# Patient Record
Sex: FEMALE | Race: WHITE | Employment: UNEMPLOYED | URBAN - METROPOLITAN AREA
[De-identification: names, ages, dates, MRNs, and addresses within clinical notes are randomized per-mention and may not be internally consistent; named-entity substitution may affect disease eponyms.]

---

## 2017-01-24 ENCOUNTER — APPOINTMENT (OUTPATIENT)
Dept: GENERAL RADIOLOGY | Age: 57
DRG: 071 | End: 2017-01-24
Attending: EMERGENCY MEDICINE
Payer: MEDICARE

## 2017-01-24 ENCOUNTER — APPOINTMENT (OUTPATIENT)
Dept: CT IMAGING | Age: 57
DRG: 071 | End: 2017-01-24
Attending: EMERGENCY MEDICINE
Payer: MEDICARE

## 2017-01-24 ENCOUNTER — HOSPITAL ENCOUNTER (INPATIENT)
Age: 57
LOS: 7 days | Discharge: HOME | DRG: 071 | End: 2017-01-31
Attending: EMERGENCY MEDICINE | Admitting: HOSPITALIST
Payer: MEDICARE

## 2017-01-24 DIAGNOSIS — R41.0 DELIRIUM: Primary | ICD-10-CM

## 2017-01-24 PROBLEM — R65.10 SIRS (SYSTEMIC INFLAMMATORY RESPONSE SYNDROME) (HCC): Status: ACTIVE | Noted: 2017-01-24

## 2017-01-24 PROBLEM — E87.1 HYPONATREMIA: Status: ACTIVE | Noted: 2017-01-24

## 2017-01-24 PROBLEM — J09.X2 INFLUENZA A (H5N1): Status: ACTIVE | Noted: 2017-01-24

## 2017-01-24 PROBLEM — R41.82 ALTERED MENTAL STATUS: Status: ACTIVE | Noted: 2017-01-24

## 2017-01-24 PROBLEM — R06.02 SHORTNESS OF BREATH: Status: ACTIVE | Noted: 2017-01-24

## 2017-01-24 LAB
ALBUMIN SERPL BCP-MCNC: 4.2 G/DL (ref 3.5–5)
ALBUMIN/GLOB SERPL: 1.2 {RATIO} (ref 1.1–2.2)
ALP SERPL-CCNC: 108 U/L (ref 45–117)
ALT SERPL-CCNC: 34 U/L (ref 12–78)
AMPHET UR QL SCN: NEGATIVE
ANION GAP BLD CALC-SCNC: 11 MMOL/L (ref 5–15)
APAP SERPL-MCNC: <2 UG/ML (ref 10–30)
APPEARANCE UR: CLEAR
AST SERPL W P-5'-P-CCNC: 24 U/L (ref 15–37)
ATRIAL RATE: 93 BPM
BACTERIA URNS QL MICRO: NEGATIVE /HPF
BARBITURATES UR QL SCN: NEGATIVE
BASOPHILS # BLD AUTO: 0 K/UL (ref 0–0.1)
BASOPHILS # BLD: 0 % (ref 0–1)
BENZODIAZ UR QL: NEGATIVE
BILIRUB SERPL-MCNC: 0.4 MG/DL (ref 0.2–1)
BILIRUB UR QL: NEGATIVE
BUN SERPL-MCNC: 10 MG/DL (ref 6–20)
BUN/CREAT SERPL: 11 (ref 12–20)
CALCIUM SERPL-MCNC: 9.1 MG/DL (ref 8.5–10.1)
CALCULATED P AXIS, ECG09: 73 DEGREES
CALCULATED R AXIS, ECG10: 51 DEGREES
CALCULATED T AXIS, ECG11: 65 DEGREES
CANNABINOIDS UR QL SCN: NEGATIVE
CHLORIDE SERPL-SCNC: 109 MMOL/L (ref 97–108)
CK SERPL-CCNC: 182 U/L (ref 26–192)
CO2 SERPL-SCNC: 19 MMOL/L (ref 21–32)
COCAINE UR QL SCN: NEGATIVE
COLOR UR: ABNORMAL
CREAT SERPL-MCNC: 0.9 MG/DL (ref 0.55–1.02)
DIAGNOSIS, 93000: NORMAL
DRUG SCRN COMMENT,DRGCM: NORMAL
EOSINOPHIL # BLD: 0 K/UL (ref 0–0.4)
EOSINOPHIL NFR BLD: 0 % (ref 0–7)
EPITH CASTS URNS QL MICRO: ABNORMAL /LPF
ERYTHROCYTE [DISTWIDTH] IN BLOOD BY AUTOMATED COUNT: 14.9 % (ref 11.5–14.5)
GLOBULIN SER CALC-MCNC: 3.5 G/DL (ref 2–4)
GLUCOSE SERPL-MCNC: 135 MG/DL (ref 65–100)
GLUCOSE UR STRIP.AUTO-MCNC: NEGATIVE MG/DL
HCT VFR BLD AUTO: 40 % (ref 35–47)
HGB BLD-MCNC: 13.5 G/DL (ref 11.5–16)
HGB UR QL STRIP: NEGATIVE
HYALINE CASTS URNS QL MICRO: ABNORMAL /LPF (ref 0–5)
KETONES UR QL STRIP.AUTO: ABNORMAL MG/DL
LEUKOCYTE ESTERASE UR QL STRIP.AUTO: NEGATIVE
LYMPHOCYTES # BLD AUTO: 17 % (ref 12–49)
LYMPHOCYTES # BLD: 1.6 K/UL (ref 0.8–3.5)
MCH RBC QN AUTO: 28 PG (ref 26–34)
MCHC RBC AUTO-ENTMCNC: 33.8 G/DL (ref 30–36.5)
MCV RBC AUTO: 83 FL (ref 80–99)
METHADONE UR QL: NEGATIVE
MONOCYTES # BLD: 0.3 K/UL (ref 0–1)
MONOCYTES NFR BLD AUTO: 3 % (ref 5–13)
NEUTS SEG # BLD: 7.5 K/UL (ref 1.8–8)
NEUTS SEG NFR BLD AUTO: 80 % (ref 32–75)
NITRITE UR QL STRIP.AUTO: NEGATIVE
OPIATES UR QL: NEGATIVE
P-R INTERVAL, ECG05: 150 MS
PCP UR QL: NEGATIVE
PH UR STRIP: 7 [PH] (ref 5–8)
PLATELET # BLD AUTO: 347 K/UL (ref 150–400)
POTASSIUM SERPL-SCNC: 3.4 MMOL/L (ref 3.5–5.1)
PROT SERPL-MCNC: 7.7 G/DL (ref 6.4–8.2)
PROT UR STRIP-MCNC: 30 MG/DL
Q-T INTERVAL, ECG07: 380 MS
QRS DURATION, ECG06: 80 MS
QTC CALCULATION (BEZET), ECG08: 472 MS
RBC # BLD AUTO: 4.82 M/UL (ref 3.8–5.2)
RBC #/AREA URNS HPF: ABNORMAL /HPF (ref 0–5)
SALICYLATES SERPL-MCNC: 2.6 MG/DL (ref 2.8–20)
SODIUM SERPL-SCNC: 139 MMOL/L (ref 136–145)
SP GR UR REFRACTOMETRY: 1.02 (ref 1–1.03)
TROPONIN I SERPL-MCNC: <0.04 NG/ML
UA: UC IF INDICATED,UAUC: ABNORMAL
UROBILINOGEN UR QL STRIP.AUTO: 0.2 EU/DL (ref 0.2–1)
VENTRICULAR RATE, ECG03: 93 BPM
WBC # BLD AUTO: 9.4 K/UL (ref 3.6–11)
WBC URNS QL MICRO: ABNORMAL /HPF (ref 0–4)

## 2017-01-24 PROCEDURE — 70450 CT HEAD/BRAIN W/O DYE: CPT

## 2017-01-24 PROCEDURE — 99285 EMERGENCY DEPT VISIT HI MDM: CPT

## 2017-01-24 PROCEDURE — 82550 ASSAY OF CK (CPK): CPT | Performed by: EMERGENCY MEDICINE

## 2017-01-24 PROCEDURE — 85025 COMPLETE CBC W/AUTO DIFF WBC: CPT | Performed by: EMERGENCY MEDICINE

## 2017-01-24 PROCEDURE — 51701 INSERT BLADDER CATHETER: CPT

## 2017-01-24 PROCEDURE — 80307 DRUG TEST PRSMV CHEM ANLYZR: CPT | Performed by: EMERGENCY MEDICINE

## 2017-01-24 PROCEDURE — 71010 XR CHEST PORT: CPT

## 2017-01-24 PROCEDURE — 65660000001 HC RM ICU INTERMED STEPDOWN

## 2017-01-24 PROCEDURE — 93005 ELECTROCARDIOGRAM TRACING: CPT

## 2017-01-24 PROCEDURE — 84484 ASSAY OF TROPONIN QUANT: CPT | Performed by: EMERGENCY MEDICINE

## 2017-01-24 PROCEDURE — 80053 COMPREHEN METABOLIC PANEL: CPT | Performed by: EMERGENCY MEDICINE

## 2017-01-24 PROCEDURE — 74011000250 HC RX REV CODE- 250: Performed by: HOSPITALIST

## 2017-01-24 PROCEDURE — 81001 URINALYSIS AUTO W/SCOPE: CPT | Performed by: EMERGENCY MEDICINE

## 2017-01-24 PROCEDURE — 74011250636 HC RX REV CODE- 250/636: Performed by: HOSPITALIST

## 2017-01-24 PROCEDURE — 96374 THER/PROPH/DIAG INJ IV PUSH: CPT

## 2017-01-24 PROCEDURE — 77030005563 HC CATH URETH INT MMGH -A

## 2017-01-24 PROCEDURE — 36415 COLL VENOUS BLD VENIPUNCTURE: CPT | Performed by: EMERGENCY MEDICINE

## 2017-01-24 PROCEDURE — 74011250637 HC RX REV CODE- 250/637: Performed by: HOSPITALIST

## 2017-01-24 PROCEDURE — 74011250636 HC RX REV CODE- 250/636: Performed by: EMERGENCY MEDICINE

## 2017-01-24 RX ORDER — LANOLIN ALCOHOL/MO/W.PET/CERES
400 CREAM (GRAM) TOPICAL DAILY
Status: DISCONTINUED | OUTPATIENT
Start: 2017-01-24 | End: 2017-01-31 | Stop reason: HOSPADM

## 2017-01-24 RX ORDER — POTASSIUM CHLORIDE 750 MG/1
40 TABLET, FILM COATED, EXTENDED RELEASE ORAL
Status: COMPLETED | OUTPATIENT
Start: 2017-01-24 | End: 2017-01-24

## 2017-01-24 RX ORDER — ENOXAPARIN SODIUM 100 MG/ML
40 INJECTION SUBCUTANEOUS EVERY 24 HOURS
Status: DISCONTINUED | OUTPATIENT
Start: 2017-01-24 | End: 2017-01-31 | Stop reason: HOSPADM

## 2017-01-24 RX ORDER — LOPERAMIDE HYDROCHLORIDE 2 MG/1
2 CAPSULE ORAL
COMMUNITY
End: 2017-01-31

## 2017-01-24 RX ORDER — FAMOTIDINE 20 MG/1
20 TABLET, FILM COATED ORAL 2 TIMES DAILY
Status: DISCONTINUED | OUTPATIENT
Start: 2017-01-24 | End: 2017-01-31 | Stop reason: HOSPADM

## 2017-01-24 RX ORDER — QUETIAPINE FUMARATE 100 MG/1
300 TABLET, FILM COATED ORAL DAILY
Status: DISCONTINUED | OUTPATIENT
Start: 2017-01-24 | End: 2017-01-24

## 2017-01-24 RX ORDER — SODIUM CHLORIDE 0.9 % (FLUSH) 0.9 %
5-10 SYRINGE (ML) INJECTION EVERY 8 HOURS
Status: DISCONTINUED | OUTPATIENT
Start: 2017-01-24 | End: 2017-01-31 | Stop reason: HOSPADM

## 2017-01-24 RX ORDER — LORAZEPAM 1 MG/1
2 TABLET ORAL 3 TIMES DAILY
Status: DISCONTINUED | OUTPATIENT
Start: 2017-01-24 | End: 2017-01-25

## 2017-01-24 RX ORDER — SODIUM CHLORIDE 0.9 % (FLUSH) 0.9 %
5-10 SYRINGE (ML) INJECTION AS NEEDED
Status: DISCONTINUED | OUTPATIENT
Start: 2017-01-24 | End: 2017-01-31 | Stop reason: HOSPADM

## 2017-01-24 RX ORDER — SODIUM CHLORIDE 9 MG/ML
100 INJECTION, SOLUTION INTRAVENOUS CONTINUOUS
Status: DISCONTINUED | OUTPATIENT
Start: 2017-01-24 | End: 2017-01-26

## 2017-01-24 RX ORDER — FLUOROMETHOLONE 1 MG/ML
1 SOLUTION/ DROPS OPHTHALMIC 2 TIMES DAILY
Status: DISCONTINUED | OUTPATIENT
Start: 2017-01-24 | End: 2017-01-31 | Stop reason: HOSPADM

## 2017-01-24 RX ORDER — FAMOTIDINE 20 MG/1
20 TABLET, FILM COATED ORAL 2 TIMES DAILY
Status: DISCONTINUED | OUTPATIENT
Start: 2017-01-24 | End: 2017-01-24 | Stop reason: SDUPTHER

## 2017-01-24 RX ORDER — HYDROCORTISONE ACETATE 1 %
400 CREAM (GRAM) TOPICAL DAILY
Status: DISCONTINUED | OUTPATIENT
Start: 2017-01-24 | End: 2017-01-24 | Stop reason: SDUPTHER

## 2017-01-24 RX ORDER — FLUTICASONE PROPIONATE 50 MCG
2 SPRAY, SUSPENSION (ML) NASAL 2 TIMES DAILY
Status: DISCONTINUED | OUTPATIENT
Start: 2017-01-24 | End: 2017-01-31 | Stop reason: HOSPADM

## 2017-01-24 RX ORDER — MELATONIN
1000 DAILY
Status: DISCONTINUED | OUTPATIENT
Start: 2017-01-24 | End: 2017-01-31 | Stop reason: HOSPADM

## 2017-01-24 RX ORDER — LORAZEPAM 2 MG/ML
1 INJECTION INTRAMUSCULAR
Status: COMPLETED | OUTPATIENT
Start: 2017-01-24 | End: 2017-01-24

## 2017-01-24 RX ORDER — MORPHINE SULFATE 2 MG/ML
2 INJECTION, SOLUTION INTRAMUSCULAR; INTRAVENOUS
Status: DISCONTINUED | OUTPATIENT
Start: 2017-01-24 | End: 2017-01-28

## 2017-01-24 RX ORDER — IPRATROPIUM BROMIDE AND ALBUTEROL SULFATE 2.5; .5 MG/3ML; MG/3ML
3 SOLUTION RESPIRATORY (INHALATION)
Status: DISCONTINUED | OUTPATIENT
Start: 2017-01-24 | End: 2017-01-27

## 2017-01-24 RX ORDER — VITAMIN E 268 MG
400 CAPSULE ORAL DAILY
COMMUNITY
End: 2021-08-03 | Stop reason: ALTCHOICE

## 2017-01-24 RX ORDER — FENOFIBRATE 145 MG/1
145 TABLET, COATED ORAL
Status: DISCONTINUED | OUTPATIENT
Start: 2017-01-24 | End: 2017-01-31 | Stop reason: HOSPADM

## 2017-01-24 RX ORDER — OSELTAMIVIR PHOSPHATE 75 MG/1
75 CAPSULE ORAL EVERY 12 HOURS
Status: CANCELLED | OUTPATIENT
Start: 2017-01-24

## 2017-01-24 RX ADMIN — FAMOTIDINE 20 MG: 20 TABLET ORAL at 18:30

## 2017-01-24 RX ADMIN — Medication 1 CAPSULE: at 18:30

## 2017-01-24 RX ADMIN — FLUOROMETHOLONE 1 DROP: 1 SOLUTION/ DROPS OPHTHALMIC at 18:30

## 2017-01-24 RX ADMIN — Medication 10 ML: at 22:47

## 2017-01-24 RX ADMIN — VITAMIN D, TAB 1000IU (100/BT) 1000 UNITS: 25 TAB at 18:30

## 2017-01-24 RX ADMIN — LORAZEPAM 1 MG: 2 INJECTION INTRAMUSCULAR; INTRAVENOUS at 11:28

## 2017-01-24 RX ADMIN — POTASSIUM CHLORIDE 40 MEQ: 750 TABLET, FILM COATED, EXTENDED RELEASE ORAL at 18:29

## 2017-01-24 RX ADMIN — VITAMIN E CAP 400 UNIT 400 UNITS: 400 CAP at 15:51

## 2017-01-24 RX ADMIN — SODIUM CHLORIDE 100 ML/HR: 900 INJECTION, SOLUTION INTRAVENOUS at 16:03

## 2017-01-24 RX ADMIN — FENOFIBRATE 145 MG: 145 TABLET, FILM COATED ORAL at 15:51

## 2017-01-24 RX ADMIN — QUETIAPINE FUMARATE 300 MG: 100 TABLET, FILM COATED ORAL at 15:51

## 2017-01-24 RX ADMIN — ENOXAPARIN SODIUM 40 MG: 40 INJECTION SUBCUTANEOUS at 15:51

## 2017-01-24 RX ADMIN — FLUTICASONE PROPIONATE 2 SPRAY: 50 SPRAY, METERED NASAL at 22:46

## 2017-01-24 RX ADMIN — LORAZEPAM 2 MG: 2 TABLET ORAL at 15:51

## 2017-01-24 RX ADMIN — Medication 10 ML: at 15:52

## 2017-01-24 RX ADMIN — LORAZEPAM 2 MG: 2 TABLET ORAL at 22:46

## 2017-01-24 NOTE — ED NOTES
Bedside and Verbal shift change report given to Candace Ortiz RN (oncoming nurse) by Cassia Olson RN (offgoing nurse). Report included the following information SBAR, ED Summary, MAR and Recent Results.

## 2017-01-24 NOTE — H&P
[address] [facility] [address] [place] [address], [address]   HISTORY AND PHYSICAL       Name:  Dg Ann   MR#:  [id number]   :  1960   Account #:  [de-identified]        Date of Adm:  2017       PRIMARY CARE PROVIDER: Adalgisa Puente MD    SOURCE OF INFORMATION: The patient, her sister at the bedside   and from ER staff. CHIEF COMPLAINT: Altered mental status. HISTORY OF PRESENT ILLNESS: This is a 58-year-old    female with past medical history significant for bipolar disorder, irritable   bowel syndrome, and headache, who is brought to [facility]   Emergency Department after she was found confused, agitated at   [facility] assisted living facility. The patient is a poor historian   and per her sister at the bedside, she reported that she was called by   [employer] at [facility] and she is told that the patient was confused  and agitated, she went to see the patient, patient was on her bed and her   medications on the tables. Her sister did not know which one of them  the patient took this morning. Her sister states that the patient didn't take  her medications 2 days ago. Per her sister, patient is more confused and  this different from her baseline. She had multiple falls in the past few days. The patient received Ativan in ER and look calm, but she does not give reliable   information. CT of the head without contrast was done, no acute   abnormality identified. Toxicology screening was negative. Urinalysis   unremarkable. Chest x-ray, no acute process, and there is a concern   for overdose and the patient is referred to hospitalist service for further   evaluation and admission. REVIEW OF SYSTEMS: Not able to obtain as the patient is confused. PAST MEDICAL HISTORY:   1. Bipolar disorder. 2. Gastroesophageal reflux disease. 3. Irritable bowel syndrome. 4. History of chronic headache.       PRIOR TO ADMISSION MEDICATIONS INCLUDE:   1. Vitamin E 400 units p.o. daily. 2. Imodium 2 mg 4 times daily as needed. 3. Pepcid 20 mg p.o. b.i.d.   4. Seroquel 300 mg p.o. daily. 5. Neurontin 300 mg p.o. as needed. 6. Nellie-Q 1 tab p.o. daily. 7. Flonase 2 sprays as needed. 8. Tricor 145 mg p.o. daily. 9. Benadryl 50 mg p.o. at bedtime. 10. Aspirin-acetaminophen-caffeine 1 tab every 6 hours as needed. 11. Ativan 2 mg 3 times daily. 12. Cholecalciferol 1 tab p.o. daily. ALLERGIES:    1. DICYCLOMINE. 2. LITHIUM. SOCIAL HISTORY: The patient from [facility] assisted living   facility. No tobacco or alcohol abuse. Ambulates with a rollator. CODE STATUS: FULL CODE. PHYSICAL EXAMINATION   VITAL SIGNS: Blood pressure 146/83, pulse 88, temperature 97.9,   respiratory rate 16. GENERAL APPEARANCE: The patient is alert, confused, but   cooperative, not in cardiorespiratory distress. HEENT: Pink conjunctivae and anicteric sclerae, moist tongue. Pupils   dilated, reactive to light slowly. Dry tongue and buccal mucosa. LUNGS: Clear to auscultation bilaterally. CHEST WALL: No tenderness or deformity. HEART: Regular rate and rhythm. S1 and S2 normal. No murmur or   gallop. ABDOMEN: Soft, nontender. Bowel sounds normal. No mass or   organomegaly. EXTREMITIES: No cyanosis or edema. SKIN: No rash or lesion. CENTRAL NERVOUS SYSTEM: The patient is conscious and alert,   confused. Moves all her extremities. EKG: Normal sinus rhythm, ventricular rate 93 beats per minute. No   significant EKG change from 03/19/2015. LABORATORY DATA: Hematology: White blood cell count 9.4,   hemoglobin 13.5, hematocrit 40, MCV 83, platelet count 573. Urinalysis unremarkable. Sodium 139, potassium 3.4, CO2 19, anion   gap 11, glucose 135, BUN 10, creatinine 0.90, calcium 9.1, ALT 34,   AST 24, alkaline phosphatase 128. Troponin less than 0.04. CT head without contrast: No acute process. Chest x-ray: No acute process.     ASSESSMENT: This is a 49-year-old  female with past   medical history significant for bipolar disorder, irritable bowel   syndrome, headache, who is brought from [facility] assisted   facility for altered mental status and confusion and agitation. 1. Encephalopathy, likely metabolic ? Over dose. 2. Hypokalemia. 3. History of bipolar disorder. 4. Chronic headache. PLAN:    1. Encephalopathy, metabolic versus delirium secondary to over dose present on admission. Admit to IMCU. Will hydrate with normal saline, monitor intake and output. CT scan is negative,  Neuro check, swallowing evaluation, fall precaution and consult psychiatrist,    2. Hypokalemia. Replace with KCl and repeat BMP in a.m.   3. History of bipolar disorder. The patient is stable after she had Ativan   and continue her home medications ativan, stop seroquel and consult psychiatrist.    Gastrointestinal prophylaxis, Pepcid. DVT prophylaxis, Lovenox.          MD HOUSTON Kenny / CAROLINA   D:  01/24/2017   13:49   T:  01/24/2017   14:25   Job #:  [id number]

## 2017-01-24 NOTE — PROGRESS NOTES
Admission Medication Reconciliation:    Information obtained from: Patient, patient's sister and rx bottles from home    Significant PMH/Disease States:   Past Medical History   Diagnosis Date    Arthritis     Bipolar disorder ([address]     Bladder pain     Choledocholithiasis 2010    GERD (gastroesophageal reflux disease)     Headache(784.0)      tension headaches    Irritable bowel     Pelvic pain in female     Psychiatric disorder      BIPOLAR    Stool color black      irritable bowel       Chief Complaint for this Admission:  AMS    Allergies:  Dicyclomine; Lithium; and Other medication    Prior to Admission Medications:   Prior to Admission Medications   Prescriptions Last Dose Informant Patient Reported? Taking? Cholecalciferol, Vitamin D3, (VITAMIN D3) 1,000 unit cap 2017 at Unknown time  Yes Yes   Sig: Take 1 Tab by mouth daily. L.acidoph&parac-S.therm-Bifido (THEO-Q,2,) 16 billion cell cap 2017 at Unknown time  No Yes   Si cap po QD   LORazepam (ATIVAN) 2 mg tablet 2017 at Unknown time  Yes Yes   Sig: Take 2 mg by mouth three (3) times daily. QUEtiapine (SEROQUEL) 300 mg tablet 2017 at Unknown time  Yes Yes   Sig: Take 300 mg by mouth daily. VILAZODONE HYDROCHLORIDE (VIIBRYD PO) 2017 at Unknown time  Yes Yes   Sig: Take 20 mg by mouth daily. aspirin-acetaminophen-caffeine 250-250-65 mg per tablet 2017 at Unknown time  Yes Yes   Sig: Take 1 Tab by mouth every six (6) hours as needed for Pain. diphenhydrAMINE (BENADRYL) 50 mg capsule 2017 at Unknown time  Yes Yes   Sig: Take 50 mg by mouth nightly. famotidine (PEPCID) 20 mg tablet 2017 at Unknown time  No Yes   Sig: Take 1 Tab by mouth two (2) times a day. D/C omeprazole   fenofibrate nanocrystallized (TRICOR) 145 mg tablet 2017 at Unknown time  No Yes   Sig: Take 1 Tab by mouth every fourty-eight (48) hours.    fluorometholone (FML) 0.1 % ophthalmic suspension 2017 at Unknown time  Yes Yes   Sig: Administer 1 Drop to both eyes two (2) times a day. fluticasone (FLONASE) 50 mcg/actuation nasal spray 1/23/2017 at Unknown time  No Yes   Sig: USE ONE SPRAY IN EACH NOSTRIL TWO TIMES A DAY. food supplement, lactose-free (ENSURE) liqd 1/23/2017 at Unknown time  No Yes   Sig: Take 237 mL by mouth daily. gabapentin (NEURONTIN) 300 mg capsule 1/23/2017 at Unknown time  No Yes   Sig: TAKE 1 CAPSULE BY MOUTH EVERY EIGHT HOURS AS NEEDED FOR HEADACHE. hyoscyamine SL (LEVSIN/SL) 0.125 mg SL tablet   No Yes   Sig: DISSOLVE 1 TABLET UNDER TONGUE EVERY SIX (6) HOURS AS NEEDED FOR CRAMPING. loperamide (IMODIUM) 2 mg capsule   Yes Yes   Sig: Take 2 mg by mouth four (4) times daily as needed for Diarrhea.   vitamin E (AQUA GEMS) 400 unit capsule 1/23/2017 at Unknown time  Yes Yes   Sig: Take 400 Units by mouth daily. Facility-Administered Medications: None         Comments/Recommendations: Reviewed all medications in prescription bottles brought in by patient's sister. These were consistent with the outpatient prescription records in rx query. Secondly, the medications in her pill box were checked for compliance with her medication regimen. The following issues were noteworthy:  1. She has been taking gabapentin bid every day except for Saturday while she is only prescribed to take this prn HA. 2.  Diphenhydramine is being taken 50 mg qhs, where this was supposed to be 25 mg prn  3. She has been taking quetiapine every day except for Saturday, the patient cannot recall why she set her pillbox up this way. 4.  Per patient's sister, she tends to [facility]" on OTC medications and she also tends to schedule her prn medications as well. She takes a significant amount of loperamide and generic excedrin(65 mg of caffeine) on a regular basis. The patient was counseled by myself on the detrimental results of using her OTC medications in excess.   In particular we discussed excessive use of Excedrin and the adverse effects of excessive caffeine in patient's with bipolar d/o. She and her sister expressed understanding of what was discussed and all of their questions were answered to their satisfaction.         Al Hernandez, LatanyaD

## 2017-01-24 NOTE — ED TRIAGE NOTES
Patient lives independently at the [facility]. She is anxious and not eating or drinking for 3 days. Patient has not taken any of her meds since Sunday for unknown reason. Patient is altered and oriented to self only when squad arrived.

## 2017-01-24 NOTE — ED PROVIDER NOTES
HPI Comments: 60 y.o. female with past medical history significant for Bipolar disorder, IBS, and tension headaches who presents from [facility] via EMS for evaluation of altered mental status. Per sister, the  at [facility] called her this morning and stated that patient called him confused and altered. Sister reports patient may have missed her medications yesterday. She is normally lucid but today is altered and disoriented. Sister states patient is making sense \"off and on. \"  She has a history of getting her meds mixed up but has never had a reaction like this from missing one day of medications. Patient is a poor historian. Social hx: Resides at [facility]  PCP: Chidi Good MD    Full history, physical exam, and ROS unable to be obtained due to: Altered mental status. Note written by Tiffanie Neely. Curtis Rudolph, as dictated by Shakir Nava MD 10:08 AM      The history is provided by the patient and a relative. The history is limited by the condition of the patient.         Past Medical History:   Diagnosis Date    Arthritis     Bipolar disorder ([address]     Bladder pain     Choledocholithiasis 11/24/2010    GERD (gastroesophageal reflux disease)     Headache(784.0)      tension headaches    Irritable bowel     Pelvic pain in female 2014    Psychiatric disorder      BIPOLAR    Stool color black      irritable bowel       Past Surgical History:   Procedure Laterality Date    Hx gyn  1998     hysterectomy    Hx gi  2005     prolasped rectum    Laparoscopy abdomen diagnostic  1987    Hx other surgical  1987     EXPLORATORY LAPAROSCOPY    Hx appendectomy  1988    Hx cholecystectomy  11/23/10     cholecystectomy         Family History:   Problem Relation Age of Onset    Hypertension Father     Cancer Father      SKIN CA    Colon Polyps Father     Other Maternal Uncle      CHAROT-MARISELA-TOOTH    Diabetes Paternal Grandfather        Social History     Social History  Marital status:      Spouse name: N/A    Number of children: N/A    Years of education: N/A     Occupational History    Not on file. Social History Main Topics    Smoking status: Never Smoker    Smokeless tobacco: Never Used    Alcohol use No    Drug use: Yes    Sexual activity: No     Other Topics Concern    Not on file     Social History Narrative         ALLERGIES: Dicyclomine; Lithium; and Other medication    Review of Systems   Unable to perform ROS: Other       Vitals:    01/24/17 1000 01/24/17 1030 01/24/17 1100   BP: (!) 155/92 157/90 159/84   Pulse: 98 93 90   Resp: 15 17 15   SpO2: 99% 100% 98%            Physical Exam   Constitutional: She appears well-developed and well-nourished. HENT:   Head: Normocephalic and atraumatic. Mouth/Throat: Oropharynx is clear and moist.   Eyes: EOM are normal.   Dilated minimally reactive pupils bilaterally   Neck: Normal range of motion. Neck supple. Cardiovascular: Normal rate, regular rhythm, normal heart sounds and intact distal pulses. Exam reveals no gallop and no friction rub. No murmur heard. Pulmonary/Chest: Effort normal. No respiratory distress. She has no wheezes. She has no rales. Abdominal: Soft. There is no tenderness. There is no rebound. Musculoskeletal: Normal range of motion. She exhibits no tenderness. Neurological: She is alert. No cranial nerve deficit. Oriented to person, place, month; not oriented to president or year. Answers some simple questions appropriately; unable to answer more complex questions  Follows simple commands. Motor; symmetric   Skin: No erythema. Psychiatric:   Tends to look off to the right  Poor eye contact   Nursing note and vitals reviewed. Note written by Graeme Ortega.  Natalia Davey, as dictated by Courtney Shaw MD 10:06 AM       MDM  Number of Diagnoses or Management Options  Delirium:   Critical Care  Total time providing critical care: 30-74 minutes  Total critical care time spend exclusive of procedures:  40 minutes    ED Course       Procedures      10:59 AM  Patient's sister has a handful of pills which she found on the floor. Will have pharmacist identify the pills. The loose meds found by sister were patient's usual medications including Benadryl and Exedrin. Patient has been taking two Benadryl at bedtime instead of one Benadryl at bedtime. Will check acetaminophen and salicylate levels. CONSULT NOTE:  12:03 PM Shanell Deluca MD spoke with Dr. Nadia Spring, Consult for Hospitalist.  Discussed available diagnostic tests and clinical findings. He is in agreement with care plans as outlined. Dr. Nadia Spring will admit the patient. ED EKG interpretation:  Rhythm: normal sinus rhythm; and regular . Rate (approx.): 95; Axis: normal; P wave: normal; QRS interval: normal ; ST/T wave: non-specific changes; in  Lead: ; Other findings: . This EKG was interpreted by Shanell Deluca MD,ED Provider.  12:07 PM

## 2017-01-24 NOTE — Clinical Note
Status[de-identified] Inpatient [101] Type of Bed: Medical [8] Inpatient Hospitalization Certified Necessary for the Following Reasons: 8. Other (further clarification in H&P documentation) Admitting Diagnosis: SIRS ([facility]ic inflammatory response syndrome) ([facility] [[id number]] Admitting Physician: Loli Clayton [[id number]] Attending Physician: Loli Clayton [[id number]] Estimated Length of Stay: > or = to 2 Midnights Discharge Plan[de-identified] Other (Specify)

## 2017-01-25 LAB
ANION GAP BLD CALC-SCNC: 11 MMOL/L (ref 5–15)
BUN SERPL-MCNC: 10 MG/DL (ref 6–20)
BUN/CREAT SERPL: 12 (ref 12–20)
CALCIUM SERPL-MCNC: 8.8 MG/DL (ref 8.5–10.1)
CHLORIDE SERPL-SCNC: 112 MMOL/L (ref 97–108)
CO2 SERPL-SCNC: 19 MMOL/L (ref 21–32)
CREAT SERPL-MCNC: 0.86 MG/DL (ref 0.55–1.02)
ERYTHROCYTE [DISTWIDTH] IN BLOOD BY AUTOMATED COUNT: 15.5 % (ref 11.5–14.5)
GLUCOSE SERPL-MCNC: 110 MG/DL (ref 65–100)
HCT VFR BLD AUTO: 38.1 % (ref 35–47)
HGB BLD-MCNC: 12.6 G/DL (ref 11.5–16)
MCH RBC QN AUTO: 27.8 PG (ref 26–34)
MCHC RBC AUTO-ENTMCNC: 33.1 G/DL (ref 30–36.5)
MCV RBC AUTO: 83.9 FL (ref 80–99)
PLATELET # BLD AUTO: 298 K/UL (ref 150–400)
POTASSIUM SERPL-SCNC: 4 MMOL/L (ref 3.5–5.1)
RBC # BLD AUTO: 4.54 M/UL (ref 3.8–5.2)
SODIUM SERPL-SCNC: 142 MMOL/L (ref 136–145)
WBC # BLD AUTO: 7.3 K/UL (ref 3.6–11)

## 2017-01-25 PROCEDURE — 36415 COLL VENOUS BLD VENIPUNCTURE: CPT | Performed by: HOSPITALIST

## 2017-01-25 PROCEDURE — 85027 COMPLETE CBC AUTOMATED: CPT | Performed by: HOSPITALIST

## 2017-01-25 PROCEDURE — 65270000032 HC RM SEMIPRIVATE

## 2017-01-25 PROCEDURE — 74011250636 HC RX REV CODE- 250/636: Performed by: HOSPITALIST

## 2017-01-25 PROCEDURE — 74011250637 HC RX REV CODE- 250/637: Performed by: NURSE PRACTITIONER

## 2017-01-25 PROCEDURE — 74011000250 HC RX REV CODE- 250: Performed by: HOSPITALIST

## 2017-01-25 PROCEDURE — 80048 BASIC METABOLIC PNL TOTAL CA: CPT | Performed by: HOSPITALIST

## 2017-01-25 PROCEDURE — 74011250637 HC RX REV CODE- 250/637: Performed by: HOSPITALIST

## 2017-01-25 RX ORDER — CLONIDINE HYDROCHLORIDE 0.1 MG/1
0.1 TABLET ORAL
Status: DISCONTINUED | OUTPATIENT
Start: 2017-01-25 | End: 2017-01-26

## 2017-01-25 RX ADMIN — CLONIDINE HYDROCHLORIDE 0.1 MG: 0.1 TABLET ORAL at 18:37

## 2017-01-25 RX ADMIN — Medication 10 ML: at 14:00

## 2017-01-25 RX ADMIN — Medication 10 ML: at 21:49

## 2017-01-25 RX ADMIN — LORAZEPAM 2 MG: 2 TABLET ORAL at 16:41

## 2017-01-25 RX ADMIN — SODIUM CHLORIDE 100 ML/HR: 900 INJECTION, SOLUTION INTRAVENOUS at 03:27

## 2017-01-25 RX ADMIN — FAMOTIDINE 20 MG: 20 TABLET ORAL at 16:40

## 2017-01-25 RX ADMIN — FLUOROMETHOLONE 1 DROP: 1 SOLUTION/ DROPS OPHTHALMIC at 08:25

## 2017-01-25 RX ADMIN — LORAZEPAM 2 MG: 2 TABLET ORAL at 08:27

## 2017-01-25 RX ADMIN — VITAMIN E CAP 400 UNIT 400 UNITS: 400 CAP at 08:30

## 2017-01-25 RX ADMIN — FLUOROMETHOLONE 1 DROP: 1 SOLUTION/ DROPS OPHTHALMIC at 16:41

## 2017-01-25 RX ADMIN — VITAMIN D, TAB 1000IU (100/BT) 1000 UNITS: 25 TAB at 08:27

## 2017-01-25 RX ADMIN — Medication 1 CAPSULE: at 08:27

## 2017-01-25 RX ADMIN — ENOXAPARIN SODIUM 40 MG: 40 INJECTION SUBCUTANEOUS at 14:31

## 2017-01-25 RX ADMIN — FAMOTIDINE 20 MG: 20 TABLET ORAL at 08:27

## 2017-01-25 NOTE — PROGRESS NOTES
2017 Report received from [facility]. 2048 TRANSFER - IN REPORT:    Verbal report received from Anamaria Zelaya RN(name) on Quintin Clark  being received from ED(unit) for routine progression of care      Report consisted of patients Situation, Background, Assessment and   Recommendations(SBAR). Information from the following report(s) SBAR, Kardex, Accordion and Recent Results was reviewed with the receiving nurse. Opportunity for questions and clarification was provided. Assessment completed upon patients arrival to unit and care assumed      Pharmacy called to inform me that we do not carry the Excedrin medication that patient takes at home. Per patient , she was told to hold off on taking this medication due to the caffeine.      Primary Nurse Liliana Marcos RN and Sukhdeep Calle RN performed a dual skin assessment on this patient No impairment noted

## 2017-01-25 NOTE — CDMP QUERY
Please clarify if this patient is being treated/managed for:    =>Metabolic Acidosis in the setting of confusion requiring lab monitoring   =>Other Explanation of clinical findings  =>Unable to Determine (no explanation of clinical findings)    The medical record reflects the following clinical findings, treatment, and risk factors:    Risk Factors: confusion  Clinical Indicators: CO2 19 x 2  Treatment: lab monitoring     Please clarify and document your clinical opinion in the progress notes and discharge summary including the definitive and/or presumptive diagnosis, (suspected or probable), related to the above clinical findings. Please include clinical findings supporting your diagnosis.     Thank you,         Jeni Hicks [facility]

## 2017-01-25 NOTE — PROGRESS NOTES
Problem: Falls - Risk of  Goal: *Absence of falls  Outcome: Progressing Towards Goal  Pt is absent of falls. Bed in lowest and locked position. Call bell within reach. Bedside shift change report given to Jennifer (oncoming nurse) by Diann Castro (offgoing nurse). Report included the following information SBAR, Kardex, Recent Results and Med Rec Status.

## 2017-01-25 NOTE — PROGRESS NOTES
Hospitalist Progress Note  Radha Perry NP  Office: [phone]  Cell: [phone]      Date of Service:  2017  NAME:  Allan Preston  :  1960  MRN:  [id number]    Admission Summary:   Patient presented to the ED, found by sister at [facility] assisted living facility with confusion and agitation. Sister states that the patient had medications on the table when she arrived but does not know which ones she took that day. Per sister the patient was more confused then her baseline, had multiple falls over the past several days. Admitted for further evaluation and treatment. Interval history / Subjective:   Pt in bed, appears anxious and restless. Has a hard time staying on topic for conversation. Assessment & Plan:     Encephalopathy (POA):  - Metabolic versus delirium secondary to over dose  - Continue to hydrate with NS  - Monitor I&O  - CT of head () showed there is no intracranial hemorrhage, extra-axial collection, mass, mass effect or midline shift.  - Continue fall precautions   - Drug screen negative ()  - Acetaminophen <2 and Salicylate level 2.6 ()     Hypokalemia: Resolved. Potassium 4.0 ()      Elevated blood pressure:  - No history of HTN, not currently taking any medication  - Will continue to monitor. Ordered po clonidine prn for SBP >150     Hx of bipolar disorder:   - Patient given Ativan in the ED with improvement  - Continue home dose of Ativan (2mg TID)  - Seroquel held  - Psychiatrist has seen, no need for inpatient admit but recommends continued hospitalization     DVT prophylaxis: On Lovenox  Dispo: Back to home when able.    Code status: Full  Care Plan discussed with: psychiatry, pt and her sister     May transfer to medical floor       Hospital Problems  Date Reviewed: 2017          Codes Class Noted POA    * (Principal)Altered mental status ICD-10-CM: R41.82  ICD-9-CM: 780.97 1/24/2017 Unknown        Delirium ICD-10-CM: R41.0  ICD-9-CM: 780.09  1/24/2017 Unknown        Hyponatremia ICD-10-CM: E87.1  ICD-9-CM: 276.1  1/24/2017 Unknown            Review of Systems:   Multiple complaints: legs restless, not eating well, intermittent nausea and poor appetite. Vital Signs:    Last 24hrs VS reviewed since prior progress note. Most recent are:  Visit Vitals    BP (!) 167/94 (BP 1 Location: Right arm, BP Patient Position: At rest)    Pulse 87    Temp 98.3 °F (36.8 °C)    Resp 11    Wt 76.8 kg (169 lb 5 oz)    SpO2 98%    BMI 33.07 kg/m2       Intake/Output Summary (Last 24 hours) at 01/25/17 1819  Last data filed at 01/25/17 1300   Gross per 24 hour   Intake             2075 ml   Output                0 ml   Net             2075 ml      Physical Examination:        Constitutional:  Cooperative, pleasant. Restless   ENT:  Oral mucous moist    Resp:  CTA bilaterally. No accessory muscle use. RA   CV:  Regular rhythm, SR on tele. GI:  Soft, non distended, non tender. normoactive bowel sounds     Musculoskeletal:  No edema, warm, 2+ pulses throughout    Neurologic:  Moves all extremities. AAOx 1-2      Data Review:   Review and/or order of clinical lab test  Review and/or order of tests in the radiology section of CPT  Review and/or order of tests in the medicine section of CPT    Labs:     Recent Labs      01/25/17   0720  01/24/17   0944   WBC  7.3  9.4   HGB  12.6  13.5   HCT  38.1  40.0   PLT  298  347     Recent Labs      01/25/17   0720  01/24/17   0944   NA  142  139   K  4.0  3.4*   CL  112*  109*   CO2  19*  19*   BUN  10  10   CREA  0.86  0.90   GLU  110*  135*   CA  8.8  9.1     Recent Labs      01/24/17   0944   SGOT  24   ALT  34   AP  108   TBILI  0.4   TP  7.7   ALB  4.2   GLOB  3.5     No results for input(s): INR, PTP, APTT in the last 72 hours. No lab exists for component: INREXT   No results for input(s): FE, TIBC, PSAT, FERR in the last 72 hours.    No results found for: FOL, RBCF   No results for input(s): PH, PCO2, PO2 in the last 72 hours.   Recent Labs      01/24/17   0944   TROIQ  <0.04     Lab Results   Component Value Date/Time    Cholesterol, total 185 06/28/2016 09:00 AM    HDL Cholesterol 53 06/28/2016 09:00 AM    LDL, calculated 114 06/28/2016 09:00 AM    Triglyceride 89 06/28/2016 09:00 AM    CHOL/HDL Ratio 3.2 05/17/2014 06:31 AM     No results found for: [facility]  Lab Results   Component Value Date/Time    Color YELLOW/STRAW 01/24/2017 09:44 AM    Appearance CLEAR 01/24/2017 09:44 AM    Specific gravity 1.018 01/24/2017 09:44 AM    pH (UA) 7.0 01/24/2017 09:44 AM    Protein 30 01/24/2017 09:44 AM    Glucose NEGATIVE  01/24/2017 09:44 AM    Ketone TRACE 01/24/2017 09:44 AM    Bilirubin NEGATIVE  01/24/2017 09:44 AM    Urobilinogen 0.2 01/24/2017 09:44 AM    Nitrites NEGATIVE  01/24/2017 09:44 AM    Leukocyte Esterase NEGATIVE  01/24/2017 09:44 AM    Epithelial cells FEW 01/24/2017 09:44 AM    Bacteria NEGATIVE  01/24/2017 09:44 AM    WBC 0-4 01/24/2017 09:44 AM    RBC 0-5 01/24/2017 09:44 AM     Medications Reviewed:     Current Facility-Administered Medications   Medication Dose Route Frequency    cloNIDine HCl (CATAPRES) tablet 0.1 mg  0.1 mg Oral Q6H PRN    cholecalciferol (VITAMIN D3) tablet 1,000 Units  1,000 Units Oral DAILY    famotidine (PEPCID) tablet 20 mg  20 mg Oral BID    fenofibrate nanocrystallized (TRICOR) tablet 145 mg  145 mg Oral Q48H    fluorometholone (FML) 0.1 % ophthalmic suspension 1 Drop  1 Drop Both Eyes BID    fluticasone (FLONASE) 50 mcg/actuation nasal spray 2 Spray  2 Spray Both Nostrils BID    lactobac ac& pc-s.therm-b.anim (THEO Q/RISAQUAD)  1 Cap Oral DAILY    LORazepam (ATIVAN) tablet 2 mg  2 mg Oral TID    sodium chloride (NS) flush 5-10 mL  5-10 mL IntraVENous Q8H    sodium chloride (NS) flush 5-10 mL  5-10 mL IntraVENous PRN    enoxaparin (LOVENOX) injection 40 mg  40 mg SubCUTAneous Q24H    albuterol-ipratropium (DUO-NEB) 2.5 MG-0.5 MG/3 ML  3 mL Nebulization Q4H PRN    morphine injection 2 mg  2 mg IntraVENous Q4H PRN    vitamin E acetate capsule 400 Units  400 Units Oral DAILY   ______________________________________________________________________  EXPECTED LENGTH OF STAY: 2d 9h  ACTUAL LENGTH OF STAY:          [facility], NP

## 2017-01-25 NOTE — PROGRESS NOTES
Care Management Interventions  PCP Verified by CM: Yes Marge Bowen MD)  Mode of Transport at Discharge: Other (see comment)  Transition of Care Consult (CM Consult):  (To Be Determined)  Jamee Signup: No  Discharge Durable Medical Equipment: No  Physical Therapy Consult: No  Occupational Therapy Consult: No  Speech Therapy Consult: No  Current Support Network: Lives Alone ([facility])  Confirm Follow Up Transport: Family  Plan discussed with Pt/Family/Caregiver: Yes  Discharge Location  Discharge Placement: Other: (To Be Determined. )    CM met with patient. Patient was alert, functionally oriented, cheerful, and cooperative. She reports some variable confusion, but this not observed at this time. Patient has a history of mental illness with SSI disability income. Patient lives alone at [facility]. Previously, patient lived with parents in [place] who continue in good health in their [de-identified]. She moved to [address] about 3 years ago. She has a sister Amador Maharaj who is very supportive. No other family support reported. Patient reports good family /social support. Patient reports confusion about taking her medications, not sure if too much or too little, that led to this hospitalization. A Psychiatric Consult is pending. Patient said that she has previously had physical therapy at [address], the facility no longer had that service. CM is aware that [facility] is available if home health services are needed. Patient expects return to independent functioning. Patient confirmed PCP, health insurance, and prescription coverage. Transport to home will be provided by sister. No discharge planning needs presented at this time.

## 2017-01-25 NOTE — PROGRESS NOTES
1000: Purewick applied. Pt explained use. Pt verbalized understanding. Problem: Falls - Risk of  Goal: *Absence of falls  Outcome: Progressing Towards Goal  Pt absent of falls. Call light within reach. Bed in low position. Non-skid socks applied. Pt encouraged to call out. Pt calls out appropriately. Purewick repositioned every 2 hours throughout shift. Bedside shift change report given to Katia Russell 70. (oncoming nurse) by Fallon Menon (offgoing nurse). Report included the following information SBAR, Kardex, ED Summary, Procedure Summary, Intake/Output, MAR, Accordion, Recent Results, Med Rec Status, Cardiac Rhythm NSR and Alarm Parameters .

## 2017-01-25 NOTE — PROGRESS NOTES
Spiritual Care Assessment/Progress Notes    Zenia Turcios [id number]  [id number]    1960  60 y.o.  female    Patient Telephone Number: [phone] (home)   Yazidism Affiliation: [facility]   Language: English   Extended Emergency Contact Information  Primary Emergency Contact: [address] [facility] Phone: [phone]  Mobile Phone: [phone]  Relation: Sister   Patient Active Problem List    Diagnosis Date Noted    Altered mental status 01/24/2017    Delirium 01/24/2017    Hyponatremia 01/24/2017    Drop attack 03/25/2015    IBS (irritable bowel syndrome) 10/30/2014    Chronic headache 10/30/2014    Bipolar affective disorder, manic ([address] 05/16/2014     Class: Acute    Choledocholithiasis 11/24/2010    Gallstones with obstruction of gallbladder 11/23/2010    Arthritis         Date: 1/25/2017       Level of Yazidism/Spiritual Activity:  [x]         Involved in le tradition/spiritual practice    []         Not involved in le tradition/spiritual practice  []         Spiritually oriented    []         Claims no spiritual orientation    []         seeking spiritual identity  []         Feels alienated from Scientologist practice/tradition  []         Feels angry about Scientologist practice/tradition  [x]         Spirituality/Scientologist tradition is a resource for coping at this time.   []         Not able to assess due to medical condition    Services Provided Today:  []         crisis intervention    []         reading Scriptures  [x]         spiritual assessment    [x]         prayer  [x]         empathic listening/emotional support  []         rites and rituals (cite in comments)  []         life review     []         Scientologist support  []         theological development   []         advocacy  []         ethical dialog     []         blessing  []         bereavement support    []         support to family  []         anticipatory grief support   []         help with AMD  [] spiritual guidance    []         meditation      Spiritual Care Needs  [x]         Emotional Support  [x]         Spiritual/Sikh Care  []         Loss/Adjustment  []         Advocacy/Referral                /Ethics  []         No needs expressed at               this time  []         Other: (note in               comments)  [address]  []         Follow up visits with               pt/family  []         Provide materials  []         Schedule sacraments  []         Contact Community               Clergy  [x]         Follow up as needed  []         Other: (note in               comments)     Comments: Visited with Ms. Jalen Caceres per request of pt's nurse as I was present on unit. Provided listening presence, emotional and spiritual support to pt as she provided a review of the reasons for her hospitalization. Pt expresses Sikh le; shared prayer. Ms. Jalen Caceres expressed appreciation for visit and for support. Please page [phone] for additional  support as needed.     Cait Chapin, Palliative

## 2017-01-25 NOTE — PROGRESS NOTES
TRANSFER - OUT REPORT:    Verbal report given to JYOTSNA Ritter(name) on Nicolette Riley  being transferred to [facility]) for routine progression of care       Report consisted of patients Situation, Background, Assessment and   Recommendations(SBAR). Information from the following report(s) SBAR was reviewed with the receiving nurse. Lines:   Peripheral IV 01/24/17 Left Arm (Active)   Site Assessment Clean, dry, & intact 1/24/2017  9:54 AM   Phlebitis Assessment 0 1/24/2017  9:54 AM   Infiltration Assessment 0 1/24/2017  9:54 AM   Dressing Status Clean, dry, & intact 1/24/2017  9:54 AM   Hub Color/Line Status Pink 1/24/2017  9:54 AM        Opportunity for questions and clarification was provided.       Patient transported with:   Registered Nurse

## 2017-01-25 NOTE — PROGRESS NOTES
Nurse Practitioner Student Progress Note     PCP: Johnathan Campo MD     Patient presented to the ED, found by sister at [facility] assisted living facility with confusion and agitation. Sister states that the patient had medications on the table when she arrived but does not know which ones she took that day. Per sister the patient was more confused then her baseline, had multiple falls over the past several days. Admitted for further evaluation and treatment. Assessment/Plan:   Encephalopathy (POA):  -Metabolic versus delirium secondary to over dose  -Continue to hydrate with NS  -Monitor I&O  -CT of head (1/24) showed there is no intracranial hemorrhage, extra-axial collection, mass, mass effect or midline shift.  -Psychiatrist consulted. -Continue fall precautions   -Drug screen was negative (1/24)  -Acetaminophen <2 and Salicylate level 2.6 (2/87)    Hypokalemia:   Resolved. Potassium is 4.0 (1/25)     Elevated blood pressure:  -No history of HTN, not currently taking any medication  -Will continue to monitor. Hx of bipolar disorder:   -Patient given Ativan in the ED with improvement  -Continue home dose of Ativan (2mg TID)  -Seroquel held  -Psychiatrist consulted on patient. DVT prophylaxis: On Lovenox  Dispo: Back to home when able. Discussed plan with nurse, patient, family, NP and attending. Subjective:   Patient is resting is the bed, states that this has been a bad day for her Bipolar disorder and for her IBS. Does not really provide more detail, patient appears anxious. Patient verbalizes a poor appetites and decreases oral intake over the past day.       Past Medical History   Diagnosis Date    Arthritis     Bipolar disorder ([facility] [address]     Bladder pain     Choledocholithiasis 11/24/2010    GERD (gastroesophageal reflux disease)     Headache(784.0) tension headaches    Irritable bowel     Pelvic pain in female 2014    Psychiatric disorder      BIPOLAR    Stool color black      irritable bowel       Past Surgical History   Procedure Laterality Date    Hx gyn  1998     hysterectomy    Hx gi  2005     prolasped rectum    Laparoscopy abdomen diagnostic  1987    Hx other surgical  1987     EXPLORATORY LAPAROSCOPY    Hx appendectomy  1988    Hx cholecystectomy  11/23/10     cholecystectomy       Family History   Problem Relation Age of Onset    Hypertension Father     Cancer Father      SKIN CA    Colon Polyps Father     Other Maternal Uncle      CHAROT-MARISELA-TOOTH    Diabetes Paternal Grandfather        Social History     Social History    Marital status:      Spouse name: N/A    Number of children: N/A    Years of education: N/A     Social History Main Topics    Smoking status: Never Smoker    Smokeless tobacco: Never Used    Alcohol use No    Drug use: Yes    Sexual activity: No     Other Topics Concern    None     Social History Narrative       Review of Systems:   Patient states some intermittent headache, chest pain, palpitations, shortness of breath, nausea, abdominal pain, pain with urination,swelling to her extremities and anxiety. Denies any diarrhea. Discussed with nursing and patient has no significant complaints for today. Objective:     Visit Vitals    /86 (BP 1 Location: Right arm, BP Patient Position: At rest)    Pulse 91    Temp 98.6 °F (37 °C)    Resp 17    Wt 76.8 kg (169 lb 5 oz)    SpO2 97%    BMI 33.07 kg/m2       Intake/Output Summary (Last 24 hours) at 01/25/17 1529  Last data filed at 01/25/17 1300   Gross per 24 hour   Intake             2075 ml   Output                0 ml   Net             2075 ml        PHYSICAL EXAM:   General:  Alert, cooperative, no acute distress. HEENT: Mucus membranes are moist.   Neurologic:  Alert and oriented X 3, moving all extremities, speech clear. Lungs:  CTA bilateral; No accessory muscle usage  Heart:   NSR (90), S1 and S2 heard, no murmur, no Rubs, no Gallops, no JVD   Abdomen:  Soft, Non distended, +Bowel sounds, tender in all four quadrants. Extremities:  No clubbing cyanosis, no edema, warm, dry. Skin:  Intact, no s/s of infection  Psych:  Cooperative. No agitated, patient is anxious. Data Review:     Recent Labs      01/25/17 0720 01/24/17 0944   NA  142  139   K  4.0  3.4*   CL  112*  109*   CO2  19*  19*   BUN  10  10   CREA  0.86  0.90   GLU  110*  135*   ALB   --   4.2     Recent Labs      01/25/17 0720 01/24/17 0944   WBC  7.3  9.4   HGB  12.6  13.5   HCT  38.1  40.0   PLT  298  347     Recent Labs      01/24/17 0944   TROIQ  <0.04     No results for input(s): INR, PTP, APTT in the last 72 hours.     No lab exists for component: INREXT  No components found for: GLPOC    Current Facility-Administered Medications   Medication Dose Route Frequency    cholecalciferol (VITAMIN D3) tablet 1,000 Units  1,000 Units Oral DAILY    famotidine (PEPCID) tablet 20 mg  20 mg Oral BID    fenofibrate nanocrystallized (TRICOR) tablet 145 mg  145 mg Oral Q48H    fluorometholone (FML) 0.1 % ophthalmic suspension 1 Drop  1 Drop Both Eyes BID    fluticasone (FLONASE) 50 mcg/actuation nasal spray 2 Spray  2 Spray Both Nostrils BID    lactobac ac& pc-s.therm-b.anim (THEO Q/RISAQUAD)  1 Cap Oral DAILY    LORazepam (ATIVAN) tablet 2 mg  2 mg Oral TID    sodium chloride (NS) flush 5-10 mL  5-10 mL IntraVENous Q8H    sodium chloride (NS) flush 5-10 mL  5-10 mL IntraVENous PRN    enoxaparin (LOVENOX) injection 40 mg  40 mg SubCUTAneous Q24H    albuterol-ipratropium (DUO-NEB) 2.5 MG-0.5 MG/3 ML  3 mL Nebulization Q4H PRN    morphine injection 2 mg  2 mg IntraVENous Q4H PRN    vitamin E acetate capsule 400 Units  400 Units Oral DAILY       Efra Pipes

## 2017-01-25 NOTE — PROGRESS NOTES
Pt well known to me from outpatient practice. Diagnosis of bipolar disorder never fully manic, sometimes depressed with neurovegetative sx prominent, and some up moods. Pt probably took pills this AM and has improved over the day but unable to hold meaningful conversation with confusion prominent. Will defer more thorough A/P pending her level of alertness.   Will follow

## 2017-01-26 PROCEDURE — 74011250637 HC RX REV CODE- 250/637: Performed by: NURSE PRACTITIONER

## 2017-01-26 PROCEDURE — 74011250636 HC RX REV CODE- 250/636: Performed by: HOSPITALIST

## 2017-01-26 PROCEDURE — 65270000032 HC RM SEMIPRIVATE

## 2017-01-26 PROCEDURE — 74011250637 HC RX REV CODE- 250/637: Performed by: PSYCHIATRY & NEUROLOGY

## 2017-01-26 PROCEDURE — 74011250637 HC RX REV CODE- 250/637: Performed by: HOSPITALIST

## 2017-01-26 RX ORDER — QUETIAPINE FUMARATE 100 MG/1
100 TABLET, FILM COATED ORAL 2 TIMES DAILY
Status: DISCONTINUED | OUTPATIENT
Start: 2017-01-26 | End: 2017-01-27

## 2017-01-26 RX ORDER — AMLODIPINE BESYLATE 5 MG/1
2.5 TABLET ORAL DAILY
Status: DISCONTINUED | OUTPATIENT
Start: 2017-01-27 | End: 2017-01-29

## 2017-01-26 RX ADMIN — FLUOROMETHOLONE 1 DROP: 1 SOLUTION/ DROPS OPHTHALMIC at 18:11

## 2017-01-26 RX ADMIN — Medication 1 CAPSULE: at 08:59

## 2017-01-26 RX ADMIN — QUETIAPINE FUMARATE 100 MG: 100 TABLET, FILM COATED ORAL at 21:43

## 2017-01-26 RX ADMIN — Medication 10 ML: at 21:43

## 2017-01-26 RX ADMIN — CLONIDINE HYDROCHLORIDE 0.1 MG: 0.1 TABLET ORAL at 11:20

## 2017-01-26 RX ADMIN — FAMOTIDINE 20 MG: 20 TABLET ORAL at 18:11

## 2017-01-26 RX ADMIN — FLUTICASONE PROPIONATE 2 SPRAY: 50 SPRAY, METERED NASAL at 21:44

## 2017-01-26 RX ADMIN — FAMOTIDINE 20 MG: 20 TABLET ORAL at 08:59

## 2017-01-26 RX ADMIN — ENOXAPARIN SODIUM 40 MG: 40 INJECTION SUBCUTANEOUS at 14:35

## 2017-01-26 RX ADMIN — Medication 10 ML: at 14:36

## 2017-01-26 RX ADMIN — VITAMIN E CAP 400 UNIT 400 UNITS: 400 CAP at 08:59

## 2017-01-26 RX ADMIN — VITAMIN D, TAB 1000IU (100/BT) 1000 UNITS: 25 TAB at 08:59

## 2017-01-26 RX ADMIN — FENOFIBRATE 145 MG: 145 TABLET, FILM COATED ORAL at 14:35

## 2017-01-26 RX ADMIN — Medication 10 ML: at 06:00

## 2017-01-26 NOTE — PROGRESS NOTES
Reviewed medical chart; met with the patient at the bedside. Note prior assessment by colleague Alex Bustamante LCSW. Patient lives at [address] [place] [address]. She uses a rollator to ambulate. Patient explained that she had a mishap with her medication, but that her sister is looking into getting medication administration assistance at [facility]. Patient is followed by Dr. Parker Mccurdy as an inpatient and outpatient. Note that she may not need a transfer to the [address] [place] [address] Unit. This  called the patient's sister and left a voicemail to introduce self and role, Nathaniel Haq, Y#[phone], H#[phone] - awaiting a call back. Care Management will continue to follow her disposition.    RENZO Rosales

## 2017-01-26 NOTE — PROGRESS NOTES
Hospitalist Progress Note  Noe Valenzuela NP  Office: [phone]  Cell: [phone]      Date of Service:  2017  NAME:  Sonia Jain  :  1960  MRN:  [id number]    Admission Summary:   Patient presented to the ED, found by sister at [facility] assisted living facility with confusion and agitation. Sister states that the patient had medications on the table when she arrived but does not know which ones she took that day. Per sister the patient was more confused then her baseline, had multiple falls over the past several days. Admitted for further evaluation and treatment. Interval history / Subjective:   Pt in bed, looks more calm today. She reports she is feeling better - fixated on how she doesn't know \"how this happened\". Still having a hard time staying on topic with conversation. Assessment & Plan:     Encephalopathy (POA):  - Metabolic versus delirium secondary to over dose  - CT of head () showed there is no intracranial hemorrhage, extra-axial collection, mass, mass effect or midline shift.  - Continue fall precautions   - Drug screen negative ()  - Acetaminophen <2 and Salicylate level 2.6 ()     Hypokalemia: Resolved. Potassium 4.0 ()      Elevated blood pressure:  - No history of HTN, no BP meds PTA  - Will continue to monitor. Has received 2 doses of clonidine. DBP climbs into 90's and SBP into 150's. - start low dose norvasc tomorrow am, stop clonidine     Hx of bipolar disorder:   - Patient given Ativan in the ED with improvement  - stopped ativan as per psych   - Seroquel held for now, psych will start back as indicated  - Psychiatrist has seen, no need for inpatient admit but recommends continued hospitalization     DVT prophylaxis: On Lovenox  Dispo: Back to home when able.    Code status: Full  Care Plan discussed with: patient and attending MD       Hospital Problems  Date Reviewed: 2017 Codes Class Noted POA    * (Principal)Altered mental status ICD-10-CM: R41.82  ICD-9-CM: 780.97  1/24/2017 Unknown        Delirium ICD-10-CM: R41.0  ICD-9-CM: 780.09  1/24/2017 Unknown        Hyponatremia ICD-10-CM: E87.1  ICD-9-CM: 276.1  1/24/2017 Unknown            Review of Systems:   Reports she is \"hot\". No GI complaints, no respiratory complaints. Vital Signs:    Last 24hrs VS reviewed since prior progress note. Most recent are:  Visit Vitals    BP (!) 136/91    Pulse 82    Temp 98 °F (36.7 °C)    Resp 14    Wt 76.8 kg (169 lb 5 oz)    SpO2 98%    BMI 33.07 kg/m2       Intake/Output Summary (Last 24 hours) at 01/26/17 1319  Last data filed at 01/26/17 1121   Gross per 24 hour   Intake                0 ml   Output                1 ml   Net               -1 ml      Physical Examination:        Constitutional:  Cooperative, pleasant. ENT:  Oral mucous moist    Resp:  CTA bilaterally. No accessory muscle use. RA   CV:  Regular rhythm. GI:  Soft, non distended, non tender. normoactive bowel sounds     Musculoskeletal:  No edema, warm, 2+ pulses throughout    Neurologic:  Moves all extremities. AAOx 2-3      Data Review:   Review and/or order of clinical lab test  Review and/or order of tests in the radiology section of CPT  Review and/or order of tests in the medicine section of CPT    Labs:     Recent Labs      01/25/17   0720  01/24/17   0944   WBC  7.3  9.4   HGB  12.6  13.5   HCT  38.1  40.0   PLT  298  347     Recent Labs      01/25/17   0720  01/24/17   0944   NA  142  139   K  4.0  3.4*   CL  112*  109*   CO2  19*  19*   BUN  10  10   CREA  0.86  0.90   GLU  110*  135*   CA  8.8  9.1     Recent Labs      01/24/17   0944   SGOT  24   ALT  34   AP  108   TBILI  0.4   TP  7.7   ALB  4.2   GLOB  3.5     No results for input(s): INR, PTP, APTT in the last 72 hours. No lab exists for component: INREXT, INREXT   No results for input(s): FE, TIBC, PSAT, FERR in the last 72 hours.    No results found for: FOL, RBCF   No results for input(s): PH, PCO2, PO2 in the last 72 hours.   Recent Labs      01/24/17   0944   TROIQ  <0.04     Lab Results   Component Value Date/Time    Cholesterol, total 185 06/28/2016 09:00 AM    HDL Cholesterol 53 06/28/2016 09:00 AM    LDL, calculated 114 06/28/2016 09:00 AM    Triglyceride 89 06/28/2016 09:00 AM    CHOL/HDL Ratio 3.2 05/17/2014 06:31 AM     No results found for: [facility]  Lab Results   Component Value Date/Time    Color YELLOW/STRAW 01/24/2017 09:44 AM    Appearance CLEAR 01/24/2017 09:44 AM    Specific gravity 1.018 01/24/2017 09:44 AM    pH (UA) 7.0 01/24/2017 09:44 AM    Protein 30 01/24/2017 09:44 AM    Glucose NEGATIVE  01/24/2017 09:44 AM    Ketone TRACE 01/24/2017 09:44 AM    Bilirubin NEGATIVE  01/24/2017 09:44 AM    Urobilinogen 0.2 01/24/2017 09:44 AM    Nitrites NEGATIVE  01/24/2017 09:44 AM    Leukocyte Esterase NEGATIVE  01/24/2017 09:44 AM    Epithelial cells FEW 01/24/2017 09:44 AM    Bacteria NEGATIVE  01/24/2017 09:44 AM    WBC 0-4 01/24/2017 09:44 AM    RBC 0-5 01/24/2017 09:44 AM     Medications Reviewed:     Current Facility-Administered Medications   Medication Dose Route Frequency    cloNIDine HCl (CATAPRES) tablet 0.1 mg  0.1 mg Oral Q6H PRN    cholecalciferol (VITAMIN D3) tablet 1,000 Units  1,000 Units Oral DAILY    famotidine (PEPCID) tablet 20 mg  20 mg Oral BID    fenofibrate nanocrystallized (TRICOR) tablet 145 mg  145 mg Oral Q48H    fluorometholone (FML) 0.1 % ophthalmic suspension 1 Drop  1 Drop Both Eyes BID    fluticasone (FLONASE) 50 mcg/actuation nasal spray 2 Spray  2 Spray Both Nostrils BID    lactobac ac& pc-s.therm-b.anim (THEO Q/RISAQUAD)  1 Cap Oral DAILY    sodium chloride (NS) flush 5-10 mL  5-10 mL IntraVENous Q8H    sodium chloride (NS) flush 5-10 mL  5-10 mL IntraVENous PRN    enoxaparin (LOVENOX) injection 40 mg  40 mg SubCUTAneous Q24H    albuterol-ipratropium (DUO-NEB) 2.5 MG-0.5 MG/3 ML  3 mL Nebulization Q4H PRN    morphine injection 2 mg  2 mg IntraVENous Q4H PRN    vitamin E acetate capsule 400 Units  400 Units Oral DAILY   ______________________________________________________________________  EXPECTED LENGTH OF STAY: 2d 9h  ACTUAL LENGTH OF STAY:          2             Johnson Anderson NP

## 2017-01-26 NOTE — PROGRESS NOTES
TRANSFER - OUT REPORT:    Verbal report given to Cyn Griffin RN(name) on Glenora Dandy  being transferred to [facility](unit) for routine progression of care       Report consisted of patients Situation, Background, Assessment and   Recommendations(SBAR). Information from the following report(s) SBAR, Kardex, Intake/Output, MAR and Recent Results was reviewed with the receiving nurse. Lines:   Peripheral IV 01/24/17 Left Arm (Active)   Site Assessment Clean, dry, & intact 1/25/2017  4:30 PM   Phlebitis Assessment 0 1/25/2017  4:30 PM   Infiltration Assessment 0 1/25/2017  4:30 PM   Dressing Status Clean, dry, & intact 1/25/2017  4:30 PM   Dressing Type Transparent;Tape 1/25/2017  4:30 PM   Hub Color/Line Status Pink;Capped 1/25/2017  4:30 PM   Action Taken Open ports on tubing capped 1/25/2017  4:30 PM   Alcohol Cap Used Yes 1/25/2017  4:30 PM        Opportunity for questions and clarification was provided.

## 2017-01-26 NOTE — PROGRESS NOTES
Pt talking of GI sx and difficulty eating and she is wondering about damaging herself with taking too much medication. Somatization and obsessing about physical sx occurs frequently with her. She is also clearer mentally in terms of cognitive function and now appearing more like shifting to manic episode. Will restart quetiapine and consider transfer to Psychiatry if shift to manic state progresses.

## 2017-01-26 NOTE — ROUTINE PROCESS
Bedside and Verbal shift change report given to Bharat Mosher (oncoming nurse) by Troy Cesar (offgoing nurse). Report included the following information SBAR. All opportunity for clarification/questions given.

## 2017-01-27 PROCEDURE — 74011250637 HC RX REV CODE- 250/637: Performed by: NURSE PRACTITIONER

## 2017-01-27 PROCEDURE — 65270000032 HC RM SEMIPRIVATE

## 2017-01-27 PROCEDURE — 74011250637 HC RX REV CODE- 250/637: Performed by: HOSPITALIST

## 2017-01-27 PROCEDURE — 74011250636 HC RX REV CODE- 250/636: Performed by: HOSPITALIST

## 2017-01-27 PROCEDURE — 74011250637 HC RX REV CODE- 250/637: Performed by: PSYCHIATRY & NEUROLOGY

## 2017-01-27 RX ORDER — QUETIAPINE FUMARATE 100 MG/1
200 TABLET, FILM COATED ORAL
Status: DISCONTINUED | OUTPATIENT
Start: 2017-01-27 | End: 2017-01-28

## 2017-01-27 RX ORDER — QUETIAPINE FUMARATE 100 MG/1
100 TABLET, FILM COATED ORAL DAILY
Status: DISCONTINUED | OUTPATIENT
Start: 2017-01-28 | End: 2017-01-28

## 2017-01-27 RX ADMIN — FAMOTIDINE 20 MG: 20 TABLET ORAL at 18:05

## 2017-01-27 RX ADMIN — Medication 10 ML: at 13:40

## 2017-01-27 RX ADMIN — Medication 1 CAPSULE: at 09:57

## 2017-01-27 RX ADMIN — FLUOROMETHOLONE 1 DROP: 1 SOLUTION/ DROPS OPHTHALMIC at 09:59

## 2017-01-27 RX ADMIN — ENOXAPARIN SODIUM 40 MG: 40 INJECTION SUBCUTANEOUS at 13:39

## 2017-01-27 RX ADMIN — VITAMIN E CAP 400 UNIT 400 UNITS: 400 CAP at 09:57

## 2017-01-27 RX ADMIN — QUETIAPINE FUMARATE 200 MG: 100 TABLET, FILM COATED ORAL at 21:24

## 2017-01-27 RX ADMIN — Medication 10 ML: at 07:02

## 2017-01-27 RX ADMIN — AMLODIPINE BESYLATE 2.5 MG: 5 TABLET ORAL at 09:56

## 2017-01-27 RX ADMIN — QUETIAPINE FUMARATE 100 MG: 100 TABLET, FILM COATED ORAL at 07:02

## 2017-01-27 RX ADMIN — Medication 10 ML: at 21:24

## 2017-01-27 RX ADMIN — FLUOROMETHOLONE 1 DROP: 1 SOLUTION/ DROPS OPHTHALMIC at 19:09

## 2017-01-27 RX ADMIN — FLUTICASONE PROPIONATE 2 SPRAY: 50 SPRAY, METERED NASAL at 21:24

## 2017-01-27 RX ADMIN — VITAMIN D, TAB 1000IU (100/BT) 1000 UNITS: 25 TAB at 09:56

## 2017-01-27 RX ADMIN — FAMOTIDINE 20 MG: 20 TABLET ORAL at 09:56

## 2017-01-27 RX ADMIN — FLUTICASONE PROPIONATE 2 SPRAY: 50 SPRAY, METERED NASAL at 07:03

## 2017-01-27 NOTE — PROGRESS NOTES
Hospitalist Progress Note  Jayde Everett NP  Office: [phone]  Cell: [phone]      Date of Service:  2017  NAME:  Katie Lara  :  1960  MRN:  [id number]    Admission Summary:   Patient presented to the ED, found by sister at [facility] assisted living facility with confusion and agitation. Sister states that the patient had medications on the table when she arrived but does not know which ones she took that day. Per sister the patient was more confused then her baseline, had multiple falls over the past several days. Admitted for further evaluation and treatment. Interval history / Subjective:   Pt in bed, still having difficulty staying on topic with conversation. Had episode of dizziness last night, says she fell forward onto bed. Did not lose consciousness and had no detailed explanations of the event. Nurse did not know specifics either. Assessment & Plan:     Encephalopathy (POA):  - Metabolic versus delirium secondary to over dose  - CT of head () showed there is no intracranial hemorrhage, extra-axial collection, mass, mass effect or midline shift.  - Continue fall precautions   - Drug screen negative ()  - Acetaminophen <2 and Salicylate level 2.6 ()     Hypokalemia: Resolved. Potassium 4.0 ()      Elevated blood pressure:  - No history of HTN, no BP meds PTA  - Will continue to monitor. Had received 2 doses of clonidine (stopped yesterday)  - started low dose norvasc this am, monitor BP - can titrate up     Hx of bipolar disorder:   - Patient given Ativan in the ED with improvement  - stopped ativan as per psych   - Psychiatrist has seen and titrating medications  - Seroquel has been restarted.      DVT prophylaxis: On Lovenox  Dispo: Back to home when able unless psych recommending inpatient stay.   Code status: Full  Care Plan discussed with: patient, attending MD and case management Hospital Problems  Date Reviewed: 1/24/2017          Codes Class Noted POA    * (Principal)Altered mental status ICD-10-CM: R41.82  ICD-9-CM: 780.97  1/24/2017 Unknown        Delirium ICD-10-CM: R41.0  ICD-9-CM: 780.09  1/24/2017 Unknown        Hyponatremia ICD-10-CM: E87.1  ICD-9-CM: 276.1  1/24/2017 Unknown            Review of Systems:   Reports she is \"hot\". Has been dizzy. No GI complaints, no respiratory complaints. Vital Signs:    Last 24hrs VS reviewed since prior progress note. Most recent are:  Visit Vitals    /90 (BP 1 Location: Right arm, BP Patient Position: Standing)    Pulse 89    Temp 97.6 °F (36.4 °C)    Resp 16    Wt 76.8 kg (169 lb 5 oz)    SpO2 97%    BMI 33.07 kg/m2       Intake/Output Summary (Last 24 hours) at 01/27/17 1329  Last data filed at 01/27/17 1008   Gross per 24 hour   Intake              400 ml   Output                0 ml   Net              400 ml      Physical Examination:        Constitutional:  Cooperative, pleasant. ENT:  Oral mucous moist    Resp:  CTA bilaterally. No accessory muscle use. RA   CV:  Regular rhythm. GI:  Soft, non distended, non tender. normoactive bowel sounds     Musculoskeletal:  No edema, warm, 2+ pulses throughout    Neurologic:  Moves all extremities. AAOx 2-3      Data Review:   Review and/or order of clinical lab test  Review and/or order of tests in the radiology section of CPT  Review and/or order of tests in the medicine section of CPT    Labs:     Recent Labs      01/25/17   0720   WBC  7.3   HGB  12.6   HCT  38.1   PLT  298     Recent Labs      01/25/17   0720   NA  142   K  4.0   CL  112*   CO2  19*   BUN  10   CREA  0.86   GLU  110*   CA  8.8     No results for input(s): SGOT, GPT, ALT, AP, TBIL, TBILI, TP, ALB, GLOB, GGT, AML, LPSE in the last 72 hours. No lab exists for component: AMYP, HLPSE  No results for input(s): INR, PTP, APTT in the last 72 hours.     No lab exists for component: INREXT, INREXT   No results for input(s): FE, TIBC, PSAT, FERR in the last 72 hours. No results found for: FOL, RBCF   No results for input(s): PH, PCO2, PO2 in the last 72 hours. No results for input(s): CPK, CKNDX, TROIQ in the last 72 hours.     No lab exists for component: CPKMB  Lab Results   Component Value Date/Time    Cholesterol, total 185 06/28/2016 09:00 AM    HDL Cholesterol 53 06/28/2016 09:00 AM    LDL, calculated 114 06/28/2016 09:00 AM    Triglyceride 89 06/28/2016 09:00 AM    CHOL/HDL Ratio 3.2 05/17/2014 06:31 AM     No results found for: [facility]  Lab Results   Component Value Date/Time    Color YELLOW/STRAW 01/24/2017 09:44 AM    Appearance CLEAR 01/24/2017 09:44 AM    Specific gravity 1.018 01/24/2017 09:44 AM    pH (UA) 7.0 01/24/2017 09:44 AM    Protein 30 01/24/2017 09:44 AM    Glucose NEGATIVE  01/24/2017 09:44 AM    Ketone TRACE 01/24/2017 09:44 AM    Bilirubin NEGATIVE  01/24/2017 09:44 AM    Urobilinogen 0.2 01/24/2017 09:44 AM    Nitrites NEGATIVE  01/24/2017 09:44 AM    Leukocyte Esterase NEGATIVE  01/24/2017 09:44 AM    Epithelial cells FEW 01/24/2017 09:44 AM    Bacteria NEGATIVE  01/24/2017 09:44 AM    WBC 0-4 01/24/2017 09:44 AM    RBC 0-5 01/24/2017 09:44 AM     Medications Reviewed:     Current Facility-Administered Medications   Medication Dose Route Frequency    amLODIPine (NORVASC) tablet 2.5 mg  2.5 mg Oral DAILY    QUEtiapine (SEROquel) tablet 100 mg  100 mg Oral BID    cholecalciferol (VITAMIN D3) tablet 1,000 Units  1,000 Units Oral DAILY    famotidine (PEPCID) tablet 20 mg  20 mg Oral BID    fenofibrate nanocrystallized (TRICOR) tablet 145 mg  145 mg Oral Q48H    fluorometholone (FML) 0.1 % ophthalmic suspension 1 Drop  1 Drop Both Eyes BID    fluticasone (FLONASE) 50 mcg/actuation nasal spray 2 Spray  2 Spray Both Nostrils BID    lactobac ac& pc-s.therm-b.anim (THEO Q/RISAQUAD)  1 Cap Oral DAILY    sodium chloride (NS) flush 5-10 mL  5-10 mL IntraVENous Q8H    sodium chloride (NS) flush 5-10 mL  5-10 mL IntraVENous PRN    enoxaparin (LOVENOX) injection 40 mg  40 mg SubCUTAneous Q24H    morphine injection 2 mg  2 mg IntraVENous Q4H PRN    vitamin E acetate capsule 400 Units  400 Units Oral DAILY   ______________________________________________________________________  EXPECTED LENGTH OF STAY: 2d 9h  ACTUAL LENGTH OF STAY:          [address] [facility], NP

## 2017-01-27 NOTE — PROGRESS NOTES
Bedside shift change report given to Ben Longo RN (oncoming nurse) by Nigel Pike RN (offgoing nurse). Report included the following information SBAR, Kardex, Intake/Output, MAR and Recent Results.

## 2017-01-27 NOTE — PROGRESS NOTES
Spoke with Jonathon Padron, Personalized Living  at [facility] ([phone], ext [phone], B#[phone]). She explained the cost of medication reminders at their facility. The cost is as follows: 1 x / day = $8.50;  2 x/day = $11.00;  3x/day = $20.00 and 4x/day = $25.00. This  left a voicemail for the patient's sister, Sd Padron, F#[phone], B#[phone] to notify her of this information. Brian Param also plans to call the patient's sister with this information. Current Plan:  Per conversation with the psychiatrist, patient may transfer to [facility] for further management once a bed becomes available.     RENZO Martinez

## 2017-01-27 NOTE — PROGRESS NOTES
Pt continues to improve with clearer thinking, better about being on target. However still has multiple physical complaints that are psychogenic,. Speech is flighty at times, affect unstable, presents as helpless. Mood mixed with up moments, depressed moments and primarily anxiety/worry. Not in position to care for herself or return to independent living. Best served with transfer to Psychiatry for stabilization. Likely need unit where she can be assisted with ADLs. Bipolar diagnosis. Tolerating quetiapine and will increase dose. She had been on 400 mg/day as outpatient and now no longer influenced by overuse of medication and will advance quetiapine. Has been on lamotrigine before hospitalization but will go slowly with medication as not unmanageably acute.

## 2017-01-27 NOTE — ROUTINE PROCESS
Bedside and Verbal shift change report given to 81 Joel Mosher (oncoming nurse) by Edy Nugent (offgoing nurse). Report included the following information SBAR. All opportunity for clarification/questions given.

## 2017-01-27 NOTE — PROGRESS NOTES
ADULT PROTOCOL: JET AEROSOL ASSESSMENT    Patient  Stevie Queen     60 y.o.   female     1/27/2017  10:38 AM    Breath Sounds Pre Procedure: Right Breath Sounds: Clear                               Left Breath Sounds: Clear    Breath Sounds Post Procedure:                                        Breathing pattern: Pre procedure Breathing Pattern: Regular          Post procedure      Heart Rate: Pre procedure             Post procedure      Resp Rate: Pre procedure             Post procedure      Peak Flow: Pre bronchodilator             Post bronchodilator       FVC/FEV1:      Incentive Spirometry:             Cough: Pre procedure                 Post procedure      Suctioned:     Sputum: Pre procedure                   Post procedure      Oxygen: O2 Device: Room air        Changed: NO    SpO2: Pre procedure     without oxygen              Post procedure    without oxygen    Nebulizer Therapy: Current medications   DuoNeb Q4 PRN      Changed: YES, per Protocol: patient does not have HOme Regimen, no treatments given since ordered (01/24/2017), patient BS Clear    Smoking History:     Problem List:   Patient Active Problem List   Diagnosis Code    Arthritis M19.90    Gallstones with obstruction of gallbladder K80.21    Choledocholithiasis K80.50    Bipolar affective disorder, manic (HCC) F31.10    IBS (irritable bowel syndrome) K58.9    Chronic headache R51    Drop attack R55    Altered mental status R41.82    Delirium R41.0    Hyponatremia E87.1       Respiratory Therapist: Chino Sanderson, RT

## 2017-01-28 PROCEDURE — 74011250637 HC RX REV CODE- 250/637: Performed by: NURSE PRACTITIONER

## 2017-01-28 PROCEDURE — 74011250636 HC RX REV CODE- 250/636: Performed by: HOSPITALIST

## 2017-01-28 PROCEDURE — 65270000032 HC RM SEMIPRIVATE

## 2017-01-28 PROCEDURE — 74011250637 HC RX REV CODE- 250/637: Performed by: PSYCHIATRY & NEUROLOGY

## 2017-01-28 PROCEDURE — 74011250637 HC RX REV CODE- 250/637: Performed by: HOSPITALIST

## 2017-01-28 PROCEDURE — 74011000250 HC RX REV CODE- 250: Performed by: PSYCHIATRY & NEUROLOGY

## 2017-01-28 RX ADMIN — FLUOROMETHOLONE 1 DROP: 1 SOLUTION/ DROPS OPHTHALMIC at 18:14

## 2017-01-28 RX ADMIN — FAMOTIDINE 20 MG: 20 TABLET ORAL at 18:14

## 2017-01-28 RX ADMIN — Medication 10 ML: at 22:51

## 2017-01-28 RX ADMIN — FLUTICASONE PROPIONATE 2 SPRAY: 50 SPRAY, METERED NASAL at 09:05

## 2017-01-28 RX ADMIN — QUETIAPINE FUMARATE 100 MG: 100 TABLET, FILM COATED ORAL at 09:03

## 2017-01-28 RX ADMIN — VITAMIN E CAP 400 UNIT 400 UNITS: 400 CAP at 09:03

## 2017-01-28 RX ADMIN — AMLODIPINE BESYLATE 2.5 MG: 5 TABLET ORAL at 09:02

## 2017-01-28 RX ADMIN — Medication 1 CAPSULE: at 09:02

## 2017-01-28 RX ADMIN — FLUOROMETHOLONE 1 DROP: 1 SOLUTION/ DROPS OPHTHALMIC at 09:05

## 2017-01-28 RX ADMIN — OLANZAPINE 5 MG: 10 INJECTION, POWDER, FOR SOLUTION INTRAMUSCULAR at 13:33

## 2017-01-28 RX ADMIN — OLANZAPINE 10 MG: 10 INJECTION, POWDER, FOR SOLUTION INTRAMUSCULAR at 17:23

## 2017-01-28 RX ADMIN — FAMOTIDINE 20 MG: 20 TABLET ORAL at 09:02

## 2017-01-28 RX ADMIN — Medication 10 ML: at 13:35

## 2017-01-28 RX ADMIN — ENOXAPARIN SODIUM 40 MG: 40 INJECTION SUBCUTANEOUS at 13:33

## 2017-01-28 RX ADMIN — FLUTICASONE PROPIONATE 2 SPRAY: 50 SPRAY, METERED NASAL at 22:51

## 2017-01-28 RX ADMIN — FENOFIBRATE 145 MG: 145 TABLET, FILM COATED ORAL at 13:47

## 2017-01-28 RX ADMIN — VITAMIN D, TAB 1000IU (100/BT) 1000 UNITS: 25 TAB at 09:03

## 2017-01-28 NOTE — PROGRESS NOTES
Hospitalist Progress Note  Darcie Mcgraw NP  Office: [phone]  Cell: [phone]      Date of Service:  2017  NAME:  Sonia Barfield  :  1960  MRN:  [id number]    Admission Summary:   Patient presented to the ED, found by sister at [facility] living [facility] with confusion and agitation. Sister states that the patient had medications on the table when she arrived but does not know which ones she took that day. Per sister the patient was more confused then her baseline, had multiple falls over the past several days. Admitted for further evaluation and treatment. Interval history / Subjective:   Pt in bed, eating breakfast (has eaten almost all of it). Says she had a \"terrible\" night but unable to elaborate. She still has difficulty staying with topic of conversation. Appeared happy she would see Dr. Kelli Rodriguez this weekend. She was updated on plan of care: awaiting psychiatry bed. Assessment & Plan:     Encephalopathy (POA):  - Metabolic versus delirium secondary to over dose  - CT of head () showed there is no intracranial hemorrhage, extra-axial collection, mass, mass effect or midline shift.  - Continue fall precautions   - Drug screen negative ()  - Acetaminophen <2 and Salicylate level 2.6 ()     Hypokalemia: Resolved. Potassium 4.0 ()      Elevated blood pressure:  - No history of HTN, no BP meds PTA  - Will continue to monitor.   - started low dose norvasc, monitor: was normotensive this am     Hx of bipolar disorder:   - Patient given Ativan in the ED with improvement  - stopped ativan as per psych   - Psychiatrist has seen and titrating medications  - Seroquel has been restarted.    - discussed case/status with Dr. Kelli Rodriguez last pm: pt can be transferred to inpatient psych, bed is pending and she is on transfer list up in psych.     DVT prophylaxis: On Lovenox  Dispo: Awaiting psych bed  Code status: Full  Care Plan discussed with: patient, attending MD      Hospital Problems  Date Reviewed: 1/24/2017          Codes Class Noted POA    * (Principal)Altered mental status ICD-10-CM: R41.82  ICD-9-CM: 780.97  1/24/2017 Unknown        Delirium ICD-10-CM: R41.0  ICD-9-CM: 780.09  1/24/2017 Unknown        Hyponatremia ICD-10-CM: E87.1  ICD-9-CM: 276.1  1/24/2017 Unknown            Review of Systems:   Very directed with ROS questions: No dizziness, reported insomnia. No GI or respiratory complaints. Vital Signs:    Last 24hrs VS reviewed since prior progress note. Most recent are:  Visit Vitals    /74 (BP 1 Location: Right arm, BP Patient Position: At rest)    Pulse 88    Temp 98 °F (36.7 °C)    Resp 16    Wt 76.8 kg (169 lb 5 oz)    SpO2 100%    BMI 33.07 kg/m2       Intake/Output Summary (Last 24 hours) at 01/28/17 1011  Last data filed at 01/28/17 0300   Gross per 24 hour   Intake                0 ml   Output              650 ml   Net             -650 ml      Physical Examination:        Constitutional:  Cooperative, pleasant. Cheerful this am.   ENT:  Oral mucous moist    Resp:  No accessory muscle use. RA   CV:  Regular rhythm. Musculoskeletal:  No edema    Neurologic:  Moves all extremities. AAOx 3   Psych: Does not appear anxious. Has difficulty staying on task/with topic of conversation      Data Review:   Review and/or order of clinical lab test  Review and/or order of tests in the radiology section of CPT  Review and/or order of tests in the medicine section of CPT    Labs:     No results for input(s): WBC, HGB, HCT, PLT, HGBEXT, HCTEXT, PLTEXT, HGBEXT, HCTEXT, PLTEXT in the last 72 hours. No results for input(s): NA, K, CL, CO2, BUN, CREA, GLU, CA, MG, PHOS, URICA in the last 72 hours. No results for input(s): SGOT, GPT, ALT, AP, TBIL, TBILI, TP, ALB, GLOB, GGT, AML, LPSE in the last 72 hours.     No lab exists for component: AMYP, HLPSE  No results for input(s): INR, PTP, APTT in the last 72 hours. No lab exists for component: INREXT, INREXT   No results for input(s): FE, TIBC, PSAT, FERR in the last 72 hours. No results found for: FOL, RBCF   No results for input(s): PH, PCO2, PO2 in the last 72 hours. No results for input(s): CPK, CKNDX, TROIQ in the last 72 hours.     No lab exists for component: CPKMB  Lab Results   Component Value Date/Time    Cholesterol, total 185 06/28/2016 09:00 AM    HDL Cholesterol 53 06/28/2016 09:00 AM    LDL, calculated 114 06/28/2016 09:00 AM    Triglyceride 89 06/28/2016 09:00 AM    CHOL/HDL Ratio 3.2 05/17/2014 06:31 AM     No results found for: [facility]  Lab Results   Component Value Date/Time    Color YELLOW/STRAW 01/24/2017 09:44 AM    Appearance CLEAR 01/24/2017 09:44 AM    Specific gravity 1.018 01/24/2017 09:44 AM    pH (UA) 7.0 01/24/2017 09:44 AM    Protein 30 01/24/2017 09:44 AM    Glucose NEGATIVE  01/24/2017 09:44 AM    Ketone TRACE 01/24/2017 09:44 AM    Bilirubin NEGATIVE  01/24/2017 09:44 AM    Urobilinogen 0.2 01/24/2017 09:44 AM    Nitrites NEGATIVE  01/24/2017 09:44 AM    Leukocyte Esterase NEGATIVE  01/24/2017 09:44 AM    Epithelial cells FEW 01/24/2017 09:44 AM    Bacteria NEGATIVE  01/24/2017 09:44 AM    WBC 0-4 01/24/2017 09:44 AM    RBC 0-5 01/24/2017 09:44 AM     Medications Reviewed:     Current Facility-Administered Medications   Medication Dose Route Frequency    QUEtiapine (SEROquel) tablet 100 mg  100 mg Oral DAILY    QUEtiapine (SEROquel) tablet 200 mg  200 mg Oral QHS    amLODIPine (NORVASC) tablet 2.5 mg  2.5 mg Oral DAILY    cholecalciferol (VITAMIN D3) tablet 1,000 Units  1,000 Units Oral DAILY    famotidine (PEPCID) tablet 20 mg  20 mg Oral BID    fenofibrate nanocrystallized (TRICOR) tablet 145 mg  145 mg Oral Q48H    fluorometholone (FML) 0.1 % ophthalmic suspension 1 Drop  1 Drop Both Eyes BID    fluticasone (FLONASE) 50 mcg/actuation nasal spray 2 Spray  2 Spray Both Nostrils BID    lactobac ac& maria ines-s.therm-b.anim (THEO Q/RISAQUAD)  1 Cap Oral DAILY    sodium chloride (NS) flush 5-10 mL  5-10 mL IntraVENous Q8H    sodium chloride (NS) flush 5-10 mL  5-10 mL IntraVENous PRN    enoxaparin (LOVENOX) injection 40 mg  40 mg SubCUTAneous Q24H    morphine injection 2 mg  2 mg IntraVENous Q4H PRN    vitamin E acetate capsule 400 Units  400 Units Oral DAILY   ______________________________________________________________________  EXPECTED LENGTH OF STAY: 2d 9h  ACTUAL LENGTH OF STAY:          [address],

## 2017-01-28 NOTE — PROGRESS NOTES
Pt tends to be quite emphatic about her sx. Her subjective experience needs to be evaluated with objective observation. In talking about difficulty sleeping, which is quite unusual for quetiapine, pt did talk about missing lorazepam.  Perhaps lorazepam was covering unusual agitation of quetiapine. Will switch to olanzapine. She notes she has been on pretty much all medications with varying success. Current mental status shows her to be in mild manic mode with agitated, feeling on edge, rapid speech and thoughts, some moving from topic to topic without bridging sentences. Transfer to Psych when bed available.

## 2017-01-28 NOTE — PROGRESS NOTES
Nurse has been called into room several times by the patient but without any specific complaints or requests. She appears to have a flight of ideas and unable to stay on topic other to say \"It's my BiPolar. \"  She keeps referring to her feelings as a \"head experience. \"  When I inquire what I can do to help her feel better she states \"I don't know. \"  Will continue to monitor and provide emotional support. Will pass this information to the on-coming nurse.

## 2017-01-28 NOTE — PROGRESS NOTES
Bedside and Verbal shift change report given to alysa (oncoming nurse) by Roger Fair (offgoing nurse). Report included the following information SBAR.

## 2017-01-29 PROCEDURE — 74011250636 HC RX REV CODE- 250/636: Performed by: HOSPITALIST

## 2017-01-29 PROCEDURE — 74011250637 HC RX REV CODE- 250/637: Performed by: HOSPITALIST

## 2017-01-29 PROCEDURE — 74011000250 HC RX REV CODE- 250: Performed by: PSYCHIATRY & NEUROLOGY

## 2017-01-29 PROCEDURE — 65270000032 HC RM SEMIPRIVATE

## 2017-01-29 PROCEDURE — 74011250637 HC RX REV CODE- 250/637: Performed by: NURSE PRACTITIONER

## 2017-01-29 RX ORDER — AMLODIPINE BESYLATE 5 MG/1
5 TABLET ORAL DAILY
Status: DISCONTINUED | OUTPATIENT
Start: 2017-01-30 | End: 2017-01-31 | Stop reason: HOSPADM

## 2017-01-29 RX ORDER — AMOXICILLIN 250 MG
1 CAPSULE ORAL
Status: DISCONTINUED | OUTPATIENT
Start: 2017-01-29 | End: 2017-01-31 | Stop reason: HOSPADM

## 2017-01-29 RX ADMIN — OLANZAPINE 10 MG: 10 INJECTION, POWDER, FOR SOLUTION INTRAMUSCULAR at 18:00

## 2017-01-29 RX ADMIN — FAMOTIDINE 20 MG: 20 TABLET ORAL at 18:01

## 2017-01-29 RX ADMIN — FLUTICASONE PROPIONATE 2 SPRAY: 50 SPRAY, METERED NASAL at 07:05

## 2017-01-29 RX ADMIN — AMLODIPINE BESYLATE 2.5 MG: 5 TABLET ORAL at 08:27

## 2017-01-29 RX ADMIN — VITAMIN E CAP 400 UNIT 400 UNITS: 400 CAP at 08:27

## 2017-01-29 RX ADMIN — FLUTICASONE PROPIONATE 2 SPRAY: 50 SPRAY, METERED NASAL at 22:45

## 2017-01-29 RX ADMIN — ENOXAPARIN SODIUM 40 MG: 40 INJECTION SUBCUTANEOUS at 15:02

## 2017-01-29 RX ADMIN — DOCUSATE SODIUM AND SENNOSIDES 1 TABLET: 8.6; 5 TABLET, FILM COATED ORAL at 22:45

## 2017-01-29 RX ADMIN — OLANZAPINE 5 MG: 10 INJECTION, POWDER, FOR SOLUTION INTRAMUSCULAR at 08:29

## 2017-01-29 RX ADMIN — Medication 1 CAPSULE: at 08:27

## 2017-01-29 RX ADMIN — FLUOROMETHOLONE 1 DROP: 1 SOLUTION/ DROPS OPHTHALMIC at 18:01

## 2017-01-29 RX ADMIN — Medication 10 ML: at 15:02

## 2017-01-29 RX ADMIN — Medication 10 ML: at 22:45

## 2017-01-29 RX ADMIN — VITAMIN D, TAB 1000IU (100/BT) 1000 UNITS: 25 TAB at 08:27

## 2017-01-29 RX ADMIN — FLUOROMETHOLONE 1 DROP: 1 SOLUTION/ DROPS OPHTHALMIC at 08:33

## 2017-01-29 RX ADMIN — FAMOTIDINE 20 MG: 20 TABLET ORAL at 08:27

## 2017-01-29 NOTE — PROGRESS NOTES
Hospitalist Progress Note  Edmond Lewis NP  Office: [phone]  Cell: [phone]      Date of Service:  2017  NAME:  Nam Jenkins  :  1960  MRN:  [id number]    Admission Summary:   Patient presented to the ED, found by sister at [facility] living [facility] with confusion and agitation. Sister states that the patient had medications on the table when she arrived but does not know which ones she took that day. Per sister the patient was more confused then her baseline, had multiple falls over the past several days. Admitted for further evaluation and treatment. Interval history / Subjective:   Pt in bed, says she is sleeping better but \"up all night\" - oriented but does not stay with train of thought and jumps around with her conversation. Aware her meds are being changed and was able to tell me she was now on zyprexa. Assessment & Plan:     Encephalopathy (POA):  - Metabolic versus delirium secondary to over dose  - CT of head () showed there is no intracranial hemorrhage, extra-axial collection, mass, mass effect or midline shift.  - Continue fall precautions   - Drug screen negative ()  - Acetaminophen <2 and Salicylate level 2.6 ()     Hypokalemia: Resolved. Potassium 4.0 ()      Elevated blood pressure:  - No history of HTN, no BP meds PTA  - Will continue to monitor.   - started low dose norvasc, will increase dose to 5 mg q am starting tomorrow      Hx of bipolar disorder:   - Patient given Ativan in the ED with improvement, stopped ativan as per psych   - Psychiatrist has seen and titrating medications  - Seroquel now stopped, on zyprexa.   - discussed case/status with Dr. Michelle Fernandez : pt can be transferred to inpatient psych, bed is pending and she is on transfer list up in psych.     DVT prophylaxis: On Lovenox  Dispo: Awaiting psych bed  Code status: Full  Care Plan discussed with: patient, attending MD      Hospital Problems  Date Reviewed: 1/24/2017          Codes Class Noted POA    * (Principal)Altered mental status ICD-10-CM: R41.82  ICD-9-CM: 780.97  1/24/2017 Unknown        Delirium ICD-10-CM: R41.0  ICD-9-CM: 780.09  1/24/2017 Unknown        Hyponatremia ICD-10-CM: E87.1  ICD-9-CM: 276.1  1/24/2017 Unknown            Review of Systems:   Very directed with ROS questions: No dizziness, No GI or respiratory complaints. Vital Signs:    Last 24hrs VS reviewed since prior progress note. Most recent are:  Visit Vitals    /90 (BP 1 Location: Left arm, BP Patient Position: At rest)    Pulse 91    Temp 97.5 °F (36.4 °C)    Resp 16    Wt 76.8 kg (169 lb 5 oz)    SpO2 96%    BMI 33.07 kg/m2       Intake/Output Summary (Last 24 hours) at 01/29/17 1410  Last data filed at 01/29/17 0935   Gross per 24 hour   Intake              350 ml   Output              750 ml   Net             -400 ml      Physical Examination:   No change to physical exam 1/29:       Constitutional:  Cooperative, pleasant. Cheerful this am.   ENT:  Oral mucous moist    Resp:  No accessory muscle use. RA   CV:  Regular rhythm. Musculoskeletal:  No edema    Neurologic:  Moves all extremities. AAOx 3   Psych: Does not appear anxious. Has difficulty staying on task/with topic of conversation      Data Review:   Review and/or order of clinical lab test  Review and/or order of tests in the radiology section of CPT  Review and/or order of tests in the medicine section of CPT    Labs:     No results for input(s): WBC, HGB, HCT, PLT, HGBEXT, HCTEXT, PLTEXT, HGBEXT, HCTEXT, PLTEXT in the last 72 hours. No results for input(s): NA, K, CL, CO2, BUN, CREA, GLU, CA, MG, PHOS, URICA in the last 72 hours. No results for input(s): SGOT, GPT, ALT, AP, TBIL, TBILI, TP, ALB, GLOB, GGT, AML, LPSE in the last 72 hours.     No lab exists for component: AMYP, HLPSE  No results for input(s): INR, PTP, APTT in the last 72 hours.    No lab exists for component: INREXT, INREXT   No results for input(s): FE, TIBC, PSAT, FERR in the last 72 hours. No results found for: FOL, RBCF   No results for input(s): PH, PCO2, PO2 in the last 72 hours. No results for input(s): CPK, CKNDX, TROIQ in the last 72 hours.     No lab exists for component: CPKMB  Lab Results   Component Value Date/Time    Cholesterol, total 185 06/28/2016 09:00 AM    HDL Cholesterol 53 06/28/2016 09:00 AM    LDL, calculated 114 06/28/2016 09:00 AM    Triglyceride 89 06/28/2016 09:00 AM    CHOL/HDL Ratio 3.2 05/17/2014 06:31 AM     No results found for: [facility]  Lab Results   Component Value Date/Time    Color YELLOW/STRAW 01/24/2017 09:44 AM    Appearance CLEAR 01/24/2017 09:44 AM    Specific gravity 1.018 01/24/2017 09:44 AM    pH (UA) 7.0 01/24/2017 09:44 AM    Protein 30 01/24/2017 09:44 AM    Glucose NEGATIVE  01/24/2017 09:44 AM    Ketone TRACE 01/24/2017 09:44 AM    Bilirubin NEGATIVE  01/24/2017 09:44 AM    Urobilinogen 0.2 01/24/2017 09:44 AM    Nitrites NEGATIVE  01/24/2017 09:44 AM    Leukocyte Esterase NEGATIVE  01/24/2017 09:44 AM    Epithelial cells FEW 01/24/2017 09:44 AM    Bacteria NEGATIVE  01/24/2017 09:44 AM    WBC 0-4 01/24/2017 09:44 AM    RBC 0-5 01/24/2017 09:44 AM     Medications Reviewed:     Current Facility-Administered Medications   Medication Dose Route Frequency    OLANZapine (ZyPREXA) 5 mg in sterile water (preservative free) injection  5 mg IntraMUSCular DAILY    OLANZapine (ZyPREXA) 10 mg in sterile water (preservative free) injection  10 mg IntraMUSCular DAILY WITH DINNER    amLODIPine (NORVASC) tablet 2.5 mg  2.5 mg Oral DAILY    cholecalciferol (VITAMIN D3) tablet 1,000 Units  1,000 Units Oral DAILY    famotidine (PEPCID) tablet 20 mg  20 mg Oral BID    fenofibrate nanocrystallized (TRICOR) tablet 145 mg  145 mg Oral Q48H    fluorometholone (FML) 0.1 % ophthalmic suspension 1 Drop  1 Drop Both Eyes BID    fluticasone (FLONASE) 50 mcg/actuation nasal spray 2 Spray  2 Spray Both Nostrils BID    lactobac ac& pc-s.therm-b.anim (THEO Q/RISAQUAD)  1 Cap Oral DAILY    sodium chloride (NS) flush 5-10 mL  5-10 mL IntraVENous Q8H    sodium chloride (NS) flush 5-10 mL  5-10 mL IntraVENous PRN    enoxaparin (LOVENOX) injection 40 mg  40 mg SubCUTAneous Q24H    vitamin E acetate capsule 400 Units  400 Units Oral DAILY   ______________________________________________________________________  EXPECTED LENGTH OF STAY: 2d 9h  ACTUAL LENGTH OF STAY:          [address] [facility] [address], NP

## 2017-01-29 NOTE — PROGRESS NOTES
Problem: Falls - Risk of  Goal: *Absence of falls  Outcome: Progressing Towards Goal  Call bell within reach, pt educated on and verbalized understanding of calling before getting OOB. Patient specific fall prevention measures in place as documented on Fall Flowsheet.

## 2017-01-29 NOTE — PROGRESS NOTES
Bedside shift change report given to Natalya (oncoming nurse) by Joey Mc (offgoing nurse). Report included the following information SBAR.

## 2017-01-30 PROCEDURE — 74011000250 HC RX REV CODE- 250: Performed by: PSYCHIATRY & NEUROLOGY

## 2017-01-30 PROCEDURE — 65270000032 HC RM SEMIPRIVATE

## 2017-01-30 PROCEDURE — 74011250636 HC RX REV CODE- 250/636: Performed by: HOSPITALIST

## 2017-01-30 PROCEDURE — 74011250637 HC RX REV CODE- 250/637: Performed by: NURSE PRACTITIONER

## 2017-01-30 PROCEDURE — 74011250637 HC RX REV CODE- 250/637: Performed by: HOSPITALIST

## 2017-01-30 RX ADMIN — VITAMIN D, TAB 1000IU (100/BT) 1000 UNITS: 25 TAB at 08:52

## 2017-01-30 RX ADMIN — FENOFIBRATE 145 MG: 145 TABLET, FILM COATED ORAL at 14:00

## 2017-01-30 RX ADMIN — OLANZAPINE 10 MG: 10 INJECTION, POWDER, FOR SOLUTION INTRAMUSCULAR at 18:42

## 2017-01-30 RX ADMIN — Medication 10 ML: at 14:00

## 2017-01-30 RX ADMIN — OLANZAPINE 5 MG: 10 INJECTION, POWDER, FOR SOLUTION INTRAMUSCULAR at 08:52

## 2017-01-30 RX ADMIN — FAMOTIDINE 20 MG: 20 TABLET ORAL at 18:40

## 2017-01-30 RX ADMIN — ENOXAPARIN SODIUM 40 MG: 40 INJECTION SUBCUTANEOUS at 14:00

## 2017-01-30 RX ADMIN — FLUTICASONE PROPIONATE 2 SPRAY: 50 SPRAY, METERED NASAL at 07:20

## 2017-01-30 RX ADMIN — FLUOROMETHOLONE 1 DROP: 1 SOLUTION/ DROPS OPHTHALMIC at 18:51

## 2017-01-30 RX ADMIN — Medication 10 ML: at 21:58

## 2017-01-30 RX ADMIN — FLUTICASONE PROPIONATE 2 SPRAY: 50 SPRAY, METERED NASAL at 22:04

## 2017-01-30 RX ADMIN — FAMOTIDINE 20 MG: 20 TABLET ORAL at 08:52

## 2017-01-30 RX ADMIN — Medication 1 CAPSULE: at 08:52

## 2017-01-30 RX ADMIN — DOCUSATE SODIUM AND SENNOSIDES 1 TABLET: 8.6; 5 TABLET, FILM COATED ORAL at 21:58

## 2017-01-30 RX ADMIN — AMLODIPINE BESYLATE 5 MG: 5 TABLET ORAL at 08:52

## 2017-01-30 RX ADMIN — VITAMIN E CAP 400 UNIT 400 UNITS: 400 CAP at 08:53

## 2017-01-30 RX ADMIN — FLUOROMETHOLONE 1 DROP: 1 SOLUTION/ DROPS OPHTHALMIC at 08:53

## 2017-01-30 NOTE — PROGRESS NOTES
Pt continues to describe difficulty thinking, difficulty with memory and recall, quite anxious, and an emotional wreck. Tendency to exaggerate sx, especially somatic ones, and to have anxiety drive severity are an old pattern of patient. She does show ability to remember statements when unguarded and sheepish about this pointed out to her. That doesn't mean a conscious and manipulative activity but part of her somatization. Placement will be difficult as she is demonstrating limited ability to be self care. This is also a repetitive pattern and was seen consistently if episodically when she lived with her parents for many years. Parents became unable to manage pt's vicissitudes and therefore independent apartment. Assisted living level of function is being observed and perhaps pt will rally with that option especially with $ burden it imposes. On mental status she is alert and oriented x 4. Highly anxious. Hygiene and grooming ok. Mostly reliable and collaborative save for misrepresentations of sx and experience in hospital.  Mildly rapid speech and thought flow. No psychotic sx. No suicidal/homicidal sx. Subjective cognitive function worse than objective and overall not impaired. Dx of bipolar, mild manic episode. Transfer to Psychiatry when bed available on geropsych unit. Tolerating olanzapine.

## 2017-01-30 NOTE — PROGRESS NOTES
Hospitalist Progress Note  Donn Hutchinson MD  Office: [phone]  Cell: [phone]      Date of Service:  2017  NAME:  Enma Holland  :  1960  MRN:  [id number]    Admission Summary:   Patient presented to the ED, found by sister at [facility] assisted living facility with confusion and agitation. Sister states that the patient had medications on the table when she arrived but does not know which ones she took that day. Per sister the patient was more confused then her baseline, had multiple falls over the past several days. Admitted for further evaluation and treatment. Interval history / Subjective:   Pt in bed, says she is sleeping better but \"up all night\" - oriented but does not stay with train of thought and jumps around with her conversation. Aware her meds are being changed and was able to tell me she was now on zyprexa. Assessment & Plan:     Acute Encephalopathy (POA):  - Metabolic versus delirium secondary to over dose  - CT of head () showed there is no intracranial hemorrhage, extra-axial collection, mass, mass effect or midline shift.  - Continue fall precautions   - Drug screen negative ()  - Acetaminophen <2 and Salicylate level 2.6 ()     Hypokalemia: Resolved. Potassium 4.0 ()      Elevated blood pressure:  - No history of HTN, no BP meds PTA  - Will continue to monitor.   -Now on Norvasc     Hx of bipolar disorder:   - Patient given Ativan in the ED with improvement, stopped ativan as per psych   - Psychiatrist has seen and titrating medications  - Seroquel now stopped, on zyprexa. - discussed case/status with Dr. Arturo Amanda : pt can be transferred to inpatient psych, bed is pending and she is on transfer list up in psych.     Cardiac diet    The patient continues to remain stable from medicine standpoint awaiting inpatient psych bed     DVT prophylaxis: On Lovenox  Dispo: Awaiting psych bed  Code status: Full  Care Plan discussed with: patient     Hospital Problems  Date Reviewed: 1/24/2017          Codes Class Noted POA    * (Principal)Altered mental status ICD-10-CM: R41.82  ICD-9-CM: 780.97  1/24/2017 Unknown        Delirium ICD-10-CM: R41.0  ICD-9-CM: 780.09  1/24/2017 Unknown        Hyponatremia ICD-10-CM: E87.1  ICD-9-CM: 276.1  1/24/2017 Unknown            Review of Systems:   Very directed with ROS questions: No dizziness, No GI or respiratory complaints. Vital Signs:    Last 24hrs VS reviewed since prior progress note. Most recent are:  Visit Vitals    BP (!) 151/99 (BP 1 Location: Left arm, BP Patient Position: At rest)    Pulse 97    Temp 97.6 °F (36.4 °C)    Resp 16    Wt 76.8 kg (169 lb 5 oz)    SpO2 97%    BMI 33.07 kg/m2       Intake/Output Summary (Last 24 hours) at 01/30/17 0931  Last data filed at 01/30/17 0827   Gross per 24 hour   Intake              440 ml   Output              700 ml   Net             -260 ml      Physical Examination:   No change to physical exam 1/29:       Constitutional:  Cooperative, pleasant. Cheerful this am.   ENT:  Oral mucous moist    Resp:  No accessory muscle use. RA   CV:  Regular rhythm. Musculoskeletal:  No edema    Neurologic:  Moves all extremities. AAOx 3   Psych: Does not appear anxious. Has difficulty staying on task/with topic of conversation      Data Review:   Review and/or order of clinical lab test  Review and/or order of tests in the radiology section of CPT  Review and/or order of tests in the medicine section of CPT    Labs:     No results for input(s): WBC, HGB, HCT, PLT, HGBEXT, HCTEXT, PLTEXT, HGBEXT, HCTEXT, PLTEXT in the last 72 hours. No results for input(s): NA, K, CL, CO2, BUN, CREA, GLU, CA, MG, PHOS, URICA in the last 72 hours. No results for input(s): SGOT, GPT, ALT, AP, TBIL, TBILI, TP, ALB, GLOB, GGT, AML, LPSE in the last 72 hours.     No lab exists for component: AMYP, HLPSE  No results for input(s): INR, PTP, APTT in the last 72 hours. No lab exists for component: INREXT, INREXT   No results for input(s): FE, TIBC, PSAT, FERR in the last 72 hours. No results found for: FOL, RBCF   No results for input(s): PH, PCO2, PO2 in the last 72 hours. No results for input(s): CPK, CKNDX, TROIQ in the last 72 hours.     No lab exists for component: CPKMB  Lab Results   Component Value Date/Time    Cholesterol, total 185 06/28/2016 09:00 AM    HDL Cholesterol 53 06/28/2016 09:00 AM    LDL, calculated 114 06/28/2016 09:00 AM    Triglyceride 89 06/28/2016 09:00 AM    CHOL/HDL Ratio 3.2 05/17/2014 06:31 AM     No results found for: [facility]  Lab Results   Component Value Date/Time    Color YELLOW/STRAW 01/24/2017 09:44 AM    Appearance CLEAR 01/24/2017 09:44 AM    Specific gravity 1.018 01/24/2017 09:44 AM    pH (UA) 7.0 01/24/2017 09:44 AM    Protein 30 01/24/2017 09:44 AM    Glucose NEGATIVE  01/24/2017 09:44 AM    Ketone TRACE 01/24/2017 09:44 AM    Bilirubin NEGATIVE  01/24/2017 09:44 AM    Urobilinogen 0.2 01/24/2017 09:44 AM    Nitrites NEGATIVE  01/24/2017 09:44 AM    Leukocyte Esterase NEGATIVE  01/24/2017 09:44 AM    Epithelial cells FEW 01/24/2017 09:44 AM    Bacteria NEGATIVE  01/24/2017 09:44 AM    WBC 0-4 01/24/2017 09:44 AM    RBC 0-5 01/24/2017 09:44 AM     Medications Reviewed:     Current Facility-Administered Medications   Medication Dose Route Frequency    amLODIPine (NORVASC) tablet 5 mg  5 mg Oral DAILY    senna-docusate (PERICOLACE) 8.6-50 mg per tablet 1 Tab  1 Tab Oral QHS    OLANZapine (ZyPREXA) 5 mg in sterile water (preservative free) injection  5 mg IntraMUSCular DAILY    OLANZapine (ZyPREXA) 10 mg in sterile water (preservative free) injection  10 mg IntraMUSCular DAILY WITH DINNER    cholecalciferol (VITAMIN D3) tablet 1,000 Units  1,000 Units Oral DAILY    famotidine (PEPCID) tablet 20 mg  20 mg Oral BID    fenofibrate nanocrystallized (TRICOR) tablet 145 mg  145 mg Oral Q48H    fluorometholone (FML) 0.1 % ophthalmic suspension 1 Drop  1 Drop Both Eyes BID    fluticasone (FLONASE) 50 mcg/actuation nasal spray 2 Spray  2 Spray Both Nostrils BID    lactobac ac& pc-s.therm-b.anim (THEO Q/RISAQUAD)  1 Cap Oral DAILY    sodium chloride (NS) flush 5-10 mL  5-10 mL IntraVENous Q8H    sodium chloride (NS) flush 5-10 mL  5-10 mL IntraVENous PRN    enoxaparin (LOVENOX) injection 40 mg  40 mg SubCUTAneous Q24H    vitamin E acetate capsule 400 Units  400 Units Oral DAILY   ______________________________________________________________________  EXPECTED LENGTH OF STAY: 2d 9h  ACTUAL LENGTH OF STAY:          Rayray Livingston MD

## 2017-01-30 NOTE — PROGRESS NOTES
Reviewed medical chart; met with the patient at the bedside and later with her sister, Alexandre Alonzo. Brenda Honeycutt explained that she received this 's messages, but has been unable to call back during work hours. Explained again the cost of medication reminders at their facility. The cost is as follows: 1 x / day = $8.50;  2 x/day = $11.00;  3x/day = $20.00 and 4x/day = $25.00.  Brenda Honeycutt had not received a call from Jamie Mata, personalized living  at [facility] regarding the cost of assisted living. This  shared the marks that Jamie Mata had quoted last week for assisted living, $2800/month to start. As Brenda Honeycutt had previously indicated, the patient receives approximately $730/month in SSI and $150/month in food stamps. Riya asked what Medicaid covers in regards to housing. Explained that [facility]/assisted living or look into available Medicaid beds at a nursing facility. Care Management will continue to follow her disposition.    RENZO Moon

## 2017-01-31 ENCOUNTER — HOSPITAL ENCOUNTER (INPATIENT)
Age: 57
LOS: 6 days | Discharge: HOME | DRG: 885 | End: 2017-02-06
Attending: PSYCHIATRY & NEUROLOGY | Admitting: PSYCHIATRY & NEUROLOGY
Payer: MEDICARE

## 2017-01-31 VITALS
SYSTOLIC BLOOD PRESSURE: 128 MMHG | BODY MASS INDEX: 33.07 KG/M2 | OXYGEN SATURATION: 97 % | TEMPERATURE: 98.8 F | WEIGHT: 169.31 LBS | DIASTOLIC BLOOD PRESSURE: 91 MMHG | RESPIRATION RATE: 18 BRPM | HEART RATE: 55 BPM

## 2017-01-31 PROBLEM — F31.9 BIPOLAR 1 DISORDER (HCC): Status: ACTIVE | Noted: 2017-01-31

## 2017-01-31 PROCEDURE — 74011250636 HC RX REV CODE- 250/636: Performed by: HOSPITALIST

## 2017-01-31 PROCEDURE — 74011250637 HC RX REV CODE- 250/637: Performed by: HOSPITALIST

## 2017-01-31 PROCEDURE — 65220000003 HC RM SEMIPRIVATE PSYCH

## 2017-01-31 PROCEDURE — 74011250637 HC RX REV CODE- 250/637

## 2017-01-31 PROCEDURE — 74011250637 HC RX REV CODE- 250/637: Performed by: NURSE PRACTITIONER

## 2017-01-31 PROCEDURE — 74011000250 HC RX REV CODE- 250: Performed by: PSYCHIATRY & NEUROLOGY

## 2017-01-31 RX ORDER — AMOXICILLIN 250 MG
1 CAPSULE ORAL
Status: DISCONTINUED | OUTPATIENT
Start: 2017-01-31 | End: 2017-02-06 | Stop reason: HOSPADM

## 2017-01-31 RX ORDER — LORAZEPAM 1 MG/1
2 TABLET ORAL 3 TIMES DAILY
Status: DISCONTINUED | OUTPATIENT
Start: 2017-01-31 | End: 2017-01-31

## 2017-01-31 RX ORDER — ADHESIVE BANDAGE
30 BANDAGE TOPICAL DAILY PRN
Status: DISCONTINUED | OUTPATIENT
Start: 2017-01-31 | End: 2017-02-06 | Stop reason: HOSPADM

## 2017-01-31 RX ORDER — ACETAMINOPHEN 325 MG/1
650 TABLET ORAL
Status: DISCONTINUED | OUTPATIENT
Start: 2017-01-31 | End: 2017-01-31 | Stop reason: HOSPADM

## 2017-01-31 RX ORDER — BUSPIRONE HYDROCHLORIDE 5 MG/1
5 TABLET ORAL 3 TIMES DAILY
Status: DISCONTINUED | OUTPATIENT
Start: 2017-01-31 | End: 2017-01-31 | Stop reason: HOSPADM

## 2017-01-31 RX ORDER — LORAZEPAM 1 MG/1
2 TABLET ORAL
Status: DISCONTINUED | OUTPATIENT
Start: 2017-01-31 | End: 2017-01-31

## 2017-01-31 RX ORDER — AMLODIPINE BESYLATE 5 MG/1
5 TABLET ORAL DAILY
Status: DISCONTINUED | OUTPATIENT
Start: 2017-02-01 | End: 2017-02-06 | Stop reason: HOSPADM

## 2017-01-31 RX ORDER — IBUPROFEN 200 MG
1 TABLET ORAL
Status: DISCONTINUED | OUTPATIENT
Start: 2017-01-31 | End: 2017-02-06 | Stop reason: HOSPADM

## 2017-01-31 RX ORDER — OLANZAPINE 5 MG/1
5 TABLET ORAL DAILY
Status: DISCONTINUED | OUTPATIENT
Start: 2017-02-01 | End: 2017-01-31 | Stop reason: HOSPADM

## 2017-01-31 RX ORDER — FAMOTIDINE 20 MG/1
20 TABLET, FILM COATED ORAL 2 TIMES DAILY
Status: DISCONTINUED | OUTPATIENT
Start: 2017-02-01 | End: 2017-02-06 | Stop reason: HOSPADM

## 2017-01-31 RX ORDER — LORAZEPAM 2 MG/ML
0.5 INJECTION INTRAMUSCULAR
Status: DISCONTINUED | OUTPATIENT
Start: 2017-01-31 | End: 2017-02-06 | Stop reason: HOSPADM

## 2017-01-31 RX ORDER — ZOLPIDEM TARTRATE 5 MG/1
5 TABLET ORAL
Status: DISCONTINUED | OUTPATIENT
Start: 2017-01-31 | End: 2017-02-06 | Stop reason: HOSPADM

## 2017-01-31 RX ORDER — AMLODIPINE BESYLATE 5 MG/1
5 TABLET ORAL DAILY
Qty: 30 TAB | Refills: 0 | Status: SHIPPED | OUTPATIENT
Start: 2017-01-31 | End: 2017-02-14 | Stop reason: SDUPTHER

## 2017-01-31 RX ORDER — FLUTICASONE PROPIONATE 50 MCG
2 SPRAY, SUSPENSION (ML) NASAL DAILY
Status: DISCONTINUED | OUTPATIENT
Start: 2017-02-01 | End: 2017-02-01

## 2017-01-31 RX ORDER — AMOXICILLIN 250 MG
1 CAPSULE ORAL
Qty: 30 TAB | Refills: 0 | Status: SHIPPED | OUTPATIENT
Start: 2017-01-31 | End: 2017-02-14 | Stop reason: SDUPTHER

## 2017-01-31 RX ORDER — ACETAMINOPHEN 325 MG/1
650 TABLET ORAL
Status: DISCONTINUED | OUTPATIENT
Start: 2017-01-31 | End: 2017-02-06 | Stop reason: HOSPADM

## 2017-01-31 RX ORDER — BENZTROPINE MESYLATE 1 MG/1
1 TABLET ORAL
Status: DISCONTINUED | OUTPATIENT
Start: 2017-01-31 | End: 2017-02-06 | Stop reason: HOSPADM

## 2017-01-31 RX ORDER — FENOFIBRATE 145 MG/1
145 TABLET, COATED ORAL
Status: DISCONTINUED | OUTPATIENT
Start: 2017-01-31 | End: 2017-02-06 | Stop reason: HOSPADM

## 2017-01-31 RX ORDER — LORAZEPAM 0.5 MG/1
0.5 TABLET ORAL
Status: DISCONTINUED | OUTPATIENT
Start: 2017-01-31 | End: 2017-02-01

## 2017-01-31 RX ORDER — IBUPROFEN 400 MG/1
400 TABLET ORAL
Status: DISCONTINUED | OUTPATIENT
Start: 2017-01-31 | End: 2017-02-06 | Stop reason: HOSPADM

## 2017-01-31 RX ORDER — LANOLIN ALCOHOL/MO/W.PET/CERES
400 CREAM (GRAM) TOPICAL DAILY
Status: DISCONTINUED | OUTPATIENT
Start: 2017-02-01 | End: 2017-02-06 | Stop reason: HOSPADM

## 2017-01-31 RX ORDER — BENZTROPINE MESYLATE 1 MG/ML
1 INJECTION INTRAMUSCULAR; INTRAVENOUS
Status: DISCONTINUED | OUTPATIENT
Start: 2017-01-31 | End: 2017-02-06 | Stop reason: HOSPADM

## 2017-01-31 RX ORDER — OLANZAPINE 2.5 MG/1
2.5 TABLET ORAL
Status: DISCONTINUED | OUTPATIENT
Start: 2017-01-31 | End: 2017-02-06 | Stop reason: HOSPADM

## 2017-01-31 RX ORDER — OLANZAPINE 5 MG/1
10 TABLET ORAL EVERY EVENING
Status: DISCONTINUED | OUTPATIENT
Start: 2017-01-31 | End: 2017-01-31 | Stop reason: HOSPADM

## 2017-01-31 RX ADMIN — VITAMIN D, TAB 1000IU (100/BT) 1000 UNITS: 25 TAB at 10:04

## 2017-01-31 RX ADMIN — Medication 1 CAPSULE: at 10:04

## 2017-01-31 RX ADMIN — FENOFIBRATE 145 MG: 145 TABLET, FILM COATED ORAL at 22:10

## 2017-01-31 RX ADMIN — FAMOTIDINE 20 MG: 20 TABLET ORAL at 10:04

## 2017-01-31 RX ADMIN — ZOLPIDEM TARTRATE 5 MG: 5 TABLET, FILM COATED ORAL at 22:08

## 2017-01-31 RX ADMIN — LORAZEPAM 0.5 MG: 0.5 TABLET ORAL at 19:30

## 2017-01-31 RX ADMIN — Medication 10 ML: at 14:00

## 2017-01-31 RX ADMIN — Medication 10 ML: at 07:56

## 2017-01-31 RX ADMIN — OLANZAPINE 5 MG: 10 INJECTION, POWDER, FOR SOLUTION INTRAMUSCULAR at 15:27

## 2017-01-31 RX ADMIN — LORAZEPAM 2 MG: 1 TABLET ORAL at 03:10

## 2017-01-31 RX ADMIN — FLUOROMETHOLONE 1 DROP: 1 SOLUTION/ DROPS OPHTHALMIC at 10:05

## 2017-01-31 RX ADMIN — ACETAMINOPHEN 650 MG: 325 TABLET, FILM COATED ORAL at 10:59

## 2017-01-31 RX ADMIN — VITAMIN E CAP 400 UNIT 400 UNITS: 400 CAP at 10:04

## 2017-01-31 RX ADMIN — ENOXAPARIN SODIUM 40 MG: 40 INJECTION SUBCUTANEOUS at 15:27

## 2017-01-31 RX ADMIN — FLUTICASONE PROPIONATE 2 SPRAY: 50 SPRAY, METERED NASAL at 07:56

## 2017-01-31 RX ADMIN — DOCUSATE SODIUM AND SENNOSIDES 1 TABLET: 8.6; 5 TABLET, FILM COATED ORAL at 21:27

## 2017-01-31 RX ADMIN — AMLODIPINE BESYLATE 5 MG: 5 TABLET ORAL at 10:04

## 2017-01-31 NOTE — IP AVS SNAPSHOT
[address] [facility] [address] [phone] Patient: Cornell Ash MRN: [id number] CXU:6/43/2757 You are allergic to the following Allergen Reactions Dicyclomine Nausea and Vomiting Extreme stomach pains Lithium Nausea and Vomiting Severe nausea and vomiting. Other Medication Other (comments) Intolerance to oranges, cabbage,beans,peppers,raw onions,foods with caffeine in them, popcorn, no pop sodas, because of IBS Recent Documentation Height Weight Breastfeeding? BMI OB Status Smoking Status 1.524 m 73.3 kg No 31.56 kg/m2 Hysterectomy Never Smoker Emergency Contacts Name Discharge Info Relation Home Work Mobile Alvin Azevedo  Sister [23] [phone] About your hospitalization You were admitted on:  January 31, 2017 You last received care in the:  [address] You were discharged on:  February 6, 2017 Unit phone number:  [phone] Why you were hospitalized Your primary diagnosis was:  Not on File Your diagnoses also included:  Bipolar 1 Disorder (Hcc) Providers Seen During Your Hospitalizations Provider Role Specialty Primary office phone Marcelino Perry MD Attending Provider Psychiatry [phone] Lindsey Dan MD Attending Provider Psychiatry [phone] Your Primary Care Physician (PCP) Primary Care Physician Office Phone Office Fax [address] [place] [address] [phone] Follow-up Information Follow up With Details Comments Contact Info Dr Larisa Villalba On 2/8/2017 Time of Your Appt:  2:00 PM [facility]  
[phone] [address] [place] [address] On 2/6/2017 Staff will contact pt on Monday @ her home schedule appts for in home services [address] [place] [address] Geena Tang MD   [address] 
[phone] Your Appointments Tuesday February 14, 2017  9:15 AM EST  
[facility] [address] [place] [address]. [place] [phone]  
[phone] Current Discharge Medication List  
  
START taking these medications Dose & Instructions Dispensing Information Comments Morning Noon Evening Bedtime  
 busPIRone 5 mg tablet Commonly known as:  BUSPAR Your next dose is: Today, Tomorrow Other:  _________ Dose:  5 mg Take 1 Tab by mouth three (3) times daily. Indications: GENERALIZED ANXIETY DISORDER Quantity:  90 Tab Refills:  0  
     
   
   
   
  
 hydrOXYzine HCl 10 mg tablet Commonly known as:  ATARAX Your next dose is: Today, Tomorrow Other:  _________ Dose:  10 mg Take 1 Tab by mouth every four (4) hours as needed (Anxiety) for up to 10 days. Indications: ANXIETY Quantity:  15 Tab Refills:  1 CONTINUE these medications which have NOT CHANGED Dose & Instructions Dispensing Information Comments Morning Noon Evening Bedtime  
 amLODIPine 5 mg tablet Commonly known as:  [place] Paul Your next dose is: Today, Tomorrow Other:  _________ Dose:  5 mg Take 1 Tab by mouth daily for 30 days. Quantity:  30 Tab Refills:  0  
     
   
   
   
  
 aspirin-acetaminophen-caffeine 250-250-65 mg per tablet Commonly known as:  EXCEDRIN ES Your next dose is: Today, Tomorrow Other:  _________ Dose:  1 Tab Take 1 Tab by mouth every six (6) hours as needed for Pain. Refills:  0  
     
   
   
   
  
 famotidine 20 mg tablet Commonly known as:  PEPCID Your next dose is: Today, Tomorrow Other:  _________ Dose:  20 mg Take 1 Tab by mouth two (2) times a day. D/C omeprazole Quantity:  60 Tab Refills:  3 fenofibrate nanocrystallized 145 mg tablet Commonly known as:  [employer] Your next dose is: Today, Tomorrow Other:  _________ Dose:  145 mg Take 1 Tab by mouth every fourty-eight (48) hours. Quantity:  30 Tab Refills:  5  
     
   
   
   
  
 fluorometholone 0.1 % ophthalmic suspension Commonly known as:  FML Your next dose is: Today, Tomorrow Other:  _________ Dose:  1 Drop Administer 1 Drop to both eyes two (2) times a day. Refills:  99  
     
   
   
   
  
 fluticasone 50 mcg/actuation nasal spray Commonly known as:  Estelita Reges Your next dose is: Today, Tomorrow Other:  _________  
   
   
 USE ONE SPRAY IN EACH NOSTRIL TWO TIMES A DAY. Quantity:  16 g Refills:  1  
     
   
   
   
  
 senna-docusate 8.6-50 mg per tablet Commonly known as:  Bijal Sherwood Your next dose is: Today, Tomorrow Other:  _________ Dose:  1 Tab Take 1 Tab by mouth nightly for 30 days. Quantity:  30 Tab Refills:  0  
     
   
   
   
  
 vilazodone 10 mg Tab tablet Commonly known as:  VIIBRYD Your next dose is: Today, Tomorrow Other:  _________ Dose:  20 mg Take 20 mg by mouth daily. Refills:  0  
     
   
   
   
  
 VITAMIN D3 1,000 unit Cap Generic drug:  cholecalciferol Your next dose is: Today, Tomorrow Other:  _________ Dose:  1 Tab Take 1 Tab by mouth daily. Indications: VITAMIN D DEFICIENCY Refills:  0  
     
   
   
   
  
 vitamin E 400 unit capsule Commonly known as:  [address] Your next dose is: Today, Tomorrow Other:  _________ Dose:  400 Units Take 400 Units by mouth daily. Refills:  0 STOP taking these medications ATIVAN 2 mg tablet Generic drug:  LORazepam  
   
  
 diphenhydrAMINE 50 mg capsule Commonly known as:  BENADRYL  
   
  
 gabapentin 300 mg capsule Commonly known as:  NEURONTIN  
   
  
 L.acidoph&parac-S.therm-Bifido 16 billion cell Cap Commonly known as:  THEO-Q(2) QUEtiapine 300 mg tablet Commonly known as:  SEROquel Where to Get Your Medications Information on where to get these meds will be given to you by the nurse or doctor. ! Ask your nurse or doctor about these medications  
  busPIRone 5 mg tablet  
 hydrOXYzine HCl 10 mg tablet Discharge Instructions DISCHARGE SUMMARY 
 
NAME:Adeline Gates : 1960 MRN: [id number] The patient Jocelyn Chester exhibits the ability to control behavior in a less restrictive environment. Patient's level of functioning is improving. No assaultive/destructive behavior has been observed for the past 24 hours. No suicidal/homicidal threat or behavior has been observed for the past 24 hours. There is no evidence of serious medication side effects. Patient has not been in physical or protective restraints for at least the past 24 hours. If weapons involved, how are they secured? N/A Is patient aware of and in agreement with discharge plan? Yes Arrangements for medication:  Prescriptions given to patient. Copy of discharge instructions to  provider?: Fax to Dr Jenae Brooke @ [phone] Arrangements for transportation home:  Sister agreed to provide transportation. Mental health crisis number:  615 or your local mental health crisis line number at [phone] DISCHARGE SUMMARY from Nurse The following personal items are in your possession at time of discharge: 
 
Dental Appliances: None Visual Aid: Glasses Home Medications: None Jewelry: Watch Clothing: None Other Valuables: None Personal Items Sent to Safe: none PATIENT INSTRUCTIONS: 
 
 
What to do at Home: 
Recommended activity: Activity as tolerated,  
 
 If you experience any of the following symptoms thoughts of harming self, racing thoughts and feeling overwhelmed with anxiety and hopelessness, please follow up with Dr. Sebastián Miller. If you can not reach him or his office and symptoms continue immediately call your local crisis number at [phone]. *  Please give a list of your current medications to your Primary Care Provider. *  Please update this list whenever your medications are discontinued, doses are 
    changed, or new medications (including over-the-counter products) are added. *  Please carry medication information at all times in case of emergency situations. These are general instructions for a healthy lifestyle: No smoking/ No tobacco products/ Avoid exposure to second hand smoke Surgeon General's Warning:  Quitting smoking now greatly reduces serious risk to your health. Obesity, smoking, and sedentary lifestyle greatly increases your risk for illness A healthy diet, regular physical exercise & weight monitoring are important for maintaining a healthy lifestyle You may be retaining fluid if you have a history of heart failure or if you experience any of the following symptoms:  Weight gain of 3 pounds or more overnight or 5 pounds in a week, increased swelling in our hands or feet or shortness of breath while lying flat in bed. Please call your doctor as soon as you notice any of these symptoms; do not wait until your next office visit. Recognize signs and symptoms of STROKE: 
 
F-face looks uneven A-arms unable to move or move unevenly S-speech slurred or non-existent T-time-call 911 as soon as signs and symptoms begin-DO NOT go Back to bed or wait to see if you get better-TIME IS BRAIN. Warning Signs of HEART ATTACK Call 911 if you have these symptoms: 
? Chest discomfort.  Most heart attacks involve discomfort in the center of the chest that lasts more than a few minutes, or that goes away and comes back. It can feel like uncomfortable pressure, squeezing, fullness, or pain. ? Discomfort in other areas of the upper body. Symptoms can include pain or discomfort in one or both arms, the back, neck, jaw, or stomach. ? Shortness of breath with or without chest discomfort. ? Other signs may include breaking out in a cold sweat, nausea, or lightheadedness. Don't wait more than five minutes to call [address]! Fast action can save your life. Calling 911 is almost always the fastest way to get lifesaving treatment. Emergency Medical Services staff can begin treatment when they arrive  up to an hour sooner than if someone gets to the hospital by car. The discharge information has been reviewed with the patient. The patient verbalized understanding. Discharge medications reviewed with the patient and appropriate educational materials and side effects teaching were provided. Discharge Orders None ViViFi Announcement We are excited to announce that we are making your provider's discharge notes available to you in ViViFi. You will see these notes when they are completed and signed by the physician that discharged you from your recent hospital stay. If you have any questions or concerns about any information you see in ViViFi, please call the Health Information Department where you were seen or reach out to your Primary Care Provider for more information about your plan of care. Introducing [facility]! Janis Zepeda introduces ViViFi patient portal. Now you can access parts of your medical record, email your doctor's office, and request medication refills online. 1. In your internet browser, go to [email or web address] 2. Click on the First Time User? Click Here link in the Sign In box. You will see the New Member Sign Up page. 3. Enter your Fora Access Code exactly as it appears below. You will not need to use this code after youve completed the sign-up process. If you do not sign up before the expiration date, you must request a new code. · Fora Access Code: [email or web address] Expires: 4/24/2017 10:35 AM 
 
4. Enter the last four digits of your Social Security Number (xxxx) and Date of Birth (mm/dd/yyyy) as indicated and click Submit. You will be taken to the next sign-up page. 5. Create a Fora ID. This will be your Fora login ID and cannot be changed, so think of one that is secure and easy to remember. 6. Create a Fora password. You can change your password at any time. 7. Enter your Password Reset Question and Answer. This can be used at a later time if you forget your password. 8. Enter your e-mail address. You will receive e-mail notification when new information is available in [address] 9. Click Sign Up. You can now view and download portions of your medical record. 10. Click the Download Summary menu link to download a portable copy of your medical information. If you have questions, please visit the Frequently Asked Questions section of the Fora website. Remember, Fora is NOT to be used for urgent needs. For medical emergencies, dial 911. Now available from your iPhone and Android! General Information Please provide this summary of care documentation to your next provider. Patient Signature:  ____________________________________________________________ Date:  ____________________________________________________________  
  
Althia Kras Provider Signature:  ____________________________________________________________ Date:  ____________________________________________________________

## 2017-01-31 NOTE — PROGRESS NOTES
Bedside and Verbal shift change report given to Wallace Leon RN (oncoming nurse) by Anika Yeh (offgoing nurse). Report included the following information SBAR and Kardex.

## 2017-01-31 NOTE — DISCHARGE SUMMARY
Discharge Summary       PATIENT ID: Mckay Valdez  MRN: [id number]   YOB: 1960    DATE OF ADMISSION: 1/24/2017  9:30 AM    DATE OF DISCHARGE: 1/31/2017   PRIMARY CARE PROVIDER: Khalida Roque MD     ATTENDING PHYSICIAN: Dr Otf Johnson  DISCHARGING PROVIDER: Otf Johnson MD    To contact this individual call [phone] and ask the  to page. If unavailable ask to be transferred the Adult Hospitalist Department. CONSULTATIONS: IP CONSULT TO PSYCHIATRY    PROCEDURES/SURGERIES: * No surgery found *    ADMITTING DIAGNOSES & HOSPITAL COURSE:   Acute Encephalopathy (POA):  - Metabolic versus delirium secondary to over dose  - CT of head (1/24) showed there is no intracranial hemorrhage, extra-axial collection, mass, mass effect or midline shift.  - Continue fall precautions   - Drug screen negative (1/24)  - Acetaminophen <2 and Salicylate level 2.6 (4/72)     Hypokalemia: Resolved. Potassium 4.0 (1/25)      Elevated blood pressure:  - No history of HTN, no BP meds PTA  - Will continue to monitor.   -Now on Norvasc     Hx of bipolar disorder:   - Patient given Ativan in the ED with improvement, stopped ativan as per psych   - Psychiatrist has seen and titrating medications  - Seroquel now stopped, on zyprexa. - discussed case/status with Dr. Parker Mccurdy 1/27  -The patient will transferred to psych today    Cardiac diet        DISCHARGE DIAGNOSES / PLAN:      1. Acute encephalopathy  2.  Bipolar disorder       PENDING TEST RESULTS:   At the time of discharge the following test results are still pending: none    FOLLOW UP APPOINTMENTS:    Follow-up Information     Follow up With Details Comments Contact Info    Khalida Roque MD In 1 week  [address]  [facility]  [phone]             ADDITIONAL CARE RECOMMENDATIONS: none    DIET: Cardiac Diet    ACTIVITY: Activity as tolerated      DISCHARGE MEDICATIONS:  Current Discharge Medication List      START taking these medications Details   amLODIPine (NORVASC) 5 mg tablet Take 1 Tab by mouth daily for 30 days. Qty: 30 Tab, Refills: 0      senna-docusate (PERICOLACE) 8.6-50 mg per tablet Take 1 Tab by mouth nightly for 30 days. Qty: 30 Tab, Refills: 0      !! sterile water (preservative free) soln with OLANZapine 10 mg solr 10 mg 10 mg by IntraMUSCular route daily (with dinner). Qty: 30 Ampule, Refills: 0      !! sterile water (preservative free) soln with OLANZapine 10 mg solr 5 mg 5 mg by IntraMUSCular route daily for 30 days. Qty: 30 Ampule, Refills: 0       !! - Potential duplicate medications found. Please discuss with provider. CONTINUE these medications which have NOT CHANGED    Details   vitamin E (AQUA GEMS) 400 unit capsule Take 400 Units by mouth daily. famotidine (PEPCID) 20 mg tablet Take 1 Tab by mouth two (2) times a day. D/C omeprazole  Qty: 60 Tab, Refills: 3    Associated Diagnoses: Gastroesophageal reflux disease without esophagitis      L.acidoph&parac-S.therm-Bifido (THEO-Q,2,) 16 billion cell cap 1 cap po QD  Qty: 10 Cap, Refills: 0    Associated Diagnoses: Balance problem      fluticasone (FLONASE) 50 mcg/actuation nasal spray USE ONE SPRAY IN EACH NOSTRIL TWO TIMES A DAY. Qty: 16 g, Refills: 1      fenofibrate nanocrystallized (TRICOR) 145 mg tablet Take 1 Tab by mouth every fourty-eight (48) hours. Qty: 30 Tab, Refills: 5    Associated Diagnoses: Elevated triglycerides with high cholesterol      diphenhydrAMINE (BENADRYL) 50 mg capsule Take 50 mg by mouth nightly. aspirin-acetaminophen-caffeine 250-250-65 mg per tablet Take 1 Tab by mouth every six (6) hours as needed for Pain. fluorometholone (FML) 0.1 % ophthalmic suspension Administer 1 Drop to both eyes two (2) times a day. Refills: 99      food supplement, lactose-free (ENSURE) liqd Take 237 mL by mouth daily.   Qty: 30 Can, Refills: 2    Associated Diagnoses: Weight loss      Cholecalciferol, Vitamin D3, (VITAMIN D3) 1,000 unit cap Take 1 Tab by mouth daily. STOP taking these medications       loperamide (IMODIUM) 2 mg capsule Comments:   Reason for Stopping:         QUEtiapine (SEROQUEL) 300 mg tablet Comments:   Reason for Stopping:         gabapentin (NEURONTIN) 300 mg capsule Comments:   Reason for Stopping:         VILAZODONE HYDROCHLORIDE (VIIBRYD PO) Comments:   Reason for Stopping:         hyoscyamine SL (LEVSIN/SL) 0.125 mg SL tablet Comments:   Reason for Stopping:         LORazepam (ATIVAN) 2 mg tablet Comments:   Reason for Stopping:                 NOTIFY YOUR PHYSICIAN FOR ANY OF THE FOLLOWING:   Fever over 101 degrees for 24 hours. Chest pain, shortness of breath, fever, chills, nausea, vomiting, diarrhea, change in mentation, falling, weakness, bleeding. Severe pain or pain not relieved by medications. Or, any other signs or symptoms that you may have questions about.     DISPOSITION:   x Home With:   OT  PT  HH  RN       Long term SNF/Inpatient Rehab    Independent/assisted living    Hospice    Other:       PATIENT CONDITION AT DISCHARGE:     Functional status    Poor     Deconditioned    x Independent      Cognition   x  Lucid     Forgetful     Dementia      Catheters/lines (plus indication)    Wong     PICC     PEG    x None      Code status    x Full code     DNR      PHYSICAL EXAMINATION AT DISCHARGE:  Please see progress note      CHRONIC MEDICAL DIAGNOSES:  Problem List as of 1/31/2017  Date Reviewed: 1/24/2017          Codes Class Noted - Resolved    * (Principal)Altered mental status ICD-10-CM: R41.82  ICD-9-CM: 780.97  1/24/2017 - Present        Delirium ICD-10-CM: R41.0  ICD-9-CM: 780.09  1/24/2017 - Present        Hyponatremia ICD-10-CM: E87.1  ICD-9-CM: 276.1  1/24/2017 - Present        Drop attack ICD-10-CM: R55  ICD-9-CM: 780.2  3/25/2015 - Present        IBS (irritable bowel syndrome) ICD-10-CM: K58.9  ICD-9-CM: 564.1  10/30/2014 - Present        Chronic headache ICD-10-CM: R51  ICD-9-CM: 784.0 10/30/2014 - Present        Bipolar affective disorder, manic ([facility] ICD-10-CM: F31.10  ICD-9-CM: 296.40 Acute 5/16/2014 - Present        Choledocholithiasis ICD-10-CM: K80.50  ICD-9-CM: 574.50  11/24/2010 - Present        Gallstones with obstruction of gallbladder ICD-10-CM: K80.21  ICD-9-CM: 574.21  11/23/2010 - Present        Arthritis ICD-10-CM: M19.90  ICD-9-CM: 716.90  Unknown - Present              Greater than 37 minutes were spent with the patient on counseling and coordination of care    Signed:   Gabriel Richardson MD  1/31/2017  1:50 PM

## 2017-01-31 NOTE — BH NOTES
1715:  TRANSFER - IN REPORT:    Verbal report received from Faisal Downey on Glenora Dandy  being received from [address] [place] [address] for routine progression of care      Report consisted of patients Situation, Background, Assessment and   Recommendations(SBAR). Information from the following report(s) SBAR, Kardex and MAR was reviewed with the receiving nurse. Opportunity for questions and clarification was provided. Assessment completed upon patients arrival to unit and care assumed.

## 2017-01-31 NOTE — DISCHARGE INSTRUCTIONS
Discharge Instructions       PATIENT ID: Katie Lara  MRN: [id number]   YOB: 1960    DATE OF ADMISSION: 1/24/2017  9:30 AM    DATE OF DISCHARGE: 1/31/2017    PRIMARY CARE PROVIDER: Jovany Serrano MD     ATTENDING PHYSICIAN: Carlos Roque MD  DISCHARGING PROVIDER: Carlos Roque MD    To contact this individual call [phone] and ask the  to page. If unavailable ask to be transferred the Adult Hospitalist Department. DISCHARGE DIAGNOSES   AMS    CONSULTATIONS: IP CONSULT TO PSYCHIATRY    PROCEDURES/SURGERIES: * No surgery found *    PENDING TEST RESULTS:   At the time of discharge the following test results are still pending: none    FOLLOW UP APPOINTMENTS:   Follow-up Information     Follow up With Details Comments Contact Info    Jovany Serrano MD In 1 week  [address]  [address] [facility]  [phone]             ADDITIONAL CARE RECOMMENDATIONS: none    DIET: Cardiac Diet    ACTIVITY: Activity as tolerated      DISCHARGE MEDICATIONS:   See Medication Reconciliation Form    · It is important that you take the medication exactly as they are prescribed. · Keep your medication in the bottles provided by the pharmacist and keep a list of the medication names, dosages, and times to be taken in your wallet. · Do not take other medications without consulting your doctor. NOTIFY YOUR PHYSICIAN FOR ANY OF THE FOLLOWING:   Fever over 101 degrees for 24 hours. Chest pain, shortness of breath, fever, chills, nausea, vomiting, diarrhea, change in mentation, falling, weakness, bleeding. Severe pain or pain not relieved by medications. Or, any other signs or symptoms that you may have questions about.       DISPOSITION:   x Home With:   OT  PT  HH  RN       SNF/Inpatient Rehab/LTAC    Independent/assisted living    Hospice    Other:     CDMP Checked:   Yes x     PROBLEM LIST Updated:  Yes x       Signed:   Carlos Roque MD  1/31/2017  1:50 PM

## 2017-01-31 NOTE — BH NOTES
Skin Assessment performed by Carrie Mustafa RN and DR, LPN    Patient's skin is clean, dry and intact. There are no bruises or cuts - patient does have a small abrasion to the outer aspect of her Left Knee that she reports is a rug burn from a fall out of bed at home before admission when she was confused and having panic attacks. She has a lower abdominal bikini scar from endometriosis surgery in the past as well as an upper mid-line abdominal scar from gall bladder surgery. There are scattered moles across her upper back. Her feet are WNL. Jewelry: Patient is wearing glasses and a silver-colored chain wrist watch    Safety: Patient does contract for safety - states that she is glad to be here to help prevent the panic attacks she was having prior to her admission. Pressure Ulcer Documentation  (COMPLETE ONE LABEL PER PRESSURE ULCER)  For further information, please review corresponding Wound Care flowsheet. Stuart Ordaz has:    No pressure ulcer noted and pressure ulcer prevention initiated.       Elian Brooks RN

## 2017-01-31 NOTE — PROGRESS NOTES
0220:  Patient complaining of increased anxiety. Patient states she is having difficulty calming down, fidgeting in the bed, etc.  Patient states, \"It's like I'm having a panic attack, but I don't know. \"  Hospitalist paged. Spoke to Viral Ernst NP and orders received for PRN 2mg Ativan. Bedside shift change report given to Hari Humphrey RN (oncoming nurse) by Chi Quintero RN (offgoing nurse). Report included the following information SBAR, Intake/Output and MAR.

## 2017-01-31 NOTE — PROGRESS NOTES
Verbal shift change report given to Joaquina Fletcher (transfer nurse) by Ada Bean ([facility]). Report included the following information SBAR, Kardex, Procedure Summary, MAR, Recent Results and Med Rec Status.

## 2017-01-31 NOTE — PROGRESS NOTES
Pt improving with more practical thinking. Still affectively unstable, mood mixed, anxiety significant. No psychotic or suicidal sx. Addressing living situation. Considering information re what she needs to do to continue [facility]t place at [facility]. She has managed for @ 3 years even with regressive episodes and poor, deliberate or otherwise, management of medication. Still asking and concerned about lorazepam not being prescribed. Talks of sleep difficulties over past few days. Mental status shows her to be alert and oriented x 4. Generally cooperative but with some resentment. No motor sx. Hygiene and grooming adequate. She speaks coherently with goal direction and mildly elevated rate. Rhythm and volume normal.  Mild pressure. No psychotic sx, moderate preoccupation with sx/medication, and considerable helplessness. Mood mixed, quite emotive. Cognitive function okay and her \"poor\" memory represents anxiety and helplessness. Dx of bipolar disorder mixed episode improving. Medication misuse. Will start buspar for anxiety, change schedule for olanzapine to help with sleep.

## 2017-01-31 NOTE — BH NOTES
Admission Note:    Patient admitted under the care of Dr. Chris Shen with diagnosis of Bipolar Disorder    Admission Status:  Voluntary    Reason for Admission: Medication Adjustment and monitoring    Dr. Taiwo Castano called for orders:    Patient condition on arrival: anxious - patient reports that she hasn't slept for past several nights and seeks reassurance that we will have medication orders for even if the doctor doesn't see her until tomorrow. Patient's statements/questions/concerns: Medication to help her sleep. Patient states that she has been on our unit a few years ago.

## 2017-01-31 NOTE — IP AVS SNAPSHOT
Current Discharge Medication List  
  
Take these medications at their scheduled times Dose & Instructions Dispensing Information Comments Morning Noon Evening Bedtime  
 amLODIPine 5 mg tablet Commonly known as:  Rudene Post Your next dose is: Today, Tomorrow Other:  ____________ Dose:  5 mg Take 1 Tab by mouth daily for 30 days. Quantity:  30 Tab Refills:  0  
     
   
   
   
  
 busPIRone 5 mg tablet Commonly known as:  BUSPAR Your next dose is: Today, Tomorrow Other:  ____________ Dose:  5 mg Take 1 Tab by mouth three (3) times daily. Indications: GENERALIZED ANXIETY DISORDER Quantity:  90 Tab Refills:  0  
     
   
   
   
  
 famotidine 20 mg tablet Commonly known as:  PEPCID Your next dose is: Today, Tomorrow Other:  ____________ Dose:  20 mg Take 1 Tab by mouth two (2) times a day. D/C omeprazole Quantity:  60 Tab Refills:  3  
     
   
   
   
  
 fenofibrate nanocrystallized 145 mg tablet Commonly known as:  [employer] Your next dose is: Today, Tomorrow Other:  ____________ Dose:  145 mg Take 1 Tab by mouth every fourty-eight (48) hours. Quantity:  30 Tab Refills:  5  
     
   
   
   
  
 fluorometholone 0.1 % ophthalmic suspension Commonly known as:  FML Your next dose is: Today, Tomorrow Other:  ____________ Dose:  1 Drop Administer 1 Drop to both eyes two (2) times a day. Refills:  99  
     
   
   
   
  
 senna-docusate 8.6-50 mg per tablet Commonly known as:  Guillermo Balm Your next dose is: Today, Tomorrow Other:  ____________ Dose:  1 Tab Take 1 Tab by mouth nightly for 30 days. Quantity:  30 Tab Refills:  0  
     
   
   
   
  
 vilazodone 10 mg Tab tablet Commonly known as:  VIIBRYD Your next dose is: Today, Tomorrow Other:  ____________  Dose:  20 mg  
 Take 20 mg by mouth daily. Refills:  0  
     
   
   
   
  
 VITAMIN D3 1,000 unit Cap Generic drug:  cholecalciferol Your next dose is: Today, Tomorrow Other:  ____________ Dose:  1 Tab Take 1 Tab by mouth daily. Indications: VITAMIN D DEFICIENCY Refills:  0  
     
   
   
   
  
 vitamin E 400 unit capsule Commonly known as:  [address] Your next dose is: Today, Tomorrow Other:  ____________ Dose:  400 Units Take 400 Units by mouth daily. Refills:  0 Take these medications as needed Dose & Instructions Dispensing Information Comments Morning Noon Evening Bedtime  
 aspirin-acetaminophen-caffeine 250-250-65 mg per tablet Commonly known as:  EXCEDRIN ES Your next dose is: Today, Tomorrow Other:  ____________ Dose:  1 Tab Take 1 Tab by mouth every six (6) hours as needed for Pain. Refills:  0  
     
   
   
   
  
 hydrOXYzine HCl 10 mg tablet Commonly known as:  ATARAX Your next dose is: Today, Tomorrow Other:  ____________ Dose:  10 mg Take 1 Tab by mouth every four (4) hours as needed (Anxiety) for up to 10 days. Indications: ANXIETY Quantity:  15 Tab Refills:  1 Take these medications as directed Dose & Instructions Dispensing Information Comments Morning Noon Evening Bedtime  
 fluticasone 50 mcg/actuation nasal spray Commonly known as:  Tomeka Manley Your next dose is: Today, Tomorrow Other:  ____________  
   
   
 USE ONE SPRAY IN EACH NOSTRIL TWO TIMES A DAY. Quantity:  16 g Refills:  1 Where to Get Your Medications Information about where to get these medications is not yet available ! Ask your nurse or doctor about these medications  
  busPIRone 5 mg tablet  
 hydrOXYzine HCl 10 mg tablet

## 2017-01-31 NOTE — PROGRESS NOTES
Hospitalist Progress Note  Isamar Mike MD  Office: [phone]  Cell: [phone]      Date of Service:  2017  NAME:  Arlene Oates  :  1960  MRN:  [id number]    Admission Summary:   Patient presented to the ED, found by sister at [facility] living [facility] with confusion and agitation. Sister states that the patient had medications on the table when she arrived but does not know which ones she took that day. Per sister the patient was more confused then her baseline, had multiple falls over the past several days. Admitted for further evaluation and treatment. Interval history / Subjective:   No new issues  No pain but claims that since she has IBD sometimes when she moves her bowels she gets cramps     Assessment & Plan:     Acute Encephalopathy (POA):  - Metabolic versus delirium secondary to over dose  - CT of head () showed there is no intracranial hemorrhage, extra-axial collection, mass, mass effect or midline shift.  - Continue fall precautions   - Drug screen negative ()  - Acetaminophen <2 and Salicylate level 2.6 (4/10)     Hypokalemia: Resolved. Potassium 4.0 ()      Elevated blood pressure:  - No history of HTN, no BP meds PTA  - Will continue to monitor.   -Now on Norvasc     Hx of bipolar disorder:   - Patient given Ativan in the ED with improvement, stopped ativan as per psych   - Psychiatrist has seen and titrating medications  - Seroquel now stopped, on zyprexa.   - discussed case/status with Dr. Kasandra Alpers   -The patient will transferred to psych today    Cardiac diet    The patient continues to remain stable from medicine standpoint awaiting inpatient psych bed     DVT prophylaxis: On Lovenox  Dispo: Awaiting psych bed  Code status: Full  Care Plan discussed with: patient     Hospital Problems  Date Reviewed: 2017          Codes Class Noted POA    * (Principal)Altered mental status ICD-10-CM: R41.82  ICD-9-CM: 780.97  1/24/2017 Unknown        Delirium ICD-10-CM: R41.0  ICD-9-CM: 780.09  1/24/2017 Unknown        Hyponatremia ICD-10-CM: E87.1  ICD-9-CM: 276.1  1/24/2017 Unknown            Review of Systems:   Very directed with ROS questions: No dizziness, No GI or respiratory complaints. Vital Signs:    Last 24hrs VS reviewed since prior progress note. Most recent are:  Visit Vitals    BP (!) 148/91    Pulse 86    Temp 98.5 °F (36.9 °C)    Resp 18    Wt 76.8 kg (169 lb 5 oz)    SpO2 98%    BMI 33.07 kg/m2       Intake/Output Summary (Last 24 hours) at 01/31/17 1336  Last data filed at 01/31/17 1003   Gross per 24 hour   Intake                0 ml   Output              500 ml   Net             -500 ml      Physical Examination:   No change to physical exam 1/29:       Constitutional:  Cooperative, pleasant. Cheerful this am.   ENT:  Oral mucous moist    Resp:  No accessory muscle use. RA   CV:  Regular rhythm. Musculoskeletal:  No edema    Neurologic:  Moves all extremities. AAOx 3   Psych: Does not appear anxious. Has difficulty staying on task/with topic of conversation      Data Review:   Review and/or order of clinical lab test  Review and/or order of tests in the radiology section of CPT  Review and/or order of tests in the medicine section of CPT    Labs:     No results for input(s): WBC, HGB, HCT, PLT, HGBEXT, HCTEXT, PLTEXT, HGBEXT, HCTEXT, PLTEXT in the last 72 hours. No results for input(s): NA, K, CL, CO2, BUN, CREA, GLU, CA, MG, PHOS, URICA in the last 72 hours. No results for input(s): SGOT, GPT, ALT, AP, TBIL, TBILI, TP, ALB, GLOB, GGT, AML, LPSE in the last 72 hours. No lab exists for component: AMYP, HLPSE  No results for input(s): INR, PTP, APTT in the last 72 hours. No lab exists for component: INREXT, INREXT   No results for input(s): FE, TIBC, PSAT, FERR in the last 72 hours.    No results found for: FOL, RBCF   No results for input(s): PH, PCO2, PO2 in the last 72 hours. No results for input(s): CPK, CKNDX, TROIQ in the last 72 hours.     No lab exists for component: CPKMB  Lab Results   Component Value Date/Time    Cholesterol, total 185 06/28/2016 09:00 AM    HDL Cholesterol 53 06/28/2016 09:00 AM    LDL, calculated 114 06/28/2016 09:00 AM    Triglyceride 89 06/28/2016 09:00 AM    CHOL/HDL Ratio 3.2 05/17/2014 06:31 AM     No results found for: [facility]  Lab Results   Component Value Date/Time    Color YELLOW/STRAW 01/24/2017 09:44 AM    Appearance CLEAR 01/24/2017 09:44 AM    Specific gravity 1.018 01/24/2017 09:44 AM    pH (UA) 7.0 01/24/2017 09:44 AM    Protein 30 01/24/2017 09:44 AM    Glucose NEGATIVE  01/24/2017 09:44 AM    Ketone TRACE 01/24/2017 09:44 AM    Bilirubin NEGATIVE  01/24/2017 09:44 AM    Urobilinogen 0.2 01/24/2017 09:44 AM    Nitrites NEGATIVE  01/24/2017 09:44 AM    Leukocyte Esterase NEGATIVE  01/24/2017 09:44 AM    Epithelial cells FEW 01/24/2017 09:44 AM    Bacteria NEGATIVE  01/24/2017 09:44 AM    WBC 0-4 01/24/2017 09:44 AM    RBC 0-5 01/24/2017 09:44 AM     Medications Reviewed:     Current Facility-Administered Medications   Medication Dose Route Frequency    LORazepam (ATIVAN) tablet 2 mg  2 mg Oral Q6H PRN    acetaminophen (TYLENOL) tablet 650 mg  650 mg Oral Q6H PRN    amLODIPine (NORVASC) tablet 5 mg  5 mg Oral DAILY    senna-docusate (PERICOLACE) 8.6-50 mg per tablet 1 Tab  1 Tab Oral QHS    OLANZapine (ZyPREXA) 5 mg in sterile water (preservative free) injection  5 mg IntraMUSCular DAILY    OLANZapine (ZyPREXA) 10 mg in sterile water (preservative free) injection  10 mg IntraMUSCular DAILY WITH DINNER    cholecalciferol (VITAMIN D3) tablet 1,000 Units  1,000 Units Oral DAILY    famotidine (PEPCID) tablet 20 mg  20 mg Oral BID    fenofibrate nanocrystallized (TRICOR) tablet 145 mg  145 mg Oral Q48H    fluorometholone (FML) 0.1 % ophthalmic suspension 1 Drop  1 Drop Both Eyes BID    fluticasone (FLONASE) 50 mcg/actuation nasal spray 2 Spray  2 Spray Both Nostrils BID    lactobac ac& pc-s.therm-b.anim (THEO Q/RISAQUAD)  1 Cap Oral DAILY    sodium chloride (NS) flush 5-10 mL  5-10 mL IntraVENous Q8H    sodium chloride (NS) flush 5-10 mL  5-10 mL IntraVENous PRN    enoxaparin (LOVENOX) injection 40 mg  40 mg SubCUTAneous Q24H    vitamin E acetate capsule 400 Units  400 Units Oral DAILY   ______________________________________________________________________  EXPECTED LENGTH OF STAY: 2d 9h  ACTUAL LENGTH OF STAY:          Patricia Munson MD

## 2017-01-31 NOTE — PROGRESS NOTES
Reviewed medical chart; after speaking with the patient's sister last evening, this  looked into affordable assisted living options. This  called Ms. Keith of [facility] (Address: [address] [place] [address]). Ms. Vonda Zelaya explained that the monthly cost for this patient, only needing assistance with medication reminders and meals, would be $1500/month. She would complete the [employer] and Auxiliary angelika application, but requires that the family pay 3 months/$4500 up front if at all possible as it can take 45 days or more to get a response regarding the auxiliary angelika application. Update the patient's sister, Betzaida Orozco, Z#[phone], T#[phone] with this information. She plans to talk with her parents regarding the cost, but considers this a viable option. [facility] is a private home with under 10 residents. The patient would have to share a room, but her roommate would be close to her age. Care Management will continue to follow her disposition.    RENZO Jones

## 2017-02-01 PROCEDURE — 74011250637 HC RX REV CODE- 250/637

## 2017-02-01 PROCEDURE — 74011250637 HC RX REV CODE- 250/637: Performed by: PSYCHIATRY & NEUROLOGY

## 2017-02-01 PROCEDURE — 65220000003 HC RM SEMIPRIVATE PSYCH

## 2017-02-01 PROCEDURE — 74011250637 HC RX REV CODE- 250/637: Performed by: HOSPITALIST

## 2017-02-01 PROCEDURE — 74011000250 HC RX REV CODE- 250: Performed by: PSYCHIATRY & NEUROLOGY

## 2017-02-01 RX ORDER — FLUTICASONE PROPIONATE 50 MCG
1 SPRAY, SUSPENSION (ML) NASAL 2 TIMES DAILY
Status: DISCONTINUED | OUTPATIENT
Start: 2017-02-01 | End: 2017-02-06 | Stop reason: HOSPADM

## 2017-02-01 RX ORDER — FLUOROMETHOLONE 1 MG/ML
1 SOLUTION/ DROPS OPHTHALMIC 2 TIMES DAILY
Status: DISCONTINUED | OUTPATIENT
Start: 2017-02-01 | End: 2017-02-06 | Stop reason: HOSPADM

## 2017-02-01 RX ORDER — GABAPENTIN 300 MG/1
300 CAPSULE ORAL
COMMUNITY
End: 2017-02-06

## 2017-02-01 RX ORDER — LORAZEPAM 2 MG/1
2 TABLET ORAL
Status: DISCONTINUED | OUTPATIENT
Start: 2017-02-01 | End: 2017-02-06 | Stop reason: HOSPADM

## 2017-02-01 RX ORDER — VILAZODONE HYDROCHLORIDE 10 MG/1
20 TABLET ORAL
COMMUNITY
End: 2017-08-11 | Stop reason: DRUGHIGH

## 2017-02-01 RX ORDER — LORAZEPAM 2 MG/1
4 TABLET ORAL
Status: DISCONTINUED | OUTPATIENT
Start: 2017-02-01 | End: 2017-02-01

## 2017-02-01 RX ORDER — LORAZEPAM 1 MG/1
1 TABLET ORAL
Status: DISCONTINUED | OUTPATIENT
Start: 2017-02-01 | End: 2017-02-06 | Stop reason: HOSPADM

## 2017-02-01 RX ORDER — BUSPIRONE HYDROCHLORIDE 5 MG/1
5 TABLET ORAL 3 TIMES DAILY
Status: DISCONTINUED | OUTPATIENT
Start: 2017-02-01 | End: 2017-02-03

## 2017-02-01 RX ORDER — FOLIC ACID 1 MG/1
1 TABLET ORAL DAILY
Status: DISCONTINUED | OUTPATIENT
Start: 2017-02-01 | End: 2017-02-06 | Stop reason: HOSPADM

## 2017-02-01 RX ORDER — LANOLIN ALCOHOL/MO/W.PET/CERES
100 CREAM (GRAM) TOPICAL DAILY
Status: DISCONTINUED | OUTPATIENT
Start: 2017-02-01 | End: 2017-02-06 | Stop reason: HOSPADM

## 2017-02-01 RX ORDER — QUETIAPINE FUMARATE 300 MG/1
300 TABLET, FILM COATED ORAL DAILY
COMMUNITY
End: 2017-02-06

## 2017-02-01 RX ORDER — LORAZEPAM 2 MG/1
2 TABLET ORAL
Status: DISCONTINUED | OUTPATIENT
Start: 2017-02-01 | End: 2017-02-01

## 2017-02-01 RX ORDER — LORAZEPAM 2 MG/1
2 TABLET ORAL 3 TIMES DAILY
COMMUNITY
End: 2017-02-06

## 2017-02-01 RX ADMIN — OLANZAPINE 2.5 MG: 2.5 TABLET ORAL at 10:43

## 2017-02-01 RX ADMIN — AMLODIPINE BESYLATE 5 MG: 5 TABLET ORAL at 10:37

## 2017-02-01 RX ADMIN — FLUOROMETHOLONE 1 DROP: 1 SOLUTION/ DROPS OPHTHALMIC at 17:41

## 2017-02-01 RX ADMIN — BUSPIRONE HYDROCHLORIDE 5 MG: 5 TABLET ORAL at 21:46

## 2017-02-01 RX ADMIN — ZOLPIDEM TARTRATE 5 MG: 5 TABLET, FILM COATED ORAL at 21:51

## 2017-02-01 RX ADMIN — FLUTICASONE PROPIONATE 1 SPRAY: 50 SPRAY, METERED NASAL at 17:41

## 2017-02-01 RX ADMIN — FOLIC ACID 1 MG: 1 TABLET ORAL at 10:43

## 2017-02-01 RX ADMIN — FLUOROMETHOLONE 1 DROP: 1 SOLUTION/ DROPS OPHTHALMIC at 13:33

## 2017-02-01 RX ADMIN — FAMOTIDINE 20 MG: 20 TABLET ORAL at 17:41

## 2017-02-01 RX ADMIN — DOCUSATE SODIUM AND SENNOSIDES 1 TABLET: 8.6; 5 TABLET, FILM COATED ORAL at 21:46

## 2017-02-01 RX ADMIN — Medication 100 MG: at 10:43

## 2017-02-01 RX ADMIN — BUSPIRONE HYDROCHLORIDE 5 MG: 5 TABLET ORAL at 17:41

## 2017-02-01 RX ADMIN — FAMOTIDINE 20 MG: 20 TABLET ORAL at 10:37

## 2017-02-01 NOTE — INTERDISCIPLINARY ROUNDS
Behavioral Health Interdisciplinary Rounds     Patient Name: Stuart Ordaz  Age: 60 y.o.   Room/Bed:  742/  Primary Diagnosis: <principal problem not specified>   Admission Status: Voluntary     Readmission within 30 days: no  Power of  in place: no  Patient requires a blocked bed: no          Reason for blocked bed: n/a    VTE Prophylaxis: Not indicated  Influenza vaccine screen completed: yes Influenza vaccine given: yes - PTA  Mobility needs/Fall risk: no    Nutritional Plan: no  Consults: no         Labs/Testing due today?: no    Sleep hours:  6.5+      Participation in Care/Groups:  no  Medication Compliant?: Yes  PRNS (last 24 hours): Ambien Ativan    Restraints (last 24 hours):  no  Substance Abuse:  no  CIWA (range last 24 hours): n/a COWS (range last 24 hours): n/a  Alcohol screening (AUDIT) completed -  AUDIT Score: 0  If applicable, date SBIRT discussed in treatment team AND documented:   Tobacco - patient is a smoker: no   Date tobacco education completed by RN: n/a  24 hour chart check complete: yes     Patient goal(s) for today:   Treatment team focus/goals:   LOS:  1  Expected LOS:   Financial concerns/prescription coverage:    Date of last family contact:       Family requesting physician contact today:    Discharge plan:        Outpatient provider(s):     Participating treatment team members: Stuart Ordaz, RENZO Naqvi; Dr. Dominick Miller MD; Kasey Morejon, JYOTSNA; Graciela Lemus, LatanyaD

## 2017-02-01 NOTE — PROGRESS NOTES
Problem: Falls - Risk of  Goal: *Absence of falls  Outcome: Progressing Towards Goal  Patient remains absent of falls, non-slip socks in use, tap bell beside bed, bed alarm in use. Patient is out on the unit, med and meal compliant, did not attend group, remains on Q15 min safety checks.

## 2017-02-01 NOTE — PROGRESS NOTES
NUTRITION COMPLETE ASSESSMENT    RECOMMENDATIONS:   1. High fiber (Activia yogurt BID & All Bran cereal)  2. Update food preferences/tolerances  3. Routine weights     Interventions/Plan:   Food/Nutrient Delivery:  Modify diet/texture/consistency/nutrients (High fiber)          Nutrition Education:     Coordination of Care:    Nutrition Counseling:        Assessment:   Reason for Assessment:   [x] Provider Consult    Diet: Regular  Supplements: N/A; appetite excellent  Nutritionally Significant Medications: [x] Reviewed & Includes: Tricor, Pericolace, Pepcid, Folvite, B1, Vit E  Meal Intake: 100%    Subjective:  Appetite good, eating everything; clean plate. 's. Don't tolerate milk, drink Lactaid or soy milk. Don't tolerate: popcorn, corn, broccoli, cabbage, spicy foods and no coffee. I love sweet potatoes and penne pasta. Objective:  Admit Bipolar with hx IBS. Progressive and significant wt gain comparing wt 144.5# (2/2/16) to weight today 165.6# reflects ~21# (15%) x past ~11 months. Appetite excellent per pt, but claimed didn't eat for first 3 days of admission. IBS and offered high fiber foods, All Bran cereal and Activia yogurt BID. May consider probiotic. Rx Pepcid and Pericolace. Will update foods known to cause GI \"crampy pain\" per pt and lactose intolerant drinks Lactaid or soy milk. Hx high TG 1698 (2/3/16) last check 89 WNL (6/28/16); Rx Tricor and could consider fish oil. Noted weight gain, obese BMI 32 and risk metabolic syndrome. Could trial cardiac, NCS diet. Estimated Nutrition Needs:   Kcals/day: 1630 Kcals/day  Protein: 75 g  Fluid: 1400 ml (Minimum ~30 mL/kg IBW)     Based On: [employer] (MSJ x 1.3 wt maintenance)  Weight Used: Actual wt (75.1 kg (standing scale))    Pt expected to meet estimated nutrient needs:  [x]   Yes      Comparative Standards: % Est Energy Consumed: 100; % Est Protein Consumed: 100; % Est Fluid Consumed: 100%    Nutrition Diagnosis:   1. Altered GI function related to bowel function  as evidenced by irregular BM with IBS, and avoids foods that cause GI cramping. Goals:     Consume minimum 20 gm fiber daily until discharge     Monitoring & Evaluation:    - Fiber intake   - Other: Routine BM no crampy GI pain r/t food, flatulence or BM     Previous Nutrition Goals Met:  N/A  Previous Recommendations:      N/A    Education & Discharge Needs:   [x] Identified and addressed: Discussed fiber/fluid needs   [x] Participated in care plan, discharge planning, and/or interdisciplinary rounds        Cultural, Rastafari and ethnic food preferences identified:   None    Skin Integrity: [x]Intact    Edema: [x]None   Last BM: 2/1/17 x 1 BM (1/31/17 x 3 BM)  Food Allergies: [x]NKFA    Anthropometrics:    Weight Loss Metrics 2/1/2017 1/25/2017 11/29/2016 11/15/2016 10/4/2016 8/2/2016 6/28/2016   Today's Wt 165 lb 9.6 oz 169 lb 5 oz 167 lb 169 lb 166 lb 9.6 oz 164 lb 6.4 oz 158 lb   BMI 32.34 kg/m2 33.07 kg/m2 32.61 kg/m2 33.01 kg/m2 32.54 kg/m2 32.11 kg/m2 30.86 kg/m2      Last 3 Recorded Weights in this Encounter    02/01/17 1435   Weight: 75.1 kg (165 lb 9.6 oz)      Weight Source: Standing scale (comment)  Height: 5' (152.4 cm),    Body mass index is 32.34 kg/(m^2).   IBW : 45.4 kg (100 lb),    Usual Body Weight: 72.6 kg (160 lb),      Labs:    Lab Results   Component Value Date/Time    Sodium 142 01/25/2017 07:20 AM    Potassium 4.0 01/25/2017 07:20 AM    Chloride 112 01/25/2017 07:20 AM    CO2 19 01/25/2017 07:20 AM    Glucose 110 01/25/2017 07:20 AM    Glucose 86 05/17/2014 06:31 AM    BUN 10 01/25/2017 07:20 AM    Creatinine 0.86 01/25/2017 07:20 AM    Calcium 8.8 01/25/2017 07:20 AM    Albumin 4.2 01/24/2017 09:44 AM     Lab Results   Component Value Date/Time    Hemoglobin A1c 4.8 07/29/2010 10:08 AM     Lab Results   Component Value Date/Time    Glucose 110 01/25/2017 07:20 AM    Glucose 86 05/17/2014 06:31 AM      Lab Results   Component Value Date/Time ALT (SGPT) 34 01/24/2017 09:44 AM    AST (SGOT) 24 01/24/2017 09:44 AM    GGT 51 03/10/2015 10:27 AM    Alk.  phosphatase 108 01/24/2017 09:44 AM    Bilirubin, total 0.4 01/24/2017 09:44 AM        Jose Mayfield, RD

## 2017-02-01 NOTE — PROGRESS NOTES
Problem: Falls - Risk of  Goal: *Absence of falls  Outcome: Progressing Towards Goal  Pt received asleep in her room. Respirations are even and unlabored. No apparent distress noted. Pt remains free from falls while on this unit. Will continue Q15 minute checks for safety.

## 2017-02-01 NOTE — MASTER TREATMENT PLAN
1930- Pt appeared anxious. Ativan 0.5mg given as requested by patient for anxiety. Continue to monitor & support. 2209 - Ambien 5mg given as requested by patient prn for sleep. Continue to monitor & support.

## 2017-02-01 NOTE — BH NOTES
GROUP THERAPY PROGRESS NOTE    Aislinn Ingram did not participate in a Morning Process Group with a focus on identifying feelings and planning for the day.

## 2017-02-01 NOTE — PROGRESS NOTES
NUTRITION       Nutrition screening referral was triggered based on results obtained during nursing admission assessment for \"unsure wt loss. \"    The patient's chart was reviewed and nutrition assessment is not indicated at this time. Wt has been stable and with good appetite on med/surg floor prior to tx to psych. Wt Readings from Last 10 Encounters:   01/25/17 76.8 kg (169 lb 5 oz)   11/29/16 75.8 kg (167 lb)   11/15/16 76.7 kg (169 lb)   10/04/16 75.6 kg (166 lb 9.6 oz)   08/02/16 74.6 kg (164 lb 6.4 oz)   06/28/16 71.7 kg (158 lb)   03/22/16 65.8 kg (145 lb)   02/02/16 65.5 kg (144 lb 8 oz)   05/26/15 53.1 kg (117 lb)   04/28/15 53.5 kg (118 lb)   Please consult if nutrition intervention indicated during stay. Thank you.     Emiliano Ellis RD

## 2017-02-02 PROCEDURE — 74011250637 HC RX REV CODE- 250/637: Performed by: HOSPITALIST

## 2017-02-02 PROCEDURE — 65220000003 HC RM SEMIPRIVATE PSYCH

## 2017-02-02 PROCEDURE — 74011250637 HC RX REV CODE- 250/637

## 2017-02-02 PROCEDURE — 74011250637 HC RX REV CODE- 250/637: Performed by: PSYCHIATRY & NEUROLOGY

## 2017-02-02 RX ADMIN — BUSPIRONE HYDROCHLORIDE 5 MG: 5 TABLET ORAL at 10:06

## 2017-02-02 RX ADMIN — BUSPIRONE HYDROCHLORIDE 5 MG: 5 TABLET ORAL at 21:28

## 2017-02-02 RX ADMIN — FAMOTIDINE 20 MG: 20 TABLET ORAL at 10:06

## 2017-02-02 RX ADMIN — FLUTICASONE PROPIONATE 1 SPRAY: 50 SPRAY, METERED NASAL at 10:08

## 2017-02-02 RX ADMIN — DOCUSATE SODIUM AND SENNOSIDES 1 TABLET: 8.6; 5 TABLET, FILM COATED ORAL at 21:28

## 2017-02-02 RX ADMIN — ACETAMINOPHEN 650 MG: 325 TABLET, FILM COATED ORAL at 01:49

## 2017-02-02 RX ADMIN — AMLODIPINE BESYLATE 5 MG: 5 TABLET ORAL at 10:05

## 2017-02-02 RX ADMIN — FOLIC ACID 1 MG: 1 TABLET ORAL at 10:06

## 2017-02-02 RX ADMIN — FLUTICASONE PROPIONATE 1 SPRAY: 50 SPRAY, METERED NASAL at 17:55

## 2017-02-02 RX ADMIN — FLUOROMETHOLONE 1 DROP: 1 SOLUTION/ DROPS OPHTHALMIC at 10:08

## 2017-02-02 RX ADMIN — FAMOTIDINE 20 MG: 20 TABLET ORAL at 17:15

## 2017-02-02 RX ADMIN — BUSPIRONE HYDROCHLORIDE 5 MG: 5 TABLET ORAL at 17:15

## 2017-02-02 RX ADMIN — FLUOROMETHOLONE 1 DROP: 1 SOLUTION/ DROPS OPHTHALMIC at 17:55

## 2017-02-02 RX ADMIN — Medication 100 MG: at 10:06

## 2017-02-02 RX ADMIN — VITAMIN E CAP 400 UNIT 400 UNITS: 400 CAP at 10:09

## 2017-02-02 RX ADMIN — FENOFIBRATE 145 MG: 145 TABLET, FILM COATED ORAL at 21:28

## 2017-02-02 NOTE — BH NOTES
1540:  Patient initially received as she is sitting in the [email or web address] writing in her journal.  She reports that she is going home tomorrow and that she has had a clear-headed night. Later, after report, patient's daughter arrives to visit and meet with Social Work. Patient voices no complaints or concerns at this time. Will maintain q 15 minute safety checks. 2230:  Patient has been pleasant and cooperative throughout this shift. She has been visible on the unit and appropriately interactive with staff and peers while having dinner in the [email or web address] and watching TV afterward. She has alternated her time between the [email or web address] and her room where she spends quiet time writing in her journal.  Will continue to monitor.

## 2017-02-02 NOTE — BH NOTES
PSYCHIATRIC PROGRESS NOTE         Patient Name  Ramon Patel   Date of Birth 1960   [facility] [id number]   Medical Record Number  [id number]      Age  60 y.o. PCP Adalgisa Puente MD   Admit date:  1/31/2017    Room Number  742/02  @ [facility]   Date of Service  2/2/2017          PSYCHOTHERAPY SESSION NOTE:  Length of psychotherapy session: 45 minutes    Main condition/diagnosis/issues treated during session today, 2/2/2017 : benzodiazepine inteolerance, alternative anxity medications, independent living, coping skills    I employed Cognitive Behavioral therapy techniques, Reality-Oriented psychotherapy, as well as supportive psychotherapy in regards to various ongoing psychosocial stressors, including the following: pre-admission and current problems; housing issues; medical issues; and stress of hospitalization. Interpersonal relationship issues and psychodynamic conflicts explored. Attempts made to alleviate maladaptive patterns. We, also, worked on issues of denial & effects of substance dependency/use     Overall, patient is not progressing    Treatment Plan Update (reviewed an updated 2/2/2017) : I will modify psychotherapy tx plan by implementing more stress management strategies, building upon cognitive behavioral techniques, increasing coping skills, as well as shoring up psychological defenses). An extended energy and skill set was needed to engage pt in psychotherapy due to some of the following: resistiveness, complexity, negativity, confrontational nature, hostile behaviors, and/or severe abnormalities in thought processes/psychosis resulting in the loss of expressive/receptive language communication skills. E & M PROGRESS NOTE:         HISTORY       CC:  \"delirium\"  HISTORY OF PRESENT ILLNESS/INTERVAL HISTORY:  (reviewed/updated 2/2/2017).   per initial evaluation:     Ramon Patel presents/reports/evidences the following emotional symptoms today, 2/2/2017:delirium. The above symptoms have been present for 2 days . These symptoms are of severe severity. The symptoms are intermittent/ fleeting in nature. Additional symptomatology and features include agitation. SIDE EFFECTS: (reviewed/updated 2/2/2017)  None reported or admitted to. No noted toxicity with use of Depakote/Tegretol/lithium/Clozaril/TCAs   ALLERGIES:(reviewed/updated 2/2/2017)  Allergies   Allergen Reactions    Dicyclomine Nausea and Vomiting     Extreme stomach pains    Lithium Nausea and Vomiting     Severe nausea and vomiting.  Other Medication Other (comments)     Intolerance to oranges, cabbage,beans,peppers,raw onions,foods with caffeine in them, popcorn, no pop sodas, because of IBS      MEDICATIONS PRIOR TO ADMISSION:(reviewed/updated 2/2/2017)  Prescriptions Prior to Admission   Medication Sig    LORazepam (ATIVAN) 2 mg tablet Take 2 mg by mouth three (3) times daily.  QUEtiapine (SEROQUEL) 300 mg tablet Take 300 mg by mouth daily.  vilazodone (VIIBRYD) 10 mg tab tablet Take 20 mg by mouth daily.  gabapentin (NEURONTIN) 300 mg capsule Take 300 mg by mouth three (3) times daily as needed (Headache).  amLODIPine (NORVASC) 5 mg tablet Take 1 Tab by mouth daily for 30 days.  senna-docusate (PERICOLACE) 8.6-50 mg per tablet Take 1 Tab by mouth nightly for 30 days.  vitamin E (AQUA GEMS) 400 unit capsule Take 400 Units by mouth daily.  famotidine (PEPCID) 20 mg tablet Take 1 Tab by mouth two (2) times a day. D/C omeprazole    L.acidoph&parac-S.therm-Bifido (THEO-Q,2,) 16 billion cell cap 1 cap po QD    fluticasone (FLONASE) 50 mcg/actuation nasal spray USE ONE SPRAY IN EACH NOSTRIL TWO TIMES A DAY.  fenofibrate nanocrystallized (TRICOR) 145 mg tablet Take 1 Tab by mouth every fourty-eight (48) hours.  diphenhydrAMINE (BENADRYL) 50 mg capsule Take 50 mg by mouth nightly.  Indications: INSOMNIA    aspirin-acetaminophen-caffeine 250-250-65 mg per tablet Take 1 Tab by mouth every six (6) hours as needed for Pain.  fluorometholone (FML) 0.1 % ophthalmic suspension Administer 1 Drop to both eyes two (2) times a day.  Cholecalciferol, Vitamin D3, (VITAMIN D3) 1,000 unit cap Take 1 Tab by mouth daily. Indications: VITAMIN D DEFICIENCY      PAST MEDICAL HISTORY: Past medical history from the initial psychiatric evaluation has been reviewed (reviewed/updated 2/2/2017) with no additional updates (I asked patient and no additional past medical history provided). Past Medical History   Diagnosis Date    Arthritis     Bipolar disorder ([address]     Bladder pain     Choledocholithiasis 11/24/2010    GERD (gastroesophageal reflux disease)     Headache(784.0)      tension headaches    Irritable bowel     Pelvic pain in female 2014    Psychiatric disorder      BIPOLAR    Stool color black      irritable bowel     Past Surgical History   Procedure Laterality Date    Hx gyn  1998     hysterectomy    Hx gi  2005     prolasped rectum    Laparoscopy abdomen diagnostic  1987    Hx other surgical  1987     EXPLORATORY LAPAROSCOPY    Hx appendectomy  1988    Hx cholecystectomy  11/23/10     cholecystectomy      SOCIAL HISTORY: Social history from the initial psychiatric evaluation has been reviewed (reviewed/updated 2/2/2017) with no additional updates (I asked patient and no additional social history provided). Social History     Social History    Marital status:      Spouse name: N/A    Number of children: N/A    Years of education: N/A     Occupational History    Not on file. Social History Main Topics    Smoking status: Never Smoker    Smokeless tobacco: Never Used    Alcohol use No    Drug use:  Yes    Sexual activity: No     Other Topics Concern    Not on file     Social History Narrative      FAMILY HISTORY: Family history from the initial psychiatric evaluation has been reviewed (reviewed/updated 2/2/2017) with no additional updates (I asked patient and no additional family history provided). Family History   Problem Relation Age of Onset    Hypertension Father     Cancer Father      SKIN CA    Colon Polyps Father     Other Maternal Uncle      CHAROT-MARISELA-TOOTH    Diabetes Paternal Grandfather        REVIEW OF SYSTEMS: (reviewed/updated 2/2/2017)  Appetite:no change from normal   Sleep: improved   All other Review of Systems: Psychological ROS: positive for - anxiety  Respiratory ROS: no cough, shortness of breath, or wheezing  Cardiovascular ROS: no chest pain or dyspnea on exertion         [facility] (MSE):    MSE FINDINGS ARE WITHIN NORMAL LIMITS (WNL) UNLESS OTHERWISE STATED BELOW. ( ALL OF THE BELOW CATEGORIES OF THE MSE HAVE BEEN REVIEWED (reviewed 2/2/2017) AND UPDATED AS DEEMED APPROPRIATE )  General Presentation age appropriate, evasive   Orientation oriented to time, place and person   Vital Signs  See below (reviewed 2/2/2017); Vital Signs (BP, Pulse, & Temp) are within normal limits if not listed below.    Gait and Station Stable/steady, no ataxia   Musculoskeletal System No extrapyramidal symptoms (EPS); no abnormal muscular movements or Tardive Dyskinesia (TD); muscle strength and tone are within normal limits   Language No aphasia or dysarthria   Speech:  hyperverbal   Thought Processes logical; normal rate of thoughts; fair abstract reasoning/computation   Thought Associations goal directed   Thought Content free of delusions and free of hallucinations   Suicidal Ideations none   Homicidal Ideations none   Mood:  stable    Affect:  stable   Memory recent  fair   Memory remote:  good   Concentration/Attention:  fair   Fund of Knowledge average   Insight:  poor   Reliability fair   Judgment:  limited          VITALS:     Patient Vitals for the past 24 hrs:   Temp Pulse Resp BP SpO2   02/02/17 0916 98.9 °F (37.2 °C) (!) 108 20 196/71 98 %   02/01/17 2027 98.2 °F (36.8 °C) 96 18 161/84 95 % 02/01/17 1640 98.3 °F (36.8 °C) 100 18 152/88 96 %     Wt Readings from Last 3 Encounters:   02/01/17 75.1 kg (165 lb 9.6 oz)   01/25/17 76.8 kg (169 lb 5 oz)   11/29/16 75.8 kg (167 lb)     Temp Readings from Last 3 Encounters:   02/02/17 98.9 °F (37.2 °C)   01/31/17 98.8 °F (37.1 °C)   11/29/16 98 °F (36.7 °C) (Oral)     BP Readings from Last 3 Encounters:   02/02/17 196/71   01/31/17 (!) 128/91   11/29/16 110/74     Pulse Readings from Last 3 Encounters:   02/02/17 (!) 108   01/31/17 (!) 55   11/29/16 87            DATA     LABORATORY DATA:(reviewed/updated 2/2/2017)  No results found for this or any previous visit (from the past 24 hour(s)). No results found for: VALF2, VALAC, VALP, VALPR, DS6, CRBAM, CRBAMP, CARB2, XCRBAM  No results found for: LI, LIH, LITHM   RADIOLOGY REPORTS:(reviewed/updated 2/2/2017)  Ct Head Wo Cont    Result Date: 1/24/2017  EXAM:  CT HEAD WO CONT INDICATION:   Altered mental status COMPARISON: None. TECHNIQUE: Unenhanced CT of the head was performed using 5 mm images. Brain and bone windows were generated. CT dose reduction was achieved through use of a standardized protocol tailored for this examination and automatic exposure control for dose modulation. FINDINGS: The ventricles and sulci are normal in size, shape and configuration and midline. There is no significant white matter disease. There is no intracranial hemorrhage, extra-axial collection, mass, mass effect or midline shift. The basilar cisterns are open. No acute infarct is identified. The bone windows demonstrate no abnormalities. The visualized portions of the paranasal sinuses and mastoid air cells are clear. IMPRESSION: No acute abnormality identified. Xr Chest Port    Result Date: 1/24/2017  Indication: Altered mental status, not eating or drinking for 3 days Comparison: 3/19/2015 Portable exam of the chest obtained at 1012 demonstrates normal heart size. There is no acute process in the lung fields.  The osseous structures are unremarkable. Impression: No acute process.           MEDICATIONS     ALL MEDICATIONS:   Current Facility-Administered Medications   Medication Dose Route Frequency    folic acid (FOLVITE) tablet 1 mg  1 mg Oral DAILY    thiamine (B-1) tablet 100 mg  100 mg Oral DAILY    LORazepam (ATIVAN) tablet 1 mg  1 mg Oral Q1H PRN    LORazepam (ATIVAN) tablet 2 mg  2 mg Oral Q1H PRN    fluorometholone (FML) 0.1 % ophthalmic suspension 1 Drop  1 Drop Both Eyes BID    fluticasone (FLONASE) 50 mcg/actuation nasal spray 1 Spray  1 Spray Both Nostrils BID    busPIRone (BUSPAR) tablet 5 mg  5 mg Oral TID    OLANZapine (ZyPREXA) tablet 2.5 mg  2.5 mg Oral Q6H PRN    ziprasidone (GEODON) 10 mg in sterile water (preservative free) 0.5 mL injection  10 mg IntraMUSCular BID PRN    benztropine (COGENTIN) tablet 1 mg  1 mg Oral BID PRN    benztropine (COGENTIN) injection 1 mg  1 mg IntraMUSCular BID PRN    LORazepam (ATIVAN) injection 0.5 mg  0.5 mg IntraMUSCular Q4H PRN    zolpidem (AMBIEN) tablet 5 mg  5 mg Oral QHS PRN    acetaminophen (TYLENOL) tablet 650 mg  650 mg Oral Q4H PRN    ibuprofen (MOTRIN) tablet 400 mg  400 mg Oral Q8H PRN    magnesium hydroxide (MILK OF MAGNESIA) 400 mg/5 mL oral suspension 30 mL  30 mL Oral DAILY PRN    nicotine (NICODERM CQ) 21 mg/24 hr patch 1 Patch  1 Patch TransDERmal DAILY PRN    amLODIPine (NORVASC) tablet 5 mg  5 mg Oral DAILY    senna-docusate (PERICOLACE) 8.6-50 mg per tablet 1 Tab  1 Tab Oral QHS    vitamin E acetate capsule 400 Units  400 Units Oral DAILY    famotidine (PEPCID) tablet 20 mg  20 mg Oral BID    fenofibrate nanocrystallized (TRICOR) tablet 145 mg  145 mg Oral Q48H      SCHEDULED MEDICATIONS:   Current Facility-Administered Medications   Medication Dose Route Frequency    folic acid (FOLVITE) tablet 1 mg  1 mg Oral DAILY    thiamine (B-1) tablet 100 mg  100 mg Oral DAILY    fluorometholone (FML) 0.1 % ophthalmic suspension 1 Drop 1 Drop Both Eyes BID    fluticasone (FLONASE) 50 mcg/actuation nasal spray 1 Spray  1 Spray Both Nostrils BID    busPIRone (BUSPAR) tablet 5 mg  5 mg Oral TID    amLODIPine (NORVASC) tablet 5 mg  5 mg Oral DAILY    senna-docusate (PERICOLACE) 8.6-50 mg per tablet 1 Tab  1 Tab Oral QHS    vitamin E acetate capsule 400 Units  400 Units Oral DAILY    famotidine (PEPCID) tablet 20 mg  20 mg Oral BID    fenofibrate nanocrystallized (TRICOR) tablet 145 mg  145 mg Oral Q48H          ASSESSMENT & PLAN     DIAGNOSES REQUIRING ACTIVE TREATMENT AND MONITORING: (reviewed/updated 2/2/2017)  Patient Active Hospital Problem List:   B[facility] 1 [facility] ([facility] (1/31/2017)    Assessment: vitor alternating with depression    Plan: continue current medications- Discontinue opiates and benzodiazepines             n summary, Jerry Setting, is a 60 y.o.  female who presents with a severe exacerbation of the principal diagnosis of <principal problem not specified>  Patient's condition is improving. Patient requires continued inpatient hospitalization for further stabilization, safety monitoring and medication management. I will continue to coordinate the provision of individual, milieu, occupational, group, and substance abuse therapies to address target symptoms/diagnoses as deemed appropriate for the individual patient. A coordinated, multidisplinary treatment team round was conducted with the patient (this team consists of the nurse, psychiatric unit pharmcist,  and writer). Complete current electronic health record for patient has been reviewed today including consultant notes, ancillary staff notes, nurses and psychiatric tech notes. Suicide risk assessment completed and patient deemed to be of low risk for suicide at this time. The following regarding medications was addressed during rounds with patient:   the risks and benefits of the proposed medication.  The patient was given the opportunity to ask questions. Informed consent given to the use of the above medications. Will continue to adjust psychiatric and non-psychiatric medications (see above \"medication\" section and orders section for details) as deemed appropriate & based upon diagnoses and response to treatment. I will continue to order blood tests/labs and diagnostic tests as deemed appropriate and review results as they become available (see orders for details and above listed lab/test results). I will order psychiatric records from previous [facility] hospitals to further elucidate the nature of patient's psychopathology and review once available. I will gather additional collateral information from friends, family and o/p treatment team to further elucidate the nature of patient's psychopathology and baselline level of psychiatric functioning. I certify that this patient's inpatient psychiatric hospital services furnished since the previous certification were, and continue to be, required for treatment that could reasonably be expected to improve the patient's condition, or for diagnostic study, and that the patient continues to need, on a daily basis, active treatment furnished directly by or requiring the supervision of inpatient psychiatric facility personnel. In addition, the hospital records show that services furnished were intensive treatment services, admission or related services, or equivalent services.     EXPECTED DISCHARGE DATE/DAY: TBD     DISPOSITION: Home       Signed By:   Gwen Gardner MD  2/2/2017

## 2017-02-02 NOTE — BH NOTES
Family Contact: ANDREA met with pt and her sister Tonio Augustin) to review tx goals and d/c plans. Ms Jumana Guillen acknowledged that her sister has greatly improved but she questions whether she can mange. ANDREA stated Dr Parker Mccurdy changed her meds and Dr Citlaly Rose has continued them. She also is concerned that her sister is rushing to get out of the hospital and if she is not ready to return home. Pt told her sister that this time she is feeling better with the change in her medications. She did state that having someone to help her mange meds and living would be helpful. SW discussed skill building through local agencies that focuses on helping the individual develop skills to mange. Both agreed that this service would greatly help her. SW agreed to coordinate service before she is discharged. ANDREA called [employer] but phone service was not working. ANDREA that called Rachael Linares @ [phone] for possible referral. ANDREA spoke to Jenn Smith ( admission coordinator) as to reason for referral. Andrea was advised that pt meets the criteria for service. She agreed to come to tomorrow to conduct an assessment @ 9am. ANDREA advised pt and nursing staff of referral and pending assessment.

## 2017-02-02 NOTE — PROGRESS NOTES
Problem: Falls - Risk of  Goal: *Absence of falls  Outcome: Progressing Towards Goal  Using walker appropriately        Problem: Manic Behavior (Adult/Pediatric)  Goal: *STG: Participates in treatment plan  Outcome: Progressing Towards Goal  Review meds, up on unit social w peers and staff. Using humor appropriately. Smiling and bright while talking during community group. Set up only with standby assist needed for ADL's. Up to bathroom and ambulating with no difficulty. Mood euthymic with focus on her IBS and tolerating her am meal. Pt daily goal is to color and create art. Pt staff focus is on encouraging independence, limit setting and coping skills education.    Goal: *STG: Demonstrates improvement in sleep pattern  Outcome: Not Progressing Towards Goal  Variance: Patient Condition

## 2017-02-02 NOTE — INTERDISCIPLINARY ROUNDS
Behavioral Health Interdisciplinary Rounds     Patient Name: Erik Fernandez  Age: 60 y.o.   Room/Bed: 742/02  Primary Diagnosis: <principal problem not specified>   Admission Status:  Voluntary     Readmission within 30 days:  no  Power of  in place:  no  Patient requires a blocked bed:  no       Reason for blocked bed: n/a     VTE Prophylaxis:  Not indicated  Influenza vaccine screen completed:  yes     Influenza vaccine given: yes - PTA  Mobility needs/Fall risk:  no    Nutritional Plan:  no  Consults:  no         Labs/Testing due today?:  no     Sleep hours:     1.75+  Participation in Care/Groups:  no  Medication Compliant?: Yes  PRNS (last 24 hours):  Zyprexa, Ambien,Tylenol   Restraints (last 24 hours):  no  Substance Abuse:  no      CIWA (range last 24 hours): 3 - 0     COWS (range last 24 hours): n/a  Alcohol screening (AUDIT) completed - AUDIT Score: 0 If applicable, date SBIRT discussed in treatment team AND documented:   Tobacco - patient is a smoker:  no       Date tobacco education completed by RN: n/a  24 hour chart check complete:  yes      Patient goal(s) for today:   Treatment team focus/goals:   LOS: 1   Expected LOS: 2  Financial concerns/prescription coverage: Medicare  Date of last family contact:      Family requesting physician contact today:   Discharge plan:        Outpatient provider(s):      Participating treatment team members: Jorge A Serrano MSW; Dr. Amaury Chaudhary MD

## 2017-02-02 NOTE — BH NOTES
PSYCHOSOCIAL ASSESSMENT  :Patient identifying info:  Quintin lCark is a 60 y.o., female admitted 1/31/2017  6:14 PM     Presenting problem and precipitating factors: Pt was admitted to the hospital due altered mental status, anxiety but no s/s of psychosis not suicidal thinking  Pt seen today by SW to discuss d/c plans. Pt was very cooperative and eager to return to her life. Pt said I live @ [address] [place] [address] and receive all my meals via [employer]. She   also participates in various activities and enjoys living on her own. Pt said I lived with my parents for years. Pt said she feels ready to return to her apartment. Current psychiatric providers and contact info: Dr. Armani Norton    Previous psychiatric services/providers and response to treatment:     Substance abuse history:  Pt denied no history of drug use nor abuse. Social History   Substance Use Topics    Smoking status: Never Smoker    Smokeless tobacco: Never Used    Alcohol use No       Family constellation:  Parents, sister    Is significant other involved? N/A    Describe support system: Pt has a good support system and her sister Rafael Grier provides the greatest support. She can be reached @ [phone] or [phone]. Describe living arrangements and home environment: Pt is single, has no o children and she lives independently in her own apt. Pt does plan to return to [address] [place] [address] where she has been living for three years. 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     Have you been licensed as a manny care professional (current or ):  No    Leisure and recreation preferences:     Describe coping skills:  Ineffective and poor judgement    Ankit Boswell  2017

## 2017-02-02 NOTE — PROGRESS NOTES
Problem: Falls - Risk of  Goal: *Absence of falls  Outcome: Progressing Towards Goal  Patient received awake in her bed talking to her roommate. She states that she feels calm (no anxiety) but just can't get to sleep. Pt states she is hungry and maybe that is why she cannot sleep. Snack provided. No other needs at this time. Pt remains free from falls during this admission. Will continue Q15 minute checks for safety.       0150:  PRN Medication Documentation    Specific patient behavior that led to need for PRN medication: patient states that a headache came out of nowhere  Staff interventions attempted prior to PRN being given: lights dim, quiet milieu  PRN medication given: Tylenol (650mg)  Patient response/effectiveness of PRN medication: will assess

## 2017-02-02 NOTE — BH NOTES
GROUP THERAPY PROGRESS NOTE    Sonia Jain participated in a Morning Process Group on the [facility], with a focus identifying feelings and planning for the day. Group time: 45 minutes. Personal goal for participation: To increase the capacity to identify ones mood and plan for the day. Goal orientation: The patient will be able to identify their feelings and develop a plan for structuring their day. Group therapy participation: When prompted, this patient participated in the group. Therapeutic interventions reviewed and discussed: The group members were asked to identify an emotion they are having or let the group know what they want to focus on for the day. Impression of participation: The patient said she was transferred from the 5th floor of the hospital, while waiting for a bed on this unit. She indicated she was here to get her medication adjusted for her bi-polar illness and anxiety. She said she has been started on a medication for her anxiety that helped \"calm my mind last night. ..even though I didn't sleep. \" She said she wanted to stay up during the day, to make it easier for her to sleep tonight and that she might do some walking on the unit. She shared that her sister handles most of her affairs and visits her every day she has been in the hospital. She indicated that she likes to color, play the guitar, and sing. She said she has one adult daughter, who became a professional ballerina in [facility], and a grandson named Kimber Hartley. She appeared to be in a pleasant mood, despite her reported lack of sleep. She appeared oriented and there were no signs of HI/SI or obvious psychotic symptoms. This was the first time the undersigned had the patient in this group.

## 2017-02-02 NOTE — PROGRESS NOTES
Problem: Falls - Risk of  Goal: *Absence of falls  Outcome: Progressing Towards Goal  Patient is ambulating safely and steadily on the unit - she has remained free from falls/injury throughout this shift. Problem: Nutrition Deficit  Goal: *Optimize nutritional status  Outcome: Progressing Towards Goal  Patient with a history of IBS had a Nutrition Consult today with subsequent dietary orders.

## 2017-02-03 PROCEDURE — 74011250637 HC RX REV CODE- 250/637: Performed by: PSYCHIATRY & NEUROLOGY

## 2017-02-03 PROCEDURE — 74011250637 HC RX REV CODE- 250/637: Performed by: HOSPITALIST

## 2017-02-03 PROCEDURE — 74011250637 HC RX REV CODE- 250/637

## 2017-02-03 PROCEDURE — 65220000003 HC RM SEMIPRIVATE PSYCH

## 2017-02-03 RX ORDER — BUSPIRONE HYDROCHLORIDE 5 MG/1
5 TABLET ORAL 3 TIMES DAILY
Qty: 90 TAB | Refills: 0 | Status: SHIPPED | OUTPATIENT
Start: 2017-02-03 | End: 2017-02-06

## 2017-02-03 RX ORDER — HYDROXYZINE HYDROCHLORIDE 10 MG/1
10 TABLET, FILM COATED ORAL
Status: DISCONTINUED | OUTPATIENT
Start: 2017-02-03 | End: 2017-02-05

## 2017-02-03 RX ADMIN — VITAMIN E CAP 400 UNIT 400 UNITS: 400 CAP at 08:57

## 2017-02-03 RX ADMIN — FLUTICASONE PROPIONATE 1 SPRAY: 50 SPRAY, METERED NASAL at 08:56

## 2017-02-03 RX ADMIN — FLUTICASONE PROPIONATE 1 SPRAY: 50 SPRAY, METERED NASAL at 18:43

## 2017-02-03 RX ADMIN — ZOLPIDEM TARTRATE 5 MG: 5 TABLET, FILM COATED ORAL at 21:42

## 2017-02-03 RX ADMIN — FLUOROMETHOLONE 1 DROP: 1 SOLUTION/ DROPS OPHTHALMIC at 18:46

## 2017-02-03 RX ADMIN — FAMOTIDINE 20 MG: 20 TABLET ORAL at 08:57

## 2017-02-03 RX ADMIN — HYDROXYZINE HYDROCHLORIDE 10 MG: 10 TABLET, FILM COATED ORAL at 18:43

## 2017-02-03 RX ADMIN — FOLIC ACID 1 MG: 1 TABLET ORAL at 08:57

## 2017-02-03 RX ADMIN — ACETAMINOPHEN 650 MG: 325 TABLET, FILM COATED ORAL at 02:47

## 2017-02-03 RX ADMIN — FAMOTIDINE 20 MG: 20 TABLET ORAL at 17:39

## 2017-02-03 RX ADMIN — FLUOROMETHOLONE 1 DROP: 1 SOLUTION/ DROPS OPHTHALMIC at 08:56

## 2017-02-03 RX ADMIN — AMLODIPINE BESYLATE 5 MG: 5 TABLET ORAL at 08:57

## 2017-02-03 RX ADMIN — LORAZEPAM 1 MG: 1 TABLET ORAL at 16:22

## 2017-02-03 RX ADMIN — DOCUSATE SODIUM AND SENNOSIDES 1 TABLET: 8.6; 5 TABLET, FILM COATED ORAL at 21:42

## 2017-02-03 RX ADMIN — Medication 100 MG: at 08:57

## 2017-02-03 NOTE — PROGRESS NOTES
02/03/17 1612   Vital Signs   Temp 98.7 °F (37.1 °C)   Temp Source Oral   Pulse (Heart Rate) 98   Heart Rate Source Monitor   Resp Rate 20   O2 Sat (%) 98 %   Level of Consciousness Alert   BP (!) 165/101   MAP (Calculated) 122   BP 1 Method Automatic   MEWS Score 1   Pain 1   Pain Scale 1 Numeric (0 - 10)   Pain Intensity 1 0   POSS Scale   Opioid Sedation Scale 1   Oxygen Therapy   O2 Device Room air   pt denies pain. Pt alert and oriented. States increased anxiety. Pt and nurse discuss medication changes. PRN Medication Documentation    Specific patient behavior that led to need for PRN medication: pt c/o anxiety, CIWA 8, elevated BH and HR  Staff interventions attempted prior to PRN being given: education, deep breathing, coping skills, redirection   PRN medication given: po 1 mg ativan. Patient response/effectiveness of PRN medication: pt alert and oriented in bed. Pt practicing coping skills. reassessed CIWA 1.

## 2017-02-03 NOTE — PROGRESS NOTES
Problem: Manic Behavior (Adult/Pediatric)  Goal: *STG: Participates in treatment plan  Outcome: Progressing Towards Goal  Review meds, up on unit social during breakfast. Thoughts organized, logical and goal directed. Mood and affect smiling, anxious and grateful. Focus is on barriers to her independence and accommodations that she does not like. Staff encourage pt to focus on successes and problem solving barriers. Pt daily goal is to d/c home.  stafff focus is on d/c planning

## 2017-02-03 NOTE — DISCHARGE SUMMARY
PSYCHIATRIC DISCHARGE SUMMARY         IDENTIFICATION:    Patient Name  Ramon Patel   Date of Birth 1960   [facility] [id number]   Medical Record Number  [id number]      Age  60 y.o. PCP Adalgisa Puente MD   Admit date:  1/31/2017    Discharge date: 2/3/2017   Room Number  742/02  @ [facility]   Date of Service  2/3/2017               TYPE OF DISCHARGE: REGULAR               CONDITION AT DISCHARGE: good       PROVISIONAL & DISCHARGE DIAGNOSES:    Problem List  Date Reviewed: 1/24/2017          Codes Class    Bipolar 1 disorder ([facility] 75.) ICD-10-CM: F31.9  ICD-9-CM: 296.7         Altered mental status ICD-10-CM: R41.82  ICD-9-CM: 780.97         Delirium ICD-10-CM: R41.0  ICD-9-CM: 780.09         Hyponatremia ICD-10-CM: E87.1  ICD-9-CM: 276.1         Drop attack ICD-10-CM: R55  ICD-9-CM: 780.2         IBS (irritable bowel syndrome) ICD-10-CM: K58.9  ICD-9-CM: 564.1         Chronic headache ICD-10-CM: R51  ICD-9-CM: 784.0         Bipolar affective disorder, manic (HCC) ICD-10-CM: F31.10  ICD-9-CM: 296.40 Acute        Choledocholithiasis ICD-10-CM: K80.50  ICD-9-CM: 574.50         Gallstones with obstruction of gallbladder ICD-10-CM: K80.21  ICD-9-CM: 574.21         Arthritis ICD-10-CM: M19.90  ICD-9-CM: 716.90               Active Hospital Problems    Bipolar 1 disorder ([facility]        DISCHARGE DIAGNOSIS:   Axis I:  SEE ABOVE  Axis II: SEE ABOVE  Axis III: SEE ABOVE  Axis IV:  lack of structure  Axis V:  60 on admission, 70 on discharge 70(baseline)       CC & HISTORY OF PRESENT ILLNESS:  60year old single  female who lives independently at [place], who was admitted voluntarily after overusing benzodiazepines and having a mental status change. Pt has an established hx of medication overuse, in particular sedatives and pain killers. She has a reported HX of anxiety D/O and BAD, and she is followed by DR. Aura Sonoita in the community.  Pt was successfully detoxed off of benzodiazepines, and did very well on Atarax 10 po qid prn for anxiety. At dischrge her mental status was at baseline and she denied SI?HI intent and plan. SOCIAL HISTORY:    Social History     Social History    Marital status:      Spouse name: N/A    Number of children: N/A    Years of education: N/A     Occupational History    Not on file. Social History Main Topics    Smoking status: Never Smoker    Smokeless tobacco: Never Used    Alcohol use No    Drug use: Yes    Sexual activity: No     Other Topics Concern    Not on file     Social History Narrative      FAMILY HISTORY:   Family History   Problem Relation Age of Onset    Hypertension Father     Cancer Father      SKIN CA    Colon Polyps Father     Other Maternal Uncle      CHAROT-MARISELA-TOOTH    Diabetes Paternal Grandfather              HOSPITALIZATION COURSE:    Cornell Ash was admitted to the inpatient psychiatric unit [facility] for acute psychiatric stabilization in regards to symptomatology as described in the HPI above. The differential diagnosis at time of admission included: medication overuse/ acute delirium. While on the unit Cornell Ash was involved in individual, group, occupational and milieu therapy. Psychiatric medications were adjusted during this hospitalization including atarax. Cornell Ash demonstrated a slow, but progressive improvement in overall condition. Much of patient's depression appeared to be related to situational stressors and psychological factors. Please see individual progress notes for more specific details regarding patient's hospitalization course. At time of dc, Cornell Ash was without significant problems with depression psychosis  vitor. 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 Patient with request for discharge today. There are no grounds to seek a TDO.  Patient has maximized benefit to be derived from acute inpatient psychiatric treatment. All members of the treatment team concur with each other in regards to plans for discharge today per patient's request.          LABS AND IMAGAING:    Labs Reviewed - No data to display  Admission on 01/24/2017, Discharged on 01/31/2017   Component Date Value Ref Range Status    WBC 01/24/2017 9.4  3.6 - 11.0 K/uL Final    RBC 01/24/2017 4.82  3.80 - 5.20 M/uL Final    HGB 01/24/2017 13.5  11.5 - 16.0 g/dL Final    HCT 01/24/2017 40.0  35.0 - 47.0 % Final    MCV 01/24/2017 83.0  80.0 - 99.0 FL Final    MCH 01/24/2017 28.0  26.0 - 34.0 PG Final    MCHC 01/24/2017 33.8  30.0 - 36.5 g/dL Final    RDW 01/24/2017 14.9* 11.5 - 14.5 % Final    PLATELET 17/03/2622 875  150 - 400 K/uL Final    NEUTROPHILS 01/24/2017 80* 32 - 75 % Final    LYMPHOCYTES 01/24/2017 17  12 - 49 % Final    MONOCYTES 01/24/2017 3* 5 - 13 % Final    EOSINOPHILS 01/24/2017 0  0 - 7 % Final    BASOPHILS 01/24/2017 0  0 - 1 % Final    ABS. NEUTROPHILS 01/24/2017 7.5  1.8 - 8.0 K/UL Final    ABS. LYMPHOCYTES 01/24/2017 1.6  0.8 - 3.5 K/UL Final    ABS. MONOCYTES 01/24/2017 0.3  0.0 - 1.0 K/UL Final    ABS. EOSINOPHILS 01/24/2017 0.0  0.0 - 0.4 K/UL Final    ABS.  BASOPHILS 01/24/2017 0.0  0.0 - 0.1 K/UL Final    Sodium 01/24/2017 139  136 - 145 mmol/L Final    Potassium 01/24/2017 3.4* 3.5 - 5.1 mmol/L Final    Chloride 01/24/2017 109* 97 - 108 mmol/L Final    CO2 01/24/2017 19* 21 - 32 mmol/L Final    Anion gap 01/24/2017 11  5 - 15 mmol/L Final    Glucose 01/24/2017 135* 65 - 100 mg/dL Final    BUN 01/24/2017 10  6 - 20 MG/DL Final    Creatinine 01/24/2017 0.90  0.55 - 1.02 MG/DL Final    BUN/Creatinine ratio 01/24/2017 11* 12 - 20   Final    GFR est AA 01/24/2017 >60  >60 ml/min/1.73m2 Final    GFR est non-AA 01/24/2017 >60  >60 ml/min/1.73m2 Final    Calcium 01/24/2017 9.1  8.5 - 10.1 MG/DL Final    Bilirubin, total 01/24/2017 0.4  0.2 - 1.0 MG/DL Final    ALT (SGPT) 01/24/2017 34  12 - 78 U/L Final    AST (SGOT) 01/24/2017 24  15 - 37 U/L Final    Alk.  phosphatase 01/24/2017 108  45 - 117 U/L Final    Protein, total 01/24/2017 7.7  6.4 - 8.2 g/dL Final    Albumin 01/24/2017 4.2  3.5 - 5.0 g/dL Final    Globulin 01/24/2017 3.5  2.0 - 4.0 g/dL Final    A-G Ratio 01/24/2017 1.2  1.1 - 2.2   Final    Color 01/24/2017 YELLOW/STRAW    Final    Appearance 01/24/2017 CLEAR  CLEAR   Final    Specific gravity 01/24/2017 1.018  1.003 - 1.030   Final    pH (UA) 01/24/2017 7.0  5.0 - 8.0   Final    Protein 01/24/2017 30* NEG mg/dL Final    Glucose 01/24/2017 NEGATIVE   NEG mg/dL Final    Ketone 01/24/2017 TRACE* NEG mg/dL Final    Bilirubin 01/24/2017 NEGATIVE   NEG   Final    Blood 01/24/2017 NEGATIVE   NEG   Final    Urobilinogen 01/24/2017 0.2  0.2 - 1.0 EU/dL Final    Nitrites 01/24/2017 NEGATIVE   NEG   Final    Leukocyte Esterase 01/24/2017 NEGATIVE   NEG   Final    WBC 01/24/2017 0-4  0 - 4 /hpf Final    RBC 01/24/2017 0-5  0 - 5 /hpf Final    Epithelial cells 01/24/2017 FEW  FEW /lpf Final    Bacteria 01/24/2017 NEGATIVE   NEG /hpf Final    UA:UC IF INDICATED 01/24/2017 CULTURE NOT INDICATED BY UA RESULT  CNI   Final    Hyaline cast 01/24/2017 0-2  0 - 5 /lpf Final    Troponin-I, Qt. 01/24/2017 <0.04  <0.05 ng/mL Final    Ventricular Rate 01/24/2017 93  BPM Final    Atrial Rate 01/24/2017 93  BPM Final    P-R Interval 01/24/2017 150  ms Final    QRS Duration 01/24/2017 80  ms Final    Q-T Interval 01/24/2017 380  ms Final    QTC Calculation (Bezet) 01/24/2017 472  ms Final    Calculated P Axis 01/24/2017 73  degrees Final    Calculated R Axis 01/24/2017 51  degrees Final    Calculated T Axis 01/24/2017 65  degrees Final    Diagnosis 01/24/2017    Final                    Value:Normal sinus rhythm  When compared with ECG of 19-MAR-2015 16:41,  No significant change was found  Confirmed by Silvestre Luna MD, [place] ([id number]) on 1/24/2017 3:13:14 PM      CK 01/24/2017 182  26 - 192 U/L Final    AMPHETAMINE 01/24/2017 NEGATIVE   NEG   Final    BARBITURATES 01/24/2017 NEGATIVE   NEG   Final    BENZODIAZEPINE 01/24/2017 NEGATIVE   NEG   Final    COCAINE 01/24/2017 NEGATIVE   NEG   Final    METHADONE 01/24/2017 NEGATIVE   NEG   Final    OPIATES 01/24/2017 NEGATIVE   NEG   Final    PCP(PHENCYCLIDINE) 01/24/2017 NEGATIVE   NEG   Final    THC (TH-CANNABINOL) 01/24/2017 NEGATIVE   NEG   Final    Drug screen comment 01/24/2017 (NOTE)   Final    ACETAMINOPHEN 01/24/2017 <2* 10 - 30 ug/mL Final    SALICYLATE 44/12/6845 2.6* 2.8 - 20.0 MG/DL Final    Sodium 01/25/2017 142  136 - 145 mmol/L Final    Potassium 01/25/2017 4.0  3.5 - 5.1 mmol/L Final    Chloride 01/25/2017 112* 97 - 108 mmol/L Final    CO2 01/25/2017 19* 21 - 32 mmol/L Final    Anion gap 01/25/2017 11  5 - 15 mmol/L Final    Glucose 01/25/2017 110* 65 - 100 mg/dL Final    BUN 01/25/2017 10  6 - 20 MG/DL Final    Creatinine 01/25/2017 0.86  0.55 - 1.02 MG/DL Final    BUN/Creatinine ratio 01/25/2017 12  12 - 20   Final    GFR est AA 01/25/2017 >60  >60 ml/min/1.73m2 Final    GFR est non-AA 01/25/2017 >60  >60 ml/min/1.73m2 Final    Calcium 01/25/2017 8.8  8.5 - 10.1 MG/DL Final    WBC 01/25/2017 7.3  3.6 - 11.0 K/uL Final    RBC 01/25/2017 4.54  3.80 - 5.20 M/uL Final    HGB 01/25/2017 12.6  11.5 - 16.0 g/dL Final    HCT 01/25/2017 38.1  35.0 - 47.0 % Final    MCV 01/25/2017 83.9  80.0 - 99.0 FL Final    MCH 01/25/2017 27.8  26.0 - 34.0 PG Final    MCHC 01/25/2017 33.1  30.0 - 36.5 g/dL Final    RDW 01/25/2017 15.5* 11.5 - 14.5 % Final    PLATELET 02/01/7643 785  150 - 400 K/uL Final     Ct Head Wo Cont    Result Date: 1/24/2017  EXAM:  CT HEAD WO CONT INDICATION:   Altered mental status COMPARISON: None. TECHNIQUE: Unenhanced CT of the head was performed using 5 mm images. Brain and bone windows were generated.   CT dose reduction was achieved through use of a standardized protocol tailored for this examination and automatic exposure control for dose modulation. FINDINGS: The ventricles and sulci are normal in size, shape and configuration and midline. There is no significant white matter disease. There is no intracranial hemorrhage, extra-axial collection, mass, mass effect or midline shift. The basilar cisterns are open. No acute infarct is identified. The bone windows demonstrate no abnormalities. The visualized portions of the paranasal sinuses and mastoid air cells are clear. IMPRESSION: No acute abnormality identified. Xr Chest Port    Result Date: 1/24/2017  Indication: Altered mental status, not eating or drinking for 3 days Comparison: 3/19/2015 Portable exam of the chest obtained at 1012 demonstrates normal heart size. There is no acute process in the lung fields. The osseous structures are unremarkable. Impression: No acute process. DISPOSITION:    Home. Patient to f/u with o/p psychiatric, and psychotherapy appointments. Patient is to f/u with internist as directed. FOLLOW-UP CARE:    Activity as tolerated  Regular Diet  Wound Care: none needed. Follow-up Information     Follow up With Details Comments Contact Info    Dr Sonya James On 2/8/2017 Time of Your Appt:  2:00 PM [facility]   [address] On 2/6/2017 Staff will contact pt on Monday @ her home schedule appts for in home services [address] Daljit Navarro MD   [address]  [address] [facility]  [phone]                   PROGNOSIS:  Greatly dependent upon patient's ability to remain sober and to f/u with o/p drug/etoh rehabilitation and psychiatric/psychotherapy appointments as well as to comply with psychiatric medications as prescribed. Patient denies suicidal or homicidal ideations. [facility] fully contracts for safety.  Patient reports many positive predictive factors in terms of not attempting suicide or homicide. Patient appears to be at low risk of suicide or homicide. Patient and family are aware and in agreement with discharge and discharge plan. DISCHARGE MEDICATIONS: (no changes made). Informed consent given for the use of following psychotropic medications:  Current Discharge Medication List      START taking these medications    Details   busPIRone (BUSPAR) 5 mg tablet Take 1 Tab by mouth three (3) times daily. Indications: GENERALIZED ANXIETY DISORDER  Qty: 90 Tab, Refills: 0         CONTINUE these medications which have NOT CHANGED    Details   vilazodone (VIIBRYD) 10 mg tab tablet Take 20 mg by mouth daily. amLODIPine (NORVASC) 5 mg tablet Take 1 Tab by mouth daily for 30 days. Qty: 30 Tab, Refills: 0      senna-docusate (PERICOLACE) 8.6-50 mg per tablet Take 1 Tab by mouth nightly for 30 days. Qty: 30 Tab, Refills: 0      vitamin E (AQUA GEMS) 400 unit capsule Take 400 Units by mouth daily. famotidine (PEPCID) 20 mg tablet Take 1 Tab by mouth two (2) times a day. D/C omeprazole  Qty: 60 Tab, Refills: 3    Associated Diagnoses: Gastroesophageal reflux disease without esophagitis      fluticasone (FLONASE) 50 mcg/actuation nasal spray USE ONE SPRAY IN EACH NOSTRIL TWO TIMES A DAY. Qty: 16 g, Refills: 1      fenofibrate nanocrystallized (TRICOR) 145 mg tablet Take 1 Tab by mouth every fourty-eight (48) hours. Qty: 30 Tab, Refills: 5    Associated Diagnoses: Elevated triglycerides with high cholesterol      aspirin-acetaminophen-caffeine 250-250-65 mg per tablet Take 1 Tab by mouth every six (6) hours as needed for Pain. fluorometholone (FML) 0.1 % ophthalmic suspension Administer 1 Drop to both eyes two (2) times a day. Refills: 99      Cholecalciferol, Vitamin D3, (VITAMIN D3) 1,000 unit cap Take 1 Tab by mouth daily.  Indications: VITAMIN D DEFICIENCY         STOP taking these medications       LORazepam (ATIVAN) 2 mg tablet Comments:   Reason for Stopping:         QUEtiapine (SEROQUEL) 300 mg tablet Comments:   Reason for Stopping:         gabapentin (NEURONTIN) 300 mg capsule Comments:   Reason for Stopping:         L.acidoph&parac-S.therm-Bifido (THEO-Q,2,) 16 billion cell cap Comments:   Reason for Stopping:         diphenhydrAMINE (BENADRYL) 50 mg capsule Comments:   Reason for Stopping:                      A coordinated, multidisplinary treatment team round was conducted with Shekhar Rubin is done daily here at [facility] . This team consists of the nurse, psychiatric unit pharmcist,  and writer. I have spent greater than 35 minutes on discharge work.     Signed:  Dennis Elizalde MD  2/3/2017

## 2017-02-03 NOTE — BH NOTES
PSYCHIATRIC PROGRESS NOTE         Patient Name  Rusty Johnson   Date of Birth 1960   [facility] [id number]   Medical Record Number  [id number]      Age  60 y.o. PCP Niraj Block MD   Admit date:  1/31/2017    Room Number  742/02  @ [facility]   Date of Service  2/3/2017          PSYCHOTHERAPY SESSION NOTE:  Length of psychotherapy session: 45 minutes    Main condition/diagnosis/issues treated during session today, 2/3/2017 : benzodiazepine inteolerance, alternative anxity medications, independent living, coping skills    I employed Cognitive Behavioral therapy techniques, Reality-Oriented psychotherapy, as well as supportive psychotherapy in regards to various ongoing psychosocial stressors, including the following: pre-admission and current problems; housing issues; medical issues; and stress of hospitalization. Interpersonal relationship issues and psychodynamic conflicts explored. Attempts made to alleviate maladaptive patterns. We, also, worked on issues of denial & effects of substance dependency/use     Overall, patient is not progressing    Treatment Plan Update (reviewed an updated 2/3/2017) : I will modify psychotherapy tx plan by implementing more stress management strategies, building upon cognitive behavioral techniques, increasing coping skills, as well as shoring up psychological defenses). An extended energy and skill set was needed to engage pt in psychotherapy due to some of the following: resistiveness, complexity, negativity, confrontational nature, hostile behaviors, and/or severe abnormalities in thought processes/psychosis resulting in the loss of expressive/receptive language communication skills. E & M PROGRESS NOTE:         HISTORY       CC:  \"delirium\"  HISTORY OF PRESENT ILLNESS/INTERVAL HISTORY:  (reviewed/updated 2/3/2017).   per initial evaluation:     Rusty Johnson presents/reports/evidences the following emotional symptoms today, 2/3/2017:delirium. The above symptoms have been present for 2 days . These symptoms are of severe severity. The symptoms are intermittent/ fleeting in nature. Additional symptomatology and features include agitation. 2/3/17- Pt c/o Buspar being too activating and has produced insomnia. Will DC buspar and start low dose Atarax prn for anxiety. SIDE EFFECTS: (reviewed/updated 2/3/2017)  None reported or admitted to. No noted toxicity with use of Depakote/Tegretol/lithium/Clozaril/TCAs   ALLERGIES:(reviewed/updated 2/3/2017)  Allergies   Allergen Reactions    Dicyclomine Nausea and Vomiting     Extreme stomach pains    Lithium Nausea and Vomiting     Severe nausea and vomiting.  Other Medication Other (comments)     Intolerance to oranges, cabbage,beans,peppers,raw onions,foods with caffeine in them, popcorn, no pop sodas, because of IBS      MEDICATIONS PRIOR TO ADMISSION:(reviewed/updated 2/3/2017)  Prescriptions Prior to Admission   Medication Sig    LORazepam (ATIVAN) 2 mg tablet Take 2 mg by mouth three (3) times daily.  QUEtiapine (SEROQUEL) 300 mg tablet Take 300 mg by mouth daily.  vilazodone (VIIBRYD) 10 mg tab tablet Take 20 mg by mouth daily.  gabapentin (NEURONTIN) 300 mg capsule Take 300 mg by mouth three (3) times daily as needed (Headache).  amLODIPine (NORVASC) 5 mg tablet Take 1 Tab by mouth daily for 30 days.  senna-docusate (PERICOLACE) 8.6-50 mg per tablet Take 1 Tab by mouth nightly for 30 days.  vitamin E (AQUA GEMS) 400 unit capsule Take 400 Units by mouth daily.  famotidine (PEPCID) 20 mg tablet Take 1 Tab by mouth two (2) times a day. D/C omeprazole    L.acidoph&parac-S.therm-Bifido (THEO-Q,2,) 16 billion cell cap 1 cap po QD    fluticasone (FLONASE) 50 mcg/actuation nasal spray USE ONE SPRAY IN EACH NOSTRIL TWO TIMES A DAY.  fenofibrate nanocrystallized (TRICOR) 145 mg tablet Take 1 Tab by mouth every fourty-eight (48) hours.     diphenhydrAMINE (BENADRYL) 50 mg capsule Take 50 mg by mouth nightly. Indications: INSOMNIA    aspirin-acetaminophen-caffeine 250-250-65 mg per tablet Take 1 Tab by mouth every six (6) hours as needed for Pain.  fluorometholone (FML) 0.1 % ophthalmic suspension Administer 1 Drop to both eyes two (2) times a day.  Cholecalciferol, Vitamin D3, (VITAMIN D3) 1,000 unit cap Take 1 Tab by mouth daily. Indications: VITAMIN D DEFICIENCY      PAST MEDICAL HISTORY: Past medical history from the initial psychiatric evaluation has been reviewed (reviewed/updated 2/3/2017) with no additional updates (I asked patient and no additional past medical history provided). Past Medical History   Diagnosis Date    Arthritis     Bipolar disorder ([facility] [address]     Bladder pain     Choledocholithiasis 11/24/2010    GERD (gastroesophageal reflux disease)     Headache(784.0)      tension headaches    Irritable bowel     Pelvic pain in female 2014    Psychiatric disorder      BIPOLAR    Stool color black      irritable bowel     Past Surgical History   Procedure Laterality Date    Hx gyn  1998     hysterectomy    Hx gi  2005     prolasped rectum    Laparoscopy abdomen diagnostic  1987    Hx other surgical  1987     EXPLORATORY LAPAROSCOPY    Hx appendectomy  1988    Hx cholecystectomy  11/23/10     cholecystectomy      SOCIAL HISTORY: Social history from the initial psychiatric evaluation has been reviewed (reviewed/updated 2/3/2017) with no additional updates (I asked patient and no additional social history provided). Social History     Social History    Marital status:      Spouse name: N/A    Number of children: N/A    Years of education: N/A     Occupational History    Not on file. Social History Main Topics    Smoking status: Never Smoker    Smokeless tobacco: Never Used    Alcohol use No    Drug use:  Yes    Sexual activity: No     Other Topics Concern    Not on file     Social History Narrative      FAMILY HISTORY: Family history from the initial psychiatric evaluation has been reviewed (reviewed/updated 2/3/2017) with no additional updates (I asked patient and no additional family history provided). Family History   Problem Relation Age of Onset    Hypertension Father     Cancer Father      SKIN CA    Colon Polyps Father     Other Maternal Uncle      CHAROT-MARISELA-TOOTH    Diabetes Paternal Grandfather        REVIEW OF SYSTEMS: (reviewed/updated 2/3/2017)  Appetite:no change from normal   Sleep: improved   All other Review of Systems: Psychological ROS: positive for - anxiety  Respiratory ROS: no cough, shortness of breath, or wheezing  Cardiovascular ROS: no chest pain or dyspnea on exertion         [facility] (MSE):    MSE FINDINGS ARE WITHIN NORMAL LIMITS (WNL) UNLESS OTHERWISE STATED BELOW. ( ALL OF THE BELOW CATEGORIES OF THE MSE HAVE BEEN REVIEWED (reviewed 2/3/2017) AND UPDATED AS DEEMED APPROPRIATE )  General Presentation age appropriate, evasive   Orientation oriented to time, place and person   Vital Signs  See below (reviewed 2/3/2017); Vital Signs (BP, Pulse, & Temp) are within normal limits if not listed below.    Gait and Station Stable/steady, no ataxia   Musculoskeletal System No extrapyramidal symptoms (EPS); no abnormal muscular movements or Tardive Dyskinesia (TD); muscle strength and tone are within normal limits   Language No aphasia or dysarthria   Speech:  hyperverbal   Thought Processes logical; normal rate of thoughts; fair abstract reasoning/computation   Thought Associations goal directed   Thought Content free of delusions and free of hallucinations   Suicidal Ideations none   Homicidal Ideations none   Mood:  stable    Affect:  stable   Memory recent  fair   Memory remote:  good   Concentration/Attention:  fair   Fund of Knowledge average   Insight:  poor   Reliability fair   Judgment:  limited          VITALS:     Patient Vitals for the past 24 hrs:   Temp Pulse Resp BP SpO2   02/03/17 0733 98.6 °F (37 °C) 92 16 148/89 98 %   02/03/17 0535 - - - 142/83 -   02/03/17 0240 98.4 °F (36.9 °C) 92 18 145/86 98 %   02/02/17 1930 98 °F (36.7 °C) 100 20 149/90 98 %   02/02/17 1537 98.6 °F (37 °C) 93 16 (!) 148/94 -     Wt Readings from Last 3 Encounters:   02/01/17 75.1 kg (165 lb 9.6 oz)   01/25/17 76.8 kg (169 lb 5 oz)   11/29/16 75.8 kg (167 lb)     Temp Readings from Last 3 Encounters:   02/03/17 98.6 °F (37 °C)   01/31/17 98.8 °F (37.1 °C)   11/29/16 98 °F (36.7 °C) (Oral)     BP Readings from Last 3 Encounters:   02/03/17 148/89   01/31/17 (!) 128/91   11/29/16 110/74     Pulse Readings from Last 3 Encounters:   02/03/17 92   01/31/17 (!) 55   11/29/16 87            DATA     LABORATORY DATA:(reviewed/updated 2/3/2017)  No results found for this or any previous visit (from the past 24 hour(s)). No results found for: VALF2, VALAC, VALP, VALPR, DS6, CRBAM, CRBAMP, CARB2, XCRBAM  No results found for: LI, LIVI, MARTAM   RADIOLOGY REPORTS:(reviewed/updated 2/3/2017)  Ct Head Wo Cont    Result Date: 1/24/2017  EXAM:  CT HEAD WO CONT INDICATION:   Altered mental status COMPARISON: None. TECHNIQUE: Unenhanced CT of the head was performed using 5 mm images. Brain and bone windows were generated. CT dose reduction was achieved through use of a standardized protocol tailored for this examination and automatic exposure control for dose modulation. FINDINGS: The ventricles and sulci are normal in size, shape and configuration and midline. There is no significant white matter disease. There is no intracranial hemorrhage, extra-axial collection, mass, mass effect or midline shift. The basilar cisterns are open. No acute infarct is identified. The bone windows demonstrate no abnormalities. The visualized portions of the paranasal sinuses and mastoid air cells are clear. IMPRESSION: No acute abnormality identified.      Xr Chest Port    Result Date: 1/24/2017  Indication: Altered mental status, not eating or drinking for 3 days Comparison: 3/19/2015 Portable exam of the chest obtained at 1012 demonstrates normal heart size. There is no acute process in the lung fields. The osseous structures are unremarkable. Impression: No acute process.           MEDICATIONS     ALL MEDICATIONS:   Current Facility-Administered Medications   Medication Dose Route Frequency    folic acid (FOLVITE) tablet 1 mg  1 mg Oral DAILY    thiamine (B-1) tablet 100 mg  100 mg Oral DAILY    LORazepam (ATIVAN) tablet 1 mg  1 mg Oral Q1H PRN    LORazepam (ATIVAN) tablet 2 mg  2 mg Oral Q1H PRN    fluorometholone (FML) 0.1 % ophthalmic suspension 1 Drop  1 Drop Both Eyes BID    fluticasone (FLONASE) 50 mcg/actuation nasal spray 1 Spray  1 Spray Both Nostrils BID    busPIRone (BUSPAR) tablet 5 mg  5 mg Oral TID    OLANZapine (ZyPREXA) tablet 2.5 mg  2.5 mg Oral Q6H PRN    ziprasidone (GEODON) 10 mg in sterile water (preservative free) 0.5 mL injection  10 mg IntraMUSCular BID PRN    benztropine (COGENTIN) tablet 1 mg  1 mg Oral BID PRN    benztropine (COGENTIN) injection 1 mg  1 mg IntraMUSCular BID PRN    LORazepam (ATIVAN) injection 0.5 mg  0.5 mg IntraMUSCular Q4H PRN    zolpidem (AMBIEN) tablet 5 mg  5 mg Oral QHS PRN    acetaminophen (TYLENOL) tablet 650 mg  650 mg Oral Q4H PRN    ibuprofen (MOTRIN) tablet 400 mg  400 mg Oral Q8H PRN    magnesium hydroxide (MILK OF MAGNESIA) 400 mg/5 mL oral suspension 30 mL  30 mL Oral DAILY PRN    nicotine (NICODERM CQ) 21 mg/24 hr patch 1 Patch  1 Patch TransDERmal DAILY PRN    amLODIPine (NORVASC) tablet 5 mg  5 mg Oral DAILY    senna-docusate (PERICOLACE) 8.6-50 mg per tablet 1 Tab  1 Tab Oral QHS    vitamin E acetate capsule 400 Units  400 Units Oral DAILY    famotidine (PEPCID) tablet 20 mg  20 mg Oral BID    fenofibrate nanocrystallized (TRICOR) tablet 145 mg  145 mg Oral Q48H      SCHEDULED MEDICATIONS:   Current Facility-Administered Medications   Medication Dose Route Frequency    folic acid (FOLVITE) tablet 1 mg  1 mg Oral DAILY    thiamine (B-1) tablet 100 mg  100 mg Oral DAILY    fluorometholone (FML) 0.1 % ophthalmic suspension 1 Drop  1 Drop Both Eyes BID    fluticasone (FLONASE) 50 mcg/actuation nasal spray 1 Spray  1 Spray Both Nostrils BID    busPIRone (BUSPAR) tablet 5 mg  5 mg Oral TID    amLODIPine (NORVASC) tablet 5 mg  5 mg Oral DAILY    senna-docusate (PERICOLACE) 8.6-50 mg per tablet 1 Tab  1 Tab Oral QHS    vitamin E acetate capsule 400 Units  400 Units Oral DAILY    famotidine (PEPCID) tablet 20 mg  20 mg Oral BID    fenofibrate nanocrystallized (TRICOR) tablet 145 mg  145 mg Oral Q48H          ASSESSMENT & PLAN     DIAGNOSES REQUIRING ACTIVE TREATMENT AND MONITORING: (reviewed/updated 2/3/2017)  Patient Active Hospital Problem List:   B[facility] ([facility] (1/31/2017)    Assessment: vitor alternating with depression    Plan: continue current medications- Discontinue opiates and benzodiazepines             n summary, Arlene Oates, is a 60 y.o.  female who presents with a severe exacerbation of the principal diagnosis of <principal problem not specified>  Patient's condition is improving. Patient requires continued inpatient hospitalization for further stabilization, safety monitoring and medication management. I will continue to coordinate the provision of individual, milieu, occupational, group, and substance abuse therapies to address target symptoms/diagnoses as deemed appropriate for the individual patient. A coordinated, multidisplinary treatment team round was conducted with the patient (this team consists of the nurse, psychiatric unit pharmcist,  and writer). Complete current electronic health record for patient has been reviewed today including consultant notes, ancillary staff notes, nurses and psychiatric tech notes.     Suicide risk assessment completed and patient deemed to be of low risk for suicide at this time. The following regarding medications was addressed during rounds with patient:   the risks and benefits of the proposed medication. The patient was given the opportunity to ask questions. Informed consent given to the use of the above medications. Will continue to adjust psychiatric and non-psychiatric medications (see above \"medication\" section and orders section for details) as deemed appropriate & based upon diagnoses and response to treatment. I will continue to order blood tests/labs and diagnostic tests as deemed appropriate and review results as they become available (see orders for details and above listed lab/test results). I will order psychiatric records from previous [facility] hospitals to further elucidate the nature of patient's psychopathology and review once available. I will gather additional collateral information from friends, family and o/p treatment team to further elucidate the nature of patient's psychopathology and baselline level of psychiatric functioning. I certify that this patient's inpatient psychiatric hospital services furnished since the previous certification were, and continue to be, required for treatment that could reasonably be expected to improve the patient's condition, or for diagnostic study, and that the patient continues to need, on a daily basis, active treatment furnished directly by or requiring the supervision of inpatient psychiatric facility personnel. In addition, the hospital records show that services furnished were intensive treatment services, admission or related services, or equivalent services.     EXPECTED DISCHARGE DATE/DAY: TBD     DISPOSITION: Home       Signed By:   Bryn Dean MD  2/3/2017

## 2017-02-03 NOTE — BH NOTES
GROUP THERAPY PROGRESS NOTE    Everett Lew did not participate in a Morning Process Group with a focus on identifying feelings and planning for the day.

## 2017-02-03 NOTE — PROGRESS NOTES
1329- pt refused morning dose of Buspar 5 mg po. Pt aware Buspar 5 mg tablet is scheduled 3 times a day: 0900, 1600, 2200.

## 2017-02-03 NOTE — BH NOTES
Case Management: ANDREA met with Ms Logan Fields who is the admission coordinator to [address] [place] [address] to interview pt for services. Ms Logan Fields completed her assessment and she stated pt meets the criteria for services. Pt will be contacted on Monday by their office to schedule home appt. Update: ANDREA contacted Ms Mic Mcintyre ( [phone]# [phone]) to advise that pt.'s d/c has been cancelled due to re-evaluation of her medications. ANDREA will contact on Monday w/ new d/c date and time. Family Contact: ANDREA informed pt's sister, Kerri Cantrell that her dd/c has been cancelled. ANDREA will contact her on Monday w/ new d/c time.

## 2017-02-03 NOTE — BH NOTES
Behavioral Health Interdisciplinary Rounds     Patient Name: Rexie Eisenmenger  Age: 60 y.o. Room/Bed:  2/  Primary Diagnosis: <principal problem not specified>   Admission Status: Voluntary     Readmission within 30 days: no  Power of  in place: no  Patient requires a blocked bed: no          Reason for blocked bed: na    VTE Prophylaxis: Not indicated  Influenza vaccine screen completed: yes Influenza vaccine given: yes--PTA  Mobility needs/Fall risk: no    Nutritional Plan: no  Consults: no         Labs/Testing due today?: no    Sleep hours:  2.5+  (broken sleep)      Participation in Care/Groups:  yes  Medication Compliant?: Yes  PRNS (last 24 hours): None    Restraints (last 24 hours):  no  Substance Abuse:  yes CIWA (range last 24 hours): 2-0 COWS (range last 24 hours): na  Alcohol screening (AUDIT) completed -  AUDIT Score: 0  If applicable, date SBIRT discussed in treatment team AND documented:   Tobacco - patient is a smoker: no   Date tobacco education completed by RN: floyd  24 hour chart check complete: yes     Patient goal(s) for today: Pt voiced concerns that current medication is not effective in managing her anxiety.    Treatment team focus/goals: Cancel d/c and re-evaluate medications  LOS:  3  Expected LOS: TBD  Financial concerns/prescription coverage:  Medicare  Date of last family contact: 2/2  SW met with sister to discuss course of tx and d/c planning   Family requesting physician contact today:  no  Discharge plan: [facility] to evaluate pt today for services      Outpatient provider(s): Dr Roe Liang on 2/8/2017 @ 2:00 pm and [facility] services on 2/6/2017 @ her home    Participating treatment team members: Rexie Eisenmenger, Juan Alberto Fink, Dr Ortega Wang RN and Shannon Pi Pharm

## 2017-02-03 NOTE — PROGRESS NOTES
Pharmacist Discharge Medication Reconciliation    Discharging Provider: Dr. Celestina Marie PMH:   Past Medical History   Diagnosis Date    Arthritis     Bipolar disorder ([address]     Bladder pain     Choledocholithiasis 11/24/2010    GERD (gastroesophageal reflux disease)     Headache(784.0)      tension headaches    Irritable bowel     Pelvic pain in female 2014    Psychiatric disorder      BIPOLAR    Stool color black      irritable bowel     Chief Complaint for this Admission: No chief complaint on file. Allergies: Dicyclomine; Lithium; and Other medication    Discharge Medications:   Current Discharge Medication List        START taking these medications    Details   busPIRone (BUSPAR) 5 mg tablet Take 1 Tab by mouth three (3) times daily. Indications: GENERALIZED ANXIETY DISORDER  Qty: 90 Tab, Refills: 0           CONTINUE these medications which have NOT CHANGED    Details   vilazodone (VIIBRYD) 10 mg tab tablet Take 20 mg by mouth daily. amLODIPine (NORVASC) 5 mg tablet Take 1 Tab by mouth daily for 30 days. Qty: 30 Tab, Refills: 0      senna-docusate (PERICOLACE) 8.6-50 mg per tablet Take 1 Tab by mouth nightly for 30 days. Qty: 30 Tab, Refills: 0      vitamin E (AQUA GEMS) 400 unit capsule Take 400 Units by mouth daily. famotidine (PEPCID) 20 mg tablet Take 1 Tab by mouth two (2) times a day. D/C omeprazole  Qty: 60 Tab, Refills: 3    Associated Diagnoses: Gastroesophageal reflux disease without esophagitis      fluticasone (FLONASE) 50 mcg/actuation nasal spray USE ONE SPRAY IN EACH NOSTRIL TWO TIMES A DAY. Qty: 16 g, Refills: 1      fenofibrate nanocrystallized (TRICOR) 145 mg tablet Take 1 Tab by mouth every fourty-eight (48) hours. Qty: 30 Tab, Refills: 5    Associated Diagnoses: Elevated triglycerides with high cholesterol      aspirin-acetaminophen-caffeine 250-250-65 mg per tablet Take 1 Tab by mouth every six (6) hours as needed for Pain.       fluorometholone (FML) 0.1 % ophthalmic suspension Administer 1 Drop to both eyes two (2) times a day. Refills: 99      Cholecalciferol, Vitamin D3, (VITAMIN D3) 1,000 unit cap Take 1 Tab by mouth daily.  Indications: VITAMIN D DEFICIENCY           STOP taking these medications       LORazepam (ATIVAN) 2 mg tablet Comments:   Reason for Stopping:         QUEtiapine (SEROQUEL) 300 mg tablet Comments:   Reason for Stopping:         gabapentin (NEURONTIN) 300 mg capsule Comments:   Reason for Stopping:         L.acidoph&parac-S.therm-Bifido (THEO-Q,2,) 16 billion cell cap Comments:   Reason for Stopping:         diphenhydrAMINE (BENADRYL) 50 mg capsule Comments:   Reason for Stopping:               The patient's chart, MAR and AVS were reviewed by Alex Park, PHARMD.

## 2017-02-04 PROCEDURE — 74011250637 HC RX REV CODE- 250/637: Performed by: HOSPITALIST

## 2017-02-04 PROCEDURE — 74011250637 HC RX REV CODE- 250/637

## 2017-02-04 PROCEDURE — 74011250637 HC RX REV CODE- 250/637: Performed by: PSYCHIATRY & NEUROLOGY

## 2017-02-04 PROCEDURE — 65220000003 HC RM SEMIPRIVATE PSYCH

## 2017-02-04 RX ADMIN — FLUTICASONE PROPIONATE 1 SPRAY: 50 SPRAY, METERED NASAL at 08:58

## 2017-02-04 RX ADMIN — Medication 100 MG: at 08:55

## 2017-02-04 RX ADMIN — FLUTICASONE PROPIONATE 1 SPRAY: 50 SPRAY, METERED NASAL at 17:24

## 2017-02-04 RX ADMIN — VITAMIN E CAP 400 UNIT 400 UNITS: 400 CAP at 09:01

## 2017-02-04 RX ADMIN — FAMOTIDINE 20 MG: 20 TABLET ORAL at 17:21

## 2017-02-04 RX ADMIN — ZOLPIDEM TARTRATE 5 MG: 5 TABLET, FILM COATED ORAL at 21:35

## 2017-02-04 RX ADMIN — AMLODIPINE BESYLATE 5 MG: 5 TABLET ORAL at 08:55

## 2017-02-04 RX ADMIN — FENOFIBRATE 145 MG: 145 TABLET, FILM COATED ORAL at 21:35

## 2017-02-04 RX ADMIN — FLUOROMETHOLONE 1 DROP: 1 SOLUTION/ DROPS OPHTHALMIC at 08:59

## 2017-02-04 RX ADMIN — FOLIC ACID 1 MG: 1 TABLET ORAL at 08:55

## 2017-02-04 RX ADMIN — DOCUSATE SODIUM AND SENNOSIDES 1 TABLET: 8.6; 5 TABLET, FILM COATED ORAL at 21:35

## 2017-02-04 RX ADMIN — HYDROXYZINE HYDROCHLORIDE 10 MG: 10 TABLET, FILM COATED ORAL at 17:21

## 2017-02-04 RX ADMIN — FAMOTIDINE 20 MG: 20 TABLET ORAL at 08:55

## 2017-02-04 RX ADMIN — FLUOROMETHOLONE 1 DROP: 1 SOLUTION/ DROPS OPHTHALMIC at 17:24

## 2017-02-04 NOTE — PROGRESS NOTES
Problem: Manic Behavior (Adult/Pediatric)  Goal: *STG: Seeks staff when feelings of anxiety and fear arise  Outcome: Progressing Towards Goal  Patient seeked staffs when feeling of anxiety and fear arise. Pt requested for anti-anxiety medications at beginning of shift. Unit nurses encouraged pt using coping skills instead. Pt currently verbalized understanding. Pt medications and meals compliant. Appeared anxious, but cooperative. Up visible on unit at times and interacted with peers appropriately. Pt returned to room intermittently to decrease stimuli & to help relax. No acute distress noted. Continue pt on Q 15 min safety checks.

## 2017-02-04 NOTE — BH NOTES
PSYCHIATRIC PROGRESS NOTE         Patient Name  Mainor Man   Date of Birth 1960   [facility] [id number]   Medical Record Number  [id number]      Age  60 y.o. PCP Wyatt Olson MD   Admit date:  1/31/2017    Room Number  742/02  @ [facility]   Date of Service  2/4/2017          PSYCHOTHERAPY SESSION NOTE:  Length of psychotherapy session: 45 minutes    Main condition/diagnosis/issues treated during session today, 2/4/2017 : benzodiazepine inteolerance, alternative anxity medications, independent living, coping skills    I employed Cognitive Behavioral therapy techniques, Reality-Oriented psychotherapy, as well as supportive psychotherapy in regards to various ongoing psychosocial stressors, including the following: pre-admission and current problems; housing issues; medical issues; and stress of hospitalization. Interpersonal relationship issues and psychodynamic conflicts explored. Attempts made to alleviate maladaptive patterns. We, also, worked on issues of denial & effects of substance dependency/use     Overall, patient is not progressing    Treatment Plan Update (reviewed an updated 2/4/2017) : I will modify psychotherapy tx plan by implementing more stress management strategies, building upon cognitive behavioral techniques, increasing coping skills, as well as shoring up psychological defenses). An extended energy and skill set was needed to engage pt in psychotherapy due to some of the following: resistiveness, complexity, negativity, confrontational nature, hostile behaviors, and/or severe abnormalities in thought processes/psychosis resulting in the loss of expressive/receptive language communication skills. E & M PROGRESS NOTE:         HISTORY       CC:  \"delirium\"  HISTORY OF PRESENT ILLNESS/INTERVAL HISTORY:  (reviewed/updated 2/4/2017).   per initial evaluation:     Mainor Man presents/reports/evidences the following emotional symptoms today, 2/4/2017:delirium. The above symptoms have been present for 2 days . These symptoms are of severe severity. The symptoms are intermittent/ fleeting in nature. Additional symptomatology and features include agitation. 2/3/17- Pt c/o Buspar being too activating and has produced insomnia. Will DC buspar and start low dose Atarax prn for anxiety. 2/4/17- continues med seeking and dependent behaviors. Likes atarax and keeps trying to get nursing to increase the dose. Cignition and memory are intact. SIDE EFFECTS: (reviewed/updated 2/4/2017)  None reported or admitted to. No noted toxicity with use of Depakote/Tegretol/lithium/Clozaril/TCAs   ALLERGIES:(reviewed/updated 2/4/2017)  Allergies   Allergen Reactions    Dicyclomine Nausea and Vomiting     Extreme stomach pains    Lithium Nausea and Vomiting     Severe nausea and vomiting.  Other Medication Other (comments)     Intolerance to oranges, cabbage,beans,peppers,raw onions,foods with caffeine in them, popcorn, no pop sodas, because of IBS      MEDICATIONS PRIOR TO ADMISSION:(reviewed/updated 2/4/2017)  Prescriptions Prior to Admission   Medication Sig    LORazepam (ATIVAN) 2 mg tablet Take 2 mg by mouth three (3) times daily.  QUEtiapine (SEROQUEL) 300 mg tablet Take 300 mg by mouth daily.  vilazodone (VIIBRYD) 10 mg tab tablet Take 20 mg by mouth daily.  gabapentin (NEURONTIN) 300 mg capsule Take 300 mg by mouth three (3) times daily as needed (Headache).  amLODIPine (NORVASC) 5 mg tablet Take 1 Tab by mouth daily for 30 days.  senna-docusate (PERICOLACE) 8.6-50 mg per tablet Take 1 Tab by mouth nightly for 30 days.  vitamin E (AQUA GEMS) 400 unit capsule Take 400 Units by mouth daily.  famotidine (PEPCID) 20 mg tablet Take 1 Tab by mouth two (2) times a day.  D/C omeprazole    L.acidoph&parac-S.therm-Bifido (THEO-Q,2,) 16 billion cell cap 1 cap po QD    fluticasone (FLONASE) 50 mcg/actuation nasal spray USE ONE SPRAY IN [facility] NOSTRIL TWO TIMES A DAY.  fenofibrate nanocrystallized (TRICOR) 145 mg tablet Take 1 Tab by mouth every fourty-eight (48) hours.  diphenhydrAMINE (BENADRYL) 50 mg capsule Take 50 mg by mouth nightly. Indications: INSOMNIA    aspirin-acetaminophen-caffeine 250-250-65 mg per tablet Take 1 Tab by mouth every six (6) hours as needed for Pain.  fluorometholone (FML) 0.1 % ophthalmic suspension Administer 1 Drop to both eyes two (2) times a day.  Cholecalciferol, Vitamin D3, (VITAMIN D3) 1,000 unit cap Take 1 Tab by mouth daily. Indications: VITAMIN D DEFICIENCY      PAST MEDICAL HISTORY: Past medical history from the initial psychiatric evaluation has been reviewed (reviewed/updated 2/4/2017) with no additional updates (I asked patient and no additional past medical history provided). Past Medical History   Diagnosis Date    Arthritis     Bipolar disorder ([facility] [address]     Bladder pain     Choledocholithiasis 11/24/2010    GERD (gastroesophageal reflux disease)     Headache(784.0)      tension headaches    Irritable bowel     Pelvic pain in female 2014    Psychiatric disorder      BIPOLAR    Stool color black      irritable bowel     Past Surgical History   Procedure Laterality Date    Hx gyn  1998     hysterectomy    Hx gi  2005     prolasped rectum    Laparoscopy abdomen diagnostic  1987    Hx other surgical  1987     EXPLORATORY LAPAROSCOPY    Hx appendectomy  1988    Hx cholecystectomy  11/23/10     cholecystectomy      SOCIAL HISTORY: Social history from the initial psychiatric evaluation has been reviewed (reviewed/updated 2/4/2017) with no additional updates (I asked patient and no additional social history provided). Social History     Social History    Marital status:      Spouse name: N/A    Number of children: N/A    Years of education: N/A     Occupational History    Not on file.      Social History Main Topics    Smoking status: Never Smoker    Smokeless tobacco: Never Used    Alcohol use No    Drug use: Yes    Sexual activity: No     Other Topics Concern    Not on file     Social History Narrative      FAMILY HISTORY: Family history from the initial psychiatric evaluation has been reviewed (reviewed/updated 2/4/2017) with no additional updates (I asked patient and no additional family history provided). Family History   Problem Relation Age of Onset    Hypertension Father     Cancer Father      SKIN CA    Colon Polyps Father     Other Maternal Uncle      CHAROT-MARISELA-TOOTH    Diabetes Paternal Grandfather        REVIEW OF SYSTEMS: (reviewed/updated 2/4/2017)  Appetite:no change from normal   Sleep: improved   All other Review of Systems: Psychological ROS: positive for - anxiety  Respiratory ROS: no cough, shortness of breath, or wheezing  Cardiovascular ROS: no chest pain or dyspnea on exertion         [facility] (MSE):    MSE FINDINGS ARE WITHIN NORMAL LIMITS (WNL) UNLESS OTHERWISE STATED BELOW. ( ALL OF THE BELOW CATEGORIES OF THE MSE HAVE BEEN REVIEWED (reviewed 2/4/2017) AND UPDATED AS DEEMED APPROPRIATE )  General Presentation age appropriate, evasive   Orientation oriented to time, place and person   Vital Signs  See below (reviewed 2/4/2017); Vital Signs (BP, Pulse, & Temp) are within normal limits if not listed below.    Gait and Station Stable/steady, no ataxia   Musculoskeletal System No extrapyramidal symptoms (EPS); no abnormal muscular movements or Tardive Dyskinesia (TD); muscle strength and tone are within normal limits   Language No aphasia or dysarthria   Speech:  hyperverbal   Thought Processes logical; normal rate of thoughts; fair abstract reasoning/computation   Thought Associations goal directed   Thought Content free of delusions and free of hallucinations   Suicidal Ideations none   Homicidal Ideations none   Mood:  stable    Affect:  stable   Memory recent  fair   Memory remote:  good Concentration/Attention:  fair   Fund of Knowledge average   Insight:  poor   Reliability fair   Judgment:  limited          VITALS:     Patient Vitals for the past 24 hrs:   Temp Pulse Resp BP SpO2   02/04/17 1625 98.2 °F (36.8 °C) 100 20 (!) 193/100 99 %   02/04/17 1201 97.6 °F (36.4 °C) 100 16 (!) 166/92 -   02/04/17 0812 98.2 °F (36.8 °C) 95 18 144/87 98 %   02/03/17 1739 - 95 - (!) 163/95 -     Wt Readings from Last 3 Encounters:   02/01/17 75.1 kg (165 lb 9.6 oz)   01/25/17 76.8 kg (169 lb 5 oz)   11/29/16 75.8 kg (167 lb)     Temp Readings from Last 3 Encounters:   02/04/17 98.2 °F (36.8 °C)   01/31/17 98.8 °F (37.1 °C)   11/29/16 98 °F (36.7 °C) (Oral)     BP Readings from Last 3 Encounters:   02/04/17 (!) 193/100   01/31/17 (!) 128/91   11/29/16 110/74     Pulse Readings from Last 3 Encounters:   02/04/17 100   01/31/17 (!) 55   11/29/16 87            DATA     LABORATORY DATA:(reviewed/updated 2/4/2017)  No results found for this or any previous visit (from the past 24 hour(s)). No results found for: VALF2, VALAC, VALP, VALPR, DS6, CRBAM, CRBAMP, CARB2, XCRBAM  No results found for: LI, LIH, LITHM   RADIOLOGY REPORTS:(reviewed/updated 2/4/2017)  Ct Head Wo Cont    Result Date: 1/24/2017  EXAM:  CT HEAD WO CONT INDICATION:   Altered mental status COMPARISON: None. TECHNIQUE: Unenhanced CT of the head was performed using 5 mm images. Brain and bone windows were generated. CT dose reduction was achieved through use of a standardized protocol tailored for this examination and automatic exposure control for dose modulation. FINDINGS: The ventricles and sulci are normal in size, shape and configuration and midline. There is no significant white matter disease. There is no intracranial hemorrhage, extra-axial collection, mass, mass effect or midline shift. The basilar cisterns are open. No acute infarct is identified. The bone windows demonstrate no abnormalities.  The visualized portions of the paranasal sinuses and mastoid air cells are clear. IMPRESSION: No acute abnormality identified. Xr Chest Port    Result Date: 1/24/2017  Indication: Altered mental status, not eating or drinking for 3 days Comparison: 3/19/2015 Portable exam of the chest obtained at 1012 demonstrates normal heart size. There is no acute process in the lung fields. The osseous structures are unremarkable. Impression: No acute process.           MEDICATIONS     ALL MEDICATIONS:   Current Facility-Administered Medications   Medication Dose Route Frequency    hydrOXYzine HCl (ATARAX) tablet 10 mg  10 mg Oral A7H PRN    folic acid (FOLVITE) tablet 1 mg  1 mg Oral DAILY    thiamine (B-1) tablet 100 mg  100 mg Oral DAILY    LORazepam (ATIVAN) tablet 1 mg  1 mg Oral Q1H PRN    LORazepam (ATIVAN) tablet 2 mg  2 mg Oral Q1H PRN    fluorometholone (FML) 0.1 % ophthalmic suspension 1 Drop  1 Drop Both Eyes BID    fluticasone (FLONASE) 50 mcg/actuation nasal spray 1 Spray  1 Spray Both Nostrils BID    OLANZapine (ZyPREXA) tablet 2.5 mg  2.5 mg Oral Q6H PRN    ziprasidone (GEODON) 10 mg in sterile water (preservative free) 0.5 mL injection  10 mg IntraMUSCular BID PRN    benztropine (COGENTIN) tablet 1 mg  1 mg Oral BID PRN    benztropine (COGENTIN) injection 1 mg  1 mg IntraMUSCular BID PRN    LORazepam (ATIVAN) injection 0.5 mg  0.5 mg IntraMUSCular Q4H PRN    zolpidem (AMBIEN) tablet 5 mg  5 mg Oral QHS PRN    acetaminophen (TYLENOL) tablet 650 mg  650 mg Oral Q4H PRN    ibuprofen (MOTRIN) tablet 400 mg  400 mg Oral Q8H PRN    magnesium hydroxide (MILK OF MAGNESIA) 400 mg/5 mL oral suspension 30 mL  30 mL Oral DAILY PRN    nicotine (NICODERM CQ) 21 mg/24 hr patch 1 Patch  1 Patch TransDERmal DAILY PRN    amLODIPine (NORVASC) tablet 5 mg  5 mg Oral DAILY    senna-docusate (PERICOLACE) 8.6-50 mg per tablet 1 Tab  1 Tab Oral QHS    vitamin E acetate capsule 400 Units  400 Units Oral DAILY    famotidine (PEPCID) tablet 20 mg 20 mg Oral BID    fenofibrate nanocrystallized (TRICOR) tablet 145 mg  145 mg Oral Q48H      SCHEDULED MEDICATIONS:   Current Facility-Administered Medications   Medication Dose Route Frequency    folic acid (FOLVITE) tablet 1 mg  1 mg Oral DAILY    thiamine (B-1) tablet 100 mg  100 mg Oral DAILY    fluorometholone (FML) 0.1 % ophthalmic suspension 1 Drop  1 Drop Both Eyes BID    fluticasone (FLONASE) 50 mcg/actuation nasal spray 1 Spray  1 Spray Both Nostrils BID    amLODIPine (NORVASC) tablet 5 mg  5 mg Oral DAILY    senna-docusate (PERICOLACE) 8.6-50 mg per tablet 1 Tab  1 Tab Oral QHS    vitamin E acetate capsule 400 Units  400 Units Oral DAILY    famotidine (PEPCID) tablet 20 mg  20 mg Oral BID    fenofibrate nanocrystallized (TRICOR) tablet 145 mg  145 mg Oral Q48H          ASSESSMENT & PLAN     DIAGNOSES REQUIRING ACTIVE TREATMENT AND MONITORING: (reviewed/updated 2/4/2017)  Patient Active Hospital Problem List:   [facility] (1/31/2017)    Assessment: vitor alternating with depression    Plan: continue current medications- Discontinue opiates and benzodiazepines             n summary, Erik Fernandez, is a 60 y.o.  female who presents with a severe exacerbation of the principal diagnosis of <principal problem not specified>  Patient's condition is improving. Patient requires continued inpatient hospitalization for further stabilization, safety monitoring and medication management. I will continue to coordinate the provision of individual, milieu, occupational, group, and substance abuse therapies to address target symptoms/diagnoses as deemed appropriate for the individual patient. A coordinated, multidisplinary treatment team round was conducted with the patient (this team consists of the nurse, psychiatric unit pharmcist,  and writer).      Complete current electronic health record for patient has been reviewed today including consultant notes, ancillary staff notes, nurses and psychiatric tech notes. Suicide risk assessment completed and patient deemed to be of low risk for suicide at this time. The following regarding medications was addressed during rounds with patient:   the risks and benefits of the proposed medication. The patient was given the opportunity to ask questions. Informed consent given to the use of the above medications. Will continue to adjust psychiatric and non-psychiatric medications (see above \"medication\" section and orders section for details) as deemed appropriate & based upon diagnoses and response to treatment. I will continue to order blood tests/labs and diagnostic tests as deemed appropriate and review results as they become available (see orders for details and above listed lab/test results). I will order psychiatric records from previous [facility] hospitals to further elucidate the nature of patient's psychopathology and review once available. I will gather additional collateral information from friends, family and o/p treatment team to further elucidate the nature of patient's psychopathology and baselline level of psychiatric functioning. I certify that this patient's inpatient psychiatric hospital services furnished since the previous certification were, and continue to be, required for treatment that could reasonably be expected to improve the patient's condition, or for diagnostic study, and that the patient continues to need, on a daily basis, active treatment furnished directly by or requiring the supervision of inpatient psychiatric facility personnel. In addition, the hospital records show that services furnished were intensive treatment services, admission or related services, or equivalent services.     EXPECTED DISCHARGE DATE/DAY: TBD     DISPOSITION: Home       Signed By:   Ale Lipscomb MD  2/4/2017

## 2017-02-04 NOTE — PROGRESS NOTES
Problem: Falls - Risk of  Goal: *Absence of falls  Outcome: Progressing Towards Goal  0030 Patient appearing to be sleep in bed, respirations even and unlabored. No acute distress noted at this time. Bed alarm in place and functioning. Patient remains free of falls this shift. Will continue to monitor patient [id number] for safety. 24hr chart review completed.

## 2017-02-04 NOTE — BH NOTES
PRN Medication Documentation    Specific patient behavior that led to need for PRN medication: awake in bed  Staff interventions attempted prior to PRN being given: encourage to try to go to sleep  PRN medication given: Ambien 5 mg  Patient response/effectiveness of PRN medication: pending

## 2017-02-04 NOTE — BH NOTES
PRN Medication Documentation    Specific patient behavior that led to need for PRN medication: restless states she is anxious  Staff interventions attempted prior to PRN being given: encourage relaxation via reading  PRN medication given: Atarax 10mg  Patient response/effectiveness of PRN medication: stated it really help calmer and states she feel better.
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
## 2017-02-04 NOTE — INTERDISCIPLINARY ROUNDS
Behavioral Health Interdisciplinary Rounds      Patient Name: Katie Lara    Age: 60 y.o.     Room/Bed: 742/02  Primary Diagnosis: <principal problem not specified>   Admission Status: Voluntary     Readmission within 30 days: no  Power of  in place: no  Patient requires a blocked bed: no Reason for blocked bed: n/a     VTE Prophylaxis: Not indicated  Influenza vaccine screen completed: yes   Influenza vaccine given: yes--PTA  Mobility needs/Fall risk: no    Nutritional Plan: no  Consults: no         Labs/Testing due today?: no     Sleep hours: 6.15    Participation in Care/Groups: yes  Medication Compliant?: Yes  PRNS (last 24 hours): PO Anti-Anxiety, Sleep Aid     Restraints (last 24 hours): no  Substance Abuse: yes  CIWA (range last 24 hours): 0-1  COWS (range last 24 hours): n/a  Alcohol screening (AUDIT) completed - AUDIT Score: 0 If applicable, date SBIRT discussed in treatment team AND documented:   Tobacco - patient is a smoker: no   Date tobacco education completed by RN: n/a  24 hour chart check complete: yes      Patient goal(s) for today:  Prepare for d/c on Monday  Treatment team focus/goals: Cont. meds and monitor for their effectiveness  LOS: 5  Expected LOS: TBD  Financial concerns/prescription coverage: Medicare  Date of last family contact: 2/2 SW met with sister to discuss course of tx and d/c planning   Family requesting physician contact today: no  Discharge plan:   Return to independent [address] provider(s): Dr Brian Vazquez on 2/8/2017 @ 2:00 pm and [address] services on 2/6/2017 @ her home     Participating treatment team members: Katie Lara, Dr Parminedr Omalley

## 2017-02-05 PROCEDURE — 65220000003 HC RM SEMIPRIVATE PSYCH

## 2017-02-05 PROCEDURE — 74011250637 HC RX REV CODE- 250/637: Performed by: HOSPITALIST

## 2017-02-05 PROCEDURE — 74011250637 HC RX REV CODE- 250/637: Performed by: PSYCHIATRY & NEUROLOGY

## 2017-02-05 PROCEDURE — 74011250637 HC RX REV CODE- 250/637

## 2017-02-05 RX ORDER — HYDROXYZINE HYDROCHLORIDE 10 MG/1
10 TABLET, FILM COATED ORAL
Status: DISCONTINUED | OUTPATIENT
Start: 2017-02-05 | End: 2017-02-06 | Stop reason: HOSPADM

## 2017-02-05 RX ADMIN — FOLIC ACID 1 MG: 1 TABLET ORAL at 08:14

## 2017-02-05 RX ADMIN — FLUTICASONE PROPIONATE 1 SPRAY: 50 SPRAY, METERED NASAL at 08:19

## 2017-02-05 RX ADMIN — HYDROXYZINE HYDROCHLORIDE 10 MG: 10 TABLET, FILM COATED ORAL at 18:15

## 2017-02-05 RX ADMIN — FLUOROMETHOLONE 1 DROP: 1 SOLUTION/ DROPS OPHTHALMIC at 18:21

## 2017-02-05 RX ADMIN — AMLODIPINE BESYLATE 5 MG: 5 TABLET ORAL at 08:14

## 2017-02-05 RX ADMIN — FAMOTIDINE 20 MG: 20 TABLET ORAL at 08:14

## 2017-02-05 RX ADMIN — DOCUSATE SODIUM AND SENNOSIDES 1 TABLET: 8.6; 5 TABLET, FILM COATED ORAL at 22:02

## 2017-02-05 RX ADMIN — VITAMIN E CAP 400 UNIT 400 UNITS: 400 CAP at 08:23

## 2017-02-05 RX ADMIN — FLUTICASONE PROPIONATE 1 SPRAY: 50 SPRAY, METERED NASAL at 18:22

## 2017-02-05 RX ADMIN — Medication 100 MG: at 08:14

## 2017-02-05 RX ADMIN — FLUOROMETHOLONE 1 DROP: 1 SOLUTION/ DROPS OPHTHALMIC at 08:19

## 2017-02-05 RX ADMIN — HYDROXYZINE HYDROCHLORIDE 10 MG: 10 TABLET, FILM COATED ORAL at 03:00

## 2017-02-05 RX ADMIN — FAMOTIDINE 20 MG: 20 TABLET ORAL at 18:15

## 2017-02-05 RX ADMIN — ZOLPIDEM TARTRATE 5 MG: 5 TABLET, FILM COATED ORAL at 22:02

## 2017-02-05 NOTE — PROGRESS NOTES
Problem: Falls - Risk of  Goal: *Absence of falls  Outcome: Progressing Towards Goal  Patient is ambulating safely and steadily on the unit while using her walker appropriately. She has remained free from falls/injury throughout this shift. Problem: Manic Behavior (Adult/Pediatric)  Goal: *STG: Seeks staff when feelings of anxiety and fear arise  Outcome: Progressing Towards Goal  Patient approaches Nurses' station at the beginning of the shift to request medication for her \"rising vitor\" and then again later for Ambien at bedtime to ensure that she sleeps well tonight. Patient is anxious and focused on her medications.

## 2017-02-05 NOTE — PROGRESS NOTES
Problem: Manic Behavior (Adult/Pediatric)  Goal: *STG: Seeks staff when feelings of anxiety and fear arise  Outcome: Resolved/Met Date Met:  02/05/17  Patient seeks staff when feelings of anxiety arises, smiling, out on the unit, med and meal compliant, attention seeking, remains on Q15 min safety checks.

## 2017-02-05 NOTE — BH NOTES
PSYCHIATRIC PROGRESS NOTE         Patient Name  Aislinn Ingram   Date of Birth 1960   [facility] [id number]   Medical Record Number  [id number]      Age  60 y.o. PCP Chip Pastor MD   Admit date:  1/31/2017    Room Number  742/02  @ [facility]   Date of Service  2/5/2017          PSYCHOTHERAPY SESSION NOTE:  Length of psychotherapy session: 45 minutes    Main condition/diagnosis/issues treated during session today, 2/5/2017 : benzodiazepine inteolerance, alternative anxity medications, independent living, coping skills    I employed Cognitive Behavioral therapy techniques, Reality-Oriented psychotherapy, as well as supportive psychotherapy in regards to various ongoing psychosocial stressors, including the following: pre-admission and current problems; housing issues; medical issues; and stress of hospitalization. Interpersonal relationship issues and psychodynamic conflicts explored. Attempts made to alleviate maladaptive patterns. We, also, worked on issues of denial & effects of substance dependency/use     Overall, patient is not progressing    Treatment Plan Update (reviewed an updated 2/5/2017) : I will modify psychotherapy tx plan by implementing more stress management strategies, building upon cognitive behavioral techniques, increasing coping skills, as well as shoring up psychological defenses). An extended energy and skill set was needed to engage pt in psychotherapy due to some of the following: resistiveness, complexity, negativity, confrontational nature, hostile behaviors, and/or severe abnormalities in thought processes/psychosis resulting in the loss of expressive/receptive language communication skills. E & M PROGRESS NOTE:         HISTORY       CC:  \"delirium\"  HISTORY OF PRESENT ILLNESS/INTERVAL HISTORY:  (reviewed/updated 2/5/2017).   per initial evaluation:     Aislinn Ingram presents/reports/evidences the following emotional symptoms today, 2/5/2017:delirium. The above symptoms have been present for 2 days . These symptoms are of severe severity. The symptoms are intermittent/ fleeting in nature. Additional symptomatology and features include agitation. 2/3/17- Pt c/o Buspar being too activating and has produced insomnia. Will DC buspar and start low dose Atarax prn for anxiety. 2/4/17- continues med seeking and dependent behaviors. Likes atarax and keeps trying to get nursing to increase the dose. Cignition and memory are intact. 2/5/17/ pt. Still med seeking with no obvoius signs of distress or anxiety. Stated she would like a medication \"in a while because I know I'm going to feel anxious\". SIDE EFFECTS: (reviewed/updated 2/5/2017)  None reported or admitted to. No noted toxicity with use of Depakote/Tegretol/lithium/Clozaril/TCAs   ALLERGIES:(reviewed/updated 2/5/2017)  Allergies   Allergen Reactions    Dicyclomine Nausea and Vomiting     Extreme stomach pains    Lithium Nausea and Vomiting     Severe nausea and vomiting.  Other Medication Other (comments)     Intolerance to oranges, cabbage,beans,peppers,raw onions,foods with caffeine in them, popcorn, no pop sodas, because of IBS      MEDICATIONS PRIOR TO ADMISSION:(reviewed/updated 2/5/2017)  Prescriptions Prior to Admission   Medication Sig    LORazepam (ATIVAN) 2 mg tablet Take 2 mg by mouth three (3) times daily.  QUEtiapine (SEROQUEL) 300 mg tablet Take 300 mg by mouth daily.  vilazodone (VIIBRYD) 10 mg tab tablet Take 20 mg by mouth daily.  gabapentin (NEURONTIN) 300 mg capsule Take 300 mg by mouth three (3) times daily as needed (Headache).  amLODIPine (NORVASC) 5 mg tablet Take 1 Tab by mouth daily for 30 days.  senna-docusate (PERICOLACE) 8.6-50 mg per tablet Take 1 Tab by mouth nightly for 30 days.  vitamin E (AQUA GEMS) 400 unit capsule Take 400 Units by mouth daily.  famotidine (PEPCID) 20 mg tablet Take 1 Tab by mouth two (2) times a day.  D/C omeprazole    L.acidoph&parac-S.therm-Bifido (THEO-Q,2,) 16 billion cell cap 1 cap po QD    fluticasone (FLONASE) 50 mcg/actuation nasal spray USE ONE SPRAY IN EACH NOSTRIL TWO TIMES A DAY.  fenofibrate nanocrystallized (TRICOR) 145 mg tablet Take 1 Tab by mouth every fourty-eight (48) hours.  diphenhydrAMINE (BENADRYL) 50 mg capsule Take 50 mg by mouth nightly. Indications: INSOMNIA    aspirin-acetaminophen-caffeine 250-250-65 mg per tablet Take 1 Tab by mouth every six (6) hours as needed for Pain.  fluorometholone (FML) 0.1 % ophthalmic suspension Administer 1 Drop to both eyes two (2) times a day.  Cholecalciferol, Vitamin D3, (VITAMIN D3) 1,000 unit cap Take 1 Tab by mouth daily. Indications: VITAMIN D DEFICIENCY      PAST MEDICAL HISTORY: Past medical history from the initial psychiatric evaluation has been reviewed (reviewed/updated 2/5/2017) with no additional updates (I asked patient and no additional past medical history provided). Past Medical History   Diagnosis Date    Arthritis     Bipolar disorder ([facility] [address]     Bladder pain     Choledocholithiasis 11/24/2010    GERD (gastroesophageal reflux disease)     Headache(784.0)      tension headaches    Irritable bowel     Pelvic pain in female 2014    Psychiatric disorder      BIPOLAR    Stool color black      irritable bowel     Past Surgical History   Procedure Laterality Date    Hx gyn  1998     hysterectomy    Hx gi  2005     prolasped rectum    Laparoscopy abdomen diagnostic  1987    Hx other surgical  1987     EXPLORATORY LAPAROSCOPY    Hx appendectomy  1988    Hx cholecystectomy  11/23/10     cholecystectomy      SOCIAL HISTORY: Social history from the initial psychiatric evaluation has been reviewed (reviewed/updated 2/5/2017) with no additional updates (I asked patient and no additional social history provided).    Social History     Social History    Marital status:      Spouse name: N/A    Number of children: N/A    Years of education: N/A     Occupational History    Not on file. Social History Main Topics    Smoking status: Never Smoker    Smokeless tobacco: Never Used    Alcohol use No    Drug use: Yes    Sexual activity: No     Other Topics Concern    Not on file     Social History Narrative      FAMILY HISTORY: Family history from the initial psychiatric evaluation has been reviewed (reviewed/updated 2/5/2017) with no additional updates (I asked patient and no additional family history provided). Family History   Problem Relation Age of Onset    Hypertension Father     Cancer Father      SKIN CA    Colon Polyps Father     Other Maternal Uncle      CHAROT-MARISELA-TOOTH    Diabetes Paternal Grandfather        REVIEW OF SYSTEMS: (reviewed/updated 2/5/2017)  Appetite:no change from normal   Sleep: improved   All other Review of Systems: Psychological ROS: positive for - anxiety  Respiratory ROS: no cough, shortness of breath, or wheezing  Cardiovascular ROS: no chest pain or dyspnea on exertion         [facility] (MSE):    MSE FINDINGS ARE WITHIN NORMAL LIMITS (WNL) UNLESS OTHERWISE STATED BELOW. ( ALL OF THE BELOW CATEGORIES OF THE MSE HAVE BEEN REVIEWED (reviewed 2/5/2017) AND UPDATED AS DEEMED APPROPRIATE )  General Presentation age appropriate, evasive   Orientation oriented to time, place and person   Vital Signs  See below (reviewed 2/5/2017); Vital Signs (BP, Pulse, & Temp) are within normal limits if not listed below.    Gait and Station Stable/steady, no ataxia   Musculoskeletal System No extrapyramidal symptoms (EPS); no abnormal muscular movements or Tardive Dyskinesia (TD); muscle strength and tone are within normal limits   Language No aphasia or dysarthria   Speech:  hyperverbal   Thought Processes logical; normal rate of thoughts; fair abstract reasoning/computation   Thought Associations goal directed   Thought Content free of delusions and free of hallucinations   Suicidal Ideations none   Homicidal Ideations none   Mood:  stable    Affect:  stable   Memory recent  fair   Memory remote:  good   Concentration/Attention:  fair   Fund of Knowledge average   Insight:  poor   Reliability fair   Judgment:  limited          VITALS:     Patient Vitals for the past 24 hrs:   Temp Pulse Resp BP SpO2   02/05/17 0737 97.5 °F (36.4 °C) 88 18 143/82 96 %   02/05/17 0300 98.4 °F (36.9 °C) 98 18 (!) 160/93 97 %   02/04/17 2000 99.2 °F (37.3 °C) 95 18 (!) 158/95 95 %   02/04/17 1625 98.2 °F (36.8 °C) 100 20 (!) 193/100 99 %     Wt Readings from Last 3 Encounters:   02/01/17 75.1 kg (165 lb 9.6 oz)   01/25/17 76.8 kg (169 lb 5 oz)   11/29/16 75.8 kg (167 lb)     Temp Readings from Last 3 Encounters:   02/05/17 97.5 °F (36.4 °C)   01/31/17 98.8 °F (37.1 °C)   11/29/16 98 °F (36.7 °C) (Oral)     BP Readings from Last 3 Encounters:   02/05/17 143/82   01/31/17 (!) 128/91   11/29/16 110/74     Pulse Readings from Last 3 Encounters:   02/05/17 88   01/31/17 (!) 55   11/29/16 87            DATA     LABORATORY DATA:(reviewed/updated 2/5/2017)  No results found for this or any previous visit (from the past 24 hour(s)). No results found for: VALF2, VALAC, VALP, VALPR, DS6, CRBAM, CRBAMP, CARB2, XCRBAM  No results found for: LI, LIH, LITHM   RADIOLOGY REPORTS:(reviewed/updated 2/5/2017)  Ct Head Wo Cont    Result Date: 1/24/2017  EXAM:  CT HEAD WO CONT INDICATION:   Altered mental status COMPARISON: None. TECHNIQUE: Unenhanced CT of the head was performed using 5 mm images. Brain and bone windows were generated. CT dose reduction was achieved through use of a standardized protocol tailored for this examination and automatic exposure control for dose modulation. FINDINGS: The ventricles and sulci are normal in size, shape and configuration and midline. There is no significant white matter disease.  There is no intracranial hemorrhage, extra-axial collection, mass, mass effect or midline shift.  The basilar cisterns are open. No acute infarct is identified. The bone windows demonstrate no abnormalities. The visualized portions of the paranasal sinuses and mastoid air cells are clear. IMPRESSION: No acute abnormality identified. Xr Chest Port    Result Date: 1/24/2017  Indication: Altered mental status, not eating or drinking for 3 days Comparison: 3/19/2015 Portable exam of the chest obtained at 1012 demonstrates normal heart size. There is no acute process in the lung fields. The osseous structures are unremarkable. Impression: No acute process.           MEDICATIONS     ALL MEDICATIONS:   Current Facility-Administered Medications   Medication Dose Route Frequency    hydrOXYzine HCl (ATARAX) tablet 10 mg  10 mg Oral A0D PRN    folic acid (FOLVITE) tablet 1 mg  1 mg Oral DAILY    thiamine (B-1) tablet 100 mg  100 mg Oral DAILY    LORazepam (ATIVAN) tablet 1 mg  1 mg Oral Q1H PRN    LORazepam (ATIVAN) tablet 2 mg  2 mg Oral Q1H PRN    fluorometholone (FML) 0.1 % ophthalmic suspension 1 Drop  1 Drop Both Eyes BID    fluticasone (FLONASE) 50 mcg/actuation nasal spray 1 Spray  1 Spray Both Nostrils BID    OLANZapine (ZyPREXA) tablet 2.5 mg  2.5 mg Oral Q6H PRN    ziprasidone (GEODON) 10 mg in sterile water (preservative free) 0.5 mL injection  10 mg IntraMUSCular BID PRN    benztropine (COGENTIN) tablet 1 mg  1 mg Oral BID PRN    benztropine (COGENTIN) injection 1 mg  1 mg IntraMUSCular BID PRN    LORazepam (ATIVAN) injection 0.5 mg  0.5 mg IntraMUSCular Q4H PRN    zolpidem (AMBIEN) tablet 5 mg  5 mg Oral QHS PRN    acetaminophen (TYLENOL) tablet 650 mg  650 mg Oral Q4H PRN    ibuprofen (MOTRIN) tablet 400 mg  400 mg Oral Q8H PRN    magnesium hydroxide (MILK OF MAGNESIA) 400 mg/5 mL oral suspension 30 mL  30 mL Oral DAILY PRN    nicotine (NICODERM CQ) 21 mg/24 hr patch 1 Patch  1 Patch TransDERmal DAILY PRN    amLODIPine (NORVASC) tablet 5 mg  5 mg Oral DAILY    senna-docusate (PERICOLACE) 8.6-50 mg per tablet 1 Tab  1 Tab Oral QHS    vitamin E acetate capsule 400 Units  400 Units Oral DAILY    famotidine (PEPCID) tablet 20 mg  20 mg Oral BID    fenofibrate nanocrystallized (TRICOR) tablet 145 mg  145 mg Oral Q48H      SCHEDULED MEDICATIONS:   Current Facility-Administered Medications   Medication Dose Route Frequency    folic acid (FOLVITE) tablet 1 mg  1 mg Oral DAILY    thiamine (B-1) tablet 100 mg  100 mg Oral DAILY    fluorometholone (FML) 0.1 % ophthalmic suspension 1 Drop  1 Drop Both Eyes BID    fluticasone (FLONASE) 50 mcg/actuation nasal spray 1 Spray  1 Spray Both Nostrils BID    amLODIPine (NORVASC) tablet 5 mg  5 mg Oral DAILY    senna-docusate (PERICOLACE) 8.6-50 mg per tablet 1 Tab  1 Tab Oral QHS    vitamin E acetate capsule 400 Units  400 Units Oral DAILY    famotidine (PEPCID) tablet 20 mg  20 mg Oral BID    fenofibrate nanocrystallized (TRICOR) tablet 145 mg  145 mg Oral Q48H          ASSESSMENT & PLAN     DIAGNOSES REQUIRING ACTIVE TREATMENT AND MONITORING: (reviewed/updated 2/5/2017)  Patient Active Hospital Problem List:   [facility] (1/31/2017)    Assessment: vitor alternating with depression    Plan: continue current medications- Discontinue opiates and benzodiazepines             n summary, Nicolette Riley, is a 60 y.o.  female who presents with a severe exacerbation of the principal diagnosis of <principal problem not specified>  Patient's condition is improving. Patient requires continued inpatient hospitalization for further stabilization, safety monitoring and medication management. I will continue to coordinate the provision of individual, milieu, occupational, group, and substance abuse therapies to address target symptoms/diagnoses as deemed appropriate for the individual patient.   A coordinated, multidisplinary treatment team round was conducted with the patient (this team consists of the nurse, psychiatric unit pharmcist,  and writer). Complete current electronic health record for patient has been reviewed today including consultant notes, ancillary staff notes, nurses and psychiatric tech notes. Suicide risk assessment completed and patient deemed to be of low risk for suicide at this time. The following regarding medications was addressed during rounds with patient:   the risks and benefits of the proposed medication. The patient was given the opportunity to ask questions. Informed consent given to the use of the above medications. Will continue to adjust psychiatric and non-psychiatric medications (see above \"medication\" section and orders section for details) as deemed appropriate & based upon diagnoses and response to treatment. I will continue to order blood tests/labs and diagnostic tests as deemed appropriate and review results as they become available (see orders for details and above listed lab/test results). I will order psychiatric records from previous [facility] hospitals to further elucidate the nature of patient's psychopathology and review once available. I will gather additional collateral information from friends, family and o/p treatment team to further elucidate the nature of patient's psychopathology and baselline level of psychiatric functioning. I certify that this patient's inpatient psychiatric hospital services furnished since the previous certification were, and continue to be, required for treatment that could reasonably be expected to improve the patient's condition, or for diagnostic study, and that the patient continues to need, on a daily basis, active treatment furnished directly by or requiring the supervision of inpatient psychiatric facility personnel. In addition, the hospital records show that services furnished were intensive treatment services, admission or related services, or equivalent services.     EXPECTED DISCHARGE DATE/DAY: TBD DISPOSITION: Home       Signed By:   Lindsey Dan MD  2/5/2017

## 2017-02-05 NOTE — INTERDISCIPLINARY ROUNDS
Behavioral Health Interdisciplinary Rounds     Patient Name: Quintin Clark  Age: 60 y.o. Room/Bed:  742/  Primary Diagnosis: <principal problem not specified>   Admission Status: Voluntary     Readmission within 30 days: no  Power of  in place: no  Patient requires a blocked bed: no          Reason for blocked bed: n/a    VTE Prophylaxis: Not indicated  Influenza vaccine screen completed: yes Influenza vaccine given: yes - PTA  Mobility needs/Fall risk: no    Nutritional Plan: no  Consults: no         Labs/Testing due today?: no    Sleep hours: 4      Participation in Care/Groups:  yes  Medication Compliant?: Yes  PRNS (last 24 hours):  Antianxiety and Sleep Aid    Restraints (last 24 hours):  no  Substance Abuse:  yes CIWA (range last 24 hours): 0-5  COWS (range last 24 hours):   Alcohol screening (AUDIT) completed -  AUDIT Score: 0  If applicable, date SBIRT discussed in treatment team AND documented:   Tobacco - patient is a smoker: no   Date tobacco education completed by RN: n/a  24 hour chart check complete: yes     Patient goal(s) for today:   Treatment team focus/goals:   LOS:  5  Expected LOS:   Financial concerns/prescription coverage:   Date of last family contact:       Family requesting physician contact today:    Discharge plan:        Outpatient provider(s):     Participating treatment team members: Quintin Clark

## 2017-02-05 NOTE — PROGRESS NOTES
Problem: Falls - Risk of  Goal: *Absence of falls  Outcome: Progressing Towards Goal  2330 Pt appears asleep in bed. Respirations even and unlabored. Bed alarm on, bathroom light on, call bell within reach. Will continue to monitor with Q 15 safety checks. 0300 PRN Medication Documentation    Specific patient behavior that led to need for PRN medication: anxiety, rapid speech  Staff interventions attempted prior to PRN being given: dark, calm environment, encourage pt to deep breathe  PRN medication given: Atarax 10 mg  Patient response/effectiveness of PRN medication: 0340 Pt appears asleep. Respirations even and unlabored.

## 2017-02-05 NOTE — BH NOTES
1530:  Patient received as she is resting in bed reading. She greets oncoming staff cordially and states that she can feel her vitor rising. She also reports that her sister will be picking her up at noon tomorrow although there is no discharge order. She voices no additional complaints or concerns at this time. Will maintain q 15 minute safety checks.

## 2017-02-06 VITALS
OXYGEN SATURATION: 96 % | WEIGHT: 161.6 LBS | TEMPERATURE: 98.3 F | SYSTOLIC BLOOD PRESSURE: 165 MMHG | RESPIRATION RATE: 18 BRPM | BODY MASS INDEX: 31.73 KG/M2 | HEIGHT: 60 IN | DIASTOLIC BLOOD PRESSURE: 113 MMHG | HEART RATE: 90 BPM

## 2017-02-06 PROCEDURE — 74011250637 HC RX REV CODE- 250/637

## 2017-02-06 PROCEDURE — 74011250637 HC RX REV CODE- 250/637: Performed by: PSYCHIATRY & NEUROLOGY

## 2017-02-06 PROCEDURE — 74011250637 HC RX REV CODE- 250/637: Performed by: HOSPITALIST

## 2017-02-06 RX ORDER — HYDROXYZINE HYDROCHLORIDE 10 MG/1
10 TABLET, FILM COATED ORAL
Qty: 15 TAB | Refills: 1 | Status: SHIPPED | OUTPATIENT
Start: 2017-02-06 | End: 2017-02-14 | Stop reason: SDUPTHER

## 2017-02-06 RX ADMIN — Medication 100 MG: at 08:12

## 2017-02-06 RX ADMIN — HYDROXYZINE HYDROCHLORIDE 10 MG: 10 TABLET, FILM COATED ORAL at 03:42

## 2017-02-06 RX ADMIN — FLUTICASONE PROPIONATE 1 SPRAY: 50 SPRAY, METERED NASAL at 08:16

## 2017-02-06 RX ADMIN — AMLODIPINE BESYLATE 5 MG: 5 TABLET ORAL at 08:12

## 2017-02-06 RX ADMIN — FLUOROMETHOLONE 1 DROP: 1 SOLUTION/ DROPS OPHTHALMIC at 08:16

## 2017-02-06 RX ADMIN — FAMOTIDINE 20 MG: 20 TABLET ORAL at 08:12

## 2017-02-06 RX ADMIN — FOLIC ACID 1 MG: 1 TABLET ORAL at 08:12

## 2017-02-06 RX ADMIN — VITAMIN E CAP 400 UNIT 400 UNITS: 400 CAP at 08:15

## 2017-02-06 RX ADMIN — ACETAMINOPHEN 650 MG: 325 TABLET, FILM COATED ORAL at 02:18

## 2017-02-06 NOTE — BH NOTES
Behavioral Health Transition Record to Provider    Patient Name: Arlene Oates  YOB: 1960  Medical Record Number: [id number]  Date of Admission: 1/31/2017  Date of Discharge: 2/6/2017    Attending Provider: Deven Osman MD  Discharging Provider: Deven Osman MD  To contact this individual call [phone] and ask the  to page. If unavailable, ask to be transferred to [facility] Provider on call. Primary Care Provider: Donald Beebe MD    Allergies: Allergies   Allergen Reactions    Dicyclomine Nausea and Vomiting     Extreme stomach pains    Lithium Nausea and Vomiting     Severe nausea and vomiting.  Other Medication Other (comments)     Intolerance to oranges, cabbage,beans,peppers,raw onions,foods with caffeine in them, popcorn, no pop sodas, because of IBS       Reason for admission:   PSYCHIATRIC PROGRESS NOTE            Patient Name  Arlene Oates   Date of Birth 1960   [facility] [id number]   Medical Record Number  [id number]    Age  60 y.o. PCP Donald Beebe MD   Admit date:  1/31/2017    Room Number  742/02 @ [facility]   Date of Service  2/2/2017            PSYCHOTHERAPY SESSION NOTE:  Length of psychotherapy session: 45 minutes     Main condition/diagnosis/issues treated during session today, 2/2/2017 : benzodiazepine inteolerance, alternative anxity medications, independent living, coping skills     I employed Cognitive Behavioral therapy techniques, Reality-Oriented psychotherapy, as well as supportive psychotherapy in regards to various ongoing psychosocial stressors, including the following: pre-admission and current problems; housing issues; medical issues; and stress of hospitalization. Interpersonal relationship issues and psychodynamic conflicts explored. Attempts made to alleviate maladaptive patterns.  We, also, worked on issues of denial & effects of substance dependency/use      Overall, patient is not progressing     Treatment Plan Update (reviewed an updated 2/2/2017) : I will modify psychotherapy tx plan by implementing more stress management strategies, building upon cognitive behavioral techniques, increasing coping skills, as well as shoring up psychological defenses).     An extended energy and skill set was needed to engage pt in psychotherapy due to some of the following: resistiveness, complexity, negativity, confrontational nature, hostile behaviors, and/or severe abnormalities in thought processes/psychosis resulting in the loss of expressive/receptive language communication skills.                                     E & M PROGRESS NOTE:         HISTORY       CC: \"delirium\"  HISTORY OF PRESENT ILLNESS/INTERVAL HISTORY:  (reviewed/updated 2/2/2017). per initial evaluation:      Isabel Lundberg presents/reports/evidences the following emotional symptoms today, 2/2/2017:delirium. The above symptoms have been present for 2 days . These symptoms are of severe severity. The symptoms are intermittent/ fleeting in nature. Additional symptomatology and features include agitation. SIDE EFFECTS: (reviewed/updated 2/2/2017)  None reported or admitted to. No noted toxicity with use of Depakote/Tegretol/lithium/Clozaril/TCAs   ALLERGIES:(reviewed/updated 2/2/2017)        Allergies   Allergen Reactions    Dicyclomine Nausea and Vomiting       Extreme stomach pains    Lithium Nausea and Vomiting       Severe nausea and vomiting.  Other Medication Other (comments)       Intolerance to oranges, cabbage,beans,peppers,raw onions,foods with caffeine in them, popcorn, no pop sodas, because of IBS       MEDICATIONS PRIOR TO ADMISSION:(reviewed/updated 2/2/2017)       Prescriptions Prior to Admission   Medication Sig    LORazepam (ATIVAN) 2 mg tablet Take 2 mg by mouth three (3) times daily.  QUEtiapine (SEROQUEL) 300 mg tablet Take 300 mg by mouth daily.  vilazodone (VIIBRYD) 10 mg tab tablet Take 20 mg by mouth daily.     gabapentin (NEURONTIN) 300 mg capsule Take 300 mg by mouth three (3) times daily as needed (Headache).  amLODIPine (NORVASC) 5 mg tablet Take 1 Tab by mouth daily for 30 days.  senna-docusate (PERICOLACE) 8.6-50 mg per tablet Take 1 Tab by mouth nightly for 30 days.  vitamin E (AQUA GEMS) 400 unit capsule Take 400 Units by mouth daily.  famotidine (PEPCID) 20 mg tablet Take 1 Tab by mouth two (2) times a day. D/C omeprazole    L.acidoph&parac-S.therm-Bifido (THEO-Q,2,) 16 billion cell cap 1 cap po QD    fluticasone (FLONASE) 50 mcg/actuation nasal spray USE ONE SPRAY IN EACH NOSTRIL TWO TIMES A DAY.  fenofibrate nanocrystallized (TRICOR) 145 mg tablet Take 1 Tab by mouth every fourty-eight (48) hours.  diphenhydrAMINE (BENADRYL) 50 mg capsule Take 50 mg by mouth nightly. Indications: INSOMNIA    aspirin-acetaminophen-caffeine 250-250-65 mg per tablet Take 1 Tab by mouth every six (6) hours as needed for Pain.  fluorometholone (FML) 0.1 % ophthalmic suspension Administer 1 Drop to both eyes two (2) times a day.  Cholecalciferol, Vitamin D3, (VITAMIN D3) 1,000 unit cap Take 1 Tab by mouth daily. Indications: VITAMIN D DEFICIENCY             PAST MEDICAL HISTORY: Past medical history from the initial psychiatric evaluation has been reviewed (reviewed/updated 2/2/2017) with no additional updates (I asked patient and no additional past medical history provided).  Past Medical History   Diagnosis Date    Arthritis      Bipolar disorder (HCC)      Bladder pain      Choledocholithiasis 11/24/2010    GERD (gastroesophageal reflux disease)      Headache(784.0)         tension headaches    Irritable bowel      Pelvic pain in female 2014    Psychiatric disorder         BIPOLAR    Stool color black         irritable bowel             Past Surgical History   Procedure Laterality Date    Hx gyn   1998       hysterectomy    Hx gi   2005       prolasped rectum    Laparoscopy abdomen diagnostic   1987    Hx other surgical   1987       EXPLORATORY LAPAROSCOPY    Hx appendectomy   1988    Hx cholecystectomy   11/23/10       cholecystectomy       SOCIAL HISTORY: Social history from the initial psychiatric evaluation has been reviewed (reviewed/updated 2/2/2017) with no additional updates (I asked patient and no additional social history provided). Social History            Social History    Marital status:        Spouse name: N/A    Number of children: N/A    Years of education: N/A          Occupational History    Not on file.           Social History Main Topics    Smoking status: Never Smoker    Smokeless tobacco: Never Used    Alcohol use No    Drug use: Yes    Sexual activity: No           Other Topics Concern    Not on file      Social History Narrative       FAMILY HISTORY: Family history from the initial psychiatric evaluation has been reviewed (reviewed/updated 2/2/2017) with no additional updates (I asked patient and no additional family history provided). Family History   Problem Relation Age of Onset    Hypertension Father      Cancer Father         SKIN CA    Colon Polyps Father      Other Maternal Uncle         CHAROT-MARISELA-TOOTH    Diabetes Paternal Grandfather         REVIEW OF SYSTEMS: (reviewed/updated 2/2/2017)  Appetite:no change from normal   Sleep: improved   All other Review of Systems: Psychological ROS: positive for - anxiety  Respiratory ROS: no cough, shortness of breath, or wheezing  Cardiovascular ROS: no chest pain or dyspnea on exertion            MENTAL STATUS EXAM & VITALS      MENTAL STATUS EXAM (MSE):    MSE FINDINGS ARE WITHIN NORMAL LIMITS (WNL) UNLESS OTHERWISE STATED BELOW. ( ALL OF THE BELOW CATEGORIES OF THE MSE HAVE BEEN REVIEWED (reviewed 2/2/2017) AND UPDATED AS DEEMED APPROPRIATE )  General Presentation age appropriate, evasive   Orientation oriented to time, place and person   Vital Signs  See below (reviewed 2/2/2017);  Vital Signs (BP, Pulse, & Temp) are within normal limits if not listed below. Gait and Station Stable/steady, no ataxia   Musculoskeletal System No extrapyramidal symptoms (EPS); no abnormal muscular movements or Tardive Dyskinesia (TD); muscle strength and tone are within normal limits   Language No aphasia or dysarthria   Speech:  hyperverbal   Thought Processes logical; normal rate of thoughts; fair abstract reasoning/computation   Thought Associations goal directed   Thought Content free of delusions and free of hallucinations   Suicidal Ideations none   Homicidal Ideations none   Mood:  stable    Affect:  stable   Memory recent  fair   Memory remote:  good   Concentration/Attention:  fair   Fund of Knowledge average   Insight:  poor   Reliability fair   Judgment:  limited            VITALS:   Patient Vitals for the past 24 hrs:    Temp Pulse Resp BP SpO2   02/02/17 0916 98.9 °F (37.2 °C) (!) 108 20 196/71 98 %   02/01/17 2027 98.2 °F (36.8 °C) 96 18 161/84 95 %   02/01/17 1640 98.3 °F (36.8 °C) 100 18 152/88 96 %          Wt Readings from Last 3 Encounters:   02/01/17 75.1 kg (165 lb 9.6 oz)   01/25/17 76.8 kg (169 lb 5 oz)   11/29/16 75.8 kg (167 lb)          Temp Readings from Last 3 Encounters:   02/02/17 98.9 °F (37.2 °C)   01/31/17 98.8 °F (37.1 °C)   11/29/16 98 °F (36.7 °C) (Oral)          BP Readings from Last 3 Encounters:   02/02/17 196/71   01/31/17 (!) 128/91   11/29/16 110/74          Pulse Readings from Last 3 Encounters:   02/02/17 (!) 108   01/31/17 (!) 55   11/29/16 87                DATA      LABORATORY DATA:(reviewed/updated 2/2/2017)   Recent Results    No results found for this or any previous visit (from the past 24 hour(s)). No results found for: VALF2, VALAC, VALP, VALPR, DS6, CRBAM, CRBAMP, CARB2, XCRBAM  No results found for: LI, LIH, MARTAM   RADIOLOGY REPORTS:(reviewed/updated 2/2/2017)  Ct Head Wo Cont     Result Date: 1/24/2017  EXAM: CT HEAD WO CONT INDICATION: Altered mental status COMPARISON: None. TECHNIQUE: Unenhanced CT of the head was performed using 5 mm images. Brain and bone windows were generated. CT dose reduction was achieved through use of a standardized protocol tailored for this examination and automatic exposure control for dose modulation. FINDINGS: The ventricles and sulci are normal in size, shape and configuration and midline. There is no significant white matter disease. There is no intracranial hemorrhage, extra-axial collection, mass, mass effect or midline shift. The basilar cisterns are open. No acute infarct is identified. The bone windows demonstrate no abnormalities. The visualized portions of the paranasal sinuses and mastoid air cells are clear.      IMPRESSION: No acute abnormality identified.      Xr Chest Port     Result Date: 1/24/2017  Indication: Altered mental status, not eating or drinking for 3 days Comparison: 3/19/2015 Portable exam of the chest obtained at 1012 demonstrates normal heart size. There is no acute process in the lung fields.  The osseous structures are unremarkable.      Impression: No acute process.          MEDICATIONS      ALL MEDICATIONS:          Current Facility-Administered Medications   Medication Dose Route Frequency    folic acid (FOLVITE) tablet 1 mg 1 mg Oral DAILY    thiamine (B-1) tablet 100 mg 100 mg Oral DAILY    LORazepam (ATIVAN) tablet 1 mg 1 mg Oral Q1H PRN    LORazepam (ATIVAN) tablet 2 mg 2 mg Oral Q1H PRN    fluorometholone (FML) 0.1 % ophthalmic suspension 1 Drop 1 Drop Both Eyes BID    fluticasone (FLONASE) 50 mcg/actuation nasal spray 1 Spray 1 Spray Both Nostrils BID    busPIRone (BUSPAR) tablet 5 mg 5 mg Oral TID    OLANZapine (ZyPREXA) tablet 2.5 mg 2.5 mg Oral Q6H PRN    ziprasidone (GEODON) 10 mg in sterile water (preservative free) 0.5 mL injection 10 mg IntraMUSCular BID PRN    benztropine (COGENTIN) tablet 1 mg 1 mg Oral BID PRN    benztropine (COGENTIN) injection 1 mg 1 mg IntraMUSCular BID PRN    LORazepam (ATIVAN) injection 0.5 mg 0.5 mg IntraMUSCular Q4H PRN    zolpidem (AMBIEN) tablet 5 mg 5 mg Oral QHS PRN    acetaminophen (TYLENOL) tablet 650 mg 650 mg Oral Q4H PRN    ibuprofen (MOTRIN) tablet 400 mg 400 mg Oral Q8H PRN    magnesium hydroxide (MILK OF MAGNESIA) 400 mg/5 mL oral suspension 30 mL 30 mL Oral DAILY PRN    nicotine (NICODERM CQ) 21 mg/24 hr patch 1 Patch 1 Patch TransDERmal DAILY PRN    amLODIPine (NORVASC) tablet 5 mg 5 mg Oral DAILY    senna-docusate (PERICOLACE) 8.6-50 mg per tablet 1 Tab 1 Tab Oral QHS    vitamin E acetate capsule 400 Units 400 Units Oral DAILY    famotidine (PEPCID) tablet 20 mg 20 mg Oral BID    fenofibrate nanocrystallized (TRICOR) tablet 145 mg 145 mg Oral Q48H       SCHEDULED MEDICATIONS:          Current Facility-Administered Medications   Medication Dose Route Frequency    folic acid (FOLVITE) tablet 1 mg 1 mg Oral DAILY    thiamine (B-1) tablet 100 mg 100 mg Oral DAILY    fluorometholone (FML) 0.1 % ophthalmic suspension 1 Drop 1 Drop Both Eyes BID    fluticasone (FLONASE) 50 mcg/actuation nasal spray 1 Spray 1 Spray Both Nostrils BID    busPIRone (BUSPAR) tablet 5 mg 5 mg Oral TID    amLODIPine (NORVASC) tablet 5 mg 5 mg Oral DAILY    senna-docusate (PERICOLACE) 8.6-50 mg per tablet 1 Tab 1 Tab Oral QHS    vitamin E acetate capsule 400 Units 400 Units Oral DAILY    famotidine (PEPCID) tablet 20 mg 20 mg Oral BID    fenofibrate nanocrystallized (TRICOR) tablet 145 mg 145 mg Oral Q48H             ASSESSMENT & PLAN      DIAGNOSES REQUIRING ACTIVE TREATMENT AND MONITORING: (reviewed/updated 2/2/2017)  Patient Active Hospital Problem List:  Bipolar 1 disorder ([facility] (1/31/2017)  Assessment: vitor alternating with depression  Plan: continue current medications- Discontinue opiates and benzodiazepines                n summary, Uma Mcintyre, is a 60 y.o. female who presents with a severe exacerbation of the principal diagnosis of <principal problem not specified>  Patient's condition is improving. Patient requires continued inpatient hospitalization for further stabilization, safety monitoring and medication management. I will continue to coordinate the provision of individual, milieu, occupational, group, and substance abuse therapies to address target symptoms/diagnoses as deemed appropriate for the individual patient. A coordinated, multidisplinary treatment team round was conducted with the patient (this team consists of the nurse, psychiatric unit pharmcist,  and writer).    Complete current electronic health record for patient has been reviewed today including consultant notes, ancillary staff notes, nurses and psychiatric tech notes.     Suicide risk assessment completed and patient deemed to be of low risk for suicide at this time.      The following regarding medications was addressed during rounds with patient:   the risks and benefits of the proposed medication. The patient was given the opportunity to ask questions. Informed consent given to the use of the above medications. Will continue to adjust psychiatric and non-psychiatric medications (see above \"medication\" section and orders section for details) as deemed appropriate & based upon diagnoses and response to treatment.      I will continue to order blood tests/labs and diagnostic tests as deemed appropriate and review results as they become available (see orders for details and above listed lab/test results).      I will order psychiatric records from previous [facility] hospitals to further elucidate the nature of patient's psychopathology and review once available.     I will gather additional collateral information from friends, family and o/p treatment team to further elucidate the nature of patient's psychopathology and baselline level of psychiatric functioning.         I certify that this patient's inpatient psychiatric hospital services furnished since the previous certification were, and continue to be, required for treatment that could reasonably be expected to improve the patient's condition, or for diagnostic study, and that the patient continues to need, on a daily basis, active treatment furnished directly by or requiring the supervision of inpatient psychiatric facility personnel. In addition, the hospital records show that services furnished were intensive treatment services, admission or related services, or equivalent services.     EXPECTED DISCHARGE DATE/DAY: TBD      DISPOSITION: Home         Signed By:   Gee Hidalgo MD  2017    Admission Diagnosis: bipolar  Bipolar 1 disorder ([facility]    Procedures: * No surgery found *    Lab Results: No results found for this or any previous visit (from the past 72 hour(s)). Immunizations administered during this encounter:   Immunization History   Administered Date(s) Administered    Influenza Vaccine 10/13/2014       Discharge Diagnosis/Plan:   DISCHARGE SUMMARY    NAME:Adeline Graff  : 1960  MRN: [id number]    The patient Mustapha Manzano exhibits the ability to control behavior in a less restrictive environment. Patient's level of functioning is improving. No assaultive/destructive behavior has been observed for the past 24 hours. No suicidal/homicidal threat or behavior has been observed for the past 24 hours. There is no evidence of serious medication side effects. Patient has not been in physical or protective restraints for at least the past 24 hours. If weapons involved, how are they secured? N/A    Is patient aware of and in agreement with discharge plan? Yes     Arrangements for medication:  Prescriptions given to patient. Copy of discharge instructions to  provider?: Fax to Dr Enzo Thomson @ [phone]                                                                             Arrangements for transportation home:  Sister agreed to provide transportation.     Mental health crisis number:  332 or your local mental health crisis line number at [phone]    Discharge Medication List and Instructions:   Discharge Medication List as of 2/6/2017 11:38 AM      START taking these medications    Details   hydrOXYzine HCl (ATARAX) 10 mg tablet Take 1 Tab by mouth every four (4) hours as needed (Anxiety) for up to 10 days. Indications: ANXIETY, Print, Disp-15 Tab, R-1      busPIRone (BUSPAR) 5 mg tablet Take 1 Tab by mouth three (3) times daily. Indications: GENERALIZED ANXIETY DISORDER, Print, Disp-90 Tab, R-0         CONTINUE these medications which have NOT CHANGED    Details   vilazodone (VIIBRYD) 10 mg tab tablet Take 20 mg by mouth daily. , Historical Med      amLODIPine (NORVASC) 5 mg tablet Take 1 Tab by mouth daily for 30 days. , Print, Disp-30 Tab, R-0      senna-docusate (PERICOLACE) 8.6-50 mg per tablet Take 1 Tab by mouth nightly for 30 days. , Print, Disp-30 Tab, R-0      vitamin E (AQUA GEMS) 400 unit capsule Take 400 Units by mouth daily. , Historical Med      famotidine (PEPCID) 20 mg tablet Take 1 Tab by mouth two (2) times a day. D/C omeprazole, Normal, Disp-60 Tab, R-3      fluticasone (FLONASE) 50 mcg/actuation nasal spray USE ONE SPRAY IN EACH NOSTRIL TWO TIMES A DAY., Normal, Disp-16 g, R-1      fenofibrate nanocrystallized (TRICOR) 145 mg tablet Take 1 Tab by mouth every fourty-eight (48) hours. , No Print, Disp-30 Tab, R-5      aspirin-acetaminophen-caffeine 250-250-65 mg per tablet Take 1 Tab by mouth every six (6) hours as needed for Pain., Historical Med      fluorometholone (FML) 0.1 % ophthalmic suspension Administer 1 Drop to both eyes two (2) times a day., Historical Med, R-99      Cholecalciferol, Vitamin D3, (VITAMIN D3) 1,000 unit cap Take 1 Tab by mouth daily.  Indications: VITAMIN D DEFICIENCY, Historical Med         STOP taking these medications       LORazepam (ATIVAN) 2 mg tablet Comments:   Reason for Stopping:         QUEtiapine (SEROQUEL) 300 mg tablet Comments:   Reason for Stopping: gabapentin (NEURONTIN) 300 mg capsule Comments:   Reason for Stopping:         L.acidoph&parac-S.therm-Bifido (THEO-Q,2,) 16 billion cell cap Comments:   Reason for Stopping:         diphenhydrAMINE (BENADRYL) 50 mg capsule Comments:   Reason for Stopping:               Pending Test Results: None  To obtain results of studies pending at discharge, please contact N/A    Follow-Up Appointments: Follow-up Information     Follow up With Details Comments Contact Info    Dr Danuta Martin On 2/8/2017 Time of Your Appt:  2:00 PM [address] On 2/6/2017 Staff will contact pt on Monday @ her home schedule appts for in home services [address] [place] [address] Helen Hammond MD   [address]  [address] [facility]  [phone]            Advanced Care Plan: Patient declined.       Janneth Addison

## 2017-02-06 NOTE — DISCHARGE INSTRUCTIONS
DISCHARGE SUMMARY    NAME:Adeline Anderson  : 1960  MRN: [id number]    The patient Blank Townsend exhibits the ability to control behavior in a less restrictive environment. Patient's level of functioning is improving. No assaultive/destructive behavior has been observed for the past 24 hours. No suicidal/homicidal threat or behavior has been observed for the past 24 hours. There is no evidence of serious medication side effects. Patient has not been in physical or protective restraints for at least the past 24 hours. If weapons involved, how are they secured? N/A    Is patient aware of and in agreement with discharge plan? Yes     Arrangements for medication:  Prescriptions given to patient. Copy of discharge instructions to  provider?: Fax to Dr Clara Tierney @ [phone]                                                                             Arrangements for transportation home:  Sister agreed to provide transportation. Mental health crisis number:  [phone] or your local mental health crisis line number at [address] from Nurse    The following personal items are in your possession at time of discharge:    Dental Appliances: None  Visual Aid: Glasses     Home Medications: None  Jewelry: Watch  Clothing: None  Other Valuables: None  Personal Items Sent to Safe: none          PATIENT INSTRUCTIONS:      What to do at Home:  Recommended activity: Activity as tolerated,     If you experience any of the following symptoms thoughts of harming self, racing thoughts and feeling overwhelmed with anxiety and hopelessness, please follow up with Dr. Clara Tierney. If you can not reach him or his office and symptoms continue immediately call your local crisis number at [phone]. *  Please give a list of your current medications to your Primary Care Provider.     *  Please update this list whenever your medications are discontinued, doses are      changed, or new medications (including over-the-counter products) are added. *  Please carry medication information at all times in case of emergency situations. These are general instructions for a healthy lifestyle:    No smoking/ No tobacco products/ Avoid exposure to second hand smoke    Surgeon General's Warning:  Quitting smoking now greatly reduces serious risk to your health. Obesity, smoking, and sedentary lifestyle greatly increases your risk for illness    A healthy diet, regular physical exercise & weight monitoring are important for maintaining a healthy lifestyle    You may be retaining fluid if you have a history of heart failure or if you experience any of the following symptoms:  Weight gain of 3 pounds or more overnight or 5 pounds in a week, increased swelling in our hands or feet or shortness of breath while lying flat in bed. Please call your doctor as soon as you notice any of these symptoms; do not wait until your next office visit. Recognize signs and symptoms of STROKE:    F-face looks uneven    A-arms unable to move or move unevenly    S-speech slurred or non-existent    T-time-call 911 as soon as signs and symptoms begin-DO NOT go       Back to bed or wait to see if you get better-TIME IS BRAIN. Warning Signs of HEART ATTACK     Call 911 if you have these symptoms:   Chest discomfort. Most heart attacks involve discomfort in the center of the chest that lasts more than a few minutes, or that goes away and comes back. It can feel like uncomfortable pressure, squeezing, fullness, or pain.  Discomfort in other areas of the upper body. Symptoms can include pain or discomfort in one or both arms, the back, neck, jaw, or stomach.  Shortness of breath with or without chest discomfort.  Other signs may include breaking out in a cold sweat, nausea, or lightheadedness. Don't wait more than five minutes to call 911 - MINUTES MATTER! Fast action can save your life.  Calling 911 is almost always the fastest way to get lifesaving treatment. Emergency Medical Services staff can begin treatment when they arrive -- up to an hour sooner than if someone gets to the hospital by car. The discharge information has been reviewed with the patient. The patient verbalized understanding. Discharge medications reviewed with the patient and appropriate educational materials and side effects teaching were provided.

## 2017-02-06 NOTE — BH NOTES
GROUP THERAPY PROGRESS NOTE    Cornell Ash participated in a Morning Process Group on the [facility], with a focus identifying feelings and planning for the day. Group time: 55 minutes. Personal goal for participation: To increase the capacity to identify ones mood and plan for the day. Goal orientation: The patient will be able to identify their feelings and develop a plan for structuring their day. Group therapy participation: When prompted, this patient partially participated in the group. Therapeutic interventions reviewed and discussed: The group members were asked to identify an emotion they are having or let the group know what they want to focus on for the day. Impression of participation: The patient joined the group about half way through and said she was feeling, \"Calmer today. \" She added that she slept 6.5 hours last night and this seemed to please her. She added that she planned to see Dr. Tatyana Mcrae on Wednesday and that she was focused on a possible discharge today. She appeared fully oriented and without any gross psychosis. She also expressed no SI/HI. She was in an euphoric mood and had a pleasant disposition.

## 2017-02-06 NOTE — PROGRESS NOTES
Problem: Manic Behavior (Adult/Pediatric)  Goal: *STG: Participates in treatment plan  Outcome: Progressing Towards Goal  Review meds, up in room writing in journal. Thoughts organized, logical and goal directed. Mood and affect bright and engaged. Reports goal for day is to d/c home. Demonstrates manipulative behaviors such as splitting staff. Staff focus is on encouraging independence, limit setting and d/c planning. 1212 pt signed d/c instructions verbalized understanding of follow up appoitments and medications. Awaiting sisters  for home.

## 2017-02-06 NOTE — INTERDISCIPLINARY ROUNDS
Behavioral Health Interdisciplinary Rounds     Patient Name: Terrence Rust  Age: 60 y.o. Room/Bed:  742/  Primary Diagnosis: <principal problem not specified>   Admission Status: Voluntary     Readmission within 30 days: no  Power of  in place: no  Patient requires a blocked bed: no          Reason for blocked bed: n/a    VTE Prophylaxis: Not indicated  Influenza vaccine screen completed: yes Influenza vaccine given: yes - PTA  Mobility needs/Fall risk: no    Nutritional Plan: no  Consults: no         Labs/Testing due today?: no    Sleep hours: 6        Participation in Care/Groups:  yes  Medication Compliant?: Yes  PRNS (last 24 hours):  Antianxiety and Sleep Aid, Pain    Restraints (last 24 hours):  no  Substance Abuse:  yes  CIWA (range last 24 hours): 0-3 COWS (range last 24 hours):   Alcohol screening (AUDIT) completed -  AUDIT Score: 0  If applicable, date SBIRT discussed in treatment team AND documented:   Tobacco - patient is a smoker: no   Date tobacco education completed by RN: n/a  24 hour chart check complete: yes     Patient goal(s) for today:   Treatment team focus/goals:   LOS:  6  Expected LOS:   Financial concerns/prescription coverage:    Date of last family contact:       Family requesting physician contact today:    Discharge plan:        Outpatient provider(s):     Participating treatment team members: Terrence Rust

## 2017-02-06 NOTE — BH NOTES
PRN Medication Documentation    Specific patient behavior that led to need for PRN medication: anxiety  Staff interventions attempted prior to PRN being given: coping skills  PRN medication given: atarax  Patient response/effectiveness of PRN medication: good

## 2017-02-06 NOTE — PROGRESS NOTES
Problem: Falls - Risk of  Goal: *Absence of falls  Outcome: Progressing Towards Goal  Patient is ambulating safely and steadily on the unit while using her walker appropriately. She has been free from falls/injury throughout this shift. Problem: Manic Behavior (Adult/Pediatric)  Goal: *STG: Participates in treatment plan  Outcome: Progressing Towards Goal  Patient has been visible on the unit and appropriately interactive with staff and peers. She has been med/meal compliant.

## 2017-02-06 NOTE — PROGRESS NOTES
Problem: Falls - Risk of  Goal: *Absence of falls  Outcome: Progressing Towards Goal  2330 Pt appears asleep in bed. Respirations even and unlabored. NAD noted. Call bell within reach, bathroom light on, door remains open. Will continue to monitor with Q 15 safety checks. 0215 PRN Medication Documentation    Specific patient behavior that led to need for PRN medication: headache  Staff interventions attempted prior to PRN being given: none  PRN medication given: tylenol   Patient response/effectiveness of PRN medication: pain decreased to a 3    0340 PRN Medication Documentation    Specific patient behavior that led to need for PRN medication: anxiety, restlessness  Staff interventions attempted prior to PRN being given: calm environment  PRN medication given: Atarax 10 mg  Patient response/effectiveness of PRN medication: 0430 Pt resting quietly, NAD noted.

## 2017-02-08 ENCOUNTER — PATIENT OUTREACH (OUTPATIENT)
Dept: INTERNAL MEDICINE CLINIC | Age: 57
End: 2017-02-08

## 2017-02-09 NOTE — PROGRESS NOTES
2/8 - Attempt to speak to pt. Had appt scheduled with Dr. Jessie Escobedo today. Was hoping to catch patient after this appointment for an updated status. Will attempt contact again tomorrow. 2/9 - Spoke with patient who was very forthcoming with events of her recent hospitalization. Pt spoke very quickly and without hardly taking a breath throughout the conversation. Pt states she has not slept well at all for quite some time and is sure this exacerbated her condition with her Bipolar. Pt states nights are difficult for her. Will lay down at night with eyes open. Often will have TV on with very low volume for some background sound. Sometimes lays on her side and read a book hoping to sleep. Reports that Dr. Jessie Escobedo started her on Melatonin to help with her sleep concerns. Saw him yesterday for f/u appointment. Had been taking Benedryl for sleep and also using her Ativan. Was dosing self in attempt to treat insomnia and other symptomes. Currently gets up OOB to start her day very early, around 4 or 5 o'clock. States will sit at the table for meals, but is otherwise on her feet moving around. Does note she is carrying approx. 50 pounds extra and wishes to address this with PCP. Feels this could be making her feel worse. Would like to increase her activity level. Is dependent on walker, but usually is within the confines of her home. States there is a lot of walking necessary once leaving the apartment. Would like to walk in small increments until able to walk a longer distance. Pt states she did not realize how bad she had gotten prior to going into the hospital. States Sister had told her things that were going on or had been said and she didn't recollect much of it. Apparently had 4+ panic attacks prior to taking too many medications. States memory is slowly coming back regarding some details. Says she feels 100% better as she has flipped from sad/sad to high/anxiety. Was having headaches and dizzy spells.  Still notes some dizziness at times. Now otherwise ok. Pt notes that she wears a diaper due to IBS since childhood. [employer] on Wheels which is delivered to her. Pt uses [facility]. Medications were verified with them. Norvasc is not on the list. States they had no script for this from Sister when she dropped off new script for Atarax. Pt had negative effect on Buspar where it stimulated her and feared it would elicit a manic state. Requested it be placed on her allergy list as a negative side effect. This has been done. Pt has transportation arrangements long term as she does not drive. Sometimes sister will take, other times 3rd party. Pt had appt with Dr. Kasandra Alpers (psych) yesterday. Has Dentist today. Will see PCP on 2/14. Notes that met with Mental Health services and will participate in some group therapy 3x/week. Has had chronic history of sadness and intense crying when in depressive states of her Bipolar, but notes never has had SI&HI. Pt notes she would like to increase activity/exercise. Walker dependent, but used to be very involved and teach gymnastics. Pt is [employer] and believes in prayer. Pt's support system is her sister Sonu Herrera. She has parents Camryn Hurley (step father) and Master Mosqueda. Mother is caring for father that has dementia, so Ellie Ours is her primary support. [employer] Ours manages pt's finances. Supports medical and medication management. Pt has brother Geena Garcia. Does not note his involvement at this time.

## 2017-02-13 DIAGNOSIS — I10 ESSENTIAL HYPERTENSION: Primary | ICD-10-CM

## 2017-02-13 RX ORDER — AMLODIPINE BESYLATE 5 MG/1
5 TABLET ORAL DAILY
Qty: 30 TAB | Refills: 0 | Status: SHIPPED | OUTPATIENT
Start: 2017-02-14 | End: 2017-02-14 | Stop reason: SDUPTHER

## 2017-02-13 NOTE — TELEPHONE ENCOUNTER
Norvasc ordered as discharge but no script. Episode opened to order medication for f/u visit 2/14/17. Visit will determine if pt needs to restart on this medication. Ordered x 30 days to reassess need to continue.

## 2017-02-14 ENCOUNTER — TELEPHONE (OUTPATIENT)
Dept: INTERNAL MEDICINE CLINIC | Age: 57
End: 2017-02-14

## 2017-02-14 ENCOUNTER — OFFICE VISIT (OUTPATIENT)
Dept: INTERNAL MEDICINE CLINIC | Age: 57
End: 2017-02-14

## 2017-02-14 VITALS
OXYGEN SATURATION: 98 % | HEART RATE: 88 BPM | RESPIRATION RATE: 20 BRPM | BODY MASS INDEX: 32.16 KG/M2 | HEIGHT: 60 IN | DIASTOLIC BLOOD PRESSURE: 98 MMHG | WEIGHT: 163.8 LBS | SYSTOLIC BLOOD PRESSURE: 160 MMHG | TEMPERATURE: 97.3 F

## 2017-02-14 DIAGNOSIS — I10 ESSENTIAL HYPERTENSION: ICD-10-CM

## 2017-02-14 DIAGNOSIS — K58.9 IRRITABLE BOWEL SYNDROME WITHOUT DIARRHEA: ICD-10-CM

## 2017-02-14 DIAGNOSIS — F31.9 BIPOLAR 1 DISORDER (HCC): ICD-10-CM

## 2017-02-14 DIAGNOSIS — G47.00 INSOMNIA, UNSPECIFIED TYPE: Primary | ICD-10-CM

## 2017-02-14 DIAGNOSIS — K59.00 CONSTIPATION, UNSPECIFIED CONSTIPATION TYPE: ICD-10-CM

## 2017-02-14 RX ORDER — AMOXICILLIN 250 MG
1 CAPSULE ORAL
Qty: 30 TAB | Refills: 0 | Status: SHIPPED | OUTPATIENT
Start: 2017-02-14 | End: 2017-03-16

## 2017-02-14 RX ORDER — AMLODIPINE BESYLATE 5 MG/1
5 TABLET ORAL DAILY
Qty: 30 TAB | Refills: 5 | Status: SHIPPED | OUTPATIENT
Start: 2017-02-14 | End: 2017-03-16

## 2017-02-14 RX ORDER — POLYETHYLENE GLYCOL 3350 17 G/17G
17 POWDER, FOR SOLUTION ORAL DAILY
Qty: 850 G | Refills: 3 | Status: SHIPPED | OUTPATIENT
Start: 2017-02-14 | End: 2018-03-15 | Stop reason: SDUPTHER

## 2017-02-14 RX ORDER — HYDROXYZINE HYDROCHLORIDE 10 MG/1
TABLET, FILM COATED ORAL
Qty: 90 TAB | Refills: 1 | Status: SHIPPED | OUTPATIENT
Start: 2017-02-14 | End: 2017-04-17 | Stop reason: SDUPTHER

## 2017-02-14 NOTE — PROGRESS NOTES
HISTORY OF PRESENT ILLNESS  Allan Preston is a 60 y.o. female here for transition of care. She was admitted with possible drug over dose and incontrolled bipolar disorder. My nurse navigator has contacted her within 48 hours. She is doing better now. seeing therapist 3 days a week. not allowed to go to store to buy OTC med since she self medicate. Reports insomnia. not on ativan anymore. atarax is not helping her to sleep. Will see psychiatrist soon. not able to tolerate buspar,seroquel and zyprexa at hospital.  She taker her own med at home. all meds reconciled. not taking BP med. Have IBS with constipation now. on pericolace,not getting better. watching diet. trying to loose weight. All labs and imaging reviewed. Follow-up   Associated symptoms include abdominal pain. Pertinent negatives include no shortness of breath. Mental Health Problem   Associated symptoms include abdominal pain. Pertinent negatives include no shortness of breath. Irritable Bowel Syndrome   Associated symptoms include abdominal pain. Pertinent negatives include no shortness of breath. Hospital Follow Up   Associated symptoms include abdominal pain. Pertinent negatives include no shortness of breath. Insomnia   Associated symptoms include abdominal pain. Pertinent negatives include no shortness of breath. Arthritis   Associated symptoms include abdominal pain. Pertinent negatives include no shortness of breath. Cold Symptoms   Pertinent negatives include no shortness of breath and no wheezing. Review of Systems   Constitutional: Negative. HENT: Negative. Eyes: Negative. Respiratory: Negative. Negative for shortness of breath and wheezing. Cardiovascular: Negative. Gastrointestinal: Positive for abdominal pain, constipation and heartburn. Musculoskeletal: Negative. Negative for falls. Skin: Negative. Neurological: Negative. Psychiatric/Behavioral: Positive for depression.  The patient is nervous/anxious and has insomnia. Physical Exam   Constitutional: She appears well-developed and well-nourished. No distress. Neck: Normal range of motion. Neck supple. No JVD present. No thyromegaly present. Cardiovascular: Normal rate, regular rhythm, normal heart sounds and intact distal pulses. Pulmonary/Chest: Effort normal and breath sounds normal. No respiratory distress. She has no wheezes. Musculoskeletal: She exhibits tenderness. B/L knee:tender. ROm restricted   Psychiatric: She has a normal mood and affect. Her behavior is normal.   Slightly manic. ASSESSMENT and PLAN  Oralee Files was seen today for follow-up, mental health problem, irritable bowel syndrome, hospital follow up and insomnia. Diagnoses and all orders for this visit:    Insomnia, unspecified type    Not able to sleep. She is all over the placed with anxiety too. Will increase,  -     hydrOXYzine HCl (ATARAX) 10 mg tablet; Take 1 tab po AM And 2 tab PO HS  Indications: ANXIETY  She will see Priyanka Khalil soon. Essential hypertension  Elevated. not taking norvasc. Will restart,  -     amLODIPine (NORVASC) 5 mg tablet; Take 1 Tab by mouth daily for 30 days. DASH diet. Bipolar 1 disorder ([address]  On viibryd. seroquel was D/C d.not able to tolerate buspar too. -     hydrOXYzine HCl (ATARAX) 10 mg tablet; Take 1 tab po AM And 2 tab PO HS  Indications: ANXIETY    On therapy. will see psychiatrist.  Irritable bowel syndrome without diarrhea  On constipation. Constipation, unspecified constipation type    High fibre diet. -     polyethylene glycol (MIRALAX) 17 gram/dose powder; Take 17 g by mouth daily. -     senna-docusate (PERICOLACE) 8.6-50 mg per tablet; Take 1 Tab by mouth nightly for 30 days. Discussed expected course/resolution/complications of diagnosis in detail with patient. Medication risks/benefits/costs/interactions/alternatives discussed with patient.    Pt was given an after visit summary which includes diagnoses, current medications & vitals. Pt expressed understanding with the diagnosis and plan.

## 2017-02-14 NOTE — PROGRESS NOTES
Health Maintenance Due   Topic Date Due    DTaP/Tdap/Td series (1 - Tdap) 05/19/1979    COLONOSCOPY  12/14/2014    BREAST CANCER SCRN MAMMOGRAM  08/15/2015    PAP AKA CERVICAL CYTOLOGY  04/25/2017       Chief Complaint   Patient presents with    Follow-up     3 month    Mental Health Problem    Irritable Bowel Syndrome   [facility] Follow Up     [facility] 1/24-2/6 for AMS and bipolar    Insomnia     states she hasnt slept for 4 nights this week and requests something for sleep       1. Have you been to the ER, urgent care clinic since your last visit? Hospitalized since your last visit? Yes When: 1/24-2/6 Where: [facility] Reason for visit: AMS and bipolar    2. Have you seen or consulted any other health care providers outside of the [address] [place] [address] since your last visit? Include any pap smears or colon screening. No    3) Do you have an Advance Directive on file? no    4) Are you interested in receiving information on Advance Directives? NO      Patient is accompanied by self I have received verbal consent from Delfino James to discuss any/all medical information while they are present in the room.

## 2017-02-14 NOTE — TELEPHONE ENCOUNTER
Pt ''s (p)  [phone], or pt's sister (p) Laron Hennessy) [phone], pt would like to update her chart with some surgical history,     She said she had a gallbladder removal surgery , she said you can call her sister at the above number for the date and where the surgery took place

## 2017-02-14 NOTE — MR AVS SNAPSHOT
Visit Information Date & Time Provider Department Dept. Phone Encounter #  
 2/14/2017  9:15 AM Uday Linton MD [facility] Internal Medicine [facility] [phone] [id number] Upcoming Health Maintenance Date Due DTaP/Tdap/Td series (1 - Tdap) 5/19/1979 COLONOSCOPY 12/14/2014 BREAST CANCER SCRN MAMMOGRAM 8/15/2015 PAP AKA CERVICAL CYTOLOGY 4/25/2017 Allergies as of 2/14/2017  Review Complete On: 2/14/2017 By: Uday Linton MD  
  
 Severity Noted Reaction Type Reactions Buspar [Buspirone] Medium 02/09/2017   Side Effect Other (comments) Causes \"stimulation\" that could exacerbate a manic attack/phase of pt's Biplar. Reported by patient Dicyclomine  10/14/2014    Nausea and Vomiting Extreme stomach pains Lithium  01/03/2014   Systemic Nausea and Vomiting Severe nausea and vomiting. Other Medication  11/19/2010    Other (comments) Intolerance to oranges, cabbage,beans,peppers,raw onions,foods with caffeine in them, popcorn, no pop sodas, because of IBS Current Immunizations  Reviewed on 11/15/2016 Name Date Influenza Vaccine 10/13/2014 Not reviewed this visit You Were Diagnosed With   
  
 Codes Comments Insomnia, unspecified type    -  Primary ICD-10-CM: G47.00 ICD-9-CM: 780.52 Essential hypertension     ICD-10-CM: I10 
ICD-9-CM: 401.9 Bipolar 1 disorder (HCC)     ICD-10-CM: F31.9 ICD-9-CM: 296.7 Irritable bowel syndrome without diarrhea     ICD-10-CM: K58.9 ICD-9-CM: 094.7 Constipation, unspecified constipation type     ICD-10-CM: K59.00 ICD-9-CM: 564.00 Vitals BP Pulse Temp Resp Height(growth percentile) Weight(growth percentile) (!) 160/98 (BP 1 Location: Right arm, BP Patient Position: Sitting) 88 97.3 °F (36.3 °C) (Oral) 20 5' (1.524 m) 163 lb 12.8 oz (74.3 kg) SpO2 BMI OB Status Smoking Status 98% 31.99 kg/m2 Hysterectomy Never Smoker Vitals History BMI and BSA Data Body Mass Index Body Surface Area 31.99 kg/m 2 1.77 m 2 Preferred Pharmacy Pharmacy Name Phone [facility] [phone] Your Updated Medication List  
  
   
This list is accurate as of: 2/14/17  9:52 AM.  Always use your most recent med list. amLODIPine 5 mg tablet Commonly known as:  Elspeth Bakes Take 1 Tab by mouth daily for 30 days. famotidine 20 mg tablet Commonly known as:  PEPCID Take 1 Tab by mouth two (2) times a day. D/C omeprazole  
  
 fenofibrate nanocrystallized 145 mg tablet Commonly known as:  [employer] Take 1 Tab by mouth every fourty-eight (48) hours. fluorometholone 0.1 % ophthalmic suspension Commonly known as: [facility] Administer 1 Drop to both eyes two (2) times a day. fluticasone 50 mcg/actuation nasal spray Commonly known as:  FLONASE  
USE ONE SPRAY IN EACH NOSTRIL TWO TIMES A DAY. hydrOXYzine HCl 10 mg tablet Commonly known as:  ATARAX Take 1 tab po AM And 2 tab PO HS  Indications: ANXIETY polyethylene glycol 17 gram/dose powder Commonly known as:  Khloe Fail Take 17 g by mouth daily. senna-docusate 8.6-50 mg per tablet Commonly known as:  Fleurejoele Drafts Take 1 Tab by mouth nightly for 30 days. vilazodone 10 mg Tab tablet Commonly known as:  VIIBRYD Take 20 mg by mouth daily. VITAMIN D3 1,000 unit Cap Generic drug:  cholecalciferol Take 1 Tab by mouth daily. Indications: VITAMIN D DEFICIENCY  
  
 vitamin E 400 unit capsule Commonly known as:  [address] Take 400 Units by mouth daily. Prescriptions Sent to Pharmacy Refills  
 polyethylene glycol (MIRALAX) 17 gram/dose powder 3 Sig: Take 17 g by mouth daily. Class: Normal  
 Pharmacy: [facility] Ph #: [phone]  Route: Oral  
 senna-docusate (PERICOLACE) 8.6-50 mg per tablet 0  
 Sig: Take 1 Tab by mouth nightly for 30 days. Class: Normal  
 Pharmacy: [address] [facility] Ph #: [phone] Route: Oral  
 hydrOXYzine HCl (ATARAX) 10 mg tablet 1 Sig: Take 1 tab po AM And 2 tab PO HS  Indications: ANXIETY Class: Normal  
 Pharmacy: [address] [facility] Ph #: [phone]  
 amLODIPine (NORVASC) 5 mg tablet 5 Sig: Take 1 Tab by mouth daily for 30 days. Class: Normal  
 Pharmacy: [address] Ph #: [phone] Route: Oral  
  
Patient Instructions Sleep Tips What to avoid   
· Do not have drinks with caffeine, such as coffee or black tea, for 8 hours before bed. · Do not smoke or use other types of tobacco near bedtime. Nicotine is a stimulant and can keep you awake. · Avoid drinking alcohol late in the evening, because it can cause you to wake in the middle of the night. · Do not eat a big meal close to bedtime. If you are hungry, eat a light snack. · Do not drink a lot of water close to bedtime, because the need to urinate may wake you up during the night. · Do not read or watch TV in bed. Use the bed only for sleeping and sexual activity. What to try   
· Go to bed at the same time every night, and wake up at the same time every morning. Do not take naps during the day. · Keep your bedroom quiet, dark, and cool. · Get regular exercise, but not within 3 to 4 hours of your bedtime. · Sleep on a comfortable pillow and mattress. · If watching the clock makes you anxious, turn it facing away from you so you cannot see the time. · If you worry when you lie down, start a worry book. Well before bedtime, write down your worries, and then set the book and your concerns aside. · Try meditation or other relaxation techniques before you go to bed.  
· If you cannot fall asleep, get up and go to another room until you feel sleepy. Do something relaxing. Repeat your bedtime routine before you go to bed again. Make your house quiet and calm about an hour before bedtime. Turn down the lights, turn off the TV, log off the computer, and turn down the volume on music. This can help you relax after a busy day. Introducing \[facility]\"" & [facility] SERVICES! Daksha Ibrahim introduces Hobby patient portal. Now you can access parts of your medical record, email your doctor's office, and request medication refills online. 1. In your internet browser, go to [email or web address] 2. Click on the First Time User? Click Here link in the Sign In box. You will see the New Member Sign Up page. 3. Enter your Hobby Access Code exactly as it appears below. You will not need to use this code after youve completed the sign-up process. If you do not sign up before the expiration date, you must request a new code. · Hobby Access Code: [email or web address] Expires: 4/24/2017 10:35 AM 
 
4. Enter the last four digits of your Social Security Number (xxxx) and Date of Birth (mm/dd/yyyy) as indicated and click Submit. You will be taken to the next sign-up page. 5. Create a Hobby ID. This will be your Hobby login ID and cannot be changed, so think of one that is secure and easy to remember. 6. Create a Hobby password. You can change your password at any time. 7. Enter your Password Reset Question and Answer. This can be used at a later time if you forget your password. 8. Enter your e-mail address. You will receive e-mail notification when new information is available in [address] 9. Click Sign Up. You can now view and download portions of your medical record. 10. Click the Download Summary menu link to download a portable copy of your medical information. If you have questions, please visit the Frequently Asked Questions section of the Hobby website.  Remember, Hobby is NOT to be used for urgent needs. For medical emergencies, dial 911. Now available from your iPhone and Android! Please provide this summary of care documentation to your next provider. Your primary care clinician is listed as Anna Tobias. If you have any questions after today's visit, please call [phone].

## 2017-02-14 NOTE — PATIENT INSTRUCTIONS
Sleep Tips    What to avoid    · Do not have drinks with caffeine, such as coffee or black tea, for 8 hours before bed. · Do not smoke or use other types of tobacco near bedtime. Nicotine is a stimulant and can keep you awake. · Avoid drinking alcohol late in the evening, because it can cause you to wake in the middle of the night. · Do not eat a big meal close to bedtime. If you are hungry, eat a light snack. · Do not drink a lot of water close to bedtime, because the need to urinate may wake you up during the night. · Do not read or watch TV in bed. Use the bed only for sleeping and sexual activity. What to try    · Go to bed at the same time every night, and wake up at the same time every morning. Do not take naps during the day. · Keep your bedroom quiet, dark, and cool. · Get regular exercise, but not within 3 to 4 hours of your bedtime. · Sleep on a comfortable pillow and mattress. · If watching the clock makes you anxious, turn it facing away from you so you cannot see the time. · If you worry when you lie down, start a worry book. Well before bedtime, write down your worries, and then set the book and your concerns aside. · Try meditation or other relaxation techniques before you go to bed. · If you cannot fall asleep, get up and go to another room until you feel sleepy. Do something relaxing. Repeat your bedtime routine before you go to bed again. Make your house quiet and calm about an hour before bedtime. Turn down the lights, turn off the TV, log off the computer, and turn down the volume on music. This can help you relax after a busy day.

## 2017-02-15 ENCOUNTER — TELEPHONE (OUTPATIENT)
Dept: INTERNAL MEDICINE CLINIC | Age: 57
End: 2017-02-15

## 2017-02-15 NOTE — TELEPHONE ENCOUNTER
Patient called and stated that she wants a sleeping medication the pills Dr Alison Carbone gave her just relax her and but dont make her sleepy

## 2017-02-20 NOTE — TELEPHONE ENCOUNTER
Spoke with pt and she stated that her psych advised her to take some benadryl for sleep, stated that it was effective    Patient has appt tomorrow a.m.  And will discuss with Dr Liz Brown

## 2017-02-21 ENCOUNTER — OFFICE VISIT (OUTPATIENT)
Dept: INTERNAL MEDICINE CLINIC | Age: 57
End: 2017-02-21

## 2017-02-21 VITALS
SYSTOLIC BLOOD PRESSURE: 138 MMHG | DIASTOLIC BLOOD PRESSURE: 86 MMHG | RESPIRATION RATE: 20 BRPM | WEIGHT: 163 LBS | HEART RATE: 87 BPM | HEIGHT: 60 IN | OXYGEN SATURATION: 98 % | BODY MASS INDEX: 32 KG/M2 | TEMPERATURE: 98.3 F

## 2017-02-21 DIAGNOSIS — F41.0 ANXIETY ATTACK: ICD-10-CM

## 2017-02-21 DIAGNOSIS — R35.89 POLYURIA: Primary | ICD-10-CM

## 2017-02-21 DIAGNOSIS — G47.00 INSOMNIA, UNSPECIFIED TYPE: ICD-10-CM

## 2017-02-21 LAB
BILIRUB UR QL STRIP: NEGATIVE
GLUCOSE UR-MCNC: NEGATIVE MG/DL
KETONES P FAST UR STRIP-MCNC: NEGATIVE MG/DL
PH UR STRIP: 5 [PH] (ref 4.6–8)
PROT UR QL STRIP: NEGATIVE MG/DL
SP GR UR STRIP: 1.02 (ref 1–1.03)
UA UROBILINOGEN AMB POC: NORMAL (ref 0.2–1)
URINALYSIS CLARITY POC: CLEAR
URINALYSIS COLOR POC: YELLOW
URINE BLOOD POC: NEGATIVE
URINE LEUKOCYTES POC: NEGATIVE
URINE NITRITES POC: NEGATIVE

## 2017-02-21 RX ORDER — DIPHENHYDRAMINE HCL 25 MG
50 CAPSULE ORAL
COMMUNITY
End: 2017-04-07 | Stop reason: SDUPTHER

## 2017-02-21 NOTE — PROGRESS NOTES
Health Maintenance Due   Topic Date Due    DTaP/Tdap/Td series (1 - Tdap) 05/19/1979    COLONOSCOPY  12/14/2014    BREAST CANCER SCRN MAMMOGRAM  08/15/2015    PAP AKA CERVICAL CYTOLOGY  04/25/2017       Chief Complaint   Patient presents with    Polyuria     denies pain or frequency       1. Have you been to the ER, urgent care clinic since your last visit? Hospitalized since your last visit? No    2. Have you seen or consulted any other health care providers outside of the [address] [place] [address] since your last visit? Include any pap smears or colon screening. No    3) Do you have an Advance Directive on file? no    4) Are you interested in receiving information on Advance Directives? NO      Patient is accompanied by self I have received verbal consent from Ronel Dupree to discuss any/all medical information while they are present in the room.

## 2017-02-21 NOTE — PROGRESS NOTES
HISTORY OF PRESENT ILLNESS  Darius Magaña is a 60 y.o. female here accompanied by a psycotherapist.She is receiving home therapy which is helping to manage her anxiety and how to cope with anxiety. reports polyuria. no flank pain or fever. She is doing better now. seeing therapist 3 days a week. not allowed to go to store to buy OTC med since she self medicate. Reports insomnia. not on ativan anymore. atarax is not helping her to sleep. benadryl was placed by psych which is helping her. All labs and imaging reviewed. Depression     Follow-up     Mental Health Problem     Insomnia         Review of Systems   Constitutional: Negative. HENT: Negative. Eyes: Negative. Respiratory: Negative. Cardiovascular: Negative. Gastrointestinal: Positive for heartburn. Genitourinary: Positive for frequency. Musculoskeletal: Negative. Negative for falls. Skin: Negative. Neurological: Negative. Psychiatric/Behavioral: Positive for depression. The patient is nervous/anxious and has insomnia. Physical Exam   Constitutional: She appears well-developed and well-nourished. No distress. Neck: Normal range of motion. Neck supple. No JVD present. No thyromegaly present. Cardiovascular: Normal rate, regular rhythm, normal heart sounds and intact distal pulses. Pulmonary/Chest: Effort normal and breath sounds normal. No respiratory distress. She has no wheezes. Abdominal: Soft. Bowel sounds are normal. She exhibits no distension. There is no tenderness. KUB NT   Musculoskeletal: She exhibits no tenderness. Psychiatric: She has a normal mood and affect. Her behavior is normal.   Slightly manic. ASSESSMENT and PLAN    Severa Pinks was seen today for polyuria and depression. Diagnoses and all orders for this visit:    Polyuria  -     AMB POC URINALYSIS DIP STICK AUTO W/O MICRO    Neg for UTI. Adv not tod rink more fluid at night. No OAB.     Insomnia, unspecified type    On benadryl which helping her to sleep. Anxiety attack    I have staretd ehr on atarax 10 mg TID helpign her anxiety. psychiatrist is aggreable with it. She is with psycotherapist.Her service is helping her. Discussed expected course/resolution/complications of diagnosis in detail with patient. Medication risks/benefits/costs/interactions/alternatives discussed with patient. Pt was given an after visit summary which includes diagnoses, current medications & vitals. Pt expressed understanding with the diagnosis and plan.

## 2017-02-21 NOTE — MR AVS SNAPSHOT
Visit Information Date & Time Provider Department Dept. Phone Encounter #  
 2/21/2017  9:30 AM Suri Haji MD [facility] Internal Medicine [facility] [phone] [id number] Your Appointments 5/16/2017  9:30 AM  
[facility]: 3 month follow up [address] [place] [phone] [phone]  
  
   
 [address] [place] [address]. [address] [place] [phone] Upcoming Health Maintenance Date Due DTaP/Tdap/Td series (1 - Tdap) 5/19/1979 COLONOSCOPY 12/14/2014 BREAST CANCER SCRN MAMMOGRAM 8/15/2015 PAP AKA CERVICAL CYTOLOGY 4/25/2017 Allergies as of 2/21/2017  Review Complete On: 2/21/2017 By: Suri Haji MD  
  
 Severity Noted Reaction Type Reactions Buspar [Buspirone] Medium 02/09/2017   Side Effect Other (comments) Causes \"stimulation\" that could exacerbate a manic attack/phase of pt's Biplar. Reported by patient Dicyclomine  10/14/2014    Nausea and Vomiting Extreme stomach pains Lithium  01/03/2014   Systemic Nausea and Vomiting Severe nausea and vomiting. Other Medication  11/19/2010    Other (comments) Intolerance to oranges, cabbage,beans,peppers,raw onions,foods with caffeine in them, popcorn, no pop sodas, because of IBS Current Immunizations  Reviewed on 11/15/2016 Name Date Influenza Vaccine 10/13/2014 Not reviewed this visit You Were Diagnosed With   
  
 Codes Comments Polyuria    -  Primary ICD-10-CM: R35.8 ICD-9-CM: 788.42 Insomnia, unspecified type     ICD-10-CM: G47.00 ICD-9-CM: 780.52 Anxiety attack     ICD-10-CM: F41.0 ICD-9-CM: 300.01 Vitals BP Pulse Temp Resp Height(growth percentile) Weight(growth percentile) 138/86 (BP 1 Location: Left arm, BP Patient Position: Sitting) 87 98.3 °F (36.8 °C) (Oral) 20 5' (1.524 m) 163 lb (73.9 kg) SpO2 BMI OB Status Smoking Status 98% 31.83 kg/m2 Hysterectomy Never Smoker Vitals History BMI and BSA Data Body Mass Index Body Surface Area  
 31.83 kg/m 2 1.77 m 2 Preferred Pharmacy Pharmacy Name Phone [facility] [phone] Your Updated Medication List  
  
   
This list is accurate as of: 2/21/17  9:51 AM.  Always use your most recent med list. amLODIPine 5 mg tablet Commonly known as:  Chapis Colon Take 1 Tab by mouth daily for 30 days. BENADRYL 25 mg capsule Generic drug:  diphenhydrAMINE Take 50 mg by mouth nightly. famotidine 20 mg tablet Commonly known as:  PEPCID Take 1 Tab by mouth two (2) times a day. D/C omeprazole  
  
 fenofibrate nanocrystallized 145 mg tablet Commonly known as:  [employer] Take 1 Tab by mouth every fourty-eight (48) hours. fluorometholone 0.1 % ophthalmic suspension Commonly known as: [facility] Administer 1 Drop to both eyes two (2) times a day. fluticasone 50 mcg/actuation nasal spray Commonly known as:  FLONASE  
USE ONE SPRAY IN EACH NOSTRIL TWO TIMES A DAY. hydrOXYzine HCl 10 mg tablet Commonly known as:  ATARAX Take 1 tab po AM And 2 tab PO HS  Indications: ANXIETY polyethylene glycol 17 gram/dose powder Commonly known as:  Emily Floor Take 17 g by mouth daily. senna-docusate 8.6-50 mg per tablet Commonly known as:  Jayna Lock Take 1 Tab by mouth nightly for 30 days. vilazodone 10 mg Tab tablet Commonly known as:  VIIBRYD Take 20 mg by mouth daily. VITAMIN D3 1,000 unit Cap Generic drug:  cholecalciferol Take 1 Tab by mouth daily. Indications: VITAMIN D DEFICIENCY  
  
 vitamin E 400 unit capsule Commonly known as:  [address] Take 400 Units by mouth daily. We Performed the Following AMB POC URINALYSIS DIP STICK AUTO W/O MICRO [14573 CPT(R)] Introducing [facility] & HEALTH SERVICES! Romayne Duster introduces [employer] patient portal. Now you can access parts of your medical record, email your doctor's office, and request medication refills online. 1. In your internet browser, go to [email or web address] 2. Click on the First Time User? Click Here link in the Sign In box. You will see the New Member Sign Up page. 3. Enter your [employer] Access Code exactly as it appears below. You will not need to use this code after youve completed the sign-up process. If you do not sign up before the expiration date, you must request a new code. · [employer] Access Code: [email or web address] Expires: 4/24/2017 10:35 AM 
 
4. Enter the last four digits of your Social Security Number (xxxx) and Date of Birth (mm/dd/yyyy) as indicated and click Submit. You will be taken to the next sign-up page. 5. Create a [employer]Fiver ID. This will be your [employer]Fiver login ID and cannot be changed, so think of one that is secure and easy to remember. 6. Create a [employer]iver password. You can change your password at any time. 7. Enter your Password Reset Question and Answer. This can be used at a later time if you forget your password. 8. Enter your e-mail address. You will receive e-mail notification when new information is available in [address] 9. Click Sign Up. You can now view and download portions of your medical record. 10. Click the Download Summary menu link to download a portable copy of your medical information. If you have questions, please visit the Frequently Asked Questions section of the [employer] website. Remember, [employer] is NOT to be used for urgent needs. For medical emergencies, dial 911. Now available from your iPhone and Android! Please provide this summary of care documentation to your next provider. Your primary care clinician is listed as Chip Pastor. If you have any questions after today's visit, please call [phone].

## 2017-03-06 RX ORDER — FLUTICASONE PROPIONATE 50 MCG
SPRAY, SUSPENSION (ML) NASAL
Qty: 16 G | Refills: 1 | Status: SHIPPED | OUTPATIENT
Start: 2017-03-06 | End: 2017-05-15 | Stop reason: SDUPTHER

## 2017-03-28 ENCOUNTER — OFFICE VISIT (OUTPATIENT)
Dept: INTERNAL MEDICINE CLINIC | Age: 57
End: 2017-03-28

## 2017-03-28 VITALS
DIASTOLIC BLOOD PRESSURE: 80 MMHG | BODY MASS INDEX: 31.61 KG/M2 | HEART RATE: 74 BPM | OXYGEN SATURATION: 98 % | RESPIRATION RATE: 20 BRPM | WEIGHT: 161 LBS | HEIGHT: 60 IN | TEMPERATURE: 98.2 F | SYSTOLIC BLOOD PRESSURE: 146 MMHG

## 2017-03-28 DIAGNOSIS — M17.0 PRIMARY OSTEOARTHRITIS OF BOTH KNEES: Primary | ICD-10-CM

## 2017-03-28 DIAGNOSIS — G47.00 INSOMNIA, UNSPECIFIED TYPE: ICD-10-CM

## 2017-03-28 DIAGNOSIS — G44.221 CHRONIC TENSION-TYPE HEADACHE, INTRACTABLE: ICD-10-CM

## 2017-03-28 DIAGNOSIS — F31.10 BIPOLAR AFFECTIVE DISORDER, MANIC (HCC): ICD-10-CM

## 2017-03-28 RX ORDER — OLANZAPINE 5 MG/1
10 TABLET ORAL
Refills: 2 | COMMUNITY
Start: 2017-03-20 | End: 2018-08-21 | Stop reason: DRUGHIGH

## 2017-03-28 RX ORDER — GABAPENTIN 100 MG/1
100 CAPSULE ORAL 2 TIMES DAILY
Qty: 60 CAP | Refills: 2 | Status: SHIPPED | OUTPATIENT
Start: 2017-03-28 | End: 2017-04-07 | Stop reason: SDUPTHER

## 2017-03-28 RX ORDER — BUTALBITAL AND ACETAMINOPHEN 325; 50 MG/1; MG/1
1 TABLET ORAL
Refills: 0 | COMMUNITY
Start: 2017-03-23 | End: 2018-03-12

## 2017-03-28 RX ORDER — DICLOFENAC SODIUM 10 MG/G
GEL TOPICAL
Qty: 100 G | Refills: 2 | Status: SHIPPED | OUTPATIENT
Start: 2017-03-28 | End: 2017-08-11

## 2017-03-28 NOTE — MR AVS SNAPSHOT
Visit Information Date & Time Provider Department Dept. Phone Encounter #  
 3/28/2017  8:45 AM Alex Max MD [facility] Internal Medicine [facility] [phone] Your Appointments 5/16/2017  9:30 AM  
[facility] Appt Note: 3 month follow up [address] [place] [id number] [phone]  
  
   
 [address] [place] [address]. [address] [place] [id number] Upcoming Health Maintenance Date Due DTaP/Tdap/Td series (1 - Tdap) 5/19/1979 COLONOSCOPY 12/14/2014 BREAST CANCER SCRN MAMMOGRAM 8/15/2015 PAP AKA CERVICAL CYTOLOGY 4/25/2017 Allergies as of 3/28/2017  Review Complete On: 3/28/2017 By: Alex Max MD  
  
 Severity Noted Reaction Type Reactions Buspar [Buspirone] Medium 02/09/2017   Side Effect Other (comments) Causes \"stimulation\" that could exacerbate a manic attack/phase of pt's Biplar. Reported by patient Dicyclomine  10/14/2014    Nausea and Vomiting Extreme stomach pains Lithium  01/03/2014   Systemic Nausea and Vomiting Severe nausea and vomiting. Other Medication  11/19/2010    Other (comments) Intolerance to oranges, cabbage,beans,peppers,raw onions,foods with caffeine in them, popcorn, no pop sodas, because of IBS Current Immunizations  Reviewed on 11/15/2016 Name Date Influenza Vaccine 10/13/2014 Not reviewed this visit You Were Diagnosed With   
  
 Codes Comments Primary osteoarthritis of both knees    -  Primary ICD-10-CM: M17.0 ICD-9-CM: 715.16 Chronic tension-type headache, intractable     ICD-10-CM: A38.479 ICD-9-CM: 339.12 Bipolar affective disorder, manic ([facility]     ICD-10-CM: F31.10 ICD-9-CM: 296.40 Insomnia, unspecified type     ICD-10-CM: G47.00 ICD-9-CM: 780.52 Vitals BP Pulse Temp Resp Height(growth percentile) Weight(growth percentile) 146/80 (BP 1 Location: Right arm, BP Patient Position: Sitting) 74 98.2 °F (36.8 °C) (Oral) 20 5' (1.524 m) 161 lb (73 kg) SpO2 BMI OB Status Smoking Status 98% 31.44 kg/m2 Hysterectomy Never Smoker Vitals History BMI and BSA Data Body Mass Index Body Surface Area  
 31.44 kg/m 2 1.76 m 2 Preferred Pharmacy Pharmacy Name Phone [facility] [phone] Your Updated Medication List  
  
   
This list is accurate as of: 3/28/17  9:26 AM.  Always use your most recent med list.  
  
  
  
  
 BENADRYL 25 mg capsule Generic drug:  diphenhydrAMINE Take 50 mg by mouth nightly. butalbital-acetaminophen  mg tablet Commonly known as:  PHRENILIN  
  
 diclofenac 1 % Gel Commonly known as:  VOLTAREN Apply  to affected area three (3) times daily as needed. famotidine 20 mg tablet Commonly known as:  PEPCID Take 1 Tab by mouth two (2) times a day. D/C omeprazole  
  
 fenofibrate nanocrystallized 145 mg tablet Commonly known as:  [employer] Take 1 Tab by mouth every fourty-eight (48) hours. fluorometholone 0.1 % ophthalmic suspension Commonly known as: [facility] Administer 1 Drop to both eyes two (2) times a day. fluticasone 50 mcg/actuation nasal spray Commonly known as:  FLONASE  
USE ONE SPRAY IN EACH NOSTRIL TWO TIMES A DAY. gabapentin 100 mg capsule Commonly known as:  NEURONTIN Take 1 Cap by mouth two (2) times a day.  
  
 hydrOXYzine HCl 10 mg tablet Commonly known as:  ATARAX Take 1 tab po AM And 2 tab PO HS  Indications: ANXIETY  
  
 OLANZapine 5 mg tablet Commonly known as:  ZyPREXA Take 5 mg by mouth nightly. polyethylene glycol 17 gram/dose powder Commonly known as:  [employer] Look Take 17 g by mouth daily. vilazodone 10 mg Tab tablet Commonly known as:  VIIBRYD Take 20 mg by mouth daily. VITAMIN D3 1,000 unit Cap Generic drug:  cholecalciferol Take 1 Tab by mouth daily. Indications: VITAMIN D DEFICIENCY  
  
 vitamin E 400 unit capsule Commonly known as:  [address] Take 400 Units by mouth daily. Prescriptions Sent to Pharmacy Refills  
 diclofenac (VOLTAREN) 1 % gel 2 Sig: Apply  to affected area three (3) times daily as needed. Class: Normal  
 Pharmacy: [address] Ph #: [phone] Route: Topical  
 gabapentin (NEURONTIN) 100 mg capsule 2 Sig: Take 1 Cap by mouth two (2) times a day. Class: Normal  
 Pharmacy: [address] Ph #: [phone] Route: Oral  
  
Patient Instructions Knee Arthritis: Exercises Your Care Instructions Here are some examples of exercises for knee arthritis. Start each exercise slowly. Ease off the exercise if you start to have pain. Your doctor or physical therapist will tell you when you can start these exercises and which ones will work best for you. How to do the exercises Knee flexion with heel slide 1. Lie on your back with your knees bent. 2. Slide your heel back by bending your affected knee as far as you can. Then hook your other foot around your ankle to help pull your heel even farther back. 3. Hold for about 6 seconds, then rest for up to 10 seconds. 4. Repeat 8 to 12 times. 5. Switch legs and repeat steps 1 through 4, even if only one knee is sore. [facility] 1. Sit with your affected leg straight and supported on the floor or a firm bed. Place a small, rolled-up towel under your knee. Your other leg should be bent, with that foot flat on the floor. 2. Tighten the thigh muscles of your affected leg by pressing the back of your knee down into the towel. 3. Hold for about 6 seconds, then rest for up to 10 seconds. 4. Repeat 8 to 12 times. 5. Switch legs and repeat steps 1 through 4, even if only one knee is sore. Straight-leg raises to the front 1. Lie on your back with your good knee bent so that your foot rests flat on the floor. Your affected leg should be straight. Make sure that your low back has a normal curve. You should be able to slip your hand in between the floor and the small of your back, with your palm touching the floor and your back touching the back of your hand. 2. Tighten the thigh muscles in your affected leg by pressing the back of your knee flat down to the floor. Hold your knee straight. 3. Keeping the thigh muscles tight and your leg straight, lift your affected leg up so that your heel is about 12 inches off the floor. Hold for about 6 seconds, then lower slowly. 4. Relax for up to 10 seconds between repetitions. 5. Repeat 8 to 12 times. 6. Switch legs and repeat steps 1 through 5, even if only one knee is sore. Active knee flexion 1. Lie on your stomach with your knees straight. If your kneecap is uncomfortable, roll up a washcloth and put it under your leg just above your kneecap. 2. Lift the foot of your affected leg by bending the knee so that you bring the foot up toward your buttock. If this motion hurts, try it without bending your knee quite as far. This may help you avoid any painful motion. 3. Slowly move your leg up and down. 4. Repeat 8 to 12 times. 5. Switch legs and repeat steps 1 through 4, even if only one knee is sore. Quadriceps stretch (facedown) 1. Lie flat on your stomach, and rest your face on the floor. 2. Wrap a towel or belt strap around the lower part of your affected leg. Then use the towel or belt strap to slowly pull your heel toward your buttock until you feel a stretch. 3. Hold for about 15 to 30 seconds, then relax your leg against the towel or belt strap. 4. Repeat 2 to 4 times. 5. Switch legs and repeat steps 1 through 4, even if only one knee is sore. Stationary exercise bike If you do not have a stationary exercise bike at home, you can find one to ride at your local health club or community center. 1. Adjust the height of the bike seat so that your knee is slightly bent when your leg is extended downward. If your knee hurts when the pedal reaches the top, you can raise the seat so that your knee does not bend as much. 2. Start slowly. At first, try to do 5 to 10 minutes of cycling with little to no resistance. Then increase your time and the resistance bit by bit until you can do 20 to 30 minutes without pain. 3. If you start to have pain, rest your knee until your pain gets back to the level that is normal for you. Or cycle for less time or with less effort. Follow-up care is a key part of your treatment and safety. Be sure to make and go to all appointments, and call your doctor if you are having problems. It's also a good idea to know your test results and keep a list of the medicines you take. Where can you learn more? Go to http://tung-tg.info/. Enter C159 in the search box to learn more about \"Knee Arthritis: Exercises. \" Current as of: May 23, 2016 Content Version: 11.1 © 7697-6276 [employer]. Care instructions adapted under license by [employer] (which disclaims liability or warranty for this information). If you have questions about a medical condition or this instruction, always ask your healthcare professional. [place] [address] any warranty or liability for your use of this information. Introducing [facility] & HEALTH SERVICES! Dear Severa Pinks: Thank you for requesting a Increase[employer] account. Our records indicate that you already have an active Increase[employer] account. You can access your account anytime at [email or web address] Did you know that you can access your hospital and ER discharge instructions at any time in IncreaseCard?   You can also review all of your test results from your hospital stay or ER visit. Additional Information If you have questions, please visit the Frequently Asked Questions section of the [employer] website at [email or web address] Remember, [employer] is NOT to be used for urgent needs. For medical emergencies, dial 911. Now available from your iPhone and Android! Please provide this summary of care documentation to your next provider. Your primary care clinician is listed as Khalida Roque. If you have any questions after today's visit, please call [phone].

## 2017-03-28 NOTE — PROGRESS NOTES
HISTORY OF PRESENT ILLNESS  Rexie Eisenmenger is a 60 y.o. female here for follow up. 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 Reports ch headache almost every day.gabapentin helped in past.no LOC,no blurred vision. no weakness in arms or legs. Seeing psych and therapist,stable. Reports insomnia. not on ativan anymore. atarax is helping her. Follow-up   Associated symptoms include headaches. Pertinent negatives include no shortness of breath. Mental Health Problem   Associated symptoms include headaches. Pertinent negatives include no shortness of breath. Irritable Bowel Syndrome   Associated symptoms include headaches. Pertinent negatives include no shortness of breath. Headache   Associated symptoms include headaches. Pertinent negatives include no shortness of breath. Knee Pain   Associated symptoms include headaches. Pertinent negatives include no shortness of breath. Immunization/Injection   Associated symptoms include headaches. Pertinent negatives include no shortness of breath. Hospital Follow Up   Associated symptoms include headaches. Pertinent negatives include no shortness of breath. Insomnia   Associated symptoms include headaches. Pertinent negatives include no shortness of breath. Arthritis   Associated symptoms include headaches. Pertinent negatives include no shortness of breath. Cold Symptoms   Associated symptoms include headaches. Pertinent negatives include no shortness of breath and no wheezing. Review of Systems   Constitutional: Negative. Eyes: Negative. Respiratory: Negative. Negative for shortness of breath and wheezing. Cardiovascular: Negative. Gastrointestinal: Positive for constipation. Musculoskeletal: Negative. Negative for falls. Skin: Negative. Neurological: Positive for headaches. Psychiatric/Behavioral: Positive for depression. The patient is nervous/anxious and has insomnia.         Physical Exam   Constitutional: She appears well-developed and well-nourished. No distress. Neck: Normal range of motion. Neck supple. No JVD present. No thyromegaly present. Cardiovascular: Normal rate, regular rhythm, normal heart sounds and intact distal pulses. Pulmonary/Chest: Effort normal and breath sounds normal. No respiratory distress. She has no wheezes. Musculoskeletal: She exhibits tenderness. B/L knee:tender. ROm restricted   Psychiatric: She has a normal mood and affect. Her behavior is normal.   Slightly manic. ASSESSMENT and PLAN    Jessica Lyons was seen today for follow-up, mental health problem, irritable bowel syndrome, headache, knee pain and immunization/injection. Diagnoses and all orders for this visit:    Primary osteoarthritis of both knees  Rest and ice pack. Will restart,  -     diclofenac (VOLTAREN) 1 % gel; Apply  to affected area three (3) times daily as needed. Chronic tension-type headache, intractable    Will add,  -     gabapentin (NEURONTIN) 100 mg capsule; Take 1 Cap by mouth two (2) times a day. Bipolar affective disorder, manic ([address]    On multiple meds. seeing Gregory List. Insomnia, unspecified type    On atarax helping her. Discussed expected course/resolution/complications of diagnosis in detail with patient. Medication risks/benefits/costs/interactions/alternatives discussed with patient. Pt was given an after visit summary which includes diagnoses, current medications & vitals. Pt expressed understanding with the diagnosis and plan.

## 2017-03-28 NOTE — PATIENT INSTRUCTIONS
Knee Arthritis: Exercises  Your Care Instructions  Here are some examples of exercises for knee arthritis. Start each exercise slowly. Ease off the exercise if you start to have pain. Your doctor or physical therapist will tell you when you can start these exercises and which ones will work best for you. How to do the exercises  Knee flexion with heel slide    1. Lie on your back with your knees bent. 2. Slide your heel back by bending your affected knee as far as you can. Then hook your other foot around your ankle to help pull your heel even farther back. 3. Hold for about 6 seconds, then rest for up to 10 seconds. 4. Repeat 8 to 12 times. 5. Switch legs and repeat steps 1 through 4, even if only one knee is sore. Quad sets    1. Sit with your affected leg straight and supported on the floor or a firm bed. Place a small, rolled-up towel under your knee. Your other leg should be bent, with that foot flat on the floor. 2. Tighten the thigh muscles of your affected leg by pressing the back of your knee down into the towel. 3. Hold for about 6 seconds, then rest for up to 10 seconds. 4. Repeat 8 to 12 times. 5. Switch legs and repeat steps 1 through 4, even if only one knee is sore. Straight-leg raises to the front    1. Lie on your back with your good knee bent so that your foot rests flat on the floor. Your affected leg should be straight. Make sure that your low back has a normal curve. You should be able to slip your hand in between the floor and the small of your back, with your palm touching the floor and your back touching the back of your hand. 2. Tighten the thigh muscles in your affected leg by pressing the back of your knee flat down to the floor. Hold your knee straight. 3. Keeping the thigh muscles tight and your leg straight, lift your affected leg up so that your heel is about 12 inches off the floor. Hold for about 6 seconds, then lower slowly.   4. Relax for up to 10 seconds between repetitions. 5. Repeat 8 to 12 times. 6. Switch legs and repeat steps 1 through 5, even if only one knee is sore. Active knee flexion    1. Lie on your stomach with your knees straight. If your kneecap is uncomfortable, roll up a washcloth and put it under your leg just above your kneecap. 2. Lift the foot of your affected leg by bending the knee so that you bring the foot up toward your buttock. If this motion hurts, try it without bending your knee quite as far. This may help you avoid any painful motion. 3. Slowly move your leg up and down. 4. Repeat 8 to 12 times. 5. Switch legs and repeat steps 1 through 4, even if only one knee is sore. Quadriceps stretch (facedown)    1. Lie flat on your stomach, and rest your face on the floor. 2. Wrap a towel or belt strap around the lower part of your affected leg. Then use the towel or belt strap to slowly pull your heel toward your buttock until you feel a stretch. 3. Hold for about 15 to 30 seconds, then relax your leg against the towel or belt strap. 4. Repeat 2 to 4 times. 5. Switch legs and repeat steps 1 through 4, even if only one knee is sore. Stationary exercise bike    If you do not have a stationary exercise bike at home, you can find one to ride at your local health club or community center. 1. Adjust the height of the bike seat so that your knee is slightly bent when your leg is extended downward. If your knee hurts when the pedal reaches the top, you can raise the seat so that your knee does not bend as much. 2. Start slowly. At first, try to do 5 to 10 minutes of cycling with little to no resistance. Then increase your time and the resistance bit by bit until you can do 20 to 30 minutes without pain. 3. If you start to have pain, rest your knee until your pain gets back to the level that is normal for you. Or cycle for less time or with less effort. Follow-up care is a key part of your treatment and safety.  Be sure to make and go to all appointments, and call your doctor if you are having problems. It's also a good idea to know your test results and keep a list of the medicines you take. Where can you learn more? Go to http://tung-tg.info/. Enter C159 in the search box to learn more about \"Knee Arthritis: Exercises. \"  Current as of: May 23, 2016  Content Version: 11.1  © 2970-7628 [employer], [employer]. Care instructions adapted under license by [employer] (which disclaims liability or warranty for this information). If you have questions about a medical condition or this instruction, always ask your healthcare professional. [address] any warranty or liability for your use of this information.

## 2017-03-28 NOTE — PROGRESS NOTES
Health Maintenance Due   Topic Date Due    DTaP/Tdap/Td series (1 - Tdap) 05/19/1979    COLONOSCOPY  12/14/2014    BREAST CANCER SCRN MAMMOGRAM  08/15/2015    PAP AKA CERVICAL CYTOLOGY  04/25/2017       Chief Complaint   Patient presents with    Follow-up     states that hydroxyzine is not on her insurance formulary    Mental Health Problem    Irritable Bowel Syndrome    Headache     states having headaches everyday and having to take tylenol 3 x a day    Knee Pain     states having trouble walking 3 days a week    Immunization/Injection     wasnt sure if her TDAP was due       1. Have you been to the ER, urgent care clinic since your last visit? Hospitalized since your last visit? No    2. Have you seen or consulted any other health care providers outside of the [address] [place] [address] since your last visit? Include any pap smears or colon screening. No    3) Do you have an Advance Directive on file? no    4) Are you interested in receiving information on Advance Directives? NO      Patient is accompanied by adult caretaker I have received verbal consent from Rexie Eisenmenger to discuss any/all medical information while they are present in the room.

## 2017-04-13 ENCOUNTER — TELEPHONE (OUTPATIENT)
Dept: INTERNAL MEDICINE CLINIC | Age: 57
End: 2017-04-13

## 2017-04-13 NOTE — TELEPHONE ENCOUNTER
Faxed off appeal form and letter yesterday for pt to receive hyroxyzine. Called pt to let her know.   Will wait for approval/denial

## 2017-04-13 NOTE — TELEPHONE ENCOUNTER
Received call from Muriel Wyatt, a pharmacist with [facility] team.  He had a few questions to ask and said that her hydroxyzine would be approved. Called patient to let her know. LM on her VM to that effect. Told her to wait until tomorrow to try to refill hydroxyzine.

## 2017-04-17 DIAGNOSIS — G47.00 INSOMNIA, UNSPECIFIED TYPE: ICD-10-CM

## 2017-04-17 DIAGNOSIS — F31.9 BIPOLAR 1 DISORDER (HCC): ICD-10-CM

## 2017-04-17 RX ORDER — HYDROXYZINE HYDROCHLORIDE 10 MG/1
TABLET, FILM COATED ORAL
Qty: 90 TAB | Refills: 1 | Status: SHIPPED | OUTPATIENT
Start: 2017-04-17 | End: 2017-04-18 | Stop reason: SDUPTHER

## 2017-04-18 ENCOUNTER — OFFICE VISIT (OUTPATIENT)
Dept: INTERNAL MEDICINE CLINIC | Age: 57
End: 2017-04-18

## 2017-04-18 VITALS
SYSTOLIC BLOOD PRESSURE: 118 MMHG | OXYGEN SATURATION: 98 % | HEIGHT: 60 IN | DIASTOLIC BLOOD PRESSURE: 68 MMHG | WEIGHT: 161 LBS | HEART RATE: 72 BPM | TEMPERATURE: 98.6 F | RESPIRATION RATE: 17 BRPM | BODY MASS INDEX: 31.61 KG/M2

## 2017-04-18 DIAGNOSIS — M25.561 PAIN IN BOTH KNEES, UNSPECIFIED CHRONICITY: ICD-10-CM

## 2017-04-18 DIAGNOSIS — G44.221 CHRONIC TENSION-TYPE HEADACHE, INTRACTABLE: Primary | ICD-10-CM

## 2017-04-18 DIAGNOSIS — F31.9 BIPOLAR 1 DISORDER (HCC): ICD-10-CM

## 2017-04-18 DIAGNOSIS — M25.562 PAIN IN BOTH KNEES, UNSPECIFIED CHRONICITY: ICD-10-CM

## 2017-04-18 DIAGNOSIS — G47.00 INSOMNIA, UNSPECIFIED TYPE: ICD-10-CM

## 2017-04-18 RX ORDER — GABAPENTIN 100 MG/1
200 CAPSULE ORAL 2 TIMES DAILY
Qty: 120 CAP | Refills: 5 | Status: SHIPPED | OUTPATIENT
Start: 2017-04-18 | End: 2017-10-13 | Stop reason: SDUPTHER

## 2017-04-18 RX ORDER — HYDROXYZINE HYDROCHLORIDE 10 MG/1
TABLET, FILM COATED ORAL
Qty: 90 TAB | Refills: 4 | Status: SHIPPED | OUTPATIENT
Start: 2017-04-18 | End: 2017-06-05 | Stop reason: SDUPTHER

## 2017-04-18 RX ORDER — ESTRADIOL 0.05 MG/D
1 FILM, EXTENDED RELEASE TRANSDERMAL
Refills: 3 | COMMUNITY
Start: 2017-04-05 | End: 2018-08-21 | Stop reason: ALTCHOICE

## 2017-04-18 RX ORDER — 1.1% SODIUM FLUORIDE PRESCRIPTION DENTAL CREAM 5 MG/G
1 CREAM DENTAL
Refills: 4 | COMMUNITY
Start: 2017-03-29 | End: 2019-09-03 | Stop reason: ALTCHOICE

## 2017-04-18 NOTE — PROGRESS NOTES
HISTORY OF PRESENT ILLNESS  Italo Peters is a 60 y.o. female here for follow up.her knee pain is better. She has better balance to walk now. Her headache is better but need more dose adjustment. Has bipolar disorder. on med. doing well. Seeing psych and therapist,stable. Headache   Associated symptoms include headaches. Pertinent negatives include no shortness of breath. Follow Up Chronic Condition   Associated symptoms include headaches. Pertinent negatives include no shortness of breath. Follow-up   Associated symptoms include headaches. Pertinent negatives include no shortness of breath. Mental Health Problem   Associated symptoms include headaches. Pertinent negatives include no shortness of breath. Irritable Bowel Syndrome   Associated symptoms include headaches. Pertinent negatives include no shortness of breath. Knee Pain   Associated symptoms include headaches. Pertinent negatives include no shortness of breath. Immunization/Injection   Associated symptoms include headaches. Pertinent negatives include no shortness of breath. Hospital Follow Up   Associated symptoms include headaches. Pertinent negatives include no shortness of breath. Insomnia   Associated symptoms include headaches. Pertinent negatives include no shortness of breath. Arthritis   Associated symptoms include headaches. Pertinent negatives include no shortness of breath. Cold Symptoms   Associated symptoms include headaches. Pertinent negatives include no shortness of breath and no wheezing. Review of Systems   Constitutional: Negative. Eyes: Negative. Respiratory: Negative. Negative for shortness of breath and wheezing. Cardiovascular: Negative. Musculoskeletal: Positive for joint pain. Negative for falls. Skin: Negative. Neurological: Positive for headaches. Psychiatric/Behavioral: Positive for depression. The patient is nervous/anxious and has insomnia.         Physical Exam Constitutional: She appears well-developed and well-nourished. No distress. Neck: Normal range of motion. Neck supple. No JVD present. No thyromegaly present. Cardiovascular: Normal rate, regular rhythm, normal heart sounds and intact distal pulses. Pulmonary/Chest: Effort normal and breath sounds normal. No respiratory distress. She has no wheezes. Musculoskeletal: She exhibits tenderness. B/L knee:tender. ROm restricted   Psychiatric: She has a normal mood and affect. Her behavior is normal.   Slightly manic. ASSESSMENT and PLAN    Mark Aden was seen today for headache and follow up chronic condition. Diagnoses and all orders for this visit:    Chronic tension-type headache, intractable    better but need dose adjustment. Will increase,  -     gabapentin (NEURONTIN) 100 mg capsule; Take 2 Caps by mouth two (2) times a day. Anxiety  Will refill,  -     hydrOXYzine HCl (ATARAX) 10 mg tablet; 1 tab po TID    Bipolar 1 disorder (HCC)  -     hydrOXYzine HCl (ATARAX) 10 mg tablet; 1 tab po TID    Pain in both knees, unspecified chronicity    On voltarin gel. Discussed expected course/resolution/complications of diagnosis in detail with patient. Medication risks/benefits/costs/interactions/alternatives discussed with patient. Pt was given an after visit summary which includes diagnoses, current medications & vitals. Pt expressed understanding with the diagnosis and plan.

## 2017-04-18 NOTE — MR AVS SNAPSHOT
Visit Information Date & Time Provider Department Dept. Phone Encounter #  
 4/18/2017 10:45 AM Sy Álvarez MD [facility] Internal Medicine [facility] [phone] [id number] Your Appointments 5/16/2017  9:30 AM  
ROUTINE CARE with Sy Álvarez MD  
[address] ([facility]) Appt Note: 3 month follow up [address] [place] [address]. [place] [phone]  
  
   
 [address] [place] [address]. [address] [place] [phone] Upcoming Health Maintenance Date Due DTaP/Tdap/Td series (1 - Tdap) 5/19/1979 COLONOSCOPY 12/14/2014 BREAST CANCER SCRN MAMMOGRAM 8/15/2015 PAP AKA CERVICAL CYTOLOGY 4/25/2017 Allergies as of 4/18/2017  Review Complete On: 4/18/2017 By: Sy Álvarez MD  
  
 Severity Noted Reaction Type Reactions Buspar [Buspirone] Medium 02/09/2017   Side Effect Other (comments) Causes \"stimulation\" that could exacerbate a manic attack/phase of pt's Biplar. Reported by patient Dicyclomine  10/14/2014    Nausea and Vomiting Extreme stomach pains Lithium  01/03/2014   Systemic Nausea and Vomiting Severe nausea and vomiting. Other Medication  11/19/2010    Other (comments) Intolerance to oranges, cabbage,beans,peppers,raw onions,foods with caffeine in them, popcorn, no pop sodas, because of IBS Current Immunizations  Reviewed on 11/15/2016 Name Date Influenza Vaccine 10/13/2014 Not reviewed this visit You Were Diagnosed With   
  
 Codes Comments Chronic tension-type headache, intractable    -  Primary ICD-10-CM: B04.896 ICD-9-CM: 339.12 Insomnia, unspecified type     ICD-10-CM: G47.00 ICD-9-CM: 780.52 Bipolar 1 disorder (HCC)     ICD-10-CM: F31.9 ICD-9-CM: 296.7 Pain in both knees, unspecified chronicity     ICD-10-CM: M25.561, M25.562 ICD-9-CM: 719.46 Vitals BP Pulse Temp Resp Height(growth percentile) Weight(growth percentile) 118/68 (BP 1 Location: Right arm, BP Patient Position: Sitting) 72 98.6 °F (37 °C) (Oral) 17 5' (1.524 m) 161 lb (73 kg) SpO2 BMI OB Status Smoking Status 98% 31.44 kg/m2 Hysterectomy Never Smoker Vitals History BMI and BSA Data Body Mass Index Body Surface Area  
 31.44 kg/m 2 1.76 m 2 Preferred Pharmacy Pharmacy Name Phone [facility] [phone] Your Updated Medication List  
  
   
This list is accurate as of: 4/18/17 11:19 AM.  Always use your most recent med list.  
  
  
  
  
 butalbital-acetaminophen  mg tablet Commonly known as:  PHRENILIN  
  
 diclofenac 1 % Gel Commonly known as:  VOLTAREN Apply  to affected area three (3) times daily as needed. diphenhydrAMINE 25 mg capsule Commonly known as:  BENADRYL Take 2 Caps by mouth nightly. estradiol 0.05 mg/24 hr  
Commonly known as:  VIVELLE  
  
 famotidine 20 mg tablet Commonly known as:  PEPCID Take 1 Tab by mouth two (2) times a day. D/C omeprazole  
  
 fenofibrate nanocrystallized 145 mg tablet Commonly known as:  [employer] Take 1 Tab by mouth every fourty-eight (48) hours. fluorometholone 0.1 % ophthalmic suspension Commonly known as: [address] [facility] Administer 1 Drop to both eyes two (2) times a day. fluticasone 50 mcg/actuation nasal spray Commonly known as:  FLONASE  
USE ONE SPRAY IN EACH NOSTRIL TWO TIMES A DAY. gabapentin 100 mg capsule Commonly known as:  NEURONTIN Take 2 Caps by mouth two (2) times a day.  
  
 hydrOXYzine HCl 10 mg tablet Commonly known as:  ATARAX  
1 tab po TID OLANZapine 5 mg tablet Commonly known as:  ZyPREXA Take 5 mg by mouth nightly. polyethylene glycol 17 gram/dose powder Commonly known as:  Adiel Early Take 17 g by mouth daily. SF 5000 PLUS 1.1 % Crea Generic drug:  sodium fluoride  
  
 vilazodone 10 mg Tab tablet Commonly known as:  VIIBRYD Take 20 mg by mouth daily. VITAMIN D3 1,000 unit Cap Generic drug:  cholecalciferol Take 1 Tab by mouth daily. Indications: VITAMIN D DEFICIENCY  
  
 vitamin E 400 unit capsule Commonly known as:  [address] Take 400 Units by mouth daily. Prescriptions Sent to Pharmacy Refills  
 hydrOXYzine HCl (ATARAX) 10 mg tablet 4 Si tab po TID Class: Normal  
 Pharmacy: [address]  Ph #: [phone]  
 gabapentin (NEURONTIN) 100 mg capsule 5 Sig: Take 2 Caps by mouth two (2) times a day. Class: Normal  
 Pharmacy: [address]  Ph #: [phone] Route: Oral  
  
Introducing [facility] & HEALTH SERVICES! Dear Shellie Carvalho: Thank you for requesting a [email or web address] account. Our records indicate that you already have an active [email or web address] account. You can access your account anytime at [email or web address] Did you know that you can access your hospital and ER discharge instructions at any time in [email or web address]? You can also review all of your test results from your hospital stay or ER visit. Additional Information If you have questions, please visit the Frequently Asked Questions section of the [email or web address] website at [email or web address] Remember, [email or web address] is NOT to be used for urgent needs. For medical emergencies, dial 911. Now available from your iPhone and Android! Please provide this summary of care documentation to your next provider. Your primary care clinician is listed as Beata Yi. If you have any questions after today's visit, please call [phone].

## 2017-04-18 NOTE — PROGRESS NOTES
Reviewed record in preparation for visit and have obtained necessary documentation. Identified pt with two pt identifiers(name and ). Health Maintenance Due   Topic    DTaP/Tdap/Td series (1 - Tdap)    COLONOSCOPY     BREAST CANCER SCRN MAMMOGRAM     PAP AKA CERVICAL CYTOLOGY          Chief Complaint   Patient presents with    Headache          Learning Assessment:  :     Learning Assessment 2/10/2015   PRIMARY LEARNER Patient   PRIMARY LANGUAGE ENGLISH   LEARNER PREFERENCE PRIMARY READING   ANSWERED BY patient   RELATIONSHIP SELF       Depression Screening:  :     No flowsheet data found. Fall Risk Assessment:  :     Fall Risk Assessment, last 12 mths 2015   Able to walk? Yes   Fall in past 12 months? Yes   Fall with injury? No   Number of falls in past 12 months 8 or more   Fall Risk Score 8       Abuse Screening:  :     Abuse Screening Questionnaire 3/22/2016   Do you ever feel afraid of your partner? N   Are you in a relationship with someone who physically or mentally threatens you? N   Is it safe for you to go home? Y       Coordination of Care Questionnaire:  :     1) Have you been to an emergency room, urgent care clinic since your last visit? no   Hospitalized since your last visit? no             2) Have you seen or consulted any other health care providers outside of [address] [place] [address] since your last visit? yes  (Include any pap smears or colon screenings in this section.)    3) Do you have an Advance Directive on file? no    4) Are you interested in receiving information on Advance Directives? NO      Patient is accompanied by self I have received verbal consent from Leslie Cooper to discuss any/all medical information while they are present in the room.

## 2017-05-02 DIAGNOSIS — K21.9 GASTROESOPHAGEAL REFLUX DISEASE WITHOUT ESOPHAGITIS: ICD-10-CM

## 2017-05-02 RX ORDER — FAMOTIDINE 20 MG/1
TABLET, FILM COATED ORAL
Qty: 60 TAB | Refills: 3 | Status: SHIPPED | OUTPATIENT
Start: 2017-05-02 | End: 2017-08-29 | Stop reason: SDUPTHER

## 2017-05-15 RX ORDER — FLUTICASONE PROPIONATE 50 MCG
SPRAY, SUSPENSION (ML) NASAL
Qty: 16 G | Refills: 1 | Status: SHIPPED | OUTPATIENT
Start: 2017-05-15 | End: 2017-07-11 | Stop reason: SDUPTHER

## 2017-05-30 ENCOUNTER — OFFICE VISIT (OUTPATIENT)
Dept: INTERNAL MEDICINE CLINIC | Age: 57
End: 2017-05-30

## 2017-05-30 VITALS
BODY MASS INDEX: 31.22 KG/M2 | OXYGEN SATURATION: 98 % | TEMPERATURE: 98.3 F | WEIGHT: 159 LBS | SYSTOLIC BLOOD PRESSURE: 100 MMHG | DIASTOLIC BLOOD PRESSURE: 66 MMHG | HEIGHT: 60 IN | RESPIRATION RATE: 20 BRPM | HEART RATE: 78 BPM

## 2017-05-30 DIAGNOSIS — Z00.00 MEDICARE ANNUAL WELLNESS VISIT, INITIAL: ICD-10-CM

## 2017-05-30 DIAGNOSIS — M81.0 OSTEOPOROSIS, UNSPECIFIED OSTEOPOROSIS TYPE, UNSPECIFIED PATHOLOGICAL FRACTURE PRESENCE: ICD-10-CM

## 2017-05-30 DIAGNOSIS — R60.0 BILATERAL EDEMA OF LOWER EXTREMITY: Primary | ICD-10-CM

## 2017-05-30 DIAGNOSIS — Z71.89 ACP (ADVANCE CARE PLANNING): ICD-10-CM

## 2017-05-30 DIAGNOSIS — I10 ESSENTIAL HYPERTENSION: ICD-10-CM

## 2017-05-30 DIAGNOSIS — Z23 ENCOUNTER FOR IMMUNIZATION: ICD-10-CM

## 2017-05-30 DIAGNOSIS — F31.10 BIPOLAR AFFECTIVE DISORDER, MANIC (HCC): ICD-10-CM

## 2017-05-30 RX ORDER — LOSARTAN POTASSIUM 25 MG/1
25 TABLET ORAL DAILY
Qty: 30 TAB | Refills: 3 | Status: SHIPPED | OUTPATIENT
Start: 2017-05-30 | End: 2017-09-26 | Stop reason: SDUPTHER

## 2017-05-30 RX ORDER — ALENDRONATE SODIUM 70 MG/1
70 TABLET ORAL
Qty: 4 TAB | Refills: 12 | Status: SHIPPED | OUTPATIENT
Start: 2017-05-30 | End: 2018-06-08 | Stop reason: SDUPTHER

## 2017-05-30 RX ORDER — MULTIVITAMIN
1 TABLET ORAL DAILY
Qty: 30 TAB | Refills: 12 | Status: SHIPPED | OUTPATIENT
Start: 2017-05-30 | End: 2019-10-01 | Stop reason: ALTCHOICE

## 2017-05-30 RX ORDER — AMLODIPINE BESYLATE 5 MG/1
TABLET ORAL
Refills: 5 | COMMUNITY
Start: 2017-05-15 | End: 2017-06-28

## 2017-05-30 NOTE — PATIENT INSTRUCTIONS
Schedule of Personalized Health Plan  (Provide Copy to Patient)  The best way to stay healthy is to live a healthy lifestyle. A healthy lifestyle includes regular exercise, eating a well-balanced diet, keeping a healthy weight and not smoking. Regular physical exams and screening tests are another important way to take care of yourself. Preventive exams provided by health care providers can find health problems early when treatment works best and can keep you from getting certain diseases or illnesses. Preventive services include exams, lab tests, screenings, shots, monitoring and information to help you take care of your own health. All people over 65 should have a pneumonia shot. Pneumonia shots are usually only needed once in a lifetime unless your doctor decides differently. All people over 65 should have a yearly flu shot. People over 65 are at medium to high risk for Hepatitis B. Three shots are needed for complete protection. In addition to your physical exam, some screening tests are recommended:    Bone mass measurement (dexa scan) is recommended every two years. done already  Diabetes Mellitus screening is recommended every year. Glaucoma is an eye disease caused by high pressure in the eye. An eye exam is recommended every year. Recent eye check up April 2017. Cardiovascular screening tests that check your cholesterol and other blood fat (lipid) levels are recommended every five years. Colorectal Cancer screening tests help to find pre-cancerous polyps (growths in the colon) so they can be removed before they turn into cancer. Tests ordered for screening depend on your personal and family history risk factors. Screening for Breast Cancer is recommended yearly with a mammogram.    Screening for Cervical Cancer is recommended every two years (annually for certain risk factors, such as previous history of STD or abnormal PAP in past 7 years), with a Pelvic Exam with PAP. no need for pap anymore. Colonoscopy is up to date. SHe will see GI soon.   Here is a list of your current Health Maintenance items with a due date:  Health Maintenance   Topic Date Due    DTaP/Tdap/Td series (1 - Tdap) 05/19/1979    COLONOSCOPY  12/14/2014    PAP AKA CERVICAL CYTOLOGY  04/25/2017    INFLUENZA AGE 9 TO ADULT  08/01/2017    BREAST CANCER SCRN MAMMOGRAM  03/28/2019    Hepatitis C Screening  Completed

## 2017-05-30 NOTE — MR AVS SNAPSHOT
Visit Information Date & Time Provider Department Dept. Phone Encounter #  
 5/30/2017  9:00 AM Bryce Segundo MD [facility] Internal Medicine [facility] [phone] [id number] Your Appointments 7/18/2017  9:30 AM  
ROUTINE CARE with Bryce Segundo MD  
[facility] ([facility]) Appt Note: 3 month follow up [address] [place] [address]. [place] [phone]  
  
   
 [address] [place] [address]. [address] [place] [phone] Upcoming Health Maintenance Date Due DTaP/Tdap/Td series (1 - Tdap) 5/19/1979 COLONOSCOPY 12/14/2014 PAP AKA CERVICAL CYTOLOGY 4/25/2017 INFLUENZA AGE 9 TO ADULT 8/1/2017 BREAST CANCER SCRN MAMMOGRAM 3/28/2019 Allergies as of 5/30/2017  Review Complete On: 5/30/2017 By: Bryce Segundo MD  
  
 Severity Noted Reaction Type Reactions Buspar [Buspirone] Medium 02/09/2017   Side Effect Other (comments) Causes \"stimulation\" that could exacerbate a manic attack/phase of pt's Biplar. Reported by patient Dicyclomine  10/14/2014    Nausea and Vomiting Extreme stomach pains Lithium  01/03/2014   Systemic Nausea and Vomiting Severe nausea and vomiting. Other Medication  11/19/2010    Other (comments) Intolerance to oranges, cabbage,beans,peppers,raw onions,foods with caffeine in them, popcorn, no pop sodas, because of IBS Current Immunizations  Reviewed on 5/30/2017 Name Date Influenza Vaccine 10/13/2014 Reviewed by Bryce Segundo MD on 5/30/2017 at  9:18 AM  
You Were Diagnosed With   
  
 Codes Comments Bilateral edema of lower extremity    -  Primary ICD-10-CM: R60.0 ICD-9-CM: 782.3 Essential hypertension     ICD-10-CM: I10 
ICD-9-CM: 401.9 Bipolar affective disorder, manic ([facility]     ICD-10-CM: F31.10 ICD-9-CM: 296.40 Encounter for immunization     ICD-10-CM: M33 ICD-9-CM: V03.89   
 Osteoporosis, unspecified osteoporosis type, unspecified pathological fracture presence     ICD-10-CM: M81.0 ICD-9-CM: 733.00 Medicare annual wellness visit, initial     ICD-10-CM: Z00.00 ICD-9-CM: V70.0 ACP (advance care planning)     ICD-10-CM: Z71.89 ICD-9-CM: V65.49 Vitals BP Pulse Temp Resp Height(growth percentile) Weight(growth percentile) 100/66 (BP 1 Location: Left arm, BP Patient Position: Sitting) 78 98.3 °F (36.8 °C) (Oral) 20 5' (1.524 m) 159 lb (72.1 kg) SpO2 BMI OB Status Smoking Status 98% 31.05 kg/m2 Hysterectomy Never Smoker Vitals History BMI and BSA Data Body Mass Index Body Surface Area 31.05 kg/m 2 1.75 m 2 Preferred Pharmacy Pharmacy Name [facility] [phone] Your Updated Medication List  
  
   
This list is accurate as of: 5/30/17  9:25 AM.  Always use your most recent med list.  
  
  
  
  
 alendronate 70 mg tablet Commonly known as:  FOSAMAX Take 1 Tab by mouth every seven (7) days. amLODIPine 5 mg tablet Commonly known as:  Simon Ezra TAKE 1 TABLET BY MOUTH EVERY DAY. butalbital-acetaminophen  mg tablet Commonly known as:  PHRENILIN  
  
 calcium-cholecalciferol (D3) tablet Commonly known as:  CALTRATE 600+D Take 1 Tab by mouth daily. diclofenac 1 % Gel Commonly known as:  VOLTAREN Apply  to affected area three (3) times daily as needed. diph,Pertuss(Acell),Tet Vac-PF 2 Lf-(2.5-5-3-5 mcg)-5Lf/0.5 mL susp Commonly known as:  ADACEL  
0.5 mL by IntraMUSCular route once for 1 dose. diphenhydrAMINE 25 mg capsule Commonly known as:  BENADRYL Take 2 Caps by mouth nightly. estradiol 0.05 mg/24 hr  
Commonly known as:  VIVELLE  
  
 famotidine 20 mg tablet Commonly known as:  PEPCID  
TAKE 1 TABLET BY MOUTH TWO (2) TIMES A DAY. STOP OMEPRAZOLE. fenofibrate nanocrystallized 145 mg tablet Commonly known as:  [employer] Take 1 Tab by mouth every fourty-eight (48) hours. fluorometholone 0.1 % ophthalmic suspension Commonly known as: [address] [facility] Administer 1 Drop to both eyes two (2) times a day. fluticasone 50 mcg/actuation nasal spray Commonly known as:  FLONASE  
USE ONE SPRAY IN EACH NOSTRIL TWO TIMES A DAY. gabapentin 100 mg capsule Commonly known as:  NEURONTIN Take 2 Caps by mouth two (2) times a day.  
  
 hydrOXYzine HCl 10 mg tablet Commonly known as:  ATARAX  
1 tab po TID  
  
 losartan 25 mg tablet Commonly known as:  COZAAR Take 1 Tab by mouth daily. OLANZapine 5 mg tablet Commonly known as:  ZyPREXA Take 5 mg by mouth nightly. pneumococcal 13 luis conj dip 0.5 mL Syrg injection Commonly known as:  PREVNAR-13  
0.5 mL by IntraMUSCular route once for 1 dose. polyethylene glycol 17 gram/dose powder Commonly known as:  Joi Adolfo Take 17 g by mouth daily. SF 5000 PLUS 1.1 % Crea Generic drug:  sodium fluoride  
  
 vilazodone 10 mg Tab tablet Commonly known as:  VIIBRYD Take 20 mg by mouth daily. VITAMIN D3 1,000 unit Cap Generic drug:  cholecalciferol Take 1 Tab by mouth daily. Indications: VITAMIN D DEFICIENCY  
  
 vitamin E 400 unit capsule Commonly known as:  [address] Take 400 Units by mouth daily. Prescriptions Sent to Pharmacy Refills  
 losartan (COZAAR) 25 mg tablet 3 Sig: Take 1 Tab by mouth daily. Class: Normal  
 Pharmacy: [address] Ph #: [phone] Route: Oral  
 pneumococcal 13 luis conj dip (PREVNAR-13) 0.5 mL syrg injection 0 Si.5 mL by IntraMUSCular route once for 1 dose. Class: Normal  
 Pharmacy: [address] Ph #: [phone]  Route: IntraMUSCular  
 alendronate (FOSAMAX) 70 mg tablet 12  
 Sig: Take 1 Tab by mouth every seven (7) days. Class: Normal  
 Pharmacy: [address] Ph #: [phone] Route: Oral  
 calcium-cholecalciferol, D3, (CALTRATE 600+D) tablet 12 Sig: Take 1 Tab by mouth daily. Class: Normal  
 Pharmacy: [address] Ph #: [phone] Route: Oral  
 diph,Pertuss,Acell,,Tet Vac-PF (ADACEL) 2 Lf-(2.5-5-3-5 mcg)-5Lf/0.5 mL susp 0 Si.5 mL by IntraMUSCular route once for 1 dose. Class: Normal  
 Pharmacy: [address] Ph #: [phone] Route: IntraMUSCular Patient Instructions Schedule of Personalized Health Plan (Provide Copy to Patient) The best way to stay healthy is to live a healthy lifestyle. A healthy lifestyle includes regular exercise, eating a well-balanced diet, keeping a healthy weight and not smoking. Regular physical exams and screening tests are another important way to take care of yourself. Preventive exams provided by health care providers can find health problems early when treatment works best and can keep you from getting certain diseases or illnesses. Preventive services include exams, lab tests, screenings, shots, monitoring and information to help you take care of your own health. All people over 65 should have a pneumonia shot. Pneumonia shots are usually only needed once in a lifetime unless your doctor decides differently. All people over 65 should have a yearly flu shot. People over 65 are at medium to high risk for Hepatitis B. Three shots are needed for complete protection. In addition to your physical exam, some screening tests are recommended: 
 
Bone mass measurement (dexa scan) is recommended every two years. done already Diabetes Mellitus screening is recommended every year. Glaucoma is an eye disease caused by high pressure in the eye.   An eye exam is recommended every year. Recent eye check up April 2017. Cardiovascular screening tests that check your cholesterol and other blood fat (lipid) levels are recommended every five years. Colorectal Cancer screening tests help to find pre-cancerous polyps (growths in the colon) so they can be removed before they turn into cancer. Tests ordered for screening depend on your personal and family history risk factors. Screening for Breast Cancer is recommended yearly with a mammogram. 
 
Screening for Cervical Cancer is recommended every two years (annually for certain risk factors, such as previous history of STD or abnormal PAP in past 7 years), with a Pelvic Exam with PAP. no need for pap anymore. Colonoscopy is up to date. SHe will see GI soon. Here is a list of your current Health Maintenance items with a due date: 
Health Maintenance Topic Date Due  
 DTaP/Tdap/Td series (1 - Tdap) 05/19/1979  COLONOSCOPY  12/14/2014  PAP AKA CERVICAL CYTOLOGY  04/25/2017  INFLUENZA AGE 9 TO ADULT  08/01/2017  BREAST CANCER SCRN MAMMOGRAM  03/28/2019  Hepatitis C Screening  Completed Introducing [facility] & HEALTH SERVICES! Dear Bowen Bashir: Thank you for requesting a [employer] account. Our records indicate that you already have an active [employer] account. You can access your account anytime at [email or web address] Did you know that you can access your hospital and ER discharge instructions at any time in [employer]? You can also review all of your test results from your hospital stay or ER visit. Additional Information If you have questions, please visit the Frequently Asked Questions section of the [employer] website at [email or web address] Remember, [employer] is NOT to be used for urgent needs. For medical emergencies, dial 911. Now available from your iPhone and Android! Please provide this summary of care documentation to your next provider. Your primary care clinician is listed as [facility] Garib[facility]. If you have any questions after today's visit, please call [phone].

## 2017-05-30 NOTE — PROGRESS NOTES
Health Maintenance Due   Topic Date Due    DTaP/Tdap/Td series (1 - Tdap) 05/19/1979    COLONOSCOPY  12/14/2014    BREAST CANCER SCRN MAMMOGRAM  08/15/2015    PAP AKA CERVICAL CYTOLOGY  04/25/2017       Chief Complaint   Patient presents with    Ankle swelling     ankle, feet and legs x 2 weeks   [employer] Visit       1. Have you been to the ER, urgent care clinic since your last visit? Hospitalized since your last visit? No    2. Have you seen or consulted any other health care providers outside of the [employer] since your last visit? Include any pap smears or colon screening. No    3) Do you have an Advance Directive on file? no    4) Are you interested in receiving information on Advance Directives? NO      Patient is accompanied by self I have received verbal consent from Budianne Riding to discuss any/all medical information while they are present in the room.

## 2017-05-30 NOTE — PROGRESS NOTES
Leslie Cooper is a 59 y.o. female and presents for annual Medicare Wellness Visit. Problem List: Reviewed with patient and discussed risk factors. Patient Active Problem List   Diagnosis Code    Arthritis M19.90    Gallstones with obstruction of gallbladder K80.21    Choledocholithiasis K80.50    Bipolar affective disorder, manic ([facility]) F31.10    IBS (irritable bowel syndrome) K58.9    Chronic headache R51    Drop attack R55    Altered mental status R41.82    Delirium R41.0    Hyponatremia E87.1    Bipolar 1 disorder ([address] F31.9    ACP (advance care planning) Z71.89       Current medical providers:  Patient Care Team:  Fran Cuevas MD as PCP - General (Internal Medicine)  Markus Song MD (Gynecology)  Eldon Stevens MD (Gastroenterology)  Te Ordonez MD (Psychiatry)  Alexia Turner MD ([facility] [facility])  Katya Mckeon LPN as Ambulatory Care Navigator (Internal Medicine)  Najma Akbar RN as Nurse Navigator (Internal Medicine)    PSH: Reviewed with patient  Past Surgical History:   Procedure Laterality Date    HX APPENDECTOMY  80    HX CHOLECYSTECTOMY  11/23/10    cholecystectomy    HX GI  2005    prolasped rectum    HX GYN  1998    hysterectomy    HX OTHER SURGICAL  1987    EXPLORATORY LAPAROSCOPY    LAPAROSCOPY ABDOMEN DIAGNOSTIC  1987        SH: Reviewed with patient  Social History   Substance Use Topics    Smoking status: Never Smoker    Smokeless tobacco: Never Used    Alcohol use No       FH: Reviewed with patient  Family History   Problem Relation Age of Onset    Hypertension Father     Cancer Father      SKIN CA    Colon Polyps Father     Other Maternal Uncle      CHAROT-MARISELA-TOOTH    Diabetes Paternal Grandfather        Medications/Allergies: Reviewed with patient  Current Outpatient Prescriptions on File Prior to Visit   Medication Sig Dispense Refill    fluticasone (FLONASE) 50 mcg/actuation nasal spray USE ONE SPRAY IN EACH NOSTRIL TWO TIMES A DAY. 16 g 1    famotidine (PEPCID) 20 mg tablet TAKE 1 TABLET BY MOUTH TWO (2) TIMES A DAY. STOP OMEPRAZOLE. 60 Tab 3    estradiol (VIVELLE) 0.05 mg/24 hr   3    SF 5000 PLUS 1.1 % crea   4    hydrOXYzine HCl (ATARAX) 10 mg tablet 1 tab po TID 90 Tab 4    gabapentin (NEURONTIN) 100 mg capsule Take 2 Caps by mouth two (2) times a day. 120 Cap 5    diphenhydrAMINE (BENADRYL) 25 mg capsule Take 2 Caps by mouth nightly. 60 Cap 3    butalbital-acetaminophen (PHRENILIN)  mg tablet   0    OLANZapine (ZYPREXA) 5 mg tablet Take 5 mg by mouth nightly. 2    diclofenac (VOLTAREN) 1 % gel Apply  to affected area three (3) times daily as needed. 100 g 2    polyethylene glycol (MIRALAX) 17 gram/dose powder Take 17 g by mouth daily. 850 g 3    vilazodone (VIIBRYD) 10 mg tab tablet Take 20 mg by mouth daily.  vitamin E (AQUA GEMS) 400 unit capsule Take 400 Units by mouth daily.  fenofibrate nanocrystallized (TRICOR) 145 mg tablet Take 1 Tab by mouth every fourty-eight (48) hours. 30 Tab 5    fluorometholone (FML) 0.1 % ophthalmic suspension Administer 1 Drop to both eyes two (2) times a day. 99    Cholecalciferol, Vitamin D3, (VITAMIN D3) 1,000 unit cap Take 1 Tab by mouth daily. Indications: VITAMIN D DEFICIENCY       No current facility-administered medications on file prior to visit. Allergies   Allergen Reactions    Buspar [Buspirone] Other (comments)     Causes \"stimulation\" that could exacerbate a manic attack/phase of pt's Biplar. Reported by patient    Dicyclomine Nausea and Vomiting     Extreme stomach pains    Lithium Nausea and Vomiting     Severe nausea and vomiting.     Other Medication Other (comments)     Intolerance to oranges, cabbage,beans,peppers,raw onions,foods with caffeine in them, popcorn, no pop sodas, because of IBS       Objective:  Visit Vitals    /66 (BP 1 Location: Left arm, BP Patient Position: Sitting)    Pulse 78    Temp 98.3 °F (36.8 °C) (Oral)    Resp 20  Ht 5' (1.524 m)    Wt 159 lb (72.1 kg)    SpO2 98%    BMI 31.05 kg/m2    Body mass index is 31.05 kg/(m^2). Assessment of cognitive impairment: Alert and oriented x 3    Depression Screen:   PHQ over the last two weeks 5/30/2017   Little interest or pleasure in doing things Not at all   Feeling down, depressed or hopeless Not at all   Total Score PHQ 2 0       Fall Risk Assessment:    Fall Risk Assessment, last 12 mths 5/30/2017   Able to walk? Yes   Fall in past 12 months? No   Fall with injury? -   Number of falls in past 12 months -   Fall Risk Score -       Functional Ability:   Does the patient exhibit a steady gait? yes   How long did it take the patient to get up and walk from a sitting position? 1 min   Is the patient self reliant?  (ie can do own laundry, meals, household chores)  yes     Does the patient handle his/her own medications? yes     Does the patient handle his/her own money? yes     Is the patients home safe (ie good lighting, handrails on stairs and bath, etc.)? no     Did you notice or did patient express any hearing difficulties? no     Did you notice or did patient express any vision difficulties?   no     Were distance and reading eye charts used? no       Advance Care Planning:   Patient was offered the opportunity to discuss advance care planning:  yes     Does patient have an Advance Directive:  no   If no, did you provide information on Caring Connections?   yes       Plan:      Orders Placed This Encounter    amLODIPine (NORVASC) 5 mg tablet    losartan (COZAAR) 25 mg tablet    pneumococcal 13 luis conj dip (PREVNAR-13) 0.5 mL syrg injection    alendronate (FOSAMAX) 70 mg tablet    calcium-cholecalciferol, D3, (CALTRATE 600+D) tablet    diph,Pertuss,Acell,,Tet Vac-PF (ADACEL) 2 Lf-(2.5-5-3-5 mcg)-5Lf/0.5 mL susp       Health Maintenance   Topic Date Due    DTaP/Tdap/Td series (1 - Tdap) 05/19/1979    COLONOSCOPY  12/14/2014    PAP AKA CERVICAL CYTOLOGY 04/25/2017    INFLUENZA AGE 9 TO ADULT  08/01/2017    BREAST CANCER SCRN MAMMOGRAM  03/28/2019    Hepatitis C Screening  Completed       Patient verbalized understanding and agreement with the plan. A copy of the After Visit Summary with personalized health plan was given to the patient today.

## 2017-05-30 NOTE — PROGRESS NOTES
HISTORY OF PRESENT ILLNESS  Robyn Monique is a 59 y.o. female here for follow up.noticed to have b/L leg edema and she feels her whole body swelled up. no weight gain. No SOB or orthopnea. No chest pain. Has bipolar disorder. on med. doing well. Seeing psych and therapist,stable. She has no living will. Here for [facility] wellness visit too. Ankle swelling      Hypertension    Pertinent negatives include no shortness of breath. Bipolar   Pertinent negatives include no shortness of breath. Follow-up   Pertinent negatives include no shortness of breath. Mental Health Problem   Pertinent negatives include no shortness of breath. Immunization/Injection   Pertinent negatives include no shortness of breath. Insomnia   Pertinent negatives include no shortness of breath. Review of Systems   Constitutional: Negative. HENT: Negative. Eyes: Negative. Respiratory: Negative. Negative for shortness of breath and wheezing. Cardiovascular: Positive for leg swelling. Gastrointestinal: Negative. Musculoskeletal: Positive for joint pain. Negative for falls. Skin: Negative. Psychiatric/Behavioral: Positive for depression. The patient is nervous/anxious and has insomnia. Physical Exam   Constitutional: She appears well-developed and well-nourished. No distress. Neck: Normal range of motion. Neck supple. No JVD present. No thyromegaly present. Cardiovascular: Normal rate, regular rhythm, normal heart sounds and intact distal pulses. Pulmonary/Chest: Effort normal and breath sounds normal. No respiratory distress. She has no wheezes. Musculoskeletal: She exhibits edema. She exhibits no tenderness. B/L +leg edema   Psychiatric: She has a normal mood and affect. Her behavior is normal.   Slightly manic. ASSESSMENT and PLAN    Alex Hopson was seen today for ankle swelling, annual wellness visit, hypertension and bipolar.     Diagnoses and all orders for this visit:    Bilateral edema of lower extremity    leg elevation. Will D/C norvasc 5 mg.    low salt diet. Essential hypertension    BP is OK. Will D/c norvasc.will start,  -     losartan (COZAAR) 25 mg tablet; Take 1 Tab by mouth daily. Bipolar affective disorder, manic ([address]    Seeing . Encounter for immunization  Will order,  -     pneumococcal 13 luis conj dip (PREVNAR-13) 0.5 mL syrg injection; 0.5 mL by IntraMUSCular route once for 1 dose.  -     diph,Pertuss,Acell,,Tet Vac-PF (ADACEL) 2 Lf-(2.5-5-3-5 mcg)-5Lf/0.5 mL susp; 0.5 mL by IntraMUSCular route once for 1 dose. Osteoporosis, unspecified osteoporosis type, unspecified pathological fracture presence  Will start,  -     alendronate (FOSAMAX) 70 mg tablet; Take 1 Tab by mouth every seven (7) days. -     calcium-cholecalciferol, D3, (CALTRATE 600+D) tablet; Take 1 Tab by mouth daily. Medicare annual wellness visit, initial    ACP (advance care planning)      ACP discussed with pt in detail , including choosing a healthcare agent to communicate patient's healthcare decisions if patient lost the ability to make decisions, such as after a sudden illness or accident. Understanding of the healthcare agent role was assessed and information provided. Discussed values, goals, and preferences if recovery is not expected, even with continued medical treatment in the event of: Imminent death/serious illness. Recommended completion of Advance Directive form after review of ACP materials and conversation with prospective healthcare agent. Recommended communicating the plan and making copies for the healthcare agent, personal physician, and others as appropriate (e.g., health system). Recommended review of completed ACP document annually or upon change in health status. Information book and form given. Face to face time spent was16 minutes              Discussed expected course/resolution/complications of diagnosis in detail with patient.    Medication risks/benefits/costs/interactions/alternatives discussed with patient. Pt was given an after visit summary which includes diagnoses, current medications & vitals. Pt expressed understanding with the diagnosis and plan.

## 2017-06-05 DIAGNOSIS — F31.9 BIPOLAR 1 DISORDER (HCC): ICD-10-CM

## 2017-06-05 DIAGNOSIS — G47.00 INSOMNIA, UNSPECIFIED TYPE: ICD-10-CM

## 2017-06-05 RX ORDER — HYDROXYZINE HYDROCHLORIDE 10 MG/1
TABLET, FILM COATED ORAL
Qty: 90 TAB | Refills: 4 | Status: SHIPPED | OUTPATIENT
Start: 2017-06-05 | End: 2017-07-26

## 2017-06-15 ENCOUNTER — HOSPITAL ENCOUNTER (OUTPATIENT)
Dept: GENERAL RADIOLOGY | Age: 57
Discharge: HOME | End: 2017-06-15
Attending: INTERNAL MEDICINE
Payer: MEDICARE

## 2017-06-15 DIAGNOSIS — K58.9 IRRITABLE BOWEL SYNDROME: ICD-10-CM

## 2017-06-15 DIAGNOSIS — F31.9 BIPOLAR 1 DISORDER (HCC): ICD-10-CM

## 2017-06-15 DIAGNOSIS — G47.00 INSOMNIA, UNSPECIFIED TYPE: ICD-10-CM

## 2017-06-15 DIAGNOSIS — N80.9 ENDOMETRIOSIS: ICD-10-CM

## 2017-06-15 DIAGNOSIS — R19.4 CHANGE IN BOWEL HABITS: ICD-10-CM

## 2017-06-15 DIAGNOSIS — R15.9 INCONTINENCE, FECES: ICD-10-CM

## 2017-06-15 PROCEDURE — 74270 X-RAY XM COLON 1CNTRST STD: CPT

## 2017-06-15 RX ORDER — HYDROXYZINE HYDROCHLORIDE 10 MG/1
TABLET, FILM COATED ORAL
Qty: 90 TAB | Refills: 1 | Status: ON HOLD | OUTPATIENT
Start: 2017-06-15 | End: 2017-06-27 | Stop reason: SDUPTHER

## 2017-06-27 ENCOUNTER — HOSPITAL ENCOUNTER (OUTPATIENT)
Age: 57
Setting detail: OUTPATIENT SURGERY
Discharge: HOME | End: 2017-06-27
Attending: SPECIALIST | Admitting: SPECIALIST
Payer: MEDICARE

## 2017-06-27 VITALS
OXYGEN SATURATION: 96 % | DIASTOLIC BLOOD PRESSURE: 87 MMHG | WEIGHT: 159 LBS | HEIGHT: 60 IN | SYSTOLIC BLOOD PRESSURE: 147 MMHG | RESPIRATION RATE: 16 BRPM | BODY MASS INDEX: 31.22 KG/M2 | HEART RATE: 94 BPM

## 2017-06-27 PROCEDURE — 76040000007: Performed by: SPECIALIST

## 2017-06-27 PROCEDURE — 77030020268 HC MISC GENERAL SUPPLY: Performed by: SPECIALIST

## 2017-06-27 NOTE — IP AVS SNAPSHOT
[address] [facility] 
[phone] Patient: Ollie Stoddard MRN: [id number] SLY:[birth year] You are allergic to the following Allergen Reactions Buspar (Buspirone) Other (comments) Causes \"stimulation\" that could exacerbate a manic attack/phase of pt's Biplar. Reported by patient Dicyclomine Nausea and Vomiting Extreme stomach pains Lithium Nausea and Vomiting Severe nausea and vomiting. Other Medication Other (comments) Intolerance to oranges, cabbage,beans,peppers,raw onions,foods with caffeine in them, popcorn, no pop sodas, because of IBS Recent Documentation Height Weight Breastfeeding? BMI OB Status Smoking Status 1.524 m 72.1 kg No 31.05 kg/m2 Hysterectomy Never Smoker Emergency Contacts Name Discharge Info Relation Home Work Mobile [employer] CAREGIVER [3] Sister [23] [phone] About your hospitalization You were admitted on:  June 27, 2017 You last received care in the:  [facility] ENDOSCOPY You were discharged on:  June 27, 2017 Unit phone number:  [phone] Why you were hospitalized Your primary diagnosis was:  Not on File Providers Seen During Your Hospitalizations Provider Role Specialty Primary office phone Laurie Estes MD Attending Provider Gastroenterology [phone] Your Primary Care Physician (PCP) Primary Care Physician Office Phone Office Fax [address] [employer] [address] [phone] Follow-up Information None Your Appointments Tuesday July 18, 2017  9:30 AM EDT ROUTINE CARE with Nasrin Laureano MD  
[address] ([facility]) [address] [place] [phone] Current Discharge Medication List  
  
ASK your doctor about these medications Dose & Instructions Dispensing Information Comments Morning Noon Evening Bedtime  
 alendronate 70 mg tablet Commonly known as:  FOSAMAX Your last dose was: Your next dose is:    
   
   
 Dose:  70 mg Take 1 Tab by mouth every seven (7) days. Quantity:  4 Tab Refills:  12 amLODIPine 5 mg tablet Commonly known as:  Cristian Waldrop Your last dose was: Your next dose is: TAKE 1 TABLET BY MOUTH EVERY DAY. Refills:  5  
     
   
   
   
  
 butalbital-acetaminophen  mg tablet Commonly known as:  Mayen Mend Your last dose was: Your next dose is:    
   
   
  Refills:  0  
     
   
   
   
  
 calcium-cholecalciferol (D3) tablet Commonly known as:  CALTRATE 600+D Your last dose was: Your next dose is:    
   
   
 Dose:  1 Tab Take 1 Tab by mouth daily. Quantity:  30 Tab Refills:  12  
     
   
   
   
  
 diclofenac 1 % Gel Commonly known as:  VOLTAREN Your last dose was: Your next dose is:    
   
   
 Apply  to affected area three (3) times daily as needed. Quantity:  100 g Refills:  2 diphenhydrAMINE 25 mg capsule Commonly known as:  BENADRYL Your last dose was: Your next dose is:    
   
   
 Dose:  50 mg Take 2 Caps by mouth nightly. Quantity:  60 Cap Refills:  3  
     
   
   
   
  
 estradiol 0.05 mg/24 hr  
Commonly known as:  Sobia Alert Your last dose was: Your next dose is:    
   
   
  Refills:  3  
     
   
   
   
  
 famotidine 20 mg tablet Commonly known as:  PEPCID Your last dose was: Your next dose is: TAKE 1 TABLET BY MOUTH TWO (2) TIMES A DAY. STOP OMEPRAZOLE. Quantity:  60 Tab Refills:  3  
     
   
   
   
  
 fenofibrate nanocrystallized 145 mg tablet Commonly known as:  [employer] Your last dose was:     
   
Your next dose is:    
   
   
 Dose:  145 mg  
 Take 1 Tab by mouth every fourty-eight (48) hours. Quantity:  30 Tab Refills:  5  
     
   
   
   
  
 fluorometholone 0.1 % ophthalmic suspension Commonly known as:  FML Your last dose was: Your next dose is:    
   
   
 Dose:  1 Drop Administer 1 Drop to both eyes two (2) times a day. Refills:  99  
     
   
   
   
  
 fluticasone 50 mcg/actuation nasal spray Commonly known as:  Arlys Pock Your last dose was: Your next dose is:    
   
   
 USE ONE SPRAY IN EACH NOSTRIL TWO TIMES A DAY. Quantity:  16 g Refills:  1  
     
   
   
   
  
 gabapentin 100 mg capsule Commonly known as:  NEURONTIN Your last dose was: Your next dose is:    
   
   
 Dose:  200 mg Take 2 Caps by mouth two (2) times a day. Quantity:  120 Cap Refills:  5  
     
   
   
   
  
 hydrOXYzine HCl 10 mg tablet Commonly known as:  ATARAX What changed:  Another medication with the same name was removed. Continue taking this medication, and follow the directions you see here. Your last dose was: Your next dose is:    
   
   
 1 tab po TID Quantity:  90 Tab Refills:  4  
     
   
   
   
  
 losartan 25 mg tablet Commonly known as:  COZAAR Your last dose was: Your next dose is:    
   
   
 Dose:  25 mg Take 1 Tab by mouth daily. Quantity:  30 Tab Refills:  3 OLANZapine 5 mg tablet Commonly known as:  ZyPREXA Your last dose was: Your next dose is:    
   
   
 Dose:  5 mg Take 5 mg by mouth nightly. Refills:  2  
     
   
   
   
  
 polyethylene glycol 17 gram/dose powder Commonly known as:  Edmund Barnhart Your last dose was: Your next dose is:    
   
   
 Dose:  17 g Take 17 g by mouth daily. Quantity:  850 g Refills:  3 SF 5000 PLUS 1.1 % Crea Generic drug:  sodium fluoride Your last dose was: Your next dose is: Refills:  4  
     
   
   
   
  
 vilazodone 10 mg Tab tablet Commonly known as:  VIIBRYD Your last dose was: Your next dose is:    
   
   
 Dose:  20 mg Take 20 mg by mouth daily. Refills:  0  
     
   
   
   
  
 VITAMIN D3 1,000 unit Cap Generic drug:  cholecalciferol Your last dose was: Your next dose is:    
   
   
 Dose:  1 Tab Take 1 Tab by mouth daily. Indications: VITAMIN D DEFICIENCY Refills:  0  
     
   
   
   
  
 vitamin E 400 unit capsule Commonly known as:  [address] Your last dose was: Your next dose is:    
   
   
 Dose:  400 Units Take 400 Units by mouth daily. Refills:  0 Discharge Instructions Segundo Norman [id number] MANOMETRY DISCHARGE INSTRUCTION You may resume your regular diet as tolerated. You may resume your normal daily activities. Call your Physician if you have any complications or questions. [employer] Activation Thank you for requesting access to [employer]. Please follow the instructions below to securely access and download your online medical record. G[employer]mant allows you to send messages to your doctor, view your test results, renew your prescriptions, schedule appointments, and more. How Do I Sign Up? 1. In your internet browser, go to [email or web address] 
2. Click on the First Time User? Click Here link in the Sign In box. You will be redirect to the New Member Sign Up page. 3. Enter your G[employer]mant Access Code exactly as it appears below. You will not need to use this code after youve completed the sign-up process. If you do not sign up before the expiration date, you must request a new code. [employer]mant Access Code: Activation code not generated Current G[employer]mant Status: Active (This is the date your G[employer]mant access code will ) 4.  Enter the last four digits of your Social Security Number (xxxx) and Date of Birth (mm/dd/yyyy) as indicated and click Submit. You will be taken to the next sign-up page. 5. Create a [employer] ID. This will be your [employer] login ID and cannot be changed, so think of one that is secure and easy to remember. 6. Create a [employer] password. You can change your password at any time. 7. Enter your Password Reset Question and Answer. This can be used at a later time if you forget your password. 8. Enter your e-mail address. You will receive e-mail notification when new information is available in [address] 9. Click Sign Up. You can now view and download portions of your medical record. 10. Click the Download Summary menu link to download a portable copy of your medical information. Additional Information If you have questions, please visit the Frequently Asked Questions section of the [employer] website at [email or web address] Remember, [employer] is NOT to be used for urgent needs. For medical emergencies, dial 911. Discharge Orders None ACO Transitions of Care Introducing [employer] [address] Keesha Choi offers a voluntary care coordination program to provide high quality service and care to [facility] fee-for-service beneficiaries. Patrica Solitario was designed to help you enhance your health and well-being through the following services: ? Transitions of Care  support for individuals who are transitioning from one care setting to another (example: Hospital to home). ? Chronic and Complex Care Coordination  support for individuals and caregivers of those with serious or chronic illnesses or with more than one chronic (ongoing) condition and those who take a number of different medications. If you meet specific medical criteria, a [address] [place] [address] Rd may call you directly to coordinate your care with your primary care physician and your other care providers. For questions about the [facility] MEDICAL Golf programs, please, contact your physicians office. For general questions or additional information about Accountable Care Organizations: 
Please visit www.medicare.gov/acos. html or call 1-800-MEDICARE (1-597.485.6174) TT users should call 9-251.200.9303. Introducing [facility] SERVICES! Dear Patricia Hernandez: Thank you for requesting a [employer] account. Our records indicate that you already have an active [employer] account. You can access your account anytime at [email or web address] Did you know that you can access your hospital and ER discharge instructions at any time in [employer]? You can also review all of your test results from your hospital stay or ER visit. Additional Information If you have questions, please visit the Frequently Asked Questions section of the [employer] website at [email or web address] Remember, [employer] is NOT to be used for urgent needs. For medical emergencies, dial 911. Now available from your iPhone and Android! General Information Please provide this summary of care documentation to your next provider. Patient Signature:  ____________________________________________________________ Date:  ____________________________________________________________  
  
[facility] Provider Signature:  ____________________________________________________________ Date:  ____________________________________________________________

## 2017-06-27 NOTE — DISCHARGE INSTRUCTIONS
Katarina Glasgow  [id number]  1960      MANOMETRY DISCHARGE INSTRUCTION    You may resume your regular diet as tolerated. You may resume your normal daily activities. Call your Physician if you have any complications or questions. [employer] Activation    Thank you for requesting access to [employer]. Please follow the instructions below to securely access and download your online medical record. [employer] allows you to send messages to your doctor, view your test results, renew your prescriptions, schedule appointments, and more. How Do I Sign Up? 1. In your internet browser, go to [email or web address]  2. Click on the First Time User? Click Here link in the Sign In box. You will be redirect to the New Member Sign Up page. 3. Enter your [employer] Access Code exactly as it appears below. You will not need to use this code after youve completed the sign-up process. If you do not sign up before the expiration date, you must request a new code. [employer] Access Code: Activation code not generated  Current [employer] Status: Active (This is the date your [employer] access code will )    4. Enter the last four digits of your Social Security Number (xxxx) and Date of Birth (mm/dd/yyyy) as indicated and click Submit. You will be taken to the next sign-up page. 5. Create a [employer] ID. This will be your [employer] login ID and cannot be changed, so think of one that is secure and easy to remember. 6. Create a [employer] password. You can change your password at any time. 7. Enter your Password Reset Question and Answer. This can be used at a later time if you forget your password. 8. Enter your e-mail address. You will receive e-mail notification when new information is available in [address] 9. Click Sign Up. You can now view and download portions of your medical record. 10. Click the Download Summary menu link to download a portable copy of your medical information.     Additional Information    If you have questions, please visit the Frequently Asked Questions section of the [employer] website at [email or web address] Remember, [employer] is NOT to be used for urgent needs. For medical emergencies, dial 911.

## 2017-06-27 NOTE — PROGRESS NOTES
Rectal exam done by Raymon Wong RN. Anal manometry catheter inserted into rectum. Manometry procedure complete. Catheter inserted into rectum. Balloon filled with 40cc of luke warm H2O, and pt escorted to bathroom for 3 min expulsion test.  Pt was able to expel balloon. Balloon deflated and catheter removed. Pt tolerated procedures well.

## 2017-06-28 RX ORDER — MELATONIN 5 MG
5 CAPSULE ORAL
COMMUNITY
End: 2018-03-12

## 2017-06-28 RX ORDER — ACETAMINOPHEN 500 MG
1000 TABLET ORAL
COMMUNITY
End: 2017-09-15

## 2017-06-28 RX ORDER — IBUPROFEN 200 MG
400 TABLET ORAL
COMMUNITY
End: 2017-07-26

## 2017-06-28 RX ORDER — DIAZEPAM 2.5 MG/.5ML
2 GEL RECTAL
COMMUNITY
End: 2017-08-29 | Stop reason: ALTCHOICE

## 2017-06-28 RX ORDER — LOPERAMIDE HYDROCHLORIDE 2 MG/1
2 CAPSULE ORAL AS NEEDED
COMMUNITY
End: 2018-03-12

## 2017-06-28 RX ORDER — BLACK COHOSH ROOT 540 MG
1 CAPSULE ORAL DAILY
COMMUNITY
End: 2018-08-21 | Stop reason: ALTCHOICE

## 2017-06-28 NOTE — PERIOP NOTES
[facility]  Ambulatory Surgery Unit  Pre-operative Instructions for Endo Procedures    Procedure Date  7/5/17            Tentative Arrival Time 1000      1. On the day of your procedure, please report to the Ambulatory Surgery Unit Registration Desk and sign in at your designated time. The Ambulatory Surgery Unit is located in [facility] on the [facility] side of the [facility] across from the [address] [place] [address]. Please have all of your health insurance cards and a photo ID. 2. You must have someone with you to drive you home, as you should not drive a car for 24 hours following anesthesia. Please make arrangements for a responsible adult friend or family member to stay with you for at least the first 24 hours after your procedure. 3. Do not have anything to eat or drink (including water, gum, mints, coffee, juice) after midnight   7/4. This may not apply to medications prescribed by your physician. (Please note below the special instructions with medications to take the morning of your procedure.)    4. If applicable, follow the clear liquid diet and bowel prep instructions provided by your physician's office. If you do not have this information, or have any questions, please contact your physician's office. 5. We recommend you do not drink any alcoholic beverages for 24 hours before and after your procedure. 6. Stop all Aspirin, non-steroidal anti-inflammatory drugs (i.e. Advil, Aleve), vitamins, and supplements as directed by your surgeon's office. **If you are currently taking Plavix, Coumadin, or other blood-thinning agents, contact your surgeon for instructions. **    7. In an effort to help prevent surgical site infection, we ask that you shower with an anti-bacterial soap (i.e. Dial or Safeguard) on the morning of your procedure. Do not apply any lotions, powders, or deodorants after showering. 8. Wear comfortable clothes. Wear glasses instead of contacts.  Do not bring any jewelry or money (other than copays or fees as instructed). Do not wear make-up, particularly mascara, the morning of your procedure. Wear your hair loose or down, no ponytails, buns, lorna pins or clips. All body piercings must be removed. 9. You should understand that if you do not follow these instructions your procedure may be cancelled. If your physical condition changes (i.e. fever, cold or flu) please contact your surgeon as soon as possible. 10. It is important that you be on time. If a situation occurs where you may be late, or if you have any questions or problems, please call [phone]. 11. Your procedure time may be subject to change. You will receive a phone call the day prior to confirm your arrival time. Special Instructions: Take all medications and inhalers, as prescribed, on the morning of surgery with a sip of water EXCEPT: none      I understand a pre-operative phone call will be made to verify my procedure time.  In the event that I am not available, I give permission for a message to be left on my answering service and/or with another person?      none    Reviewed by phone by patient, verbalized understanding.     ___________________      ___________________      ___________________  (Signature of Patient)          (Witness)                   (Date and Time)

## 2017-06-28 NOTE — PERIOP NOTES
PAT call to patient. Pt states that her Mental Health Counselor, Paulina Knott will be transporting her for colonoscopy. Patient advised that per anesthesia guidelines, pt needs responsible adult with her for 24 hours. Patient advised to please review Dr. Javy Olivo instruction sheet to know which supplements/meds to hold for 5 days prior to procedure. Pt verbalized understanding, and will review her information today.

## 2017-07-05 ENCOUNTER — HOSPITAL ENCOUNTER (OUTPATIENT)
Age: 57
Setting detail: OUTPATIENT SURGERY
Discharge: HOME | End: 2017-07-05
Attending: COLON & RECTAL SURGERY | Admitting: COLON & RECTAL SURGERY
Payer: MEDICARE

## 2017-07-05 ENCOUNTER — ANESTHESIA (OUTPATIENT)
Dept: SURGERY | Age: 57
End: 2017-07-05
Payer: MEDICARE

## 2017-07-05 ENCOUNTER — ANESTHESIA EVENT (OUTPATIENT)
Dept: SURGERY | Age: 57
End: 2017-07-05
Payer: MEDICARE

## 2017-07-05 VITALS
OXYGEN SATURATION: 98 % | TEMPERATURE: 98.3 F | HEART RATE: 84 BPM | BODY MASS INDEX: 30.04 KG/M2 | RESPIRATION RATE: 16 BRPM | WEIGHT: 153 LBS | DIASTOLIC BLOOD PRESSURE: 65 MMHG | HEIGHT: 60 IN | SYSTOLIC BLOOD PRESSURE: 107 MMHG

## 2017-07-05 PROBLEM — Z12.11 ENCOUNTER FOR SCREENING COLONOSCOPY: Status: ACTIVE | Noted: 2017-07-05

## 2017-07-05 PROCEDURE — 77030021352 HC CBL LD SYS DISP COVD -B: Performed by: COLON & RECTAL SURGERY

## 2017-07-05 PROCEDURE — 77030020255 HC SOL INJ LR 1000ML BG: Performed by: COLON & RECTAL SURGERY

## 2017-07-05 PROCEDURE — 76030000002 HC AMB SURG OR TIME FIRST 0.: Performed by: COLON & RECTAL SURGERY

## 2017-07-05 PROCEDURE — 74011250636 HC RX REV CODE- 250/636

## 2017-07-05 PROCEDURE — 76210000040 HC AMBSU PH I REC FIRST 0.5 HR: Performed by: COLON & RECTAL SURGERY

## 2017-07-05 PROCEDURE — 76060000073 HC AMB SURG ANES FIRST 0.5 HR: Performed by: COLON & RECTAL SURGERY

## 2017-07-05 PROCEDURE — 74011250636 HC RX REV CODE- 250/636: Performed by: ANESTHESIOLOGY

## 2017-07-05 PROCEDURE — 76210000046 HC AMBSU PH II REC FIRST 0.5 HR: Performed by: COLON & RECTAL SURGERY

## 2017-07-05 RX ORDER — SODIUM CHLORIDE 0.9 % (FLUSH) 0.9 %
5-10 SYRINGE (ML) INJECTION AS NEEDED
Status: DISCONTINUED | OUTPATIENT
Start: 2017-07-05 | End: 2017-07-05 | Stop reason: HOSPADM

## 2017-07-05 RX ORDER — LIDOCAINE HYDROCHLORIDE 10 MG/ML
0.1 INJECTION, SOLUTION EPIDURAL; INFILTRATION; INTRACAUDAL; PERINEURAL AS NEEDED
Status: DISCONTINUED | OUTPATIENT
Start: 2017-07-05 | End: 2017-07-05 | Stop reason: HOSPADM

## 2017-07-05 RX ORDER — SODIUM CHLORIDE 0.9 % (FLUSH) 0.9 %
5-10 SYRINGE (ML) INJECTION EVERY 8 HOURS
Status: DISCONTINUED | OUTPATIENT
Start: 2017-07-05 | End: 2017-07-05 | Stop reason: HOSPADM

## 2017-07-05 RX ORDER — LABETALOL HYDROCHLORIDE 5 MG/ML
5 INJECTION, SOLUTION INTRAVENOUS ONCE
Status: DISCONTINUED | OUTPATIENT
Start: 2017-07-05 | End: 2017-07-05

## 2017-07-05 RX ORDER — SODIUM CHLORIDE, SODIUM LACTATE, POTASSIUM CHLORIDE, CALCIUM CHLORIDE 600; 310; 30; 20 MG/100ML; MG/100ML; MG/100ML; MG/100ML
25 INJECTION, SOLUTION INTRAVENOUS CONTINUOUS
Status: DISCONTINUED | OUTPATIENT
Start: 2017-07-05 | End: 2017-07-05 | Stop reason: HOSPADM

## 2017-07-05 RX ORDER — HYDROMORPHONE HYDROCHLORIDE 1 MG/ML
.2-.5 INJECTION, SOLUTION INTRAMUSCULAR; INTRAVENOUS; SUBCUTANEOUS ONCE
Status: DISCONTINUED | OUTPATIENT
Start: 2017-07-05 | End: 2017-07-05 | Stop reason: HOSPADM

## 2017-07-05 RX ORDER — MORPHINE SULFATE 10 MG/ML
2 INJECTION, SOLUTION INTRAMUSCULAR; INTRAVENOUS
Status: DISCONTINUED | OUTPATIENT
Start: 2017-07-05 | End: 2017-07-05 | Stop reason: HOSPADM

## 2017-07-05 RX ORDER — FENTANYL CITRATE 50 UG/ML
25 INJECTION, SOLUTION INTRAMUSCULAR; INTRAVENOUS
Status: DISCONTINUED | OUTPATIENT
Start: 2017-07-05 | End: 2017-07-05 | Stop reason: HOSPADM

## 2017-07-05 RX ORDER — DIPHENHYDRAMINE HYDROCHLORIDE 50 MG/ML
12.5 INJECTION, SOLUTION INTRAMUSCULAR; INTRAVENOUS AS NEEDED
Status: DISCONTINUED | OUTPATIENT
Start: 2017-07-05 | End: 2017-07-05 | Stop reason: HOSPADM

## 2017-07-05 RX ORDER — OXYCODONE AND ACETAMINOPHEN 5; 325 MG/1; MG/1
1 TABLET ORAL ONCE
Status: DISCONTINUED | OUTPATIENT
Start: 2017-07-05 | End: 2017-07-05 | Stop reason: HOSPADM

## 2017-07-05 RX ORDER — PROPOFOL 10 MG/ML
INJECTION, EMULSION INTRAVENOUS AS NEEDED
Status: DISCONTINUED | OUTPATIENT
Start: 2017-07-05 | End: 2017-07-05 | Stop reason: HOSPADM

## 2017-07-05 RX ADMIN — PROPOFOL 200 MG: 10 INJECTION, EMULSION INTRAVENOUS at 12:13

## 2017-07-05 RX ADMIN — PROPOFOL 50 MG: 10 INJECTION, EMULSION INTRAVENOUS at 12:16

## 2017-07-05 RX ADMIN — PROPOFOL 200 MG: 10 INJECTION, EMULSION INTRAVENOUS at 12:00

## 2017-07-05 RX ADMIN — SODIUM CHLORIDE, SODIUM LACTATE, POTASSIUM CHLORIDE, AND CALCIUM CHLORIDE 25 ML/HR: 600; 310; 30; 20 INJECTION, SOLUTION INTRAVENOUS at 10:21

## 2017-07-05 NOTE — INTERVAL H&P NOTE
H&P Update:  Agusto Tran was seen and examined. History and physical has been reviewed. The patient has been examined.  There have been no significant clinical changes since the completion of the originally dated History and Physical.    Signed By: Taiwo Faye MD     July 5, 2017 11:48 AM

## 2017-07-05 NOTE — IP AVS SNAPSHOT
[address] [facility] 
[phone] Patient: Mari Rose MRN: [id number] TPM:3/94/1525 You are allergic to the following Allergen Reactions Buspar (Buspirone) Other (comments) Causes \"stimulation\" that could exacerbate a manic attack/phase of pt's Biplar. Reported by patient Amlodipine Other (comments) Ankle swelling Dicyclomine Nausea and Vomiting Extreme stomach pains Lithium Nausea and Vomiting Severe nausea and vomiting. Other Medication Other (comments) Intolerance to oranges, cabbage,beans,peppers,raw onions,foods with caffeine in them, popcorn, no pop sodas, because of IBS Recent Documentation Height Weight Breastfeeding? BMI OB Status Smoking Status 1.524 m 69.4 kg No 29.88 kg/m2 Hysterectomy Never Smoker Emergency Contacts Name Discharge Info Relation Home Work Mobile [employer] CAREGIVER [3] Sister [23] [phone] About your hospitalization You were admitted on:  July 5, 2017 You last received care in the:  [facility] You were discharged on:  July 5, 2017 Unit phone number:  [phone] Why you were hospitalized Your primary diagnosis was:  Encounter For Screening Colonoscopy Providers Seen During Your Hospitalizations Provider Role Specialty Primary office phone Priyanka Rapp MD Attending Provider Colon and Rectal Surgery [phone] Your Primary Care Physician (PCP) Primary Care Physician Office Phone Office Fax [address], [place] [address] [phone] Follow-up Information Follow up With Details Comments Contact Info Celine Amos MD   [address] [place] [address] 
[phone] Your Appointments Tuesday July 18, 2017  9:30 AM EDT ROUTINE CARE with Celine Amos MD  
 [facility] ([facility]) [address] [place] [id number] [phone] Current Discharge Medication List  
  
CONTINUE these medications which have CHANGED Dose & Instructions Dispensing Information Comments Morning Noon Evening Bedtime  
 polyethylene glycol 17 gram/dose powder Commonly known as:  Jair Carter What changed:   
- when to take this 
- reasons to take this Your last dose was: Your next dose is:    
   
   
 Dose:  17 g Take 17 g by mouth daily. Quantity:  850 g Refills:  3 CONTINUE these medications which have NOT CHANGED Dose & Instructions Dispensing Information Comments Morning Noon Evening Bedtime ACIDOPHILUS Cap Generic drug:  Lactobacillus acidophilus Your last dose was: Your next dose is:    
   
   
 Dose:  1 Cap Take 1 Cap by mouth daily. Refills:  0  
     
   
   
   
  
 alendronate 70 mg tablet Commonly known as:  FOSAMAX Your last dose was: Your next dose is:    
   
   
 Dose:  70 mg Take 1 Tab by mouth every seven (7) days. Quantity:  4 Tab Refills:  12  
     
   
   
   
  
 BC HEADACHE POWDER PO Your last dose was: Your next dose is:    
   
   
 Dose:  1 Dose Take 1 Dose by mouth as needed. Refills:  0  
     
   
   
   
  
 black cohosh 540 mg Cap Your last dose was: Your next dose is:    
   
   
 Dose:  1 Tab Take 1 Tab by mouth daily. Refills:  0  
     
   
   
   
  
 butalbital-acetaminophen  mg tablet Commonly known as:  Florespinoza Watson Your last dose was: Your next dose is:    
   
   
 Dose:  1 Tab Take 1 Tab by mouth every six (6) hours as needed. Refills:  0  
     
   
   
   
  
 calcium-cholecalciferol (D3) tablet Commonly known as:  CALTRATE 600+D Your last dose was: Your next dose is: Dose:  1 Tab Take 1 Tab by mouth daily. Quantity:  30 Tab Refills:  12  
     
   
   
   
  
 diazePAM 2.5 mg Kit Commonly known as:  VALIUM Your last dose was: Your next dose is:    
   
   
 Dose:  2 mg Insert 2 mg into rectum every eight (8) hours as needed (pelvic floor pain). Refills:  0  
     
   
   
   
  
 diclofenac 1 % Gel Commonly known as:  VOLTAREN Your last dose was: Your next dose is:    
   
   
 Apply  to affected area three (3) times daily as needed. Quantity:  100 g Refills:  2 diphenhydrAMINE 25 mg capsule Commonly known as:  BENADRYL Your last dose was: Your next dose is:    
   
   
 Dose:  50 mg Take 2 Caps by mouth nightly. Quantity:  60 Cap Refills:  3  
     
   
   
   
  
 estradiol 0.05 mg/24 hr  
Commonly known as:  Lisa Linden Your last dose was: Your next dose is:    
   
   
 Dose:  1 Patch 1 Patch by TransDERmal route two (2) times a week on Wednesday and Saturday. Refills:  3  
     
   
   
   
  
 famotidine 20 mg tablet Commonly known as:  PEPCID Your last dose was: Your next dose is: TAKE 1 TABLET BY MOUTH TWO (2) TIMES A DAY. STOP OMEPRAZOLE. Quantity:  60 Tab Refills:  3  
     
   
   
   
  
 fenofibrate nanocrystallized 145 mg tablet Commonly known as:  [employer] Your last dose was: Your next dose is:    
   
   
 Dose:  145 mg Take 1 Tab by mouth every fourty-eight (48) hours. Quantity:  30 Tab Refills:  5  
     
   
   
   
  
 fluorometholone 0.1 % ophthalmic suspension Commonly known as:  FML Your last dose was: Your next dose is:    
   
   
 Dose:  1 Drop Administer 1 Drop to both eyes two (2) times a day. Refills:  99  
     
   
   
   
  
 fluticasone 50 mcg/actuation nasal spray Commonly known as:  Leafy Few Your last dose was: Your next dose is: USE ONE SPRAY IN EACH NOSTRIL TWO TIMES A DAY. Quantity:  16 g Refills:  1  
     
   
   
   
  
 gabapentin 100 mg capsule Commonly known as:  NEURONTIN Your last dose was: Your next dose is:    
   
   
 Dose:  200 mg Take 2 Caps by mouth two (2) times a day. Quantity:  120 Cap Refills:  5  
     
   
   
   
  
 hydrOXYzine HCl 10 mg tablet Commonly known as:  ATARAX Your last dose was: Your next dose is:    
   
   
 1 tab po TID Quantity:  90 Tab Refills:  4  
     
   
   
   
  
 ibuprofen 200 mg tablet Commonly known as:  MOTRIN Your last dose was: Your next dose is:    
   
   
 Dose:  400 mg Take 400 mg by mouth nightly. Refills:  0  
     
   
   
   
  
 loperamide 2 mg capsule Commonly known as:  IMODIUM Your last dose was: Your next dose is:    
   
   
 Dose:  2 mg Take 2 mg by mouth as needed for Diarrhea. Refills:  0  
     
   
   
   
  
 losartan 25 mg tablet Commonly known as:  COZAAR Your last dose was: Your next dose is:    
   
   
 Dose:  25 mg Take 1 Tab by mouth daily. Quantity:  30 Tab Refills:  3  
     
   
   
   
  
 melatonin 5 mg Cap capsule Your last dose was: Your next dose is:    
   
   
 Dose:  5 mg Take 5 mg by mouth nightly. Refills:  0  
     
   
   
   
  
 OLANZapine 5 mg tablet Commonly known as:  ZyPREXA Your last dose was: Your next dose is:    
   
   
 Dose:  5 mg Take 5 mg by mouth daily (with dinner). Refills:  2 REFRESH OPTIVE ADVANCED (PF) OP Your last dose was: Your next dose is:    
   
   
 Dose:  1 Drop Apply 1 Drop to eye two (2) times daily as needed. Refills:  0 SF 5000 PLUS 1.1 % Crea Generic drug:  sodium fluoride Your last dose was: Your next dose is:    
   
   
 Dose:  1 Tube 1 Tube. Refills:  4 TYLENOL EXTRA STRENGTH 500 mg tablet Generic drug:  acetaminophen Your last dose was: Your next dose is:    
   
   
 Dose:  1000 mg Take 1,000 mg by mouth every six (6) hours as needed for Pain. Refills:  0  
     
   
   
   
  
 vilazodone 10 mg Tab tablet Commonly known as:  VIIBRYD Your last dose was: Your next dose is:    
   
   
 Dose:  20 mg Take 20 mg by mouth daily. Refills:  0  
     
   
   
   
  
 VITAMIN D3 1,000 unit Cap Generic drug:  cholecalciferol Your last dose was: Your next dose is:    
   
   
 Dose:  1 Tab Take 1 Tab by mouth daily. Indications: VITAMIN D DEFICIENCY Refills:  0  
     
   
   
   
  
 vitamin E 400 unit capsule Commonly known as:  [address] Your last dose was: Your next dose is:    
   
   
 Dose:  400 Units Take 400 Units by mouth daily. Refills:  0 Discharge Instructions Prachi Son [id number] 1960 COLON DISCHARGE INSTRUCTIONS Discomfort: 
Redness at IV site- apply warm compress to area; if redness or soreness persist- contact your physician There may be a slight amount of blood passed from the rectum Gaseous discomfort- walking, belching will help relieve any discomfort You may not operate a vehicle for 24 hours You may not engage in an occupation involving machinery or appliances for rest of today You may not drink alcoholic beverages for at least 24 hours Avoid making any critical decisions for at least 24 hour DIET: 
 Regular diet.  however -  remember your colon is empty and a heavy meal will produce gas. Avoid these foods:  vegetables, fried / greasy foods, carbonated drinks for today ACTIVITY: 
You may not  resume your normal daily activities it is recommended that you spend the remainder of the day resting -  avoid any strenuous activity. CALL M.D. ANY SIGN OF: Increasing pain, nausea, vomiting Abdominal distension (swelling) New increased bleeding (oral or rectal) Fever (chills) Pain in chest area Bloody discharge from nose or mouth Shortness of breath You may  take any Advil, Aspirin, Ibuprofen, Motrin, Aleve, or Tylenol as needed for pain. Post procedure diagnosis:  SIGMOID DIVERTICULOSIS. PREVIOUS ANASTOMOSIS AT 10 CM. Follow-up Instructions: 
 Call Dr. Adan Harden if any questions Telephone #  [phone] Additional instructions ;  followup scope in 10 years; Will discuss surgical options in clinic -- return to clinic in 2 weeks. Francisca Shell [id number] 1960 DISCHARGE SUMMARY from Nurse The following personal items collected during your admission are returned to you:  
Dental Appliance: Dental Appliances: None Vision: Visual Aid: Glasses Hearing Aid:   
Jewelry:   
Clothing:   
Other Valuables:   
Valuables sent to safe:   
 
DO NOT TAKE TYLENOL/ACETAMINOPHEN WITH PERCOCET, [address], [address] TAKE NARCOTIC PAIN MEDICATIONS WITH FOOD Narcotics tend to be constipating, we suggest taking a stool softener such as Colace or Miralax (follow package instructions). DO NOT DRIVE WHILE TAKING NARCOTIC PAIN MEDICATIONS. DO NOT TAKE SLEEPING MEDICATIONS OR ANTIANXIETY MEDICATIONS WHILE TAKING NARCOTIC PAIN MEDICATIONS,  ESPECIALLY THE NIGHT OF ANESTHESIA. CPAP PATIENTS BE SURE TO WEAR MACHINE WHENEVER NAPPING OR SLEEPING. DISCHARGE SUMMARY from Nurse The following personal items collected during your admission are returned to you:  
Dental Appliance: Dental Appliances: None Vision: Visual Aid: Glasses Hearing Aid:   
Jewelry:   
Clothing:   
Other Valuables:   
Valuables sent to safe:   
 
 
PATIENT INSTRUCTIONS: 
 
 
B - Balance E - Eyes F-  Face looks uneven A-  Arms unable to move or move even S-  Speech slurred or non-existent T-  Time-call 911 as soon as signs and symptoms begin-DO NOT go Back to bed or wait to see if you get better-TIME IS BRAIN. If you have not received your influenza and/or pneumococcal vaccine, please follow up with your primary care physician. The discharge information has been reviewed with the patient and caregiver. The patient and caregiver verbalized understanding. Discharge Orders None ACO Transitions of Care Introducing [employer] [address] Keesha Choi offers a voluntary care coordination program to provide high quality service and care to [facility] fee-for-service beneficiaries. Socorro Landin was designed to help you enhance your health and well-being through the following services: ? Transitions of Care  support for individuals who are transitioning from one care setting to another (example: Hospital to home). ? Chronic and Complex Care Coordination  support for individuals and caregivers of those with serious or chronic illnesses or with more than one chronic (ongoing) condition and those who take a number of different medications. If you meet specific medical criteria, a [address] [place] [address] Rd may call you directly to coordinate your care with your primary care physician and your other care providers. For questions about the [facility] programs, please, contact your physicians office. For general questions or additional information about Accountable Care Organizations: 
Please visit www.medicare.gov/acos. html or call 1-800-MEDICARE (4-726.808.6756) TTY users should call 9-923.566.4263. Introducing [facility] & HEALTH SERVICES! Dear [facility]: Thank you for requesting a [employer] account. Our records indicate that you already have an active [employer]OS account. You can access your account anytime at https://[employer]. [employer] Did you know that you can access your hospital and ER discharge instructions at any time in [employer]? You can also review all of your test results from your hospital stay or ER visit. Additional Information If you have questions, please visit the Frequently Asked Questions section of the [employer] website at https://[email or web address]. [employer]/[email or web address] Remember, [employer] is NOT to be used for urgent needs. For medical emergencies, dial 911. Now available from your iPhone and Android! General Information Please provide this summary of care documentation to your next provider. Patient Signature:  ____________________________________________________________ Date:  ____________________________________________________________  
  
Alisia Gage Provider Signature:  ____________________________________________________________ Date:  ____________________________________________________________

## 2017-07-05 NOTE — ANESTHESIA POSTPROCEDURE EVALUATION
Post-Anesthesia Evaluation and Assessment    Patient: Desi Stone MRN: [id number]  SSN: [id number]    YOB: 1960  Age: 59 y.o. Sex: female       Cardiovascular Function/Vital Signs  Visit Vitals    /65    Pulse 84    Temp 36.8 °C (98.3 °F)    Resp 16    Ht 5' (1.524 m)    Wt 69.4 kg (153 lb)    SpO2 98%    Breastfeeding No    BMI 29.88 kg/m2       Patient is status post general, total IV anesthesia anesthesia for Procedure(s):  COLONOSCOPY. Nausea/Vomiting: None    Postoperative hydration reviewed and adequate. Pain:  Pain Scale 1: Numeric (0 - 10) (07/05/17 1235)  Pain Intensity 1: 0 (07/05/17 1235)   Managed    Neurological Status:   Neuro (WDL): Within Defined Limits (07/05/17 1235)  Neuro  Neurologic State: Alert (07/05/17 1235)  LUE Motor Response: Spontaneous  (07/05/17 1235)  LLE Motor Response: Spontaneous  (07/05/17 1235)  RUE Motor Response: Spontaneous  (07/05/17 1235)  RLE Motor Response: Spontaneous  (07/05/17 1235)   At baseline    Mental Status and Level of Consciousness: Arousable    Pulmonary Status:   O2 Device: Room air (07/05/17 1235)   Adequate oxygenation and airway patent    Complications related to anesthesia: None    Post-anesthesia assessment completed.  No concerns    Signed By: Katrine Schwab, MD     July 5, 2017

## 2017-07-05 NOTE — PERIOP NOTES
Anila Azul  1960  [id number]    Situation:  Verbal report given from: SPEEDY Farley CRNA  Procedure: Procedure(s):  COLONOSCOPY    Background:    Preoperative diagnosis: RECTAL PROLAPSE    Postoperative diagnosis: SIGMOID DIVERTICULOSIS. PREVIOUS ANASTOMOSIS AT 10 CM. :  Dr. Bob Lucas    Assistant(s): Circ-1: Cornelius Lomeli  Circ-2: Ken Goldberg RN  Scrub Tech-1: Randi Garibay    Specimens: * No specimens in log *    Assessment:  Intra-procedure medications   Propofol  450 mg      Anesthesia gave intra-procedure sedation and medications, see anesthesia flow sheet     Intravenous fluids: LR@ KVO     Vital signs stable     Abdominal assessment: round and soft       Recommendation:    Permission to share finding with family or friend yes    All side rails up, bed in low position, wheels locked. Nurse at bedside.

## 2017-07-05 NOTE — OP NOTES
[address], [address]   OP NOTE       Name:  David Menjivar   MR#:  [id number]   :  1960   Account #:  [de-identified]    Surgery Date:  2017   Date of Adm:  2017       PREOPERATIVE DIAGNOSIS:  Recurrent rectal prolapse. POSTOPERATIVE DIAGNOSES:    1. Recurrent rectal prolapse. 2. Previous anastomosis located at about 10 cm from the anal verge   with sigmoid diverticulosis above that. PROCEDURES PERFORMED:  Total colonoscopy to cecum. SURGEON: Rosa Elena Akins. Danielle Castle MD    ESTIMATED BLOOD LOSS: None. SPECIMENS REMOVED: None. ANESTHESIA:  IV sedation with propofol per Anesthesia. INDICATIONS: The patient is a 20-year-old with recurrent rectal   prolapse. She has terrible sphincter tone; a problem from the   standpoint of recurring prolapses going forth. The problem now is to   pick which operation would be the best for her. She has already had a   \"low anterior resection,\" and I wanted to see, exactly where the   anastomosis is, because that would play into which operative options   we would have that would involve rectum, and regardless of whether   they from below or above the site of the prolapse (i.e. extra   abdominal/perineal versus intraabdominal). She is in today for   screening colonoscopy to see if there are any other factors that would   cause her to prolapse and to definitively locate her anastomotic line. DESCRIPTION OF PROCEDURE: After uneventful induction of IV   sedation, the patient was placed in the left lateral position and the   pediatric 180 stiffening scope passed through the colon to the cecum   without difficulty. Position was confirmed with light reflex and local   anatomic landmarks. The scope was slowly and carefully pulled back. Photograph was obtained of the ileocecal valve to document location   and anatomy. Prep was excellent.  The ascending and transverse colon   were normal. The descending colon looked good with a rim that may be   left of the sigmoid colon, likely, that was accompanied by a number of   diverticula. This continued until we got down to the \"rectum,\" and we   saw a scar from the staple line, about 10 cm up from the anal verge. This gives us a good idea as to where blood supplies could be   compromised or problematic if we were to repeat various types of   surgical resectional therapies and with the staple line now 10 cm up   from verge, this gives us a good idea as to what resections may result   in relatively ischemic segments, depending on how they were done, or   whether they will be well vascularized. At any rate, the rectal prolapse   is obvious and the sphincter tone is terrible. The scope was pulled back   out. Air was aspirated. The scope was removed and the exam   terminated. She tolerated the exam well and remained stable and left   with a benign abdomen. We will have her back to office to go over her   surgical possibilities, and she could undergo colonoscopy in 10 years.         MD NICOLLE Wilson / KIKI   D:  07/05/2017   12:32   T:  07/05/2017   14:38   Job #:  [id number]

## 2017-07-05 NOTE — DISCHARGE INSTRUCTIONS
Arpit Bhatt  [id number]  1960    COLON DISCHARGE INSTRUCTIONS  Discomfort:  Redness at IV site- apply warm compress to area; if redness or soreness persist- contact your physician  There may be a slight amount of blood passed from the rectum  Gaseous discomfort- walking, belching will help relieve any discomfort  You may not operate a vehicle for 24 hours  You may not engage in an occupation involving machinery or appliances for rest of today  You may not drink alcoholic beverages for at least 24 hours  Avoid making any critical decisions for at least 24 hour  DIET:   Regular diet. - however -  remember your colon is empty and a heavy meal will produce gas. Avoid these foods:  vegetables, fried / greasy foods, carbonated drinks for today     ACTIVITY:  You may not  resume your normal daily activities it is recommended that you spend the remainder of the day resting -  avoid any strenuous activity. CALL M.D. ANY SIGN OF:   Increasing pain, nausea, vomiting  Abdominal distension (swelling)  New increased bleeding (oral or rectal)  Fever (chills)  Pain in chest area  Bloody discharge from nose or mouth  Shortness of breath    You may  take any Advil, Aspirin, Ibuprofen, Motrin, Aleve, or Tylenol as needed for pain. Post procedure diagnosis:  SIGMOID DIVERTICULOSIS. PREVIOUS ANASTOMOSIS AT 10 CM. Follow-up Instructions:   Call Dr. Daryl Goodell if any questions  Telephone #  [phone]  Additional instructions ;  followup scope in 10 years; Will discuss surgical options in clinic -- return to clinic in 2 weeks.                   Arpit Bhatt  [id number]  1960        DISCHARGE SUMMARY from Nurse    The following personal items collected during your admission are returned to you:   Dental Appliance: Dental Appliances: None  Vision: Visual Aid: Glasses  Hearing Aid:    Jewelry:    Clothing:    Other Valuables:    Valuables sent to safe:      DO NOT TAKE TYLENOL/ACETAMINOPHEN WITH PERCOCET, LORTAB, [address] CANDYODEN. TAKE NARCOTIC PAIN MEDICATIONS WITH FOOD   Narcotics tend to be constipating, we suggest taking a stool softener such as Colace or Miralax (follow package instructions). DO NOT DRIVE WHILE TAKING NARCOTIC PAIN MEDICATIONS. DO NOT TAKE SLEEPING MEDICATIONS OR ANTIANXIETY MEDICATIONS WHILE TAKING NARCOTIC PAIN MEDICATIONS,  ESPECIALLY THE NIGHT OF ANESTHESIA. CPAP PATIENTS BE SURE TO WEAR MACHINE WHENEVER NAPPING OR SLEEPING. DISCHARGE SUMMARY from Nurse    The following personal items collected during your admission are returned to you:   Dental Appliance: Dental Appliances: None  Vision: Visual Aid: Glasses  Hearing Aid:    Jewelry:    Clothing:    Other Valuables:    Valuables sent to safe:        PATIENT INSTRUCTIONS:    After General Anesthesia or Intravenous Sedation, for 24 hours or while taking prescription Narcotics:        Someone should be with you for the next 24 hours. For your own safety, a responsible adult must drive you home. · Limit your activities  · Recommended activity: Rest today, up with assistance today. Do not climb stairs or shower unattended for the next 24 hours. · Please start with a soft bland diet and advance as tolerated (no nausea) to regular diet. · If you have a sore throat you should try the following: fluids, warm salt water gargles, or throat lozenges. If it does not improve after several days please follow up with your primary physician. · Do not drive and operate hazardous machinery  · Do not make important personal or business decisions  · Do  not drink alcoholic beverages  · If you have not urinated within 8 hours after discharge, please contact your surgeon on call.     Report the following to your surgeon:  · Excessive pain, swelling, redness or odor of or around the surgical area  · Temperature over 100.5  · Nausea and vomiting lasting longer than 4 hours or if unable to take medications  · Any signs of decreased circulation or nerve impairment to extremity: change in color, persistent  numbness, tingling, coldness or increase pain      · You will receive a Post Operative Call from one of the Recovery Room Nurses on the day after your surgery to check on you. It is very important for us to know how you are recovering after your surgery. If you have an issue or need to speak with someone, please call your surgeon, do not wait for the post operative call. · You may receive an e-mail or letter in the mail from [facility] regarding your experience with us in the Ambulatory Surgery Unit. Your feedback is valuable to us and we appreciate your participation in the survey. · If the above instructions are not adequate, please contact Nella King RN, [facility] anesthesia Nurse Manager or our Anesthesiologist, at [phone]. If you are having problems after your surgery, call the physician at his office number. · We wish you a speedy recovery ? What to do at Home:      *  Please give a list of your current medications to your Primary Care Provider. *  Please update this list whenever your medications are discontinued, doses are      changed, or new medications (including over-the-counter products) are added. *  Please carry medication information at all times in case of emergency situations. These are general instructions for a healthy lifestyle:    No smoking/ No tobacco products/ Avoid exposure to second hand smoke    Surgeon General's Warning:  Quitting smoking now greatly reduces serious risk to your health.     Obesity, smoking, and sedentary lifestyle greatly increases your risk for illness    A healthy diet, regular physical exercise & weight monitoring are important for maintaining a healthy lifestyle    You may be retaining fluid if you have a history of heart failure or if you experience any of the following symptoms:  Weight gain of 3 pounds or more overnight or 5 pounds in a week, increased swelling in our hands or feet or shortness of breath while lying flat in bed. Please call your doctor as soon as you notice any of these symptoms; do not wait until your next office visit. Recognize signs and symptoms of STROKE:    B - Balance  E - Eyes    F-  Face looks uneven    A-  Arms unable to move or move even    S-  Speech slurred or non-existent    T-  Time-call 911 as soon as signs and symptoms begin-DO NOT go       Back to bed or wait to see if you get better-TIME IS BRAIN. If you have not received your influenza and/or pneumococcal vaccine, please follow up with your primary care physician. The discharge information has been reviewed with the patient and caregiver. The patient and caregiver verbalized understanding.

## 2017-07-05 NOTE — BRIEF OP NOTE
BRIEF OPERATIVE NOTE    Date of Procedure: 7/5/2017   Preoperative Diagnosis: RECTAL PROLAPSE  Postoperative Diagnosis: SIGMOID DIVERTICULOSIS. PREVIOUS ANASTOMOSIS AT 10 CM. Procedure(s):  COLONOSCOPY  Surgeon(s) and Role:     * David Alicea MD - Primary         Assistant Staff:       Surgical Staff:  Circ-1: Trini Pelaez  Circ-2: Barbara Peralta RN  Scrub Tech-1: Randi Garibay  Event Time In   Incision Start 1203   Incision Close 1216     Anesthesia: MAC   Estimated Blood Loss: none  Specimens: * No specimens in log *   Findings: anastomosis at 10 cm;  Sigmoid tics;   Otherwise normal colon;  Very poor tone  Complications: none  Implants: * No implants in log *

## 2017-07-05 NOTE — H&P
[facility], [address]   HISTORY AND PHYSICAL       Name:  Kelli Flores   MR#:  [id number]   :  1960   Account #:  [de-identified]        Date of Adm:  2017       CHIEF COMPLAINT: Rectal prolapse with previous history of prolapse,   and previous repair, now for exam for some incontinence and recurrent   prolapse. HISTORY OF PRESENT ILLNESS: The patient is 49-year-old woman   who has undergone a low anterior anastomosis for rectal prolapse   years ago, by my partner, Dr. Farzana Mckee. She has over the   ensuing years had various types of colonic motility issues, but she has   recently developed recurrence of prolapse and problems with   continence. She has evidence of recurrent prolapse, and prior to   deciding on the type of surgery, we wanted to make sure that there is   not an element of mass or structuring above the lower rectum that is   predisposing her towards a recurrence at the site of previous repair. She also seems to prolapse more anterior tissue than posterior, and   again, in planning a future surgical intervention for her prolapse, I   would like to know exactly where the anastomosis is, and so as not to   inadvertently create an ischemic segment above her anal outlet and   anal rectum at the lowest portion, that could be disastrous for viability   of her mid rectum. Subsequently, she is in today for screening   colonoscopy. PAST MEDICAL HISTORY: Significant for the rectal resection done  in   about . She has also undergone a radical hysterectomy,   appendectomy, cholecystectomy, and she has a number of medical   issues, and medications which she takes for them. Indeed, her list of medicines is 27 medications long. Highlights include:    1. TriCor. 2. Losartan. 3. Hydroxyzine. 4. Zyprexa. 5. Vilazodone. 6. Pepcid. 7. Loperamide. 8. Polyethylene glycol. 9. Acidophilus.    10. Fluticasone. 11. Fluorometholone. 12. Various eyedrops. 13. She also takes estradiol. 14. Gabapentin. 15. Valium. 16. Benadryl. 17. Melatonin. 18. Ibuprofen. 19. Alendronate. 20. Calcium. 21. Vitamins D and E.   22. BC Powders. 23. Acetaminophen. ALLERGIES    1. BUSPAR. 2. LITHIUM. 3. DICYCLOMINE. Medical history is otherwise negative, as noted above. She has a   history of hysterectomy for endometriosis. SOCIAL HISTORY: She does not drink, does not smoke. FAMILY HISTORY: Significant for cholecystectomy, rectal surgery in   both parents, and some dementia. REVIEW OF SYSTEMS: Significant for frequent loose stools, change   in bowels habits and abdominal discomfort, rectal pressure, history of   headaches. She has some occasional dysuria, joint pains, and   swelling. She has trouble sleeping and some situational depression. PHYSICAL EXAMINATION   GENERAL: A 57-year-old white female, she is in no acute distress. HEENT: Clear. NECK: Supple. LUNGS: Clear. CARDIAC: Regular. BREASTS: Deferred. ABDOMEN: Soft, nontender, no mass or organomegaly. Bowel sounds   active. Well healed surgical incisions. Flanks negative. Groins   negative. RECTAL: Decreased sphincter tone. Full thickness rectal prolapse. The prolapse is much more pronounced anteriorly than posteriorly. It is   almost as if a rectocele has taken part in the development of this   process, as well. EXTREMITIES: No gross deformity. NEUROLOGICAL: Intact. SKIN: Clear. ASSESSMENT: Rectal prolapse recurrence with irritable bowel   syndrome type symptoms as well.  Before embarking on any kind of   repair of a prolapse, I need to know exactly where the anastomosis   from the previous surgery is, so that we do not jeopardize a segment of   distal colon brought down to the rectal area by dividing above the   anastomosis, thus rendering a potential for ischemia of that segment   that has been previously anastomosed down to the lowest part of the   rectum. PLAN: We are going to scope her today and see if there are any   answers forthcoming, in terms of the level of the previous anastomosis,   and if there is any other process above it that might predispose her to   issues.  Further care will be rendered once we see the results of this   exam.         MD NICOLLE Kaplan / Isaac Carey   D:  07/04/2017   20:11   T:  07/04/2017   21:33   Job #:  [id number]

## 2017-07-05 NOTE — ANESTHESIA PREPROCEDURE EVALUATION
Anesthetic History          Comments: Was awake during one colonoscopy & didn't like it     Review of Systems / Medical History  Patient summary reviewed, nursing notes reviewed and pertinent labs reviewed    Pulmonary                Comments: Seasonal allergy: coughing sneezing   Neuro/Psych         Headaches (Chronic) and psychiatric history (Bipolar affective disorder)     Cardiovascular    Hypertension              Exercise tolerance: >4 METS     GI/Hepatic/Renal     GERD (no longer a problem)          Comments: IBS Endo/Other        Arthritis     Other Findings            Physical Exam    Airway  Mallampati: II  TM Distance: 4 - 6 cm  Neck ROM: normal range of motion   Mouth opening: Normal    Comments: torus Cardiovascular  Regular rate and rhythm,  S1 and S2 normal,  no murmur, click, rub, or gallop  Rhythm: regular  Rate: normal         Dental  No notable dental hx       Pulmonary  Breath sounds clear to auscultation               Abdominal  GI exam deferred       Other Findings            Anesthetic Plan    ASA: 2  Anesthesia type: general and total IV anesthesia          Induction: Intravenous  Anesthetic plan and risks discussed with: Patient

## 2017-07-07 NOTE — OP NOTES
[facility]    [facility], [address] [address]   OP NOTE       Name:  Jerome Acosta   MR#:  [id number]   :  1960   Account #:  [de-identified]    Surgery Date:  2017   Date of Adm:  2017       PREOPERATIVE DIAGNOSIS: Fecal incontinence. POSTOPERATIVE DIAGNOSES   1. Abnormal study. 2. Failure to augment voluntary squeeze. 3. Marked pelvic floor dyskinesia with paradoxical increase of rectal   pressure with push maneuver. 4. Abnormal sensory findings. 5. Balloon expulsion is passed    PROCEDURES PERFORMED   1. Anorectal manometry. 2. Assessment of anorectal function with balloon. ESTIMATED BLOOD LOSS: none    SPECIMENS REMOVED: none    ANESTHESIA:  None     DESCRIPTION OF PROCEDURE: High-resolution anorectal   manometry was performed by the nursing staff, with subsequent   interpretation by Dr. Kimberly Woody. Sphincter pressures are measured, rectal   and abdominal reference mean and max. Normals were greater than 30   mmHg. Maximum rectal reference pressure 48.7, mean rectal   reference pressure 34.1. Maximum abdominal reference pressure 55.7,   mean 41.2. The patient was then asked to voluntarily squeeze. Normal   will sustain for more than 10 seconds and increase squeeze by more   than 30 mmHg. Squeeze pressure increases only to 65.5 mmHg by   rectal reference and 75.9 mmHg by abdominal reference. These are   borderline/weak. She sustains for 21.5 seconds. She was then asked to bear down. Residual anal pressure paradoxically   increases to 90.1 mmHg, intrarectal pressure at this time was 64.3, for   a rectoanal pressure differential of -25.8. This is a -41% relaxation. This   is marked pelvic floor dyskinesia. Balloon expulsion is passed. .    The balloon was then utilized for sensory testing. Rectoanal inhibitory   reflex was present. The first sensation to rectal filling was at 30 mL. Normal should be about 20 mL.  The urge to defecate is at 80 mL,   which is normal. Maximum discomfort is at 120 mL, which is low;   normals are about 180 mL. The sensory findings demonstrate impaired sensation to first filling and   heightened sensitivity to ongoing maximal filling. This may represent   stool in the vault. It may represent a noncompliant or denervated   sphincter. I recommend the followin. Transanal ultrasound to look for focal weakness. 2. Pelvic floor therapy. 3. MR defecography.         MD SINDI Thao / Sky May   D:  2017   20:37   T:  2017   09:43   Job #:  [id number]

## 2017-07-11 RX ORDER — FLUTICASONE PROPIONATE 50 MCG
SPRAY, SUSPENSION (ML) NASAL
Qty: 16 G | Refills: 1 | Status: SHIPPED | OUTPATIENT
Start: 2017-07-11 | End: 2017-10-09 | Stop reason: SDUPTHER

## 2017-07-13 DIAGNOSIS — E78.2 ELEVATED TRIGLYCERIDES WITH HIGH CHOLESTEROL: ICD-10-CM

## 2017-07-13 RX ORDER — FENOFIBRATE 145 MG/1
TABLET, COATED ORAL
Qty: 30 TAB | Refills: 5 | Status: SHIPPED | OUTPATIENT
Start: 2017-07-13 | End: 2017-07-18 | Stop reason: SDUPTHER

## 2017-07-18 ENCOUNTER — OFFICE VISIT (OUTPATIENT)
Dept: INTERNAL MEDICINE CLINIC | Age: 57
End: 2017-07-18

## 2017-07-18 VITALS
HEART RATE: 91 BPM | OXYGEN SATURATION: 99 % | TEMPERATURE: 97.8 F | WEIGHT: 158 LBS | SYSTOLIC BLOOD PRESSURE: 118 MMHG | HEIGHT: 60 IN | RESPIRATION RATE: 20 BRPM | DIASTOLIC BLOOD PRESSURE: 74 MMHG | BODY MASS INDEX: 31.02 KG/M2

## 2017-07-18 DIAGNOSIS — F31.10 BIPOLAR AFFECTIVE DISORDER, MANIC (HCC): ICD-10-CM

## 2017-07-18 DIAGNOSIS — K62.3 RECTAL PROLAPSE: ICD-10-CM

## 2017-07-18 DIAGNOSIS — R51.9 CHRONIC DAILY HEADACHE: Primary | ICD-10-CM

## 2017-07-18 DIAGNOSIS — I10 ESSENTIAL HYPERTENSION: ICD-10-CM

## 2017-07-18 RX ORDER — AMITRIPTYLINE HYDROCHLORIDE 25 MG/1
25 TABLET, FILM COATED ORAL
Qty: 30 TAB | Refills: 2 | Status: SHIPPED | OUTPATIENT
Start: 2017-07-18 | End: 2017-09-15 | Stop reason: DRUGHIGH

## 2017-07-18 NOTE — PATIENT INSTRUCTIONS

## 2017-07-18 NOTE — MR AVS SNAPSHOT
Visit Information Date & Time Provider Department Dept. Phone Encounter #  
 7/18/2017  9:30 AM Yeimi Spivey MD [facility] Internal Medicine [facility] [phone] [id number] Upcoming Health Maintenance Date Due DTaP/Tdap/Td series (1 - Tdap) 5/19/1979 PAP AKA CERVICAL CYTOLOGY 4/25/2017 INFLUENZA AGE 9 TO ADULT 8/1/2017 MEDICARE YEARLY EXAM 5/31/2018 BREAST CANCER SCRN MAMMOGRAM 3/28/2019 COLONOSCOPY 7/5/2027 Allergies as of 7/18/2017  Review Complete On: 7/18/2017 By: Yeimi Spivey MD  
  
 Severity Noted Reaction Type Reactions Buspar [Buspirone] Medium 02/09/2017   Side Effect Other (comments) Causes \"stimulation\" that could exacerbate a manic attack/phase of pt's Biplar. Reported by patient Amlodipine  06/28/2017    Other (comments) Ankle swelling Dicyclomine  10/14/2014    Nausea and Vomiting Extreme stomach pains Lithium  01/03/2014   Systemic Nausea and Vomiting Severe nausea and vomiting. Other Medication  11/19/2010    Other (comments) Intolerance to oranges, cabbage,beans,peppers,raw onions,foods with caffeine in them, popcorn, no pop sodas, because of IBS Current Immunizations  Reviewed on 5/30/2017 Name Date Influenza Vaccine 10/13/2014 Tdap 6/19/2017 Not reviewed this visit You Were Diagnosed With   
  
 Codes Comments Chronic daily headache    -  Primary ICD-10-CM: R36 ICD-9-CM: 784.0 Bipolar affective disorder, manic ([facility]     ICD-10-CM: F31.10 ICD-9-CM: 296.40 Rectal prolapse     ICD-10-CM: K62.3 ICD-9-CM: 569.1 Essential hypertension     ICD-10-CM: I10 
ICD-9-CM: 401.9 Vitals BP Pulse Temp Resp Height(growth percentile) Weight(growth percentile) 118/74 (BP 1 Location: Left arm, BP Patient Position: Sitting) 91 97.8 °F (36.6 °C) (Oral) 20 5' (1.524 m) 158 lb (71.7 kg) SpO2 BMI OB Status Smoking Status 99% 30.86 kg/m2 Hysterectomy Never Smoker Vitals History BMI and BSA Data Body Mass Index Body Surface Area  
 30.86 kg/m 2 1.74 m 2 Preferred Pharmacy Pharmacy Name Phone [facility] [phone] Your Updated Medication List  
  
   
This list is accurate as of: 7/18/17  9:53 AM.  Always use your most recent med list.  
  
  
  
  
 ACIDOPHILUS Cap Generic drug:  Lactobacillus acidophilus Take 1 Cap by mouth daily. alendronate 70 mg tablet Commonly known as:  FOSAMAX Take 1 Tab by mouth every seven (7) days. amitriptyline 25 mg tablet Commonly known as:  ELAVIL Take 1 Tab by mouth nightly. BC HEADACHE POWDER PO Take 1 Dose by mouth as needed. black cohosh 540 mg Cap Take 1 Tab by mouth daily. butalbital-acetaminophen  mg tablet Commonly known as:  Volney Coe Take 1 Tab by mouth every six (6) hours as needed. calcium-cholecalciferol (D3) tablet Commonly known as:  CALTRATE 600+D Take 1 Tab by mouth daily. diazePAM 2.5 mg Kit Commonly known as:  VALIUM Insert 2 mg into rectum every eight (8) hours as needed (pelvic floor pain). diclofenac 1 % Gel Commonly known as:  VOLTAREN Apply  to affected area three (3) times daily as needed. diphenhydrAMINE 25 mg capsule Commonly known as:  BENADRYL Take 2 Caps by mouth nightly. estradiol 0.05 mg/24 hr  
Commonly known as:  VIVELLE  
1 Patch by TransDERmal route two (2) times a week on Wednesday and Saturday. famotidine 20 mg tablet Commonly known as:  PEPCID  
TAKE 1 TABLET BY MOUTH TWO (2) TIMES A DAY. STOP OMEPRAZOLE. fenofibrate nanocrystallized 145 mg tablet Commonly known as:  [employer] Take 1 Tab by mouth every fourty-eight (48) hours. fluorometholone 0.1 % ophthalmic suspension Commonly known as: [facility] Administer 1 Drop to both eyes two (2) times a day. fluticasone 50 mcg/actuation nasal spray Commonly known as:  FLONASE  
USE ONE SPRAY IN EACH NOSTRIL TWO TIMES A DAY. gabapentin 100 mg capsule Commonly known as:  NEURONTIN Take 2 Caps by mouth two (2) times a day.  
  
 hydrOXYzine HCl 10 mg tablet Commonly known as:  ATARAX  
1 tab po TID  
  
 ibuprofen 200 mg tablet Commonly known as:  MOTRIN Take 400 mg by mouth nightly. loperamide 2 mg capsule Commonly known as:  IMODIUM Take 2 mg by mouth as needed for Diarrhea.  
  
 losartan 25 mg tablet Commonly known as:  COZAAR Take 1 Tab by mouth daily. melatonin 5 mg Cap capsule Take 5 mg by mouth nightly. OLANZapine 5 mg tablet Commonly known as:  ZyPREXA Take 5 mg by mouth daily (with dinner). polyethylene glycol 17 gram/dose powder Commonly known as:  Wilman Barrs Take 17 g by mouth daily. REFRESH OPTIVE ADVANCED (PF) OP Apply 1 Drop to eye two (2) times daily as needed. SF 5000 PLUS 1.1 % Crea Generic drug:  sodium fluoride 1 Tube. TYLENOL EXTRA STRENGTH 500 mg tablet Generic drug:  acetaminophen Take 1,000 mg by mouth every six (6) hours as needed for Pain.  
  
 vilazodone 10 mg Tab tablet Commonly known as:  VIIBRYD Take 20 mg by mouth daily. VITAMIN D3 1,000 unit Cap Generic drug:  cholecalciferol Take 1 Tab by mouth daily. Indications: VITAMIN D DEFICIENCY  
  
 vitamin E 400 unit capsule Commonly known as:  [address] Take 400 Units by mouth daily. Prescriptions Sent to Pharmacy Refills  
 amitriptyline (ELAVIL) 25 mg tablet 2 Sig: Take 1 Tab by mouth nightly. Class: Normal  
 Pharmacy: [facility] Ralf Serrato Ph #: [phone] Route: Oral  
  
We Performed the Following REFERRAL TO NEUROLOGY [ESW87 Custom] Comments:  
 Please evaluate patient for for chronic headache Referral Information Referral ID Referred By Referred To [id number] [facility], [address] [facility] Maggie Arellano Phone: [phone] Fax: [phone] Visits Status Start Date End Date 1 New Request 7/18/17 7/18/18 If your referral has a status of pending review or denied, additional information will be sent to support the outcome of this decision. Patient Instructions Headache: Care Instructions Your Care Instructions Headaches have many possible causes. Most headaches aren't a sign of a more serious problem, and they will get better on their own. Home treatment may help you feel better faster. The doctor has checked you carefully, but problems can develop later. If you notice any problems or new symptoms, get medical treatment right away. Follow-up care is a key part of your treatment and safety. Be sure to make and go to all appointments, and call your doctor if you are having problems. It's also a good idea to know your test results and keep a list of the medicines you take. How can you care for yourself at home? · Do not drive if you have taken a prescription pain medicine. · Rest in a quiet, dark room until your headache is gone. Close your eyes and try to relax or go to sleep. Don't watch TV or read. · Put a cold, moist cloth or cold pack on the painful area for 10 to 20 minutes at a time. Put a thin cloth between the cold pack and your skin. · Use a warm, moist towel or a heating pad set on low to relax tight shoulder and neck muscles. · Have someone gently massage your neck and shoulders. · Take pain medicines exactly as directed. ¨ If the doctor gave you a prescription medicine for pain, take it as prescribed. ¨ If you are not taking a prescription pain medicine, ask your doctor if you can take an over-the-counter medicine.  
· Be careful not to take pain medicine more often than the instructions allow, because you may get worse or more frequent headaches when the medicine wears off. · Do not ignore new symptoms that occur with a headache, such as a fever, weakness or numbness, vision changes, or confusion. These may be signs of a more serious problem. To prevent headaches · Keep a headache diary so you can figure out what triggers your headaches. Avoiding triggers may help you prevent headaches. Record when each headache began, how long it lasted, and what the pain was like (throbbing, aching, stabbing, or dull). Write down any other symptoms you had with the headache, such as nausea, flashing lights or dark spots, or sensitivity to bright light or loud noise. Note if the headache occurred near your period. List anything that might have triggered the headache, such as certain foods (chocolate, cheese, wine) or odors, smoke, bright light, stress, or lack of sleep. · Find healthy ways to deal with stress. Headaches are most common during or right after stressful times. Take time to relax before and after you do something that has caused a headache in the past. 
· Try to keep your muscles relaxed by keeping good posture. Check your jaw, face, neck, and shoulder muscles for tension, and try relaxing them. When sitting at a desk, change positions often, and stretch for 30 seconds each hour. · Get plenty of sleep and exercise. · Eat regularly and well. Long periods without food can trigger a headache. · Treat yourself to a massage. Some people find that regular massages are very helpful in relieving tension. · Limit caffeine by not drinking too much coffee, tea, or soda. But don't quit caffeine suddenly, because that can also give you headaches. · Reduce eyestrain from computers by blinking frequently and looking away from the computer screen every so often. Make sure you have proper eyewear and that your monitor is set up properly, about an arm's length away. · Seek help if you have depression or anxiety. Your headaches may be linked to these conditions. Treatment can both prevent headaches and help with symptoms of anxiety or depression. When should you call for help? Call 911 anytime you think you may need emergency care. For example, call if: 
· You have signs of a stroke. These may include: 
¨ Sudden numbness, paralysis, or weakness in your face, arm, or leg, especially on only one side of your body. ¨ Sudden vision changes. ¨ Sudden trouble speaking. ¨ Sudden confusion or trouble understanding simple statements. ¨ Sudden problems with walking or balance. ¨ A sudden, severe headache that is different from past headaches. Call your doctor now or seek immediate medical care if: 
· You have a new or worse headache. · Your headache gets much worse. Where can you learn more? Go to http://tung-tg.info/. Enter M271 in the search box to learn more about \"Headache: Care Instructions. \" Current as of: October 14, 2016 Content Version: 11.3 © 9800-1473 Virtual 3-D Display for Smartphones. Care instructions adapted under license by [employer] (which disclaims liability or warranty for this information). If you have questions about a medical condition or this instruction, always ask your healthcare professional. [place] [address] any warranty or liability for your use of this information. Introducing \[facility] [place] [facility]\"" & HEALTH SERVICES! Dear Kevin Lee: Thank you for requesting a Jumpzter account. Our records indicate that you already have an active Jumpzter account. You can access your account anytime at [email or web address] Did you know that you can access your hospital and ER discharge instructions at any time in Jumpzter? You can also review all of your test results from your hospital stay or ER visit. Additional Information If you have questions, please visit the Frequently Asked Questions section of the NuORDER website at [email or web address] Remember, NuORDER is NOT to be used for urgent needs. For medical emergencies, dial 911. Now available from your iPhone and Android! Please provide this summary of care documentation to your next provider. Your primary care clinician is listed as Cecy Nino. If you have any questions after today's visit, please call [phone].

## 2017-07-18 NOTE — PROGRESS NOTES
HISTORY OF PRESENT ILLNESS  Pam Li is a 59 y.o. female here for follow up. reports ch daily headache.takign motrin and tylenol 2 to 3 times a day. some times use BC powder too.  her knee pain is better. She has better balance to walk now. Has HTN. on med. Has bipolar disorder. on med. doing well. Seeing psych and therapist,stable. Abdominal Pain   Associated symptoms include headaches. Pertinent negatives include no shortness of breath. Mental Health Problem   Associated symptoms include headaches. Pertinent negatives include no shortness of breath. Headache   Associated symptoms include headaches. Pertinent negatives include no shortness of breath. Follow Up Chronic Condition   Associated symptoms include headaches. Pertinent negatives include no shortness of breath. Insomnia   Associated symptoms include headaches. Pertinent negatives include no shortness of breath. Arthritis   Associated symptoms include headaches. Pertinent negatives include no shortness of breath. Review of Systems   Constitutional: Negative. Eyes: Negative. Respiratory: Negative. Negative for shortness of breath and wheezing. Cardiovascular: Negative. Musculoskeletal: Positive for joint pain. Negative for falls. Skin: Negative. Neurological: Positive for headaches. Psychiatric/Behavioral: Positive for depression. The patient is nervous/anxious and has insomnia. Physical Exam   Constitutional: She is oriented to person, place, and time. She appears well-developed and well-nourished. No distress. HENT:   Head: Normocephalic and atraumatic. Right Ear: External ear normal.   Left Ear: External ear normal.   Nose: Nose normal.   Mouth/Throat: Oropharynx is clear and moist. No oropharyngeal exudate. Neck: Normal range of motion. Neck supple. No JVD present. No thyromegaly present. Cardiovascular: Normal rate, regular rhythm, normal heart sounds and intact distal pulses.     Pulmonary/Chest: Effort normal and breath sounds normal. No respiratory distress. She has no wheezes. Neurological: She is alert and oriented to person, place, and time. She has normal reflexes. She displays normal reflexes. No cranial nerve deficit. She exhibits normal muscle tone. Coordination normal.   Psychiatric: She has a normal mood and affect. Her behavior is normal.       ASSESSMENT and PLAN    Jacki Landaverde was seen today for irritable bowel syndrome, mental health problem and headache. Diagnoses and all orders for this visit:    Chronic daily headache  Need to stop NSAID. can cause rebound headache. Refer to,  -     REFERRAL TO NEUROLOGY    Will start,  -     amitriptyline (ELAVIL) 25 mg tablet; Take 1 Tab by mouth nightly. Bipolar affective disorder, manic ([address]    On zyprexa and atarax. seeing David Dacosta. Rectal prolapse    Seeing .will have surgery soon. Essential hypertension    Stable. on med. Other orders  -     amitriptyline (ELAVIL) 25 mg tablet; Take 1 Tab by mouth nightly. Discussed expected course/resolution/complications of diagnosis in detail with patient. Medication risks/benefits/costs/interactions/alternatives discussed with patient. Pt was given an after visit summary which includes diagnoses, current medications & vitals. Pt expressed understanding with the diagnosis and plan.

## 2017-07-18 NOTE — PROGRESS NOTES
Health Maintenance Due   Topic Date Due    DTaP/Tdap/Td series (1 - Tdap) 05/19/1979    PAP AKA CERVICAL CYTOLOGY  04/25/2017       Chief Complaint   Patient presents with    Irritable Bowel Syndrome     3 month follow up   [address] Problem    Headache     states daily headaches and meds not effective       1. Have you been to the ER, urgent care clinic since your last visit? Hospitalized since your last visit? No    2. Have you seen or consulted any other health care providers outside of the [address] [place] [address] since your last visit? Include any pap smears or colon screening. No    3) Do you have an Advance Directive on file? no    4) Are you interested in receiving information on Advance Directives? NO      Patient is accompanied by self I have received verbal consent from Anila Azul to discuss any/all medical information while they are present in the room.

## 2017-07-26 ENCOUNTER — HOSPITAL ENCOUNTER (OUTPATIENT)
Dept: PREADMISSION TESTING | Age: 57
Discharge: HOME | End: 2017-07-26
Attending: COLON & RECTAL SURGERY
Payer: MEDICARE

## 2017-07-26 VITALS
TEMPERATURE: 98.3 F | HEART RATE: 82 BPM | SYSTOLIC BLOOD PRESSURE: 124 MMHG | DIASTOLIC BLOOD PRESSURE: 77 MMHG | BODY MASS INDEX: 30.77 KG/M2 | WEIGHT: 156.75 LBS | RESPIRATION RATE: 20 BRPM | OXYGEN SATURATION: 100 % | HEIGHT: 60 IN

## 2017-07-26 LAB
ABO + RH BLD: NORMAL
ALBUMIN SERPL BCP-MCNC: 4.1 G/DL (ref 3.5–5)
ALBUMIN/GLOB SERPL: 1.2 {RATIO} (ref 1.1–2.2)
ALP SERPL-CCNC: 87 U/L (ref 45–117)
ALT SERPL-CCNC: 30 U/L (ref 12–78)
ANION GAP BLD CALC-SCNC: 5 MMOL/L (ref 5–15)
APPEARANCE UR: CLEAR
APTT PPP: 31.4 SEC (ref 22.1–32.5)
AST SERPL W P-5'-P-CCNC: 21 U/L (ref 15–37)
BACTERIA URNS QL MICRO: NEGATIVE /HPF
BILIRUB SERPL-MCNC: 0.3 MG/DL (ref 0.2–1)
BILIRUB UR QL: NEGATIVE
BLOOD GROUP ANTIBODIES SERPL: NORMAL
BUN SERPL-MCNC: 15 MG/DL (ref 6–20)
BUN/CREAT SERPL: 15 (ref 12–20)
CALCIUM SERPL-MCNC: 9.4 MG/DL (ref 8.5–10.1)
CEA SERPL-MCNC: <0.5 NG/ML
CHLORIDE SERPL-SCNC: 105 MMOL/L (ref 97–108)
CO2 SERPL-SCNC: 29 MMOL/L (ref 21–32)
COLOR UR: NORMAL
CREAT SERPL-MCNC: 1 MG/DL (ref 0.55–1.02)
EPITH CASTS URNS QL MICRO: NORMAL /LPF
ERYTHROCYTE [DISTWIDTH] IN BLOOD BY AUTOMATED COUNT: 13.5 % (ref 11.5–14.5)
GLOBULIN SER CALC-MCNC: 3.4 G/DL (ref 2–4)
GLUCOSE SERPL-MCNC: 89 MG/DL (ref 65–100)
GLUCOSE UR STRIP.AUTO-MCNC: NEGATIVE MG/DL
HCT VFR BLD AUTO: 39.2 % (ref 35–47)
HGB BLD-MCNC: 13 G/DL (ref 11.5–16)
HGB UR QL STRIP: NEGATIVE
HYALINE CASTS URNS QL MICRO: NORMAL /LPF (ref 0–5)
INR PPP: 1.1 (ref 0.9–1.1)
KETONES UR QL STRIP.AUTO: NEGATIVE MG/DL
LEUKOCYTE ESTERASE UR QL STRIP.AUTO: NEGATIVE
MCH RBC QN AUTO: 28.6 PG (ref 26–34)
MCHC RBC AUTO-ENTMCNC: 33.2 G/DL (ref 30–36.5)
MCV RBC AUTO: 86.3 FL (ref 80–99)
NITRITE UR QL STRIP.AUTO: NEGATIVE
PH UR STRIP: 5.5 [PH] (ref 5–8)
PLATELET # BLD AUTO: 297 K/UL (ref 150–400)
POTASSIUM SERPL-SCNC: 3.8 MMOL/L (ref 3.5–5.1)
PROT SERPL-MCNC: 7.5 G/DL (ref 6.4–8.2)
PROT UR STRIP-MCNC: NEGATIVE MG/DL
PROTHROMBIN TIME: 11.5 SEC (ref 9–11.1)
RBC # BLD AUTO: 4.54 M/UL (ref 3.8–5.2)
RBC #/AREA URNS HPF: NORMAL /HPF (ref 0–5)
SODIUM SERPL-SCNC: 139 MMOL/L (ref 136–145)
SP GR UR REFRACTOMETRY: 1.01 (ref 1–1.03)
SPECIMEN EXP DATE BLD: NORMAL
THERAPEUTIC RANGE,PTTT: NORMAL SECS (ref 58–77)
UROBILINOGEN UR QL STRIP.AUTO: 0.2 EU/DL (ref 0.2–1)
WBC # BLD AUTO: 6.8 K/UL (ref 3.6–11)
WBC URNS QL MICRO: NORMAL /HPF (ref 0–4)

## 2017-07-26 PROCEDURE — 80053 COMPREHEN METABOLIC PANEL: CPT | Performed by: COLON & RECTAL SURGERY

## 2017-07-26 PROCEDURE — 87086 URINE CULTURE/COLONY COUNT: CPT | Performed by: COLON & RECTAL SURGERY

## 2017-07-26 PROCEDURE — 85027 COMPLETE CBC AUTOMATED: CPT | Performed by: COLON & RECTAL SURGERY

## 2017-07-26 PROCEDURE — 85730 THROMBOPLASTIN TIME PARTIAL: CPT | Performed by: COLON & RECTAL SURGERY

## 2017-07-26 PROCEDURE — 82378 CARCINOEMBRYONIC ANTIGEN: CPT | Performed by: COLON & RECTAL SURGERY

## 2017-07-26 PROCEDURE — 86900 BLOOD TYPING SEROLOGIC ABO: CPT | Performed by: COLON & RECTAL SURGERY

## 2017-07-26 PROCEDURE — 85610 PROTHROMBIN TIME: CPT | Performed by: COLON & RECTAL SURGERY

## 2017-07-26 PROCEDURE — 36415 COLL VENOUS BLD VENIPUNCTURE: CPT | Performed by: COLON & RECTAL SURGERY

## 2017-07-26 PROCEDURE — 81001 URINALYSIS AUTO W/SCOPE: CPT | Performed by: COLON & RECTAL SURGERY

## 2017-07-26 RX ORDER — ALVIMOPAN 12 MG/1
12 CAPSULE ORAL ONCE
Status: CANCELLED | OUTPATIENT
Start: 2017-08-03 | End: 2017-08-03

## 2017-07-26 RX ORDER — DIAZEPAM 5 MG/1
5 TABLET ORAL 2 TIMES DAILY
COMMUNITY
End: 2017-09-15

## 2017-07-26 NOTE — PERIOP NOTES
[facility]  Preoperative Instructions        Surgery Date 08/03/17          Time of Arrival 1000   Contact # [phone] home    1. On the day of your surgery, please report to the Surgical Services Registration Desk and sign in at your designated time. The Surgery Center is located to the right of the Emergency Room. 2. You must have someone with you to drive you home. You should not drive a car for 24 hours following surgery. Please make arrangements for a friend or family member to stay with you for the first 24 hours after your surgery. 3. Do not have anything to eat or drink (including water, gum, mints, coffee, juice) after midnight ?08/02/17    . ? This may not apply to medications prescribed by your physician. ?(Please note below the special instructions with medications to take the morning of your procedure.)    4. We recommend you do not drink any alcoholic beverages for 24 hours before and after your surgery. 5. Stop all Aspirin, non-steroidal anti-inflammatory drugs (i.e. Advil, Aleve), vitamins, and supplements?as directed by your surgeon's office. ? **If you are currently taking Plavix, Coumadin, or other blood-thinning agents, contact your surgeon for instructions. **    6. Wear comfortable clothes. Wear glasses instead of contacts. Do not bring any money or jewelry. Please bring picture ID, insurance card, and any prearranged co-payment or hospital payment. Do not wear make-up, particularly mascara the morning of your surgery. Do not wear nail polish, particularly if you are having foot /hand surgery. Wear your hair loose or down, no ponytails, buns, lorna pins or clips. All body piercings must be removed. Please shower with antibacterial soap for three consecutive days before and on the morning of surgery, but do not apply any lotions, powders or deodorants after the shower on the day of surgery. Please use a fresh towels after each shower.  Please sleep in clean clothes and change bed linens the night before surgery. Please do not shave for 48 hours prior to surgery. Shaving of the face is acceptable. 7. You should understand that if you do not follow these instructions your surgery may be cancelled. If your physical condition changes (I.e. fever, cold or flu) please contact your surgeon as soon as possible. 8. It is important that you be on time. If a situation occurs where you may be late, please call [phone] (OR Holding Area). 9. If you have any questions and or problems, please call [phone] (Pre-admission Testing). 10. Your surgery time may be subject to change. You will receive a phone call the evening prior if your time changes. 11.  If having outpatient surgery, you must have someone to drive you here, stay with you during the duration of your stay, and to drive you home at time of discharge. Special Instructions: Follow bowel prep as surgeon directed -----  Practice incentive spirometry and bring to the hospital    [address] [place] [address] as needed,eye drops as needed,fluticasone nasal spray,hormone patch may be removed with surgery preparation      I understand a pre-operative phone call will be made to verify my surgery time. In the event that I am not available, I give permission for a message to be left on my answering service and/or with another person?   {yes nyes   ___________________      __________   _________    (Signature of Patient)             (Witness)                (Date and Time)

## 2017-07-28 LAB
BACTERIA SPEC CULT: NORMAL
CC UR VC: NORMAL
SERVICE CMNT-IMP: NORMAL

## 2017-07-28 RX ORDER — SODIUM CHLORIDE, SODIUM LACTATE, POTASSIUM CHLORIDE, CALCIUM CHLORIDE 600; 310; 30; 20 MG/100ML; MG/100ML; MG/100ML; MG/100ML
25 INJECTION, SOLUTION INTRAVENOUS CONTINUOUS
Status: CANCELLED | OUTPATIENT
Start: 2017-08-03

## 2017-07-31 ENCOUNTER — TELEPHONE (OUTPATIENT)
Dept: INTERNAL MEDICINE CLINIC | Age: 57
End: 2017-07-31

## 2017-07-31 NOTE — TELEPHONE ENCOUNTER
GT to let you know:  She's off of atarax, and now on hydroxoxine. Replaced the atarax with valium - 5 mg 2x per day. Dr. Tatyana Mcrae made these changes. She will be going into the hospital on Thurs Aug 3 to get her colon resectioned.

## 2017-08-03 ENCOUNTER — HOSPITAL ENCOUNTER (INPATIENT)
Age: 57
LOS: 3 days | Discharge: HOME | DRG: 331 | End: 2017-08-06
Attending: COLON & RECTAL SURGERY | Admitting: COLON & RECTAL SURGERY
Payer: MEDICARE

## 2017-08-03 ENCOUNTER — ANESTHESIA EVENT (OUTPATIENT)
Dept: SURGERY | Age: 57
DRG: 331 | End: 2017-08-03
Payer: MEDICARE

## 2017-08-03 ENCOUNTER — ANESTHESIA (OUTPATIENT)
Dept: SURGERY | Age: 57
DRG: 331 | End: 2017-08-03
Payer: MEDICARE

## 2017-08-03 PROBLEM — K62.3 RECTAL PROLAPSE: Status: ACTIVE | Noted: 2017-08-03

## 2017-08-03 LAB — INR BLD: 1.1 (ref 0.9–1.2)

## 2017-08-03 PROCEDURE — 74011000250 HC RX REV CODE- 250: Performed by: COLON & RECTAL SURGERY

## 2017-08-03 PROCEDURE — 77030027138 HC INCENT SPIROMETER -A

## 2017-08-03 PROCEDURE — 77030002996 HC SUT SLK J&J -A: Performed by: COLON & RECTAL SURGERY

## 2017-08-03 PROCEDURE — 77030021163 HC TUBE CYSTO IRR ICUM -A: Performed by: COLON & RECTAL SURGERY

## 2017-08-03 PROCEDURE — 77030009527 HC GEL PRT SYS AMR -E: Performed by: COLON & RECTAL SURGERY

## 2017-08-03 PROCEDURE — 77030012961 HC IRR KT CYSTO/TUR ICUM -A: Performed by: COLON & RECTAL SURGERY

## 2017-08-03 PROCEDURE — 77030034848: Performed by: COLON & RECTAL SURGERY

## 2017-08-03 PROCEDURE — 74011250636 HC RX REV CODE- 250/636: Performed by: ANESTHESIOLOGY

## 2017-08-03 PROCEDURE — 77030011640 HC PAD GRND REM COVD -A: Performed by: COLON & RECTAL SURGERY

## 2017-08-03 PROCEDURE — 77030013079 HC BLNKT BAIR HGGR 3M -A: Performed by: ANESTHESIOLOGY

## 2017-08-03 PROCEDURE — 77030002888 HC SUT CHRMC J&J -A: Performed by: COLON & RECTAL SURGERY

## 2017-08-03 PROCEDURE — 77030035220 HC TRCR ENDOSC BLNTPRT ANCHR COVD -B: Performed by: COLON & RECTAL SURGERY

## 2017-08-03 PROCEDURE — 74011250636 HC RX REV CODE- 250/636

## 2017-08-03 PROCEDURE — 77030035051: Performed by: COLON & RECTAL SURGERY

## 2017-08-03 PROCEDURE — 77030008756 HC TU IRR SUC STRY -B: Performed by: COLON & RECTAL SURGERY

## 2017-08-03 PROCEDURE — 77030008684 HC TU ET CUF COVD -B: Performed by: ANESTHESIOLOGY

## 2017-08-03 PROCEDURE — C1769 GUIDE WIRE: HCPCS | Performed by: COLON & RECTAL SURGERY

## 2017-08-03 PROCEDURE — 77030035045 HC TRCR ENDOSC VRSPRT BLDLSS COVD -B: Performed by: COLON & RECTAL SURGERY

## 2017-08-03 PROCEDURE — 77030013567 HC DRN WND RESERV BARD -A: Performed by: COLON & RECTAL SURGERY

## 2017-08-03 PROCEDURE — 77030019702 HC WRP THER MENM -C: Performed by: COLON & RECTAL SURGERY

## 2017-08-03 PROCEDURE — 77030002986 HC SUT PROL J&J -A: Performed by: COLON & RECTAL SURGERY

## 2017-08-03 PROCEDURE — 77030010545: Performed by: COLON & RECTAL SURGERY

## 2017-08-03 PROCEDURE — 65270000029 HC RM PRIVATE

## 2017-08-03 PROCEDURE — 77030012890

## 2017-08-03 PROCEDURE — 76210000002 HC OR PH I REC 3 TO 3.5 HR: Performed by: COLON & RECTAL SURGERY

## 2017-08-03 PROCEDURE — 77030011296 HC FCPS GRSP ENDOSC J&J -B: Performed by: COLON & RECTAL SURGERY

## 2017-08-03 PROCEDURE — 0T788DZ DILATION OF BILATERAL URETERS WITH INTRALUMINAL DEVICE, VIA NATURAL OR ARTIFICIAL OPENING ENDOSCOPIC: ICD-10-PCS | Performed by: UROLOGY

## 2017-08-03 PROCEDURE — 74011250637 HC RX REV CODE- 250/637: Performed by: COLON & RECTAL SURGERY

## 2017-08-03 PROCEDURE — 74011000258 HC RX REV CODE- 258: Performed by: COLON & RECTAL SURGERY

## 2017-08-03 PROCEDURE — 74011000272 HC RX REV CODE- 272: Performed by: COLON & RECTAL SURGERY

## 2017-08-03 PROCEDURE — 77030012406 HC DRN WND PENRS BARD -A: Performed by: COLON & RECTAL SURGERY

## 2017-08-03 PROCEDURE — 77030026438 HC STYL ET INTUB CARD -A: Performed by: ANESTHESIOLOGY

## 2017-08-03 PROCEDURE — C1758 CATHETER, URETERAL: HCPCS | Performed by: COLON & RECTAL SURGERY

## 2017-08-03 PROCEDURE — 77030022703 HC LIGASURE  BLNT LAPSCP SEAL COVD -E: Performed by: COLON & RECTAL SURGERY

## 2017-08-03 PROCEDURE — 77030019908 HC STETH ESOPH SIMS -A: Performed by: ANESTHESIOLOGY

## 2017-08-03 PROCEDURE — 74011000250 HC RX REV CODE- 250

## 2017-08-03 PROCEDURE — 77030031139 HC SUT VCRL2 J&J -A: Performed by: COLON & RECTAL SURGERY

## 2017-08-03 PROCEDURE — 85610 PROTHROMBIN TIME: CPT

## 2017-08-03 PROCEDURE — 76060000038 HC ANESTHESIA 3.5 TO 4 HR: Performed by: COLON & RECTAL SURGERY

## 2017-08-03 PROCEDURE — 77030018836 HC SOL IRR NACL ICUM -A: Performed by: COLON & RECTAL SURGERY

## 2017-08-03 PROCEDURE — 76010000134 HC OR TIME 3.5 TO 4 HR: Performed by: COLON & RECTAL SURGERY

## 2017-08-03 PROCEDURE — 77030018832 HC SOL IRR H20 ICUM -A: Performed by: COLON & RECTAL SURGERY

## 2017-08-03 PROCEDURE — 74011250636 HC RX REV CODE- 250/636: Performed by: COLON & RECTAL SURGERY

## 2017-08-03 PROCEDURE — 77030018846 HC SOL IRR STRL H20 ICUM -A: Performed by: COLON & RECTAL SURGERY

## 2017-08-03 PROCEDURE — 77030008459 HC STPLR SKN COOP -B: Performed by: COLON & RECTAL SURGERY

## 2017-08-03 PROCEDURE — 0DSP4ZZ REPOSITION RECTUM, PERCUTANEOUS ENDOSCOPIC APPROACH: ICD-10-PCS | Performed by: COLON & RECTAL SURGERY

## 2017-08-03 PROCEDURE — 77030032490 HC SLV COMPR SCD KNE COVD -B: Performed by: COLON & RECTAL SURGERY

## 2017-08-03 PROCEDURE — 77030008771 HC TU NG SALEM SUMP -A: Performed by: ANESTHESIOLOGY

## 2017-08-03 RX ORDER — DIPHENHYDRAMINE HYDROCHLORIDE 50 MG/ML
12.5 INJECTION, SOLUTION INTRAMUSCULAR; INTRAVENOUS AS NEEDED
Status: DISCONTINUED | OUTPATIENT
Start: 2017-08-03 | End: 2017-08-03 | Stop reason: HOSPADM

## 2017-08-03 RX ORDER — AMITRIPTYLINE HYDROCHLORIDE 25 MG/1
25 TABLET, FILM COATED ORAL
Status: DISCONTINUED | OUTPATIENT
Start: 2017-08-03 | End: 2017-08-06 | Stop reason: HOSPADM

## 2017-08-03 RX ORDER — VALSARTAN 40 MG/1
40 TABLET ORAL
Status: DISCONTINUED | OUTPATIENT
Start: 2017-08-03 | End: 2017-08-06 | Stop reason: HOSPADM

## 2017-08-03 RX ORDER — FLUOROMETHOLONE 1 MG/ML
1 SOLUTION/ DROPS OPHTHALMIC 2 TIMES DAILY
Status: DISCONTINUED | OUTPATIENT
Start: 2017-08-03 | End: 2017-08-06 | Stop reason: HOSPADM

## 2017-08-03 RX ORDER — PROPOFOL 10 MG/ML
INJECTION, EMULSION INTRAVENOUS AS NEEDED
Status: DISCONTINUED | OUTPATIENT
Start: 2017-08-03 | End: 2017-08-03 | Stop reason: HOSPADM

## 2017-08-03 RX ORDER — FENTANYL CITRATE 50 UG/ML
INJECTION, SOLUTION INTRAMUSCULAR; INTRAVENOUS AS NEEDED
Status: DISCONTINUED | OUTPATIENT
Start: 2017-08-03 | End: 2017-08-03 | Stop reason: HOSPADM

## 2017-08-03 RX ORDER — MORPHINE SULFATE 5 MG/ML
INJECTION, SOLUTION INTRAVENOUS
Status: DISPENSED
Start: 2017-08-03 | End: 2017-08-04

## 2017-08-03 RX ORDER — FLUTICASONE PROPIONATE 50 MCG
1 SPRAY, SUSPENSION (ML) NASAL DAILY
Status: DISCONTINUED | OUTPATIENT
Start: 2017-08-04 | End: 2017-08-06 | Stop reason: HOSPADM

## 2017-08-03 RX ORDER — SODIUM CHLORIDE, SODIUM LACTATE, POTASSIUM CHLORIDE, CALCIUM CHLORIDE 600; 310; 30; 20 MG/100ML; MG/100ML; MG/100ML; MG/100ML
25 INJECTION, SOLUTION INTRAVENOUS CONTINUOUS
Status: DISCONTINUED | OUTPATIENT
Start: 2017-08-03 | End: 2017-08-03 | Stop reason: HOSPADM

## 2017-08-03 RX ORDER — PHENYLEPHRINE HCL IN 0.9% NACL 0.4MG/10ML
SYRINGE (ML) INTRAVENOUS AS NEEDED
Status: DISCONTINUED | OUTPATIENT
Start: 2017-08-03 | End: 2017-08-03 | Stop reason: HOSPADM

## 2017-08-03 RX ORDER — ENOXAPARIN SODIUM 100 MG/ML
40 INJECTION SUBCUTANEOUS EVERY 24 HOURS
Status: DISCONTINUED | OUTPATIENT
Start: 2017-08-04 | End: 2017-08-06 | Stop reason: HOSPADM

## 2017-08-03 RX ORDER — ROCURONIUM BROMIDE 10 MG/ML
INJECTION, SOLUTION INTRAVENOUS AS NEEDED
Status: DISCONTINUED | OUTPATIENT
Start: 2017-08-03 | End: 2017-08-03 | Stop reason: HOSPADM

## 2017-08-03 RX ORDER — SODIUM CHLORIDE 0.9 % (FLUSH) 0.9 %
5-10 SYRINGE (ML) INJECTION EVERY 8 HOURS
Status: DISCONTINUED | OUTPATIENT
Start: 2017-08-03 | End: 2017-08-06 | Stop reason: HOSPADM

## 2017-08-03 RX ORDER — DIAZEPAM 5 MG/1
5 TABLET ORAL 2 TIMES DAILY
Status: DISCONTINUED | OUTPATIENT
Start: 2017-08-04 | End: 2017-08-06 | Stop reason: HOSPADM

## 2017-08-03 RX ORDER — SODIUM CHLORIDE 0.9 % (FLUSH) 0.9 %
5-10 SYRINGE (ML) INJECTION AS NEEDED
Status: DISCONTINUED | OUTPATIENT
Start: 2017-08-03 | End: 2017-08-03 | Stop reason: HOSPADM

## 2017-08-03 RX ORDER — METOCLOPRAMIDE HYDROCHLORIDE 5 MG/ML
10 INJECTION INTRAMUSCULAR; INTRAVENOUS EVERY 6 HOURS
Status: DISCONTINUED | OUTPATIENT
Start: 2017-08-03 | End: 2017-08-06 | Stop reason: HOSPADM

## 2017-08-03 RX ORDER — ALVIMOPAN 12 MG/1
12 CAPSULE ORAL ONCE
Status: COMPLETED | OUTPATIENT
Start: 2017-08-03 | End: 2017-08-03

## 2017-08-03 RX ORDER — GLYCOPYRROLATE 0.2 MG/ML
INJECTION INTRAMUSCULAR; INTRAVENOUS AS NEEDED
Status: DISCONTINUED | OUTPATIENT
Start: 2017-08-03 | End: 2017-08-03 | Stop reason: HOSPADM

## 2017-08-03 RX ORDER — MORPHINE SULFATE 10 MG/ML
2 INJECTION, SOLUTION INTRAMUSCULAR; INTRAVENOUS
Status: DISCONTINUED | OUTPATIENT
Start: 2017-08-03 | End: 2017-08-03 | Stop reason: HOSPADM

## 2017-08-03 RX ORDER — HYDROMORPHONE HYDROCHLORIDE 2 MG/ML
INJECTION, SOLUTION INTRAMUSCULAR; INTRAVENOUS; SUBCUTANEOUS AS NEEDED
Status: DISCONTINUED | OUTPATIENT
Start: 2017-08-03 | End: 2017-08-03 | Stop reason: HOSPADM

## 2017-08-03 RX ORDER — ESTRADIOL 0.05 MG/D
1 FILM, EXTENDED RELEASE TRANSDERMAL
Status: DISCONTINUED | OUTPATIENT
Start: 2017-08-05 | End: 2017-08-06 | Stop reason: HOSPADM

## 2017-08-03 RX ORDER — DIPHENHYDRAMINE HCL 25 MG
50 CAPSULE ORAL
Status: DISCONTINUED | OUTPATIENT
Start: 2017-08-03 | End: 2017-08-06 | Stop reason: HOSPADM

## 2017-08-03 RX ORDER — LANOLIN ALCOHOL/MO/W.PET/CERES
6 CREAM (GRAM) TOPICAL
Status: DISCONTINUED | OUTPATIENT
Start: 2017-08-03 | End: 2017-08-06 | Stop reason: HOSPADM

## 2017-08-03 RX ORDER — KETOROLAC TROMETHAMINE 30 MG/ML
15 INJECTION, SOLUTION INTRAMUSCULAR; INTRAVENOUS
Status: DISCONTINUED | OUTPATIENT
Start: 2017-08-03 | End: 2017-08-06 | Stop reason: HOSPADM

## 2017-08-03 RX ORDER — SUCCINYLCHOLINE CHLORIDE 20 MG/ML
INJECTION INTRAMUSCULAR; INTRAVENOUS AS NEEDED
Status: DISCONTINUED | OUTPATIENT
Start: 2017-08-03 | End: 2017-08-03 | Stop reason: HOSPADM

## 2017-08-03 RX ORDER — ALVIMOPAN 12 MG/1
12 CAPSULE ORAL 2 TIMES DAILY
Status: DISCONTINUED | OUTPATIENT
Start: 2017-08-04 | End: 2017-08-06 | Stop reason: HOSPADM

## 2017-08-03 RX ORDER — MORPHINE SULFATE 5 MG/ML
INJECTION, SOLUTION INTRAVENOUS
Status: DISCONTINUED | OUTPATIENT
Start: 2017-08-03 | End: 2017-08-05

## 2017-08-03 RX ORDER — MIDAZOLAM HYDROCHLORIDE 1 MG/ML
INJECTION, SOLUTION INTRAMUSCULAR; INTRAVENOUS AS NEEDED
Status: DISCONTINUED | OUTPATIENT
Start: 2017-08-03 | End: 2017-08-03 | Stop reason: HOSPADM

## 2017-08-03 RX ORDER — METOCLOPRAMIDE HYDROCHLORIDE 5 MG/ML
INJECTION INTRAMUSCULAR; INTRAVENOUS
Status: COMPLETED
Start: 2017-08-03 | End: 2017-08-03

## 2017-08-03 RX ORDER — ONDANSETRON 2 MG/ML
4 INJECTION INTRAMUSCULAR; INTRAVENOUS AS NEEDED
Status: DISCONTINUED | OUTPATIENT
Start: 2017-08-03 | End: 2017-08-03 | Stop reason: HOSPADM

## 2017-08-03 RX ORDER — GABAPENTIN 100 MG/1
200 CAPSULE ORAL 2 TIMES DAILY
Status: DISCONTINUED | OUTPATIENT
Start: 2017-08-03 | End: 2017-08-06 | Stop reason: HOSPADM

## 2017-08-03 RX ORDER — HYDROMORPHONE HYDROCHLORIDE 1 MG/ML
.2-.5 INJECTION, SOLUTION INTRAMUSCULAR; INTRAVENOUS; SUBCUTANEOUS
Status: DISCONTINUED | OUTPATIENT
Start: 2017-08-03 | End: 2017-08-03 | Stop reason: HOSPADM

## 2017-08-03 RX ORDER — NALOXONE HYDROCHLORIDE 0.4 MG/ML
0.4 INJECTION, SOLUTION INTRAMUSCULAR; INTRAVENOUS; SUBCUTANEOUS
Status: DISCONTINUED | OUTPATIENT
Start: 2017-08-03 | End: 2017-08-06 | Stop reason: HOSPADM

## 2017-08-03 RX ORDER — ACETAMINOPHEN 325 MG/1
650 TABLET ORAL
Status: DISCONTINUED | OUTPATIENT
Start: 2017-08-03 | End: 2017-08-06 | Stop reason: HOSPADM

## 2017-08-03 RX ORDER — DEXAMETHASONE SODIUM PHOSPHATE 4 MG/ML
INJECTION, SOLUTION INTRA-ARTICULAR; INTRALESIONAL; INTRAMUSCULAR; INTRAVENOUS; SOFT TISSUE AS NEEDED
Status: DISCONTINUED | OUTPATIENT
Start: 2017-08-03 | End: 2017-08-03 | Stop reason: HOSPADM

## 2017-08-03 RX ORDER — OXYCODONE AND ACETAMINOPHEN 5; 325 MG/1; MG/1
1 TABLET ORAL
Status: DISCONTINUED | OUTPATIENT
Start: 2017-08-03 | End: 2017-08-06 | Stop reason: HOSPADM

## 2017-08-03 RX ORDER — OLANZAPINE 10 MG/1
5 TABLET ORAL
Status: DISCONTINUED | OUTPATIENT
Start: 2017-08-04 | End: 2017-08-06 | Stop reason: HOSPADM

## 2017-08-03 RX ORDER — SODIUM CHLORIDE 9 MG/ML
INJECTION, SOLUTION INTRAVENOUS
Status: DISCONTINUED | OUTPATIENT
Start: 2017-08-03 | End: 2017-08-03 | Stop reason: HOSPADM

## 2017-08-03 RX ORDER — DEXTROSE, SODIUM CHLORIDE, AND POTASSIUM CHLORIDE 5; .45; .15 G/100ML; G/100ML; G/100ML
INJECTION INTRAVENOUS
Status: DISPENSED
Start: 2017-08-03 | End: 2017-08-04

## 2017-08-03 RX ORDER — NEOSTIGMINE METHYLSULFATE 1 MG/ML
INJECTION INTRAVENOUS AS NEEDED
Status: DISCONTINUED | OUTPATIENT
Start: 2017-08-03 | End: 2017-08-03 | Stop reason: HOSPADM

## 2017-08-03 RX ORDER — SODIUM CHLORIDE 0.9 % (FLUSH) 0.9 %
5-10 SYRINGE (ML) INJECTION AS NEEDED
Status: DISCONTINUED | OUTPATIENT
Start: 2017-08-03 | End: 2017-08-06 | Stop reason: HOSPADM

## 2017-08-03 RX ORDER — BUTALBITAL, ACETAMINOPHEN AND CAFFEINE 50; 325; 40 MG/1; MG/1; MG/1
1 TABLET ORAL
Status: DISCONTINUED | OUTPATIENT
Start: 2017-08-03 | End: 2017-08-06 | Stop reason: HOSPADM

## 2017-08-03 RX ORDER — FENTANYL CITRATE 50 UG/ML
25 INJECTION, SOLUTION INTRAMUSCULAR; INTRAVENOUS
Status: DISCONTINUED | OUTPATIENT
Start: 2017-08-03 | End: 2017-08-03 | Stop reason: HOSPADM

## 2017-08-03 RX ORDER — DEXTROSE, SODIUM CHLORIDE, AND POTASSIUM CHLORIDE 5; .45; .15 G/100ML; G/100ML; G/100ML
75 INJECTION INTRAVENOUS CONTINUOUS
Status: DISCONTINUED | OUTPATIENT
Start: 2017-08-03 | End: 2017-08-05

## 2017-08-03 RX ORDER — FAMOTIDINE 20 MG/1
40 TABLET, FILM COATED ORAL 2 TIMES DAILY
Status: DISCONTINUED | OUTPATIENT
Start: 2017-08-03 | End: 2017-08-06 | Stop reason: HOSPADM

## 2017-08-03 RX ORDER — LIDOCAINE HYDROCHLORIDE 20 MG/ML
INJECTION, SOLUTION EPIDURAL; INFILTRATION; INTRACAUDAL; PERINEURAL AS NEEDED
Status: DISCONTINUED | OUTPATIENT
Start: 2017-08-03 | End: 2017-08-03 | Stop reason: HOSPADM

## 2017-08-03 RX ORDER — SODIUM CHLORIDE 0.9 % (FLUSH) 0.9 %
5-10 SYRINGE (ML) INJECTION EVERY 8 HOURS
Status: DISCONTINUED | OUTPATIENT
Start: 2017-08-03 | End: 2017-08-03 | Stop reason: HOSPADM

## 2017-08-03 RX ORDER — FENOFIBRATE 145 MG/1
145 TABLET, COATED ORAL
Status: DISCONTINUED | OUTPATIENT
Start: 2017-08-03 | End: 2017-08-06 | Stop reason: HOSPADM

## 2017-08-03 RX ORDER — ONDANSETRON 2 MG/ML
INJECTION INTRAMUSCULAR; INTRAVENOUS AS NEEDED
Status: DISCONTINUED | OUTPATIENT
Start: 2017-08-03 | End: 2017-08-03 | Stop reason: HOSPADM

## 2017-08-03 RX ADMIN — MIDAZOLAM HYDROCHLORIDE 2 MG: 1 INJECTION, SOLUTION INTRAMUSCULAR; INTRAVENOUS at 12:14

## 2017-08-03 RX ADMIN — LIDOCAINE HYDROCHLORIDE 60 MG: 20 INJECTION, SOLUTION EPIDURAL; INFILTRATION; INTRACAUDAL; PERINEURAL at 12:24

## 2017-08-03 RX ADMIN — SUCCINYLCHOLINE CHLORIDE 120 MG: 20 INJECTION INTRAMUSCULAR; INTRAVENOUS at 12:24

## 2017-08-03 RX ADMIN — FENTANYL CITRATE 25 MCG: 50 INJECTION, SOLUTION INTRAMUSCULAR; INTRAVENOUS at 15:50

## 2017-08-03 RX ADMIN — Medication 80 MCG: at 13:22

## 2017-08-03 RX ADMIN — CEFOTETAN DISODIUM 2 G: 2 INJECTION, POWDER, FOR SOLUTION INTRAMUSCULAR; INTRAVENOUS at 12:30

## 2017-08-03 RX ADMIN — Medication 80 MCG: at 12:58

## 2017-08-03 RX ADMIN — SODIUM CHLORIDE: 9 INJECTION, SOLUTION INTRAVENOUS at 14:25

## 2017-08-03 RX ADMIN — POTASSIUM CHLORIDE: 149 INJECTION, SOLUTION, CONCENTRATE INTRAVENOUS at 17:00

## 2017-08-03 RX ADMIN — Medication 80 MCG: at 12:25

## 2017-08-03 RX ADMIN — Medication 80 MCG: at 13:16

## 2017-08-03 RX ADMIN — FENTANYL CITRATE 50 MCG: 50 INJECTION, SOLUTION INTRAMUSCULAR; INTRAVENOUS at 12:22

## 2017-08-03 RX ADMIN — ROCURONIUM BROMIDE 30 MG: 10 INJECTION, SOLUTION INTRAVENOUS at 12:45

## 2017-08-03 RX ADMIN — ONDANSETRON 4 MG: 2 INJECTION INTRAMUSCULAR; INTRAVENOUS at 15:15

## 2017-08-03 RX ADMIN — HYDROMORPHONE HYDROCHLORIDE 0.4 MG: 2 INJECTION, SOLUTION INTRAMUSCULAR; INTRAVENOUS; SUBCUTANEOUS at 15:38

## 2017-08-03 RX ADMIN — DEXAMETHASONE SODIUM PHOSPHATE 4 MG: 4 INJECTION, SOLUTION INTRA-ARTICULAR; INTRALESIONAL; INTRAMUSCULAR; INTRAVENOUS; SOFT TISSUE at 14:30

## 2017-08-03 RX ADMIN — FENTANYL CITRATE 50 MCG: 50 INJECTION, SOLUTION INTRAMUSCULAR; INTRAVENOUS at 12:14

## 2017-08-03 RX ADMIN — ROCURONIUM BROMIDE 20 MG: 10 INJECTION, SOLUTION INTRAVENOUS at 13:25

## 2017-08-03 RX ADMIN — HYDROMORPHONE HYDROCHLORIDE 0.2 MG: 2 INJECTION, SOLUTION INTRAMUSCULAR; INTRAVENOUS; SUBCUTANEOUS at 14:41

## 2017-08-03 RX ADMIN — NEOSTIGMINE METHYLSULFATE 3 MG: 1 INJECTION INTRAVENOUS at 15:40

## 2017-08-03 RX ADMIN — HYDROMORPHONE HYDROCHLORIDE 0.4 MG: 2 INJECTION, SOLUTION INTRAMUSCULAR; INTRAVENOUS; SUBCUTANEOUS at 13:30

## 2017-08-03 RX ADMIN — Medication 10 ML: at 21:06

## 2017-08-03 RX ADMIN — SODIUM CHLORIDE, POTASSIUM CHLORIDE, SODIUM LACTATE AND CALCIUM CHLORIDE: 600; 310; 30; 20 INJECTION, SOLUTION INTRAVENOUS at 12:14

## 2017-08-03 RX ADMIN — HYDROMORPHONE HYDROCHLORIDE 0.4 MG: 2 INJECTION, SOLUTION INTRAMUSCULAR; INTRAVENOUS; SUBCUTANEOUS at 13:25

## 2017-08-03 RX ADMIN — AMITRIPTYLINE HYDROCHLORIDE 25 MG: 25 TABLET, FILM COATED ORAL at 21:06

## 2017-08-03 RX ADMIN — FAMOTIDINE 40 MG: 20 TABLET ORAL at 21:04

## 2017-08-03 RX ADMIN — GLYCOPYRROLATE 0.4 MG: 0.2 INJECTION INTRAMUSCULAR; INTRAVENOUS at 15:40

## 2017-08-03 RX ADMIN — VALSARTAN 40 MG: 40 TABLET ORAL at 21:05

## 2017-08-03 RX ADMIN — GABAPENTIN 200 MG: 100 CAPSULE ORAL at 21:04

## 2017-08-03 RX ADMIN — ROCURONIUM BROMIDE 10 MG: 10 INJECTION, SOLUTION INTRAVENOUS at 13:53

## 2017-08-03 RX ADMIN — HYDROMORPHONE HYDROCHLORIDE 0.2 MG: 2 INJECTION, SOLUTION INTRAMUSCULAR; INTRAVENOUS; SUBCUTANEOUS at 13:53

## 2017-08-03 RX ADMIN — PROPOFOL 20 MG: 10 INJECTION, EMULSION INTRAVENOUS at 12:25

## 2017-08-03 RX ADMIN — HYDROMORPHONE HYDROCHLORIDE 0.4 MG: 2 INJECTION, SOLUTION INTRAMUSCULAR; INTRAVENOUS; SUBCUTANEOUS at 14:04

## 2017-08-03 RX ADMIN — KETOROLAC TROMETHAMINE 15 MG: 30 INJECTION, SOLUTION INTRAMUSCULAR at 19:03

## 2017-08-03 RX ADMIN — FENOFIBRATE 145 MG: 145 TABLET ORAL at 21:05

## 2017-08-03 RX ADMIN — ROCURONIUM BROMIDE 20 MG: 10 INJECTION, SOLUTION INTRAVENOUS at 13:00

## 2017-08-03 RX ADMIN — PROPOFOL 160 MG: 10 INJECTION, EMULSION INTRAVENOUS at 12:24

## 2017-08-03 RX ADMIN — Medication: at 16:53

## 2017-08-03 RX ADMIN — SODIUM CHLORIDE: 9 INJECTION, SOLUTION INTRAVENOUS at 12:40

## 2017-08-03 RX ADMIN — ROCURONIUM BROMIDE 10 MG: 10 INJECTION, SOLUTION INTRAVENOUS at 14:37

## 2017-08-03 RX ADMIN — SODIUM CHLORIDE, POTASSIUM CHLORIDE, SODIUM LACTATE AND CALCIUM CHLORIDE: 600; 310; 30; 20 INJECTION, SOLUTION INTRAVENOUS at 14:30

## 2017-08-03 RX ADMIN — METOCLOPRAMIDE 10 MG: 5 INJECTION, SOLUTION INTRAMUSCULAR; INTRAVENOUS at 17:29

## 2017-08-03 RX ADMIN — PROPOFOL 50 MG: 10 INJECTION, EMULSION INTRAVENOUS at 15:38

## 2017-08-03 RX ADMIN — DEXTROSE MONOHYDRATE, SODIUM CHLORIDE, AND POTASSIUM CHLORIDE 150 ML/HR: 50; 4.5; 1.49 INJECTION, SOLUTION INTRAVENOUS at 23:38

## 2017-08-03 RX ADMIN — SODIUM CHLORIDE, SODIUM LACTATE, POTASSIUM CHLORIDE, AND CALCIUM CHLORIDE 25 ML/HR: 600; 310; 30; 20 INJECTION, SOLUTION INTRAVENOUS at 11:00

## 2017-08-03 RX ADMIN — FLUOROMETHOLONE 1 DROP: 1 SUSPENSION/ DROPS OPHTHALMIC at 23:15

## 2017-08-03 RX ADMIN — ALVIMOPAN 12 MG: 12 CAPSULE ORAL at 11:01

## 2017-08-03 NOTE — ANESTHESIA POSTPROCEDURE EVALUATION
Post-Anesthesia Evaluation and Assessment    Patient: Agusto Tran MRN: [id number]  SSN: [id number]    YOB: 1960  Age: 59 y.o. Sex: female       Cardiovascular Function/Vital Signs  Visit Vitals    /68 (BP 1 Location: Right arm, BP Patient Position: At rest)    Pulse 86    Temp 36.4 °C (97.5 °F)    Resp 20    Ht 5' (1.524 m)    Wt 69.7 kg (153 lb 10.6 oz)    SpO2 99%    BMI 30.01 kg/m2       Patient is status post general anesthesia for Procedure(s):  LAPAROSCOPIC RECTOPEXY, CYSTOSCOPY, INSERTION BILATERAL URETERAL STENTS    CYSTOSCOPY INSERTION URETERAL CATHETERS. Nausea/Vomiting: None    Postoperative hydration reviewed and adequate. Pain:  Pain Scale 1: Numeric (0 - 10) (08/03/17 1700)  Pain Intensity 1: 0 (08/03/17 1700)   Managed    Neurological Status:   Neuro (WDL): Within Defined Limits (08/03/17 1700)  Neuro  Neurologic State: Alert (08/03/17 1700)  Orientation Level: Oriented to person;Oriented to place;Oriented to situation (08/03/17 1700)  Cognition: Follows commands (08/03/17 1700)  Speech: Clear (08/03/17 1700)  LUE Motor Response: Purposeful (08/03/17 1700)  LLE Motor Response: Purposeful (08/03/17 1700)  RUE Motor Response: Purposeful (08/03/17 1700)  RLE Motor Response: Purposeful (08/03/17 1700)   At baseline    Mental Status and Level of Consciousness: Arousable    Pulmonary Status:   O2 Device: Nasal cannula (08/03/17 1700)   Adequate oxygenation and airway patent    Complications related to anesthesia: None    Post-anesthesia assessment completed.  No concerns    Signed By: Chadwick Bonilla MD     August 3, 2017

## 2017-08-03 NOTE — INTERVAL H&P NOTE
H&P Update:  Mikle Hodgkins was seen and examined. History and physical has been reviewed. The patient has been examined.  There have been no significant clinical changes since the completion of the originally dated History and Physical.    Signed By: Alexia Logan MD     August 3, 2017 12:08 PM

## 2017-08-03 NOTE — PERIOP NOTES
Handoff Report from Operating Room to PACU    Report received from [facility] RN  and Amira Guzman CRNA  regarding [place]. Surgeon(s):  Frutoso Albany, MD Pinky Nissen, MD  And Procedure(s) (LRB):  LAPAROSCOPIC RECTOPEXY, CYSTOSCOPY, INSERTION BILATERAL URETERAL STENTS   (N/A)  CYSTOSCOPY INSERTION URETERAL CATHETERS (Bilateral)  confirmed   with allergies, drains and dressings discussed. Anesthesia type, drugs, patient history, complications, estimated blood loss, vital signs, intake and output, and last pain medication and reversal medications were reviewed.

## 2017-08-03 NOTE — OP NOTES
[address], [address]   OP NOTE       Name:  Connor Ledbetter   MR#:  [id number]   :  1960   Account #:  [de-identified]    Surgery Date:  2017   Date of Adm:  2017       SURGEON: Kenny Lovell MD    ASSISTANT: None. PREOPERATIVE DIAGNOSIS: Rectal prolapse. POSTOPERATIVE DIAGNOSIS: Rectal prolapse. PROCEDURES PERFORMED:   1. Cystoscopy. 2. Bilateral ureteral stent placement, temporary. ANESTHESIA:  General    ESTIMATED BLOOD LOSS: 0 mL. INTRAVENOUS FLUIDS: Per anesthesia record. COMPLICATIONS: None. DRAINS: Bilateral ureteral access catheter (TigerTail). SPECIMENS REMOVED: None. INDICATIONS: The patient was met in the preoperative holding area. I   introduced myself, reviewed her case details, and discussed   anticipated bilateral ureteral stent placement. I am asked to do so by   Dr. Jarad Desir to aid in intraoperative ureteral identification. The   patient denied any prior urologic history or surgeries. Risks and   benefits discussed including ureteral and urinary tract injury, infection,   bleeding. The patient in agreement. DESCRIPTION OF PROCEDURE: The patient was taken to the   operating room in stable condition, placed under anesthesia, surgical   pause performed, prepped and draped, and positioned in the lithotomy   position. A 21-Monegasque cystourethroscopy was performed revealing   absence of any pathology to the bladder or urethra. Ureteral orifices   were identified in orthotopic position. Bilateral ureteral access   catheters were placed (TigerTail) over the guidance of a guidewire. There was some tightness of the ureter, but overall the catheters   passed without significant complication. Once placed, the left catheter   was cut in an oblique fashion for subsequent identification. The bladder   was then drained. A 16-Monegasque Wong catheter placed.  The TigerTail   catheters were affixed to the Wong catheter with adapters and then all   attached to a draining Wong bag. The case was turned over to Dr. Adan Harden. He was given instructions to remove the catheters at   case completion and call us if there were any issues.         Gerry Garcia MD DR / Karl Lopez   D:  08/03/2017   12:51   T:  08/03/2017   13:38   Job #:  [id number]

## 2017-08-03 NOTE — BRIEF OP NOTE
BRIEF OPERATIVE NOTE    Date of Procedure: 8/3/2017   Preoperative Diagnosis: RECURRENT RECTAL PROLAPSE  Postoperative Diagnosis: RECURRENT RECTAL PROLAPSE    Procedure(s):  LAPAROSCOPIC RECTOPEXY, CYSTOSCOPY, INSERTION BILATERAL URETERAL STENTS    CYSTOSCOPY INSERTION URETERAL CATHETERS  Surgeon(s) and Role:  Panel 1:     * Toan Reyna MD - Primary    Panel 2:     * Jerzy Kevin MD - Primary         Assistant Staff:       Surgical Staff:  Circ-1: Dallin Garcia RN  Circ-Relief: Katelyn Zamora RN; Vincent Mercer RN  Scrub Tech-1: Megha Durand  Scrub Tech-2: Allan Gaspar  Scrub Tech-Relief: Junious Anna  Surg Asst-1: Sailaja Oyster  Surg Asst-Relief: Roger Shook  Event Time In   Incision Start 1323   Incision Close 1545     Anesthesia: General   Estimated Blood Loss: 50  Specimens: * No specimens in log *   Findings: rectopexy without need for resection handled the problem   Complications: none  Implants: * No implants in log *

## 2017-08-03 NOTE — PERIOP NOTES
1100 Pt states wants mental health worker who accompanied her to hospital to wait in waiting room. States her sister will be here by 1300.

## 2017-08-03 NOTE — H&P
Lul Neumann, [address]   HISTORY AND PHYSICAL       Name:  Michael Nicole   MR#:  [id number]   :  1960   Account #:  [de-identified]        Date of Adm:  2017       CHIEF COMPLAINT: Recurrent rectal prolapse. HISTORY: The patient is a 69-year-old female who has undergone a   previous low anterior resection years ago by my partner, Dr. Lennox Lee, for rectal prolapse. Over the ensuing years, she has had various   types of colonic motility issues, but over the years, she has now come   to the point where she has recently developed recurrence of her   prolapse as well as problems with continence. She has on physical   exam had clear evidence of recurrent prolapse and we wanted to be   sure that we knew exactly where the anastomosis was prior to any   surgical intervention. We therefore recently colonoscoped her and   found the anastomosis to be at about 10 cm, which should give us   room to resect the old anastomosis and get a good anastomosis down   below that point without compromising an intervening segment of   descending colon that had been brought down to the rectal remnant. Indeed, it appears that we have room to resect the old repair and   perform a new one with a resection and rectopexy. She is now brought   in for resection rectopexy to correct her prolapse situation. PAST MEDICAL HISTORY: Significant for long-standing problems with   constipation and bowel motility issue. She has had a radical   hysterectomy as well, appendectomy, cholecystectomy and a number   of medical issues and medications which she takes for them. Her   medical history is obviously significant for acid reflux disease and   hypertension. She also has a history of hysterectomy for   endometriosis. MEDICATIONS   Indeed her list of medications is 27 medications long, highlights   include:    1. Tricor. 2. Losartan. 3. Hydroxyzine.    4. Zyprexa. 5. Vilazodone. 6. Pepcid. 7. Loperamide. 8. Polyethylene glycol. 9. Acidophilus. 10. Fluticasone. 11. Fluorometholone. 12. Various eyedrops. 13. Estradiol. 14. Gabapentin. 15. Valium. 16. Benadryl. 17. Melatonin. 18. Ibuprofen. 19. Alendronate. 20. Calcium. 21. Vitamin D.   22. Vitamin E.    23. BC powders. 24. Tylenol. ALLERGIES   INCLUDE:   1. BUSPAR. 2. LITHIUM. 3. DICYCLOMINE. HABITS: Does not drink, does not smoke. FAMILY HISTORY: Significant for gallbladder disease, rectal surgery   in both parents and some dementia. REVIEW OF SYSTEMS: Significant for frequent loose stools, change   in bowel habits and abdominal discomfort, rectal pressure, history   of headaches, occasional dysuria, joint pain and swelling, and some   trouble sleeping and situational depression. PHYSICAL EXAMINATION   GENERAL: A 73-year-old white female in no acute distress. HEENT: ENT is clear. NECK: Supple. LUNGS: Clear. CARDIAC: Regular. BREASTS: Deferred. ABDOMEN: Soft, nontender. No mass or organomegaly. Well-healed   surgical incisions. FLANKS: Negative. GROINS: Negative. RECTAL: Decreased sphincter tone. On request, she can produce a   full-thickness rectal prolapse, which is much more pronounced   anteriorly than posteriorly. EXTREMITIES: No gross deformity. NEUROLOGIC: Intact. SKIN: Clear. ASSESSMENT: Recurrent rectal prolapse with irritable bowel   syndrome. We have colonoscoped her and know the anastomosis   rides at about 10 cm from the anal verge. PLAN: We are going to move forward with resection rectopexy. She is   aware of the risks of surgery including anesthesia, infection, bleeding,   transfusions, risk of transfusions, alternatives to the same, DVT,   atelectasis and pneumonia. She is agreeable to proceed. We will move   forward with laparoscopic, possible open resection rectopexy today.         Ilene Dutton MD      WRT / Sarah Piña   D:  08/02/2017   22:45   T:  08/02/2017   23:39   Job #:  [id number]

## 2017-08-03 NOTE — ANESTHESIA PREPROCEDURE EVALUATION
Anesthetic History   No history of anesthetic complications            Review of Systems / Medical History  Patient summary reviewed, nursing notes reviewed and pertinent labs reviewed    Pulmonary  Within defined limits                 Neuro/Psych         Psychiatric history     Cardiovascular    Hypertension              Exercise tolerance: >4 METS     GI/Hepatic/Renal     GERD           Endo/Other        Obesity and arthritis     Other Findings   Comments: Bipolar  IBS  RECURRENT RECTAL PROLAPSE         Physical Exam    Airway  Mallampati: II  TM Distance: 4 - 6 cm  Neck ROM: normal range of motion   Mouth opening: Diminished (comment)     Cardiovascular  Regular rate and rhythm,  S1 and S2 normal,  no murmur, click, rub, or gallop             Dental  No notable dental hx       Pulmonary  Breath sounds clear to auscultation               Abdominal  GI exam deferred       Other Findings            Anesthetic Plan    ASA: 2  Anesthesia type: general            Anesthetic plan and risks discussed with: Patient

## 2017-08-03 NOTE — PERIOP NOTES
TRANSFER - OUT REPORT:    Verbal report given to Lazaro Clements RN (name) on Jg Huynh  being transferred to 2133(unit) for routine post - op       Report consisted of patients Situation, Background, Assessment and   Recommendations(SBAR). Information from the following report(s) SBAR, Kardex, OR Summary, Intake/Output and MAR was reviewed with the receiving nurse. Opportunity for questions and clarification was provided.       Patient transported with:   Registered Nurse

## 2017-08-03 NOTE — OP NOTES
[address], [address]   OP NOTE       Name:  Rani Mcallister   MR#:  [id number]   :  1960   Account #:  [de-identified]    Surgery Date:  2017   Date of Adm:  2017       PREOPERATIVE DIAGNOSIS:  Recurrent rectal prolapse following   low anterior resection. POSTOPERATIVE DIAGNOSIS:  Recurrent rectal prolapse following   low anterior resection with no redundant bowel available for resection,   but good setup for pelvic rectopexy at the time of surgery. PROCEDURES PERFORMED:  Laparoscopic rectopexy. ESTIMATED BLOOD LOSS: 50 cc's    SPECIMENS REMOVED: None    ANESTHESIA:  General.     SURGEON:  Mahesh Mello. Jose Betancourt MD    PREOPERATIVE MEDICATIONS: Cefotan 2 g IV. INDICATIONS: The patient is a 49-year-old woman with recurrent   rectal prolapse. My partner, Dr. Ap Young, a number of years ago   tried to treat this with a low anterior resection and she got some control   for some time, but has developed a recurrence. Her anastomosis is   high enough that if we did an Altemeier or perineal approach, we would   leave a devascularized middle portion and likely not be able to reach   the area of the previous colorectal anastomosis, and we did not want   to put her in a position where a section of rectum or colon between 2 staple lines   would be rendered ischemic and die. Therefore, we elected to go with   an abdominal approach so as to best visualize where we might be able   to help her if a resection was warranted. If not, we can simply proceed   with a rectopexy. Subsequently, she was booked for resection and   rectopexy as appropriate and we will do this laparoscopically. She is   aware of the risks including anesthesia, infection, bleeding,   transfusions, risk of transfusions, alternatives to the same, DVT,   atelectasis and pneumonia. PLAN: We will move forward with the resection today.     SUBSEQUENT OPERATIVE COURSE: She was taken to the operating   room where she underwent general anesthesia and was placed in   supine lithotomy position with both arms padded and tucked. Orogastric and Wong catheters in place. Her rectal tone was so poor   that she would not hold a rectal balloon for a rectal catheter. Supraumbilical 19/91 Jie port was placed. Pneumoperitoneum   was established. A right lower quadrant 10/12 port opened and the left   lower quadrant GelPort site opened through a 5 cm incision. Through   these accesses the case was done. The patient had undergone   placement of ureteral catheters by Dr. Henry Feliz and these were absolutely   invaluable as the degree of scarring from her previous low anterior   resection years ago had left her ureters stuck densely into the site of   the previous dissections and the presence of the stents was absolutely   invaluable in safely dissecting them off the tissues. Moreover, she did   not have much in the way of redundant sigmoid and rectum to resect   and it became clear that she would fare better with a rectopexy than   any kind of resectional therapy. Subsequently, the right and left perirectal gutters were opened and the   presacral space struck. Again, the ureteral catheters were invaluable   here. With the catheters being moved off to the side, we moved down   right and left perirectal gutter and into the presacral space, opened it   all the way down to the tip of the coccyx. We then came anteriorly and   took down part of the lateral ligamentous tissue and peritoneum such   that we could come down to the cul-de-sac and lift it all up without any   welling or deepening of the cul-de-sac.  Indeed, at the beginning of the   procedure the amount of welling up in the cul-de-sac between the   rectum and sacrum and structures in front was fairly striking, and   subsequently the freeing up of this allowed us to be in a good position   to do a standard rectopexy using the lateral ligaments and tacking   these to the sacral promontory. Subsequently, this was done with 0   Vicryl using 2 stitches on either side at the pelvic brim to lift the entire   rectum up out of the pelvis. We then placed WILL drains behind the   rectum to help seal up the space in the presacral space. We did not   require any resection of further sigmoid, and we did check for luminal   integrity using a sizer, 25 mm, which went through the entire area up to   the sigmoid very nicely and up into the descending colon without   difficulty and without tearing free any of the tissues. With the rectopexy now good and a WILL placed, we then irrigated the   pelvis, suctioned dry, and then released the pneumoperitoneum. The   trocar sites were closed with 0 Vicryl suture. The left lower quadrant   GelPort site was closed with 2-0 Vicryl for the peritoneum and running   #1 Vicryl for the fascia. Subcutaneous was irrigated and the   subcutaneous closed with 2-0 chromic. Again, it should be noted that   the trocar sites were closed with 0 Vicryl suture and the GelPort site   was closed with #1 Vicryl suture. With this all done now, the skin was   then closed with Insorb stapling. Sterile dressings were applied. The   WILL site had been brought out through the right lower quadrant 10/12   port and this was secured with 2-0 nylon. This was attached to bulb   syringe and the procedure terminated. The patient tolerated the   operation well. Blood loss was 50 mL or less. There was no   replacement. She remained stable and left the operating room in   satisfactory condition.         MD NICOLLE Gagnon / Tae Ingram   D:  08/03/2017   16:53   T:  08/03/2017   17:41   Job #:  [id number]

## 2017-08-03 NOTE — PROGRESS NOTES
2000- TRANSFER - IN REPORT:    Verbal report received from Anyi Meza on Arpit Bhatt  being received from PACU (unit) for routine post - op      Report consisted of patients Situation, Background, Assessment and   Recommendations(SBAR). Information from the following report(s) SBAR, Kardex, OR Summary, Procedure Summary, Intake/Output, MAR and Recent Results was reviewed with the receiving nurse. Opportunity for questions and clarification was provided. Assessment completed upon patients arrival to unit and care assumed.

## 2017-08-03 NOTE — IP AVS SNAPSHOT
[address] [facility] 
[phone] Patient: Orestes Connors MRN: [id number] HLE:[id number] You are allergic to the following Allergen Reactions Buspar (Buspirone) Other (comments) Causes \"stimulation\" that could exacerbate a manic attack/phase of pt's Biplar. Reported by patient Amlodipine Other (comments) Ankle swelling Dicyclomine Nausea and Vomiting Extreme stomach pains Lithium Nausea and Vomiting Severe nausea and vomiting. Other Medication Other (comments) Intolerance to oranges, cabbage,beans,peppers,raw onions,foods with caffeine in them, popcorn, no pop sodas, because of IBS Recent Documentation Height Weight BMI OB Status Smoking Status 1.524 m 69.7 kg 30.01 kg/m2 Hysterectomy Never Smoker Emergency Contacts Name Discharge Info Relation Home Work Mobile [employer] CAREGIVER [3] Sister [23] [phone] W,Riya  Other Relative [6] [phone] About your hospitalization You were admitted on:  August 3, 2017 You last received care in the:  [facility] 2 GENERAL SURGERY You were discharged on:  August 6, 2017 Unit phone number:  [phone] Why you were hospitalized Your primary diagnosis was:  Rectal Prolapse Providers Seen During Your Hospitalizations Provider Role Specialty Primary office phone Derotha Homans, MD Attending Provider Colon and Rectal Surgery [phone] Your Primary Care Physician (PCP) Primary Care Physician Office Phone Office Fax [address], [place] [address] [phone] Follow-up Information Follow up With Details Comments Contact Info Julia Cortez MD   [address] [place] [address] 
[phone] Your Appointments Tuesday August 29, 2017  9:30 AM EDT Any with Julia Cortez MD  
 [address] ([address]) [address] [place] [id number] [phone] Current Discharge Medication List  
  
START taking these medications Dose & Instructions Dispensing Information Comments Morning Noon Evening Bedtime  
 oxyCODONE-acetaminophen 5-325 mg per tablet Commonly known as:  PERCOCET Your last dose was: Your next dose is:    
   
   
 Dose:  1 Tab Take 1 Tab by mouth every four (4) hours as needed. Max Daily Amount: 6 Tabs. Quantity:  50 Tab Refills:  0 CONTINUE these medications which have CHANGED Dose & Instructions Dispensing Information Comments Morning Noon Evening Bedtime  
 polyethylene glycol 17 gram/dose powder Commonly known as:  Hansa Mule What changed:   
- when to take this 
- reasons to take this Your last dose was: Your next dose is:    
   
   
 Dose:  17 g Take 17 g by mouth daily. Quantity:  850 g Refills:  3 CONTINUE these medications which have NOT CHANGED Dose & Instructions Dispensing Information Comments Morning Noon Evening Bedtime ACIDOPHILUS Cap Generic drug:  Lactobacillus acidophilus Your last dose was: Your next dose is:    
   
   
 Dose:  1 Cap Take 1 Cap by mouth daily. Refills:  0  
     
   
   
   
  
 alendronate 70 mg tablet Commonly known as:  FOSAMAX Your last dose was: Your next dose is:    
   
   
 Dose:  70 mg Take 1 Tab by mouth every seven (7) days. Quantity:  4 Tab Refills:  12  
     
   
   
   
  
 amitriptyline 25 mg tablet Commonly known as:  ELAVIL Your last dose was: Your next dose is:    
   
   
 Dose:  25 mg Take 1 Tab by mouth nightly. Quantity:  30 Tab Refills:  2  
     
   
   
   
  
 black cohosh 540 mg Cap Your last dose was: Your next dose is:    
   
   
 Dose:  1 Tab Take 1 Tab by mouth daily. Refills:  0  
     
   
   
   
  
 butalbital-acetaminophen  mg tablet Commonly known as:  Krishna Rosa Your last dose was: Your next dose is:    
   
   
 Dose:  1 Tab Take 1 Tab by mouth every six (6) hours as needed. Refills:  0  
     
   
   
   
  
 calcium-cholecalciferol (D3) tablet Commonly known as:  CALTRATE 600+D Your last dose was: Your next dose is:    
   
   
 Dose:  1 Tab Take 1 Tab by mouth daily. Quantity:  30 Tab Refills:  12  
     
   
   
   
  
 * diazePAM 2.5 mg Kit Commonly known as:  VALIUM Your last dose was: Your next dose is:    
   
   
 Dose:  2 mg Insert 2 mg into vagina every eight (8) hours as needed (pelvic floor pain). Refills:  0  
     
   
   
   
  
 * diazePAM 5 mg tablet Commonly known as:  VALIUM Your last dose was: Your next dose is:    
   
   
 Dose:  5 mg Take 5 mg by mouth two (2) times a day. 8am and 3pm  
 Refills:  0  
     
   
   
   
  
 diclofenac 1 % Gel Commonly known as:  VOLTAREN Your last dose was: Your next dose is:    
   
   
 Apply  to affected area three (3) times daily as needed. Quantity:  100 g Refills:  2 diphenhydrAMINE 25 mg capsule Commonly known as:  BENADRYL Your last dose was: Your next dose is:    
   
   
 Dose:  50 mg Take 2 Caps by mouth nightly. Quantity:  60 Cap Refills:  3  
     
   
   
   
  
 estradiol 0.05 mg/24 hr  
Commonly known as:  Riya Alexandria Your last dose was: Your next dose is:    
   
   
 Dose:  1 Patch 1 Patch by TransDERmal route two (2) times a week on Wednesday and Saturday. Refills:  3  
     
   
   
   
  
 famotidine 20 mg tablet Commonly known as:  PEPCID Your last dose was: Your next dose is: TAKE 1 TABLET BY MOUTH TWO (2) TIMES A DAY. STOP OMEPRAZOLE. Quantity:  60 Tab Refills:  3  
     
   
   
   
  
 fenofibrate nanocrystallized 145 mg tablet Commonly known as:  [employer] Your last dose was: Your next dose is:    
   
   
 Dose:  145 mg Take 1 Tab by mouth every fourty-eight (48) hours. Quantity:  30 Tab Refills:  5  
     
   
   
   
  
 fluorometholone 0.1 % ophthalmic suspension Commonly known as:  FML Your last dose was: Your next dose is:    
   
   
 Dose:  1 Drop Administer 1 Drop to both eyes two (2) times a day. Refills:  99  
     
   
   
   
  
 fluticasone 50 mcg/actuation nasal spray Commonly known as:  Marcine Infield Your last dose was: Your next dose is:    
   
   
 USE ONE SPRAY IN EACH NOSTRIL TWO TIMES A DAY. Quantity:  16 g Refills:  1  
     
   
   
   
  
 gabapentin 100 mg capsule Commonly known as:  NEURONTIN Your last dose was: Your next dose is:    
   
   
 Dose:  200 mg Take 2 Caps by mouth two (2) times a day. Quantity:  120 Cap Refills:  5  
     
   
   
   
  
 loperamide 2 mg capsule Commonly known as:  IMODIUM Your last dose was: Your next dose is:    
   
   
 Dose:  2 mg Take 2 mg by mouth as needed for Diarrhea. Refills:  0  
     
   
   
   
  
 losartan 25 mg tablet Commonly known as:  COZAAR Your last dose was: Your next dose is:    
   
   
 Dose:  25 mg Take 1 Tab by mouth daily. Quantity:  30 Tab Refills:  3  
     
   
   
   
  
 melatonin 5 mg Cap capsule Your last dose was: Your next dose is:    
   
   
 Dose:  5 mg Take 5 mg by mouth nightly. Refills:  0  
     
   
   
   
  
 OLANZapine 5 mg tablet Commonly known as:  ZyPREXA Your last dose was: Your next dose is:    
   
   
 Dose:  5 mg Take 5 mg by mouth daily (with dinner). Refills:  2 REFRESH OPTIVE ADVANCED (PF) OP Your last dose was: Your next dose is:    
   
   
 Dose:  1 Drop Apply 1 Drop to eye two (2) times daily as needed. Refills:  0 SF 5000 PLUS 1.1 % Crea Generic drug:  fluoride (sodium) Your last dose was: Your next dose is:    
   
   
 Dose:  1 Tube 1 Tube. Refills:  4  
     
   
   
   
  
 TYLENOL EXTRA STRENGTH 500 mg tablet Generic drug:  acetaminophen Your last dose was: Your next dose is:    
   
   
 Dose:  1000 mg Take 1,000 mg by mouth every six (6) hours as needed for Pain. Refills:  0  
     
   
   
   
  
 vilazodone 10 mg Tab tablet Commonly known as:  VIIBRYD Your last dose was: Your next dose is:    
   
   
 Dose:  20 mg Take 20 mg by mouth every morning. Refills:  0  
     
   
   
   
  
 VITAMIN D3 1,000 unit Cap Generic drug:  cholecalciferol Your last dose was: Your next dose is:    
   
   
 Dose:  1 Tab Take 1 Tab by mouth daily. Indications: VITAMIN D DEFICIENCY Refills:  0  
     
   
   
   
  
 vitamin E 400 unit capsule Commonly known as:  [address] Your last dose was: Your next dose is:    
   
   
 Dose:  400 Units Take 400 Units by mouth daily. Refills:  0  
     
   
   
   
  
 * Notice: This list has 2 medication(s) that are the same as other medications prescribed for you. Read the directions carefully, and ask your doctor or other care provider to review them with you. Where to Get Your Medications Information on where to get these meds will be given to you by the nurse or doctor. ! Ask your nurse or doctor about these medications  
  oxyCODONE-acetaminophen 5-325 mg per tablet Discharge Instructions Steven Keene MD, FACS Anirudh TAMAYO. Marlyn Chavez MD, FACS Akira Cano MD, FACS Shy Hedrick. Katya Rose MD, FACS Peyton Hagan MD, FACS 
 Lexi Perez. MD Hari Smart MD 
 
[facility] Discharge Instructions for Colon Surgery Patients 1. Diagnosis: laparoscopic rectopexy 2. Restricted fiber diet. 3. Do not drive for 2 weeks or until after your next doctors appointment. 4. Leave dressings on incision. They may fall off on their own. 5. May take a shower. 6. No lifting any objects weighing more than 20 pounds. Do not do any housework, such as vacuuming, scrubbing, etc for at least a month. 7. When you get tired during the day, take naps, as you need your rest. 
8. Multiple bowel movements are normal each day for a while. 9. May walk as desired. May go up and down stairs. 10. Take pain medication as prescribed: (NO DRIVING WHILE ON PAIN MEDICATIONS). Percocet 5 mg po EVERY 4-6 HOURS AS NEEDED. Other Medications: resume your usual preadmission meds 11. See me in the office in 10-14 days. Call as soon as discharged for an appointment 21 [phone]. 12.  Call the Exchange [phone], if you have any questions or problems after office hours. Discharge Orders None ACO Transitions of Care Introducing [facility] offers a voluntary care coordination program to provide high quality service and care to [facility] fee-for-service beneficiaries. Migel Rush was designed to help you enhance your health and well-being through the following services: ? Transitions of Care  support for individuals who are transitioning from one care setting to another (example: Hospital to home). ? Chronic and Complex Care Coordination  support for individuals and caregivers of those with serious or chronic illnesses or with more than one chronic (ongoing) condition and those who take a number of different medications.   
 
 
If you meet specific medical criteria, a [facility] Coordinator may call you directly to coordinate your care with your primary care physician and your other care providers. For questions about the [facility] programs, please, contact your physicians office. For general questions or additional information about Accountable Care Organizations: 
Please visit www.medicare.gov/acos. html or call 1-800-MEDICARE (3-409.420.1738) TTY users should call 4-800.383.9473. Introducing [facility]! Dear Lesvia Valencia: Thank you for requesting a [employer] account. Our records indicate that you already have an active [employer] account. You can access your account anytime at [email or web address] Did you know that you can access your hospital and ER discharge instructions at any time in [employer]? You can also review all of your test results from your hospital stay or ER visit. Additional Information If you have questions, please visit the Frequently Asked Questions section of the [employer] website at [email or web address] Remember, [employer] is NOT to be used for urgent needs. For medical emergencies, dial 911. Now available from your iPhone and Android! General Information Please provide this summary of care documentation to your next provider. Patient Signature:  ____________________________________________________________ Date:  ____________________________________________________________  
  
Ira Weems Provider Signature:  ____________________________________________________________ Date:  ____________________________________________________________

## 2017-08-04 LAB
ALBUMIN SERPL BCP-MCNC: 3.2 G/DL (ref 3.5–5)
ALBUMIN/GLOB SERPL: 1 {RATIO} (ref 1.1–2.2)
ALP SERPL-CCNC: 64 U/L (ref 45–117)
ALT SERPL-CCNC: 27 U/L (ref 12–78)
ANION GAP BLD CALC-SCNC: 7 MMOL/L (ref 5–15)
AST SERPL W P-5'-P-CCNC: 22 U/L (ref 15–37)
BASOPHILS # BLD AUTO: 0 K/UL (ref 0–0.1)
BASOPHILS # BLD: 0 % (ref 0–1)
BILIRUB SERPL-MCNC: 0.4 MG/DL (ref 0.2–1)
BUN SERPL-MCNC: 6 MG/DL (ref 6–20)
BUN/CREAT SERPL: 4 (ref 12–20)
CALCIUM SERPL-MCNC: 7.4 MG/DL (ref 8.5–10.1)
CHLORIDE SERPL-SCNC: 106 MMOL/L (ref 97–108)
CO2 SERPL-SCNC: 24 MMOL/L (ref 21–32)
CREAT SERPL-MCNC: 1.35 MG/DL (ref 0.55–1.02)
EOSINOPHIL # BLD: 0 K/UL (ref 0–0.4)
EOSINOPHIL NFR BLD: 0 % (ref 0–7)
ERYTHROCYTE [DISTWIDTH] IN BLOOD BY AUTOMATED COUNT: 13.5 % (ref 11.5–14.5)
GLOBULIN SER CALC-MCNC: 3.2 G/DL (ref 2–4)
GLUCOSE SERPL-MCNC: 173 MG/DL (ref 65–100)
HCT VFR BLD AUTO: 34.5 % (ref 35–47)
HGB BLD-MCNC: 11.3 G/DL (ref 11.5–16)
LYMPHOCYTES # BLD AUTO: 11 % (ref 12–49)
LYMPHOCYTES # BLD: 1 K/UL (ref 0.8–3.5)
MCH RBC QN AUTO: 28.1 PG (ref 26–34)
MCHC RBC AUTO-ENTMCNC: 32.8 G/DL (ref 30–36.5)
MCV RBC AUTO: 85.8 FL (ref 80–99)
MONOCYTES # BLD: 0.3 K/UL (ref 0–1)
MONOCYTES NFR BLD AUTO: 3 % (ref 5–13)
NEUTS SEG # BLD: 7.4 K/UL (ref 1.8–8)
NEUTS SEG NFR BLD AUTO: 86 % (ref 32–75)
PLATELET # BLD AUTO: 237 K/UL (ref 150–400)
POTASSIUM SERPL-SCNC: 4.7 MMOL/L (ref 3.5–5.1)
PROT SERPL-MCNC: 6.4 G/DL (ref 6.4–8.2)
RBC # BLD AUTO: 4.02 M/UL (ref 3.8–5.2)
SODIUM SERPL-SCNC: 137 MMOL/L (ref 136–145)
WBC # BLD AUTO: 8.6 K/UL (ref 3.6–11)

## 2017-08-04 PROCEDURE — 85025 COMPLETE CBC W/AUTO DIFF WBC: CPT | Performed by: COLON & RECTAL SURGERY

## 2017-08-04 PROCEDURE — 80053 COMPREHEN METABOLIC PANEL: CPT | Performed by: COLON & RECTAL SURGERY

## 2017-08-04 PROCEDURE — 36415 COLL VENOUS BLD VENIPUNCTURE: CPT | Performed by: COLON & RECTAL SURGERY

## 2017-08-04 PROCEDURE — 74011000258 HC RX REV CODE- 258: Performed by: COLON & RECTAL SURGERY

## 2017-08-04 PROCEDURE — 74011250636 HC RX REV CODE- 250/636: Performed by: COLON & RECTAL SURGERY

## 2017-08-04 PROCEDURE — 74011000250 HC RX REV CODE- 250: Performed by: COLON & RECTAL SURGERY

## 2017-08-04 PROCEDURE — 65270000029 HC RM PRIVATE

## 2017-08-04 PROCEDURE — 74011250637 HC RX REV CODE- 250/637: Performed by: COLON & RECTAL SURGERY

## 2017-08-04 RX ORDER — OXYCODONE AND ACETAMINOPHEN 5; 325 MG/1; MG/1
1 TABLET ORAL
Qty: 50 TAB | Refills: 0 | Status: SHIPPED | OUTPATIENT
Start: 2017-08-04 | End: 2017-09-15

## 2017-08-04 RX ORDER — VILAZODONE HYDROCHLORIDE 20 MG/1
20 TABLET ORAL DAILY
Status: DISCONTINUED | OUTPATIENT
Start: 2017-08-05 | End: 2017-08-06 | Stop reason: HOSPADM

## 2017-08-04 RX ADMIN — Medication 10 ML: at 05:31

## 2017-08-04 RX ADMIN — FAMOTIDINE 40 MG: 20 TABLET ORAL at 09:15

## 2017-08-04 RX ADMIN — ALVIMOPAN 12 MG: 12 CAPSULE ORAL at 17:33

## 2017-08-04 RX ADMIN — AMITRIPTYLINE HYDROCHLORIDE 25 MG: 25 TABLET, FILM COATED ORAL at 21:09

## 2017-08-04 RX ADMIN — FLUOROMETHOLONE 1 DROP: 1 SUSPENSION/ DROPS OPHTHALMIC at 17:34

## 2017-08-04 RX ADMIN — Medication 10 ML: at 14:57

## 2017-08-04 RX ADMIN — METOCLOPRAMIDE 10 MG: 5 INJECTION, SOLUTION INTRAMUSCULAR; INTRAVENOUS at 23:45

## 2017-08-04 RX ADMIN — Medication 10 ML: at 21:10

## 2017-08-04 RX ADMIN — FLUOROMETHOLONE 1 DROP: 1 SUSPENSION/ DROPS OPHTHALMIC at 09:16

## 2017-08-04 RX ADMIN — OLANZAPINE 5 MG: 10 TABLET, FILM COATED ORAL at 17:33

## 2017-08-04 RX ADMIN — METOCLOPRAMIDE 10 MG: 5 INJECTION, SOLUTION INTRAMUSCULAR; INTRAVENOUS at 17:33

## 2017-08-04 RX ADMIN — FLUTICASONE PROPIONATE 1 SPRAY: 50 SPRAY, METERED NASAL at 09:17

## 2017-08-04 RX ADMIN — MELATONIN 3 MG ORAL TABLET 6 MG: 3 TABLET ORAL at 21:09

## 2017-08-04 RX ADMIN — DIAZEPAM 5 MG: 5 TABLET ORAL at 14:57

## 2017-08-04 RX ADMIN — GABAPENTIN 200 MG: 100 CAPSULE ORAL at 17:33

## 2017-08-04 RX ADMIN — DEXTROSE MONOHYDRATE, SODIUM CHLORIDE, AND POTASSIUM CHLORIDE 150 ML/HR: 50; 4.5; 1.49 INJECTION, SOLUTION INTRAVENOUS at 11:50

## 2017-08-04 RX ADMIN — CEFOTETAN DISODIUM 2 G: 2 INJECTION, POWDER, FOR SOLUTION INTRAMUSCULAR; INTRAVENOUS at 11:58

## 2017-08-04 RX ADMIN — DEXTROSE MONOHYDRATE, SODIUM CHLORIDE, AND POTASSIUM CHLORIDE 150 ML/HR: 50; 4.5; 1.49 INJECTION, SOLUTION INTRAVENOUS at 05:35

## 2017-08-04 RX ADMIN — ENOXAPARIN SODIUM 40 MG: 40 INJECTION SUBCUTANEOUS at 09:16

## 2017-08-04 RX ADMIN — FAMOTIDINE 40 MG: 20 TABLET ORAL at 17:33

## 2017-08-04 RX ADMIN — DIPHENHYDRAMINE HYDROCHLORIDE 50 MG: 25 CAPSULE ORAL at 21:09

## 2017-08-04 RX ADMIN — CEFOTETAN DISODIUM 2 G: 2 INJECTION, POWDER, FOR SOLUTION INTRAMUSCULAR; INTRAVENOUS at 00:07

## 2017-08-04 RX ADMIN — METOCLOPRAMIDE 10 MG: 5 INJECTION, SOLUTION INTRAMUSCULAR; INTRAVENOUS at 11:50

## 2017-08-04 RX ADMIN — METOCLOPRAMIDE 10 MG: 5 INJECTION, SOLUTION INTRAMUSCULAR; INTRAVENOUS at 05:31

## 2017-08-04 RX ADMIN — DIAZEPAM 5 MG: 5 TABLET ORAL at 09:15

## 2017-08-04 RX ADMIN — GABAPENTIN 200 MG: 100 CAPSULE ORAL at 09:15

## 2017-08-04 RX ADMIN — ALVIMOPAN 12 MG: 12 CAPSULE ORAL at 09:14

## 2017-08-04 RX ADMIN — METOCLOPRAMIDE 10 MG: 5 INJECTION, SOLUTION INTRAMUSCULAR; INTRAVENOUS at 00:06

## 2017-08-04 RX ADMIN — Medication 1 CAPSULE: at 09:16

## 2017-08-04 RX ADMIN — VALSARTAN 40 MG: 40 TABLET ORAL at 21:09

## 2017-08-04 NOTE — PROGRESS NOTES
Problem: Falls - Risk of  Goal: *Knowledge of fall prevention  Outcome: Progressing Towards Goal  Patient will use call bell for assistance before rising    Problem: Patient Education: Go to Patient Education Activity  Goal: Patient/Family Education  Outcome: Progressing Towards Goal  Call bell for assistance

## 2017-08-04 NOTE — PROGRESS NOTES
Patient requesting viibryd medication be reordered and she has it at the bedside from home, she reports this is for bipolar disorder and she is \"get a bad headache and begin to cry\" if not taken soon, nurse spoke with Dr Tianna Kelsey nurse and requested a reorder, pharmacy does not carry medication but can verify her home med at the bedside, awaiting return call.

## 2017-08-04 NOTE — PROGRESS NOTES
2300- Bedside shift change report given to Veta Severance (oncoming nurse) by Matilda Roy (offgoing nurse). Report included the following information SBAR, Kardex, Procedure Summary, Intake/Output, MAR and Recent Results.

## 2017-08-04 NOTE — PROGRESS NOTES
End of Shift Nursing Note    Bedside shift change report given to Sanjana Sidhu (oncoming nurse) by Claudean Rud (offgoing nurse). Report included the following information SBAR. [facility] Phone:   [phone]    Significant changes during shift:    One small BM patient describes as sediment   Non-emergent issues for physician to address:   no     Number times ambulated in hallway past shift: 3      Number of times OOB to chair past shift: 3    POD #: 1     Vital Signs:    Temp: 98.6 °F (37 °C)     Pulse (Heart Rate): 87     BP: 127/65     Resp Rate: 14     O2 Sat (%): 98 %    Lines & Drains:     Urinary Catheter? Yes   Placement Date: 8/3   Medical Necessity:   Central Line? No   Placement Date:    Medical Necessity:   PICC Line? No   Placement Date:    Medical Necessity:     NG tube [] in [] removed [x] not applicable   Drains [] in [] removed [x] not applicable     Skin Integrity:      Wounds: yes   Dressings Present: yes    Wound Concerns: yes      GI:    Current diet:  DIET CLEAR LIQUID    Nausea: YES  Vomiting: YES  Bowel Sounds: YES  Flatus: YES  Last Bowel Movement: yesterday   Appearance:     Respiratory:  Supplemental O2: No      Device:    via  Liters/min     Incentive Spirometer: YES  Volume: 1250  Coughing and Deep Breathing: YES  Oral Care: YES  Understanding (patient/family education): YES   Getting out of bed: YES  Head of bed elevation: YES    Patient Safety:    Falls Score: 1  Mobility Score: 1  Bed Alarm On? Yes  Sitter? Yes      Opportunity for questions and clarification was given to oncoming nurse. Patient bed is in low position, side rails are up x 3, door & observation blinds open as needed, call bell within reach and patient not in distress.     Teddy Peguero RN

## 2017-08-04 NOTE — PROGRESS NOTES
Interdisciplinary Rounds were completed on this patient. Rounds included nursing, clinical care leader, pharmacy, and case management. Patient was doing well without problems. Patient had the following concerns: none. Goals for the day will include: mobilize and pharmacy to follow up on medication that patient is requesting from home.

## 2017-08-05 LAB
ANION GAP BLD CALC-SCNC: 7 MMOL/L (ref 5–15)
BUN SERPL-MCNC: <1 MG/DL (ref 6–20)
BUN/CREAT SERPL: ABNORMAL (ref 12–20)
CALCIUM SERPL-MCNC: 7.9 MG/DL (ref 8.5–10.1)
CHLORIDE SERPL-SCNC: 105 MMOL/L (ref 97–108)
CO2 SERPL-SCNC: 24 MMOL/L (ref 21–32)
CREAT SERPL-MCNC: 0.93 MG/DL (ref 0.55–1.02)
ERYTHROCYTE [DISTWIDTH] IN BLOOD BY AUTOMATED COUNT: 14.1 % (ref 11.5–14.5)
GLUCOSE SERPL-MCNC: 111 MG/DL (ref 65–100)
HCT VFR BLD AUTO: 34.8 % (ref 35–47)
HGB BLD-MCNC: 11.2 G/DL (ref 11.5–16)
MCH RBC QN AUTO: 27.9 PG (ref 26–34)
MCHC RBC AUTO-ENTMCNC: 32.2 G/DL (ref 30–36.5)
MCV RBC AUTO: 86.6 FL (ref 80–99)
PLATELET # BLD AUTO: 236 K/UL (ref 150–400)
POTASSIUM SERPL-SCNC: 3.6 MMOL/L (ref 3.5–5.1)
RBC # BLD AUTO: 4.02 M/UL (ref 3.8–5.2)
SODIUM SERPL-SCNC: 136 MMOL/L (ref 136–145)
WBC # BLD AUTO: 7.4 K/UL (ref 3.6–11)

## 2017-08-05 PROCEDURE — 74011250637 HC RX REV CODE- 250/637: Performed by: COLON & RECTAL SURGERY

## 2017-08-05 PROCEDURE — 36415 COLL VENOUS BLD VENIPUNCTURE: CPT | Performed by: COLON & RECTAL SURGERY

## 2017-08-05 PROCEDURE — 80048 BASIC METABOLIC PNL TOTAL CA: CPT | Performed by: COLON & RECTAL SURGERY

## 2017-08-05 PROCEDURE — 85027 COMPLETE CBC AUTOMATED: CPT | Performed by: COLON & RECTAL SURGERY

## 2017-08-05 PROCEDURE — 74011250636 HC RX REV CODE- 250/636: Performed by: COLON & RECTAL SURGERY

## 2017-08-05 PROCEDURE — 65270000029 HC RM PRIVATE

## 2017-08-05 RX ORDER — MORPHINE SULFATE 10 MG/ML
2 INJECTION, SOLUTION INTRAMUSCULAR; INTRAVENOUS
Status: DISCONTINUED | OUTPATIENT
Start: 2017-08-05 | End: 2017-08-06 | Stop reason: HOSPADM

## 2017-08-05 RX ADMIN — DIPHENHYDRAMINE HYDROCHLORIDE 50 MG: 25 CAPSULE ORAL at 21:08

## 2017-08-05 RX ADMIN — Medication 10 ML: at 15:13

## 2017-08-05 RX ADMIN — OXYCODONE HYDROCHLORIDE AND ACETAMINOPHEN 1 TABLET: 5; 325 TABLET ORAL at 11:17

## 2017-08-05 RX ADMIN — ENOXAPARIN SODIUM 40 MG: 40 INJECTION SUBCUTANEOUS at 08:33

## 2017-08-05 RX ADMIN — ALVIMOPAN 12 MG: 12 CAPSULE ORAL at 17:26

## 2017-08-05 RX ADMIN — AMITRIPTYLINE HYDROCHLORIDE 25 MG: 25 TABLET, FILM COATED ORAL at 21:10

## 2017-08-05 RX ADMIN — GABAPENTIN 200 MG: 100 CAPSULE ORAL at 17:26

## 2017-08-05 RX ADMIN — OLANZAPINE 5 MG: 10 TABLET, FILM COATED ORAL at 17:26

## 2017-08-05 RX ADMIN — MELATONIN 3 MG ORAL TABLET 6 MG: 3 TABLET ORAL at 21:09

## 2017-08-05 RX ADMIN — FLUOROMETHOLONE 1 DROP: 1 SUSPENSION/ DROPS OPHTHALMIC at 08:33

## 2017-08-05 RX ADMIN — FAMOTIDINE 40 MG: 20 TABLET ORAL at 08:33

## 2017-08-05 RX ADMIN — DEXTROSE MONOHYDRATE, SODIUM CHLORIDE, AND POTASSIUM CHLORIDE 75 ML/HR: 50; 4.5; 1.49 INJECTION, SOLUTION INTRAVENOUS at 05:56

## 2017-08-05 RX ADMIN — GABAPENTIN 200 MG: 100 CAPSULE ORAL at 08:33

## 2017-08-05 RX ADMIN — FAMOTIDINE 40 MG: 20 TABLET ORAL at 17:26

## 2017-08-05 RX ADMIN — ACETAMINOPHEN 650 MG: 325 TABLET, FILM COATED ORAL at 03:23

## 2017-08-05 RX ADMIN — FLUOROMETHOLONE 1 DROP: 1 SUSPENSION/ DROPS OPHTHALMIC at 17:27

## 2017-08-05 RX ADMIN — DIAZEPAM 5 MG: 5 TABLET ORAL at 08:32

## 2017-08-05 RX ADMIN — Medication 5 ML: at 22:00

## 2017-08-05 RX ADMIN — OXYCODONE HYDROCHLORIDE AND ACETAMINOPHEN 1 TABLET: 5; 325 TABLET ORAL at 16:18

## 2017-08-05 RX ADMIN — METOCLOPRAMIDE 10 MG: 5 INJECTION, SOLUTION INTRAMUSCULAR; INTRAVENOUS at 05:56

## 2017-08-05 RX ADMIN — FENOFIBRATE 145 MG: 145 TABLET ORAL at 21:09

## 2017-08-05 RX ADMIN — VALSARTAN 40 MG: 40 TABLET ORAL at 21:09

## 2017-08-05 RX ADMIN — DIAZEPAM 5 MG: 5 TABLET ORAL at 15:12

## 2017-08-05 RX ADMIN — VILAZODONE HYDROCHLORIDE 20 MG: 20 TABLET ORAL at 09:00

## 2017-08-05 RX ADMIN — Medication 10 ML: at 05:56

## 2017-08-05 RX ADMIN — FLUTICASONE PROPIONATE 1 SPRAY: 50 SPRAY, METERED NASAL at 08:33

## 2017-08-05 RX ADMIN — Medication 1 CAPSULE: at 08:33

## 2017-08-05 RX ADMIN — ALVIMOPAN 12 MG: 12 CAPSULE ORAL at 08:32

## 2017-08-05 RX ADMIN — OXYCODONE HYDROCHLORIDE AND ACETAMINOPHEN 1 TABLET: 5; 325 TABLET ORAL at 20:48

## 2017-08-05 NOTE — PROGRESS NOTES
CRS  POD2  Pt without complaints.  Wong removed this am. Started on [employer]  flowsheet reviewed  Abd: inc: dressings intact  Drain: serosang    Plan:  Gi lite diet, hliv, dc pca

## 2017-08-05 NOTE — DISCHARGE INSTRUCTIONS
Rsoa Casillas MD, FACS  Anirudh Dahl MD, FACS  Zoya Barajas. Shari Flores MD, [address] [facility] Rachele Deal MD, [address] [facility] Diego Fulton MD, FACS  Georgi Urbano. MD Maryan Betancourt MD    [facility] Discharge Instructions for Colon Surgery Patients    1. Diagnosis: laparoscopic rectopexy  2. Restricted fiber diet. 3. Do not drive for 2 weeks or until after your next doctors appointment. 4. Leave dressings on incision. They may fall off on their own. 5. May take a shower. 6. No lifting any objects weighing more than 20 pounds. Do not do any housework, such as vacuuming, scrubbing, etc for at least a month. 7. When you get tired during the day, take naps, as you need your rest.  8. Multiple bowel movements are normal each day for a while. 9. May walk as desired. May go up and down stairs. 10. Take pain medication as prescribed: (NO DRIVING WHILE ON PAIN MEDICATIONS). Percocet 5 mg po EVERY 4-6 HOURS AS NEEDED. Other Medications: resume your usual preadmission meds  11. See me in the office in 10-14 days. Call as soon as discharged for an appointment 21 [phone]. 12.  Call the Exchange [phone], if you have any questions or problems after office hours.

## 2017-08-05 NOTE — PROGRESS NOTES
Patient requesting Fosamax be restarted tomorrow as she takes it once a week on Sundays, per pharmacy we do not carry drug and its not recommended this medication be taken in hospital, drug has long half life and can be resumed on dc pt expected to be dc on Monday. Pt made aware.

## 2017-08-05 NOTE — PROGRESS NOTES
Visited by Venice Llamas Partner on 8/4/17      BEAR William, [facility], [address]     Paging Service  [phone]

## 2017-08-05 NOTE — PROGRESS NOTES
CRS POD # 1    Stable nite;  AVSS with good UO;  Lungs clear;  Cor without failure; Abdomen soft with some BS;  Labs OK -- will recheck in AM;  Will dc pritchett in AM and increase po; Will decrease IV;  Good progress so far.

## 2017-08-05 NOTE — PROGRESS NOTES
End of Shift Nursing Note    Bedside shift change report given to Oliverio Cox RN (oncoming nurse) by Christy Ivan RN (offgoing nurse). Report included the following information SBAR. [facility] Phone:   [phone]    Significant changes during shift:    Diet advanced to GI Lite      Fluids and PCA dc      Reglan held due to loose stools   Non-emergent issues for physician to address:   no     Number times ambulated in hallway past shift: 3    Number of times OOB to chair past shift: 3    POD #: 2     Vital Signs:    Temp: 98.9 °F (37.2 °C)     Pulse (Heart Rate): 89     BP: 138/81     Resp Rate: 16     O2 Sat (%): 97 %    Lines & Drains:     Urinary Catheter? No (removed at 0600)   Placement Date:    Medical Necessity:     Central Line? No   Placement Date:    Medical Necessity:     PICC Line? No   Placement Date:    Medical Necessity:     NG tube [] in [] removed [x] not applicable   Drains [x] in  WILL to R Abd [] removed [] not applicable     Skin Integrity:      Wounds: yes   Dressings Present: yes    Wound Concerns: yes      GI:    Current diet:  DIET GI LITE (POST SURGICAL)    Nausea: YES  Vomiting: YES  Bowel Sounds: YES  Flatus: NO  Last Bowel Movement: yesterday   Appearance:     Respiratory:  Supplemental O2: No      Device:    via  Liters/min     Incentive Spirometer: YES  Volume: 1250  Coughing and Deep Breathing: YES  Oral Care: YES  Understanding (patient/family education): YES   Getting out of bed: YES  Head of bed elevation: YES    Patient Safety:    Falls Score: 1  Mobility Score: 1  Bed Alarm On? Yes  Sitter? Yes      Opportunity for questions and clarification was given to oncoming nurse. Patient bed is in low position, side rails are up x 3, door & observation blinds open as needed, call bell within reach and patient not in distress.     Christy Ivan RN

## 2017-08-05 NOTE — PROGRESS NOTES
End of Shift Nursing Note    Bedside shift change report given to JYOTSNA Nogueira (oncoming nurse) by Tyler Brown RN (offgoing nurse). Report included the following information SBAR. [facility] Phone:   [phone]    Significant changes during shift:    Diet advanced to GI Lite      Fluids decreased to 75/hr      Wong D/C'd at 0609; needs to void   Non-emergent issues for physician to address:   no     Number times ambulated in hallway past shift: 0      Number of times OOB to chair past shift: 0    POD #: 2     Vital Signs:    Temp: 98 °F (36.7 °C)     Pulse (Heart Rate): 94     BP: 117/63     Resp Rate: 14     O2 Sat (%): 95 %    Lines & Drains:     Urinary Catheter? No (removed at 0600)   Placement Date:    Medical Necessity:     Central Line? No   Placement Date:    Medical Necessity:     PICC Line? No   Placement Date:    Medical Necessity:     NG tube [] in [] removed [x] not applicable   Drains [x] in  WILL to R Abd [] removed [] not applicable     Skin Integrity:      Wounds: yes   Dressings Present: yes    Wound Concerns: yes      GI:    Current diet:  DIET GI LITE (POST SURGICAL)    Nausea: YES  Vomiting: YES  Bowel Sounds: YES  Flatus: NO  Last Bowel Movement: yesterday   Appearance:     Respiratory:  Supplemental O2: No      Device:    via  Liters/min     Incentive Spirometer: YES  Volume: 1250  Coughing and Deep Breathing: YES  Oral Care: YES  Understanding (patient/family education): YES   Getting out of bed: YES  Head of bed elevation: YES    Patient Safety:    Falls Score: 1  Mobility Score: 1  Bed Alarm On? Yes  Sitter? Yes      Opportunity for questions and clarification was given to oncoming nurse. Patient bed is in low position, side rails are up x 3, door & observation blinds open as needed, call bell within reach and patient not in distress.     Tyler Brown RN

## 2017-08-06 VITALS
OXYGEN SATURATION: 98 % | SYSTOLIC BLOOD PRESSURE: 102 MMHG | DIASTOLIC BLOOD PRESSURE: 61 MMHG | HEART RATE: 80 BPM | HEIGHT: 60 IN | BODY MASS INDEX: 30.17 KG/M2 | RESPIRATION RATE: 16 BRPM | WEIGHT: 153.66 LBS | TEMPERATURE: 98.1 F

## 2017-08-06 PROCEDURE — 74011250636 HC RX REV CODE- 250/636: Performed by: COLON & RECTAL SURGERY

## 2017-08-06 PROCEDURE — 74011250637 HC RX REV CODE- 250/637: Performed by: COLON & RECTAL SURGERY

## 2017-08-06 RX ADMIN — OXYCODONE HYDROCHLORIDE AND ACETAMINOPHEN 1 TABLET: 5; 325 TABLET ORAL at 08:01

## 2017-08-06 RX ADMIN — FAMOTIDINE 40 MG: 20 TABLET ORAL at 08:02

## 2017-08-06 RX ADMIN — OXYCODONE HYDROCHLORIDE AND ACETAMINOPHEN 1 TABLET: 5; 325 TABLET ORAL at 03:03

## 2017-08-06 RX ADMIN — OXYCODONE HYDROCHLORIDE AND ACETAMINOPHEN 1 TABLET: 5; 325 TABLET ORAL at 12:50

## 2017-08-06 RX ADMIN — Medication 1 CAPSULE: at 08:02

## 2017-08-06 RX ADMIN — DIAZEPAM 5 MG: 5 TABLET ORAL at 08:01

## 2017-08-06 RX ADMIN — ACETAMINOPHEN 650 MG: 325 TABLET, FILM COATED ORAL at 08:01

## 2017-08-06 RX ADMIN — VILAZODONE HYDROCHLORIDE 20 MG: 20 TABLET ORAL at 09:00

## 2017-08-06 RX ADMIN — Medication 10 ML: at 06:59

## 2017-08-06 RX ADMIN — GABAPENTIN 200 MG: 100 CAPSULE ORAL at 08:01

## 2017-08-06 RX ADMIN — ENOXAPARIN SODIUM 40 MG: 40 INJECTION SUBCUTANEOUS at 08:01

## 2017-08-06 RX ADMIN — FLUOROMETHOLONE 1 DROP: 1 SUSPENSION/ DROPS OPHTHALMIC at 08:27

## 2017-08-06 RX ADMIN — FLUTICASONE PROPIONATE 1 SPRAY: 50 SPRAY, METERED NASAL at 08:27

## 2017-08-06 RX ADMIN — ALVIMOPAN 12 MG: 12 CAPSULE ORAL at 08:02

## 2017-08-06 NOTE — PROGRESS NOTES
End of Shift Nursing Note    Bedside shift change report given to [address] [place] [address] (oncoming nurse) by Miri Rodriguez RN (offgoing nurse). Report included the following information SBAR, Kardex, OR Summary, Intake/Output and MAR. Zone Phone:       Significant changes during shift:   pt having BMs , loose and frequent   Non-emergent issues for physician to address:  none     Number times ambulated in hallway past shift: n      Number of times OOB to chair past shift: 0    POD #: 3     Vital Signs:    Temp: 98.5 °F (36.9 °C)     Pulse (Heart Rate): 80     BP: 113/66     Resp Rate: 19     O2 Sat (%): 98 %    Lines & Drains:     Urinary Catheter? No      NG tube [] in [] removed [x] not applicable   Drains [x] in [] removed [] not applicable     Skin Integrity:      Wounds: yes   Dressings Present: yes    Wound Concerns: yes      GI:    Current diet:  DIET GI LITE (POST SURGICAL)    Nausea: NO  Vomiting: NO  Bowel Sounds: yes  Flatus: YES  Last Bowel Movement: today   Appearance: loose    Respiratory:  Supplemental O2: No     Incentive Spirometer: YES  Volume: 750  Coughing and Deep Breathing: YES  Oral Care: YES  Understanding (patient/family education): YES   Getting out of bed: YES  Head of bed elevation: YES    Patient Safety:    Falls Score: 2  Mobility Score: 1  Bed Alarm On? No  Sitter? No      Opportunity for questions and clarification was given to oncoming nurse. Patient bed is in low position, side rails are up x 2, door & observation blinds open as needed, call bell within reach and patient not in distress.     Ankita Barillas RN

## 2017-08-06 NOTE — PROGRESS NOTES
CRS  Pt without complaints. hasmukh diet, hasmukh pain pills by mouth  Flowsheet, labs reviewed  Abd: soft, nt  Drain: serosang    Plan:  Dc drain.  Dc home

## 2017-08-07 ENCOUNTER — PATIENT OUTREACH (OUTPATIENT)
Dept: INTERNAL MEDICINE CLINIC | Age: 57
End: 2017-08-07

## 2017-08-10 NOTE — DISCHARGE SUMMARY
[address] SUMMARY       Name:  Nancy Jeffrey   MR#:  [id number]   :  1960   Account #:  [de-identified]        Date of Adm:  2017       DIAGNOSES:   1. Recurrent rectal prolapse. 2. History of previous rectal prolapse repair with previous sigmoid   resection and rectopexy. 3. History of appendectomy. 4. History of radical hysterectomy. 5. History of cholecystectomy. 6. History of hypertension and acid reflux disease. CONSULTATIONS: Dr. Heriberto Boswell, Urology. PROCEDURES PERFORMED THIS HOSPITALIZATION:   1. Laparoscopic rectopexy. 2. Cystoscopy and placement of bilateral ureteral catheters. [address] COURSE: The patient is a 49-year-old   white female with recurrent rectal prolapse. My partner, Dr. Bernice Izaguirre, a number of years ago had treated this with a low   anterior resection, sigmoidectomy and rectopexy and she had control   for some time of her Rectal prolapse problem. However, she has since   developed recurrence. Her anastomosis is high enough so that if we   did an Altmeier or perineal approach, we would leave a devascularized   middle portion and likely not be able to reach the area of previous   colorectal anastomosis from a perineal approach, therefore, we did   not want to put her in a position where a section of her remaining colon   between the 2 staple lines would be rendered ischemic and   die. Therefore, we have elected to go forward with an abdominal   approach, so it is best visualized where we might be able to help her if   a resection is warranted and if not, we can simply proceed with   rectopexy. Subsequently, she is in for surgery today. We will do this in   a laparoscopic fashion. The remainder of her history and physical is well documented  and   will not be repeated here.     SUBSEQUENT HOSPITAL COURSE: The patient was taken to the   operating room on the date of admission, where she underwent   laparoscopic exploration. At surgery she was found to have a colon   that draped directly down to her anastomosis and in order to correct   her problem a resection was not necessary; however, it was necessary   to release her rectum and pull it back up into a normal anatomic   position and rectopexy it up to the upper sacrum. This was done and   postoperatively, her course was quite smooth. She was moved up from   a clear liquid to a GI light diet and then to a regular diet. She was able   to void with removal of her Wong catheter, she was able to tolerate   regular diet and was able to stool. At the current time on postop day 3,   she is up, she is active, her pain is controlled with oral analgesics and   she is moving her bowels, voiding and doing well on all fronts. She will   be discharged home on Percocet for pain. I asked her not to lift over 20   pounds. She can shower but not bathe or soak the incision. I would   prefer she stay on a low-fiber diet and I would prefer she not driving   until her first postop visit. We will see her back in 10 days for followup,   and she is subsequently discharged home having obtained maximal   hospital benefit also. ADDENDUM:  Because of her previous surgery, we protected her   ureters with ureteral stents for the duration of the operation. They   were placed by Dr. Sung Gilbert from Urology and were discontinued at the   end of her procedure.          MD NICOLLE Barclay / BRITNEY   D:  08/10/2017   15:37   T:  08/10/2017   16:22   Job #:  [id number]

## 2017-08-10 NOTE — PROGRESS NOTES
Chava Hadley 59 y.o. listed on [place] and Daily report. Patient discharged from [facility] for rectal prolapse from dates: 8/3/17 to 8/6/17. RRAT score: 15 Moderate    Nurse Navigator(NN) contacted the patient by telephone to perform post hospital discharge assessment. Verified as patient with 2 identifiers. Provided introduction to self, and explanation of the Nurses Navigator role. Call Information: Spoke with patient who was in good spirits. She explained she is doing quite well. She is using a rollator she had in storage to help her feel more steady on her feet. She feels she is able to ambulate further this way. It is noted that the bandages remain in place. She has showered twice since surgery. The home nurse is coming out daily to assess her wounds and everything is looking good. There is some itching from the healing. Pt does have some complaint of a chaffing or raw sensation in the genital region. No issues with urination however. Pt will see NP tomorrow for a check up and this will be addressed. Pt states her pain has been well managed with the Percocet. She typically only needs approx. 3/day. Today however she has needed 2 by the afternoon, noting she was more sore this morning. Writer asks had pt been more active the day prior than usual. Pt does note she had a busy and active day. Writer explains that the additional activity just caught up to her. Not to be alarmed, but to increase the activity slowly and not get over confident when feeling good. Pt voices understanding. Questioned pt about bowel pattern since returning home. Pt notes she has not loose stool, but not formed stool, but more like sludge a few times a day. States she feels she is passing enough stool. Is drinking plenty of fluid. Pt questions how long she should use her Incentive Spirometer.  Writer encourages to use for 2 weeks post surgery, which would be until her post op appointment with  Fernando next week. Encouraged to keep the IS for use during the cold and flu season to help with keeping lungs open and clear. Pt notes she also has Pelvic Floor Pain and her gynecology doctor Hari Rodney suggested she resume some physical therapy to tighten and strengthen the pelvic muscles. Writer informed that the NP could possibly writer this referral tomorrow at the f/u appointment that she has scheduled. Pt states she would like to start on the therapy again because it helps significantly. It is so painful sometimes she will be sitting on a heating pad for the majority of the day. Writer informed she would forward this information to the NP so she would be aware of it and address it with her appointment tomorrow at noon. Writer sent Inbox message to NP regarding pt's appointment and referral request for the PT for pelvic floor pain. Also noted that pt likely should get clearance from Dr. Jonah Linton the surgeon for her prolapsed rectal surgery. She has that appointment next week. The doctor could write for the clearance and fax his office note to us and then we could proceed with the referral. Michelle Coburn had not considered the clearance until after having hung up with patient. The NP will be able to explain this to the pt however. Red Flags:  CP, SOB, fever, weakness especially on one side, trouble speaking or swallowing, confusion, incision site redness or drainage. Discharge Instructions :  Reviewed discharge instructions with patient. Patient verbalizes understanding of discharge instructions and follow-up care. PCP/Specialist Follow Up:   Patient scheduled to follow up with Avinash Tomlinson NP on 8/11/17. Reviewed red flags with patient, and patient verbalizes understanding. Opportunity given to ask questions. No other clinical/social/functional needs noted. Plan:  Reviewed plan of care. Patient verbalized understanding and agreement with plan.      The patient agrees to contact the PCP office for questions related to their healthcare. Contact information given to call prn. Goal:  Goals Addressed             Most Recent     Attends follow-up appointments as directed. PCP appt 8/11 and 8/29  Dr. Barbara Gavin \"next week\"       Knowledge and adherence of prescribed medication (ie. action, side effects, missed dose, etc.). On track (2/8/2017)     Prevent complications post hospitalization.  Returns to baseline activity level. Be able to ambulate safely without use of assistive device. Be able to resume house hold duties. Be able to resume normal level of walking ability. [facility] 8/10/17       Supportive resources in place to maintain patient in the community (ie. Home Health, DME equipment, refer to, medication assistant plan, etc.)   On track (2/8/2017)     Understands red flags post discharge.    On track (2/8/2017)          Medical History:     Past Medical History:   Diagnosis Date    Arthritis     Bipolar disorder ([address]     Bladder pain     Choledocholithiasis 11/24/2010    GERD (gastroesophageal reflux disease)     Headache     tension headaches    High cholesterol     History of cholecystectomy     Hypertension     Irritable bowel     Pelvic pain in female 2014    Psychiatric disorder     Stool color black     irritable bowel       Labs Reviewed:  Recent Results (from the past 200 hour(s))   POC INR    Collection Time: 08/03/17 10:51 AM   Result Value Ref Range    INR (POC) 1.1 <8.5     METABOLIC PANEL, COMPREHENSIVE    Collection Time: 08/04/17  2:50 AM   Result Value Ref Range    Sodium 137 136 - 145 mmol/L    Potassium 4.7 3.5 - 5.1 mmol/L    Chloride 106 97 - 108 mmol/L    CO2 24 21 - 32 mmol/L    Anion gap 7 5 - 15 mmol/L    Glucose 173 (H) 65 - 100 mg/dL    BUN 6 6 - 20 MG/DL    Creatinine 1.35 (H) 0.55 - 1.02 MG/DL    BUN/Creatinine ratio 4 (L) 12 - 20      GFR est AA 49 (L) >60 ml/min/1.73m2    GFR est non-AA 40 (L) >60 ml/min/1.73m2    Calcium 7.4 (L) 8.5 - 10.1 MG/DL    Bilirubin, total 0.4 0.2 - 1.0 MG/DL    ALT (SGPT) 27 12 - 78 U/L    AST (SGOT) 22 15 - 37 U/L    Alk. phosphatase 64 45 - 117 U/L    Protein, total 6.4 6.4 - 8.2 g/dL    Albumin 3.2 (L) 3.5 - 5.0 g/dL    Globulin 3.2 2.0 - 4.0 g/dL    A-G Ratio 1.0 (L) 1.1 - 2.2     CBC WITH AUTOMATED DIFF    Collection Time: 08/04/17  2:50 AM   Result Value Ref Range    WBC 8.6 3.6 - 11.0 K/uL    RBC 4.02 3.80 - 5.20 M/uL    HGB 11.3 (L) 11.5 - 16.0 g/dL    HCT 34.5 (L) 35.0 - 47.0 %    MCV 85.8 80.0 - 99.0 FL    MCH 28.1 26.0 - 34.0 PG    MCHC 32.8 30.0 - 36.5 g/dL    RDW 13.5 11.5 - 14.5 %    PLATELET 495 251 - 027 K/uL    NEUTROPHILS 86 (H) 32 - 75 %    LYMPHOCYTES 11 (L) 12 - 49 %    MONOCYTES 3 (L) 5 - 13 %    EOSINOPHILS 0 0 - 7 %    BASOPHILS 0 0 - 1 %    ABS. NEUTROPHILS 7.4 1.8 - 8.0 K/UL    ABS. LYMPHOCYTES 1.0 0.8 - 3.5 K/UL    ABS. MONOCYTES 0.3 0.0 - 1.0 K/UL    ABS. EOSINOPHILS 0.0 0.0 - 0.4 K/UL    ABS.  BASOPHILS 0.0 0.0 - 0.1 K/UL   CBC W/O DIFF    Collection Time: 08/05/17  3:33 AM   Result Value Ref Range    WBC 7.4 3.6 - 11.0 K/uL    RBC 4.02 3.80 - 5.20 M/uL    HGB 11.2 (L) 11.5 - 16.0 g/dL    HCT 34.8 (L) 35.0 - 47.0 %    MCV 86.6 80.0 - 99.0 FL    MCH 27.9 26.0 - 34.0 PG    MCHC 32.2 30.0 - 36.5 g/dL    RDW 14.1 11.5 - 14.5 %    PLATELET 988 361 - 935 K/uL   METABOLIC PANEL, BASIC    Collection Time: 08/05/17  3:33 AM   Result Value Ref Range    Sodium 136 136 - 145 mmol/L    Potassium 3.6 3.5 - 5.1 mmol/L    Chloride 105 97 - 108 mmol/L    CO2 24 21 - 32 mmol/L    Anion gap 7 5 - 15 mmol/L    Glucose 111 (H) 65 - 100 mg/dL    BUN <1 (L) 6 - 20 MG/DL    Creatinine 0.93 0.55 - 1.02 MG/DL    BUN/Creatinine ratio Cannot be calulated 12 - 20      GFR est AA >60 >60 ml/min/1.73m2    GFR est non-AA >60 >60 ml/min/1.73m2    Calcium 7.9 (L) 8.5 - 10.1 MG/DL

## 2017-08-11 ENCOUNTER — TELEPHONE (OUTPATIENT)
Dept: NEUROLOGY | Age: 57
End: 2017-08-11

## 2017-08-11 ENCOUNTER — OFFICE VISIT (OUTPATIENT)
Dept: INTERNAL MEDICINE CLINIC | Age: 57
End: 2017-08-11

## 2017-08-11 VITALS
BODY MASS INDEX: 31.41 KG/M2 | HEART RATE: 83 BPM | WEIGHT: 160 LBS | DIASTOLIC BLOOD PRESSURE: 60 MMHG | HEIGHT: 60 IN | RESPIRATION RATE: 20 BRPM | SYSTOLIC BLOOD PRESSURE: 112 MMHG | OXYGEN SATURATION: 98 % | TEMPERATURE: 98.2 F

## 2017-08-11 DIAGNOSIS — K62.3 RECTAL PROLAPSE: ICD-10-CM

## 2017-08-11 DIAGNOSIS — R10.2 PELVIC PAIN: Primary | ICD-10-CM

## 2017-08-11 DIAGNOSIS — B37.9 YEAST INFECTION: ICD-10-CM

## 2017-08-11 DIAGNOSIS — T14.8XXA HEMATOMA: ICD-10-CM

## 2017-08-11 RX ORDER — DOXYLAMINE SUCCINATE 25 MG
TABLET ORAL 2 TIMES DAILY
Qty: 15 G | Refills: 0 | Status: SHIPPED | OUTPATIENT
Start: 2017-08-11 | End: 2018-03-12

## 2017-08-11 RX ORDER — IBUPROFEN 200 MG
400 TABLET ORAL
COMMUNITY
End: 2018-03-12

## 2017-08-11 RX ORDER — VILAZODONE HYDROCHLORIDE 20 MG/1
TABLET ORAL
Refills: 5 | COMMUNITY
Start: 2017-07-24 | End: 2018-03-12

## 2017-08-11 NOTE — MR AVS SNAPSHOT
Visit Information Date & Time Provider Department Dept. Phone Encounter #  
 8/11/2017 12:00 PM Sergei Sena, [address] [facility] Internal Medicine [facility] [phone] [id number] Follow-up Instructions Return in about 2 weeks (around 8/25/2017). Follow-up and Disposition History Your Appointments 9/15/2017  8:40 AM  
Follow Up with DO Delon Guallpa [facility] at [address] [place] [address]) Appt Note: f/u headache 8/14/17; f/u headache 8/14/17  
 [address] [place] [id number] [phone]  
  
   
 [address] [place] [id number]  
  
    
 10/10/2017  3:40 PM  
Follow Up with DO Delon Guallpa [facility] at [address] [place] [address]) Appt Note: Follow Up, Headache,SW [id number] Dr. Nava [address] [place] [address]  
[phone] Upcoming Health Maintenance Date Due  
 PAP AKA CERVICAL CYTOLOGY 4/25/2017 INFLUENZA AGE 9 TO ADULT 8/1/2017 MEDICARE YEARLY EXAM 5/31/2018 BREAST CANCER SCRN MAMMOGRAM 3/28/2019 DTaP/Tdap/Td series (2 - Td) 6/19/2027 COLONOSCOPY 7/5/2027 Allergies as of 8/11/2017  Review Complete On: 8/11/2017 By: Sergei Sena, NEGRO Severity Noted Reaction Type Reactions Buspar [Buspirone] Medium 02/09/2017   Side Effect Other (comments) Causes \"stimulation\" that could exacerbate a manic attack/phase of pt's Biplar. Reported by patient Amlodipine  06/28/2017    Other (comments) Ankle swelling Dicyclomine  10/14/2014    Nausea and Vomiting Extreme stomach pains Lithium  01/03/2014   Systemic Nausea and Vomiting Severe nausea and vomiting. Other Medication  11/19/2010    Other (comments) Intolerance to oranges, cabbage,beans,peppers,raw onions,foods with caffeine in them, popcorn, no pop sodas, because of IBS Current Immunizations  Reviewed on 5/30/2017 Name Date Influenza Vaccine 10/13/2014 Tdap 6/19/2017 Not reviewed this visit You Were Diagnosed With   
  
 Codes Comments Pelvic pain    -  Primary ICD-10-CM: R10.2 ICD-9-CM: [id number] Yeast infection     ICD-10-CM: B37.9 ICD-9-CM: 112.9 Rectal prolapse     ICD-10-CM: K62.3 ICD-9-CM: 569.1 Hematoma     ICD-10-CM: T14.8 ICD-9-CM: 924.9 Vitals BP Pulse Temp Resp Height(growth percentile) Weight(growth percentile) 112/60 (BP 1 Location: Left arm, BP Patient Position: Sitting) 83 98.2 °F (36.8 °C) (Oral) 20 5' (1.524 m) 160 lb (72.6 kg) SpO2 BMI OB Status Smoking Status 98% 31.25 kg/m2 Hysterectomy Never Smoker Vitals History BMI and BSA Data Body Mass Index Body Surface Area  
 31.25 kg/m 2 1.75 m 2 Preferred Pharmacy Pharmacy Name Phone [facility] [phone] Your Updated Medication List  
  
   
This list is accurate as of: 8/11/17 11:59 PM.  Always use your most recent med list.  
  
  
  
  
 ACIDOPHILUS Cap Generic drug:  Lactobacillus acidophilus Take 1 Cap by mouth daily. alendronate 70 mg tablet Commonly known as:  FOSAMAX Take 1 Tab by mouth every seven (7) days. amitriptyline 25 mg tablet Commonly known as:  ELAVIL Take 1 Tab by mouth nightly. black cohosh 540 mg Cap Take 1 Tab by mouth daily. butalbital-acetaminophen  mg tablet Commonly known as:  Roanne Rankin Take 1 Tab by mouth every six (6) hours as needed. calcium-cholecalciferol (D3) tablet Commonly known as:  CALTRATE 600+D Take 1 Tab by mouth daily. * diazePAM 2.5 mg Kit Commonly known as:  VALIUM Insert 2 mg into vagina every eight (8) hours as needed (pelvic floor pain). * diazePAM 5 mg tablet Commonly known as:  VALIUM Take 5 mg by mouth two (2) times a day.  8am and 3pm  
  
 diphenhydrAMINE 25 mg capsule Commonly known as:  BENADRYL Take 2 Caps by mouth nightly. estradiol 0.05 mg/24 hr  
Commonly known as:  VIVELLE  
1 Patch by TransDERmal route two (2) times a week on Wednesday and Saturday. famotidine 20 mg tablet Commonly known as:  PEPCID  
TAKE 1 TABLET BY MOUTH TWO (2) TIMES A DAY. STOP OMEPRAZOLE. fenofibrate nanocrystallized 145 mg tablet Commonly known as:  [employer] Take 1 Tab by mouth every fourty-eight (48) hours. fluorometholone 0.1 % ophthalmic suspension Commonly known as: [address] [facility] Administer 1 Drop to both eyes two (2) times a day. fluticasone 50 mcg/actuation nasal spray Commonly known as:  FLONASE  
USE ONE SPRAY IN EACH NOSTRIL TWO TIMES A DAY. gabapentin 100 mg capsule Commonly known as:  NEURONTIN Take 2 Caps by mouth two (2) times a day. ibuprofen 200 mg tablet Commonly known as:  MOTRIN Take 400 mg by mouth every six (6) hours as needed for Pain. loperamide 2 mg capsule Commonly known as:  IMODIUM Take 2 mg by mouth as needed for Diarrhea.  
  
 losartan 25 mg tablet Commonly known as:  COZAAR Take 1 Tab by mouth daily. melatonin 5 mg Cap capsule Take 5 mg by mouth nightly. miconazole 2 % topical cream  
Commonly known as:  Lawence Gaw Apply  to affected area two (2) times a day. OLANZapine 5 mg tablet Commonly known as:  ZyPREXA Take 5 mg by mouth daily (with dinner). oxyCODONE-acetaminophen 5-325 mg per tablet Commonly known as:  PERCOCET Take 1 Tab by mouth every four (4) hours as needed. Max Daily Amount: 6 Tabs. polyethylene glycol 17 gram/dose powder Commonly known as:  Orysia [place] Take 17 g by mouth daily. REFRESH OPTIVE ADVANCED (PF) OP Apply 1 Drop to eye two (2) times daily as needed. SF 5000 PLUS 1.1 % Crea Generic drug:  fluoride (sodium) 1 Tube. TYLENOL EXTRA STRENGTH 500 mg tablet Generic drug:  acetaminophen Take 1,000 mg by mouth every six (6) hours as needed for Pain. VIIBRYD 20 mg Tab tablet Generic drug:  vilazodone TAKE 1 TABLET BY MOUTH ONCE DAILY. VITAMIN D3 1,000 unit Cap Generic drug:  cholecalciferol Take 1 Tab by mouth daily. Indications: VITAMIN D DEFICIENCY  
  
 vitamin E 400 unit capsule Commonly known as:  [address] Take 400 Units by mouth daily. * Notice: This list has 2 medication(s) that are the same as other medications prescribed for you. Read the directions carefully, and ask your doctor or other care provider to review them with you. Prescriptions Sent to Pharmacy Refills  
 miconazole (MICOTIN) 2 % topical cream 0 Sig: Apply  to affected area two (2) times a day. Class: Normal  
 Pharmacy: [address]  #: [phone] Route: Topical  
  
We Performed the Following REFERRAL TO PHYSICAL THERAPY [QZZ35 Custom] Comments:  
 Please evaluate patient for pelvic floor therapy Please schedule and authorize patient for services Rayne Hidalgo PT [facility] Follow-up Instructions Return in about 2 weeks (around 8/25/2017). Referral Information Referral ID Referred By Referred To  
  
 [id number] [address], [address] [place] [address], [address] [place] [address] Phone: [phone] Fax: [phone] Visits Status Start Date End Date 1 Open 8/11/17 8/11/18 If your referral has a status of pending review or denied, additional information will be sent to support the outcome of this decision. Introducing [facility]! Dear Vazquez Collins: Thank you for requesting a Supramed account. Our records indicate that you already have an active Supramed account. You can access your account anytime at [email or web address]. [email or web address] Did you know that you can access your hospital and ER discharge instructions at any time in [email or web address]? You can also review all of your test results from your hospital stay or ER visit. Additional Information If you have questions, please visit the Frequently Asked Questions section of the [email or web address] website at [email or web address] Remember, [email or web address] is NOT to be used for urgent needs. For medical emergencies, dial 911. Now available from your iPhone and Android! Please provide this summary of care documentation to your next provider. Your primary care clinician is listed as Jitendra Moore. If you have any questions after today's visit, please call [phone].

## 2017-08-11 NOTE — PROGRESS NOTES
HISTORY OF PRESENT ILLNESS  Roger Carvalho is a 59 y.o. female. This is a patient of Dr. Opal Hillman who presents today related to pelvic pain. The patient states she has a history of chronic pelvic pain. She states she has been followed by Dr. Jose Singh. She was previously referred for PT, pelvic floor therapy, at [address] [place] [address]. She states this helped significantly at that time and is interested in completing course of therapy. The patient also expresses concern of possible yeast infection. She describes irritation and itching to labia and in area surrounding vagina. She states that she is passing urine without problem. The patient has had recent laparoscopic rectopexy for rectal prolapse by Dr. Dallas Bowen. She has follow-up scheduled on 08/16/17. The patient requests bruising to right arm be evaluated as well. She states she first noted bruising to right forearm after surgery. Visit Vitals    /60 (BP 1 Location: Left arm, BP Patient Position: Sitting)    Pulse 83    Temp 98.2 °F (36.8 °C) (Oral)    Resp 20    Ht 5' (1.524 m)    Wt 160 lb (72.6 kg)    SpO2 98%    BMI 31.25 kg/m2     HPI    Review of Systems   Constitutional: Negative for chills and fever. HENT: Negative for congestion. Respiratory: Negative for cough and shortness of breath. Cardiovascular: Negative for chest pain and leg swelling. Gastrointestinal: Negative for abdominal pain, nausea and vomiting. Genitourinary: Negative. Musculoskeletal: Negative. Skin: Positive for itching and rash. Endo/Heme/Allergies: Negative. Physical Exam   Constitutional: She is oriented to person, place, and time. She appears well-developed and well-nourished. No distress. HENT:   Head: Normocephalic and atraumatic. Cardiovascular: Normal rate and regular rhythm. Pulmonary/Chest: Effort normal and breath sounds normal. No respiratory distress. She has no wheezes. She has no rales. Abdominal: Soft.  She exhibits no distension. Genitourinary:   Genitourinary Comments: Redness to skin of labia with small white vaginal discharge noted    Musculoskeletal: She exhibits no edema. Neurological: She is alert and oriented to person, place, and time. Skin: Skin is warm and dry. 3 lap sites noted to abdomen with clean, dry dressings in place  Area of bruising to right forearm with small central hematoma   Psychiatric: She has a normal mood and affect. Nursing note and vitals reviewed. ASSESSMENT and PLAN    ICD-10-CM ICD-9-CM    1. Pelvic pain R10.2 [id number] Will order  REFERRAL TO PHYSICAL THERAPY  Recommend patient discuss with Dr. Jose Escobedo at follow-up on 08/16/17 prior to starting therapy, for clearance   2. Yeast infection B37.9 112.9 Will order  miconazole (MICOTIN) 2 % topical cream to affected area bid x 7 days   3.      4. Rectal prolapse      Hematoma K62.3 569.1 S/p laparoscopic rectopexy  Follow-up with Dr. Jose Escobedo as scheduled. Warm compresses to affected area for comfort. Notify if does not continue to improve/ worsens. Lab results and schedule of future lab studies reviewed with patient  Reviewed medications and side effects in detail  Patient encouraged to call or return to office if symptoms do not improve or worsen. Reviewed plan of care with patient who acknowledges understanding and agrees.

## 2017-08-11 NOTE — PROGRESS NOTES
Chief Complaint   Patient presents with    Vaginal Pain     pain near vagina--recent surgery--6/10    Pelvic Pain     pelvic floor pain per patient--requesting exercises for therapy    Mike MI @ bruised site     1. Have you been to the ER, urgent care clinic since your last visit? Hospitalized since your last visit? [facility]    2. Have you seen or consulted any other health care providers outside of the [facility] since your last visit? Include any pap smears or colon screening.  Dr. Reza Olivas

## 2017-08-11 NOTE — TELEPHONE ENCOUNTER
----- Message from Walker Robles sent at 8/11/2017  8:50 AM EDT -----  Regarding: Dr. Mariaa Jaquez  Pt called concerning getting an earlier appt and would like a call back. Pt best contact number [phone].

## 2017-08-21 RX ORDER — DIPHENHYDRAMINE HCL 25 MG
CAPSULE ORAL
Qty: 60 CAP | Refills: 3 | Status: SHIPPED | OUTPATIENT
Start: 2017-08-21 | End: 2018-01-23 | Stop reason: ALTCHOICE

## 2017-08-29 ENCOUNTER — OFFICE VISIT (OUTPATIENT)
Dept: INTERNAL MEDICINE CLINIC | Age: 57
End: 2017-08-29

## 2017-08-29 VITALS
HEART RATE: 100 BPM | WEIGHT: 154.5 LBS | TEMPERATURE: 98 F | OXYGEN SATURATION: 97 % | RESPIRATION RATE: 20 BRPM | SYSTOLIC BLOOD PRESSURE: 128 MMHG | BODY MASS INDEX: 30.33 KG/M2 | DIASTOLIC BLOOD PRESSURE: 80 MMHG | HEIGHT: 60 IN

## 2017-08-29 DIAGNOSIS — M81.0 OSTEOPOROSIS, UNSPECIFIED OSTEOPOROSIS TYPE, UNSPECIFIED PATHOLOGICAL FRACTURE PRESENCE: ICD-10-CM

## 2017-08-29 DIAGNOSIS — K58.9 IRRITABLE BOWEL SYNDROME WITHOUT DIARRHEA: ICD-10-CM

## 2017-08-29 DIAGNOSIS — F31.9 BIPOLAR 1 DISORDER (HCC): ICD-10-CM

## 2017-08-29 DIAGNOSIS — G44.221 CHRONIC TENSION-TYPE HEADACHE, INTRACTABLE: ICD-10-CM

## 2017-08-29 DIAGNOSIS — F41.1 GAD (GENERALIZED ANXIETY DISORDER): Primary | ICD-10-CM

## 2017-08-29 DIAGNOSIS — K21.9 GASTROESOPHAGEAL REFLUX DISEASE WITHOUT ESOPHAGITIS: ICD-10-CM

## 2017-08-29 RX ORDER — FAMOTIDINE 20 MG/1
TABLET, FILM COATED ORAL
Qty: 60 TAB | Refills: 3 | Status: SHIPPED | OUTPATIENT
Start: 2017-08-29 | End: 2017-12-26 | Stop reason: SDUPTHER

## 2017-08-29 RX ORDER — LORAZEPAM 0.5 MG/1
0.5 TABLET ORAL
Qty: 90 TAB | Refills: 0 | Status: SHIPPED | OUTPATIENT
Start: 2017-08-29 | End: 2017-10-03 | Stop reason: SDUPTHER

## 2017-08-29 NOTE — PROGRESS NOTES
HISTORY OF PRESENT ILLNESS  Roger Carvalho is a 59 y.o. female here for follow up. Shaking a lot. her anxiety is not controlled at all.was started on valium 5 mg BID by Pako Talavera helping her at all. She looks zittary and anxious. Has bipolar disorder. on med. doing well. Seeing psych and therapist,stable. Insomnia has improved with elavil  Her ch headache is controlled with it. Has recent rectopexy by .slightly constipated. Abdominal Pain   Associated symptoms include abdominal pain. Pertinent negatives include no shortness of breath. Mental Health Problem   Associated symptoms include abdominal pain. Pertinent negatives include no shortness of breath. Arthritis   Associated symptoms include abdominal pain. Pertinent negatives include no shortness of breath. Hypertension    Pertinent negatives include no shortness of breath. Follow-up   Associated symptoms include abdominal pain. Pertinent negatives include no shortness of breath. Review of Systems   Constitutional: Negative. HENT: Negative. Eyes: Negative. Respiratory: Negative. Negative for shortness of breath and wheezing. Cardiovascular: Positive for leg swelling. Gastrointestinal: Positive for abdominal pain. Musculoskeletal: Negative. Negative for falls. Skin: Negative. Psychiatric/Behavioral: Positive for depression. The patient is nervous/anxious. Physical Exam   Constitutional: She appears well-developed and well-nourished. No distress. Neck: Normal range of motion. Neck supple. No JVD present. No thyromegaly present. Cardiovascular: Normal rate, regular rhythm, normal heart sounds and intact distal pulses. Pulmonary/Chest: Effort normal and breath sounds normal. No respiratory distress. She has no wheezes. Musculoskeletal: She exhibits no edema or tenderness. Psychiatric: She has a normal mood and affect. Her behavior is normal.   Anxious.        ASSESSMENT and PLAN    Diagnoses and all orders for this visit:    1. RACHEL (generalized anxiety disorder)    Valium is not working. will D/C it. Will try,  -     LORazepam (ATIVAN) 0.5 mg tablet; Take 1 Tab by mouth every eight (8) hours as needed for Anxiety. Max Daily Amount: 1.5 mg. D/C valium  vibrid need to be changed. 2. Bipolar 1 disorder ([address]    On med. seeing Sara Jaquez. 3. Irritable bowel syndrome without diarrhea    Stable now. 4. Chronic tension-type headache, intractable    controlled with elavil. 5. Osteoporosis, unspecified osteoporosis type, unspecified pathological fracture presence      On fosamax and calcium BID          Discussed expected course/resolution/complications of diagnosis in detail with patient. Medication risks/benefits/costs/interactions/alternatives discussed with patient. Pt was given an after visit summary which includes diagnoses, current medications & vitals. Pt expressed understanding with the diagnosis and plan.

## 2017-08-29 NOTE — PROGRESS NOTES
Health Maintenance Due   Topic Date Due    PAP AKA CERVICAL CYTOLOGY  04/25/2017    INFLUENZA AGE 9 TO ADULT  08/01/2017       Chief Complaint   Patient presents with    Irritable Bowel Syndrome    Mental Health Problem     states anxiety is worsened    Arthritis    Annual Wellness Visit       1. Have you been to the ER, urgent care clinic since your last visit? Hospitalized since your last visit? No    2. Have you seen or consulted any other health care providers outside of the [address] [place] [address] since your last visit? Include any pap smears or colon screening. No    3) Do you have an Advance Directive on file? no    4) Are you interested in receiving information on Advance Directives? NO      Patient is accompanied by self I have received verbal consent from Prachi Son to discuss any/all medical information while they are present in the room.

## 2017-08-29 NOTE — MR AVS SNAPSHOT
Visit Information Date & Time Provider Department Dept. Phone Encounter #  
 8/29/2017  9:30 AM Leo Cleaning MD [facility] [phone] [id number] Your Appointments 9/15/2017  8:40 AM  
Follow Up with Bowen Calle DO [facility] Appt Note: f/u headache 8/14/17; f/u headache 8/14/17  
 [address] [place] [address]  
[phone]  
  
   
 [address] [place] [address] At [facility] [place] [address]  
  
    
 10/10/2017  3:40 PM  
Follow Up with Bowen Calle DO [facility] at [facility] Appt Note: Follow Up, Headache, [id number] Dr. Rodriguez [address]  
[phone] Upcoming Health Maintenance Date Due  
 PAP AKA CERVICAL CYTOLOGY 4/25/2017 INFLUENZA AGE 9 TO ADULT 8/1/2017 MEDICARE YEARLY EXAM 5/31/2018 BREAST CANCER SCRN MAMMOGRAM 3/28/2019 DTaP/Tdap/Td series (2 - Td) 6/19/2027 COLONOSCOPY 7/5/2027 Allergies as of 8/29/2017  Review Complete On: 8/29/2017 By: Leo Cleaning MD  
  
 Severity Noted Reaction Type Reactions Buspar [Buspirone] Medium 02/09/2017   Side Effect Other (comments) Causes \"stimulation\" that could exacerbate a manic attack/phase of pt's Biplar. Reported by patient Amlodipine  06/28/2017    Other (comments) Ankle swelling Dicyclomine  10/14/2014    Nausea and Vomiting Extreme stomach pains Lithium  01/03/2014   Systemic Nausea and Vomiting Severe nausea and vomiting. Other Medication  11/19/2010    Other (comments) Intolerance to oranges, cabbage,beans,peppers,raw onions,foods with caffeine in them, popcorn, no pop sodas, because of IBS Current Immunizations  Reviewed on 5/30/2017 Name Date Influenza Vaccine 10/13/2014 Tdap 6/19/2017 Not reviewed this visit You Were Diagnosed With   
  
 Codes Comments RACHEL (generalized anxiety disorder)    -  Primary ICD-10-CM: F41.1 ICD-9-CM: 300.02 Bipolar 1 disorder (HCC)     ICD-10-CM: F31.9 ICD-9-CM: 296.7 Irritable bowel syndrome without diarrhea     ICD-10-CM: K58.9 ICD-9-CM: 522.0 Chronic tension-type headache, intractable     ICD-10-CM: E73.271 ICD-9-CM: 339.12 Osteoporosis, unspecified osteoporosis type, unspecified pathological fracture presence     ICD-10-CM: M81.0 ICD-9-CM: 733.00 Vitals BP Pulse Temp Resp Height(growth percentile) Weight(growth percentile) 128/80 (BP 1 Location: Right arm, BP Patient Position: Sitting) 100 98 °F (36.7 °C) (Oral) 20 5' (1.524 m) 154 lb 8 oz (70.1 kg) SpO2 BMI OB Status Smoking Status 97% 30.17 kg/m2 Hysterectomy Never Smoker Vitals History BMI and BSA Data Body Mass Index Body Surface Area  
 30.17 kg/m 2 1.72 m 2 Preferred Pharmacy Pharmacy Name [facility] [phone] Your Updated Medication List  
  
   
This list is accurate as of: 8/29/17 10:10 AM.  Always use your most recent med list.  
  
  
  
  
 ACIDOPHILUS Cap Generic drug:  Lactobacillus acidophilus Take 1 Cap by mouth daily. alendronate 70 mg tablet Commonly known as:  FOSAMAX Take 1 Tab by mouth every seven (7) days. amitriptyline 25 mg tablet Commonly known as:  ELAVIL Take 1 Tab by mouth nightly. black cohosh 540 mg Cap Take 1 Tab by mouth daily. butalbital-acetaminophen  mg tablet Commonly known as:  Karol Beckers Take 1 Tab by mouth every six (6) hours as needed. calcium-cholecalciferol (D3) tablet Commonly known as:  CALTRATE 600+D Take 1 Tab by mouth daily. diazePAM 5 mg tablet Commonly known as:  VALIUM Take 5 mg by mouth two (2) times a day. 8am and 3pm  
  
 diphenhydrAMINE 25 mg capsule Commonly known as:  BENADRYL  
TAKE 2 CAPSULES BY MOUTH NIGHTLY. estradiol 0.05 mg/24 hr  
Commonly known as:  VIVELLE  
1 Patch by TransDERmal route two (2) times a week on Wednesday and Saturday. famotidine 20 mg tablet Commonly known as:  PEPCID  
TAKE 1 TABLET BY MOUTH TWO (2) TIMES A DAY. STOP OMEPRAZOLE. fenofibrate nanocrystallized 145 mg tablet Commonly known as:  [employer] Take 1 Tab by mouth every fourty-eight (48) hours. fluorometholone 0.1 % ophthalmic suspension Commonly known as: [facility] Administer 1 Drop to both eyes two (2) times a day. fluticasone 50 mcg/actuation nasal spray Commonly known as:  FLONASE  
USE ONE SPRAY IN EACH NOSTRIL TWO TIMES A DAY. gabapentin 100 mg capsule Commonly known as:  NEURONTIN Take 2 Caps by mouth two (2) times a day. ibuprofen 200 mg tablet Commonly known as:  MOTRIN Take 400 mg by mouth every six (6) hours as needed for Pain. loperamide 2 mg capsule Commonly known as:  IMODIUM Take 2 mg by mouth as needed for Diarrhea. LORazepam 0.5 mg tablet Commonly known as:  ATIVAN Take 1 Tab by mouth every eight (8) hours as needed for Anxiety. Max Daily Amount: 1.5 mg. D/C valium  
  
 losartan 25 mg tablet Commonly known as:  COZAAR Take 1 Tab by mouth daily. melatonin 5 mg Cap capsule Take 5 mg by mouth nightly. miconazole 2 % topical cream  
Commonly known as:  Mozelle Sara Apply  to affected area two (2) times a day. OLANZapine 5 mg tablet Commonly known as:  ZyPREXA Take 5 mg by mouth daily (with dinner). oxyCODONE-acetaminophen 5-325 mg per tablet Commonly known as:  PERCOCET Take 1 Tab by mouth every four (4) hours as needed. Max Daily Amount: 6 Tabs. polyethylene glycol 17 gram/dose powder Commonly known as:  [employer] Take 17 g by mouth daily. REFRESH OPTIVE ADVANCED (PF) OP Apply 1 Drop to eye two (2) times daily as needed. SF 5000 PLUS 1.1 % Crea Generic drug:  fluoride (sodium) 1 Tube. TYLENOL EXTRA STRENGTH 500 mg tablet Generic drug:  acetaminophen Take 1,000 mg by mouth every six (6) hours as needed for Pain. VIIBRYD 20 mg Tab tablet Generic drug:  vilazodone TAKE 1 TABLET BY MOUTH ONCE DAILY. VITAMIN D3 1,000 unit Cap Generic drug:  cholecalciferol Take 1 Tab by mouth daily. Indications: VITAMIN D DEFICIENCY  
  
 vitamin E 400 unit capsule Commonly known as:  [address] Take 400 Units by mouth daily. Prescriptions Printed Refills LORazepam (ATIVAN) 0.5 mg tablet 0 Sig: Take 1 Tab by mouth every eight (8) hours as needed for Anxiety. Max Daily Amount: 1.5 mg. D/C valium Class: Print Route: Oral  
  
Introducing [facility] SERVICES! Dear Roby Dove: Thank you for requesting a hyaqu account. Our records indicate that you already have an active hyaqu account. You can access your account anytime at [email or web address] Did you know that you can access your hospital and ER discharge instructions at any time in hyaqu? You can also review all of your test results from your hospital stay or ER visit. Additional Information If you have questions, please visit the Frequently Asked Questions section of the hyaqu website at [email or web address] Remember, hyaqu is NOT to be used for urgent needs. For medical emergencies, dial 911. Now available from your iPhone and Android! Please provide this summary of care documentation to your next provider. Your primary care clinician is listed as Matti Alanis. If you have any questions after today's visit, please call [phone].

## 2017-09-15 ENCOUNTER — OFFICE VISIT (OUTPATIENT)
Dept: NEUROLOGY | Age: 57
End: 2017-09-15

## 2017-09-15 VITALS
HEART RATE: 93 BPM | BODY MASS INDEX: 29.69 KG/M2 | DIASTOLIC BLOOD PRESSURE: 70 MMHG | WEIGHT: 152 LBS | SYSTOLIC BLOOD PRESSURE: 128 MMHG | RESPIRATION RATE: 18 BRPM | OXYGEN SATURATION: 98 %

## 2017-09-15 DIAGNOSIS — T39.95XA ANALGESIC OVERUSE HEADACHE: ICD-10-CM

## 2017-09-15 DIAGNOSIS — R51.9 CHRONIC DAILY HEADACHE: Primary | ICD-10-CM

## 2017-09-15 DIAGNOSIS — G44.40 ANALGESIC OVERUSE HEADACHE: ICD-10-CM

## 2017-09-15 RX ORDER — AMITRIPTYLINE HYDROCHLORIDE 50 MG/1
50 TABLET, FILM COATED ORAL
Qty: 30 TAB | Refills: 2 | Status: SHIPPED | OUTPATIENT
Start: 2017-09-15 | End: 2017-12-19 | Stop reason: DRUGHIGH

## 2017-09-15 NOTE — PROGRESS NOTES
Neurology Clinic Follow up Note    Patient ID:  Ollie Stoddard  [id number]  71 y.o.  1960      Ms. Clyde Oh is here for follow up today of  Chief Complaint   Patient presents with    Headache          Last Appointment:  4/2015 Dr. Maria Del Carmen West      Interval History:   Pt returns for f/u of headaches, 5x weekly lasting 10 hours. HAs located bi-frontal with radiation to vertex, no nausea, mild photophobia. She is taking gabapentin and Elavil for preventative therapy. She feels the Elavil has been somewhat helpful. No side effect reported. Using ibuprofen/tylenol 4 x weekly still. PMHx/ PSHx/ FHx/ SHx:  Reviewed and unchanged previous visit. Past Medical History:   Diagnosis Date    Arthritis     Bipolar disorder ([address]     Bladder pain     Choledocholithiasis 11/24/2010    GERD (gastroesophageal reflux disease)     Headache     tension headaches    High cholesterol     History of cholecystectomy     Hypertension     Irritable bowel     Pelvic pain in female 2014    Psychiatric disorder     Stool color black     irritable bowel     Past Surgical History:   Procedure Laterality Date    COLONOSCOPY N/A 7/5/2017    COLONOSCOPY performed by Marlin Shah MD at [address] [facility]  2003    prolasped rectum    HX LAP CHOLECYSTECTOMY  11/23/10    HX KALEIGH AND BSO  1998    LAPAROSCOPY ABDOMEN DIAGNOSTIC  1987         ROS:  Comprehensive review of systems negative except for as noted above. Objective:       Meds:  Current Outpatient Prescriptions   Medication Sig Dispense Refill    LORazepam (ATIVAN) 0.5 mg tablet Take 1 Tab by mouth every eight (8) hours as needed for Anxiety. Max Daily Amount: 1.5 mg. D/C valium 90 Tab 0    famotidine (PEPCID) 20 mg tablet TAKE 1 TABLET BY MOUTH TWO (2) TIMES A DAY. STOP OMEPRAZOLE. 60 Tab 3    diphenhydrAMINE (BENADRYL) 25 mg capsule TAKE 2 CAPSULES BY MOUTH NIGHTLY.  60 Cap 3    VIIBRYD 20 mg tab tablet TAKE 1 TABLET BY MOUTH ONCE DAILY. 5    ibuprofen (MOTRIN) 200 mg tablet Take 400 mg by mouth every six (6) hours as needed for Pain.  miconazole (MICOTIN) 2 % topical cream Apply  to affected area two (2) times a day. 15 g 0    oxyCODONE-acetaminophen (PERCOCET) 5-325 mg per tablet Take 1 Tab by mouth every four (4) hours as needed. Max Daily Amount: 6 Tabs. 50 Tab 0    diazePAM (VALIUM) 5 mg tablet Take 5 mg by mouth two (2) times a day. 8am and 3pm      amitriptyline (ELAVIL) 25 mg tablet Take 1 Tab by mouth nightly. 30 Tab 2    fluticasone (FLONASE) 50 mcg/actuation nasal spray USE ONE SPRAY IN EACH NOSTRIL TWO TIMES A DAY. 16 g 1    loperamide (IMODIUM) 2 mg capsule Take 2 mg by mouth as needed for Diarrhea.  Lactobacillus acidophilus (ACIDOPHILUS) cap Take 1 Cap by mouth daily.  CARBOXYMETHYL/GLY/POLY80/PF (REFRESH OPTIVE ADVANCED, PF, OP) Apply 1 Drop to eye two (2) times daily as needed.  black cohosh 540 mg cap Take 1 Tab by mouth daily.  melatonin 5 mg cap capsule Take 5 mg by mouth nightly.  acetaminophen (TYLENOL EXTRA STRENGTH) 500 mg tablet Take 1,000 mg by mouth every six (6) hours as needed for Pain.  losartan (COZAAR) 25 mg tablet Take 1 Tab by mouth daily. 30 Tab 3    alendronate (FOSAMAX) 70 mg tablet Take 1 Tab by mouth every seven (7) days. 4 Tab 12    calcium-cholecalciferol, D3, (CALTRATE 600+D) tablet Take 1 Tab by mouth daily. 30 Tab 12    estradiol (VIVELLE) 0.05 mg/24 hr 1 Patch by TransDERmal route two (2) times a week on Wednesday and Saturday. 3    SF 5000 PLUS 1.1 % crea 1 Tube. 4    gabapentin (NEURONTIN) 100 mg capsule Take 2 Caps by mouth two (2) times a day. 120 Cap 5    butalbital-acetaminophen (PHRENILIN)  mg tablet Take 1 Tab by mouth every six (6) hours as needed. 0    OLANZapine (ZYPREXA) 5 mg tablet Take 5 mg by mouth daily (with dinner). 2    polyethylene glycol (MIRALAX) 17 gram/dose powder Take 17 g by mouth daily. (Patient taking differently: Take 17 g by mouth daily as needed.) 850 g 3    vitamin E (AQUA GEMS) 400 unit capsule Take 400 Units by mouth daily.  fenofibrate nanocrystallized (TRICOR) 145 mg tablet Take 1 Tab by mouth every fourty-eight (48) hours. 30 Tab 5    fluorometholone (FML) 0.1 % ophthalmic suspension Administer 1 Drop to both eyes two (2) times a day. 99    Cholecalciferol, Vitamin D3, (VITAMIN D3) 1,000 unit cap Take 1 Tab by mouth daily. Indications: VITAMIN D DEFICIENCY         Exam:  Visit Vitals    /70    Pulse 93    Resp 18    Wt 68.9 kg (152 lb)    SpO2 98%    BMI 29.69 kg/m2     NEUROLOGICAL EXAM:  Cardiac: RRR  Lungs: CTAB  General: Awake, alert, speech fluent  CN: PERRL, EOMI without nystagmus, VFF to confrontation, facial sensation and strength are normal and symmetric, hearing is intact to finger rub bilaterally, palate and tongue movements are intact and symmetric. Motor: Normal tone, bulk and strength bilaterally. Reflexes: 2/4 and symmetric, plantar stimulation is flexor. Coordination: FNF, XUAN, HTS intact. Sensation: LT intact throughout. Gait: Normal-based and steady. LABS  Results for orders placed or performed during the hospital encounter of 68/04/45   METABOLIC PANEL, COMPREHENSIVE   Result Value Ref Range    Sodium 137 136 - 145 mmol/L    Potassium 4.7 3.5 - 5.1 mmol/L    Chloride 106 97 - 108 mmol/L    CO2 24 21 - 32 mmol/L    Anion gap 7 5 - 15 mmol/L    Glucose 173 (H) 65 - 100 mg/dL    BUN 6 6 - 20 MG/DL    Creatinine 1.35 (H) 0.55 - 1.02 MG/DL    BUN/Creatinine ratio 4 (L) 12 - 20      GFR est AA 49 (L) >60 ml/min/1.73m2    GFR est non-AA 40 (L) >60 ml/min/1.73m2    Calcium 7.4 (L) 8.5 - 10.1 MG/DL    Bilirubin, total 0.4 0.2 - 1.0 MG/DL    ALT (SGPT) 27 12 - 78 U/L    AST (SGOT) 22 15 - 37 U/L    Alk.  phosphatase 64 45 - 117 U/L    Protein, total 6.4 6.4 - 8.2 g/dL    Albumin 3.2 (L) 3.5 - 5.0 g/dL    Globulin 3.2 2.0 - 4.0 g/dL    A-G Ratio 1.0 (L) 1.1 - 2.2     CBC WITH AUTOMATED DIFF   Result Value Ref Range    WBC 8.6 3.6 - 11.0 K/uL    RBC 4.02 3.80 - 5.20 M/uL    HGB 11.3 (L) 11.5 - 16.0 g/dL    HCT 34.5 (L) 35.0 - 47.0 %    MCV 85.8 80.0 - 99.0 FL    MCH 28.1 26.0 - 34.0 PG    MCHC 32.8 30.0 - 36.5 g/dL    RDW 13.5 11.5 - 14.5 %    PLATELET 357 303 - 612 K/uL    NEUTROPHILS 86 (H) 32 - 75 %    LYMPHOCYTES 11 (L) 12 - 49 %    MONOCYTES 3 (L) 5 - 13 %    EOSINOPHILS 0 0 - 7 %    BASOPHILS 0 0 - 1 %    ABS. NEUTROPHILS 7.4 1.8 - 8.0 K/UL    ABS. LYMPHOCYTES 1.0 0.8 - 3.5 K/UL    ABS. MONOCYTES 0.3 0.0 - 1.0 K/UL    ABS. EOSINOPHILS 0.0 0.0 - 0.4 K/UL    ABS. BASOPHILS 0.0 0.0 - 0.1 K/UL   CBC W/O DIFF   Result Value Ref Range    WBC 7.4 3.6 - 11.0 K/uL    RBC 4.02 3.80 - 5.20 M/uL    HGB 11.2 (L) 11.5 - 16.0 g/dL    HCT 34.8 (L) 35.0 - 47.0 %    MCV 86.6 80.0 - 99.0 FL    MCH 27.9 26.0 - 34.0 PG    MCHC 32.2 30.0 - 36.5 g/dL    RDW 14.1 11.5 - 14.5 %    PLATELET 745 140 - 455 K/uL   METABOLIC PANEL, BASIC   Result Value Ref Range    Sodium 136 136 - 145 mmol/L    Potassium 3.6 3.5 - 5.1 mmol/L    Chloride 105 97 - 108 mmol/L    CO2 24 21 - 32 mmol/L    Anion gap 7 5 - 15 mmol/L    Glucose 111 (H) 65 - 100 mg/dL    BUN <1 (L) 6 - 20 MG/DL    Creatinine 0.93 0.55 - 1.02 MG/DL    BUN/Creatinine ratio Cannot be calulated 12 - 20      GFR est AA >60 >60 ml/min/1.73m2    GFR est non-AA >60 >60 ml/min/1.73m2    Calcium 7.9 (L) 8.5 - 10.1 MG/DL   POC INR   Result Value Ref Range    INR (POC) 1.1 <1.2         IMAGING:  MRI Results (most recent):  No results found for this or any previous visit. Assessment:     Encounter Diagnoses     ICD-10-CM ICD-9-CM   1. Chronic daily headache R51 784.0   2. Analgesic overuse headache T39.91XA 339.3    G44.40 E80.9   59year old female here for f/u of chronic daily headaches still occurring 5x weekly exacerbated by analgesic overuse.   Neurological examination is non-focal.  Long discussion regarding limiting abortive headache medication to avoid rebound headaches. She expressed understanding. Will continue titration of Elavil for HA ppx. She appears to be tolerating this well. Plan:   Increase Elavil to 50mg qhs  Limit OTC analgesics to no more than twice weekly to prevent rebound headaches    Follow-up Disposition:  Return in about 2 months (around 11/15/2017).         Signed:  Brandan Mckeon, DO  9/15/2017

## 2017-09-15 NOTE — PATIENT INSTRUCTIONS

## 2017-09-15 NOTE — MR AVS SNAPSHOT
Visit Information Date & Time Provider Department Dept. Phone Encounter #  
 9/15/2017  8:40 AM DO June Bojorquez [facility] at [address]  Follow-up Instructions Return in about 2 months (around 11/15/2017). Your Appointments 10/10/2017  3:40 PM  
Follow Up with DO June Bojorquez [facility] at [address]) Appt Note: Follow Up, Headache,SW [id number] Dr. Mckee Current [address] [place] [address] [phone]  
  
   
 [address] [place] [address]  [id number]  
  
    
 11/28/2017  8:30 AM  
Any with Julia Cortez MD  
[address] ([address]) Appt Note: 3m follow up [facility]. [facility] [phone]  
  
   
 [facility]. [address] [place] [phone] Upcoming Health Maintenance Date Due  
 PAP AKA CERVICAL CYTOLOGY 4/25/2017 INFLUENZA AGE 9 TO ADULT 8/1/2017 MEDICARE YEARLY EXAM 5/31/2018 BREAST CANCER SCRN MAMMOGRAM 3/28/2019 DTaP/Tdap/Td series (2 - Td) 6/19/2027 COLONOSCOPY 7/5/2027 Allergies as of 9/15/2017  Review Complete On: 9/15/2017 By: Jaclyn Hernandez DO Severity Noted Reaction Type Reactions Buspar [Buspirone] Medium 02/09/2017   Side Effect Other (comments) Causes \"stimulation\" that could exacerbate a manic attack/phase of pt's Biplar. Reported by patient Amlodipine  06/28/2017    Other (comments) Ankle swelling Dicyclomine  10/14/2014    Nausea and Vomiting Extreme stomach pains Lithium  01/03/2014   Systemic Nausea and Vomiting Severe nausea and vomiting. Other Medication  11/19/2010    Other (comments) Intolerance to oranges, cabbage,beans,peppers,raw onions,foods with caffeine in them, popcorn, no pop sodas, because of IBS Current Immunizations  Reviewed on 5/30/2017 Name Date Influenza Vaccine 10/13/2014 Tdap 6/19/2017 Not reviewed this visit You Were Diagnosed With   
  
 Codes Comments Chronic daily headache    -  Primary ICD-10-CM: D88 ICD-9-CM: 791. 0 Vitals BP Pulse Resp Weight(growth percentile) SpO2 BMI  
 128/70 93 18 152 lb (68.9 kg) 98% 29.69 kg/m2 OB Status Smoking Status Hysterectomy Never Smoker Vitals History BMI and BSA Data Body Mass Index Body Surface Area  
 29.69 kg/m 2 1.71 m 2 Preferred Pharmacy Pharmacy Name Phone [facility] [phone] Your Updated Medication List  
  
   
This list is accurate as of: 9/15/17  9:05 AM.  Always use your most recent med list.  
  
  
  
  
 ACIDOPHILUS Cap Generic drug:  Lactobacillus acidophilus Take 1 Cap by mouth daily. alendronate 70 mg tablet Commonly known as:  FOSAMAX Take 1 Tab by mouth every seven (7) days. amitriptyline 50 mg tablet Commonly known as:  ELAVIL Take 1 Tab by mouth nightly. black cohosh 540 mg Cap Take 1 Tab by mouth daily. butalbital-acetaminophen  mg tablet Commonly known as:  Odalys Carla Take 1 Tab by mouth every six (6) hours as needed. calcium-cholecalciferol (D3) tablet Commonly known as:  CALTRATE 600+D Take 1 Tab by mouth daily. diphenhydrAMINE 25 mg capsule Commonly known as:  BENADRYL  
TAKE 2 CAPSULES BY MOUTH NIGHTLY. estradiol 0.05 mg/24 hr  
Commonly known as:  VIVELLE  
1 Patch by TransDERmal route two (2) times a week on Wednesday and Saturday. famotidine 20 mg tablet Commonly known as:  PEPCID  
TAKE 1 TABLET BY MOUTH TWO (2) TIMES A DAY. STOP OMEPRAZOLE. fenofibrate nanocrystallized 145 mg tablet Commonly known as:  [employer] Take 1 Tab by mouth every fourty-eight (48) hours. fluorometholone 0.1 % ophthalmic suspension Commonly known as: [facility] Administer 1 Drop to both eyes two (2) times a day. fluticasone 50 mcg/actuation nasal spray Commonly known as:  FLONASE  
USE ONE SPRAY IN EACH NOSTRIL TWO TIMES A DAY. gabapentin 100 mg capsule Commonly known as:  NEURONTIN Take 2 Caps by mouth two (2) times a day. ibuprofen 200 mg tablet Commonly known as:  MOTRIN Take 400 mg by mouth every six (6) hours as needed for Pain. loperamide 2 mg capsule Commonly known as:  IMODIUM Take 2 mg by mouth as needed for Diarrhea. LORazepam 0.5 mg tablet Commonly known as:  ATIVAN Take 1 Tab by mouth every eight (8) hours as needed for Anxiety. Max Daily Amount: 1.5 mg. D/C valium  
  
 losartan 25 mg tablet Commonly known as:  COZAAR Take 1 Tab by mouth daily. melatonin 5 mg Cap capsule Take 5 mg by mouth nightly. miconazole 2 % topical cream  
Commonly known as:  Lawence Gaw Apply  to affected area two (2) times a day. OLANZapine 5 mg tablet Commonly known as:  ZyPREXA Take 5 mg by mouth daily (with dinner). polyethylene glycol 17 gram/dose powder Commonly known as:  Orysia [place] Take 17 g by mouth daily. REFRESH OPTIVE ADVANCED (PF) OP Apply 1 Drop to eye two (2) times daily as needed. SF 5000 PLUS 1.1 % Crea Generic drug:  fluoride (sodium) 1 Tube. VIIBRYD 20 mg Tab tablet Generic drug:  vilazodone TAKE 1 TABLET BY MOUTH ONCE DAILY. VITAMIN D3 1,000 unit Cap Generic drug:  cholecalciferol Take 1 Tab by mouth daily. Indications: VITAMIN D DEFICIENCY  
  
 vitamin E 400 unit capsule Commonly known as:  [address] Take 400 Units by mouth daily. Prescriptions Sent to Pharmacy Refills  
 amitriptyline (ELAVIL) 50 mg tablet 2 Sig: Take 1 Tab by mouth nightly. Class: Normal  
 Pharmacy: [facility]  #: [phone] Route: Oral  
  
Follow-up Instructions Return in about 2 months (around 11/15/2017). Patient Instructions A Healthy Lifestyle: Care Instructions Your Care Instructions A healthy lifestyle can help you feel good, stay at a healthy weight, and have plenty of energy for both work and play. A healthy lifestyle is something you can share with your whole family. A healthy lifestyle also can lower your risk for serious health problems, such as high blood pressure, heart disease, and diabetes. You can follow a few steps listed below to improve your health and the health of your family. Follow-up care is a key part of your treatment and safety. Be sure to make and go to all appointments, and call your doctor if you are having problems. Its also a good idea to know your test results and keep a list of the medicines you take. How can you care for yourself at home? · Do not eat too much sugar, fat, or fast foods. You can still have dessert and treats now and then. The goal is moderation. · Start small to improve your eating habits. Pay attention to portion sizes, drink less juice and soda pop, and eat more fruits and vegetables. ¨ Eat a healthy amount of food. A 3-ounce serving of meat, for example, is about the size of a deck of cards. Fill the rest of your plate with vegetables and whole grains. ¨ Limit the amount of soda and sports drinks you have every day. Drink more water when you are thirsty. ¨ Eat at least 5 servings of fruits and vegetables every day. It may seem like a lot, but it is not hard to reach this goal. A serving or helping is 1 piece of fruit, 1 cup of vegetables, or 2 cups of leafy, raw vegetables. Have an apple or some carrot sticks as an afternoon snack instead of a candy bar. Try to have fruits and/or vegetables at every meal. 
· Make exercise part of your daily routine.  You may want to start with simple activities, such as walking, bicycling, or slow swimming. Try to be active 30 to 60 minutes every day. You do not need to do all 30 to 60 minutes all at once. For example, you can exercise 3 times a day for 10 or 20 minutes. Moderate exercise is safe for most people, but it is always a good idea to talk to your doctor before starting an exercise program. 
· Keep moving. Bren Mass the lawn, work in the garden, or [employer]. Take the stairs instead of the elevator at work. · If you smoke, quit. People who smoke have an increased risk for heart attack, stroke, cancer, and other lung illnesses. Quitting is hard, but there are ways to boost your chance of quitting tobacco for good. ¨ Use nicotine gum, patches, or lozenges. ¨ Ask your doctor about stop-smoking programs and medicines. ¨ Keep trying. In addition to reducing your risk of diseases in the future, you will notice some benefits soon after you stop using tobacco. If you have shortness of breath or asthma symptoms, they will likely get better within a few weeks after you quit. · Limit how much alcohol you drink. Moderate amounts of alcohol (up to 2 drinks a day for men, 1 drink a day for women) are okay. But drinking too much can lead to liver problems, high blood pressure, and other health problems. Family health If you have a family, there are many things you can do together to improve your health. · Eat meals together as a family as often as possible. · Eat healthy foods. This includes fruits, vegetables, lean meats and dairy, and whole grains. · Include your family in your fitness plan. Most people think of activities such as jogging or tennis as the way to fitness, but there are many ways you and your family can be more active. Anything that makes you breathe hard and gets your heart pumping is exercise. Here are some tips: 
¨ Walk to do errands or to take your child to school or the bus. ¨ Go for a family bike ride after dinner instead of watching TV. Where can you learn more? Go to http://tung-tg.info/. Enter Y546 in the search box to learn more about \"A Healthy Lifestyle: Care Instructions. \" Current as of: July 26, 2016 Content Version: 11.3 © 3962-1817 Nap[employer]. Care instructions adapted under license by [employer] (which disclaims liability or warranty for this information). If you have questions about a medical condition or this instruction, always ask your healthcare professional. [address] any warranty or liability for your use of this information. Introducing [facility] & HE[facility]! Dear Norma Nealer: Thank you for requesting a mapp2link account. Our records indicate that you already have an active mapp2link account. You can access your account anytime at [email or web address] Did you know that you can access your hospital and ER discharge instructions at any time in mapp2link? You can also review all of your test results from your hospital stay or ER visit. Additional Information If you have questions, please visit the Frequently Asked Questions section of the mapp2link website at [email or web address] Remember, mapp2link is NOT to be used for urgent needs. For medical emergencies, dial 911. Now available from your iPhone and Android! Please provide this summary of care documentation to your next provider. Your primary care clinician is listed as [facility]. If you have any questions after today's visit, please call [phone].

## 2017-09-26 DIAGNOSIS — I10 ESSENTIAL HYPERTENSION: ICD-10-CM

## 2017-09-26 RX ORDER — LOSARTAN POTASSIUM 25 MG/1
TABLET ORAL
Qty: 30 TAB | Refills: 3 | Status: SHIPPED | OUTPATIENT
Start: 2017-09-26 | End: 2018-01-22 | Stop reason: SDUPTHER

## 2017-10-03 ENCOUNTER — OFFICE VISIT (OUTPATIENT)
Dept: INTERNAL MEDICINE CLINIC | Age: 57
End: 2017-10-03

## 2017-10-03 VITALS
RESPIRATION RATE: 20 BRPM | DIASTOLIC BLOOD PRESSURE: 82 MMHG | TEMPERATURE: 97.1 F | BODY MASS INDEX: 30.43 KG/M2 | SYSTOLIC BLOOD PRESSURE: 142 MMHG | HEART RATE: 88 BPM | HEIGHT: 60 IN | OXYGEN SATURATION: 97 % | WEIGHT: 155 LBS

## 2017-10-03 DIAGNOSIS — F41.0 ANXIETY ATTACK: Primary | ICD-10-CM

## 2017-10-03 DIAGNOSIS — K59.02 CONSTIPATION DUE TO OUTLET DYSFUNCTION: ICD-10-CM

## 2017-10-03 DIAGNOSIS — K58.9 IRRITABLE BOWEL SYNDROME WITHOUT DIARRHEA: ICD-10-CM

## 2017-10-03 DIAGNOSIS — F41.1 GAD (GENERALIZED ANXIETY DISORDER): ICD-10-CM

## 2017-10-03 DIAGNOSIS — F31.9 BIPOLAR 1 DISORDER (HCC): ICD-10-CM

## 2017-10-03 RX ORDER — LORAZEPAM 0.5 MG/1
0.5 TABLET ORAL
Qty: 90 TAB | Refills: 2 | Status: SHIPPED | OUTPATIENT
Start: 2017-10-03

## 2017-10-03 NOTE — PROGRESS NOTES
Health Maintenance Due   Topic Date Due    PAP AKA CERVICAL CYTOLOGY  04/25/2017    INFLUENZA AGE 9 TO ADULT  08/01/2017       Chief Complaint   Patient presents with    Medication Evaluation       1. Have you been to the ER, urgent care clinic since your last visit? Hospitalized since your last visit? No    2. Have you seen or consulted any other health care providers outside of the [address] [place] [address] since your last visit? Include any pap smears or colon screening. No    3) Do you have an Advance Directive on file? no    4) Are you interested in receiving information on Advance Directives? NO      Patient is accompanied by self I have received verbal consent from Isabel Lundberg to discuss any/all medical information while they are present in the room.

## 2017-10-03 NOTE — MR AVS SNAPSHOT
Visit Information Date & Time Provider Department Dept. Phone Encounter #  
 10/3/2017  8:45 AM Elayne Soulier, MD [facility] Internal [facility] [phone] [id number] Your Appointments 10/10/2017  3:40 PM  
Follow Up with DO Daria [facility] at [facility]) Appt Note: Follow Up, Headache, [id number] Dr. Cruzito Moreland [address] [place] [address] [phone]  
  
   
 [address] [place] [address]  
  
    
 11/28/2017  8:30 AM  
Any with Elayne Soulier, MD  
[address] ([facility]) Appt Note: 3m follow up [address] [place] [address]. [place] [phone]  
[phone]  
  
   
 [address] [place] [address]. [address] [place] [phone]  
  
    
 12/5/2017  8:40 AM  
[facility] Appt Note: 2 month f/u headache 9/15/17 [address] [place] [address] [address]  
[phone] Upcoming Health Maintenance Date Due  
 PAP AKA CERVICAL CYTOLOGY 4/25/2017 INFLUENZA AGE 9 TO ADULT 8/1/2017 MEDICARE YEARLY EXAM 5/31/2018 BREAST CANCER SCRN MAMMOGRAM 3/28/2019 DTaP/Tdap/Td series (2 - Td) 6/19/2027 COLONOSCOPY 7/5/2027 Allergies as of 10/3/2017  Review Complete On: 10/3/2017 By: Elayne Soulier, MD  
  
 Severity Noted Reaction Type Reactions Buspar [Buspirone] Medium 02/09/2017   Side Effect Other (comments) Causes \"stimulation\" that could exacerbate a manic attack/phase of pt's Biplar. Reported by patient Amlodipine  06/28/2017    Other (comments) Ankle swelling Dicyclomine  10/14/2014    Nausea and Vomiting Extreme stomach pains Lithium  01/03/2014   Systemic Nausea and Vomiting Severe nausea and vomiting. Other Medication  11/19/2010    Other (comments) Intolerance to oranges, cabbage,beans,peppers,raw onions,foods with caffeine in them, popcorn, no pop sodas, because of IBS Current Immunizations  Reviewed on 5/30/2017 Name Date Influenza Vaccine 10/13/2014 Tdap 6/19/2017 Not reviewed this visit You Were Diagnosed With   
  
 Codes Comments Anxiety attack    -  Primary ICD-10-CM: F41.0 ICD-9-CM: 300.01 Bipolar 1 disorder (HCC)     ICD-10-CM: F31.9 ICD-9-CM: 296.7 Irritable bowel syndrome without diarrhea     ICD-10-CM: K58.9 ICD-9-CM: 564.1   
 RACHEL (generalized anxiety disorder)     ICD-10-CM: F41.1 ICD-9-CM: 300.02 Constipation due to outlet dysfunction     ICD-10-CM: K59.02 
ICD-9-CM: 564.02 Vitals BP Pulse Temp Resp Height(growth percentile) Weight(growth percentile) 142/82 (BP 1 Location: Left arm, BP Patient Position: Sitting) 88 97.1 °F (36.2 °C) (Oral) 20 5' (1.524 m) 155 lb (70.3 kg) SpO2 BMI OB Status Smoking Status 97% 30.27 kg/m2 Hysterectomy Never Smoker Vitals History BMI and BSA Data Body Mass Index Body Surface Area  
 30.27 kg/m 2 1.73 m 2 Preferred Pharmacy Pharmacy Name Phone [facility] [phone] Your Updated Medication List  
  
   
This list is accurate as of: 10/3/17  9:23 AM.  Always use your most recent med list.  
  
  
  
  
 ACIDOPHILUS Cap Generic drug:  Lactobacillus acidophilus Take 1 Cap by mouth daily. alendronate 70 mg tablet Commonly known as:  FOSAMAX Take 1 Tab by mouth every seven (7) days. amitriptyline 50 mg tablet Commonly known as:  ELAVIL Take 1 Tab by mouth nightly. black cohosh 540 mg Cap Take 1 Tab by mouth daily. butalbital-acetaminophen  mg tablet Commonly known as:  Jayy Edwina Take 1 Tab by mouth every six (6) hours as needed. calcium-cholecalciferol (D3) tablet Commonly known as:  CALTRATE 600+D Take 1 Tab by mouth daily. diphenhydrAMINE 25 mg capsule Commonly known as:  BENADRYL  
TAKE 2 CAPSULES BY MOUTH NIGHTLY. estradiol 0.05 mg/24 hr  
Commonly known as:  VIVELLE  
1 Patch by TransDERmal route two (2) times a week on Wednesday and Saturday. famotidine 20 mg tablet Commonly known as:  PEPCID  
TAKE 1 TABLET BY MOUTH TWO (2) TIMES A DAY. STOP OMEPRAZOLE. fenofibrate nanocrystallized 145 mg tablet Commonly known as:  [employer] Take 1 Tab by mouth every fourty-eight (48) hours. fluorometholone 0.1 % ophthalmic suspension Commonly known as: [facility] Administer 1 Drop to both eyes two (2) times a day. fluticasone 50 mcg/actuation nasal spray Commonly known as:  FLONASE  
USE ONE SPRAY IN EACH NOSTRIL TWO TIMES A DAY. gabapentin 100 mg capsule Commonly known as:  NEURONTIN Take 2 Caps by mouth two (2) times a day. ibuprofen 200 mg tablet Commonly known as:  MOTRIN Take 400 mg by mouth every six (6) hours as needed for Pain. loperamide 2 mg capsule Commonly known as:  IMODIUM Take 2 mg by mouth as needed for Diarrhea. LORazepam 0.5 mg tablet Commonly known as:  ATIVAN Take 1 Tab by mouth every eight (8) hours as needed for Anxiety. Max Daily Amount: 1.5 mg. D/C valium  
  
 losartan 25 mg tablet Commonly known as:  COZAAR  
TAKE 1 TABLET BY MOUTH DAILY. melatonin 5 mg Cap capsule Take 5 mg by mouth nightly. miconazole 2 % topical cream  
Commonly known as:  Pearlean Libel Apply  to affected area two (2) times a day. OLANZapine 5 mg tablet Commonly known as:  ZyPREXA Take 5 mg by mouth daily (with dinner). polyethylene glycol 17 gram/dose powder Commonly known as:  Scot Daniel Take 17 g by mouth daily. REFRESH OPTIVE ADVANCED (PF) OP Apply 1 Drop to eye two (2) times daily as needed. SF 5000 PLUS 1.1 % Crea Generic drug:  fluoride (sodium) 1 Tube. VIIBRYD 20 mg Tab tablet Generic drug:  vilazodone TAKE 1 TABLET BY MOUTH ONCE DAILY. VITAMIN D3 1,000 unit Cap Generic drug:  cholecalciferol Take 1 Tab by mouth daily. Indications: VITAMIN D DEFICIENCY  
  
 vitamin E 400 unit capsule Commonly known as:  [address] Take 400 Units by mouth daily. Prescriptions Printed Refills LORazepam (ATIVAN) 0.5 mg tablet 2 Sig: Take 1 Tab by mouth every eight (8) hours as needed for Anxiety. Max Daily Amount: 1.5 mg. D/C valium Class: Print Route: Oral  
  
Patient Instructions High-Fiber Diet: Care Instructions Your Care Instructions A high-fiber diet may help you relieve constipation and feel less bloated. Your doctor and dietitian will help you make a high-fiber eating plan based on your personal needs. The plan will include the things you like to eat. It will also make sure that you get 30 grams of fiber a day. Before you make changes to the way you eat, be sure to talk with your doctor or dietitian. Follow-up care is a key part of your treatment and safety. Be sure to make and go to all appointments, and call your doctor if you are having problems. It's also a good idea to know your test results and keep a list of the medicines you take. How can you care for yourself at home? · You can increase how much fiber you get if you eat more of certain foods. These foods include: ¨ Whole-grain breads and cereals. ¨ Fruits, such as pears, apples, and peaches. Eat the skins and peels if you can. ¨ Vegetables, such as broccoli, cabbage, spinach, carrots, asparagus, and squash. ¨ Starchy vegetables. These include potatoes with skins, kidney beans, and lima beans. · Take a fiber supplement every day if your doctor recommends it. Examples are Benefiber, Citrucel, FiberCon, and Metamucil. Ask your doctor how much to take.  
· Drink plenty of fluids, enough so that your urine is light yellow or clear like water. If you have kidney, heart, or liver disease and have to limit fluids, talk with your doctor before you increase the amount of fluids you drink. · Get some exercise every day. Exercise helps stool move through the colon. It also helps prevent constipation. · Keep a food diary. Try to notice and write down what foods cause gas, pain, or other symptoms. Then you can avoid these foods. Where can you learn more? Go to http://tung-tg.info/. Enter P867 in the search box to learn more about \"High-Fiber Diet: Care Instructions. \" Current as of: December 15, 2016 Content Version: 11.3 © 0615-6022 LinQMart. Care instructions adapted under license by Call[employer] (which disclaims liability or warranty for this information). If you have questions about a medical condition or this instruction, always ask your healthcare professional. [address] any warranty or liability for your use of this information. Introducing [facility]! Dear Jean Evans: Thank you for requesting a Admatic account. Our records indicate that you already have an active Admatic account. You can access your account anytime at [email or web address] Did you know that you can access your hospital and ER discharge instructions at any time in Admatic? You can also review all of your test results from your hospital stay or ER visit. Additional Information If you have questions, please visit the Frequently Asked Questions section of the Admatic website at [email or web address] Remember, Admatic is NOT to be used for urgent needs. For medical emergencies, dial 911. Now available from your iPhone and Android! Please provide this summary of care documentation to your next provider. Your primary care clinician is listed as Uday Linton.  If you have any questions after today's visit, please call [phone].

## 2017-10-03 NOTE — PATIENT INSTRUCTIONS
High-Fiber Diet: Care Instructions  Your Care Instructions  A high-fiber diet may help you relieve constipation and feel less bloated. Your doctor and dietitian will help you make a high-fiber eating plan based on your personal needs. The plan will include the things you like to eat. It will also make sure that you get 30 grams of fiber a day. Before you make changes to the way you eat, be sure to talk with your doctor or dietitian. Follow-up care is a key part of your treatment and safety. Be sure to make and go to all appointments, and call your doctor if you are having problems. It's also a good idea to know your test results and keep a list of the medicines you take. How can you care for yourself at home? · You can increase how much fiber you get if you eat more of certain foods. These foods include:  ¨ Whole-grain breads and cereals. ¨ Fruits, such as pears, apples, and peaches. Eat the skins and peels if you can. ¨ Vegetables, such as broccoli, cabbage, spinach, carrots, asparagus, and squash. ¨ Starchy vegetables. These include potatoes with skins, kidney beans, and lima beans. · Take a fiber supplement every day if your doctor recommends it. Examples are Benefiber, Citrucel, FiberCon, and Metamucil. Ask your doctor how much to take. · Drink plenty of fluids, enough so that your urine is light yellow or clear like water. If you have kidney, heart, or liver disease and have to limit fluids, talk with your doctor before you increase the amount of fluids you drink. · Get some exercise every day. Exercise helps stool move through the colon. It also helps prevent constipation. · Keep a food diary. Try to notice and write down what foods cause gas, pain, or other symptoms. Then you can avoid these foods. Where can you learn more? Go to http://tung-tg.info/. Enter C220 in the search box to learn more about \"High-Fiber Diet: Care Instructions. \"  Current as of: December 15, 2016  Content Version: 11.3  © 6929-8838 [employer]ium[employer]. Care instructions adapted under license by [employer]M (which disclaims liability or warranty for this information). If you have questions about a medical condition or this instruction, always ask your healthcare professional. [address] any warranty or liability for your use of this information.

## 2017-10-03 NOTE — PROGRESS NOTES
HISTORY OF PRESENT ILLNESS  Natalie Diggs is a 59 y.o. female here for follow up. Has RACHEL,on ativan which ehlps ehr anxiety. She need to take 3 times a day. need a refill. seeign psych. Has bipolar disorder,on zyprexa. Seeing  therapist,stable. Insomnia has improved with elavil  Her ch headache is controlled with it. Has recent rectopexy by .slightly constipated. labs reviewed. Medication Evaluation   Associated symptoms include abdominal pain. Pertinent negatives include no shortness of breath. Anxiety   Associated symptoms include abdominal pain. Pertinent negatives include no shortness of breath. Abdominal Pain   Associated symptoms include abdominal pain. Pertinent negatives include no shortness of breath. Mental Health Problem   Associated symptoms include abdominal pain. Pertinent negatives include no shortness of breath. Review of Systems   Constitutional: Negative. HENT: Negative. Eyes: Negative. Respiratory: Negative. Negative for shortness of breath and wheezing. Cardiovascular: Negative. Gastrointestinal: Positive for abdominal pain. Negative for blood in stool and constipation. Musculoskeletal: Negative. Negative for falls. Skin: Negative. Psychiatric/Behavioral: Positive for depression. The patient is nervous/anxious. Physical Exam   Constitutional: She appears well-developed and well-nourished. No distress. Neck: Normal range of motion. Neck supple. No JVD present. No thyromegaly present. Cardiovascular: Normal rate, regular rhythm, normal heart sounds and intact distal pulses. Pulmonary/Chest: Effort normal and breath sounds normal. No respiratory distress. She has no wheezes. Musculoskeletal: She exhibits no edema or tenderness. Psychiatric: She has a normal mood and affect. Her behavior is normal.   Anxious. ASSESSMENT and PLAN    Diagnoses and all orders for this visit:    1. Anxiety attack    On ativan TID PRN. .she is seeing Brad Sinha. refuse to give refill. 2. Bipolar 1 disorder ([address]    On zyprexa. seen by Rosalinda Warner. Stable. 3. Irritable bowel syndrome without diarrhea    Ativan helps with spasm. 4. RACHEL (generalized anxiety disorder)  Will refill,  -     LORazepam (ATIVAN) 0.5 mg tablet; Take 1 Tab by mouth every eight (8) hours as needed for Anxiety. Max Daily Amount: 1.5 mg. D/C valium    5. Constipation due to outlet dysfunction    S/P rectopexy. on fibre. Discussed expected course/resolution/complications of diagnosis in detail with patient. Medication risks/benefits/costs/interactions/alternatives discussed with patient. Pt was given an after visit summary which includes diagnoses, current medications & vitals. Pt expressed understanding with the diagnosis and plan.

## 2017-10-09 RX ORDER — FLUTICASONE PROPIONATE 50 MCG
SPRAY, SUSPENSION (ML) NASAL
Qty: 16 G | Refills: 1 | Status: SHIPPED | OUTPATIENT
Start: 2017-10-09 | End: 2017-12-06 | Stop reason: SDUPTHER

## 2017-10-13 DIAGNOSIS — G44.221 CHRONIC TENSION-TYPE HEADACHE, INTRACTABLE: ICD-10-CM

## 2017-10-16 RX ORDER — GABAPENTIN 100 MG/1
CAPSULE ORAL
Qty: 120 CAP | Refills: 5 | Status: SHIPPED | OUTPATIENT
Start: 2017-10-16 | End: 2018-04-12 | Stop reason: SDUPTHER

## 2017-10-31 ENCOUNTER — OFFICE VISIT (OUTPATIENT)
Dept: INTERNAL MEDICINE CLINIC | Age: 57
End: 2017-10-31

## 2017-10-31 VITALS
BODY MASS INDEX: 31.02 KG/M2 | WEIGHT: 158 LBS | HEART RATE: 101 BPM | OXYGEN SATURATION: 96 % | HEIGHT: 60 IN | SYSTOLIC BLOOD PRESSURE: 146 MMHG | RESPIRATION RATE: 20 BRPM | TEMPERATURE: 97.9 F | DIASTOLIC BLOOD PRESSURE: 86 MMHG

## 2017-10-31 DIAGNOSIS — R26.9 GAIT ABNORMALITY: ICD-10-CM

## 2017-10-31 DIAGNOSIS — M17.10 ARTHRITIS OF KNEE: Primary | ICD-10-CM

## 2017-10-31 DIAGNOSIS — F31.9 BIPOLAR 1 DISORDER (HCC): ICD-10-CM

## 2017-10-31 DIAGNOSIS — G44.221 CHRONIC TENSION-TYPE HEADACHE, INTRACTABLE: ICD-10-CM

## 2017-10-31 NOTE — MR AVS SNAPSHOT
Visit Information Date & Time Provider Department Dept. Phone Encounter #  
 10/31/2017  9:00 AM Yanira Hammond MD [facility] Internal Medicine [facility] [phone] [id number] Your Appointments 12/5/2017  8:40 AM  
Follow Up with Jose Westfall DO [facility] [address] [facility]) Appt Note: 2 month f/u headache 9/15/17 [address] [facility] [address]  
[phone]  
  
   
 [address] [place] [id number]  
  
    
 1/30/2018  8:30 AM  
Any with Yanira Hammond MD  
[facility] ([address] [facility]) Appt Note: 3m follow up; fu [address] [place] [address] Street. [place] [id number] [phone]  
  
   
 [address] [place] [address]. [address] [place] [id number] Upcoming Health Maintenance Date Due  
 PAP AKA CERVICAL CYTOLOGY 4/25/2017 INFLUENZA AGE 9 TO ADULT 8/1/2017 MEDICARE YEARLY EXAM 5/31/2018 BREAST CANCER SCRN MAMMOGRAM 3/28/2019 DTaP/Tdap/Td series (2 - Td) 6/19/2027 COLONOSCOPY 7/5/2027 Allergies as of 10/31/2017  Review Complete On: 10/31/2017 By: Yanira Hammond MD  
  
 Severity Noted Reaction Type Reactions Buspar [Buspirone] Medium 02/09/2017   Side Effect Other (comments) Causes \"stimulation\" that could exacerbate a manic attack/phase of pt's Biplar. Reported by patient Amlodipine  06/28/2017    Other (comments) Ankle swelling Dicyclomine  10/14/2014    Nausea and Vomiting Extreme stomach pains Lithium  01/03/2014   Systemic Nausea and Vomiting Severe nausea and vomiting. Other Medication  11/19/2010    Other (comments) Intolerance to oranges, cabbage,beans,peppers,raw onions,foods with caffeine in them, popcorn, no pop sodas, because of IBS Current Immunizations  Reviewed on 5/30/2017 Name Date Influenza Vaccine 10/13/2014 Tdap 6/19/2017 Not reviewed this visit You Were Diagnosed With   
  
 Codes Comments Arthritis of knee    -  Primary ICD-10-CM: M17.10 ICD-9-CM: 716.96 Gait abnormality     ICD-10-CM: R26.9 ICD-9-CM: 502. 2 Chronic tension-type headache, intractable     ICD-10-CM: G92.319 ICD-9-CM: 339.12 Bipolar 1 disorder (HCC)     ICD-10-CM: F31.9 ICD-9-CM: 296.7 Vitals BP Pulse Temp Resp Height(growth percentile) Weight(growth percentile) 146/86 (BP 1 Location: Left arm, BP Patient Position: Sitting) (!) 101 97.9 °F (36.6 °C) (Oral) 20 5' (1.524 m) 158 lb (71.7 kg) SpO2 BMI OB Status Smoking Status 96% 30.86 kg/m2 Hysterectomy Never Smoker Vitals History BMI and BSA Data Body Mass Index Body Surface Area  
 30.86 kg/m 2 1.74 m 2 Preferred Pharmacy Pharmacy Name Phone [facility] [phone] Your Updated Medication List  
  
   
This list is accurate as of: 10/31/17  9:24 AM.  Always use your most recent med list.  
  
  
  
  
 ACIDOPHILUS Cap Generic drug:  Lactobacillus acidophilus Take 1 Cap by mouth daily. alendronate 70 mg tablet Commonly known as:  FOSAMAX Take 1 Tab by mouth every seven (7) days. amitriptyline 50 mg tablet Commonly known as:  ELAVIL Take 1 Tab by mouth nightly. black cohosh 540 mg Cap Take 1 Tab by mouth daily. butalbital-acetaminophen  mg tablet Commonly known as:  Santiago Montiel Take 1 Tab by mouth every six (6) hours as needed. calcium-cholecalciferol (D3) tablet Commonly known as:  CALTRATE 600+D Take 1 Tab by mouth daily. diphenhydrAMINE 25 mg capsule Commonly known as:  BENADRYL  
TAKE 2 CAPSULES BY MOUTH NIGHTLY. estradiol 0.05 mg/24 hr  
Commonly known as:  VIVELLE  
1 Patch by TransDERmal route two (2) times a week on Wednesday and Saturday. famotidine 20 mg tablet Commonly known as:  PEPCID  
 TAKE 1 TABLET BY MOUTH TWO (2) TIMES A DAY. STOP OMEPRAZOLE. fenofibrate nanocrystallized 145 mg tablet Commonly known as:  [employer] Take 1 Tab by mouth every fourty-eight (48) hours. fluorometholone 0.1 % ophthalmic suspension Commonly known as: [facility] Administer 1 Drop to both eyes two (2) times a day. fluticasone 50 mcg/actuation nasal spray Commonly known as:  FLONASE  
USE ONE SPRAY IN EACH NOSTRIL TWO TIMES A DAY. gabapentin 100 mg capsule Commonly known as:  NEURONTIN  
TAKE 2 CAPSULES BY MOUTH TWO (2) TIMES A DAY. ibuprofen 200 mg tablet Commonly known as:  MOTRIN Take 400 mg by mouth every six (6) hours as needed for Pain. loperamide 2 mg capsule Commonly known as:  IMODIUM Take 2 mg by mouth as needed for Diarrhea. LORazepam 0.5 mg tablet Commonly known as:  ATIVAN Take 1 Tab by mouth every eight (8) hours as needed for Anxiety. Max Daily Amount: 1.5 mg. D/C valium  
  
 losartan 25 mg tablet Commonly known as:  COZAAR  
TAKE 1 TABLET BY MOUTH DAILY. melatonin 5 mg Cap capsule Take 5 mg by mouth nightly. miconazole 2 % topical cream  
Commonly known as:  Chalino Rathke Apply  to affected area two (2) times a day. OLANZapine 5 mg tablet Commonly known as:  ZyPREXA Take 5 mg by mouth daily (with dinner). polyethylene glycol 17 gram/dose powder Commonly known as:  Waymond Bakari Take 17 g by mouth daily. REFRESH OPTIVE ADVANCED (PF) OP Apply 1 Drop to eye two (2) times daily as needed. SF 5000 PLUS 1.1 % Crea Generic drug:  fluoride (sodium) 1 Tube. VIIBRYD 20 mg Tab tablet Generic drug:  vilazodone TAKE 1 TABLET BY MOUTH ONCE DAILY. VITAMIN D3 1,000 unit Cap Generic drug:  cholecalciferol Take 1 Tab by mouth daily. Indications: VITAMIN D DEFICIENCY  
  
 vitamin E 400 unit capsule Commonly known as:  [address] Take 400 Units by mouth daily. We Performed the Following [address] [place] [address] Comments:  
 Need H/H for PT,OT for gait and balance REFERRAL TO ORTHOPEDICS [JJS310 Custom] Comments:  
 Knee pain and arthritis Referral Information Referral ID Referred By Referred To  
  
 [id number] Evelyne Day Not Available Visits Status Start Date End Date 1 New Request 10/31/17 10/31/18 If your referral has a status of pending review or denied, additional information will be sent to support the outcome of this decision. Referral ID Referred By Referred To  
 [id number] Shira [facility], [address] [facility], [address] Phone: [phone] Fax: [phone] Visits Status Start Date End Date 1 New Request 10/31/17 10/31/18 If your referral has a status of pending review or denied, additional information will be sent to support the outcome of this decision. Patient Instructions Knee Arthritis: Exercises Your Care Instructions Here are some examples of exercises for knee arthritis. Start each exercise slowly. Ease off the exercise if you start to have pain. Your doctor or physical therapist will tell you when you can start these exercises and which ones will work best for you. How to do the exercises Knee flexion with heel slide 1. Lie on your back with your knees bent. 2. Slide your heel back by bending your affected knee as far as you can. Then hook your other foot around your ankle to help pull your heel even farther back. 3. Hold for about 6 seconds, then rest for up to 10 seconds. 4. Repeat 8 to 12 times. 5. Switch legs and repeat steps 1 through 4, even if only one knee is sore. [employer] 1. Sit with your affected leg straight and supported on the floor or a firm bed. Place a small, rolled-up towel under your knee. Your other leg should be bent, with that foot flat on the floor. 2. Tighten the thigh muscles of your affected leg by pressing the back of your knee down into the towel. 3. Hold for about 6 seconds, then rest for up to 10 seconds. 4. Repeat 8 to 12 times. 5. Switch legs and repeat steps 1 through 4, even if only one knee is sore. Straight-leg raises to the front 1. Lie on your back with your good knee bent so that your foot rests flat on the floor. Your affected leg should be straight. Make sure that your low back has a normal curve. You should be able to slip your hand in between the floor and the small of your back, with your palm touching the floor and your back touching the back of your hand. 2. Tighten the thigh muscles in your affected leg by pressing the back of your knee flat down to the floor. Hold your knee straight. 3. Keeping the thigh muscles tight and your leg straight, lift your affected leg up so that your heel is about 12 inches off the floor. Hold for about 6 seconds, then lower slowly. 4. Relax for up to 10 seconds between repetitions. 5. Repeat 8 to 12 times. 6. Switch legs and repeat steps 1 through 5, even if only one knee is sore. Active knee flexion 1. Lie on your stomach with your knees straight. If your kneecap is uncomfortable, roll up a washcloth and put it under your leg just above your kneecap. 2. Lift the foot of your affected leg by bending the knee so that you bring the foot up toward your buttock. If this motion hurts, try it without bending your knee quite as far. This may help you avoid any painful motion. 3. Slowly move your leg up and down. 4. Repeat 8 to 12 times. 5. Switch legs and repeat steps 1 through 4, even if only one knee is sore. Quadriceps stretch (facedown) 1. Lie flat on your stomach, and rest your face on the floor. 2. Wrap a towel or belt strap around the lower part of your affected leg. Then use the towel or belt strap to slowly pull your heel toward your buttock until you feel a stretch. 3. Hold for about 15 to 30 seconds, then relax your leg against the towel or belt strap. 4. Repeat 2 to 4 times. 5. Switch legs and repeat steps 1 through 4, even if only one knee is sore. Stationary exercise bike 1. If you do not have a stationary exercise bike at home, you can find one to ride at your local health club or community center. 2. Adjust the height of the bike seat so that your knee is slightly bent when your leg is extended downward. If your knee hurts when the pedal reaches the top, you can raise the seat so that your knee does not bend as much. 3. Start slowly. At first, try to do 5 to 10 minutes of cycling with little to no resistance. Then increase your time and the resistance bit by bit until you can do 20 to 30 minutes without pain. 4. If you start to have pain, rest your knee until your pain gets back to the level that is normal for you. Or cycle for less time or with less effort. Follow-up care is a key part of your treatment and safety. Be sure to make and go to all appointments, and call your doctor if you are having problems. It's also a good idea to know your test results and keep a list of the medicines you take. Where can you learn more? Go to http://tung-tg.info/. Enter C159 in the search box to learn more about \"Knee Arthritis: Exercises. \" Current as of: March 21, 2017 Content Version: 11.4 © 1420-8263 [employer]. Care instructions adapted under license by [employer] (which disclaims liability or warranty for this information). If you have questions about a medical condition or this instruction, always ask your healthcare professional. [place] [address] any warranty or liability for your use of this information. Introducing [facility]! Dear Eliu Cisneros: Thank you for requesting a [employer] account. Our records indicate that you already have an active [employer] account.   You can access your account anytime at [email or web address] Did you know that you can access your hospital and ER discharge instructions at any time in [employer]? You can also review all of your test results from your hospital stay or ER visit. Additional Information If you have questions, please visit the Frequently Asked Questions section of the [employer] website at [email or web address] Remember, [employer] is NOT to be used for urgent needs. For medical emergencies, dial 911. Now available from your iPhone and Android! Please provide this summary of care documentation to your next provider. Your primary care clinician is listed as Lida Venegas. If you have any questions after today's visit, please call [phone].

## 2017-10-31 NOTE — PROGRESS NOTES
HISTORY OF PRESENT ILLNESS  Ellard Primrose is a 59 y.o. female here for follow up. Report bilateral knee pain for past 2 weeks. No fall or injury. She was seen by Dr. Sutton Neither last year and received intra-articular injections. Would like to see orthopedics again. Knee pain is causing her gait and balance problem would like to get physical therapy to. She continued to have headache,.  Gabapentin is helping her but she thinks living more gabapentin will help her more. Has bipolar disorder. on med. doing well. Seeing psych and therapist,stable. Has recent rectopexy by .slightly constipated. Gained weight. .    Knee Pain   Associated symptoms include headaches. Pertinent negatives include no shortness of breath. Headache   Associated symptoms include headaches. Pertinent negatives include no shortness of breath. Depression   Associated symptoms include headaches. Pertinent negatives include no shortness of breath. Arthritis   Associated symptoms include headaches. Pertinent negatives include no shortness of breath. Follow-up   Associated symptoms include headaches. Pertinent negatives include no shortness of breath. Review of Systems   Constitutional: Negative. Eyes: Negative. Respiratory: Negative. Negative for shortness of breath and wheezing. Cardiovascular: Negative. Musculoskeletal: Positive for joint pain. Negative for falls. Skin: Negative. Neurological: Positive for headaches. Psychiatric/Behavioral: Positive for depression. The patient is nervous/anxious. Physical Exam   Constitutional: She appears well-developed and well-nourished. No distress. Neck: Normal range of motion. Neck supple. No JVD present. No thyromegaly present. Cardiovascular: Normal rate, regular rhythm, normal heart sounds and intact distal pulses. Pulmonary/Chest: Effort normal and breath sounds normal. No respiratory distress. She has no wheezes.    Musculoskeletal: She exhibits tenderness. She exhibits no edema. Bilateral knee:  Tenderness on both knee, joint crepitus present, range of motions restricted. Psychiatric: She has a normal mood and affect. Her behavior is normal.   Anxious. ASSESSMENT and PLAN  Diagnoses and all orders for this visit:    1. Arthritis of knee  Continue with the Tylenol. Will refer her,  -     [address] for intra-articular shots. 2. Gait abnormality  We will refer her,  -     [address]    3. Chronic tension-type headache, intractable  Advised her to take extra gabapentin as needed for headache    4. Bipolar 1 disorder [facility])    Seeing psychiatrist.  On medications, stable        Discussed expected course/resolution/complications of diagnosis in detail with patient. Medication risks/benefits/costs/interactions/alternatives discussed with patient. Pt was given an after visit summary which includes diagnoses, current medications & vitals. Pt expressed understanding with the diagnosis and plan.

## 2017-10-31 NOTE — PROGRESS NOTES
Health Maintenance Due   Topic Date Due    PAP AKA CERVICAL CYTOLOGY  04/25/2017    INFLUENZA AGE 9 TO ADULT  08/01/2017       Chief Complaint   Patient presents with    Knee Pain     bilateral knees having to use walker, balance problem       1. Have you been to the ER, urgent care clinic since your last visit? Hospitalized since your last visit? No    2. Have you seen or consulted any other health care providers outside of the [address] [place] [address] since your last visit? Include any pap smears or colon screening. No    3) Do you have an Advance Directive on file? no    4) Are you interested in receiving information on Advance Directives? NO      Patient is accompanied by self I have received verbal consent from Everett Lew to discuss any/all medical information while they are present in the room.

## 2017-10-31 NOTE — PATIENT INSTRUCTIONS
Knee Arthritis: Exercises  Your Care Instructions  Here are some examples of exercises for knee arthritis. Start each exercise slowly. Ease off the exercise if you start to have pain. Your doctor or physical therapist will tell you when you can start these exercises and which ones will work best for you. How to do the exercises  Knee flexion with heel slide    1. Lie on your back with your knees bent. 2. Slide your heel back by bending your affected knee as far as you can. Then hook your other foot around your ankle to help pull your heel even farther back. 3. Hold for about 6 seconds, then rest for up to 10 seconds. 4. Repeat 8 to 12 times. 5. Switch legs and repeat steps 1 through 4, even if only one knee is sore. Quad sets    1. Sit with your affected leg straight and supported on the floor or a firm bed. Place a small, rolled-up towel under your knee. Your other leg should be bent, with that foot flat on the floor. 2. Tighten the thigh muscles of your affected leg by pressing the back of your knee down into the towel. 3. Hold for about 6 seconds, then rest for up to 10 seconds. 4. Repeat 8 to 12 times. 5. Switch legs and repeat steps 1 through 4, even if only one knee is sore. Straight-leg raises to the front    1. Lie on your back with your good knee bent so that your foot rests flat on the floor. Your affected leg should be straight. Make sure that your low back has a normal curve. You should be able to slip your hand in between the floor and the small of your back, with your palm touching the floor and your back touching the back of your hand. 2. Tighten the thigh muscles in your affected leg by pressing the back of your knee flat down to the floor. Hold your knee straight. 3. Keeping the thigh muscles tight and your leg straight, lift your affected leg up so that your heel is about 12 inches off the floor. Hold for about 6 seconds, then lower slowly.   4. Relax for up to 10 seconds between repetitions. 5. Repeat 8 to 12 times. 6. Switch legs and repeat steps 1 through 5, even if only one knee is sore. Active knee flexion    1. Lie on your stomach with your knees straight. If your kneecap is uncomfortable, roll up a washcloth and put it under your leg just above your kneecap. 2. Lift the foot of your affected leg by bending the knee so that you bring the foot up toward your buttock. If this motion hurts, try it without bending your knee quite as far. This may help you avoid any painful motion. 3. Slowly move your leg up and down. 4. Repeat 8 to 12 times. 5. Switch legs and repeat steps 1 through 4, even if only one knee is sore. Quadriceps stretch (facedown)    1. Lie flat on your stomach, and rest your face on the floor. 2. Wrap a towel or belt strap around the lower part of your affected leg. Then use the towel or belt strap to slowly pull your heel toward your buttock until you feel a stretch. 3. Hold for about 15 to 30 seconds, then relax your leg against the towel or belt strap. 4. Repeat 2 to 4 times. 5. Switch legs and repeat steps 1 through 4, even if only one knee is sore. Stationary exercise bike    1. If you do not have a stationary exercise bike at home, you can find one to ride at your local health club or community center. 2. Adjust the height of the bike seat so that your knee is slightly bent when your leg is extended downward. If your knee hurts when the pedal reaches the top, you can raise the seat so that your knee does not bend as much. 3. Start slowly. At first, try to do 5 to 10 minutes of cycling with little to no resistance. Then increase your time and the resistance bit by bit until you can do 20 to 30 minutes without pain. 4. If you start to have pain, rest your knee until your pain gets back to the level that is normal for you. Or cycle for less time or with less effort. Follow-up care is a key part of your treatment and safety.  Be sure to make and go to all appointments, and call your doctor if you are having problems. It's also a good idea to know your test results and keep a list of the medicines you take. Where can you learn more? Go to http://tung-tg.info/. Enter C159 in the search box to learn more about \"Knee Arthritis: Exercises. \"  Current as of: March 21, 2017  Content Version: 11.4  © 0434-9113 [employer]. Care instructions adapted under license by [employer] (which disclaims liability or warranty for this information). If you have questions about a medical condition or this instruction, always ask your healthcare professional. [address] any warranty or liability for your use of this information.

## 2017-11-07 ENCOUNTER — TELEPHONE (OUTPATIENT)
Dept: FAMILY MEDICINE CLINIC | Age: 57
End: 2017-11-07

## 2017-11-07 NOTE — TELEPHONE ENCOUNTER
Received telephone call from Ezequiel Talbot [place] with [facility]. Patient has been opened to PT services for knee arthritis and gait training, as per order.

## 2017-12-06 RX ORDER — FLUTICASONE PROPIONATE 50 MCG
SPRAY, SUSPENSION (ML) NASAL
Qty: 16 G | Refills: 1 | Status: SHIPPED | OUTPATIENT
Start: 2017-12-06 | End: 2018-03-12

## 2017-12-19 ENCOUNTER — OFFICE VISIT (OUTPATIENT)
Dept: NEUROLOGY | Age: 57
End: 2017-12-19

## 2017-12-19 VITALS
RESPIRATION RATE: 18 BRPM | SYSTOLIC BLOOD PRESSURE: 132 MMHG | HEART RATE: 103 BPM | DIASTOLIC BLOOD PRESSURE: 84 MMHG | OXYGEN SATURATION: 98 %

## 2017-12-19 DIAGNOSIS — F31.9 BIPOLAR AFFECTIVE DISORDER, REMISSION STATUS UNSPECIFIED (HCC): ICD-10-CM

## 2017-12-19 DIAGNOSIS — I10 HYPERTENSION, UNSPECIFIED TYPE: ICD-10-CM

## 2017-12-19 DIAGNOSIS — R51.9 CHRONIC DAILY HEADACHE: Primary | ICD-10-CM

## 2017-12-19 RX ORDER — AMITRIPTYLINE HYDROCHLORIDE 75 MG/1
75 TABLET, FILM COATED ORAL
Qty: 30 TAB | Refills: 2 | Status: SHIPPED | OUTPATIENT
Start: 2017-12-19 | End: 2018-03-12 | Stop reason: SDUPTHER

## 2017-12-19 NOTE — PROGRESS NOTES
Neurology Clinic Follow up Note    Patient ID:  Stuart January  [id number]  1 y.o.  1960      Ms. Nayeli Burleson is here for follow up today of  Chief Complaint   Patient presents with    Headache          Last Appointment:  9/15/17      Interval History:   Pt returns for f/u of headaches, previously followed by Dr. Matteo Rankin. HAs are currently 2x weekly, improved since last visit. HA are non severe, no nausea or photophobia. She is using Elavil for preventative therapy. She feels the Elavil has been somewhat helpful. No side effect reported. Using tylenol 2x weekly at most for rescue HA tx. PMHx/ PSHx/ FHx/ SHx:  Reviewed and unchanged previous visit. Past Medical History:   Diagnosis Date    Arthritis     Bipolar disorder ([address]     Bladder pain     Choledocholithiasis 11/24/2010    GERD (gastroesophageal reflux disease)     Headache(784.0)     tension headaches    High cholesterol     History of cholecystectomy     Hypertension     Irritable bowel     Pelvic pain in female 2014    Psychiatric disorder     Stool color black     irritable bowel     Past Surgical History:   Procedure Laterality Date    COLONOSCOPY N/A 7/5/2017    COLONOSCOPY performed by Rosita Vuong MD at [address] [facility]  2003    prolasped rectum    HX LAP CHOLECYSTECTOMY  11/23/10    HX KALEIGH AND BSO  1998    LAPAROSCOPY ABDOMEN DIAGNOSTIC  1987         ROS:  Comprehensive review of systems negative except for as noted above. Objective:       Meds:  Current Outpatient Prescriptions   Medication Sig Dispense Refill    fluticasone (FLONASE) 50 mcg/actuation nasal spray USE ONE SPRAY IN EACH NOSTRIL TWO TIMES A DAY. 16 g 1    gabapentin (NEURONTIN) 100 mg capsule TAKE 2 CAPSULES BY MOUTH TWO (2) TIMES A DAY. 120 Cap 5    LORazepam (ATIVAN) 0.5 mg tablet Take 1 Tab by mouth every eight (8) hours as needed for Anxiety.  Max Daily Amount: 1.5 mg. D/C valium 90 Tab 2    losartan (COZAAR) 25 mg tablet TAKE 1 TABLET BY MOUTH DAILY. 30 Tab 3    amitriptyline (ELAVIL) 50 mg tablet Take 1 Tab by mouth nightly. 30 Tab 2    famotidine (PEPCID) 20 mg tablet TAKE 1 TABLET BY MOUTH TWO (2) TIMES A DAY. STOP OMEPRAZOLE. 60 Tab 3    diphenhydrAMINE (BENADRYL) 25 mg capsule TAKE 2 CAPSULES BY MOUTH NIGHTLY. 60 Cap 3    VIIBRYD 20 mg tab tablet TAKE 1 TABLET BY MOUTH ONCE DAILY. 5    ibuprofen (MOTRIN) 200 mg tablet Take 400 mg by mouth every six (6) hours as needed for Pain.  miconazole (MICOTIN) 2 % topical cream Apply  to affected area two (2) times a day. 15 g 0    loperamide (IMODIUM) 2 mg capsule Take 2 mg by mouth as needed for Diarrhea.  Lactobacillus acidophilus (ACIDOPHILUS) cap Take 1 Cap by mouth daily.  CARBOXYMETHYL/GLY/POLY80/PF (REFRESH OPTIVE ADVANCED, PF, OP) Apply 1 Drop to eye two (2) times daily as needed.  black cohosh 540 mg cap Take 1 Tab by mouth daily.  melatonin 5 mg cap capsule Take 5 mg by mouth nightly.  alendronate (FOSAMAX) 70 mg tablet Take 1 Tab by mouth every seven (7) days. 4 Tab 12    calcium-cholecalciferol, D3, (CALTRATE 600+D) tablet Take 1 Tab by mouth daily. 30 Tab 12    estradiol (VIVELLE) 0.05 mg/24 hr 1 Patch by TransDERmal route two (2) times a week on Wednesday and Saturday. 3    SF 5000 PLUS 1.1 % crea 1 Tube. 4    butalbital-acetaminophen (PHRENILIN)  mg tablet Take 1 Tab by mouth every six (6) hours as needed. 0    OLANZapine (ZYPREXA) 5 mg tablet Take 5 mg by mouth daily (with dinner). 2    polyethylene glycol (MIRALAX) 17 gram/dose powder Take 17 g by mouth daily. (Patient taking differently: Take 17 g by mouth daily as needed.) 850 g 3    vitamin E (AQUA GEMS) 400 unit capsule Take 400 Units by mouth daily.  fenofibrate nanocrystallized (TRICOR) 145 mg tablet Take 1 Tab by mouth every fourty-eight (48) hours.  30 Tab 5    fluorometholone (FML) 0.1 % ophthalmic suspension Administer 1 Drop to both eyes two (2) times a day. 99    Cholecalciferol, Vitamin D3, (VITAMIN D3) 1,000 unit cap Take 1 Tab by mouth daily. Indications: VITAMIN D DEFICIENCY         Exam:  There were no vitals taken for this visit. NEUROLOGICAL EXAM:  Cardiac: RRR  Lungs: CTAB  General: Awake, alert, speech fluent  CN: PERRL, EOMI without nystagmus, VFF to confrontation, facial sensation and strength are normal and symmetric, hearing is intact to finger rub bilaterally, palate and tongue movements are intact and symmetric. Motor: Normal tone, bulk and strength bilaterally. Reflexes: 2/4 and symmetric, plantar stimulation is flexor. Coordination: FNF, XUAN, HTS intact. Sensation: LT intact throughout. Gait: Normal-based and steady. LABS  Results for orders placed or performed during the hospital encounter of 88/11/15   METABOLIC PANEL, COMPREHENSIVE   Result Value Ref Range    Sodium 137 136 - 145 mmol/L    Potassium 4.7 3.5 - 5.1 mmol/L    Chloride 106 97 - 108 mmol/L    CO2 24 21 - 32 mmol/L    Anion gap 7 5 - 15 mmol/L    Glucose 173 (H) 65 - 100 mg/dL    BUN 6 6 - 20 MG/DL    Creatinine 1.35 (H) 0.55 - 1.02 MG/DL    BUN/Creatinine ratio 4 (L) 12 - 20      GFR est AA 49 (L) >60 ml/min/1.73m2    GFR est non-AA 40 (L) >60 ml/min/1.73m2    Calcium 7.4 (L) 8.5 - 10.1 MG/DL    Bilirubin, total 0.4 0.2 - 1.0 MG/DL    ALT (SGPT) 27 12 - 78 U/L    AST (SGOT) 22 15 - 37 U/L    Alk.  phosphatase 64 45 - 117 U/L    Protein, total 6.4 6.4 - 8.2 g/dL    Albumin 3.2 (L) 3.5 - 5.0 g/dL    Globulin 3.2 2.0 - 4.0 g/dL    A-G Ratio 1.0 (L) 1.1 - 2.2     CBC WITH AUTOMATED DIFF   Result Value Ref Range    WBC 8.6 3.6 - 11.0 K/uL    RBC 4.02 3.80 - 5.20 M/uL    HGB 11.3 (L) 11.5 - 16.0 g/dL    HCT 34.5 (L) 35.0 - 47.0 %    MCV 85.8 80.0 - 99.0 FL    MCH 28.1 26.0 - 34.0 PG    MCHC 32.8 30.0 - 36.5 g/dL    RDW 13.5 11.5 - 14.5 %    PLATELET 210 091 - 025 K/uL    NEUTROPHILS 86 (H) 32 - 75 %    LYMPHOCYTES 11 (L) 12 - 49 %    MONOCYTES 3 (L) 5 - 13 %    EOSINOPHILS 0 0 - 7 %    BASOPHILS 0 0 - 1 %    ABS. NEUTROPHILS 7.4 1.8 - 8.0 K/UL    ABS. LYMPHOCYTES 1.0 0.8 - 3.5 K/UL    ABS. MONOCYTES 0.3 0.0 - 1.0 K/UL    ABS. EOSINOPHILS 0.0 0.0 - 0.4 K/UL    ABS. BASOPHILS 0.0 0.0 - 0.1 K/UL   CBC W/O DIFF   Result Value Ref Range    WBC 7.4 3.6 - 11.0 K/uL    RBC 4.02 3.80 - 5.20 M/uL    HGB 11.2 (L) 11.5 - 16.0 g/dL    HCT 34.8 (L) 35.0 - 47.0 %    MCV 86.6 80.0 - 99.0 FL    MCH 27.9 26.0 - 34.0 PG    MCHC 32.2 30.0 - 36.5 g/dL    RDW 14.1 11.5 - 14.5 %    PLATELET 560 097 - 848 K/uL   METABOLIC PANEL, BASIC   Result Value Ref Range    Sodium 136 136 - 145 mmol/L    Potassium 3.6 3.5 - 5.1 mmol/L    Chloride 105 97 - 108 mmol/L    CO2 24 21 - 32 mmol/L    Anion gap 7 5 - 15 mmol/L    Glucose 111 (H) 65 - 100 mg/dL    BUN <1 (L) 6 - 20 MG/DL    Creatinine 0.93 0.55 - 1.02 MG/DL    BUN/Creatinine ratio Cannot be calulated 12 - 20      GFR est AA >60 >60 ml/min/1.73m2    GFR est non-AA >60 >60 ml/min/1.73m2    Calcium 7.9 (L) 8.5 - 10.1 MG/DL   POC INR   Result Value Ref Range    INR (POC) 1.1 <1.2         IMAGING:  MRI Results (most recent):  No results found for this or any previous visit. Assessment:     Encounter Diagnoses     ICD-10-CM ICD-9-CM   1. Chronic daily headache R51 784.0   2. Bipolar affective disorder, remission status unspecified ([facility] F31.9 296.80   3. Hypertension, unspecified type I836.9   59year old female with a h/o HTN, bipolar d/o here for f/u of chronic daily headaches. These are improving with limitation of abortive headache therapy and titration of Elavil. She is tolerating preventative medication without reported side effects. As HAs are still twice weekly, will continue slow titration of Elavil to further reduce headache frequency. Encouraged continued limitation of abortive headache medication to avoid rebound headaches.     Plan:   Increase Elavil to 75mg qhs  Limit OTC analgesics to no more than twice weekly to prevent rebound headaches    Follow-up Disposition:  Return in about 3 months (around 3/19/2018).         Signed:  Jackquelyn Meckel,   12/19/2017

## 2017-12-19 NOTE — PATIENT INSTRUCTIONS

## 2017-12-19 NOTE — MR AVS SNAPSHOT
Visit Information Date & Time Provider Department Dept. Phone Encounter #  
 12/19/2017  9:40 AM Flaco Bolivar DO [facility] at [address] [id number] Follow-up Instructions Return in about 3 months (around 3/19/2018). Your Appointments 1/30/2018  8:45 AM  
Any with Pepito Pepe MD  
[facility] ([facility]) Appt Note: 3m follow up; fu 3m; fu 3m  
 [address] [place] [address]. [place] [id number]  
[phone]  
  
   
 [address] [place] [address]. [address] [place] [phone] Upcoming Health Maintenance Date Due  
 PAP AKA CERVICAL CYTOLOGY 4/25/2017 Influenza Age 5 to Adult 8/1/2017 MEDICARE YEARLY EXAM 5/31/2018 DTaP/Tdap/Td series (2 - Td) 6/19/2027 COLONOSCOPY 7/5/2027 Allergies as of 12/19/2017  Review Complete On: 12/19/2017 By: Flaco Bolivar DO Severity Noted Reaction Type Reactions Buspar [Buspirone] Medium 02/09/2017   Side Effect Other (comments) Causes \"stimulation\" that could exacerbate a manic attack/phase of pt's Biplar. Reported by patient Amlodipine  06/28/2017    Other (comments) Ankle swelling Dicyclomine  10/14/2014    Nausea and Vomiting Extreme stomach pains Lithium  01/03/2014   Systemic Nausea and Vomiting Severe nausea and vomiting. Other Medication  11/19/2010    Other (comments) Intolerance to oranges, cabbage,beans,peppers,raw onions,foods with caffeine in them, popcorn, no pop sodas, because of IBS Current Immunizations  Reviewed on 5/30/2017 Name Date Influenza Vaccine 10/13/2014 Tdap 6/19/2017 Not reviewed this visit You Were Diagnosed With   
  
 Codes Comments Chronic daily headache    -  Primary ICD-10-CM: T05 ICD-9-CM: 975. 0 Vitals BP Pulse Resp SpO2 OB Status Smoking Status 132/84 (!) 103 18 98% Hysterectomy Never Smoker Vitals History Preferred Pharmacy Pharmacy Name [facility] [phone] Your Updated Medication List  
  
   
This list is accurate as of: 12/19/17  9:49 AM.  Always use your most recent med list.  
  
  
  
  
 ACIDOPHILUS Cap Generic drug:  Lactobacillus acidophilus Take 1 Cap by mouth daily. alendronate 70 mg tablet Commonly known as:  FOSAMAX Take 1 Tab by mouth every seven (7) days. amitriptyline 75 mg tablet Commonly known as:  ELAVIL Take 1 Tab by mouth nightly. black cohosh 540 mg Cap Take 1 Tab by mouth daily. butalbital-acetaminophen  mg tablet Commonly known as:  Madonna Sella Take 1 Tab by mouth every six (6) hours as needed. calcium-cholecalciferol (D3) tablet Commonly known as:  CALTRATE 600+D Take 1 Tab by mouth daily. diphenhydrAMINE 25 mg capsule Commonly known as:  BENADRYL  
TAKE 2 CAPSULES BY MOUTH NIGHTLY. estradiol 0.05 mg/24 hr  
Commonly known as:  VIVELLE  
1 Patch by TransDERmal route two (2) times a week on Wednesday and Saturday. famotidine 20 mg tablet Commonly known as:  PEPCID  
TAKE 1 TABLET BY MOUTH TWO (2) TIMES A DAY. STOP OMEPRAZOLE. fenofibrate nanocrystallized 145 mg tablet Commonly known as:  [employer] Take 1 Tab by mouth every fourty-eight (48) hours. FISH -160-1,000 mg Cap Generic drug:  omega 3-dha-epa-fish oil Take  by mouth. fluorometholone 0.1 % ophthalmic suspension Commonly known as: [address] [facility] Administer 1 Drop to both eyes two (2) times a day. fluticasone 50 mcg/actuation nasal spray Commonly known as:  FLONASE  
USE ONE SPRAY IN EACH NOSTRIL TWO TIMES A DAY. gabapentin 100 mg capsule Commonly known as:  NEURONTIN  
TAKE 2 CAPSULES BY MOUTH TWO (2) TIMES A DAY. ibuprofen 200 mg tablet Commonly known as:  MOTRIN Take 400 mg by mouth every six (6) hours as needed for Pain. loperamide 2 mg capsule Commonly known as:  IMODIUM Take 2 mg by mouth as needed for Diarrhea. LORazepam 0.5 mg tablet Commonly known as:  ATIVAN Take 1 Tab by mouth every eight (8) hours as needed for Anxiety. Max Daily Amount: 1.5 mg. D/C valium  
  
 losartan 25 mg tablet Commonly known as:  COZAAR  
TAKE 1 TABLET BY MOUTH DAILY. melatonin 5 mg Cap capsule Take 5 mg by mouth nightly. miconazole 2 % topical cream  
Commonly known as:  Madonna Berliner Apply  to affected area two (2) times a day. OLANZapine 5 mg tablet Commonly known as:  ZyPREXA Take 5 mg by mouth daily (with dinner). polyethylene glycol 17 gram/dose powder Commonly known as:  [employer] Sans Take 17 g by mouth daily. REFRESH OPTIVE ADVANCED (PF) OP Apply 1 Drop to eye two (2) times daily as needed. SF 5000 PLUS 1.1 % Crea Generic drug:  fluoride (sodium) 1 Tube. VIIBRYD 20 mg Tab tablet Generic drug:  vilazodone TAKE 1 TABLET BY MOUTH ONCE DAILY. VITAMIN D3 1,000 unit Cap Generic drug:  cholecalciferol Take 1 Tab by mouth daily. Indications: VITAMIN D DEFICIENCY  
  
 vitamin E 400 unit capsule Commonly known as:  [address] Take 400 Units by mouth daily. Prescriptions Sent to Pharmacy Refills  
 amitriptyline (ELAVIL) 75 mg tablet 2 Sig: Take 1 Tab by mouth nightly. Class: Normal  
 Pharmacy: [address] Ph #: [phone] Route: Oral  
  
Follow-up Instructions Return in about 3 months (around 3/19/2018). Patient Instructions A Healthy Lifestyle: Care Instructions Your Care Instructions A healthy lifestyle can help you feel good, stay at a healthy weight, and have plenty of energy for both work and play. A healthy lifestyle is something you can share with your whole family.  
A healthy lifestyle also can lower your risk for serious health problems, such as high blood pressure, heart disease, and diabetes. You can follow a few steps listed below to improve your health and the health of your family. Follow-up care is a key part of your treatment and safety. Be sure to make and go to all appointments, and call your doctor if you are having problems. It's also a good idea to know your test results and keep a list of the medicines you take. How can you care for yourself at home? · Do not eat too much sugar, fat, or fast foods. You can still have dessert and treats now and then. The goal is moderation. · Start small to improve your eating habits. Pay attention to portion sizes, drink less juice and soda pop, and eat more fruits and vegetables. ¨ Eat a healthy amount of food. A 3-ounce serving of meat, for example, is about the size of a deck of cards. Fill the rest of your plate with vegetables and whole grains. ¨ Limit the amount of soda and sports drinks you have every day. Drink more water when you are thirsty. ¨ Eat at least 5 servings of fruits and vegetables every day. It may seem like a lot, but it is not hard to reach this goal. A serving or helping is 1 piece of fruit, 1 cup of vegetables, or 2 cups of leafy, raw vegetables. Have an apple or some carrot sticks as an afternoon snack instead of a candy bar. Try to have fruits and/or vegetables at every meal. 
· Make exercise part of your daily routine. You may want to start with simple activities, such as walking, bicycling, or slow swimming. Try to be active 30 to 60 minutes every day. You do not need to do all 30 to 60 minutes all at once. For example, you can exercise 3 times a day for 10 or 20 minutes. Moderate exercise is safe for most people, but it is always a good idea to talk to your doctor before starting an exercise program. 
· Keep moving. [place] [employer] the lawn, work in the garden, or nanoRETE. Take the stairs instead of the elevator at work. · If you smoke, quit. People who smoke have an increased risk for heart attack, stroke, cancer, and other lung illnesses. Quitting is hard, but there are ways to boost your chance of quitting tobacco for good. ¨ Use nicotine gum, patches, or lozenges. ¨ Ask your doctor about stop-smoking programs and medicines. ¨ Keep trying. In addition to reducing your risk of diseases in the future, you will notice some benefits soon after you stop using tobacco. If you have shortness of breath or asthma symptoms, they will likely get better within a few weeks after you quit. · Limit how much alcohol you drink. Moderate amounts of alcohol (up to 2 drinks a day for men, 1 drink a day for women) are okay. But drinking too much can lead to liver problems, high blood pressure, and other health problems. Family health If you have a family, there are many things you can do together to improve your health. · Eat meals together as a family as often as possible. · Eat healthy foods. This includes fruits, vegetables, lean meats and dairy, and whole grains. · Include your family in your fitness plan. Most people think of activities such as jogging or tennis as the way to fitness, but there are many ways you and your family can be more active. Anything that makes you breathe hard and gets your heart pumping is exercise. Here are some tips: 
¨ Walk to do errands or to take your child to school or the bus. ¨ Go for a family bike ride after dinner instead of watching TV. Where can you learn more? Go to http://tung-tg.info/. Enter S020 in the search box to learn more about \"A Healthy Lifestyle: Care Instructions. \" Current as of: May 12, 2017 Content Version: 11.4 © 8074-6585 [employer]. Care instructions adapted under license by [employer] (which disclaims liability or warranty for this information).  If you have questions about a medical condition or this instruction, always ask your healthcare professional. [address] any warranty or liability for your use of this information. Introducing [facility]! Dear Savana Smith: Thank you for requesting a [employer] account. Our records indicate that you already have an active [employer] account. You can access your account anytime at [email or web address] Did you know that you can access your hospital and ER discharge instructions at any time in [employer]? You can also review all of your test results from your hospital stay or ER visit. Additional Information If you have questions, please visit the Frequently Asked Questions section of the [employer] website at [email or web address] Remember, [employer] is NOT to be used for urgent needs. For medical emergencies, dial 911. Now available from your iPhone and Android! Please provide this summary of care documentation to your next provider. Your primary care clinician is listed as Melecio Ashley. If you have any questions after today's visit, please call [phone].

## 2017-12-26 DIAGNOSIS — K21.9 GASTROESOPHAGEAL REFLUX DISEASE WITHOUT ESOPHAGITIS: ICD-10-CM

## 2017-12-26 RX ORDER — FAMOTIDINE 20 MG/1
TABLET, FILM COATED ORAL
Qty: 60 TAB | Refills: 3 | Status: SHIPPED | OUTPATIENT
Start: 2017-12-26 | End: 2018-04-27 | Stop reason: SDUPTHER

## 2018-01-12 ENCOUNTER — TELEPHONE (OUTPATIENT)
Dept: INTERNAL MEDICINE CLINIC | Age: 58
End: 2018-01-12

## 2018-01-12 NOTE — TELEPHONE ENCOUNTER
Placed call to pt and she wanted to know if she had other orthopedic concerns regarding her leg that have nothing to do with her knee can she still see Dr Alex Garcia, writer advised pt to call that office and schedule an appt and that it was fine to see him

## 2018-01-22 DIAGNOSIS — I10 ESSENTIAL HYPERTENSION: ICD-10-CM

## 2018-01-22 RX ORDER — LOSARTAN POTASSIUM 25 MG/1
TABLET ORAL
Qty: 30 TAB | Refills: 3 | Status: SHIPPED | OUTPATIENT
Start: 2018-01-22 | End: 2018-05-28 | Stop reason: SDUPTHER

## 2018-01-23 ENCOUNTER — OFFICE VISIT (OUTPATIENT)
Dept: INTERNAL MEDICINE CLINIC | Age: 58
End: 2018-01-23

## 2018-01-23 VITALS
BODY MASS INDEX: 31.22 KG/M2 | OXYGEN SATURATION: 95 % | HEIGHT: 60 IN | WEIGHT: 159 LBS | RESPIRATION RATE: 20 BRPM | DIASTOLIC BLOOD PRESSURE: 72 MMHG | SYSTOLIC BLOOD PRESSURE: 113 MMHG | TEMPERATURE: 97.8 F | HEART RATE: 88 BPM

## 2018-01-23 DIAGNOSIS — F31.9 BIPOLAR 1 DISORDER (HCC): ICD-10-CM

## 2018-01-23 DIAGNOSIS — H04.129 DRY EYE: ICD-10-CM

## 2018-01-23 DIAGNOSIS — G44.029 CHRONIC CLUSTER HEADACHE, NOT INTRACTABLE: ICD-10-CM

## 2018-01-23 DIAGNOSIS — E78.2 ELEVATED TRIGLYCERIDES WITH HIGH CHOLESTEROL: ICD-10-CM

## 2018-01-23 DIAGNOSIS — M81.0 OSTEOPOROSIS, UNSPECIFIED OSTEOPOROSIS TYPE, UNSPECIFIED PATHOLOGICAL FRACTURE PRESENCE: ICD-10-CM

## 2018-01-23 DIAGNOSIS — J30.1 ALLERGIC RHINITIS DUE TO POLLEN, UNSPECIFIED CHRONICITY, UNSPECIFIED SEASONALITY: ICD-10-CM

## 2018-01-23 DIAGNOSIS — R23.3 PETECHIAL RASH: Primary | ICD-10-CM

## 2018-01-23 RX ORDER — DESLORATADINE 5 MG/1
5 TABLET, ORALLY DISINTEGRATING ORAL DAILY
Qty: 30 TAB | Refills: 1 | Status: SHIPPED | OUTPATIENT
Start: 2018-01-23 | End: 2018-03-12

## 2018-01-23 NOTE — MR AVS SNAPSHOT
[address] [facility] 
 
 
 [place] [facility] [place] [address] [address] 
[phone] Patient: Janes Kim MRN: [id number] BPD:5/45/3394 Visit Information Date & Time Provider Department Dept. Phone Encounter #  
 1/23/2018  1:30 PM Celena Galindo MD [facility] Internal Medicine [facility] [phone] [id number] Your Appointments 1/30/2018  8:45 AM  
[facility] Appt Note: 3m follow up; fu 3m; fu 3m  
 [place] [facility] [address]  
[phone]  
  
   
 [place]. [address] [place] [id number]  
  
    
 3/26/2018  9:40 AM  
Follow Up with Israel Bauer DO [facility] Appt Note: f/u 3 months 12/19/2017 [address] [place] [address]  
[phone]  
  
   
 [address] [place] [address] Upcoming Health Maintenance Date Due  
 PAP AKA CERVICAL CYTOLOGY 4/25/2017 Influenza Age 5 to Adult 8/1/2017 MEDICARE YEARLY EXAM 5/31/2018 BREAST CANCER SCRN MAMMOGRAM 3/28/2019 DTaP/Tdap/Td series (2 - Td) 6/19/2027 COLONOSCOPY 7/5/2027 Allergies as of 1/23/2018  Review Complete On: 1/23/2018 By: Daksha Jolly LPN Severity Noted Reaction Type Reactions Buspar [Buspirone] Medium 02/09/2017   Side Effect Other (comments) Causes \"stimulation\" that could exacerbate a manic attack/phase of pt's Biplar. Reported by patient Amlodipine  06/28/2017    Other (comments) Ankle swelling Dicyclomine  10/14/2014    Nausea and Vomiting Extreme stomach pains Lithium  01/03/2014   Systemic Nausea and Vomiting Severe nausea and vomiting. Other Medication  11/19/2010    Other (comments)  Intolerance to oranges, cabbage,beans,peppers,raw onions,foods with caffeine in them, popcorn, no pop sodas, because of IBS Current Immunizations  Reviewed on 5/30/2017 Name Date Influenza Vaccine 10/13/2014 Tdap 6/19/2017 Not reviewed this visit You Were Diagnosed With   
  
 Codes Comments Petechial rash    -  Primary ICD-10-CM: R23.3 ICD-9-CM: 782.7 Bipolar 1 disorder (HCC)     ICD-10-CM: F31.9 ICD-9-CM: 296.7 Osteoporosis, unspecified osteoporosis type, unspecified pathological fracture presence     ICD-10-CM: M81.0 ICD-9-CM: 733.00 Dry eye     ICD-10-CM: A69.339 ICD-9-CM: 375.15 Chronic cluster headache, not intractable     ICD-10-CM: G44.029 
ICD-9-CM: 339.02 Allergic rhinitis due to pollen, unspecified chronicity, unspecified seasonality     ICD-10-CM: J30.1 ICD-9-CM: 477.0 Elevated triglycerides with high cholesterol     ICD-10-CM: E78.2 ICD-9-CM: 272.2 Vitals BP Pulse Temp Resp Height(growth percentile) Weight(growth percentile) 113/72 (BP 1 Location: Left arm, BP Patient Position: Sitting) 88 97.8 °F (36.6 °C) (Oral) 20 5' (1.524 m) 159 lb (72.1 kg) SpO2 BMI OB Status Smoking Status 95% 31.05 kg/m2 Hysterectomy Never Smoker Vitals History BMI and BSA Data Body Mass Index Body Surface Area 31.05 kg/m 2 1.75 m 2 Preferred Pharmacy Pharmacy Name Phone [facility]. [phone] Your Updated Medication List  
  
   
This list is accurate as of: 1/23/18  1:57 PM.  Always use your most recent med list.  
  
  
  
  
 ACIDOPHILUS Cap Generic drug:  Lactobacillus acidophilus Take 1 Cap by mouth daily. alendronate 70 mg tablet Commonly known as:  FOSAMAX Take 1 Tab by mouth every seven (7) days. amitriptyline 75 mg tablet Commonly known as:  ELAVIL Take 1 Tab by mouth nightly. black cohosh 540 mg Cap Take 1 Tab by mouth daily. butalbital-acetaminophen  mg tablet Commonly known as:  Melva Song Take 1 Tab by mouth every six (6) hours as needed. calcium-cholecalciferol (D3) tablet Commonly known as:  CALTRATE 600+D Take 1 Tab by mouth daily. desloratadine 5 mg disintegrating tablet Commonly known as:  Glory Black Take 5 mg by mouth daily. estradiol 0.05 mg/24 hr  
Commonly known as:  VIVELLE  
1 Patch by TransDERmal route two (2) times a week on Wednesday and Saturday. famotidine 20 mg tablet Commonly known as:  PEPCID  
TAKE 1 TABLET BY MOUTH TWO (2) TIMES A DAY. STOP OMEPRAZOLE. fenofibrate nanocrystallized 145 mg tablet Commonly known as:  [employer] Take 1 Tab by mouth every fourty-eight (48) hours. FISH -160-1,000 mg Cap Generic drug:  omega 3-dha-epa-fish oil Take  by mouth. fluorometholone 0.1 % ophthalmic suspension Commonly known as: [facility] Administer 1 Drop to both eyes two (2) times a day. fluticasone 50 mcg/actuation nasal spray Commonly known as:  FLONASE  
USE ONE SPRAY IN EACH NOSTRIL TWO TIMES A DAY. gabapentin 100 mg capsule Commonly known as:  NEURONTIN  
TAKE 2 CAPSULES BY MOUTH TWO (2) TIMES A DAY. ibuprofen 200 mg tablet Commonly known as:  MOTRIN Take 400 mg by mouth every six (6) hours as needed for Pain. loperamide 2 mg capsule Commonly known as:  IMODIUM Take 2 mg by mouth as needed for Diarrhea. LORazepam 0.5 mg tablet Commonly known as:  ATIVAN Take 1 Tab by mouth every eight (8) hours as needed for Anxiety. Max Daily Amount: 1.5 mg. D/C valium  
  
 losartan 25 mg tablet Commonly known as:  COZAAR  
TAKE 1 TABLET BY MOUTH DAILY. melatonin 5 mg Cap capsule Take 5 mg by mouth nightly. miconazole 2 % topical cream  
Commonly known as:  Lucero Snuffer Apply  to affected area two (2) times a day. OLANZapine 5 mg tablet Commonly known as:  ZyPREXA Take 5 mg by mouth daily (with dinner). polyethylene glycol 17 gram/dose powder Commonly known as:  Lindalou Nava Take 17 g by mouth daily. REFRESH OPTIVE ADVANCED (PF) OP Apply 1 Drop to eye two (2) times daily as needed. SF 5000 PLUS 1.1 % Crea Generic drug:  fluoride (sodium) 1 Tube. VIIBRYD 20 mg Tab tablet Generic drug:  vilazodone TAKE 1 TABLET BY MOUTH ONCE DAILY. VITAMIN D3 1,000 unit Cap Generic drug:  cholecalciferol Take 1 Tab by mouth daily. Indications: VITAMIN D DEFICIENCY  
  
 vitamin E 400 unit capsule Commonly known as:  [address] Take 400 Units by mouth daily. Prescriptions Sent to Pharmacy Refills  
 desloratadine (CLARINEX REDITAB) 5 mg disintegrating tablet 1 Sig: Take 5 mg by mouth daily. Class: Normal  
 Pharmacy: [facility] Serrato Ph #: [phone] Route: Oral  
  
We Performed the Following CBC WITH AUTOMATED DIFF [19402 CPT(R)] LIPID PANEL [88382 CPT(R)] METABOLIC PANEL, COMPREHENSIVE [68107 CPT(R)] Introducing [facility]! Dear Gianni Pierce: Thank you for requesting a [employer] account. Our records indicate that you already have an active [employer] account. You can access your account anytime at [email or web address] Did you know that you can access your hospital and ER discharge instructions at any time in [employer]? You can also review all of your test results from your hospital stay or ER visit. Additional Information If you have questions, please visit the Frequently Asked Questions section of the [employer] website at [email or web address] Remember, [employer] is NOT to be used for urgent needs. For medical emergencies, dial 911. Now available from your iPhone and Android! Please provide this summary of care documentation to your next provider. Your primary care clinician is listed as Shantell Munson.  If you have any questions after today's visit, please call [phone].

## 2018-01-23 NOTE — PROGRESS NOTES
Health Maintenance Due   Topic Date Due    PAP AKA CERVICAL CYTOLOGY  04/25/2017    Influenza Age 5 to Adult  08/01/2017       Chief Complaint   Patient presents with   [address]     states woke up with \"bug bites\" on BLE, no itching, burning etc    Allergic Rhinitis     wants alternative to flonase    Advance Care Planning     brought in today    Cholesterol Problem     pt is on tricor  and triglycerides are WNL, pt wanted to know if she still needs to take       1. Have you been to the ER, urgent care clinic since your last visit? Hospitalized since your last visit? No    2. Have you seen or consulted any other health care providers outside of the [address] [place] [address] since your last visit? Include any pap smears or colon screening. No    3) Do you have an Advance Directive on file? no    4) Are you interested in receiving information on Advance Directives? NO      Patient is accompanied by self I have received verbal consent from Dorothy Reyes to discuss any/all medical information while they are present in the room.

## 2018-01-23 NOTE — PROGRESS NOTES
HISTORY OF PRESENT ILLNESS  Josemanuel Espinoza is a 59 y.o. female here with the complaining of rash on both lower legs for last 2 days. She is wondering if she is having mid back. No itching. Has chronic daily headache, amitriptyline dosage was increased last month by Dr. Quentin Harrison. She is feeling better. She takes ibuprofen almost every day morning. Trying to cut back on that. Using more Tylenol. Has anxiety and depression, seeing Dr. Faisal Alvarez. Holden Sleeper is causing her a lot of side effects and withdrawal.  She will see psychiatry soon. Labs reviewed. labs reviewed. Had elevated triglycerides in the past.  On TriCor. Need to repeat lab work. No chest pain palpitation. Report dry eyes, using fish oil, wondering if she should stopped using it. Has allergy, Flonase is not working anymore would like to change to a different medicine. Insect Bite   Pertinent negatives include no shortness of breath. Allergic Rhinitis   Pertinent negatives include no shortness of breath. Medication Evaluation   Pertinent negatives include no shortness of breath. Mental Health Problem   Pertinent negatives include no shortness of breath. Review of Systems   Constitutional: Negative. HENT: Negative. Eyes: Negative. Respiratory: Negative. Negative for shortness of breath and wheezing. Cardiovascular: Negative. Gastrointestinal: Negative for blood in stool and constipation. Musculoskeletal: Negative. Negative for falls. Skin: Positive for rash. Negative for itching. Psychiatric/Behavioral: Positive for depression. Physical Exam   Constitutional: She appears well-developed and well-nourished. No distress. Neck: Normal range of motion. Neck supple. No JVD present. No thyromegaly present. Cardiovascular: Normal rate, regular rhythm, normal heart sounds and intact distal pulses. Pulmonary/Chest: Effort normal and breath sounds normal. No respiratory distress. She has no wheezes. Musculoskeletal: She exhibits no edema or tenderness. Skin:   Both legs: Petechial lesion present in both lower leg multiple. red spots. No itching no rash. No purpura present. Psychiatric: She has a normal mood and affect. Her behavior is normal.   Anxious. ASSESSMENT and PLAN    Diagnoses and all orders for this visit:    1. Petechial rash    No insect bite. Questionable drug reactions from amitriptyline or too much use of NSAID. Will check,  -     CBC WITH AUTOMATED DIFF  -     METABOLIC PANEL, COMPREHENSIVE    2. Bipolar 1 disorder (HCC)    Seeing Dr. Galileo Nash. She is not able to tolerate Viibryd. She will see psychiatry soon. Will check,  -     CBC WITH AUTOMATED DIFF  -     METABOLIC PANEL, COMPREHENSIVE    3. Osteoporosis, unspecified osteoporosis type, unspecified pathological fracture presence    On Fosamax and calcium with vitamin D. Stable. 4. Dry eye  Advised to stop using fish oil. She may use Systane gel 1 drop at night. 5. Chronic cluster headache, not intractable  Better with Elavil 75 mg at night. Advised to avoid NSAID. May take Tylenol as needed. 6. Allergic rhinitis due to pollen, unspecified chronicity, unspecified seasonality  Flonase is not working anymore. Will DC it. Will call in,     -     desloratadine (CLARINEX REDITAB) 5 mg disintegrating tablet; Take 5 mg by mouth daily. 7. Elevated triglycerides with high cholesterol  On TriCor, insurance company do not want to cooperate. Will check,    -     LIPID PANEL            Discussed expected course/resolution/complications of diagnosis in detail with patient. Medication risks/benefits/costs/interactions/alternatives discussed with patient. Pt was given an after visit summary which includes diagnoses, current medications & vitals. Pt expressed understanding with the diagnosis and plan.

## 2018-01-24 NOTE — LETTER
2/9/2018 2:23 PM 
 
[facility] [address] Dear Yoli Blake: 
 
Please find your most recent results below. Resulted Orders CBC WITH AUTOMATED DIFF Result Value Ref Range WBC 6.7 3.4 - 10.8 x10E3/uL  
 RBC 4.52 3.77 - 5.28 x10E6/uL HGB 12.7 11.1 - 15.9 g/dL HCT 39.8 34.0 - 46.6 % MCV 88 79 - 97 fL  
 MCH 28.1 26.6 - 33.0 pg  
 MCHC 31.9 31.5 - 35.7 g/dL  
 RDW 15.7 (H) 12.3 - 15.4 % PLATELET 241 386 - 984 x10E3/uL NEUTROPHILS 59 Not Estab. % Lymphocytes 31 Not Estab. % MONOCYTES 7 Not Estab. % EOSINOPHILS 2 Not Estab. % BASOPHILS 1 Not Estab. %  
 ABS. NEUTROPHILS 4.0 1.4 - 7.0 x10E3/uL Abs Lymphocytes 2.1 0.7 - 3.1 x10E3/uL  
 ABS. MONOCYTES 0.5 0.1 - 0.9 x10E3/uL  
 ABS. EOSINOPHILS 0.1 0.0 - 0.4 x10E3/uL  
 ABS. BASOPHILS 0.0 0.0 - 0.2 x10E3/uL IMMATURE GRANULOCYTES 0 Not Estab. %  
 ABS. IMM. GRANS. 0.0 0.0 - 0.1 x10E3/uL Narrative Performed at:  [address]  [id number] : Fred Beaulieu MD, Phone:  [phone] METABOLIC PANEL, COMPREHENSIVE Result Value Ref Range Glucose 106 (H) 65 - 99 mg/dL BUN 18 6 - 24 mg/dL Creatinine 0.86 0.57 - 1.00 mg/dL GFR est non-AA 74 >59 mL/min/1.73 GFR est AA 86 >59 mL/min/1.73  
 BUN/Creatinine ratio 21 9 - 23 Sodium 142 134 - 144 mmol/L Potassium 4.5 3.5 - 5.2 mmol/L Chloride 101 96 - 106 mmol/L  
 CO2 22 18 - 29 mmol/L Calcium 9.1 8.7 - 10.2 mg/dL Protein, total 7.2 6.0 - 8.5 g/dL Albumin 4.5 3.5 - 5.5 g/dL GLOBULIN, TOTAL 2.7 1.5 - 4.5 g/dL A-G Ratio 1.7 1.2 - 2.2 Bilirubin, total 0.4 0.0 - 1.2 mg/dL Alk. phosphatase 68 39 - 117 IU/L  
 AST (SGOT) 18 0 - 40 IU/L  
 ALT (SGPT) 15 0 - 32 IU/L Narrative Performed at:  [address]  [id number] : Fred Beaulieu MD, Phone:  [phone] LIPID PANEL  
 Result Value Ref Range Cholesterol, total 197 100 - 199 mg/dL Triglyceride 117 0 - 149 mg/dL HDL Cholesterol 62 >39 mg/dL VLDL, calculated 23 5 - 40 mg/dL LDL, calculated 112 (H) 0 - 99 mg/dL Narrative Performed at:  [address]  [id number] : Bette Reeves MD, Phone:  [phone] CVD REPORT Result Value Ref Range INTERPRETATION Note Comment:  
   Supplemental report is available. Narrative Performed at:  [address] [place] [address] [address] [place] [address]  [id number] : Barbara Herring PhD, Phone:  [phone] RECOMMENDATIONS: 
Carmenza Stain your labs indicate that your glucose was elevated, please watch your intake of sweets and carbohydrates. All other labs are stable Please call me if you have any questions: [phone] Sincerely, Madeline Rooney MD

## 2018-01-25 LAB
ALBUMIN SERPL-MCNC: 4.5 G/DL (ref 3.5–5.5)
ALBUMIN/GLOB SERPL: 1.7 {RATIO} (ref 1.2–2.2)
ALP SERPL-CCNC: 68 IU/L (ref 39–117)
ALT SERPL-CCNC: 15 IU/L (ref 0–32)
AST SERPL-CCNC: 18 IU/L (ref 0–40)
BASOPHILS # BLD AUTO: 0 X10E3/UL (ref 0–0.2)
BASOPHILS NFR BLD AUTO: 1 %
BILIRUB SERPL-MCNC: 0.4 MG/DL (ref 0–1.2)
BUN SERPL-MCNC: 18 MG/DL (ref 6–24)
BUN/CREAT SERPL: 21 (ref 9–23)
CALCIUM SERPL-MCNC: 9.1 MG/DL (ref 8.7–10.2)
CHLORIDE SERPL-SCNC: 101 MMOL/L (ref 96–106)
CHOLEST SERPL-MCNC: 197 MG/DL (ref 100–199)
CO2 SERPL-SCNC: 22 MMOL/L (ref 18–29)
CREAT SERPL-MCNC: 0.86 MG/DL (ref 0.57–1)
EOSINOPHIL # BLD AUTO: 0.1 X10E3/UL (ref 0–0.4)
EOSINOPHIL NFR BLD AUTO: 2 %
ERYTHROCYTE [DISTWIDTH] IN BLOOD BY AUTOMATED COUNT: 15.7 % (ref 12.3–15.4)
GFR SERPLBLD CREATININE-BSD FMLA CKD-EPI: 74 ML/MIN/1.73
GFR SERPLBLD CREATININE-BSD FMLA CKD-EPI: 86 ML/MIN/1.73
GLOBULIN SER CALC-MCNC: 2.7 G/DL (ref 1.5–4.5)
GLUCOSE SERPL-MCNC: 106 MG/DL (ref 65–99)
HCT VFR BLD AUTO: 39.8 % (ref 34–46.6)
HDLC SERPL-MCNC: 62 MG/DL
HGB BLD-MCNC: 12.7 G/DL (ref 11.1–15.9)
IMM GRANULOCYTES # BLD: 0 X10E3/UL (ref 0–0.1)
IMM GRANULOCYTES NFR BLD: 0 %
INTERPRETATION, 910389: NORMAL
LDLC SERPL CALC-MCNC: 112 MG/DL (ref 0–99)
LYMPHOCYTES # BLD AUTO: 2.1 X10E3/UL (ref 0.7–3.1)
LYMPHOCYTES NFR BLD AUTO: 31 %
MCH RBC QN AUTO: 28.1 PG (ref 26.6–33)
MCHC RBC AUTO-ENTMCNC: 31.9 G/DL (ref 31.5–35.7)
MCV RBC AUTO: 88 FL (ref 79–97)
MONOCYTES # BLD AUTO: 0.5 X10E3/UL (ref 0.1–0.9)
MONOCYTES NFR BLD AUTO: 7 %
NEUTROPHILS # BLD AUTO: 4 X10E3/UL (ref 1.4–7)
NEUTROPHILS NFR BLD AUTO: 59 %
PLATELET # BLD AUTO: 335 X10E3/UL (ref 150–379)
POTASSIUM SERPL-SCNC: 4.5 MMOL/L (ref 3.5–5.2)
PROT SERPL-MCNC: 7.2 G/DL (ref 6–8.5)
RBC # BLD AUTO: 4.52 X10E6/UL (ref 3.77–5.28)
SODIUM SERPL-SCNC: 142 MMOL/L (ref 134–144)
TRIGL SERPL-MCNC: 117 MG/DL (ref 0–149)
VLDLC SERPL CALC-MCNC: 23 MG/DL (ref 5–40)
WBC # BLD AUTO: 6.7 X10E3/UL (ref 3.4–10.8)

## 2018-02-06 NOTE — PROGRESS NOTES
Slightly elevated fasting blood sugar. Need to watch sweets and carbohydrate. All other labs are stable.

## 2018-02-27 ENCOUNTER — OFFICE VISIT (OUTPATIENT)
Dept: INTERNAL MEDICINE CLINIC | Age: 58
End: 2018-02-27

## 2018-02-27 VITALS
RESPIRATION RATE: 20 BRPM | WEIGHT: 157 LBS | SYSTOLIC BLOOD PRESSURE: 121 MMHG | HEIGHT: 60 IN | HEART RATE: 93 BPM | BODY MASS INDEX: 30.82 KG/M2 | DIASTOLIC BLOOD PRESSURE: 80 MMHG | TEMPERATURE: 97.8 F | OXYGEN SATURATION: 94 %

## 2018-02-27 DIAGNOSIS — Z01.818 PRE-OPERATIVE CLEARANCE: ICD-10-CM

## 2018-02-27 DIAGNOSIS — F31.10 BIPOLAR AFFECTIVE DISORDER, MANIC (HCC): ICD-10-CM

## 2018-02-27 DIAGNOSIS — M17.0 PRIMARY OSTEOARTHRITIS OF BOTH KNEES: Primary | ICD-10-CM

## 2018-02-27 DIAGNOSIS — K58.9 IRRITABLE BOWEL SYNDROME WITHOUT DIARRHEA: ICD-10-CM

## 2018-02-27 NOTE — PROGRESS NOTES
Health Maintenance Due   Topic Date Due    PAP AKA CERVICAL CYTOLOGY  04/25/2017    Influenza Age 5 to Adult  08/01/2017       Chief Complaint   Patient presents with    Pre-op Exam     TKR Dr Haley Johnson 3/19/18       1. Have you been to the ER, urgent care clinic since your last visit? Hospitalized since your last visit? No    2. Have you seen or consulted any other health care providers outside of the [address] [place] [address] since your last visit? Include any pap smears or colon screening. No    3) Do you have an Advance Directive on file? no    4) Are you interested in receiving information on Advance Directives? NO      Patient is accompanied by self I have received verbal consent from Maya Sierra to discuss any/all medical information while they are present in the room.

## 2018-02-27 NOTE — MR AVS SNAPSHOT
[address] [id number] 
[phone] Patient: German Miles MRN: [id number] FDE:[id number]39[id number] Visit Information Date & Time Provider Department Dept. Phone Encounter #  
 2/27/2018 11:30 AM Jluis Mg MD [facility] Internal Medicine [facility] [phone] [id number] Your Appointments 3/26/2018  9:40 AM  
Follow Up with DO Krishna [facility] at [address]) Appt Note: f/u 3 months 12/19/2017 [address] [place] [address] [place] [address] [address]  
[phone]  
  
   
 [address] [place] [id number] Upcoming Health Maintenance Date Due  
 PAP AKA CERVICAL CYTOLOGY 4/25/2017 Influenza Age 5 to Adult 8/1/2017 MEDICARE YEARLY EXAM 5/31/2018 BREAST CANCER SCRN MAMMOGRAM 3/28/2019 DTaP/Tdap/Td series (2 - Td) 6/19/2027 COLONOSCOPY 7/5/2027 Allergies as of 2/27/2018  Review Complete On: 2/27/2018 By: Jluis Mg MD  
  
 Severity Noted Reaction Type Reactions Buspar [Buspirone] Medium 02/09/2017   Side Effect Other (comments) Causes \"stimulation\" that could exacerbate a manic attack/phase of pt's Biplar. Reported by patient Amlodipine  06/28/2017    Other (comments) Ankle swelling Dicyclomine  10/14/2014    Nausea and Vomiting Extreme stomach pains Lithium  01/03/2014   Systemic Nausea and Vomiting Severe nausea and vomiting. Other Medication  11/19/2010    Other (comments) Intolerance to oranges, cabbage,beans,peppers,raw onions,foods with caffeine in them, popcorn, no pop sodas, because of IBS Current Immunizations  Reviewed on 5/30/2017 Name Date Influenza Vaccine 10/13/2014 Tdap 6/19/2017 Not reviewed this visit You Were Diagnosed With   
  
 Codes Comments Primary osteoarthritis of both knees    -  Primary ICD-10-CM: M17.0 ICD-9-CM: 715.16 Pre-operative clearance     ICD-10-CM: W39.561 ICD-9-CM: V72.84 B[facility] affective disorder, [facility]     ICD-10-CM: F31.10 ICD-9-CM: 296.40 Irritable bowel syndrome without diarrhea     ICD-10-CM: K58.9 ICD-9-CM: 467.8 Vitals BP Pulse Temp Resp Height(growth percentile) Weight(growth percentile) 121/80 (BP 1 Location: Left arm, BP Patient Position: Sitting) 93 97.8 °F (36.6 °C) (Oral) 20 5' (1.524 m) 157 lb (71.2 kg) SpO2 BMI OB Status Smoking Status 94% 30.66 kg/m2 Hysterectomy Never Smoker Vitals History BMI and BSA Data Body Mass Index Body Surface Area  
 30.66 kg/m 2 1.74 m 2 Preferred Pharmacy Pharmacy Name Phone [facility] [phone] Your Updated Medication List  
  
   
This list is accurate as of 2/27/18 12:24 PM.  Always use your most recent med list.  
  
  
  
  
 ACIDOPHILUS Cap Generic drug:  Lactobacillus acidophilus Take 1 Cap by mouth daily. alendronate 70 mg tablet Commonly known as:  FOSAMAX Take 1 Tab by mouth every seven (7) days. amitriptyline 75 mg tablet Commonly known as:  ELAVIL Take 1 Tab by mouth nightly. black cohosh 540 mg Cap Take 1 Tab by mouth daily. butalbital-acetaminophen  mg tablet Commonly known as:  Ford Guess Take 1 Tab by mouth every six (6) hours as needed. calcium-cholecalciferol (D3) tablet Commonly known as:  CALTRATE 600+D Take 1 Tab by mouth daily. desloratadine 5 mg disintegrating tablet Commonly known as:  Alvino Tracey Take 5 mg by mouth daily. estradiol 0.05 mg/24 hr  
Commonly known as:  VIVELLE  
1 Patch by TransDERmal route two (2) times a week on Wednesday and Saturday. famotidine 20 mg tablet Commonly known as:  PEPCID  
TAKE 1 TABLET BY MOUTH TWO (2) TIMES A DAY. STOP OMEPRAZOLE. fenofibrate nanocrystallized 145 mg tablet Commonly known as:  [employer] Take 1 Tab by mouth every fourty-eight (48) hours. FISH -160-1,000 mg Cap Generic drug:  omega 3-dha-epa-fish oil Take  by mouth. fluorometholone 0.1 % ophthalmic suspension Commonly known as: [facility] Administer 1 Drop to both eyes two (2) times a day. fluticasone 50 mcg/actuation nasal spray Commonly known as:  FLONASE  
USE ONE SPRAY IN EACH NOSTRIL TWO TIMES A DAY. gabapentin 100 mg capsule Commonly known as:  NEURONTIN  
TAKE 2 CAPSULES BY MOUTH TWO (2) TIMES A DAY. ibuprofen 200 mg tablet Commonly known as:  MOTRIN Take 400 mg by mouth every six (6) hours as needed for Pain. loperamide 2 mg capsule Commonly known as:  IMODIUM Take 2 mg by mouth as needed for Diarrhea. LORazepam 0.5 mg tablet Commonly known as:  ATIVAN Take 1 Tab by mouth every eight (8) hours as needed for Anxiety. Max Daily Amount: 1.5 mg. D/C valium  
  
 losartan 25 mg tablet Commonly known as:  COZAAR  
TAKE 1 TABLET BY MOUTH DAILY. melatonin 5 mg Cap capsule Take 5 mg by mouth nightly. miconazole 2 % topical cream  
Commonly known as:  [place] Never Apply  to affected area two (2) times a day. OLANZapine 5 mg tablet Commonly known as:  ZyPREXA Take 10 mg by mouth daily (with dinner). polyethylene glycol 17 gram/dose powder Commonly known as:  Michelle Jarred Take 17 g by mouth daily. REFRESH OPTIVE ADVANCED (PF) OP Apply 1 Drop to eye two (2) times daily as needed. SF 5000 PLUS 1.1 % Crea Generic drug:  fluoride (sodium) 1 Tube. VIIBRYD 20 mg Tab tablet Generic drug:  vilazodone TAKE 1 TABLET BY MOUTH ONCE DAILY. VITAMIN D3 1,000 unit Cap Generic drug:  cholecalciferol Take 1 Tab by mouth daily. Indications: VITAMIN D DEFICIENCY  
  
 vitamin E 400 unit capsule Commonly known as:  [address] Take 400 Units by mouth daily. To-Do List   
 03/12/2018 ECG:  EKG, 12 LEAD, INITIAL   
  
 03/12/2018 8:00 AM  
  Appointment with [facility] PAT EXAM RM 6 at [address] [place] [address] ([phone])  
  
 03/12/2018 9:30 AM  
  Appointment with [facility] PAT CLASSROOM at [address] [place] [address] ([phone]) Patient Instructions Total Knee Replacement: Before Your Surgery What is a total knee replacement? A total knee replacement replaces the worn ends of the bones where they meet at the knee. Those bones are the thighbone (femur) and the lower leg bone (tibia). Your doctor will remove the damaged bone. Then he or she will replace it with plastic and metal parts. These new parts may be attached to your bones with cement. Your doctor will make a cut down the center of your knee. This cut is called an incision. It will be about 8 to 10 inches long. Sometimes the surgery can be done with a smaller incision that is 4 to 6 inches. Both kinds of incisions leave scars that usually fade with time. You probably will be able to go home about 3 to 5 days after surgery. If you have both knees done at the same time, you may need to be in the hospital for 1 week. If there is no one to help you at home, you may go to a rehab center. Most people go back to normal activities or work in 4 to 16 weeks. This depends on your health. It also depends on how well your knee does in your rehab program. This may take longer if you have both knees done at the same time. Follow-up care is a key part of your treatment and safety. Be sure to make and go to all appointments, and call your doctor if you are having problems. It's also a good idea to know your test results and keep a list of the medicines you take. What happens before surgery? ?Surgery can be stressful. This information will help you understand what you can expect. And it will help you safely prepare for surgery. ? Preparing for surgery ? · Understand exactly what surgery is planned, along with the risks, benefits, and other options. · Tell your doctors ALL the medicines, vitamins, supplements, and herbal remedies you take. Some of these can increase the risk of bleeding or interact with anesthesia. ? · If you take blood thinners, such as warfarin (Coumadin), clopidogrel (Plavix), or aspirin, be sure to talk to your doctor. He or she will tell you if you should stop taking these medicines before your surgery. Make sure that you understand exactly what your doctor wants you to do.  
? · Your doctor will tell you which medicines to take or stop before your surgery. You may need to stop taking certain medicines a week or more before surgery. So talk to your doctor as soon as you can.  
? · If you have an advance directive, let your doctor know. It may include a living will and a durable power of  for health care. Bring a copy to the hospital. If you don't have one, you may want to prepare one. It lets your doctor and loved ones know your health care wishes. Doctors advise that everyone prepare these papers before any type of surgery or procedure. ? · You may need to shower or bathe with a special soap the night before and the morning of your surgery. The soap contains chlorhexidine. It reduces the amount of bacteria on your skin that could cause an infection after surgery. What happens on the day of surgery? · Follow the instructions exactly about when to stop eating and drinking. If you don't, your surgery may be canceled. If your doctor told you to take your medicines on the day of surgery, take them with only a sip of water. ? · Take a bath or shower before you come in for your surgery. Do not apply lotions, perfumes, deodorants, or nail polish. ? · Do not shave the surgical site yourself. ? · Take off all jewelry and piercings. And take out contact lenses, if you wear them. ? At the hospital or surgery center · Bring a picture ID. ? · The area for surgery is often marked to make sure there are no errors. ? · You will be kept comfortable and safe by your anesthesia provider. The anesthesia may make you sleep. Or it may just numb the area being worked on.  
? · You also will get antibiotics through the IV tube before surgery. This lowers the risk of an infection of the incision. ? · The surgery will take about 2 to 3 hours. Going home · Be sure you have someone to drive you home. Anesthesia and pain medicine make it unsafe for you to drive. ? · You will be given more specific instructions about recovering from your surgery. They will cover things like diet, wound care, follow-up care, driving, and getting back to your normal routine. When should you call your doctor? · You have questions or concerns. ? · You don't understand how to prepare for your surgery. ? · You become ill before the surgery (such as fever, flu, or a cold). ? · You need to reschedule or have changed your mind about having the surgery. Where can you learn more? Go to http://tung-tg.info/. Enter T834 in the search box to learn more about \"Total Knee Replacement: Before Your Surgery. \" Current as of: March 21, 2017 Content Version: 11.4 © 8775-5990 Healthwise, DN2K. Care instructions adapted under license by [employer] (which disclaims liability or warranty for this information). If you have questions about a medical condition or this instruction, always ask your healthcare professional. [address] [address] any warranty or liability for your use of this information. Introducing \[facility] [place] [facility]\"" & HEALTH SERVICES! Dear Aury Lemus: Thank you for requesting a [employer] account. Our records indicate that you already have an active [employer] account. You can access your account anytime at [email or web address] Did you know that you can access your hospital and ER discharge instructions at any time in [employer]? You can also review all of your test results from your hospital stay or ER visit. Additional Information If you have questions, please visit the Frequently Asked Questions section of the [employer] website at [email or web address] Remember, The [employer] is NOT to be used for urgent needs. For medical emergencies, dial 911. Now available from your iPhone and Android! Please provide this summary of care documentation to your next provider. Your primary care clinician is listed as Sarah Nathan. If you have any questions after today's visit, please call [phone].

## 2018-02-27 NOTE — PROGRESS NOTES
HISTORY OF PRESENT ILLNESS  Sherrie Jacob is a 59 y.o. female here for follow up. He is here for preop clearance for total knee replacement on the right side. No history of coronary disease. No chest pain or palpitation. She was seen by Dr. La Rodrigez, cardiologist in the past, stress test was ordered I did not see any results. No shortness of breath or palpitation. Has bipolar disorder. on med. doing well. Seeing psych and therapist,stable. Zyprexa dose was increased. anxiety is under control. She is seeing Dr. Cuca Mata    Pre-op Exam   Pertinent negatives include no shortness of breath. Anxiety   Pertinent negatives include no shortness of breath. Knee Pain   Pertinent negatives include no shortness of breath. Depression   Pertinent negatives include no shortness of breath. Review of Systems   Constitutional: Negative. HENT: Negative. Eyes: Negative. Respiratory: Negative. Negative for shortness of breath and wheezing. Cardiovascular: Negative. Musculoskeletal: Positive for joint pain. Negative for falls. Skin: Negative. Neurological: Negative. Psychiatric/Behavioral: Positive for depression. The patient is nervous/anxious. Physical Exam   Constitutional: She is oriented to person, place, and time. She appears well-developed and well-nourished. No distress. HENT:   Head: Normocephalic and atraumatic. Right Ear: External ear normal.   Left Ear: External ear normal.   Nose: Nose normal.   Mouth/Throat: Oropharynx is clear and moist. No oropharyngeal exudate. Neck: Normal range of motion. Neck supple. No JVD present. No thyromegaly present. Cardiovascular: Normal rate, regular rhythm, normal heart sounds and intact distal pulses. Pulmonary/Chest: Effort normal and breath sounds normal. No respiratory distress. She has no wheezes. Abdominal: Soft. Bowel sounds are normal. She exhibits no distension. There is no tenderness.    Musculoskeletal: She exhibits tenderness. She exhibits no edema. Bilateral knee:  Tenderness on both knee, joint crepitus present, range of motions restricted. Neurological: She is alert and oriented to person, place, and time. She has normal reflexes. She displays normal reflexes. No cranial nerve deficit. She exhibits normal muscle tone. Coordination normal.   Psychiatric: She has a normal mood and affect. Her behavior is normal.   Anxious. ASSESSMENT and PLAN  Diagnoses and all orders for this visit:    1. Primary osteoarthritis of both knees   she is scheduled to have total knee replacement on left side    In March. .    2. Pre-operative clearance    Seems healthy. Clear for surgery pending. Labs. Will order,  -     EKG, 12 LEAD, INITIAL; Future    3. Bipolar affective disorder, manic ([facility]    On multiple medications. Seen by psychiatrist.  Doing well. 4. Irritable bowel syndrome without diarrhea    Stable on current regimen. Discussed expected course/resolution/complications of diagnosis in detail with patient. Medication risks/benefits/costs/interactions/alternatives discussed with patient. Pt was given an after visit summary which includes diagnoses, current medications & vitals. Pt expressed understanding with the diagnosis and plan.

## 2018-02-27 NOTE — PATIENT INSTRUCTIONS
Total Knee Replacement: Before Your Surgery  What is a total knee replacement? A total knee replacement replaces the worn ends of the bones where they meet at the knee. Those bones are the thighbone (femur) and the lower leg bone (tibia). Your doctor will remove the damaged bone. Then he or she will replace it with plastic and metal parts. These new parts may be attached to your bones with cement. Your doctor will make a cut down the center of your knee. This cut is called an incision. It will be about 8 to 10 inches long. Sometimes the surgery can be done with a smaller incision that is 4 to 6 inches. Both kinds of incisions leave scars that usually fade with time. You probably will be able to go home about 3 to 5 days after surgery. If you have both knees done at the same time, you may need to be in the hospital for 1 week. If there is no one to help you at home, you may go to a rehab center. Most people go back to normal activities or work in 4 to 16 weeks. This depends on your health. It also depends on how well your knee does in your rehab program. This may take longer if you have both knees done at the same time. Follow-up care is a key part of your treatment and safety. Be sure to make and go to all appointments, and call your doctor if you are having problems. It's also a good idea to know your test results and keep a list of the medicines you take. What happens before surgery? ?Surgery can be stressful. This information will help you understand what you can expect. And it will help you safely prepare for surgery. ? Preparing for surgery  ? · Understand exactly what surgery is planned, along with the risks, benefits, and other options. · Tell your doctors ALL the medicines, vitamins, supplements, and herbal remedies you take. Some of these can increase the risk of bleeding or interact with anesthesia. ?  · If you take blood thinners, such as warfarin (Coumadin), clopidogrel (Plavix), or aspirin, be sure to talk to your doctor. He or she will tell you if you should stop taking these medicines before your surgery. Make sure that you understand exactly what your doctor wants you to do.   ? · Your doctor will tell you which medicines to take or stop before your surgery. You may need to stop taking certain medicines a week or more before surgery. So talk to your doctor as soon as you can.   ? · If you have an advance directive, let your doctor know. It may include a living will and a durable power of  for health care. Bring a copy to the hospital. If you don't have one, you may want to prepare one. It lets your doctor and loved ones know your health care wishes. Doctors advise that everyone prepare these papers before any type of surgery or procedure. ? · You may need to shower or bathe with a special soap the night before and the morning of your surgery. The soap contains chlorhexidine. It reduces the amount of bacteria on your skin that could cause an infection after surgery. What happens on the day of surgery? · Follow the instructions exactly about when to stop eating and drinking. If you don't, your surgery may be canceled. If your doctor told you to take your medicines on the day of surgery, take them with only a sip of water. ? · Take a bath or shower before you come in for your surgery. Do not apply lotions, perfumes, deodorants, or nail polish. ? · Do not shave the surgical site yourself. ? · Take off all jewelry and piercings. And take out contact lenses, if you wear them. ? At the hospital or surgery center   · Bring a picture ID. ? · The area for surgery is often marked to make sure there are no errors. ? · You will be kept comfortable and safe by your anesthesia provider. The anesthesia may make you sleep. Or it may just numb the area being worked on.   ? · You also will get antibiotics through the IV tube before surgery.  This lowers the risk of an infection of the incision. ? · The surgery will take about 2 to 3 hours. Going home   · Be sure you have someone to drive you home. Anesthesia and pain medicine make it unsafe for you to drive. ? · You will be given more specific instructions about recovering from your surgery. They will cover things like diet, wound care, follow-up care, driving, and getting back to your normal routine. When should you call your doctor? · You have questions or concerns. ? · You don't understand how to prepare for your surgery. ? · You become ill before the surgery (such as fever, flu, or a cold). ? · You need to reschedule or have changed your mind about having the surgery. Where can you learn more? Go to http://tung-tg.info/. Enter A468 in the search box to learn more about \"Total Knee Replacement: Before Your Surgery. \"  Current as of: March 21, 2017  Content Version: 11.4  © 1484-9294 [employer]ub[employer]. Care instructions adapted under license by [employer] (which disclaims liability or warranty for this information). If you have questions about a medical condition or this instruction, always ask your healthcare professional. [address] [address] any warranty or liability for your use of this information.

## 2018-03-12 ENCOUNTER — HOSPITAL ENCOUNTER (OUTPATIENT)
Dept: PREADMISSION TESTING | Age: 58
Discharge: HOME | End: 2018-03-12
Payer: MEDICARE

## 2018-03-12 VITALS
HEART RATE: 87 BPM | BODY MASS INDEX: 32 KG/M2 | RESPIRATION RATE: 15 BRPM | TEMPERATURE: 98.2 F | SYSTOLIC BLOOD PRESSURE: 146 MMHG | HEIGHT: 60 IN | WEIGHT: 163 LBS | DIASTOLIC BLOOD PRESSURE: 86 MMHG

## 2018-03-12 LAB
ABO + RH BLD: NORMAL
ANION GAP SERPL CALC-SCNC: 6 MMOL/L (ref 5–15)
APPEARANCE UR: ABNORMAL
BACTERIA URNS QL MICRO: ABNORMAL /HPF
BILIRUB UR QL: NEGATIVE
BLOOD GROUP ANTIBODIES SERPL: NORMAL
BUN SERPL-MCNC: 15 MG/DL (ref 6–20)
BUN/CREAT SERPL: 16 (ref 12–20)
CALCIUM SERPL-MCNC: 8.8 MG/DL (ref 8.5–10.1)
CAOX CRY URNS QL MICRO: ABNORMAL
CHLORIDE SERPL-SCNC: 108 MMOL/L (ref 97–108)
CO2 SERPL-SCNC: 29 MMOL/L (ref 21–32)
COLOR UR: ABNORMAL
CREAT SERPL-MCNC: 0.91 MG/DL (ref 0.55–1.02)
EPITH CASTS URNS QL MICRO: ABNORMAL /LPF
ERYTHROCYTE [DISTWIDTH] IN BLOOD BY AUTOMATED COUNT: 12.4 % (ref 11.5–14.5)
EST. AVERAGE GLUCOSE BLD GHB EST-MCNC: 105 MG/DL
GLUCOSE SERPL-MCNC: 103 MG/DL (ref 65–100)
GLUCOSE UR STRIP.AUTO-MCNC: NEGATIVE MG/DL
HBA1C MFR BLD: 5.3 % (ref 4.2–6.3)
HCT VFR BLD AUTO: 35.6 % (ref 35–47)
HGB BLD-MCNC: 11.6 G/DL (ref 11.5–16)
HGB UR QL STRIP: NEGATIVE
INR PPP: 1.1 (ref 0.9–1.1)
KETONES UR QL STRIP.AUTO: NEGATIVE MG/DL
LEUKOCYTE ESTERASE UR QL STRIP.AUTO: NEGATIVE
MCH RBC QN AUTO: 28.9 PG (ref 26–34)
MCHC RBC AUTO-ENTMCNC: 32.6 G/DL (ref 30–36.5)
MCV RBC AUTO: 88.8 FL (ref 80–99)
NITRITE UR QL STRIP.AUTO: NEGATIVE
NRBC # BLD: 0 K/UL (ref 0–0.01)
NRBC BLD-RTO: 0 PER 100 WBC
PH UR STRIP: 5.5 [PH] (ref 5–8)
PLATELET # BLD AUTO: 262 K/UL (ref 150–400)
PMV BLD AUTO: 8.9 FL (ref 8.9–12.9)
POTASSIUM SERPL-SCNC: 3.8 MMOL/L (ref 3.5–5.1)
PROT UR STRIP-MCNC: NEGATIVE MG/DL
PROTHROMBIN TIME: 10.9 SEC (ref 9–11.1)
RBC # BLD AUTO: 4.01 M/UL (ref 3.8–5.2)
RBC #/AREA URNS HPF: ABNORMAL /HPF (ref 0–5)
SODIUM SERPL-SCNC: 143 MMOL/L (ref 136–145)
SP GR UR REFRACTOMETRY: 1.02 (ref 1–1.03)
SPECIMEN EXP DATE BLD: NORMAL
UA: UC IF INDICATED,UAUC: ABNORMAL
UROBILINOGEN UR QL STRIP.AUTO: 0.2 EU/DL (ref 0.2–1)
WBC # BLD AUTO: 4.7 K/UL (ref 3.6–11)
WBC URNS QL MICRO: ABNORMAL /HPF (ref 0–4)

## 2018-03-12 PROCEDURE — 86900 BLOOD TYPING SEROLOGIC ABO: CPT | Performed by: ORTHOPAEDIC SURGERY

## 2018-03-12 PROCEDURE — 83036 HEMOGLOBIN GLYCOSYLATED A1C: CPT | Performed by: ORTHOPAEDIC SURGERY

## 2018-03-12 PROCEDURE — 80048 BASIC METABOLIC PNL TOTAL CA: CPT | Performed by: ORTHOPAEDIC SURGERY

## 2018-03-12 PROCEDURE — 85027 COMPLETE CBC AUTOMATED: CPT | Performed by: ORTHOPAEDIC SURGERY

## 2018-03-12 PROCEDURE — 87086 URINE CULTURE/COLONY COUNT: CPT | Performed by: ORTHOPAEDIC SURGERY

## 2018-03-12 PROCEDURE — 85610 PROTHROMBIN TIME: CPT | Performed by: ORTHOPAEDIC SURGERY

## 2018-03-12 PROCEDURE — 81001 URINALYSIS AUTO W/SCOPE: CPT | Performed by: ORTHOPAEDIC SURGERY

## 2018-03-12 PROCEDURE — 93005 ELECTROCARDIOGRAM TRACING: CPT

## 2018-03-12 RX ORDER — CETIRIZINE HCL 10 MG
10 TABLET ORAL DAILY
COMMUNITY
End: 2021-03-16 | Stop reason: ALTCHOICE

## 2018-03-12 RX ORDER — DIPHENHYDRAMINE HCL 25 MG
50 TABLET ORAL
COMMUNITY
End: 2019-09-03 | Stop reason: ALTCHOICE

## 2018-03-13 LAB
ATRIAL RATE: 78 BPM
BACTERIA SPEC CULT: NORMAL
CALCULATED P AXIS, ECG09: 61 DEGREES
CALCULATED R AXIS, ECG10: -4 DEGREES
CALCULATED T AXIS, ECG11: 31 DEGREES
CC UR VC: NORMAL
DIAGNOSIS, 93000: NORMAL
P-R INTERVAL, ECG05: 162 MS
Q-T INTERVAL, ECG07: 386 MS
QRS DURATION, ECG06: 100 MS
QTC CALCULATION (BEZET), ECG08: 440 MS
SERVICE CMNT-IMP: NORMAL
SERVICE CMNT-IMP: NORMAL
VENTRICULAR RATE, ECG03: 78 BPM

## 2018-03-14 RX ORDER — CEFAZOLIN SODIUM 2 G/50ML
2 SOLUTION INTRAVENOUS ONCE
Status: CANCELLED | OUTPATIENT
Start: 2018-03-19 | End: 2018-03-19

## 2018-03-14 RX ORDER — ACETAMINOPHEN 500 MG
1000 TABLET ORAL ONCE
Status: CANCELLED | OUTPATIENT
Start: 2018-03-19 | End: 2018-03-19

## 2018-03-14 RX ORDER — CELECOXIB 200 MG/1
200 CAPSULE ORAL ONCE
Status: CANCELLED | OUTPATIENT
Start: 2018-03-19 | End: 2018-03-19

## 2018-03-14 RX ORDER — DEXAMETHASONE SODIUM PHOSPHATE 10 MG/ML
4 INJECTION INTRAMUSCULAR; INTRAVENOUS ONCE
Status: CANCELLED | OUTPATIENT
Start: 2018-03-14 | End: 2018-03-14

## 2018-03-14 RX ORDER — PREGABALIN 75 MG/1
75 CAPSULE ORAL ONCE
Status: CANCELLED | OUTPATIENT
Start: 2018-03-19 | End: 2018-03-19

## 2018-03-15 DIAGNOSIS — K59.00 CONSTIPATION, UNSPECIFIED CONSTIPATION TYPE: ICD-10-CM

## 2018-03-15 RX ORDER — POLYETHYLENE GLYCOL 3350 17 G/17G
POWDER, FOR SOLUTION ORAL
Qty: 850 G | Refills: 3 | Status: SHIPPED | OUTPATIENT
Start: 2018-03-15 | End: 2018-07-01 | Stop reason: SDUPTHER

## 2018-03-15 RX ORDER — AMITRIPTYLINE HYDROCHLORIDE 75 MG/1
TABLET, FILM COATED ORAL
Qty: 30 TAB | Refills: 2 | Status: SHIPPED | OUTPATIENT
Start: 2018-03-15 | End: 2018-06-05 | Stop reason: SDUPTHER

## 2018-03-15 NOTE — TELEPHONE ENCOUNTER
Requested Prescriptions     Pending Prescriptions Disp Refills    amitriptyline (ELAVIL) 75 mg tablet [Pharmacy Med Name: AMITRIPTYLINE HCL 75 MG TAB 75 TAB] 30 Tab 2     Sig: TAKE 1 TABLET BY MOUTH NIGHTLY.

## 2018-03-15 NOTE — TELEPHONE ENCOUNTER
Pt stated the reason she had to cancel her f/u is due to knee surgery, will r/s once she is feeling better.

## 2018-03-19 ENCOUNTER — ANESTHESIA (OUTPATIENT)
Dept: SURGERY | Age: 58
DRG: 470 | End: 2018-03-19
Payer: MEDICARE

## 2018-03-19 ENCOUNTER — APPOINTMENT (OUTPATIENT)
Dept: GENERAL RADIOLOGY | Age: 58
DRG: 470 | End: 2018-03-19
Attending: ORTHOPAEDIC SURGERY
Payer: MEDICARE

## 2018-03-19 ENCOUNTER — ANESTHESIA EVENT (OUTPATIENT)
Dept: SURGERY | Age: 58
DRG: 470 | End: 2018-03-19
Payer: MEDICARE

## 2018-03-19 ENCOUNTER — HOSPITAL ENCOUNTER (INPATIENT)
Age: 58
LOS: 1 days | Discharge: HOME HEALTH | DRG: 470 | End: 2018-03-22
Attending: ORTHOPAEDIC SURGERY | Admitting: ORTHOPAEDIC SURGERY
Payer: MEDICARE

## 2018-03-19 DIAGNOSIS — M17.11 PRIMARY OSTEOARTHRITIS OF RIGHT KNEE: Primary | ICD-10-CM

## 2018-03-19 LAB
GLUCOSE BLD STRIP.AUTO-MCNC: 126 MG/DL (ref 65–100)
SERVICE CMNT-IMP: ABNORMAL

## 2018-03-19 PROCEDURE — 77030020788: Performed by: ORTHOPAEDIC SURGERY

## 2018-03-19 PROCEDURE — C9290 INJ, BUPIVACAINE LIPOSOME: HCPCS | Performed by: ORTHOPAEDIC SURGERY

## 2018-03-19 PROCEDURE — C1713 ANCHOR/SCREW BN/BN,TIS/BN: HCPCS | Performed by: ORTHOPAEDIC SURGERY

## 2018-03-19 PROCEDURE — 74011250637 HC RX REV CODE- 250/637: Performed by: ORTHOPAEDIC SURGERY

## 2018-03-19 PROCEDURE — 97116 GAIT TRAINING THERAPY: CPT

## 2018-03-19 PROCEDURE — 74011250636 HC RX REV CODE- 250/636

## 2018-03-19 PROCEDURE — 77030000032 HC CUF TRNQT ZIMM -B: Performed by: ORTHOPAEDIC SURGERY

## 2018-03-19 PROCEDURE — 73560 X-RAY EXAM OF KNEE 1 OR 2: CPT

## 2018-03-19 PROCEDURE — 74011250636 HC RX REV CODE- 250/636: Performed by: ANESTHESIOLOGY

## 2018-03-19 PROCEDURE — G8979 MOBILITY GOAL STATUS: HCPCS

## 2018-03-19 PROCEDURE — 76210000017 HC OR PH I REC 1.5 TO 2 HR: Performed by: ORTHOPAEDIC SURGERY

## 2018-03-19 PROCEDURE — 77030018842 HC SOL IRR SOD CL 9% BAXT -A: Performed by: ORTHOPAEDIC SURGERY

## 2018-03-19 PROCEDURE — 74011000250 HC RX REV CODE- 250: Performed by: ORTHOPAEDIC SURGERY

## 2018-03-19 PROCEDURE — 77030010507 HC ADH SKN DERMBND J&J -B: Performed by: ORTHOPAEDIC SURGERY

## 2018-03-19 PROCEDURE — 3E0T3BZ INTRODUCTION OF ANESTHETIC AGENT INTO PERIPHERAL NERVES AND PLEXI, PERCUTANEOUS APPROACH: ICD-10-PCS | Performed by: ANESTHESIOLOGY

## 2018-03-19 PROCEDURE — 74011000250 HC RX REV CODE- 250

## 2018-03-19 PROCEDURE — C1776 JOINT DEVICE (IMPLANTABLE): HCPCS | Performed by: ORTHOPAEDIC SURGERY

## 2018-03-19 PROCEDURE — 99218 HC RM OBSERVATION: CPT

## 2018-03-19 PROCEDURE — 74011000258 HC RX REV CODE- 258: Performed by: ORTHOPAEDIC SURGERY

## 2018-03-19 PROCEDURE — 77030006822 HC BLD SAW SAG BRSM -B: Performed by: ORTHOPAEDIC SURGERY

## 2018-03-19 PROCEDURE — 64450 NJX AA&/STRD OTHER PN/BRANCH: CPT

## 2018-03-19 PROCEDURE — 77030028224 HC PDNG CST BSNM -A: Performed by: ORTHOPAEDIC SURGERY

## 2018-03-19 PROCEDURE — G8978 MOBILITY CURRENT STATUS: HCPCS

## 2018-03-19 PROCEDURE — 0SRC0J9 REPLACEMENT OF RIGHT KNEE JOINT WITH SYNTHETIC SUBSTITUTE, CEMENTED, OPEN APPROACH: ICD-10-PCS | Performed by: ORTHOPAEDIC SURGERY

## 2018-03-19 PROCEDURE — 77030002933 HC SUT MCRYL J&J -A: Performed by: ORTHOPAEDIC SURGERY

## 2018-03-19 PROCEDURE — 77030031139 HC SUT VCRL2 J&J -A: Performed by: ORTHOPAEDIC SURGERY

## 2018-03-19 PROCEDURE — 97161 PT EVAL LOW COMPLEX 20 MIN: CPT

## 2018-03-19 PROCEDURE — 77030020268 HC MISC GENERAL SUPPLY: Performed by: ORTHOPAEDIC SURGERY

## 2018-03-19 PROCEDURE — 77030011640 HC PAD GRND REM COVD -A: Performed by: ORTHOPAEDIC SURGERY

## 2018-03-19 PROCEDURE — 74011250636 HC RX REV CODE- 250/636: Performed by: ORTHOPAEDIC SURGERY

## 2018-03-19 PROCEDURE — 77030012935 HC DRSG AQUACEL BMS -B: Performed by: ORTHOPAEDIC SURGERY

## 2018-03-19 PROCEDURE — 77030010783 HC BOWL MX BN CEM J&J -B: Performed by: ORTHOPAEDIC SURGERY

## 2018-03-19 PROCEDURE — 76060000036 HC ANESTHESIA 2.5 TO 3 HR: Performed by: ORTHOPAEDIC SURGERY

## 2018-03-19 PROCEDURE — 77030018846 HC SOL IRR STRL H20 ICUM -A: Performed by: ORTHOPAEDIC SURGERY

## 2018-03-19 PROCEDURE — 76010000172 HC OR TIME 2.5 TO 3 HR INTENSV-TIER 1: Performed by: ORTHOPAEDIC SURGERY

## 2018-03-19 PROCEDURE — 77030033067 HC SUT PDO STRATFX SPIR J&J -B: Performed by: ORTHOPAEDIC SURGERY

## 2018-03-19 PROCEDURE — 77030007866 HC KT SPN ANES BBMI -B: Performed by: ANESTHESIOLOGY

## 2018-03-19 PROCEDURE — 77030011943: Performed by: ORTHOPAEDIC SURGERY

## 2018-03-19 PROCEDURE — 77030018836 HC SOL IRR NACL ICUM -A: Performed by: ORTHOPAEDIC SURGERY

## 2018-03-19 PROCEDURE — 82962 GLUCOSE BLOOD TEST: CPT

## 2018-03-19 DEVICE — CRUCIATE RETAINING FEMORAL
Type: IMPLANTABLE DEVICE | Site: KNEE | Status: FUNCTIONAL
Brand: TRIATHLON

## 2018-03-19 DEVICE — COMPONENT KNEE CEM X3 TRIATHLON: Type: IMPLANTABLE DEVICE | Status: FUNCTIONAL

## 2018-03-19 DEVICE — CEMENT BNE 40GM FULL DOSE PMMA W/ GENT HI VISC RADPQ LNG: Type: IMPLANTABLE DEVICE | Site: KNEE | Status: FUNCTIONAL

## 2018-03-19 DEVICE — UNIVERSAL TIBIAL BASEPLATE
Type: IMPLANTABLE DEVICE | Site: KNEE | Status: FUNCTIONAL
Brand: TRIATHLON

## 2018-03-19 DEVICE — ASYMMETRIC PATELLA
Type: IMPLANTABLE DEVICE | Site: KNEE | Status: FUNCTIONAL
Brand: TRIATHLON

## 2018-03-19 DEVICE — TIBIAL BEARING INSERT
Type: IMPLANTABLE DEVICE | Site: KNEE | Status: FUNCTIONAL
Brand: TRIATHLON

## 2018-03-19 RX ORDER — AMOXICILLIN 250 MG
1 CAPSULE ORAL 2 TIMES DAILY
Status: DISCONTINUED | OUTPATIENT
Start: 2018-03-19 | End: 2018-03-22 | Stop reason: HOSPADM

## 2018-03-19 RX ORDER — DEXAMETHASONE SODIUM PHOSPHATE 10 MG/ML
4 INJECTION INTRAMUSCULAR; INTRAVENOUS ONCE
Status: DISCONTINUED | OUTPATIENT
Start: 2018-03-19 | End: 2018-03-19 | Stop reason: HOSPADM

## 2018-03-19 RX ORDER — ACETAMINOPHEN 325 MG/1
650 TABLET ORAL EVERY 6 HOURS
Status: DISCONTINUED | OUTPATIENT
Start: 2018-03-19 | End: 2018-03-22 | Stop reason: HOSPADM

## 2018-03-19 RX ORDER — SODIUM CHLORIDE 0.9 % (FLUSH) 0.9 %
5-10 SYRINGE (ML) INJECTION AS NEEDED
Status: DISCONTINUED | OUTPATIENT
Start: 2018-03-19 | End: 2018-03-19 | Stop reason: HOSPADM

## 2018-03-19 RX ORDER — CEFAZOLIN SODIUM 2 G/50ML
2 SOLUTION INTRAVENOUS ONCE
Status: COMPLETED | OUTPATIENT
Start: 2018-03-19 | End: 2018-03-19

## 2018-03-19 RX ORDER — HYDROMORPHONE HYDROCHLORIDE 1 MG/ML
0.5 INJECTION, SOLUTION INTRAMUSCULAR; INTRAVENOUS; SUBCUTANEOUS
Status: ACTIVE | OUTPATIENT
Start: 2018-03-19 | End: 2018-03-20

## 2018-03-19 RX ORDER — OXYCODONE HYDROCHLORIDE 5 MG/1
5 TABLET ORAL
Status: DISCONTINUED | OUTPATIENT
Start: 2018-03-19 | End: 2018-03-22 | Stop reason: HOSPADM

## 2018-03-19 RX ORDER — SODIUM CHLORIDE, SODIUM LACTATE, POTASSIUM CHLORIDE, CALCIUM CHLORIDE 600; 310; 30; 20 MG/100ML; MG/100ML; MG/100ML; MG/100ML
125 INJECTION, SOLUTION INTRAVENOUS CONTINUOUS
Status: DISCONTINUED | OUTPATIENT
Start: 2018-03-19 | End: 2018-03-19 | Stop reason: HOSPADM

## 2018-03-19 RX ORDER — EPINEPHRINE 1 MG/ML
INJECTION, SOLUTION, CONCENTRATE INTRAVENOUS AS NEEDED
Status: DISCONTINUED | OUTPATIENT
Start: 2018-03-19 | End: 2018-03-19 | Stop reason: HOSPADM

## 2018-03-19 RX ORDER — FENOFIBRATE 145 MG/1
145 TABLET, COATED ORAL
Status: DISCONTINUED | OUTPATIENT
Start: 2018-03-20 | End: 2018-03-22 | Stop reason: HOSPADM

## 2018-03-19 RX ORDER — GABAPENTIN 100 MG/1
200 CAPSULE ORAL 2 TIMES DAILY WITH MEALS
Status: DISCONTINUED | OUTPATIENT
Start: 2018-03-19 | End: 2018-03-22 | Stop reason: HOSPADM

## 2018-03-19 RX ORDER — SODIUM CHLORIDE 0.9 % (FLUSH) 0.9 %
5-10 SYRINGE (ML) INJECTION EVERY 8 HOURS
Status: DISCONTINUED | OUTPATIENT
Start: 2018-03-19 | End: 2018-03-19 | Stop reason: HOSPADM

## 2018-03-19 RX ORDER — SODIUM CHLORIDE 0.9 % (FLUSH) 0.9 %
5-10 SYRINGE (ML) INJECTION AS NEEDED
Status: DISCONTINUED | OUTPATIENT
Start: 2018-03-19 | End: 2018-03-22 | Stop reason: HOSPADM

## 2018-03-19 RX ORDER — OXYCODONE AND ACETAMINOPHEN 5; 325 MG/1; MG/1
1 TABLET ORAL AS NEEDED
Status: DISCONTINUED | OUTPATIENT
Start: 2018-03-19 | End: 2018-03-19 | Stop reason: HOSPADM

## 2018-03-19 RX ORDER — CEFAZOLIN SODIUM 2 G/50ML
2 SOLUTION INTRAVENOUS EVERY 8 HOURS
Status: COMPLETED | OUTPATIENT
Start: 2018-03-19 | End: 2018-03-20

## 2018-03-19 RX ORDER — BUPIVACAINE HYDROCHLORIDE 5 MG/ML
INJECTION, SOLUTION EPIDURAL; INTRACAUDAL AS NEEDED
Status: DISCONTINUED | OUTPATIENT
Start: 2018-03-19 | End: 2018-03-19 | Stop reason: HOSPADM

## 2018-03-19 RX ORDER — SODIUM CHLORIDE 9 MG/ML
50 INJECTION, SOLUTION INTRAVENOUS CONTINUOUS
Status: DISCONTINUED | OUTPATIENT
Start: 2018-03-19 | End: 2018-03-19 | Stop reason: HOSPADM

## 2018-03-19 RX ORDER — CELECOXIB 200 MG/1
200 CAPSULE ORAL ONCE
Status: COMPLETED | OUTPATIENT
Start: 2018-03-19 | End: 2018-03-19

## 2018-03-19 RX ORDER — SODIUM CHLORIDE 9 MG/ML
1000 INJECTION, SOLUTION INTRAVENOUS CONTINUOUS
Status: DISCONTINUED | OUTPATIENT
Start: 2018-03-19 | End: 2018-03-19 | Stop reason: HOSPADM

## 2018-03-19 RX ORDER — MORPHINE SULFATE 10 MG/ML
2 INJECTION, SOLUTION INTRAMUSCULAR; INTRAVENOUS
Status: DISCONTINUED | OUTPATIENT
Start: 2018-03-19 | End: 2018-03-19 | Stop reason: HOSPADM

## 2018-03-19 RX ORDER — LIDOCAINE HYDROCHLORIDE 10 MG/ML
0.1 INJECTION, SOLUTION EPIDURAL; INFILTRATION; INTRACAUDAL; PERINEURAL AS NEEDED
Status: DISCONTINUED | OUTPATIENT
Start: 2018-03-19 | End: 2018-03-19 | Stop reason: HOSPADM

## 2018-03-19 RX ORDER — HYDROXYZINE HYDROCHLORIDE 10 MG/1
10 TABLET, FILM COATED ORAL
Status: DISCONTINUED | OUTPATIENT
Start: 2018-03-19 | End: 2018-03-20

## 2018-03-19 RX ORDER — ONDANSETRON 2 MG/ML
INJECTION INTRAMUSCULAR; INTRAVENOUS AS NEEDED
Status: DISCONTINUED | OUTPATIENT
Start: 2018-03-19 | End: 2018-03-19 | Stop reason: HOSPADM

## 2018-03-19 RX ORDER — FACIAL-BODY WIPES
10 EACH TOPICAL DAILY PRN
Status: DISCONTINUED | OUTPATIENT
Start: 2018-03-21 | End: 2018-03-22 | Stop reason: HOSPADM

## 2018-03-19 RX ORDER — KETOROLAC TROMETHAMINE 30 MG/ML
30 INJECTION, SOLUTION INTRAMUSCULAR; INTRAVENOUS EVERY 6 HOURS
Status: DISCONTINUED | OUTPATIENT
Start: 2018-03-19 | End: 2018-03-20

## 2018-03-19 RX ORDER — SODIUM CHLORIDE 0.9 % (FLUSH) 0.9 %
5-10 SYRINGE (ML) INJECTION EVERY 8 HOURS
Status: DISCONTINUED | OUTPATIENT
Start: 2018-03-20 | End: 2018-03-22 | Stop reason: HOSPADM

## 2018-03-19 RX ORDER — SODIUM CHLORIDE 9 MG/ML
125 INJECTION, SOLUTION INTRAVENOUS CONTINUOUS
Status: DISPENSED | OUTPATIENT
Start: 2018-03-19 | End: 2018-03-20

## 2018-03-19 RX ORDER — ACETAMINOPHEN 500 MG
1000 TABLET ORAL ONCE
Status: COMPLETED | OUTPATIENT
Start: 2018-03-19 | End: 2018-03-19

## 2018-03-19 RX ORDER — PHENYLEPHRINE HCL IN 0.9% NACL 0.4MG/10ML
SYRINGE (ML) INTRAVENOUS AS NEEDED
Status: DISCONTINUED | OUTPATIENT
Start: 2018-03-19 | End: 2018-03-19 | Stop reason: HOSPADM

## 2018-03-19 RX ORDER — DICLOFENAC SODIUM 50 MG/1
50 TABLET, DELAYED RELEASE ORAL 2 TIMES DAILY
Status: DISCONTINUED | OUTPATIENT
Start: 2018-03-19 | End: 2018-03-20

## 2018-03-19 RX ORDER — DIPHENHYDRAMINE HYDROCHLORIDE 50 MG/ML
12.5 INJECTION, SOLUTION INTRAMUSCULAR; INTRAVENOUS AS NEEDED
Status: DISCONTINUED | OUTPATIENT
Start: 2018-03-19 | End: 2018-03-19 | Stop reason: HOSPADM

## 2018-03-19 RX ORDER — LORAZEPAM 0.5 MG/1
0.5 TABLET ORAL
Status: DISCONTINUED | OUTPATIENT
Start: 2018-03-19 | End: 2018-03-22 | Stop reason: HOSPADM

## 2018-03-19 RX ORDER — MIDAZOLAM HYDROCHLORIDE 1 MG/ML
1 INJECTION, SOLUTION INTRAMUSCULAR; INTRAVENOUS AS NEEDED
Status: DISCONTINUED | OUTPATIENT
Start: 2018-03-19 | End: 2018-03-19 | Stop reason: HOSPADM

## 2018-03-19 RX ORDER — PREGABALIN 75 MG/1
75 CAPSULE ORAL ONCE
Status: COMPLETED | OUTPATIENT
Start: 2018-03-19 | End: 2018-03-19

## 2018-03-19 RX ORDER — ONDANSETRON 2 MG/ML
4 INJECTION INTRAMUSCULAR; INTRAVENOUS
Status: ACTIVE | OUTPATIENT
Start: 2018-03-19 | End: 2018-03-20

## 2018-03-19 RX ORDER — PROPOFOL 10 MG/ML
INJECTION, EMULSION INTRAVENOUS
Status: DISCONTINUED | OUTPATIENT
Start: 2018-03-19 | End: 2018-03-19 | Stop reason: HOSPADM

## 2018-03-19 RX ORDER — ONDANSETRON 2 MG/ML
4 INJECTION INTRAMUSCULAR; INTRAVENOUS AS NEEDED
Status: DISCONTINUED | OUTPATIENT
Start: 2018-03-19 | End: 2018-03-19 | Stop reason: HOSPADM

## 2018-03-19 RX ORDER — LOSARTAN POTASSIUM 25 MG/1
25 TABLET ORAL DAILY
Status: DISCONTINUED | OUTPATIENT
Start: 2018-03-20 | End: 2018-03-22 | Stop reason: HOSPADM

## 2018-03-19 RX ORDER — POLYETHYLENE GLYCOL 3350 17 G/17G
17 POWDER, FOR SOLUTION ORAL DAILY
Status: DISCONTINUED | OUTPATIENT
Start: 2018-03-20 | End: 2018-03-22 | Stop reason: HOSPADM

## 2018-03-19 RX ORDER — OLANZAPINE 5 MG/1
10 TABLET ORAL
Status: DISCONTINUED | OUTPATIENT
Start: 2018-03-19 | End: 2018-03-22 | Stop reason: HOSPADM

## 2018-03-19 RX ORDER — FAMOTIDINE 20 MG/1
20 TABLET, FILM COATED ORAL 2 TIMES DAILY
Status: DISCONTINUED | OUTPATIENT
Start: 2018-03-19 | End: 2018-03-22 | Stop reason: HOSPADM

## 2018-03-19 RX ORDER — TRANEXAMIC ACID 100 MG/ML
INJECTION, SOLUTION INTRAVENOUS AS NEEDED
Status: DISCONTINUED | OUTPATIENT
Start: 2018-03-19 | End: 2018-03-19 | Stop reason: HOSPADM

## 2018-03-19 RX ORDER — FENTANYL CITRATE 50 UG/ML
25 INJECTION, SOLUTION INTRAMUSCULAR; INTRAVENOUS
Status: DISCONTINUED | OUTPATIENT
Start: 2018-03-19 | End: 2018-03-19 | Stop reason: HOSPADM

## 2018-03-19 RX ORDER — FENTANYL CITRATE 50 UG/ML
50 INJECTION, SOLUTION INTRAMUSCULAR; INTRAVENOUS AS NEEDED
Status: DISCONTINUED | OUTPATIENT
Start: 2018-03-19 | End: 2018-03-19 | Stop reason: HOSPADM

## 2018-03-19 RX ORDER — NALOXONE HYDROCHLORIDE 0.4 MG/ML
0.4 INJECTION, SOLUTION INTRAMUSCULAR; INTRAVENOUS; SUBCUTANEOUS AS NEEDED
Status: DISCONTINUED | OUTPATIENT
Start: 2018-03-19 | End: 2018-03-22 | Stop reason: HOSPADM

## 2018-03-19 RX ORDER — MIDAZOLAM HYDROCHLORIDE 1 MG/ML
0.5 INJECTION, SOLUTION INTRAMUSCULAR; INTRAVENOUS
Status: DISCONTINUED | OUTPATIENT
Start: 2018-03-19 | End: 2018-03-19 | Stop reason: HOSPADM

## 2018-03-19 RX ORDER — ASPIRIN 325 MG
325 TABLET, DELAYED RELEASE (ENTERIC COATED) ORAL 2 TIMES DAILY
Status: DISCONTINUED | OUTPATIENT
Start: 2018-03-19 | End: 2018-03-22 | Stop reason: HOSPADM

## 2018-03-19 RX ADMIN — OXYCODONE HYDROCHLORIDE 5 MG: 5 TABLET ORAL at 21:53

## 2018-03-19 RX ADMIN — PROPOFOL 70 MCG/KG/MIN: 10 INJECTION, EMULSION INTRAVENOUS at 10:42

## 2018-03-19 RX ADMIN — KETOROLAC TROMETHAMINE 30 MG: 30 INJECTION, SOLUTION INTRAMUSCULAR at 17:16

## 2018-03-19 RX ADMIN — DOCUSATE SODIUM AND SENNOSIDES 1 TABLET: 8.6; 5 TABLET, FILM COATED ORAL at 17:16

## 2018-03-19 RX ADMIN — AMITRIPTYLINE HYDROCHLORIDE 75 MG: 25 TABLET, FILM COATED ORAL at 21:53

## 2018-03-19 RX ADMIN — KETOROLAC TROMETHAMINE 30 MG: 30 INJECTION, SOLUTION INTRAMUSCULAR at 23:25

## 2018-03-19 RX ADMIN — BUPIVACAINE HYDROCHLORIDE 12 MG: 5 INJECTION, SOLUTION EPIDURAL; INTRACAUDAL at 10:39

## 2018-03-19 RX ADMIN — ACETAMINOPHEN 650 MG: 325 TABLET, FILM COATED ORAL at 23:25

## 2018-03-19 RX ADMIN — Medication 40 MCG: at 10:48

## 2018-03-19 RX ADMIN — FAMOTIDINE 20 MG: 20 TABLET, FILM COATED ORAL at 20:44

## 2018-03-19 RX ADMIN — CELECOXIB 200 MG: 200 CAPSULE ORAL at 08:29

## 2018-03-19 RX ADMIN — SODIUM CHLORIDE 125 ML/HR: 900 INJECTION, SOLUTION INTRAVENOUS at 14:42

## 2018-03-19 RX ADMIN — CEFAZOLIN SODIUM 2 G: 2 SOLUTION INTRAVENOUS at 10:35

## 2018-03-19 RX ADMIN — DICLOFENAC SODIUM 50 MG: 50 TABLET, DELAYED RELEASE ORAL at 17:16

## 2018-03-19 RX ADMIN — GABAPENTIN 200 MG: 100 CAPSULE ORAL at 17:16

## 2018-03-19 RX ADMIN — MIDAZOLAM HYDROCHLORIDE 5 MG: 1 INJECTION, SOLUTION INTRAMUSCULAR; INTRAVENOUS at 09:35

## 2018-03-19 RX ADMIN — SODIUM CHLORIDE 125 ML/HR: 900 INJECTION, SOLUTION INTRAVENOUS at 23:27

## 2018-03-19 RX ADMIN — ACETAMINOPHEN 1000 MG: 500 TABLET, FILM COATED ORAL at 08:29

## 2018-03-19 RX ADMIN — ASPIRIN 325 MG: 325 TABLET, DELAYED RELEASE ORAL at 19:48

## 2018-03-19 RX ADMIN — SODIUM CHLORIDE, SODIUM LACTATE, POTASSIUM CHLORIDE, AND CALCIUM CHLORIDE 125 ML/HR: 600; 310; 30; 20 INJECTION, SOLUTION INTRAVENOUS at 08:42

## 2018-03-19 RX ADMIN — FENTANYL CITRATE 100 MCG: 50 INJECTION, SOLUTION INTRAMUSCULAR; INTRAVENOUS at 09:35

## 2018-03-19 RX ADMIN — HYDROXYZINE HYDROCHLORIDE 10 MG: 10 TABLET, FILM COATED ORAL at 20:44

## 2018-03-19 RX ADMIN — CEFAZOLIN SODIUM 2 G: 2 SOLUTION INTRAVENOUS at 19:48

## 2018-03-19 RX ADMIN — ONDANSETRON 4 MG: 2 INJECTION INTRAMUSCULAR; INTRAVENOUS at 10:35

## 2018-03-19 RX ADMIN — ACETAMINOPHEN 650 MG: 325 TABLET, FILM COATED ORAL at 17:16

## 2018-03-19 RX ADMIN — Medication 40 MCG: at 10:42

## 2018-03-19 RX ADMIN — OLANZAPINE 10 MG: 5 TABLET, FILM COATED ORAL at 17:16

## 2018-03-19 RX ADMIN — OXYCODONE HYDROCHLORIDE 5 MG: 5 TABLET ORAL at 18:08

## 2018-03-19 RX ADMIN — PREGABALIN 75 MG: 75 CAPSULE ORAL at 08:29

## 2018-03-19 NOTE — ANESTHESIA PROCEDURE NOTES
Peripheral Block    Start time: 3/19/2018 9:27 AM  End time: 3/19/2018 9:36 AM  Performed by: BEVERLY Robledo  Authorized by: BEVERLY Robledo       Pre-procedure: Indications: at surgeon's request and post-op pain management    Preanesthetic Checklist: patient identified, risks and benefits discussed, site marked, timeout performed, anesthesia consent given and patient being monitored    Timeout Time: 09:26          Block Type:   Block Type:   Adductor canal  Laterality:  Right  Monitoring:  Standard ASA monitoring, continuous pulse ox, frequent vital sign checks, heart rate, responsive to questions and oxygen  Injection Technique:  Single shot  Procedures: ultrasound guided    Patient Position: supine  Prep: chlorhexidine    Location:  Mid thigh  Needle Type:  Stimuplex  Needle Gauge:  22 G  Needle Localization:  Ultrasound guidance  Medication Injected:  0.5%  ropivacaine  Volume (mL):  25    Assessment:  Number of attempts:  1  Injection Assessment:  Incremental injection every 5 mL, local visualized surrounding nerve on ultrasound, negative aspiration for blood, no paresthesia, no intravascular symptoms and ultrasound image on chart  Patient tolerance:  Patient tolerated the procedure well with no immediate complications

## 2018-03-19 NOTE — H&P
Date of Surgery Update:  Alton Swain was seen and examined. History and physical has been reviewed. The patient has been examined. There have been no significant clinical changes since the completion of the originally dated History and Physical.  Patient identified by surgeon; surgical site was confirmed by patient and surgeon.     Signed By: Ashley Hayes MD     March 19, 2018 9:49 AM

## 2018-03-19 NOTE — PROGRESS NOTES
Bedside shift change report given to Deepak Peterson (oncoming nurse) by Oma Beck (offgoing nurse). Report included the following information SBAR, Kardex, OR Summary, Intake/Output, MAR and Recent Results.

## 2018-03-19 NOTE — ANESTHESIA PROCEDURE NOTES
Spinal Block    Start time: 3/19/2018 10:43 AM  End time: 3/19/2018 10:50 AM  Performed by: BEVERLY Donato  Authorized by: BEVERLY Donato     Pre-procedure:   Indications: primary anesthetic  Preanesthetic Checklist: patient identified, risks and benefits discussed, anesthesia consent, site marked, patient being monitored and timeout performed    Timeout Time: 10:43          Spinal Block:   Patient Position:  Seated  Prep Region:  Lumbar  Prep: Betadine      Location:  L3-4  Technique:  Single shot  Local:  Lidocaine 1%      Needle:   Needle Type:  Pencil-tip  Needle Gauge:  25 G  Attempts:  1      Events: CSF confirmed, no blood with aspiration and no paresthesia        Assessment:  Insertion:  Uncomplicated  Patient tolerance:  Patient tolerated the procedure well with no immediate complications

## 2018-03-19 NOTE — PERIOP NOTES
1110: Called surgical waiting area, updated pt's counselor Juan Gr" on start of surgical procedure    Patient: Nimco Howard MRN: [id number]  SSN: [id number]   YOB: 1960  Age: 59 y.o. Sex: female     Patient is status post Procedure(s):  RIGHT TOTAL KNEE ARTHROPLASTY . Surgeon(s) and Role:     * Christiana Murguia MD - Primary    Local/Dose/Irrigation:  2 grams TXA with  0.4 mg epi, mixture 20 mL Exparel/30 mL 0.5% marcaine plain/15 mL NS                  Peripheral IV 03/19/18 Left;Mid; Inner Arm (Active)   Site Assessment Clean, dry, & intact 3/19/2018  8:41 AM   Phlebitis Assessment 0 3/19/2018  8:41 AM   Infiltration Assessment 0 3/19/2018  8:41 AM   Dressing Status Clean, dry, & intact; New 3/19/2018  8:41 AM   Dressing Type Tape;Transparent 3/19/2018  8:41 AM   Hub Color/Line Status Green; Infusing 3/19/2018  8:41 AM   Action Taken Open ports on tubing capped 3/19/2018  8:41 AM   Alcohol Cap Used Yes 3/19/2018  8:41 AM                           Dressing/Packing:  Wound Knee Right-DRESSING TYPE: Silver products; Cast padding;Elastic bandage (03/19/18 1226)  Splint/Cast:  ]    Other:  n/a

## 2018-03-19 NOTE — IP AVS SNAPSHOT
[address] [facility] 
[phone] Patient: Ade Krishnamurthy MRN: [id number] UOJ:[birth year] About your hospitalization You were admitted on:  March 19, 2018 You last received care in the:  [address] [facility] You were discharged on:  March 22, 2018 Why you were hospitalized Your primary diagnosis was:  Not on File Your diagnoses also included:  Osteoarthritis Of Right Knee, Total Knee Replacement Status, Right Follow-up Information Follow up With Details Comments Contact Info Elba Kilgore MD   [address] [facility] 
[phone] ALL ABOUT CARE   [address] [facility] [address] 
[phone] Care Advantage Bundle Program   The aid will start at 8am Friday March 23rd. Discharge Orders None A check stephen indicates which time of day the medication should be taken. My Medications START taking these medications Instructions Each Dose to Equal  
 Morning Noon Evening Bedtime  
 aspirin delayed-release 325 mg tablet Your last dose was: Your next dose is: Take 1 Tab by mouth two (2) times a day. 325 mg  
    
   
   
   
  
 diclofenac EC 50 mg EC tablet Commonly known as:  VOLTAREN Your last dose was: Your next dose is: Take 1 Tab by mouth two (2) times a day. 50 mg  
    
   
   
   
  
 oxyCODONE IR 5 mg immediate release tablet Commonly known as:  Valdene Faustino Your last dose was: Your next dose is: Take 1 Tab by mouth every three (3) hours as needed. Max Daily Amount: 40 mg.  
 5 mg CHANGE how you take these medications Instructions Each Dose to Equal  
 Morning Noon Evening Bedtime  
 alendronate 70 mg tablet Commonly known as:  FOSAMAX What changed:  when to take this Your last dose was: Your next dose is: Take 1 Tab by mouth every seven (7) days. 70 mg LORazepam 0.5 mg tablet Commonly known as:  ATIVAN What changed:   
- when to take this 
- additional instructions Your last dose was: Your next dose is: Take 1 Tab by mouth every eight (8) hours as needed for Anxiety. Max Daily Amount: 1.5 mg. D/C valium  
 0.5 mg  
    
   
   
   
  
  
CONTINUE taking these medications Instructions Each Dose to Equal  
 Morning Noon Evening Bedtime ACIDOPHILUS Cap Generic drug:  Lactobacillus acidophilus Your last dose was: Your next dose is: Take 1 Cap by mouth daily. 1 Cap  
    
   
   
   
  
 amitriptyline 75 mg tablet Commonly known as:  ELAVIL Your last dose was: Your next dose is: TAKE 1 TABLET BY MOUTH NIGHTLY. BENADRYL ALLERGY 25 mg tablet Generic drug:  diphenhydrAMINE Your last dose was: Your next dose is: Take 50 mg by mouth nightly. 50 mg  
    
   
   
   
  
 black cohosh 540 mg Cap Your last dose was: Your next dose is: Take 1 Tab by mouth daily. 1 Tab  
    
   
   
   
  
 calcium-cholecalciferol (D3) tablet Commonly known as:  CALTRATE 600+D Your last dose was: Your next dose is: Take 1 Tab by mouth daily. 1 Tab  
    
   
   
   
  
 cetirizine 10 mg tablet Commonly known as:  ZYRTEC Your last dose was: Your next dose is: Take 10 mg by mouth daily. 10 mg  
    
   
   
   
  
 estradiol 0.05 mg/24 hr  
Commonly known as:  Elsa Cervantes Your last dose was: Your next dose is:    
   
   
 1 Patch by TransDERmal route two (2) times a week on Wednesday and Saturday. 1 Patch  
    
   
   
   
  
 famotidine 20 mg tablet Commonly known as:  PEPCID Your last dose was: Your next dose is: TAKE 1 TABLET BY MOUTH TWO (2) TIMES A DAY. STOP OMEPRAZOLE. fenofibrate nanocrystallized 145 mg tablet Commonly known as:  [employer] Your last dose was: Your next dose is: Take 1 Tab by mouth every fourty-eight (48) hours. 145 mg FISH -160-1,000 mg Cap Generic drug:  omega 3-dha-epa-fish oil Your last dose was: Your next dose is: Take  by mouth.  
     
   
   
   
  
 gabapentin 100 mg capsule Commonly known as:  NEURONTIN Your last dose was: Your next dose is: TAKE 2 CAPSULES BY MOUTH TWO (2) TIMES A DAY. losartan 25 mg tablet Commonly known as:  COZAAR Your last dose was: Your next dose is: TAKE 1 TABLET BY MOUTH DAILY. OLANZapine 5 mg tablet Commonly known as:  ZyPREXA Your last dose was: Your next dose is: Take 10 mg by mouth daily (with dinner). 10 mg  
    
   
   
   
  
 polyethylene glycol 17 gram/dose powder Commonly known as:  Yvetta Slice Your last dose was: Your next dose is: TAKE 17 GRAMS (1 TABLESPOONFUL) MIXED IN LIQUID BY MOUTH DAILY AS DIRECTED. REFRESH OP Your last dose was: Your next dose is:    
   
   
 Apply  to eye daily as needed. REFRESH OPTIVE ADVANCED (PF) OP Your last dose was: Your next dose is:    
   
   
 Apply 1 Drop to eye two (2) times daily as needed. 1 Drop SF 5000 PLUS 1.1 % Crea Generic drug:  fluoride (sodium) Your last dose was: Your next dose is:    
   
   
 1 Tube. 1 Tube VITAMIN D3 1,000 unit Cap Generic drug:  cholecalciferol Your last dose was: Your next dose is: Take 1 Tab by mouth daily. Indications: VITAMIN D DEFICIENCY  
 1 Tab  
    
   
   
   
  
 vitamin E 400 unit capsule Commonly known as:  Avenfavio Robles [address] Your last dose was: Your next dose is: Take 400 Units by mouth daily. 400 Units Where to Get Your Medications Information on where to get these meds will be given to you by the nurse or doctor. ! Ask your nurse or doctor about these medications  
  aspirin delayed-release 325 mg tablet  
 diclofenac EC 50 mg EC tablet  
 oxyCODONE IR 5 mg immediate release tablet Discharge Instructions After Hospital Care Plan:  Discharge Instructions Knee Replacement Dr. Frankie Anders Patient Name: Anthony Matias Date of procedure: 3/19/2018 Procedure: Procedure(s): RIGHT TOTAL KNEE ARTHROPLASTY Surgeon: Surgeon(s) and Role: Simi Joy MD - Primary PCP: Sarah Nathan MD 
Date of discharge: No discharge date for patient encounter. Follow up appointments 
-follow up with Dr. Frankie Anders in 4 weeks. Call [phone], ext [phone] [facility]) to make an appointment. Home Health Agency: ________________________ phone: _____________________ The agency will contact you to arrange dates/times for visits. Please call them if you do not hear from them within 24 hours after you are discharged When to call your Orthopaedic Surgeon: 
-unrelieved pain 
-Signs of infection-if your incision is red; continues to have drainage; drainage has a foul odor or if you have a persistent fever over 101 degrees 
-Signs of a blood clot in your leg-calf pain, tenderness, redness, swelling of lower leg When to call your Primary Care Physician: 
-Concerns about medical conditions such as diabetes, high blood pressure, asthma, congestive heart failure 
-Call if blood sugars are elevated, persistent headache or dizziness, coughing or congestion, constipation or diarrhea, burning with urination, abnormal heart rate When to call 911and go to the nearest emergency room 
-acute onset of chest pain, shortness of breath, difficulty breathing Activity 
-walk with your walker or crutches putting as much weight on your leg as you can tolerate. Refer to pages 23-31 of your handbook for instructions and pictures 
-do 20 repetitions of each exercise 3 times each day as instructed by the physical therapist.  Refer to pages 32-40 of your handbook for exercise instructions and pictures 
-get up every one hour and walk (except at night when sleeping) 
-do not drive or operate heavy machinery Incision Care 
-the Aquacel (brown, waterproof) surgical dressing is to remain on your knee for 7 days. On the 7th day have someone gently peel the dressing off by carefully lifting the edge and stretching it slightly to break the adhesive seal and leave incision open to air. You may take a shower with the Aquacel dressing in place. Preventing blood clots  
-Take Aspirin 325 mg twice daily as prescribed  by Dr. Vannessa Chan for one month following surgery Pain management - Please take scheduled 650 mg tylenol every 6 hours for 4 weeks - Please take scheduled diclofenac 50 mg twice daily for 4 weeks - For breakthrough please take 5-10 mg oxycodone - While taking aspirin and diclofenac, please take famotidine (PEPCID) as prescribed 20 mg twice daily to prevent stomach ulcers/irritation.  
- If you have a history of stomach ulcers, do not take diclofenac. - Avoid alcoholic beverages while taking pain medication - Do not take any over-the-counter medication for pain except Tylenol (acetaminophen) - Please be aware that many medications contain Tylenol. We do not want you to over medicate so please read the information below as a guide. Do not take more than 4 Grams of Tylenol in a 24 hour - You may place an ice bag on your knee for 15-20 minutes after exercising and as needed throughout the day and night Diet 
-resume usual diet; drink plenty of fluids; eat foods high in fiber 
-you may want to take a stool softener (such as Senokot-S or Colace) to prevent constipation while you are taking pain medication. If constipation occurs, take a laxative (such as Dulcolax tablets, Milk of Magnesia, or a suppository) Home Health Care Protocol (to be followed by [address]) Nursing-per physicians order 
-remove Aquacel dressing at 7 days post-op  
-complete head to toe assessment, vital signs 
-medication reconciliation 
-review pain management 
-manage chronic medical conditions Physical Therapy-per physicians order Weight bearing status: 
Precautions at Admission: WBAT, Fall Mobility Status: 
Supine to Sit: Minimum assistance, Additional time, Assist x1 Sit to Stand: Minimum assistance, Additional time, Assist x1 Sit to Supine: Minimum assistance, Additional time, Assist x1 Gait: 
Distance (ft): 45 Feet (ft) Ambulation - Level of Assistance: Minimal assistance, Assist x1 Assistive Device: Gait belt, Walker, rolling Gait Abnormalities: Decreased step clearance, Step to gait ADL status overall composite: 
  
  
  
  
  
 
Physical Therapy 
-assessment and evaluation-bed mobility; functional transfers (bed, chair, bathroom, stairs); ambulation with equipment, car transfers, safety and ability to get out of house in the event of an emergency 
-review and maintain weight bearing as tolerated 
-discuss pain management 
-review how to do ADLs 
 
-Home Exercise Program-refer to pages 32-40 of the patient handbook for exercises 
-supine exercises-ankle pumps, hamstring sets, quad sets, heel slides, short arc quad sets, long arc quads-prop heel on pillow for stretch, straight leg raise 
-sitting exercises-active knee flexion, passive knee flexion, active knee extension, ankle pumps, bicep curls (use weights as appropriate), triceps pushups, shoulder flexion (use weights as appropriate) -standing exercises holding onto supportive counter-toe raises, heel raises, mini-squats, heel/toe touches with knee bend, marching in place, hamstring curls Physical therapy goals by discharge from [address] ambulation with walker, crutches or cane if needed (level surfaces and   stairs) -Flexion > 90 degrees, extension 0 degrees 
-Daily performance of home exercise program 
-Independent with stair climbing and car transfers 
-Progress to community ambulator (least restrictive assistive device) ACO Transitions of Care Introducing [facility] access hospital [address] offers a voluntary care coordination program to provide high quality service and care to [facility] fee-for-service beneficiaries. Shannon Ferrara was designed to help you enhance your health and well-being through the following services: ? Transitions of Care  support for individuals who are transitioning from one care setting to another (example: Hospital to home). ? Chronic and Complex Care Coordination  support for individuals and caregivers of those with serious or chronic illnesses or with more than one chronic (ongoing) condition and those who take a number of different medications. If you meet specific medical criteria, a [address] [place] [address] Rd may call you directly to coordinate your care with your primary care physician and your other care providers. For questions about the [facility] programs, please, contact your physicians office. For general questions or additional information about Accountable Care Organizations: 
Please visit www.medicare.gov/acos. html or call 1-800-MEDICARE (3-518.208.4482) TTY users should call 3-896.613.4046. Introducing \[facility]\"" & HEALTH SERVICES! Dear Rochelle Osborne: Thank you for requesting a noodls account.   Our records indicate that you already have an active [employer] account. You can access your account anytime at [email or web address] Did you know that you can access your hospital and ER discharge instructions at any time in [employer]? You can also review all of your test results from your hospital stay or ER visit. Additional Information If you have questions, please visit the Frequently Asked Questions section of the [employer] website at [email or web address] Remember, [employer] is NOT to be used for urgent needs. For medical emergencies, dial 911. Now available from your iPhone and Android! Providers Seen During Your Hospitalization Provider Specialty Primary office phone [facility], [address] [facility] Orthopedic Surgery [phone] Your Primary Care Physician (PCP) Primary Care Physician Office Phone Office Fax [address], [employer] [facility] [phone] You are allergic to the following Allergen Reactions Other Food Other (comments) Oranges, cabbage, beans, peppers, raw onions, foods with caffeine, popcorn, soda because of IBS Buspar (Buspirone) Other (comments) Causes \"stimulation\" that could exacerbate a manic attack/phase of pt's Biplar. Reported by patient Amlodipine Other (comments) Ankle swelling Dicyclomine Nausea and Vomiting Extreme stomach pains Lithium Nausea and Vomiting Severe nausea and vomiting. Other Medication Other (comments) Intolerance to oranges, cabbage,beans,peppers,raw onions,foods with caffeine in them, popcorn, no pop sodas, because of IBS Recent Documentation Height Weight BMI OB Status Smoking Status 1.524 m 73.9 kg 31.83 kg/m2 Hysterectomy Never Smoker Emergency Contacts Name Discharge Info Relation Home Work Mobile [email or web address] CAREGIVER [3] Sister [23] [phone] Cait Gandara  Other Relative [6] [phone] Patient Belongings The following personal items are in your possession at time of discharge: 
     Visual Aid: With patient      Home Medications: None   Jewelry: None  Clothing:  (clothing to PACU)    Other Valuables: Eyeglasses Please provide this summary of care documentation to your next provider. Signatures-by signing, you are acknowledging that this After Visit Summary has been reviewed with you and you have received a copy. Patient Signature:  ____________________________________________________________ Date:  ____________________________________________________________  
  
[facility] Provider Signature:  ____________________________________________________________ Date:  ____________________________________________________________

## 2018-03-19 NOTE — ANESTHESIA PREPROCEDURE EVALUATION
Anesthetic History   No history of anesthetic complications            Review of Systems / Medical History  Patient summary reviewed, nursing notes reviewed and pertinent labs reviewed    Pulmonary  Within defined limits                 Neuro/Psych   Within defined limits           Cardiovascular  Within defined limits  Hypertension                   GI/Hepatic/Renal  Within defined limits   GERD           Endo/Other  Within defined limits      Arthritis     Other Findings              Physical Exam    Airway  Mallampati: II  TM Distance: > 6 cm  Neck ROM: normal range of motion   Mouth opening: Normal     Cardiovascular  Regular rate and rhythm,  S1 and S2 normal,  no murmur, click, rub, or gallop             Dental  No notable dental hx       Pulmonary  Breath sounds clear to auscultation               Abdominal  GI exam deferred       Other Findings            Anesthetic Plan    ASA: 2  Anesthesia type: spinal      Post-op pain plan if not by surgeon: peripheral nerve block single    Induction: Intravenous  Anesthetic plan and risks discussed with: Patient

## 2018-03-19 NOTE — PROGRESS NOTES
Primary Nurse Shady Pfeiffer RN and Isabel Robert RN performed a dual skin assessment on this patient No impairment noted  Brandon score is 20

## 2018-03-19 NOTE — PROGRESS NOTES
Problem: Mobility Impaired (Adult and Pediatric)  Goal: *Acute Goals and Plan of Care (Insert Text)  Physical Therapy Goals  Initiated 3/19/2018    1. Patient will move from supine to sit and sit to supine  in bed with independence within 4 days. 2. Patient will perform sit to stand with modified independence within 4 days. 3. Patient will ambulate with modified independence for 150 feet with the least restrictive device within 4 days. 4. Patient will perform home exercise program per protocol with independence within 4 days. 5. Patient will demonstrate AROM 0-90 degrees in operative joint within 4 days. physical Therapy EVALUATION  Patient: Sherrie Jacob (11 y.o. female)  Date: 3/19/2018  Primary Diagnosis: BILATERAL PRIMARY OSTEOARTHRITIS OF KNEE   Osteoarthritis of right knee  Procedure(s) (LRB):  RIGHT TOTAL KNEE ARTHROPLASTY  (Right) Day of Surgery   Precautions:   WBAT, Fall    ASSESSMENT :  Based on the objective data described below, the patient presents with minimal knee pain, 1/10, decreased ROM R knee, decreased strength RLE, and decreased functional mobility from her baseline level S/P R TKA. Pt completed bed mobility with minimal assist, sit to stand with minimal assist, stand to sit with minimal assist for decreased control to descend to seat due to bilateral knees buckling, tolerated ambulation with rolling walker x 45 feet with WBAT and CGA. Pt reports she lives alone in [address] living at [facility]. She ambulates independently within her apartment and uses a rollator in the community. Pt has a sister that lives in the area however reports her sister works and pt has no one available to assist her upon discharge. Pt is requesting rehab as she needs to be independent to return home. Will continue to assess for disposition pending progress. Patient will benefit from skilled intervention to address the above impairments.   Patients rehabilitation potential is considered to be Good  Factors which may influence rehabilitation potential include:   []         None noted  []         Mental ability/status  []         Medical condition  [x]         Home/family situation and support systems  []         Safety awareness  []         Pain tolerance/management  []         Other:      PLAN :  Recommendations and Planned Interventions:  [x]           Bed Mobility Training             []    Neuromuscular Re-Education  [x]           Transfer Training                   []    Orthotic/Prosthetic Training  [x]           Gait Training                         []    Modalities  [x]           Therapeutic Exercises           []    Edema Management/Control  []           Therapeutic Activities            [x]    Patient and Family Training/Education  []           Other (comment):    Frequency/Duration: Patient will be followed by physical therapy  twice daily to address goals. Discharge Recommendations: Rehab, Home Health and To Be Determined  Further Equipment Recommendations for Discharge: to be determined, pt requested a rolling walker     SUBJECTIVE:   Patient stated I am hoping I can go to rehab. I don't have anyone that can help me.     OBJECTIVE DATA SUMMARY:   HISTORY:    Past Medical History:   Diagnosis Date    Arthritis     Bipolar disorder ([address]     Bladder pain     Choledocholithiasis 11/24/2010    GERD (gastroesophageal reflux disease)     Headache(784.0)     tension headaches    High cholesterol     History of cholecystectomy     Hypertension     Irritable bowel     Pelvic pain in female 2014    Psychiatric disorder     Stool color black     irritable bowel     Past Surgical History:   Procedure Laterality Date    COLONOSCOPY N/A 7/5/2017    COLONOSCOPY performed by Tera Stack MD at [address] [facility]  2003, 2017    prolasped rectum    HX LAP CHOLECYSTECTOMY  11/23/10    HX Diana Kim Prior Level of Function/Home Situation: resides in Independent living at [facility], alone in her apartment, ambulates independently within her apartment, uses a rollator in the community, no falls in the last year, pt has a sister who lives in the area, her sister works and pt reports she does not have anyone that can help her at home upon discharge  Personal factors and/or comorbidities impacting plan of care:     [address]: Independent living ([employer] [facility])  # Steps to Enter:  (uses elevator to 3rd floor apartment)  One/Two Story Residence: One story  Living Alone: Yes  Support Systems: Family member(s) (pt reports she does not have anyone that will be available to assist at home)  Patient Expects to be Discharged to[de-identified] Rehabilitation facility (pt requested as she does not have assistance at home)  Current DME Used/Available at Home: Kathye Slovak, rollator, Raised toilet seat, Shower chair  Tub or Shower Type: Tub/Shower combination    EXAMINATION/PRESENTATION/DECISION MAKING:   Critical Behavior:  Neurologic State: Alert, Appropriate for age  Orientation Level: Oriented X4  Cognition: Appropriate decision making, Appropriate for age attention/concentration, Appropriate safety awareness  Safety/Judgement: Insight into deficits  Hearing:   intact  Skin:dressing and ace wrap intact    Range Of Motion:   uninvolved within functional limits                        Strength:      uninvolved within functional limits                     Tone & Sensation:    intact                              Coordination:   intact       Functional Mobility:  Bed Mobility:     Supine to Sit: Minimum assistance; Additional time;Assist x1  Sit to Supine: Minimum assistance; Additional time;Assist x1  Scooting: Contact guard assistance; Additional time;Assist x1  Transfers:  Sit to Stand: Minimum assistance; Additional time;Assist x1  Stand to Sit: Minimum assistance; Additional time;Assist x1 (bilateral knees buckle as pt begins to sit )                       Balance:   Sitting: Intact  Standing: Impaired  Standing - Static: Good  Standing - Dynamic : Fair due to decreased control when descending to chair  Ambulation/Gait Training:  Distance (ft): 45 Feet (ft)  Assistive Device: Gait belt;Walker, rolling  Ambulation - Level of Assistance: Minimal assistance;Assist x1     Gait Description (WDL): Exceptions to WDL  Gait Abnormalities: Decreased step clearance; Step to gait              Speed/Teresa: Slow  Step Length: Left shortened;Right shortened            Functional Measure:  Tinetti test:    Sitting Balance: 1  Arises: 1  Attempts to Rise: 1  Immediate Standing Balance: 1  Standing Balance: 1  Nudged: 0  Eyes Closed: 0  Turn 360 Degrees - Continuous/Discontinuous: 1  Turn 360 Degrees - Steady/Unsteady: 1  Sitting Down: 1  Balance Score: 8  Indication of Gait: 1  R Step Length/Height: 1  L Step Length/Height: 1  R Foot Clearance: 1  L Foot Clearance: 1  Step Symmetry: 0  Step Continuity: 1  Path: 1  Trunk: 1  Walking Time: 1  Gait Score: 9  Total Score: 17       Tinetti Test and G-code impairment scale:  Percentage of Impairment CH    0%   CI    1-19% CJ    20-39% CK    40-59% CL    60-79% CM    80-99% CN     100%   Tinetti  Score 0-28 28 23-27 17-22 12-16 6-11 1-5 0       Tinetti Tool Score Risk of Falls  <19 = High Fall Risk  19-24 = Moderate Fall Risk  25-28 = Low Fall Risk  Tinetti ME. Performance-Oriented Assessment of Mobility Problems in Elderly Patients. [address]; [id number]. (Scoring Description: PT Bulletin Feb. 10, 1993)    Older adults: Ry Abdullahi et al, 2009; n = 1000 [facility] elderly evaluated with ABC, PÉREZ, ADL, and IADL)  · Mean PÉREZ score for males aged 69-68 years = 26.21(3.40)  · Mean PÉREZ score for females age 69-68 years = 25.16(4.30)  · Mean PÉREZ score for males over 80 years = 23.29(6.02)  · Mean PÉREZ score for females over 80 years = 17.20(8.32)       G codes:   In compliance with CMSs Claims Based Outcome Reporting, the following G-code set was chosen for this patient based on their primary functional limitation being treated: The outcome measure chosen to determine the severity of the functional limitation was the Tinetti with a score of 17/28 which was correlated with the impairment scale. ? Mobility - Walking and Moving Around:     - CURRENT STATUS: CJ - 20%-39% impaired, limited or restricted    - GOAL STATUS: CI - 1%-19% impaired, limited or restricted    - D/C STATUS:  ---------------To be determined---------------      Physical Therapy Evaluation Charge Determination   History Examination Presentation Decision-Making   MEDIUM  Complexity : 1-2 comorbidities / personal factors will impact the outcome/ POC  LOW Complexity : 1-2 Standardized tests and measures addressing body structure, function, activity limitation and / or participation in recreation  LOW Complexity : Stable, uncomplicated  LOW Complexity : FOTO score of       Based on the above components, the patient evaluation is determined to be of the following complexity level: LOW     Pain:  Pain Scale 1: Numeric (0 - 10)  Pain Intensity 1: 1  Location: right knee  Intervention: see MAR              Activity Tolerance:   Good, reports minimal pain with activity, no c/o lightheadedness with positional changes   Please refer to the flowsheet for vital signs taken during this treatment.   Vitals:    03/19/18 1557 03/19/18 1600 03/19/18 1610 03/19/18 1623   BP: (!) 138/92 148/88 128/84 140/82   BP 1 Location: Right arm Right arm Right arm Right arm   BP Patient Position: Supine Sitting Standing    Pulse: 72 78 81 77   Resp:       Temp:       SpO2:    100%   Weight:       Height:         After treatment:   []         Patient left in no apparent distress sitting up in chair  [x]         Patient left in no apparent distress in bed  [x]         Call bell left within reach  [x]         Nursing notified  []         Caregiver present  [] Bed alarm activated    COMMUNICATION/EDUCATION:   The patients plan of care was discussed with: Registered Nurse. [x]         Fall prevention education was provided and the patient/caregiver indicated understanding. []         Patient/family have participated as able in goal setting and plan of care. [x]         Patient/family agree to work toward stated goals and plan of care. []         Patient understands intent and goals of therapy, but is neutral about his/her participation. []         Patient is unable to participate in goal setting and plan of care.     Thank you for this referral.  Cait Davis   Time Calculation: 18 mins

## 2018-03-19 NOTE — PERIOP NOTES
TRANSFER - OUT REPORT:    Verbal report given to Jacquelyn(name) on Josemanuel Espinoza  being transferred to [facility](unit) for routine progression of care       Report consisted of patients Situation, Background, Assessment and   Recommendations(SBAR). Information from the following report(s) SBAR, OR Summary, Intake/Output and MAR was reviewed with the receiving nurse. Lines:   Peripheral IV 03/19/18 Left;Mid; Inner Arm (Active)   Site Assessment Clean, dry, & intact 3/19/2018  1:30 PM   Phlebitis Assessment 0 3/19/2018  1:30 PM   Infiltration Assessment 0 3/19/2018  1:30 PM   Dressing Status Clean, dry, & intact 3/19/2018  1:30 PM   Dressing Type Tape;Transparent 3/19/2018  8:41 AM   Hub Color/Line Status Green; Infusing 3/19/2018  8:41 AM   Action Taken Open ports on tubing capped 3/19/2018  8:41 AM   Alcohol Cap Used Yes 3/19/2018  8:41 AM        Opportunity for questions and clarification was provided.       Patient transported with:   [email or web address]

## 2018-03-19 NOTE — OP NOTES
Name: Brisa Millan  MRN:  [id number]  : 1960  Age:  59 y.o. Surgery Date: 3/19/2018      OPERATIVE REPORT - RIGHT TOTAL KNEE REPLACEMENT    PREOPERATIVE DIAGNOSIS: Osteoarthritis, right knee. POSTOPERATIVE DIAGNOSIS: Osteoarthritis, right knee. PROCEDURE PERFORMED: Right total knee arthroplasty. SURGEON: Arleth Romero MD    FIRST ASSISTANTCathe Dhaval    ANESTHESIA: Spinal    PRE-OP ANTIBIOTIC: Ancef 2g    COMPLICATIONS: None. ESTIMATED BLOOD LOSS: 50 mL. SPECIMENS REMOVED: None. COMPONENTS IMPLANTED:   Implant Name Type Inv. Item Serial No.  Lot No. LRB No. Used Action   CEMENT BNE GENTAMC GHV 40GM -- SMARTSET - SN/A  CEMENT BNE GENTAMC GHV 40GM -- SMARTSET [facility] [id number] Right 2 Implanted   BASEPLATE TIBIAL SZ 3 UNIVERSAL - SN/A Joint Component BASEPLATE TIBIAL SZ 3 UNIVERSAL N/A [employer] [facility] [id number] Right 1 Implanted   FEM KNE MOIZ CR SZ 3 RT -- TRIATHLON - SN/A  FEM KNE MOIZ CR SZ 3 RT -- TRIATHLON N/A [employer] [facility] [id number] Right 1 Implanted   PAT ASYM TRITHLON X3 [id number] -- TRIATHLON ASYMMETRIC X3 - SN/A  PAT ASYM TRITHLON X3 [id number] -- TRIATHLON ASYMMETRIC X3 N/A [employer] [facility] [id number] Right 1 Implanted   INSERT TIB ARTC BRNG SZ3 16MM -- TRIATHLON - SN/A   INSERT TIB ARTC BRNG SZ3 16MM -- TRIATHLON N/A [employer] [facility] [id number] Right 1 Implanted       INDICATIONS:  The patient is an 59 yrs female with progressive debilitating right knee pain due to severe osteoarthritis. Symptoms have progressed despite comprehensive conservative treatment and they presents for right total knee replacement. Risks, benefits, alternatives of the procedure were reviewed in detail and the patient desired to proceed. Risks including bleeding, infection, damage to surrounding structures, blood clots, pulmonary embolism, and death were discussed. DESCRIPTION OF PROCEDURE:  Epidural/spinal anesthesia was initiated.  Two grams of cefazolin were administered within 30 minutes of incision. The right lower extremity was prepped and draped in the usual sterile fashion. The skin was covered with Ioban occlusive dressing. A tourniquet was only employed for cementation- total time- 24 minutes. A midline skin incision was made with a 10 blade and small flaps were elevated. A medial parapatellar incision was made to the knee. The knee was exposed and the distal femur was cut at 5 degrees distal femoral valgus. The tibia was subluxed and a neutral varus/valgus proximal tibial cut was made with 3 degrees posterior slope. The meniscal remnants were removed. The posterior osteophytes were removed from the femur. The extension gap was determined using spacer blocks and appropriate releases were made. Femur was sized per above. An AP cutting block was placed, rotated using a gap balancing techniqe. Anterior, posterior, and chamfer cuts were carried out. The tibial was then sized and correct rotation was identified using the medial 1/3 of the tibial tubercle as a landmark. The tibia was prepared using a drill followed by a keel punch. A reciprocating saw was used to begin the cuts for the keel punch to avoid tibial fracture. Trials were placed. The patella was sized using a caliper and an appropriate resection  was performed. Lug holes were drilled and a trial was fitted. The knee tracked well with all trial implants. A CS trial was used due to incompetence of PCL. I placed a box cutting guide on the femur for size 3 and there was very little bone that would remain with elevated risk of fracture. Thus I chose a CS insert instead. The trials were then removed. Bony surfaces were drilled, lavaged, and dried. All components were cemented. Excess cement was removed. Polyethylene component was placed. After the cement had fully cured, the knee was ranged and  irrigated copiously with pulsatile lavage.      All surrounding soft tissues were infiltrated with local anesthetic. The arthrotomy  was closed with running quill suture and 0 vicryl suture. Skin and subcutaneous tissues were irrigated and closed in standard fashion with 2-0 vicryl and 3-0 monocryl. An aquacel dressing was placed. The patient underwent straight catheterization at the end of the case. There were no complications. The procedure was a RIGHT TOTAL KNEE REPLACEMENT. A [place] total knee construct was utilized. No specimens were obtained/sent. The patient was transferred to the recovery room in stable condition.       Eugenie Cerrato MD

## 2018-03-20 LAB
ANION GAP SERPL CALC-SCNC: 5 MMOL/L (ref 5–15)
BUN SERPL-MCNC: 11 MG/DL (ref 6–20)
BUN/CREAT SERPL: 12 (ref 12–20)
CALCIUM SERPL-MCNC: 7.9 MG/DL (ref 8.5–10.1)
CHLORIDE SERPL-SCNC: 110 MMOL/L (ref 97–108)
CO2 SERPL-SCNC: 26 MMOL/L (ref 21–32)
CREAT SERPL-MCNC: 0.93 MG/DL (ref 0.55–1.02)
GLUCOSE BLD STRIP.AUTO-MCNC: 108 MG/DL (ref 65–100)
GLUCOSE BLD STRIP.AUTO-MCNC: 97 MG/DL (ref 65–100)
GLUCOSE SERPL-MCNC: 104 MG/DL (ref 65–100)
HGB BLD-MCNC: 10.5 G/DL (ref 11.5–16)
POTASSIUM SERPL-SCNC: 3.7 MMOL/L (ref 3.5–5.1)
SERVICE CMNT-IMP: ABNORMAL
SERVICE CMNT-IMP: NORMAL
SODIUM SERPL-SCNC: 141 MMOL/L (ref 136–145)

## 2018-03-20 PROCEDURE — 36415 COLL VENOUS BLD VENIPUNCTURE: CPT | Performed by: ORTHOPAEDIC SURGERY

## 2018-03-20 PROCEDURE — G8987 SELF CARE CURRENT STATUS: HCPCS

## 2018-03-20 PROCEDURE — 99218 HC RM OBSERVATION: CPT

## 2018-03-20 PROCEDURE — 74011250637 HC RX REV CODE- 250/637: Performed by: NURSE PRACTITIONER

## 2018-03-20 PROCEDURE — 82962 GLUCOSE BLOOD TEST: CPT

## 2018-03-20 PROCEDURE — 85018 HEMOGLOBIN: CPT | Performed by: ORTHOPAEDIC SURGERY

## 2018-03-20 PROCEDURE — 97116 GAIT TRAINING THERAPY: CPT

## 2018-03-20 PROCEDURE — 97165 OT EVAL LOW COMPLEX 30 MIN: CPT

## 2018-03-20 PROCEDURE — 97530 THERAPEUTIC ACTIVITIES: CPT

## 2018-03-20 PROCEDURE — 74011250637 HC RX REV CODE- 250/637: Performed by: ORTHOPAEDIC SURGERY

## 2018-03-20 PROCEDURE — 80048 BASIC METABOLIC PNL TOTAL CA: CPT | Performed by: ORTHOPAEDIC SURGERY

## 2018-03-20 PROCEDURE — G8988 SELF CARE GOAL STATUS: HCPCS

## 2018-03-20 PROCEDURE — 97535 SELF CARE MNGMENT TRAINING: CPT

## 2018-03-20 PROCEDURE — 74011250636 HC RX REV CODE- 250/636: Performed by: ORTHOPAEDIC SURGERY

## 2018-03-20 RX ORDER — ASPIRIN 325 MG
325 TABLET, DELAYED RELEASE (ENTERIC COATED) ORAL 2 TIMES DAILY
Qty: 60 TAB | Refills: 0 | Status: SHIPPED | OUTPATIENT
Start: 2018-03-20 | End: 2018-08-21 | Stop reason: ALTCHOICE

## 2018-03-20 RX ORDER — KETOROLAC TROMETHAMINE 30 MG/ML
15 INJECTION, SOLUTION INTRAMUSCULAR; INTRAVENOUS
Status: ACTIVE | OUTPATIENT
Start: 2018-03-20 | End: 2018-03-21

## 2018-03-20 RX ORDER — OXYCODONE HYDROCHLORIDE 5 MG/1
5 TABLET ORAL
Qty: 60 TAB | Refills: 0 | Status: SHIPPED | OUTPATIENT
Start: 2018-03-20 | End: 2018-05-15

## 2018-03-20 RX ORDER — DICLOFENAC SODIUM 50 MG/1
50 TABLET, DELAYED RELEASE ORAL 2 TIMES DAILY
Qty: 60 TAB | Refills: 0 | Status: SHIPPED | OUTPATIENT
Start: 2018-03-20 | End: 2018-08-21 | Stop reason: ALTCHOICE

## 2018-03-20 RX ORDER — DICLOFENAC SODIUM 50 MG/1
50 TABLET, DELAYED RELEASE ORAL 2 TIMES DAILY
Status: DISCONTINUED | OUTPATIENT
Start: 2018-03-21 | End: 2018-03-22 | Stop reason: HOSPADM

## 2018-03-20 RX ORDER — HYDROXYZINE HYDROCHLORIDE 10 MG/1
10-20 TABLET, FILM COATED ORAL
Status: DISCONTINUED | OUTPATIENT
Start: 2018-03-20 | End: 2018-03-22 | Stop reason: HOSPADM

## 2018-03-20 RX ADMIN — FAMOTIDINE 20 MG: 20 TABLET, FILM COATED ORAL at 20:26

## 2018-03-20 RX ADMIN — OXYCODONE HYDROCHLORIDE 5 MG: 5 TABLET ORAL at 19:51

## 2018-03-20 RX ADMIN — OXYCODONE HYDROCHLORIDE 5 MG: 5 TABLET ORAL at 08:48

## 2018-03-20 RX ADMIN — CEFAZOLIN SODIUM 2 G: 2 SOLUTION INTRAVENOUS at 02:54

## 2018-03-20 RX ADMIN — KETOROLAC TROMETHAMINE 30 MG: 30 INJECTION, SOLUTION INTRAMUSCULAR at 05:13

## 2018-03-20 RX ADMIN — ACETAMINOPHEN 650 MG: 325 TABLET, FILM COATED ORAL at 05:13

## 2018-03-20 RX ADMIN — POLYETHYLENE GLYCOL 3350 17 G: 17 POWDER, FOR SOLUTION ORAL at 08:42

## 2018-03-20 RX ADMIN — ACETAMINOPHEN 650 MG: 325 TABLET, FILM COATED ORAL at 17:33

## 2018-03-20 RX ADMIN — OXYCODONE HYDROCHLORIDE 5 MG: 5 TABLET ORAL at 13:29

## 2018-03-20 RX ADMIN — OLANZAPINE 10 MG: 5 TABLET, FILM COATED ORAL at 17:33

## 2018-03-20 RX ADMIN — DICLOFENAC SODIUM 50 MG: 50 TABLET, DELAYED RELEASE ORAL at 08:42

## 2018-03-20 RX ADMIN — FAMOTIDINE 20 MG: 20 TABLET, FILM COATED ORAL at 08:42

## 2018-03-20 RX ADMIN — HYDROXYZINE HYDROCHLORIDE 10 MG: 10 TABLET, FILM COATED ORAL at 20:26

## 2018-03-20 RX ADMIN — AMITRIPTYLINE HYDROCHLORIDE 75 MG: 25 TABLET, FILM COATED ORAL at 22:50

## 2018-03-20 RX ADMIN — ASPIRIN 325 MG: 325 TABLET, DELAYED RELEASE ORAL at 08:42

## 2018-03-20 RX ADMIN — GABAPENTIN 200 MG: 100 CAPSULE ORAL at 17:33

## 2018-03-20 RX ADMIN — FENOFIBRATE 145 MG: 145 TABLET ORAL at 08:42

## 2018-03-20 RX ADMIN — OXYCODONE HYDROCHLORIDE 5 MG: 5 TABLET ORAL at 03:09

## 2018-03-20 RX ADMIN — ASPIRIN 325 MG: 325 TABLET, DELAYED RELEASE ORAL at 20:26

## 2018-03-20 RX ADMIN — ACETAMINOPHEN 650 MG: 325 TABLET, FILM COATED ORAL at 11:59

## 2018-03-20 RX ADMIN — OXYCODONE HYDROCHLORIDE 5 MG: 5 TABLET ORAL at 22:50

## 2018-03-20 RX ADMIN — GABAPENTIN 200 MG: 100 CAPSULE ORAL at 08:42

## 2018-03-20 RX ADMIN — DOCUSATE SODIUM AND SENNOSIDES 1 TABLET: 8.6; 5 TABLET, FILM COATED ORAL at 08:42

## 2018-03-20 RX ADMIN — SODIUM CHLORIDE 125 ML/HR: 900 INJECTION, SOLUTION INTRAVENOUS at 07:38

## 2018-03-20 NOTE — PROGRESS NOTES
Problem: Discharge Planning  Goal: *Discharge to safe environment  Outcome: Progressing Towards Goal  HH vs SNF, plan pending

## 2018-03-20 NOTE — PROGRESS NOTES
Problem: Knee Replacement: Day of Surgery/Unit  Goal: Activity/Safety  Patient walked with PT.  Tolerated well

## 2018-03-20 NOTE — PROGRESS NOTES
Problem: Mobility Impaired (Adult and Pediatric)  Goal: *Acute Goals and Plan of Care (Insert Text)  Physical Therapy Goals  Initiated 3/19/2018    1. Patient will move from supine to sit and sit to supine  in bed with independence within 4 days. 2. Patient will perform sit to stand with modified independence within 4 days. 3. Patient will ambulate with modified independence for 150 feet with the least restrictive device within 4 days. 4. Patient will perform home exercise program per protocol with independence within 4 days. 5. Patient will demonstrate AROM 0-90 degrees in operative joint within 4 days. physical Therapy TREATMENT  Patient: Brandon Lewis (2 y.o. female)  Date: 3/20/2018  Diagnosis: BILATERAL PRIMARY OSTEOARTHRITIS OF KNEE   Osteoarthritis of right knee <principal problem not specified>  Procedure(s) (LRB):  RIGHT TOTAL KNEE ARTHROPLASTY  (Right) 1 Day Post-Op  Precautions: WBAT, Fall  Chart, physical therapy assessment, plan of care and goals were reviewed. ASSESSMENT:  Pt received sitting upright in chair;agreeable to PT. Reported pain 2-3/10 at rest. Performed LAQ's and heel slides w/ knee flexion stretch while seated. Pt progressing well towards goals, demonstrating ability to sit<>stand from chair w/ CGA, verbal cues x1 for proper hand placement on armrest with transfers. Pt gait trained ~100FT using RW and CGA. Noted pt amb w/ step to gait and progressing to step through gait w/ verbal encouragement and further ambulation. Minimal circumduction of RLE noted d/t minimal pain and decreased knee flexion in stance phase and swing through phase (heel-toe gait encouraged and demonstrated). Pt returned to chair at bedside, encouraged to sit no longer than 1hr (30-45 minutes at least) and to call for NSG assist when ready to get back to bed. Pt requesting Rehab at this time as she lives alone.  At this time pt progressing well towards goals and may benefit from HHPT w/ daily support/assist (will discuss w/ Care Management). Progression toward goals:  [x]      Improving appropriately and progressing toward goals  []      Improving slowly and progressing toward goals  []      Not making progress toward goals and plan of care will be adjusted     PLAN:  Patient continues to benefit from skilled intervention to address the above impairments. Continue treatment per established plan of care. Discharge Recommendations:  Home Health vs Rehab (pt requesting Rehab as she lives alone)  Further Equipment Recommendations for Discharge:  Pt requesting RW; Owns rollator     SUBJECTIVE:   Patient stated I was walking like Frankenstein for the last 2 years. \"    OBJECTIVE DATA SUMMARY:   Critical Behavior:  Neurologic State: Alert  Orientation Level: Oriented X4  Cognition: Appropriate decision making, Appropriate for age attention/concentration, Appropriate safety awareness, Follows commands  Safety/Judgement: Insight into deficits  Range of Motion:      AROM (Flexion): 90 w/ knee flexion stretch                    Functional Mobility Training:  Bed Mobility:     Supine to Sit:  (pt received OOB in chair)  Sit to Supine:  (pt returned to chair)           Transfers:  Sit to Stand: Contact guard assistance (verbal cues x1 for proper handplacement w/transfer)  Stand to Sit: Contact guard assistance                             Balance:  Sitting: Intact  Standing: Intact; With support (RW)  Standing - Static: Constant support;Good (RW)  Standing - Dynamic : Fair  Ambulation/Gait Training:  Distance (ft): 100 Feet (ft)  Assistive Device: Gait belt;Walker, rolling  Ambulation - Level of Assistance: Contact guard assistance;Assist x1        Gait Abnormalities: Antalgic; Step to gait        Base of Support: Widened     Speed/Teresa: Pace decreased (<100 feet/min)  Step Length: Left shortened;Right shortened                        Therapeutic Exercises:     EXERCISE   Sets   Reps   Active Active Assist   Passive Self ROM Comments   Ankle Pumps   []                                        []                                        []                                        []                                           Quad Sets   []                                        []                                        []                                        []                                           Hamstring Sets   []                                        []                                        []                                        []                                           Short Arc Quads   []                                        []                                        []                                        []                                           Knee Extension Stretch     []                                          []                                          []                                          []                                           Heel Slides  10 [x]                                        []                                        []                                        []                                           Long Arc Quads  10 []                                        [x]                                        []                                        []                                           Knee Flexion Stretch   []                                        []                                        [x]                                        []                                        x15 sec tolerated     Straight Leg Raises   []                                        []                                        []                                        []                                             Pain:  Pain Scale 1: Numeric (0 - 10)  Pain Intensity 1: 5  Pain Location 1: Knee (Right)  Pain Orientation 1: Anterior;Posterior  Pain Description 1: Aching  Pain Intervention(s) 1:  Ice  Activity Tolerance:   Good. VSS   Please refer to the flowsheet for vital signs taken during this treatment.   After treatment:   [x] Patient left in no apparent distress sitting up in chair  [] Patient left in no apparent distress in bed  [x] Call bell left within reach   [x] Nursing notified  [] Caregiver present  [] Bed alarm activated    COMMUNICATION/COLLABORATION:   The patients plan of care was discussed with: Registered Nurse    Yamileth Trinidad PTA   Time Calculation: 28 mins

## 2018-03-20 NOTE — PROGRESS NOTES
Physical Therapy  3/20/2018    Attempted to see pt for afternoon PT. Pt currently in in bed w/ HOB elevated and visiting w/friends. Pt requesting PT to come back later. Will f/u later this afternoon as able and appropriate.      Angie Means, PTA

## 2018-03-20 NOTE — PROGRESS NOTES
Bedside and Verbal shift change report given to jin (oncoming nurse) by Selvin Farnsworth (offgoing nurse). Report included the following information SBAR, Kardex, Procedure Summary, Intake/Output, MAR and Recent Results.

## 2018-03-20 NOTE — DISCHARGE INSTRUCTIONS
Patient meets criteria for   BUNDLED PAYMENT   for Care Improvement Initiative Criteria    Contact Information for Orthopedic Nurse Navigator:      STACIE Lizarraga, RN-BC  [phone]  J:[phone]          After Hospital Care Plan:  Discharge Instructions Knee Replacement  Dr. Maria D Simons    Patient Name: Tony Kennedy  Date of procedure: 3/19/2018   Procedure: Procedure(s):  RIGHT TOTAL KNEE ARTHROPLASTY   Surgeon: Nafisa Whitman) and Role:     * Idania Gonsalves MD - Primary  PCP: Caryl Grimes MD  Date of discharge: No discharge date for patient encounter. Follow up appointments  -follow up with Dr. Maria D Simons in 4 weeks. Call [phone], ext [phone]) to make an appointment. [facility] Agency: ________________________ phone: _____________________  The agency will contact you to arrange dates/times for visits. Please call them if you do not hear from them within 24 hours after you are discharged    When to call your Orthopaedic Surgeon:  -unrelieved pain  -Signs of infection-if your incision is red; continues to have drainage; drainage has a foul odor or if you have a persistent fever over 101 degrees  -Signs of a blood clot in your leg-calf pain, tenderness, redness, swelling of lower leg    When to call your Primary Care Physician:  -Concerns about medical conditions such as diabetes, high blood pressure, asthma, congestive heart failure  -Call if blood sugars are elevated, persistent headache or dizziness, coughing or congestion, constipation or diarrhea, burning with urination, abnormal heart rate    When to call [id number] go to the nearest emergency room  -acute onset of chest pain, shortness of breath, difficulty breathing    Activity  -walk with your walker or crutches putting as much weight on your leg as you can tolerate.   Refer to pages 23-31 of your handbook for instructions and pictures  -do 20 repetitions of each exercise 3 times each day as instructed by the physical therapist.  Refer to pages 32-40 of your handbook for exercise instructions and pictures  -get up every one hour and walk (except at night when sleeping)  -do not drive or operate heavy machinery    Incision Care  -the Aquacel (brown, waterproof) surgical dressing is to remain on your knee for 7 days. On the 7th day have someone gently peel the dressing off by carefully lifting the edge and stretching it slightly to break the adhesive seal and leave incision open to air. You may take a shower with the Aquacel dressing in place. Preventing blood clots   -Take Aspirin 325 mg twice daily as prescribed  by Dr. Rita Magana for one month following surgery      Pain management  - Please take scheduled 650 mg tylenol every 6 hours for 4 weeks  - Please take scheduled diclofenac 50 mg twice daily for 4 weeks  - For breakthrough please take 5-10 mg oxycodone     - While taking aspirin and diclofenac, please take famotidine (PEPCID) as prescribed 20 mg twice daily to prevent stomach ulcers/irritation.   - If you have a history of stomach ulcers, do not take diclofenac. - Avoid alcoholic beverages while taking pain medication  - Do not take any over-the-counter medication for pain except Tylenol (acetaminophen)  - Please be aware that many medications contain Tylenol. We do not want you to over medicate so please read the information below as a guide. Do not take more than 4 Grams of Tylenol in a 24 hour  - You may place an ice bag on your knee for 15-20 minutes after exercising and as needed throughout the day and night      Diet  -resume usual diet; drink plenty of fluids; eat foods high in fiber  -you may want to take a stool softener (such as Senokot-S or Colace) to prevent constipation while you are taking pain medication.   If constipation occurs, take a laxative (such as Dulcolax tablets, Milk of Magnesia, or a suppository)    [facility] Protocol (to be followed by [facility] derik)    Nursing-per physicians order  -remove Aquacel dressing at 7 days post-op   -complete head to toe assessment, vital signs  -medication reconciliation  -review pain management  -manage chronic medical conditions    Physical Therapy-per physicians order    Weight bearing status:  Precautions at Admission: WBAT, Fall          Mobility Status:  Supine to Sit: Minimum assistance, Additional time, Assist x1  Sit to Stand: Minimum assistance, Additional time, Assist x1  Sit to Supine: Minimum assistance, Additional time, Assist x1       Gait:  Distance (ft): 45 Feet (ft)  Ambulation - Level of Assistance: Minimal assistance, Assist x1  Assistive Device: Gait belt, Walker, rolling  Gait Abnormalities: Decreased step clearance, Step to gait    ADL status overall composite:                   Physical Therapy  -assessment and evaluation-bed mobility; functional transfers (bed, chair, bathroom, stairs); ambulation with equipment, car transfers, safety and ability to get out of house in the event of an emergency  -review and maintain weight bearing as tolerated  -discuss pain management  -review how to do ADLs    -Home Exercise Program-refer to pages 32-40 of the patient handbook for exercises  -supine exercises-ankle pumps, hamstring sets, quad sets, heel slides, short arc quad sets, long arc quads-prop heel on pillow for stretch, straight leg raise  -sitting exercises-active knee flexion, passive knee flexion, active knee extension, ankle pumps, bicep curls (use weights as appropriate), triceps pushups, shoulder flexion (use weights as appropriate)  -standing exercises holding onto supportive counter-toe raises, heel raises, mini-squats, heel/toe touches with knee bend, marching in place, hamstring curls    Physical therapy goals by discharge from [facility] [address] ambulation with walker, crutches or cane if needed (level surfaces and   stairs)  -Flexion > 90 degrees, extension 0 degrees  -Daily performance of home exercise program  -Independent with stair climbing and car transfers  -Progress to community ambulator (least restrictive assistive device)

## 2018-03-20 NOTE — PROGRESS NOTES
Problem: Falls - Risk of  Goal: *Absence of Falls  Document Kayden Fall Risk and appropriate interventions in the flowsheet.    Outcome: Progressing Towards Goal  Fall Risk Interventions:  Mobility Interventions: Patient to call before getting OOB, PT Consult for mobility concerns         Medication Interventions: Patient to call before getting OOB, Teach patient to arise slowly, Utilize gait belt for transfers/ambulation    Elimination Interventions: Call light in reach

## 2018-03-20 NOTE — PROGRESS NOTES
Bedside shift change report given to Raissa Bashir RN (oncoming nurse) by Kin Bundy RN (offgoing nurse). Report included the following information SBAR, Kardex and Recent Results.

## 2018-03-20 NOTE — PROGRESS NOTES
Care Management Interventions  PCP Verified by CM: Yes (Dr. Justa Chavez)  Palliative Care Criteria Met (RRAT>21 & CHF Dx)?: No  Mode of Transport at Discharge: Other (see comment) (TBD/family/car)  Transition of Care Consult (CM Consult): Discharge Planning, SNF (The patient wants SNF at [facility] or [place])  [address] Marlee Bajo: No  Discharge Durable Medical Equipment: No (has Rollater and will need RW)  Health Maintenance Reviewed: Yes  Physical Therapy Consult: Yes  Occupational Therapy Consult: Yes  Speech Therapy Consult: No  Current Support Network: Own Home, Lives Alone (Independent Living at [facility])  Confirm Follow Up Transport: Family  Plan discussed with Pt/Family/Caregiver: Yes  Freedom of Choice Offered: Yes (NA)  [address] Provided?: No  Discharge Location  Discharge Placement:  (SNF vs HH, The patient is observation status for now)    Reason for Admission:       Right Total knee Replacement     RRAT Score:     18  Ask Me Three:         What is my main problem? Lives alone   What is it that I need to do? Patient wants to go to SNF  Why is it important for me to do this? For assistance post surgery  Do you have any concerns for discharge? Yes if going home  Plan for utilizing home health:     Patient wants SNF, but is observation status. (CRM LVM for Dr. Roma Pepe nurse Bertha Jerez RN to discuss [phone].) Patient aware of the barrier. She stated that her plan was for SNF prior to surgery. Likelihood of readmission? Moderate if going home. Patient does not want to be alone. SNF choice was offered. The patient chose #1 [facility] and #2 [facility]. No preference for . CRM will follow to speak with Dr. Alex Garcia regarding observation status and SNF. T    Discharge plan pending. [facility]      CRM spoke with Kelechi Delvalle, Dr. Roma Ramirez. The plan is for this patient to go home with [facility]. Dr. Alex Garcia will talk with the patient in the am. CRM will follow.  [facility]

## 2018-03-20 NOTE — PROGRESS NOTES
Physical Therapy  3/20/2018    Chart reviewed;pt cleared for PT. Attempted to see pt this AM for AM PT session, however pt currently bathing w/ PCT assist. Will f/u later as able and appropriate.     Angie Means, PTA

## 2018-03-20 NOTE — PROGRESS NOTES
Problem: Mobility Impaired (Adult and Pediatric)  Goal: *Acute Goals and Plan of Care (Insert Text)  Physical Therapy Goals  Initiated 3/19/2018    1. Patient will move from supine to sit and sit to supine  in bed with independence within 4 days. 2. Patient will perform sit to stand with modified independence within 4 days. 3. Patient will ambulate with modified independence for 150 feet with the least restrictive device within 4 days. 4. Patient will perform home exercise program per protocol with independence within 4 days. 5. Patient will demonstrate AROM 0-90 degrees in operative joint within 4 days. physical Therapy TREATMENT  Patient: Carmenza Muhammad (37 y.o. female)  Date: 3/20/2018  Diagnosis: BILATERAL PRIMARY OSTEOARTHRITIS OF KNEE   Osteoarthritis of right knee <principal problem not specified>  Procedure(s) (LRB):  RIGHT TOTAL KNEE ARTHROPLASTY  (Right) 1 Day Post-Op  Precautions: Fall, WBAT  Chart, physical therapy assessment, plan of care and goals were reviewed. ASSESSMENT:  Pt seen this afternoon for afternoon PT. Pt continues to make good progress towards goals;reported 7-8/10 pain throughout session. Pt received in bed, able to transfer to EOB w/ Supervision and use of gait belt for RLE assist to EOB. Pt gait distance progressed to ~110FT using RW. Continues to initiate gait training w/ step to gait but then able to progress to step through gait after ~10 FT amb. Continued to encouraged knee flexion and heel-toe gait w/ swing phase. Pt returned to bed, using gait belt once more for RLE assist into bed. Pt completed TKR exercises in supine. Encouraged pt to be OOB in chair w/ NSG assist for dinner. Pt ice packs reapplied to R knee. Recommend HHPT at this time as pt continues to progress towards goals however pt continues to request Rehab. Pt also requesting RW, will plan to order if pt discharges home. Pt informed therapist of possible status change from Observation to Inpatient.   PT to continue to follow. Progression toward goals:  [x]      Improving appropriately and progressing toward goals  []      Improving slowly and progressing toward goals  []      Not making progress toward goals and plan of care will be adjusted     PLAN:  Patient continues to benefit from skilled intervention to address the above impairments. Continue treatment per established plan of care. Discharge Recommendations:  Home Health  Further Equipment Recommendations for Discharge:  RW pending final d/c plan (If home, RW to be ordered). SUBJECTIVE:   \"The honeymoon is definitely over now. \" as pt reporting increase in pain (7-8/10). OBJECTIVE DATA SUMMARY:   Range of Motion:  AROM: Within functional limits  PROM: Within functional limits                    Functional Mobility Training:  Bed Mobility:     Supine to Sit: Supervision; Adaptive equipment; Additional time (gait belt used for RLE in/OOB)  Sit to Supine: Supervision; Adaptive equipment; Additional time           Transfers:  Sit to Stand: Stand-by assistance  Stand to Sit: Stand-by assistance        Bed to Chair: Contact guard assistance                    Ambulation/Gait Training:  Distance (ft): 110 Feet (ft)  Assistive Device: Gait belt;Walker, rolling  Ambulation - Level of Assistance: Contact guard assistance; Additional time;Assist x1 (step to>step through)        Gait Abnormalities: Antalgic;Decreased step clearance; Step to gait        Base of Support: Narrowed     Speed/Teresa: Pace decreased (<100 feet/min); Slow  Step Length: Left shortened;Right shortened                         Therapeutic Exercises:   Exercises performed per protocol. See morning treatment note for description.   Pain:  Pain Scale 1: Numeric (0 - 10)  Pain Intensity 1: 5  Pain Location 1: Knee  Pain Orientation 1: Anterior;Posterior  Pain Description 1: Aching  Pain Intervention(s) 1: Medication (see MAR)  Activity Tolerance:   Good  Please refer to the flowsheet for vital signs taken during this treatment.   After treatment:   [] Patient left in no apparent distress sitting up in chair  [x] Patient left in no apparent distress in bed  [x] Call bell left within reach  [x] Nursing notified  [] Caregiver present  [] Bed alarm activated    COMMUNICATION/COLLABORATION:   The patients plan of care was discussed with: Occupational Therapist and Registered Nurse    Angie Trinidad PTA   Time Calculation: 33 mins

## 2018-03-20 NOTE — PROGRESS NOTES
Resting      GEN:  NAD.  AOx3   ABD:  S/NT/ND   RLE:  Dressing C/D/I , 5/5 motor, Calf nttp (Bilat), Sensation rossly intact to light touch throughout, 1+ dp/pt pulses, foot perfused    Patient Vitals for the past 24 hrs:   Temp Pulse Resp BP SpO2   03/20/18 0308 97.3 °F (36.3 °C) 85 16 125/79 95 %   03/19/18 2319 97.9 °F (36.6 °C) 83 16 106/69 93 %   03/19/18 1947 98.7 °F (37.1 °C) 82 18 120/79 99 %   03/19/18 1804 98.6 °F (37 °C) 84 17 125/79 100 %   03/19/18 1715 - 84 18 137/73 99 %   03/19/18 1623 98.4 °F (36.9 °C) 77 16 140/82 100 %   03/19/18 1610 - 81 - 128/84 -   03/19/18 1600 - 78 - 148/88 -   03/19/18 1557 - 72 - (!) 138/92 -   03/19/18 1552 98.3 °F (36.8 °C) 74 16 142/81 100 %   03/19/18 1510 96 °F (35.6 °C) - - - -   03/19/18 1500 - 70 16 142/70 100 %   03/19/18 1445 - 68 17 144/87 100 %   03/19/18 1430 - 68 12 148/88 100 %   03/19/18 1415 - 73 13 142/82 100 %   03/19/18 1400 - 70 11 145/84 100 %   03/19/18 1345 - 70 11 141/80 100 %   03/19/18 1335 - 71 11 137/78 100 %   03/19/18 1331 - 88 12 125/87 99 %   03/19/18 1330 96.2 °F (35.7 °C) 74 14 132/75 100 %   03/19/18 1325 - - - 128/72 -   03/19/18 0814 98.4 °F (36.9 °C) 100 14 148/83 96 %       Current Facility-Administered Medications   Medication Dose Route Frequency    amitriptyline (ELAVIL) tablet 75 mg  75 mg Oral QHS    fenofibrate nanocrystallized (TRICOR) tablet 145 mg  145 mg Oral Q48H    gabapentin (NEURONTIN) capsule 200 mg  200 mg Oral BID WITH MEALS    LORazepam (ATIVAN) tablet 0.5 mg  0.5 mg Oral TID PRN    losartan (COZAAR) tablet 25 mg  25 mg Oral DAILY    OLANZapine (ZyPREXA) tablet 10 mg  10 mg Oral DAILY WITH DINNER    0.9% sodium chloride infusion  125 mL/hr IntraVENous CONTINUOUS    sodium chloride 0.9 % bolus infusion 500 mL  500 mL IntraVENous ONCE PRN    sodium chloride (NS) flush 5-10 mL  5-10 mL IntraVENous Q8H    sodium chloride (NS) flush 5-10 mL  5-10 mL IntraVENous PRN    acetaminophen (TYLENOL) tablet 650 mg  650 mg Oral Q6H    oxyCODONE IR (ROXICODONE) tablet 5 mg  5 mg Oral Q3H PRN    HYDROmorphone (PF) (DILAUDID) injection 0.5 mg  0.5 mg IntraVENous Q4H PRN    naloxone (NARCAN) injection 0.4 mg  0.4 mg IntraVENous PRN    ondansetron (ZOFRAN) injection 4 mg  4 mg IntraVENous Q4H PRN    hydrOXYzine HCl (ATARAX) tablet 10 mg  10 mg Oral Q8H PRN    famotidine (PEPCID) tablet 20 mg  20 mg Oral BID    senna-docusate (PERICOLACE) 8.6-50 mg per tablet 1 Tab  1 Tab Oral BID    polyethylene glycol (MIRALAX) packet 17 g  17 g Oral DAILY    [START ON 3/21/2018] bisacodyl (DULCOLAX) suppository 10 mg  10 mg Rectal DAILY PRN    aspirin delayed-release tablet 325 mg  325 mg Oral BID    ketorolac (TORADOL) injection 30 mg  30 mg IntraVENous Q6H    diclofenac EC (VOLTAREN) tablet 50 mg  50 mg Oral BID    prochlorperazine (COMPAZINE) with saline injection 5 mg  5 mg IntraVENous Q6H PRN       Lab Results   Component Value Date/Time    HGB 10.5 (L) 03/20/2018 03:16 AM    INR 1.1 03/12/2018 08:32 AM       Lab Results   Component Value Date/Time    Sodium 141 03/20/2018 03:16 AM    Potassium 3.7 03/20/2018 03:16 AM    Chloride 110 (H) 03/20/2018 03:16 AM    CO2 26 03/20/2018 03:16 AM    BUN 11 03/20/2018 03:16 AM    Creatinine 0.93 03/20/2018 03:16 AM    Calcium 7.9 (L) 03/20/2018 03:16 AM            59 y.o. female s/p right total knee arthroplasty on 3/19/2018  . No acute issues.        ABX: Complete 24 hours perioperative abx  DVT Prophylaxis: Aspirin 325 enteric coated  Weight Bearing: WBAT RLE   Pain Control: Diclofenac, toradol,  Tylenol, home gabapentin,  oxy 5 mg for breakthrough, dilaudid IV 0.5 mg for secondary breakthrough  Disposition: Home, HHPT     Straight cath per protocol

## 2018-03-20 NOTE — PROGRESS NOTES
Problem: Self Care Deficits Care Plan (Adult)  Goal: *Acute Goals and Plan of Care (Insert Text)  Occupational Therapy Goals  Initiated: 3/20/2018    1. Patient will perform grooming with supervision/set-up standing at sink within 3 day(s). 2.  Patient will perform bathing with supervision/set-up from chair within 3 day(s). 3.  Patient will perform upper body dressing and lower body dressing with supervision/set-up within 3 day(s). 4.  Patient will perform toilet transfers with supervision/set-up within 3 day(s). 5.  Patient will perform all aspects of toileting with supervision/set-up within 3 day(s). Occupational Therapy EVALUATION  Patient: Su Mcdermott (72 y.o. female)  Date: 3/20/2018  Primary Diagnosis: BILATERAL PRIMARY OSTEOARTHRITIS OF KNEE   Osteoarthritis of right knee  Procedure(s) (LRB):  RIGHT TOTAL KNEE ARTHROPLASTY  (Right) 1 Day Post-Op   Precautions:   Fall, WBAT    ASSESSMENT :  Based on the objective data described below, the patient presents with overall good performance with self care and functional mobility following admission for R TKR. Pt was able to progress to bathroom for toileting activities and stand at sink to wash hands. She noted with asking numerous questions regarding her discharge plan and not wanting to return to [facility] with home health care as she has done in the past due to a bad experience with one person. Pt requires mod A for lower body access at this time due to decreased ROM of knee and increased pain. Anticipate this to improve quickly with practice and encouragement to maximize her independence. Pt needs encouragement that she is doing well and progressing well with her overall recovery. Patient will benefit from skilled intervention to address the above impairments.   Patients rehabilitation potential is considered to be Good  Factors which may influence rehabilitation potential include:   [x]             None noted  []             Mental ability/status  []             Medical condition  []             Home/family situation and support systems  []             Safety awareness  []             Pain tolerance/management  []             Other:      PLAN :  Recommendations and Planned Interventions:  [x]               Self Care Training                  [x]        Therapeutic Activities  [x]               Functional Mobility Training    []        Cognitive Retraining  [x]               Therapeutic Exercises           [x]        Endurance Activities  [x]               Balance Training                   []        Neuromuscular Re-Education  [x]               Visual/Perceptual Training     [x]   Home Safety Training  [x]               Patient Education                 [x]        Family Training/Education  []               Other (comment):    Frequency/Duration: Patient will be followed by occupational therapy 5 times a week to address goals. Discharge Recommendations: Home Health  Further Equipment Recommendations for Discharge: TBD     SUBJECTIVE:   Patient stated I really want to go to rehab.     OBJECTIVE DATA SUMMARY:   HISTORY:   Past Medical History:   Diagnosis Date    Arthritis     Bipolar disorder ([facility] [address]     Bladder pain     Choledocholithiasis 11/24/2010    GERD (gastroesophageal reflux disease)     Headache(784.0)     tension headaches    High cholesterol     History of cholecystectomy     Hypertension     Irritable bowel     Pelvic pain in female 2014    Psychiatric disorder     Stool color black     irritable bowel     Past Surgical History:   Procedure Laterality Date    COLONOSCOPY N/A 7/5/2017    COLONOSCOPY performed by Dwayne Cadet MD at [address] [facility]  2003, 2017    prolasped rectum    HX LAP CHOLECYSTECTOMY  11/23/10    HX [facility] 83       Prior Level of Function/Environment/Context: pt was independent at home prior to surgery. Expanded or extensive additional review of patient history:     Home Situation  Home Environment: Independent living  # Steps to Enter:  (uses elevator to 3rd floor apartment)  One/Two Story Residence: One story  Living Alone: Yes  Support Systems: Family member(s)  Patient Expects to be Discharged to[de-identified] Rehabilitation facility  Current DME Used/Available at Home: Tima Aloe, rollator, Raised toilet seat, Shower chair  Tub or Shower Type: Tub/Shower combination  [x]  Right hand dominant   []  Left hand dominant    EXAMINATION OF PERFORMANCE DEFICITS:  Cognitive/Behavioral Status:  Neurologic State: Alert  Orientation Level: Oriented X4  Cognition: Appropriate decision making  Perception: Appears intact  Perseveration: No perseveration noted  Safety/Judgement: Good awareness of safety precautions    Skin: see nursing notes    Edema: none noted    Hearing: Auditory  Auditory Impairment: None    Vision/Perceptual:                           Acuity: Within Defined Limits    Corrective Lenses: Glasses    Range of Motion:    AROM: Within functional limits  PROM: Within functional limits                      Strength:    Strength: Within functional limits                Coordination:  Coordination: Within functional limits            Tone & Sensation:    Tone: Normal  Sensation: Intact                      Balance:  Sitting: Intact  Standing: Intact; With support (RW)  Standing - Static: Constant support;Good (RW)  Standing - Dynamic : Fair    Functional Mobility and Transfers for ADLs:  Bed Mobility:  Supine to Sit:  (pt received OOB in chair)  Sit to Supine: min A    Transfers:  Sit to Stand: Contact guard assistance (verbal cues x1 for proper handplacement w/transfer)  Stand to Sit: Contact guard assistance  Bed to Chair: Contact guard assistance  Toilet Transfer : Contact guard assistance    ADL Assessment:  Feeding: Supervision    Oral Facial Hygiene/Grooming: Supervision    Bathing:  Moderate assistance    Upper Body Dressing: Supervision    Lower Body Dressing: Moderate assistance    Toileting: Contact guard assistance                ADL Intervention and task modifications: Toileting with CG and cues for safe functional transfers. She stood at the sink to complete hand hygiene and did well with tasks. She was able to return to EOB and then supine in bed with min A. Pt given cues for safety and for hand placement with transfers and for techniques. Cognitive Retraining  Safety/Judgement: Good awareness of safety precautions    Functional Measure:  Barthel Index:    Bathin  Bladder: 10  Bowels: 10  Groomin  Dressin  Feeding: 10  Mobility: 5  Stairs: 0  Toilet Use: 5  Transfer (Bed to Chair and Back): 10  Total: 60       Barthel and G-code impairment scale:  Percentage of impairment CH  0% CI  1-19% CJ  20-39% CK  40-59% CL  60-79% CM  80-99% CN  100%   Barthel Score 0-100 100 99-80 79-60 59-40 20-39 1-19   0   Barthel Score 0-20 20 17-19 13-16 9-12 5-8 1-4 0      The Barthel ADL Index: Guidelines  1. The index should be used as a record of what a patient does, not as a record of what a patient could do. 2. The main aim is to establish degree of independence from any help, physical or verbal, however minor and for whatever reason. 3. The need for supervision renders the patient not independent. 4. A patient's performance should be established using the best available evidence. Asking the patient, friends/relatives and nurses are the usual sources, but direct observation and common sense are also important. However direct testing is not needed. 5. Usually the patient's performance over the preceding 24-48 hours is important, but occasionally longer periods will be relevant. 6. Middle categories imply that the patient supplies over 50 per cent of the effort. 7. Use of aids to be independent is allowed. Brady Quinones, Barthel, D.W. (1090).  Functional evaluation: the Barthel Index. [address] [employer] (14)2. CAMERON Andres, Crow Marks., Candis Edwards, [address] (1999). Measuring the change indisability after inpatient rehabilitation; comparison of the responsiveness of the Barthel Index and Functional Ralph Measure. Journal of Neurology, Neurosurgery, and Psychiatry, 66(4), 273-332. IRASEMA Wilson, LIZZIE Lora, & Joann Storey M.A. (2004.) Assessment of post-stroke quality of life in cost-effectiveness studies: The usefulness of the Barthel Index and the EuroQoL-5D. Quality of Life Research, 13, 891-47     G codes: In compliance with CMSs Claims Based Outcome Reporting, the following G-code set was chosen for this patient based on their primary functional limitation being treated: The outcome measure chosen to determine the severity of the functional limitation was the Barthel Index with a score of 60/100 which was correlated with the impairment scale. ? Carrying, Moving, and Handling Objects:     - CURRENT STATUS: CJ - 20%-39% impaired, limited or restricted    - GOAL STATUS: CI - 1%-19% impaired, limited or restricted    - D/C STATUS:  ---------------To be determined---------------     Occupational Therapy Evaluation Charge Determination   History Examination Decision-Making   LOW Complexity : Brief history review  MEDIUM Complexity : 3-5 performance deficits relating to physical, cognitive , or psychosocial skils that result in activity limitations and / or participation restrictions MEDIUM Complexity : Patient may present with comorbidities that affect occupational performnce.  Miniml to moderate modification of tasks or assistance (eg, physical or verbal ) with assesment(s) is necessary to enable patient to complete evaluation       Based on the above components, the patient evaluation is determined to be of the following complexity level: LOW   Pain:  Pain Scale 1: Numeric (0 - 10)  Pain Intensity 1: 5  Pain Location 1: Knee (Right)  Pain Orientation 1: Anterior;Posterior  Pain Description 1: Aching  Pain Intervention(s) 1: Ice  Activity Tolerance:   VSS throughout session. After treatment:   [x] Patient left in no apparent distress sitting up in chair  [] Patient left in no apparent distress in bed  [x] Call bell left within reach  [x] Nursing notified  [] Caregiver present  [] Bed alarm activated    COMMUNICATION/EDUCATION:   The patients plan of care was discussed with: Physical Therapist and Registered Nurse. [x] Home safety education was provided and the patient/caregiver indicated understanding. [x] Patient/family have participated as able in goal setting and plan of care. [] Patient/family agree to work toward stated goals and plan of care. [] Patient understands intent and goals of therapy, but is neutral about his/her participation. [] Patient is unable to participate in goal setting and plan of care. This patients plan of care is appropriate for delegation to [facility].     Thank you for this referral.  Joan Paige OT  Time Calculation: 25 mins

## 2018-03-21 LAB — HGB BLD-MCNC: 10.6 G/DL (ref 11.5–16)

## 2018-03-21 PROCEDURE — 74011250637 HC RX REV CODE- 250/637: Performed by: ORTHOPAEDIC SURGERY

## 2018-03-21 PROCEDURE — 36415 COLL VENOUS BLD VENIPUNCTURE: CPT | Performed by: ORTHOPAEDIC SURGERY

## 2018-03-21 PROCEDURE — 99218 HC RM OBSERVATION: CPT

## 2018-03-21 PROCEDURE — 85018 HEMOGLOBIN: CPT | Performed by: ORTHOPAEDIC SURGERY

## 2018-03-21 PROCEDURE — 74011250637 HC RX REV CODE- 250/637: Performed by: NURSE PRACTITIONER

## 2018-03-21 PROCEDURE — 97530 THERAPEUTIC ACTIVITIES: CPT

## 2018-03-21 PROCEDURE — 97116 GAIT TRAINING THERAPY: CPT

## 2018-03-21 PROCEDURE — 97535 SELF CARE MNGMENT TRAINING: CPT

## 2018-03-21 RX ADMIN — OXYCODONE HYDROCHLORIDE 5 MG: 5 TABLET ORAL at 23:07

## 2018-03-21 RX ADMIN — GABAPENTIN 200 MG: 100 CAPSULE ORAL at 08:44

## 2018-03-21 RX ADMIN — OXYCODONE HYDROCHLORIDE 5 MG: 5 TABLET ORAL at 20:00

## 2018-03-21 RX ADMIN — ASPIRIN 325 MG: 325 TABLET, DELAYED RELEASE ORAL at 20:00

## 2018-03-21 RX ADMIN — ACETAMINOPHEN 650 MG: 325 TABLET, FILM COATED ORAL at 23:40

## 2018-03-21 RX ADMIN — DICLOFENAC SODIUM 50 MG: 50 TABLET, DELAYED RELEASE ORAL at 08:44

## 2018-03-21 RX ADMIN — Medication 10 ML: at 22:00

## 2018-03-21 RX ADMIN — ACETAMINOPHEN 650 MG: 325 TABLET, FILM COATED ORAL at 12:33

## 2018-03-21 RX ADMIN — ACETAMINOPHEN 650 MG: 325 TABLET, FILM COATED ORAL at 05:28

## 2018-03-21 RX ADMIN — Medication 10 ML: at 05:28

## 2018-03-21 RX ADMIN — OLANZAPINE 10 MG: 5 TABLET, FILM COATED ORAL at 17:28

## 2018-03-21 RX ADMIN — LOSARTAN POTASSIUM 25 MG: 25 TABLET ORAL at 08:44

## 2018-03-21 RX ADMIN — OXYCODONE HYDROCHLORIDE 5 MG: 5 TABLET ORAL at 06:19

## 2018-03-21 RX ADMIN — DICLOFENAC SODIUM 50 MG: 50 TABLET, DELAYED RELEASE ORAL at 17:28

## 2018-03-21 RX ADMIN — OXYCODONE HYDROCHLORIDE 5 MG: 5 TABLET ORAL at 09:57

## 2018-03-21 RX ADMIN — FAMOTIDINE 20 MG: 20 TABLET, FILM COATED ORAL at 08:44

## 2018-03-21 RX ADMIN — ACETAMINOPHEN 650 MG: 325 TABLET, FILM COATED ORAL at 00:00

## 2018-03-21 RX ADMIN — FAMOTIDINE 20 MG: 20 TABLET, FILM COATED ORAL at 20:00

## 2018-03-21 RX ADMIN — ACETAMINOPHEN 650 MG: 325 TABLET, FILM COATED ORAL at 17:28

## 2018-03-21 RX ADMIN — GABAPENTIN 200 MG: 100 CAPSULE ORAL at 17:28

## 2018-03-21 RX ADMIN — OXYCODONE HYDROCHLORIDE 5 MG: 5 TABLET ORAL at 13:19

## 2018-03-21 RX ADMIN — AMITRIPTYLINE HYDROCHLORIDE 75 MG: 25 TABLET, FILM COATED ORAL at 22:19

## 2018-03-21 RX ADMIN — ASPIRIN 325 MG: 325 TABLET, DELAYED RELEASE ORAL at 08:44

## 2018-03-21 NOTE — PROGRESS NOTES
Resting      GEN:  NAD.  AOx3   ABD:  S/NT/ND   RLE:  Dressing C/D/I , 5/5 motor, Calf nttp (Bilat), Sensation rossly intact to light touch throughout, 1+ dp/pt pulses, foot perfused    Patient Vitals for the past 24 hrs:   Temp Pulse Resp BP SpO2   03/21/18 0837 98.9 °F (37.2 °C) 96 16 154/87 98 %   03/21/18 0345 97.5 °F (36.4 °C) 86 16 121/80 99 %   03/20/18 2020 98 °F (36.7 °C) 87 17 119/69 97 %   03/20/18 1514 97.9 °F (36.6 °C) 90 16 113/64 97 %   03/20/18 1100 - 98 - 118/74 -   03/20/18 1040 - 96 - 115/78 -       Current Facility-Administered Medications   Medication Dose Route Frequency    hydrOXYzine HCl (ATARAX) tablet 10-20 mg  10-20 mg Oral BID PRN    diclofenac EC (VOLTAREN) tablet 50 mg  50 mg Oral BID    amitriptyline (ELAVIL) tablet 75 mg  75 mg Oral QHS    fenofibrate nanocrystallized (TRICOR) tablet 145 mg  145 mg Oral Q48H    gabapentin (NEURONTIN) capsule 200 mg  200 mg Oral BID WITH MEALS    LORazepam (ATIVAN) tablet 0.5 mg  0.5 mg Oral TID PRN    losartan (COZAAR) tablet 25 mg  25 mg Oral DAILY    OLANZapine (ZyPREXA) tablet 10 mg  10 mg Oral DAILY WITH DINNER    sodium chloride 0.9 % bolus infusion 500 mL  500 mL IntraVENous ONCE PRN    sodium chloride (NS) flush 5-10 mL  5-10 mL IntraVENous Q8H    sodium chloride (NS) flush 5-10 mL  5-10 mL IntraVENous PRN    acetaminophen (TYLENOL) tablet 650 mg  650 mg Oral Q6H    oxyCODONE IR (ROXICODONE) tablet 5 mg  5 mg Oral Q3H PRN    naloxone (NARCAN) injection 0.4 mg  0.4 mg IntraVENous PRN    famotidine (PEPCID) tablet 20 mg  20 mg Oral BID    senna-docusate (PERICOLACE) 8.6-50 mg per tablet 1 Tab  1 Tab Oral BID    polyethylene glycol (MIRALAX) packet 17 g  17 g Oral DAILY    bisacodyl (DULCOLAX) suppository 10 mg  10 mg Rectal DAILY PRN    aspirin delayed-release tablet 325 mg  325 mg Oral BID       Lab Results   Component Value Date/Time    HGB 10.6 (L) 03/21/2018 03:11 AM    INR 1.1 03/12/2018 08:32 AM       Lab Results Component Value Date/Time    Sodium 141 03/20/2018 03:16 AM    Potassium 3.7 03/20/2018 03:16 AM    Chloride 110 (H) 03/20/2018 03:16 AM    CO2 26 03/20/2018 03:16 AM    BUN 11 03/20/2018 03:16 AM    Creatinine 0.93 03/20/2018 03:16 AM    Calcium 7.9 (L) 03/20/2018 03:16 AM            59 y.o. female s/p right total knee arthroplasty on 3/19/2018  . Pain increased.        ABX: Complete 24 hours perioperative abx  DVT Prophylaxis: Aspirin 325 enteric coated  Weight Bearing: WBAT RLE   Pain Control: Diclofenac, toradol,  Tylenol, home gabapentin,  oxy 5 mg for breakthrough, dilaudid IV 0.5 mg for secondary breakthrough  Disposition: Home, HHPT     Patient did not meet obs criteria due to PT needs  Will determine dispo based on todays PT session

## 2018-03-21 NOTE — PROGRESS NOTES
Problem: Mobility Impaired (Adult and Pediatric)  Goal: *Acute Goals and Plan of Care (Insert Text)  Physical Therapy Goals  Initiated 3/19/2018    1. Patient will move from supine to sit and sit to supine  in bed with independence within 4 days. 2. Patient will perform sit to stand with modified independence within 4 days. 3. Patient will ambulate with modified independence for 150 feet with the least restrictive device within 4 days. 4. Patient will perform home exercise program per protocol with independence within 4 days. 5. Patient will demonstrate AROM 0-90 degrees in operative joint within 4 days. physical Therapy TREATMENT  Patient: Marina Murphy (23 y.o. female)  Date: 3/21/2018  Diagnosis: BILATERAL PRIMARY OSTEOARTHRITIS OF KNEE   Osteoarthritis of right knee <principal problem not specified>  Procedure(s) (LRB):  RIGHT TOTAL KNEE ARTHROPLASTY  (Right) 2 Days Post-Op  Precautions: Fall, WBAT  Chart, physical therapy assessment, plan of care and goals were reviewed. ASSESSMENT:  Pt doing well w/PT this afternoon. Decreased pain reported compared to this AM. Pt HR 90 bpm post activity. Pt amb around enitre unit (~300FT) w/ Supervision using RW. Step to gait continues to progress to step through reciprocal gait w/ with further amb, also noted decrease antalgic gait. Encouraged heel-to gait w/ amb to encouraged knee flexion for swing through phase;pt able to demonstrate for remaining 150 FT. Pt returned to chair at bedside, ice packs refilled and applied to R knee. Gait belt provided for pt as pt prefers to use it for RLE support in/OOB. Will follow up in AM.  Progression toward goals:  [x]      Improving appropriately and progressing toward goals  []      Improving slowly and progressing toward goals  []      Not making progress toward goals and plan of care will be adjusted     PLAN:  Patient continues to benefit from skilled intervention to address the above impairments.   Continue treatment per established plan of care. Discharge Recommendations:  Home Health  Further Equipment Recommendations for Discharge:  rolling walker, delivered     SUBJECTIVE:   \"I sat in the chair for about 15 minutes and my knee hurt so bad, I couldn't do it. \"    OBJECTIVE DATA SUMMARY:                        Functional Mobility Training:  Bed Mobility:     Supine to Sit: Modified independent  Sit to Supine:  (returned to chair)  Scooting: Supervision        Transfers:  Sit to Stand: Stand-by assistance  Stand to Sit: Stand-by assistance        Bed to Chair: Stand-by assistance (chair>bed)                    Ambulation/Gait Training:  Distance (ft): 300 Feet (ft)  Assistive Device: Gait belt;Walker, rolling  Ambulation - Level of Assistance: Supervision;Stand-by assistance        Gait Abnormalities: Antalgic;Decreased step clearance (antalgic gait decreasing w/further amb)        Base of Support: Narrowed     Speed/Teresa: Pace decreased (<100 feet/min); Slow  Step Length: Left shortened;Right shortened                         Therapeutic Exercises:   Exercises performed per protocol. See morning treatment note for description. Pain:  Pain Scale 1: Numeric (0 - 10)  Pain Intensity 1: 5  Pain Location 1: Knee  Pain Orientation 1: Right  Pain Description 1: Aching  Pain Intervention(s) 1: Cold pack  Activity Tolerance:   Good  Please refer to the flowsheet for vital signs taken during this treatment.   After treatment:   [x] Patient left in no apparent distress sitting up in chair  [] Patient left in no apparent distress in bed  [x] Call bell left within reach  [x] Nursing notified  [] Caregiver present  [] Bed alarm activated    COMMUNICATION/COLLABORATION:   The patients plan of care was discussed with: Registered Nurse    Mario Trinidad PTA   Time Calculation: 24 mins

## 2018-03-21 NOTE — PROGRESS NOTES
Bedside and Verbal shift change report given to Domi Villegas RN (oncoming nurse) by Donnie Duong RN (offgoing nurse). Report included the following information SBAR.

## 2018-03-21 NOTE — PHYSICIAN ADVISORY
Letter of Status Determination:   Recommend hospitalization status upgraded from   OBSERVATION  to INPATIENT  Status     Pt Name:  Radha Maza   MR#   [address] [facility] # [id number] /  [id number]   [facility]#  [id number]   [address] [place] [address]  [phone]  @ [facility]   Hospitalization date  3/19/2018  7:27 AM   Current Attending Physician  Cassdiy Mcgovern MD   Principal diagnosis  <principal problem not specified>   Bilateral Primary Osteoarthritis of Knee    Clinicals  59 y.o. y.o  female hospitalized with above diagnosis   The pt went through planned RIGHT TKA. She is noted to have slowly improved from her surgery. Her Pre-op EKG does show signs of mild Left axis deviation, RBBB - increasing risk of arrhythmia      Milliman (MCG) criteria   Does  NOT apply    STATUS DETERMINATION  This patient is at above high risk of deterioration based on documented presenting clinical data, comorbid conditions, high risk of adverse events and current acute care course. Ms. Radha Maza now meets Inpatient Admission status criteria in accordance with CMS regulation Section 43 .3. Specifically, due to medical necessity the patient's stay now exceeds Two Midnights. It is our recommendation that this patient's hospitalization status should be upgraded from  OBSERVATION to INPATIENT status. The final decision of the patient's hospitalization status depends on the attending physician's judgment            Additional comments     Payor: Heron Dao / Plan: [address] / Product Type: Medicare /         Regina Navarro MD MPH FACP     Physician [address]   [employer], [address] [place] [address]    Cell  [phone]        [phone]    .

## 2018-03-21 NOTE — PROGRESS NOTES
Bedside and Verbal shift change report given to [employer] (oncoming nurse) by Brittni Preston (offgoing nurse). Report included the following information SBAR, Kardex, Procedure Summary, Intake/Output, MAR and Recent Results.

## 2018-03-21 NOTE — PROGRESS NOTES
Problem: Mobility Impaired (Adult and Pediatric)  Goal: *Acute Goals and Plan of Care (Insert Text)  Physical Therapy Goals  Initiated 3/19/2018    1. Patient will move from supine to sit and sit to supine  in bed with independence within 4 days. 2. Patient will perform sit to stand with modified independence within 4 days. 3. Patient will ambulate with modified independence for 150 feet with the least restrictive device within 4 days. 4. Patient will perform home exercise program per protocol with independence within 4 days. 5. Patient will demonstrate AROM 0-90 degrees in operative joint within 4 days. physical Therapy TREATMENT  Patient: Marina Murphy (28 y.o. female)  Date: 3/21/2018  Diagnosis: BILATERAL PRIMARY OSTEOARTHRITIS OF KNEE   Osteoarthritis of right knee <principal problem not specified>  Procedure(s) (LRB):  RIGHT TOTAL KNEE ARTHROPLASTY  (Right) 2 Days Post-Op  Precautions: Fall, WBAT  Chart, physical therapy assessment, plan of care and goals were reviewed. ASSESSMENT:  Pt received OOB in bathroom w/OT. Pt amb shorter distance this AM d/t increased pain (9/10) and c/o dizziness during gait. Pt returned to chair at bedside, /76 ; pt also reports \"feeling that something is wrong with her body, but doesn't know what\", noted pt a little shaky. Pt remained seated in chair, performed seated RLE exercises. Discussed possibility of going home as pt progressing well towards PT goals needing only CGA-Supervision for all mobility. Pt appears to be very anxious about going home w/o additional help. Ensured pt progressing well towards goals and offered afternoon PT to ensure pt confidence w/ all mobility. Pt reported 0STE apt as she lives in [address] [place] [address] and uses elevators. RW ordered in preparation for home d/c. Discussed d/c arrangements/plan w/ CM and OT.    Progression toward goals:  [x]      Improving appropriately and progressing toward goals  []      Improving slowly and progressing toward goals  []      Not making progress toward goals and plan of care will be adjusted     PLAN:  Patient continues to benefit from skilled intervention to address the above impairments. Continue treatment per established plan of care. Discharge Recommendations:  Home Health; pt did appear to be anxious this AM during PT about going home (elevated -109)  Further Equipment Recommendations for Discharge:  rolling walker;ordered and to be delivered to pt room prior to d/c     SUBJECTIVE:   Patient stated I don't know how I feel about going home. Vahid Bond    OBJECTIVE DATA SUMMARY:   Critical Behavior:  Neurologic State: Alert  Orientation Level: Oriented X4  Cognition: Appropriate for age attention/concentration  Safety/Judgement: Good awareness of safety precautions  Range of Motion:         Active ROM: 86 (w/ increased pain this AM)                 Functional Mobility Training:  Bed Mobility:     Supine to Sit:  (pt recieved OOB in bathrooom w/ OT)  Sit to Supine: Supervision;Contact guard assistance; Additional time;Assist x1  Scooting: Supervision        Transfers:  Sit to Stand: Supervision  Stand to Sit: Supervision        Bed to Chair: Stand-by assistance (chair>bed)                    Balance:  Sitting: Intact  Standing: Intact; With support  Standing - Static: Good  Standing - Dynamic : Good  Ambulation/Gait Training:  Distance (ft): 50 Feet (ft)  Assistive Device: Gait belt;Walker, rolling  Ambulation - Level of Assistance: Supervision;Stand-by assistance        Gait Abnormalities: Antalgic;Decreased step clearance        Base of Support: Narrowed     Speed/Teresa: Pace decreased (<100 feet/min); Slow  Step Length: Left shortened;Right shortened                    Stairs: Pt reported she uses elevator at ILF           Therapeutic Exercises:     EXERCISE   Sets   Reps   Active Active Assist   Passive Self ROM   Comments   Ankle Pumps   []                                        [] []                                        []                                           Quad Sets   []                                        []                                        []                                        []                                           Hamstring Sets   []                                        []                                        []                                        []                                           Short Arc Quads   []                                        []                                        []                                        []                                           Knee Extension Stretch     []                                          []                                          []                                          []                                           Heel Slides  10 [x]                                        []                                        []                                        []                                           Long Arc Quads  10 []                                        [x]                                        []                                        []                                           Knee Flexion Stretch   []                                        []                                        []                                        []                                           Straight Leg Raises   []                                        []                                        []                                        []                                             Pain:  Pain Scale 1: Numeric (0 - 10)  Pain Intensity 1: 8  Pain Location 1: Knee  Pain Orientation 1: Right  Pain Description 1: Aching  Pain Intervention(s) 1: Medication (see MAR)  Activity Tolerance:   Good. Pt appears to be more anxious today in regards to d/c.  Pt w/ increased HR (123-109) and BP (155/76 highest). Please refer to the flowsheet for vital signs taken during this treatment.   After treatment:   [] Patient left in no apparent distress sitting up in chair  [x] Patient left in no apparent distress in bed  [x] Call bell left within reach  [x] Nursing notified  [x] Caregiver present  [] Bed alarm activated    COMMUNICATION/COLLABORATION:   The patients plan of care was discussed with: Registered Nurse    Fam Trinidad PTA   Time Calculation: 27 mins

## 2018-03-21 NOTE — PROGRESS NOTES
Chart reviewed. Dr. Fatmata Carrington has suggested inpatient status. Dr Vida Swanson has been called. The patient is anxious and really wants to go to a SNF, but may be open to [facility] at home. The patient will need to be upgraded to receive this care in the home/SNF as well. CRM will continue to follow.  JEAN MARIE

## 2018-03-21 NOTE — PROGRESS NOTES
Bedside shift change report given to Shantell Díaz RN (oncoming nurse) by Karthik Ruiz RN (offgoing nurse). Report included the following information SBAR, Kardex and Recent Results.

## 2018-03-21 NOTE — ANESTHESIA POSTPROCEDURE EVALUATION
Post-Anesthesia Evaluation and Assessment    Patient: Radha Maza MRN: [id number]  SSN: [id number]    YOB: 1960  Age: 59 y.o. Sex: female       Cardiovascular Function/Vital Signs  Visit Vitals    /80    Pulse 86    Temp 36.4 °C (97.5 °F)    Resp 16    Ht 5' (1.524 m)    Wt 73.9 kg (163 lb)    SpO2 99%    BMI 31.83 kg/m2       Patient is status post spinal anesthesia for Procedure(s):  RIGHT TOTAL KNEE ARTHROPLASTY . Nausea/Vomiting: None    Postoperative hydration reviewed and adequate. Pain:  Pain Scale 1: Numeric (0 - 10) (03/21/18 0655)  Pain Intensity 1: 5 (03/21/18 0655)   Managed    Neurological Status:   Neuro (WDL): Within Defined Limits (03/19/18 1330)  Neuro  Neurologic State: Alert (03/20/18 2352)  Orientation Level: Oriented X4 (03/20/18 2352)  Cognition: Follows commands (03/20/18 2352)  LLE Motor Response: Purposeful (03/20/18 2352)  RLE Motor Response: Purposeful (03/20/18 2352)   At baseline    Mental Status and Level of Consciousness: Arousable    Pulmonary Status:   O2 Device: Room air (03/20/18 2249)   Adequate oxygenation and airway patent    Complications related to anesthesia: None    Post-anesthesia assessment completed.  No concerns    Signed By: Leonor Alexander MD     March 21, 2018

## 2018-03-21 NOTE — PROGRESS NOTES
Problem: Self Care Deficits Care Plan (Adult)  Goal: *Acute Goals and Plan of Care (Insert Text)  Occupational Therapy Goals  Initiated: 3/20/2018    1. Patient will perform grooming with supervision/set-up standing at sink within 3 day(s). 2.  Patient will perform bathing with supervision/set-up from chair within 3 day(s). 3.  Patient will perform upper body dressing and lower body dressing with supervision/set-up within 3 day(s). 4.  Patient will perform toilet transfers with supervision/set-up within 3 day(s). 5.  Patient will perform all aspects of toileting with supervision/set-up within 3 day(s). Occupational Therapy TREATMENT  Patient: Ade Krishnamurthy (61 y.o. female)  Date: 3/21/2018  Diagnosis: BILATERAL PRIMARY OSTEOARTHRITIS OF KNEE   Osteoarthritis of right knee <principal problem not specified>  Procedure(s) (LRB):  RIGHT TOTAL KNEE ARTHROPLASTY  (Right) 2 Days Post-Op  Precautions: Fall, WBAT  Chart, occupational therapy assessment, plan of care, and goals were reviewed. ASSESSMENT:  Pt was able to progress to bathroom for toileting, grooming and bathing tasks. Pt with poor awareness of her abilities and performing all basic tasks at supervision level. She was able to completed bathing standing. She refused to don clothing reporting she is too concerned about soiling her clothing. Pt encouraged to wear clothes as this would normalize her for home. She continued to decline. Pt would be safe to discharge home with home health to progress ADl activity and independence. Pt is self limiting due to anxiety. Recommend home with family support. Progression toward goals:  [x]       Improving appropriately and progressing toward goals  []       Improving slowly and progressing toward goals  []       Not making progress toward goals and plan of care will be adjusted     PLAN:  Patient continues to benefit from skilled intervention to address the above impairments.   Continue treatment per established plan of care. Discharge Recommendations:  None  Further Equipment Recommendations for Discharge:  TBD     SUBJECTIVE:   Patient stated I am feeling alright.     OBJECTIVE DATA SUMMARY:   Cognitive/Behavioral Status:  Neurologic State: Alert  Orientation Level: Oriented X4  Cognition: Appropriate for age attention/concentration  Perception: Appears intact  Perseveration: No perseveration noted  Safety/Judgement: Good awareness of safety precautions    Functional Mobility and Transfers for ADLs:  Bed Mobility:  Supine to Sit:  (recieved in bathroom)  Sit to Supine:  (remained OOB with PT following intervention)    Transfers:  Sit to Stand: Supervision  Functional Transfers  Bathroom Mobility: Supervision/set up  Toilet Transfer : Supervision  Bed to Chair: Supervision    Balance:  Sitting: Intact  Standing: Intact; With support  Standing - Static: Good  Standing - Dynamic : Good    ADL Intervention:       Grooming  Grooming Assistance: Supervision/set up (standing at sink with good activity tolerance)  Washing Face: Supervision/set-up  Brushing Teeth: Supervision/set-up    Upper Body Bathing  Bathing Assistance: Supervision/set-up  Position Performed: Standing    Lower Body Bathing  Bathing Assistance: Minimum assistance  Perineal  : Supervision/set-up  Lower Body : Minimum assistance  Position Performed: Seated in chair    [address]: Supervision/set-up (refusing to don clothing due to toileting concerns)  Hospital Gown: Supervision/ set-up    Lower Body Dressing Assistance  Dressing Assistance: Minimum assistance (additional time)  Underpants: Supervision/set-up  Socks: Minimum assistance    Toileting  Toileting Assistance: Supervision/set up  Bladder Hygiene: Supervision/set-up  Bowel Hygiene: Supervision/set-up  Clothing Management: Supervision/set-up    Cognitive Retraining  Safety/Judgement: Good awareness of safety precautions    Pain:  Pain Scale 1: Numeric (0 - 10)  Pain Intensity 1: 8  Pain Location 1: Knee  Pain Orientation 1: Right  Pain Description 1: Aching  Pain Intervention(s) 1: Medication (see MAR)  Activity Tolerance:   VSS throughout session.      After treatment:   [x] Patient left in no apparent distress sitting up in chair  [] Patient left in no apparent distress in bed  [x] Call bell left within reach  [x] Nursing notified  [] Caregiver present  [] Bed alarm activated    COMMUNICATION/COLLABORATION:   The patients plan of care was discussed with: Physical Therapist and Registered Nurse    Jazmine Sanchez OT  Time Calculation: 18 mins

## 2018-03-22 VITALS
TEMPERATURE: 97.6 F | HEART RATE: 100 BPM | OXYGEN SATURATION: 98 % | DIASTOLIC BLOOD PRESSURE: 79 MMHG | HEIGHT: 60 IN | SYSTOLIC BLOOD PRESSURE: 113 MMHG | BODY MASS INDEX: 32 KG/M2 | RESPIRATION RATE: 16 BRPM | WEIGHT: 163 LBS

## 2018-03-22 PROBLEM — Z96.651 TOTAL KNEE REPLACEMENT STATUS, RIGHT: Status: ACTIVE | Noted: 2018-03-22

## 2018-03-22 PROCEDURE — 65270000029 HC RM PRIVATE

## 2018-03-22 PROCEDURE — 74011250637 HC RX REV CODE- 250/637: Performed by: ORTHOPAEDIC SURGERY

## 2018-03-22 PROCEDURE — 99218 HC RM OBSERVATION: CPT

## 2018-03-22 PROCEDURE — 97116 GAIT TRAINING THERAPY: CPT

## 2018-03-22 PROCEDURE — 74011250637 HC RX REV CODE- 250/637: Performed by: NURSE PRACTITIONER

## 2018-03-22 RX ADMIN — DOCUSATE SODIUM AND SENNOSIDES 1 TABLET: 8.6; 5 TABLET, FILM COATED ORAL at 08:51

## 2018-03-22 RX ADMIN — GABAPENTIN 200 MG: 100 CAPSULE ORAL at 08:51

## 2018-03-22 RX ADMIN — FENOFIBRATE 145 MG: 145 TABLET ORAL at 08:51

## 2018-03-22 RX ADMIN — ASPIRIN 325 MG: 325 TABLET, DELAYED RELEASE ORAL at 08:51

## 2018-03-22 RX ADMIN — DICLOFENAC SODIUM 50 MG: 50 TABLET, DELAYED RELEASE ORAL at 08:51

## 2018-03-22 RX ADMIN — ACETAMINOPHEN 650 MG: 325 TABLET, FILM COATED ORAL at 11:58

## 2018-03-22 RX ADMIN — POLYETHYLENE GLYCOL 3350 17 G: 17 POWDER, FOR SOLUTION ORAL at 08:51

## 2018-03-22 RX ADMIN — OXYCODONE HYDROCHLORIDE 5 MG: 5 TABLET ORAL at 08:52

## 2018-03-22 RX ADMIN — FAMOTIDINE 20 MG: 20 TABLET, FILM COATED ORAL at 08:51

## 2018-03-22 RX ADMIN — OXYCODONE HYDROCHLORIDE 5 MG: 5 TABLET ORAL at 14:38

## 2018-03-22 RX ADMIN — OXYCODONE HYDROCHLORIDE 5 MG: 5 TABLET ORAL at 04:38

## 2018-03-22 RX ADMIN — ACETAMINOPHEN 650 MG: 325 TABLET, FILM COATED ORAL at 07:13

## 2018-03-22 RX ADMIN — OXYCODONE HYDROCHLORIDE 5 MG: 5 TABLET ORAL at 11:59

## 2018-03-22 NOTE — PROGRESS NOTES
Problem: Mobility Impaired (Adult and Pediatric)  Goal: *Acute Goals and Plan of Care (Insert Text)  Physical Therapy Goals  Initiated 3/19/2018    1. Patient will move from supine to sit and sit to supine  in bed with independence within 4 days. 2. Patient will perform sit to stand with modified independence within 4 days. 3. Patient will ambulate with modified independence for 150 feet with the least restrictive device within 4 days. 4. Patient will perform home exercise program per protocol with independence within 4 days. 5. Patient will demonstrate AROM 0-90 degrees in operative joint within 4 days. physical Therapy TREATMENT  Patient: Gary Spangler (11 y.o. female)  Date: 3/22/2018  Diagnosis: BILATERAL PRIMARY OSTEOARTHRITIS OF KNEE   Osteoarthritis of right knee  Total knee replacement status, right <principal problem not specified>  Procedure(s) (LRB):  RIGHT TOTAL KNEE ARTHROPLASTY  (Right) 3 Days Post-Op  Precautions: Fall, WBAT  Chart, physical therapy assessment, plan of care and goals were reviewed. ASSESSMENT:  Pt continues to do well w/PT. Pt amb to bathroom prior to mobility training; able to transfer on/off elevated toilet seat w/ Supervision and I for self-hygiene/care after voiding. Continues to amb entire unit (~300 FT) w/ Supervision using RW. Provided verbal encouragement/cues to increase knee flexion during stance phase as pt has habit of demonstrating minimal circumduction. Pt returned to chair at bedside w/ all items in reach, needs met. Encouraged pt to call for NSG assist when ready to get back to bed. Pt reported 7/10 pain post activity, ice packs provided. Pt continues to be steady w/ all mobility and ready for d/c home w/ HHPT and daily assist. Will f/u w/ pt this afternoon for additional questions/concerns regarding PT and additional gait training as able and appropriate.   Progression toward goals:  [x]      Improving appropriately and progressing toward goals  [] Improving slowly and progressing toward goals  []      Not making progress toward goals and plan of care will be adjusted     PLAN:  Patient continues to benefit from skilled intervention to address the above impairments. Continue treatment per established plan of care. Discharge Recommendations:  Home Health  Further Equipment Recommendations for Discharge:  rolling walker (delivered)     SUBJECTIVE:   Patient stated I felt like my shoulders were in my ears the other day. I can tell the difference in the height.     OBJECTIVE DATA SUMMARY:   Critical Behavior:  Neurologic State: Alert  Orientation Level: Oriented X4  Cognition: Appropriate decision making, Appropriate for age attention/concentration, Appropriate safety awareness, Follows commands  Safety/Judgement: Good awareness of safety precautions                            Functional Mobility Training:  Bed Mobility:     Supine to Sit: Supervision; Additional time  Sit to Supine:  (pt returned to chair)           Transfers:  Sit to Stand: Supervision  Stand to Sit: Supervision                             Balance:  Sitting: Intact  Standing: Intact; With support  Standing - Static: Good  Standing - Dynamic : Fair  Ambulation/Gait Training:  Distance (ft): 300 Feet (ft)  Assistive Device: Gait belt;Walker, rolling  Ambulation - Level of Assistance: Supervision        Gait Abnormalities: Decreased step clearance; Step to gait        Base of Support: Narrowed     Speed/Teresa: Pace decreased (<100 feet/min); Slow  Step Length: Left shortened;Right shortened                    Stairs: pt reported ability to use elevator at ILF. Declined stairs            Pain:  Pain Scale 1: Numeric (0 - 10)  Pain Intensity 1: 7  Pain Location 1: Knee  Pain Orientation 1: Posterior  Pain Description 1: Aching  Pain Intervention(s) 1: Medication (see MAR); Cold pack  Activity Tolerance:   Good  Please refer to the flowsheet for vital signs taken during this treatment.   After treatment:   [x] Patient left in no apparent distress sitting up in chair  [] Patient left in no apparent distress in bed  [x] Call bell left within reach  [x] Nursing notified  [] Caregiver present  [] Bed alarm activated    COMMUNICATION/COLLABORATION:   The patients plan of care was discussed with: Registered Nurse    Lena Trinidad PTA   Time Calculation: 18 mins

## 2018-03-22 NOTE — PROGRESS NOTES
Occupational Therapy  Attempted to see for treatment however has been discharged, noted declined PT this afternoon.    Sherrie Mabry, OTR/L

## 2018-03-22 NOTE — PROGRESS NOTES
Bedside and Verbal shift change report given to Aundrea Johnson, RN and Janes Hall, RN (oncoming nurse) by Cassia Delgadillo RN (offgoing nurse). Report included the following information SBAR.

## 2018-03-22 NOTE — PHYSICIAN ADVISORY
As mentioned in my previous note, this pt has Medicare and she has met two Midnights rule for medically necessary care in acute care setting. This pt's status should be changed to INPT status according to CMS guidelines. That is the appropriate level of care which will also help facilitate possible discharge to SNF if indicated.          Andi Dejesus MD MPH FACP     Physician Advisor  [address] [place] [address]  [phone]

## 2018-03-22 NOTE — PROGRESS NOTES
Pain better, significant improvement with PT.       GEN:  NAD.  AOx3   ABD:  S/NT/ND   RLE:  Dressing C/D/I , 5/5 motor, Calf nttp (Bilat), Sensation rossly intact to light touch throughout, 1+ dp/pt pulses, foot perfused    Patient Vitals for the past 24 hrs:   Temp Pulse Resp BP SpO2   03/22/18 0423 97.5 °F (36.4 °C) 90 14 111/72 98 %   03/21/18 2037 97.6 °F (36.4 °C) 85 16 112/75 99 %   03/21/18 1347 98 °F (36.7 °C) 91 16 106/70 98 %   03/21/18 0837 98.9 °F (37.2 °C) 96 16 154/87 98 %       Current Facility-Administered Medications   Medication Dose Route Frequency    hydrOXYzine HCl (ATARAX) tablet 10-20 mg  10-20 mg Oral BID PRN    diclofenac EC (VOLTAREN) tablet 50 mg  50 mg Oral BID    amitriptyline (ELAVIL) tablet 75 mg  75 mg Oral QHS    fenofibrate nanocrystallized (TRICOR) tablet 145 mg  145 mg Oral Q48H    gabapentin (NEURONTIN) capsule 200 mg  200 mg Oral BID WITH MEALS    LORazepam (ATIVAN) tablet 0.5 mg  0.5 mg Oral TID PRN    losartan (COZAAR) tablet 25 mg  25 mg Oral DAILY    OLANZapine (ZyPREXA) tablet 10 mg  10 mg Oral DAILY WITH DINNER    sodium chloride 0.9 % bolus infusion 500 mL  500 mL IntraVENous ONCE PRN    sodium chloride (NS) flush 5-10 mL  5-10 mL IntraVENous Q8H    sodium chloride (NS) flush 5-10 mL  5-10 mL IntraVENous PRN    acetaminophen (TYLENOL) tablet 650 mg  650 mg Oral Q6H    oxyCODONE IR (ROXICODONE) tablet 5 mg  5 mg Oral Q3H PRN    naloxone (NARCAN) injection 0.4 mg  0.4 mg IntraVENous PRN    famotidine (PEPCID) tablet 20 mg  20 mg Oral BID    senna-docusate (PERICOLACE) 8.6-50 mg per tablet 1 Tab  1 Tab Oral BID    polyethylene glycol (MIRALAX) packet 17 g  17 g Oral DAILY    bisacodyl (DULCOLAX) suppository 10 mg  10 mg Rectal DAILY PRN    aspirin delayed-release tablet 325 mg  325 mg Oral BID       Lab Results   Component Value Date/Time    HGB 10.6 (L) 03/21/2018 03:11 AM    INR 1.1 03/12/2018 08:32 AM       Lab Results   Component Value Date/Time Sodium 141 03/20/2018 03:16 AM    Potassium 3.7 03/20/2018 03:16 AM    Chloride 110 (H) 03/20/2018 03:16 AM    CO2 26 03/20/2018 03:16 AM    BUN 11 03/20/2018 03:16 AM    Creatinine 0.93 03/20/2018 03:16 AM    Calcium 7.9 (L) 03/20/2018 03:16 AM            59 y.o. female s/p right total knee arthroplasty on 3/19/2018  .  Pain better      ABX: Complete 24 hours perioperative abx  DVT Prophylaxis: Aspirin 325 enteric coated  Weight Bearing: WBAT RLE   Pain Control: Diclofenac, toradol,  Tylenol, home gabapentin,  oxy 5 mg for breakthrough, dilaudid IV 0.5 mg for secondary breakthrough  Disposition: Home, HHPT     Will discuss with PT regarding ability to go home vs rehab

## 2018-03-22 NOTE — PROGRESS NOTES
Physical Therapy  Defer Note  3/22/2018    Followed up w/ pt this afternoon. Pt declining additional gait prior to d/c,stated \"I think I'm, going to be doing plenty of walking when I get home. \" Reviewed transfers in/out car w/ pt-pt verbalized understanding and w/o additional questions/concerns for PT at this time.      Kristel Bonilla

## 2018-03-22 NOTE — PROGRESS NOTES
Reviewed and provided discharge instructions, prescriptions, side effects with the patient and answered questions. Patient is currently watching Discharge video. IV was already removed on night shift.  PAtient to leave around 3pm.

## 2018-03-22 NOTE — PROGRESS NOTES
AMARILIS spoke with Dr. Dinora Baron and he has agreed to upgrade the status to in patient. The patient will become a medicare bundle. CRM will begin to set up the discharge home with [facility]. JEAN MARIE    The patient would like to use [facility] for transport home (this will be included in her bundle hours) [facility] is working on a time for  around 2:30 or 3pm. They will help get the patient settled in and help her get her scripts filled a [facility] before 4:30pm. [facility] has been arranged for the home care and [facility] for the Medicare [facility] Program is all set. They will have and aid start tomorrow at [address] transport confirmed by [facility].  JEAN MARIE

## 2018-03-22 NOTE — ROUTINE PROCESS
Bedside shift change report given to Marizol Toney RN (oncoming nurse) by Sofy Mosquera RN(offgoing nurse). Report given with SBAR.

## 2018-03-22 NOTE — NURSE NAVIGATOR
[address]  [facility] Bundled Payment Handoff     FROM:                                TO: [facility]                                                      ([address] [place] [address] or Facility name)  [address]  [address] [place] [address]  Dept: [address]: [phone]                                      Room#:  653/38                                                    Nurse Navigator:  Cecy Cormier RN           SITUATION      ASAScore: ASA 2 - Patient with mild systemic disease with no functional limitations    Admitted:  3/19/2018  Hospital Day: 4      Attending Provider:  Eugenie Cerrato MD     Consultations:  None    PCP:  Mary Shannon MD   [phone]     Admitting Dx:  BILATERAL PRIMARY OSTEOARTHRITIS OF KNEE   Osteoarthritis of right knee  Total knee replacement status, right       Active Problems:    Osteoarthritis of right knee (3/19/2018)      Total knee replacement status, right (3/22/2018)      3 Days Post-Op of   Procedure(s):  RIGHT TOTAL KNEE ARTHROPLASTY    BY: Eugenie Cerrato MD             ON: 3/19/2018                  Code Status: Full Code             Advance Directive? No Doesnt Have (Send w/patient)     Isolation:  There are currently no Active Isolations       MDRO: No current active infections    BACKGROUND     Allergies: Allergies   Allergen Reactions    Other Food Other (comments)     Oranges, cabbage, beans, peppers, raw onions, foods with caffeine, popcorn, soda because of IBS    Buspar [Buspirone] Other (comments)     Causes \"stimulation\" that could exacerbate a manic attack/phase of pt's Biplar. Reported by patient    Amlodipine Other (comments)     Ankle swelling    Dicyclomine Nausea and Vomiting     Extreme stomach pains    Lithium Nausea and Vomiting     Severe nausea and vomiting.     Other Medication Other (comments)     Intolerance to oranges, cabbage,beans,peppers,raw onions,foods with caffeine in them, popcorn, no pop sodas, because of IBS       Past Medical History:   Diagnosis Date    Arthritis     Bipolar disorder ([address]     Bladder pain     Choledocholithiasis 2010    GERD (gastroesophageal reflux disease)     Headache(784.0)     tension headaches    High cholesterol     History of cholecystectomy     Hypertension     Irritable bowel     Pelvic pain in female     Psychiatric disorder     Stool color black     irritable bowel       Past Surgical History:   Procedure Laterality Date    COLONOSCOPY N/A 2017    COLONOSCOPY performed by Dwayne Cadet MD at [address] [facility]  ,     prolasped rectum    HX LAP CHOLECYSTECTOMY  11/23/10    HX KALE[place] AND [address]       Prior to Admission Medications   Prescriptions Last Dose Informant Patient Reported? Taking? CARBOXYMETHYL/GLY/POLY80/PF (REFRESH OPTIVE ADVANCED, PF, OP) 3/18/2018 at Unknown time  Yes Yes   Sig: Apply 1 Drop to eye two (2) times daily as needed. CARBOXYMETHYLCELLULOSE SODIUM (REFRESH OP) 3/18/2018 at Unknown time  Yes Yes   Sig: Apply  to eye daily as needed. Cholecalciferol, Vitamin D3, (VITAMIN D3) 1,000 unit cap 3/18/2018 at Unknown time  Yes Yes   Sig: Take 1 Tab by mouth daily. Indications: VITAMIN D DEFICIENCY   LORazepam (ATIVAN) 0.5 mg tablet 3/18/2018 at Unknown time  No Yes   Sig: Take 1 Tab by mouth every eight (8) hours as needed for Anxiety. Max Daily Amount: 1.5 mg. D/C valium   Patient taking differently: Take 0.5 mg by mouth three (3) times daily. D/C valium   Lactobacillus acidophilus (ACIDOPHILUS) cap 3/18/2018 at Unknown time  Yes Yes   Sig: Take 1 Cap by mouth daily. OLANZapine (ZYPREXA) 5 mg tablet 3/18/2018 at Unknown time  Yes Yes   Sig: Take 10 mg by mouth daily (with dinner). SF 5000 PLUS 1.1 % crea   Yes No   Si Tube.    alendronate (FOSAMAX) 70 mg tablet 3/17/2018  No No   Sig: Take 1 Tab by mouth every seven (7) days. Patient taking differently: Take 70 mg by mouth Every Saturday. amitriptyline (ELAVIL) 75 mg tablet 3/18/2018 at Unknown time  No Yes   Sig: TAKE 1 TABLET BY MOUTH NIGHTLY. black cohosh 540 mg cap 3/12/2018 at Unknown time  Yes Yes   Sig: Take 1 Tab by mouth daily. calcium-cholecalciferol, D3, (CALTRATE 600+D) tablet 3/18/2018 at Unknown time  No Yes   Sig: Take 1 Tab by mouth daily. cetirizine (ZYRTEC) 10 mg tablet 3/19/2018 at 0600  Yes Yes   Sig: Take 10 mg by mouth daily. diphenhydrAMINE (BENADRYL ALLERGY) 25 mg tablet   Yes No   Sig: Take 50 mg by mouth nightly. estradiol (VIVELLE) 0.05 mg/24 hr 3/17/2018  Yes No   Si Patch by TransDERmal route two (2) times a week on Wednesday and Saturday. famotidine (PEPCID) 20 mg tablet 3/19/2018 at 0600  No Yes   Sig: TAKE 1 TABLET BY MOUTH TWO (2) TIMES A DAY. STOP OMEPRAZOLE. fenofibrate nanocrystallized (TRICOR) 145 mg tablet 3/18/2018 at Unknown time  No Yes   Sig: Take 1 Tab by mouth every fourty-eight (48) hours. gabapentin (NEURONTIN) 100 mg capsule 3/18/2018 at Unknown time  No Yes   Sig: TAKE 2 CAPSULES BY MOUTH TWO (2) TIMES A DAY. losartan (COZAAR) 25 mg tablet   No No   Sig: TAKE 1 TABLET BY MOUTH DAILY. omega 3-dha-epa-fish oil (FISH OIL) 100-160-1,000 mg cap 3/12/2018 at Unknown time  Yes Yes   Sig: Take  by mouth.   polyethylene glycol (MIRALAX) 17 gram/dose powder 3/12/2018 at Unknown time  No Yes   Sig: TAKE 17 GRAMS (1 TABLESPOONFUL) MIXED IN LIQUID BY MOUTH DAILY AS DIRECTED.   vitamin E (AQUA GEMS) 400 unit capsule 3/12/2018 at Unknown time  Yes Yes   Sig: Take 400 Units by mouth daily. Facility-Administered Medications: None       Vaccinations:    Immunization History   Administered Date(s) Administered    Influenza Vaccine 10/13/2014    Tdap 2017         ASSESSMENT   Age: 59 y.o.              Gender: female        Height: Height: 5' (152.4 cm)                    Weight:Weight: 73.9 kg (163 lb)     Patient Vitals for the past 8 hrs:   Temp Pulse Resp BP   03/22/18 0836 97.5 °F (36.4 °C) 98 16 112/76            Active Orders   Diet    DIET REGULAR       Orientation: Orientation Level: Oriented X4    Active Lines/Drains:  (Peg Tube / Wong / CL or S/L?):no    Urinary Status: Voiding      Last BM: Last Bowel Movement Date: 03/21/18     Skin Integrity: Incision (comment), Intact (acquacel dry and intact)   Wound Knee Right-DRESSING STATUS: Clean, dry, and intact    Wound Knee Right-DRESSING TYPE: Silver products    Mobility: Slightly limited   Weight Bearing Status: WBAT (Weight Bearing as Tolerated)      Gait Training  Assistive Device: Gait belt, Walker, rolling  Ambulation - Level of Assistance: Supervision  Distance (ft): 300 Feet (ft)     On Anticoagulation? YES  Aspirin        Pain Medications given:  Diclofenac, oxycodone         Lab Results   Component Value Date/Time    Glucose 104 (H) 03/20/2018 03:16 AM    Glucose 86 05/17/2014 06:31 AM    Hemoglobin A1c 5.3 03/12/2018 08:32 AM    INR 1.1 03/12/2018 08:32 AM    INR 1.1 07/26/2017 03:19 PM    INR (POC) 1.1 08/03/2017 10:51 AM    HGB 10.6 (L) 03/21/2018 03:11 AM    HGB 10.5 (L) 03/20/2018 03:16 AM    HGB 11.6 03/12/2018 08:32 AM    HGB 12.7 01/24/2018 12:00 AM       Readmission Risks:  Score:         RECOMMENDATION     See After Visit Summary (AVS) for:  · Discharge instructions  · After [address]   · Medication Reconciliation          [facility] Orthopaedic Nurse Navigator  STACIE Nixon, RN-BC       Office  [phone]  Cell      [phone]  Fax      [phone]  [email or web address]             . Theresa

## 2018-03-23 ENCOUNTER — PATIENT OUTREACH (OUTPATIENT)
Dept: OTHER | Age: 58
End: 2018-03-23

## 2018-03-23 ENCOUNTER — PATIENT OUTREACH (OUTPATIENT)
Dept: INTERNAL MEDICINE CLINIC | Age: 58
End: 2018-03-23

## 2018-03-23 NOTE — PROGRESS NOTES
NN Transitions of Care Note:  Hospital Follow Up for Tony Kennedy 59 y.o. Admitted from 3/19 - 3/22/2018 for Rt total knee replacement at [facility]. [place]. RRAT score: 18 Moderate     Advance Medical Directive on file in EMR? yes Power of  for healthcare on file    This represents a Transitions of Care because NN spoke with patient and/or caregiver within 2 business days of discharge. Pt patient declined to schedule follow up appointment with Dr. Tong Harding last visit date 2/27/2018. No future appointments. Called patient on 3/23/2018 and verified with 2 identifiers. Summary of patients top problems:     Problem 1: limited ability to perform ADL's r/t total replacement. Patient has personal caregiver via [employer]. Service ends Tuesday 3/27/2018. Reviewed discharge knee replacement protocol. Patient verbalized understanding. Admitted to orthopedic surgeon for elective procedure service with consults from Hospitalist and physical therapy . Consult recommended n/a    Course of current Hospitalization (referenced by 3/22/2018 physician note): Dr. Maria D Simons (ortho surgeon)      [address] orders at discharge? yes If yes, what agency and what services? [address]     Medication Reconciliation completed: yes New medications at discharge include aspirin 325 mg twice a day time 4 weeks, diclofenac 50 mg twice daily times 4 weeks tylenol 650 mg every 6 hrs times 4 weeks and oxycodone 5 mg 1 tablet every 3 hrs as needed for pain. Prescription Medication total: 14 (pharmacy consult for polypharm needed?) no     Support System consists of: neighbors and friends     Barriers to care? no     Adherence to previous treatment and likelihood for f/u: good     Goals      Prevent complications post hospitalization. Patient to schedule follow up appointment with Dr. Maria D Simons in 4 weeks.   Patient to follow up with Dr. Tong Harding as needed declined to schedule hospital follow up appointment. Patient will be able to identify signs and symptoms of infection or blood clot.  Returns to baseline activity level. Patient will ambulate safely with assistive device and progress to independent ambulation.    Patient will be able to perform ADL's with independently

## 2018-03-23 NOTE — PROGRESS NOTES
Charted on 3.23.18    Spiritual Care Partner Volunteer visited patient in room 560 on 3.22.18. Documented by: : Rev. Sd Becerra; [facility], to contact [id number] call: [phone]

## 2018-03-23 NOTE — PROGRESS NOTES
This note will not be viewable in [address] Post Discharge Follow-up contact after Joint Replacement    Patient discharged on 3/22/18  By  Reva Cervantes   following  right knee Arthroplasty. Spoke with patient today, who reports they are \" feeling pretty good this morning, I slept well. And the aide from [facility] [address] is here with me\"  Denies Fever, Shortness of Breath or Chest Pain. Home Health has not visited, but is scheduled to come today. Patient also reports:. Incision  clean, dry, intact  Calf is non-tender,   operative extremity has moderate swelling. Pain is well managed. Discussed use of ice & elevation. The patient is exercising independently. Taking Aspirin for anticoagulation, Diclofenac and oxycodone for pain. Patient   is not experiencing symptoms of constipation & urinating without difficulty. Discussed side effects of anticoagulants & pain medications (bleeding/bruising, constipation, lightheaded/dizziness)  Follow up appointment is not scheduled; but patient states plan to schedule. Discussed calling surgeon Dr Vannessa Chan  for drainage, bleeding, swelling in operative extremity, fever or pain. Discussed calling PCP Dr Anselmo Love with other medical issues.

## 2018-03-29 ENCOUNTER — PATIENT OUTREACH (OUTPATIENT)
Dept: INTERNAL MEDICINE CLINIC | Age: 58
End: 2018-03-29

## 2018-03-29 NOTE — PROGRESS NOTES
Outgoing call placed spoke to patient, reintroduced self. Patient reports on Sunday rt leg was extremely swollen and painful with ambulation. Patient states she was seen by the mobile health team and doppler study was done. Per patient doppler study was negative. Patient state rt leg swelling decreased and is using ice packs and elevating the leg without flexion. Patient reports her pain has decreased and is only using oxycodone 2 times a day but is continuing tylenol therapy as directed. Patient reports the [employer] program for personal care was completed  yesterday 3/28 and her counselor is with her 3 times a week. Patient continues to receive physical therapy through [facility] 3 times a week and is currently ambulating with rolling walker and is able to perform ADL's independently. Goals      Prevent complications post hospitalization. Patient to schedule follow up appointment with Dr. Za Law in 4 weeks. Patient to follow up with Dr. Ratna Avalos as needed declined to schedule hospital follow up appointment. Patient will be able to identify signs and symptoms of infection or blood clot. 3/29/2018 patient will follow up with Dr. Za Law on 4/16/2018.  Returns to baseline activity level. Patient will ambulate safely with assistive device and progress to independent ambulation. Patient will be able to perform ADL's with independently     3/29/2018 patient will ambulate be able to ambulate with cane in 4 weeks.

## 2018-04-11 ENCOUNTER — PATIENT OUTREACH (OUTPATIENT)
Dept: INTERNAL MEDICINE CLINIC | Age: 58
End: 2018-04-11

## 2018-04-11 NOTE — PROGRESS NOTES
Outgoing call placed spoke to patient utilizing 2 identifiers. Patient reports she is doing okay no concerns voiced. Patient confirms follow up appointment with Dr. Broderick Griffin on Monday 4/16/2018. Goals      Prevent complications post hospitalization. Patient to schedule follow up appointment with Dr. Broderick Griffin in 4 weeks. Patient to follow up with Dr. Eldon Apodaca as needed declined to schedule hospital follow up appointment. Patient will be able to identify signs and symptoms of infection or blood clot. 3/29/2018 patient will follow up with Dr. Broderick Griffin on 4/16/2018.  Returns to baseline activity level. Patient will ambulate safely with assistive device and progress to independent ambulation. Patient will be able to perform ADL's with independently     3/29/2018 patient will ambulate be able to ambulate with cane in 4 weeks. 4/11/2018 patient continues to work with PT for gait training with cane and ROM. Patient ambulating with walker independently. Patient reports pain is being controlled with use of Tylenol.

## 2018-04-12 DIAGNOSIS — G44.221 CHRONIC TENSION-TYPE HEADACHE, INTRACTABLE: ICD-10-CM

## 2018-04-12 RX ORDER — GABAPENTIN 100 MG/1
CAPSULE ORAL
Qty: 120 CAP | Refills: 5 | Status: SHIPPED | OUTPATIENT
Start: 2018-04-12 | End: 2018-09-11 | Stop reason: SDUPTHER

## 2018-04-27 DIAGNOSIS — K21.9 GASTROESOPHAGEAL REFLUX DISEASE WITHOUT ESOPHAGITIS: ICD-10-CM

## 2018-04-27 RX ORDER — FAMOTIDINE 20 MG/1
TABLET, FILM COATED ORAL
Qty: 60 TAB | Refills: 3 | Status: SHIPPED | OUTPATIENT
Start: 2018-04-27 | End: 2018-06-01 | Stop reason: SDUPTHER

## 2018-05-15 ENCOUNTER — OFFICE VISIT (OUTPATIENT)
Dept: INTERNAL MEDICINE CLINIC | Age: 58
End: 2018-05-15

## 2018-05-15 VITALS
TEMPERATURE: 98 F | HEART RATE: 88 BPM | HEIGHT: 60 IN | BODY MASS INDEX: 30.82 KG/M2 | DIASTOLIC BLOOD PRESSURE: 73 MMHG | WEIGHT: 157 LBS | OXYGEN SATURATION: 96 % | SYSTOLIC BLOOD PRESSURE: 114 MMHG | RESPIRATION RATE: 20 BRPM

## 2018-05-15 DIAGNOSIS — Z12.39 SCREENING BREAST EXAMINATION: ICD-10-CM

## 2018-05-15 DIAGNOSIS — K59.00 CONSTIPATION, UNSPECIFIED CONSTIPATION TYPE: ICD-10-CM

## 2018-05-15 DIAGNOSIS — Z00.00 MEDICARE ANNUAL WELLNESS VISIT, SUBSEQUENT: ICD-10-CM

## 2018-05-15 DIAGNOSIS — K58.9 IRRITABLE BOWEL SYNDROME WITHOUT DIARRHEA: ICD-10-CM

## 2018-05-15 DIAGNOSIS — M17.11 PRIMARY OSTEOARTHRITIS OF RIGHT KNEE: ICD-10-CM

## 2018-05-15 DIAGNOSIS — I10 ESSENTIAL HYPERTENSION: Primary | ICD-10-CM

## 2018-05-15 DIAGNOSIS — F31.9 BIPOLAR 1 DISORDER (HCC): ICD-10-CM

## 2018-05-15 NOTE — PROGRESS NOTES
Mari Rose is a 59 y.o. female and presents for annual Medicare Wellness Visit. Problem List: Reviewed with patient and discussed risk factors.     Patient Active Problem List   Diagnosis Code    Arthritis M19.90    Gallstones with obstruction of gallbladder K80.21    Choledocholithiasis K80.50    Bipolar affective disorder, manic (HCC) F31.10    IBS (irritable bowel syndrome) K58.9    Chronic headache R51    Drop attack R55    Altered mental status R41.82    Delirium R41.0    Hyponatremia E87.1    Bipolar 1 disorder ([address] F31.9    ACP (advance care planning) Z71.89    Encounter for screening colonoscopy Z12.11    Rectal prolapse K62.3    Osteoarthritis of right knee M17.11    Total knee replacement status, right Y49.128       Current medical providers:  Patient Care Team:  Celine Amos MD as PCP - General (Internal Medicine)  Anai Fink MD (Gynecology)  Mark Freed MD (Gastroenterology)  Morgan Ferguson MD (Psychiatry)  Ashvin Parker MD ([address] [facility])  Stephanie Durbin LPN as Ambulatory Care Navigator (Internal Medicine)  Berana Hernández RN as Nurse Navigator (Internal Medicine)  Renaldo Lesches, RN as Nurse Navigator (Orthopedic Surgery)    [facility]: Reviewed with patient  Past Surgical History:   Procedure Laterality Date    COLONOSCOPY N/A 7/5/2017    COLONOSCOPY performed by Priyanka Rapp MD at [address] [facility]  2003, 2017    prolasped rectum    HX LAP CHOLECYSTECTOMY  11/23/10    HX KALEIGH AND BSO  1998    LAPAROSCOPY ABDOMEN DIAGNOSTIC  1987        : Reviewed with patient  Social History   Substance Use Topics    Smoking status: Never Smoker    Smokeless tobacco: Never Used    Alcohol use No       FH: Reviewed with patient  Family History   Problem Relation Age of Onset    Hypertension Father     Cancer Father      SKIN CA    Colon Polyps Father     Other Maternal Uncle      CHAROT-MARISELA-TOOTH    Diabetes Paternal Grandfather        Medications/Allergies: Reviewed with patient  Current Outpatient Prescriptions on File Prior to Visit   Medication Sig Dispense Refill    famotidine (PEPCID) 20 mg tablet TAKE 1 TABLET BY MOUTH TWO (2) TIMES A DAY. STOP OMEPRAZOLE. 60 Tab 3    gabapentin (NEURONTIN) 100 mg capsule TAKE 2 CAPSULES BY MOUTH TWO (2) TIMES A DAY. 120 Cap 5    diclofenac EC (VOLTAREN) 50 mg EC tablet Take 1 Tab by mouth two (2) times a day. 60 Tab 0    amitriptyline (ELAVIL) 75 mg tablet TAKE 1 TABLET BY MOUTH NIGHTLY. 30 Tab 2    polyethylene glycol (MIRALAX) 17 gram/dose powder TAKE 17 GRAMS (1 TABLESPOONFUL) MIXED IN LIQUID BY MOUTH DAILY AS DIRECTED. 850 g 3    cetirizine (ZYRTEC) 10 mg tablet Take 10 mg by mouth daily.  CARBOXYMETHYLCELLULOSE SODIUM (REFRESH OP) Apply  to eye daily as needed.  diphenhydrAMINE (BENADRYL ALLERGY) 25 mg tablet Take 50 mg by mouth nightly.  losartan (COZAAR) 25 mg tablet TAKE 1 TABLET BY MOUTH DAILY. 30 Tab 3    LORazepam (ATIVAN) 0.5 mg tablet Take 1 Tab by mouth every eight (8) hours as needed for Anxiety. Max Daily Amount: 1.5 mg. D/C valium (Patient taking differently: Take 0.5 mg by mouth three (3) times daily. D/C valium) 90 Tab 2    Lactobacillus acidophilus (ACIDOPHILUS) cap Take 1 Cap by mouth daily.  CARBOXYMETHYL/GLY/POLY80/PF (REFRESH OPTIVE ADVANCED, PF, OP) Apply 1 Drop to eye two (2) times daily as needed.  black cohosh 540 mg cap Take 1 Tab by mouth daily.  alendronate (FOSAMAX) 70 mg tablet Take 1 Tab by mouth every seven (7) days. (Patient taking differently: Take 70 mg by mouth Every Saturday.) 4 Tab 12    calcium-cholecalciferol, D3, (CALTRATE 600+D) tablet Take 1 Tab by mouth daily. 30 Tab 12    SF 5000 PLUS 1.1 % crea 1 Tube.  4    OLANZapine (ZYPREXA) 5 mg tablet Take 10 mg by mouth daily (with dinner). 2    vitamin E (AQUA GEMS) 400 unit capsule Take 400 Units by mouth daily.       fenofibrate nanocrystallized (TRICOR) 145 mg tablet Take 1 Tab by mouth every fourty-eight (48) hours. 30 Tab 5    Cholecalciferol, Vitamin D3, (VITAMIN D3) 1,000 unit cap Take 1 Tab by mouth daily. Indications: VITAMIN D DEFICIENCY      aspirin delayed-release 325 mg tablet Take 1 Tab by mouth two (2) times a day. 60 Tab 0    omega 3-dha-epa-fish oil (FISH OIL) 100-160-1,000 mg cap Take  by mouth.  estradiol (VIVELLE) 0.05 mg/24 hr 1 Patch by TransDERmal route two (2) times a week on Wednesday and Saturday. 3     No current facility-administered medications on file prior to visit. Allergies   Allergen Reactions    Other Food Other (comments)     Oranges, cabbage, beans, peppers, raw onions, foods with caffeine, popcorn, soda because of IBS    Buspar [Buspirone] Other (comments)     Causes \"stimulation\" that could exacerbate a manic attack/phase of pt's Biplar. Reported by patient    Amlodipine Other (comments)     Ankle swelling    Dicyclomine Nausea and Vomiting     Extreme stomach pains    Lithium Nausea and Vomiting     Severe nausea and vomiting.  Other Medication Other (comments)     Intolerance to oranges, cabbage,beans,peppers,raw onions,foods with caffeine in them, popcorn, no pop sodas, because of IBS       Objective:  Visit Vitals    /73 (BP 1 Location: Left arm, BP Patient Position: Sitting)    Pulse 88    Temp 98 °F (36.7 °C) (Oral)    Resp 20    Ht 5' (1.524 m)    Wt 157 lb (71.2 kg)    SpO2 96%    BMI 30.66 kg/m2    Body mass index is 30.66 kg/(m^2). Assessment of cognitive impairment: Alert and oriented x 3    Depression Screen:   PHQ over the last two weeks 5/15/2018   Little interest or pleasure in doing things Not at all   Feeling down, depressed or hopeless Not at all   Total Score PHQ 2 0       Fall Risk Assessment:    Fall Risk Assessment, last 12 mths 5/15/2018   Able to walk? Yes   Fall in past 12 months?  No   Fall with injury? -   Number of falls in past 12 months -   Fall Risk Score - Functional Ability:   Does the patient exhibit a steady gait? yes   How long did it take the patient to get up and walk from a sitting position? 10 sec   Is the patient self reliant?  (ie can do own laundry, meals, household chores)  yes     Does the patient handle his/her own medications? yes     Does the patient handle his/her own money? yes     Is the patients home safe (ie good lighting, handrails on stairs and bath, etc.)? yes     Did you notice or did patient express any hearing difficulties? no     Did you notice or did patient express any vision difficulties?   no     Were distance and reading eye charts used? no       Advance Care Planning:   Patient was offered the opportunity to discuss advance care planning:  yes     Does patient have an Advance Directive:  yes   If no, did you provide information on Caring Connections? yes       Plan:      Orders Placed This Encounter    linaclotide (LINZESS) 145 mcg cap capsule       Health Maintenance   Topic Date Due    PAP AKA CERVICAL CYTOLOGY  04/25/2017    ZOSTER VACCINE AGE 60>  03/19/2018    Influenza Age 5 to Adult  08/01/2018    BREAST CANCER SCRN MAMMOGRAM  03/28/2019    MEDICARE YEARLY EXAM  05/16/2019    DTaP/Tdap/Td series (2 - Td) 06/19/2027    COLONOSCOPY  07/05/2027    Hepatitis C Screening  Completed       *Patient verbalized understanding and agreement with the plan. A copy of the After Visit Summary with personalized health plan was given to the patient today.

## 2018-05-15 NOTE — PATIENT INSTRUCTIONS
Schedule of Personalized Health Plan  (Provide Copy to Patient)  The best way to stay healthy is to live a healthy lifestyle. A healthy lifestyle includes regular exercise, eating a well-balanced diet, keeping a healthy weight and not smoking. Regular physical exams and screening tests are another important way to take care of yourself. Preventive exams provided by health care providers can find health problems early when treatment works best and can keep you from getting certain diseases or illnesses. Preventive services include exams, lab tests, screenings, shots, monitoring and information to help you take care of your own health. All people over 65 should have a pneumonia shot. Pneumonia shots are usually only needed once in a lifetime unless your doctor decides differently. Will find out about prevner 13. All people over 65 should have a yearly flu shot. People over 65 are at medium to high risk for Hepatitis B. Three shots are needed for complete protection. In addition to your physical exam, some screening tests are recommended:    Bone mass measurement (dexa scan) is recommended every two years  Diabetes Mellitus screening is recommended every year. Glaucoma is an eye disease caused by high pressure in the eye. An eye exam is recommended every year. Cardiovascular screening tests that check your cholesterol and other blood fat (lipid) levels are recommended every five years. Colorectal Cancer screening tests help to find pre-cancerous polyps (growths in the colon) so they can be removed before they turn into cancer. Tests ordered for screening depend on your personal and family history risk factors. up to date    Screening for Breast Cancer is recommended yearly with a mammogram.will order    Screening for Cervical Cancer is recommended every two years (annually for certain risk factors, such as previous history of STD or abnormal PAP in past 7 years), with a Pelvic Exam with PAP. up to date    Here is a list of your current Health Maintenance items with a due date:  Health Maintenance   Topic Date Due    PAP AKA CERVICAL CYTOLOGY  04/25/2017    ZOSTER VACCINE AGE 60>  03/19/2018    Influenza Age 5 to Adult  08/01/2018    BREAST CANCER SCRN MAMMOGRAM  03/28/2019    MEDICARE YEARLY EXAM  05/16/2019    DTaP/Tdap/Td series (2 - Td) 06/19/2027    COLONOSCOPY  07/05/2027    Hepatitis C Screening  Completed          High-Fiber Diet: Care Instructions  Your Care Instructions    A high-fiber diet may help you relieve constipation and feel less bloated. Your doctor and dietitian will help you make a high-fiber eating plan based on your personal needs. The plan will include the things you like to eat. It will also make sure that you get 30 grams of fiber a day. Before you make changes to the way you eat, be sure to talk with your doctor or dietitian. Follow-up care is a key part of your treatment and safety. Be sure to make and go to all appointments, and call your doctor if you are having problems. It's also a good idea to know your test results and keep a list of the medicines you take. How can you care for yourself at home? · You can increase how much fiber you get if you eat more of certain foods. These foods include:  ¨ Whole-grain breads and cereals. ¨ Fruits, such as pears, apples, and peaches. Eat the skins, peels, and seeds, if you can. ¨ Vegetables, such as broccoli, cabbage, spinach, carrots, asparagus, and squash. ¨ Starchy vegetables. These include potatoes with skins, kidney beans, and lima beans. · Take a fiber supplement every day if your doctor recommends it. Examples are Benefiber, Citrucel, FiberCon, and Metamucil. Ask your doctor how much to take. · Drink plenty of fluids, enough so that your urine is light yellow or clear like water.  If you have kidney, heart, or liver disease and have to limit fluids, talk with your doctor before you increase the amount of fluids you drink. · Get some exercise every day. Exercise helps stool move through the colon. It also helps prevent constipation. · Keep a food diary. Try to notice and write down what foods cause gas, pain, or other symptoms. Then you can avoid these foods. Where can you learn more? Go to http://tung-tg.info/. Enter Q679 in the search box to learn more about \"High-Fiber Diet: Care Instructions. \"  Current as of: May 12, 2017  Content Version: 11.4  © 6977-2174 [employer]. Care instructions adapted under license by [employer] (which disclaims liability or warranty for this information). If you have questions about a medical condition or this instruction, always ask your healthcare professional. [address] any warranty or liability for your use of this information. High-Fiber Diet: Care Instructions  Your Care Instructions    A high-fiber diet may help you relieve constipation and feel less bloated. Your doctor and dietitian will help you make a high-fiber eating plan based on your personal needs. The plan will include the things you like to eat. It will also make sure that you get 30 grams of fiber a day. Before you make changes to the way you eat, be sure to talk with your doctor or dietitian. Follow-up care is a key part of your treatment and safety. Be sure to make and go to all appointments, and call your doctor if you are having problems. It's also a good idea to know your test results and keep a list of the medicines you take. How can you care for yourself at home? · You can increase how much fiber you get if you eat more of certain foods. These foods include:  ¨ Whole-grain breads and cereals. ¨ Fruits, such as pears, apples, and peaches. Eat the skins, peels, and seeds, if you can. ¨ Vegetables, such as broccoli, cabbage, spinach, carrots, asparagus, and squash. ¨ Starchy vegetables.  These include potatoes with skins, kidney beans, and lima beans. · Take a fiber supplement every day if your doctor recommends it. Examples are Benefiber, Citrucel, FiberCon, and Metamucil. Ask your doctor how much to take. · Drink plenty of fluids, enough so that your urine is light yellow or clear like water. If you have kidney, heart, or liver disease and have to limit fluids, talk with your doctor before you increase the amount of fluids you drink. · Get some exercise every day. Exercise helps stool move through the colon. It also helps prevent constipation. · Keep a food diary. Try to notice and write down what foods cause gas, pain, or other symptoms. Then you can avoid these foods. Where can you learn more? Go to http://tung-tg.info/. Enter W600 in the search box to learn more about \"High-Fiber Diet: Care Instructions. \"  Current as of: May 12, 2017  Content Version: 11.4  © 9569-4580 [employer]. Care instructions adapted under license by [employer] (which disclaims liability or warranty for this information). If you have questions about a medical condition or this instruction, always ask your healthcare professional. [address] any warranty or liability for your use of this information.

## 2018-05-15 NOTE — PROGRESS NOTES
HISTORY OF PRESENT ILLNESS  Maryellen Francisco is a 59 y.o. female here with the complaining of constipation so for long period of time. She is been trying MiraLAX and over-the-counter stool softener. Only having BM 1 or 2 times a week. She has IBS. Has anxiety and depression, seeing Dr. Thierno Navarro. Stable for now. She underwent right knee total arthroplasty last month. Knee is healing okay. She continued with physical therapy and occupational therapy. Pain is controlled. Has elevated lipid, on medications. Labs reviewed, all are stable. Here for Medicare wellness visit. Has living well. Mental Health Problem   Pertinent negatives include no shortness of breath. Constipation    Associated symptoms include constipation. Medication Evaluation   Pertinent negatives include no shortness of breath. Review of Systems   Constitutional: Negative. HENT: Negative. Eyes: Negative. Respiratory: Negative. Negative for shortness of breath and wheezing. Cardiovascular: Negative. Gastrointestinal: Positive for constipation. Negative for blood in stool. Musculoskeletal: Positive for joint pain. Negative for falls. Skin: Negative. Negative for itching. Psychiatric/Behavioral: Positive for depression. Physical Exam   Constitutional: She appears well-developed and well-nourished. No distress. Neck: Normal range of motion. Neck supple. No JVD present. No thyromegaly present. Cardiovascular: Normal rate, regular rhythm, normal heart sounds and intact distal pulses. Pulmonary/Chest: Effort normal and breath sounds normal. No respiratory distress. She has no wheezes. Abdominal: Soft. Bowel sounds are normal. She exhibits no distension. There is no tenderness. Musculoskeletal: She exhibits tenderness. She exhibits no edema. Right knee:surgical scar healed up. Slight swelling on the knee joint.   Range of motion normal.   Skin:   Both legs: Petechial lesion present in both lower leg multiple. red spots. No itching no rash. No purpura present. Psychiatric: She has a normal mood and affect. Her behavior is normal.   Anxious. ASSESSMENT and PLAN    Diagnoses and all orders for this visit:    1. Essential hypertension  Stable on current regimen. Will continue same. 2. Constipation, unspecified constipation type    MiraLAX and over-the-counter stool softener not helping her. Will try,  -     linaclotide (LINZESS) 145 mcg cap capsule; Take 1 Cap by mouth Daily (before breakfast). The risks and benefits of treatment were discussed as well as the potential side effects. The patient verbalized understanding and agrees to the current treatment plan. The patient is instructed to call the office with any side effects      3. Irritable bowel syndrome without diarrhea      -     linaclotide (LINZESS) 145 mcg cap capsule; Take 1 Cap by mouth Daily (before breakfast). 4. Primary osteoarthritis of right knee  Status post  Total knee replacement done. Feeling better. Started on physical therapy and Occupational Therapy. 5. Bipolar 1 disorder (HCC)    Stable with current regimen. Seeing psychiatrist.  6. Medicare annual wellness visit, subsequent    7. Screening breast examination  -     [facility] MAMMO BI SCREENING INCL CAD; Future            Discussed expected course/resolution/complications of diagnosis in detail with patient. Medication risks/benefits/costs/interactions/alternatives discussed with patient. Pt was given an after visit summary which includes diagnoses, current medications & vitals. Pt expressed understanding with the diagnosis and plan.

## 2018-05-15 NOTE — PROGRESS NOTES
Health Maintenance Due   Topic Date Due    PAP AKA CERVICAL CYTOLOGY  04/25/2017    ZOSTER VACCINE AGE 60>  03/19/2018       Chief Complaint   Patient presents with    Annual Wellness Visit    Mental Health Problem    Constipation     wants to talk about linzess       1. Have you been to the ER, urgent care clinic since your last visit? Hospitalized since your last visit? No    2. Have you seen or consulted any other health care providers outside of the [address] [place] [address] since your last visit? Include any pap smears or colon screening. No    3) Do you have an Advance Directive on file? no    4) Are you interested in receiving information on Advance Directives? NO      Patient is accompanied by self I have received verbal consent from Alf Blakely to discuss any/all medical information while they are present in the room.

## 2018-05-15 NOTE — LETTER
5/15/2018 9:08 AM 
 
Ms. Anila Azul [address] [facility] [phone] To Whom It May Concern: This letter is concerning jury duty for Anila Azul, : 1960, followed at  [address] [place] [address] [email or web address]. Due to medical problems, this patient must be excused from jury duty at this time. Please contact the office if you need further information or have any questions. Sincerely, Hina Lynne MD

## 2018-05-15 NOTE — MR AVS SNAPSHOT
[address] A [facility] [address] 
[phone] Patient: Dania Kelly MRN: [id number] DMP:9/81/1789 Visit Information Date & Time Provider Department Dept. Phone Encounter #  
 5/15/2018  9:30 AM Victorina Page MD [facility] Internal Medicine [facility] [phone] [id number] Your Appointments 5/15/2018  9:30 AM  
ROUTINE CARE with MD Jose Garcai ([facility]) Appt Note: needs a letter? [address] [place] [address]. [facility] [phone]  
[phone]  
  
   
 [address] [place] [address]. [address] [place] [id number] Upcoming Health Maintenance Date Due  
 PAP AKA CERVICAL CYTOLOGY 4/25/2017 ZOSTER VACCINE AGE 60> 3/19/2018 Influenza Age 5 to Adult 8/1/2018 BREAST CANCER SCRN MAMMOGRAM 3/28/2019 MEDICARE YEARLY EXAM 5/16/2019 DTaP/Tdap/Td series (2 - Td) 6/19/2027 COLONOSCOPY 7/5/2027 Allergies as of 5/15/2018  Review Complete On: 5/15/2018 By: Victorina Page MD  
  
 Severity Noted Reaction Type Reactions Other Food  03/20/2018    Other (comments) Oranges, cabbage, beans, peppers, raw onions, foods with caffeine, popcorn, soda because of IBS Buspar [Buspirone] Medium 02/09/2017   Side Effect Other (comments) Causes \"stimulation\" that could exacerbate a manic attack/phase of pt's Biplar. Reported by patient Amlodipine  06/28/2017    Other (comments) Ankle swelling Dicyclomine  10/14/2014    Nausea and Vomiting Extreme stomach pains Lithium  01/03/2014   Systemic Nausea and Vomiting Severe nausea and vomiting. Other Medication  11/19/2010    Other (comments) Intolerance to oranges, cabbage,beans,peppers,raw onions,foods with caffeine in them, popcorn, no pop sodas, because of IBS Current Immunizations  Reviewed on 5/15/2018 Name Date Influenza Vaccine 10/13/2014 Tdap 6/19/2017 Reviewed by Bernard Stone MD on 5/15/2018 at  9:22 AM  
You Were Diagnosed With   
  
 Codes Comments Essential hypertension    -  Primary ICD-10-CM: I10 
ICD-9-CM: 401.9 Constipation, unspecified constipation type     ICD-10-CM: K59.00 ICD-9-CM: 564.00 Irritable bowel syndrome without diarrhea     ICD-10-CM: K58.9 ICD-9-CM: 270.8 Primary osteoarthritis of right knee     ICD-10-CM: M17.11 ICD-9-CM: 715.16 Bipolar 1 disorder (HCC)     ICD-10-CM: F31.9 ICD-9-CM: 296.7 Medicare annual wellness visit, subsequent     ICD-10-CM: Z00.00 ICD-9-CM: V70.0 Screening breast examination     ICD-10-CM: Z12.31 
ICD-9-CM: V76.10 Vitals BP Pulse Temp Resp Height(growth percentile) Weight(growth percentile) 114/73 (BP 1 Location: Left arm, BP Patient Position: Sitting) 88 98 °F (36.7 °C) (Oral) 20 5' (1.524 m) 157 lb (71.2 kg) SpO2 BMI OB Status Smoking Status 96% 30.66 kg/m2 Hysterectomy Never Smoker Vitals History BMI and BSA Data Body Mass Index Body Surface Area  
 30.66 kg/m 2 1.74 m 2 Preferred Pharmacy Pharmacy Name Phone [id number] [address], [address] Gisele Syed [phone] Your Updated Medication List  
  
   
This list is accurate as of 5/15/18  9:29 AM.  Always use your most recent med list.  
  
  
  
  
 ACIDOPHILUS Cap Generic drug:  Lactobacillus acidophilus Take 1 Cap by mouth daily. alendronate 70 mg tablet Commonly known as:  FOSAMAX Take 1 Tab by mouth every seven (7) days. amitriptyline 75 mg tablet Commonly known as:  ELAVIL TAKE 1 TABLET BY MOUTH NIGHTLY. aspirin delayed-release 325 mg tablet Take 1 Tab by mouth two (2) times a day. BENADRYL ALLERGY 25 mg tablet Generic drug:  diphenhydrAMINE Take 50 mg by mouth nightly. black cohosh 540 mg Cap Take 1 Tab by mouth daily. calcium-cholecalciferol (D3) tablet Commonly known as:  CALTRATE 600+D Take 1 Tab by mouth daily. cetirizine 10 mg tablet Commonly known as:  ZYRTEC Take 10 mg by mouth daily. diclofenac EC 50 mg EC tablet Commonly known as:  VOLTAREN Take 1 Tab by mouth two (2) times a day. estradiol 0.05 mg/24 hr  
Commonly known as:  VIVELLE  
1 Patch by TransDERmal route two (2) times a week on Wednesday and Saturday. famotidine 20 mg tablet Commonly known as:  PEPCID  
TAKE 1 TABLET BY MOUTH TWO (2) TIMES A DAY. STOP OMEPRAZOLE. fenofibrate nanocrystallized 145 mg tablet Commonly known as:  [employer] Take 1 Tab by mouth every fourty-eight (48) hours. FISH -160-1,000 mg Cap Generic drug:  omega 3-dha-epa-fish oil Take  by mouth.  
  
 gabapentin 100 mg capsule Commonly known as:  NEURONTIN  
TAKE 2 CAPSULES BY MOUTH TWO (2) TIMES A DAY. linaclotide 145 mcg Cap capsule Commonly known as:  Mack Stagger Take 1 Cap by mouth Daily (before breakfast). LORazepam 0.5 mg tablet Commonly known as:  ATIVAN Take 1 Tab by mouth every eight (8) hours as needed for Anxiety. Max Daily Amount: 1.5 mg. D/C valium  
  
 losartan 25 mg tablet Commonly known as:  COZAAR  
TAKE 1 TABLET BY MOUTH DAILY. OLANZapine 5 mg tablet Commonly known as:  ZyPREXA Take 10 mg by mouth daily (with dinner). polyethylene glycol 17 gram/dose powder Commonly known as:  Lugene Minder TAKE 17 GRAMS (1 TABLESPOONFUL) MIXED IN LIQUID BY MOUTH DAILY AS DIRECTED. REFRESH OP Apply  to eye daily as needed. REFRESH OPTIVE ADVANCED (PF) OP Apply 1 Drop to eye two (2) times daily as needed. SF 5000 PLUS 1.1 % Crea Generic drug:  fluoride (sodium) 1 Tube. VITAMIN D3 1,000 unit Cap Generic drug:  cholecalciferol Take 1 Tab by mouth daily. Indications: VITAMIN D DEFICIENCY  
  
 vitamin E 400 unit capsule Commonly known as:  [address] Take 400 Units by mouth daily. Prescriptions Sent to Pharmacy Refills  
 linaclotide (LINZESS) 145 mcg cap capsule 3 Sig: Take 1 Cap by mouth Daily (before breakfast). Class: Normal  
 [id number] #: [phone] Route: Oral  
  
To-Do List   
 08/15/2018 Imaging:  [facility] MAMMO BI SCREENING INCL CAD Patient Instructions Schedule of Personalized Health Plan (Provide Copy to Patient) The best way to stay healthy is to live a healthy lifestyle. A healthy lifestyle includes regular exercise, eating a well-balanced diet, keeping a healthy weight and not smoking. Regular physical exams and screening tests are another important way to take care of yourself. Preventive exams provided by health care providers can find health problems early when treatment works best and can keep you from getting certain diseases or illnesses. Preventive services include exams, lab tests, screenings, shots, monitoring and information to help you take care of your own health. All people over 65 should have a pneumonia shot. Pneumonia shots are usually only needed once in a lifetime unless your doctor decides differently. Will find out about prevner 13. All people over 65 should have a yearly flu shot. People over 65 are at medium to high risk for Hepatitis B. Three shots are needed for complete protection. In addition to your physical exam, some screening tests are recommended: 
 
Bone mass measurement (dexa scan) is recommended every two years Diabetes Mellitus screening is recommended every year. Glaucoma is an eye disease caused by high pressure in the eye. An eye exam is recommended every year. Cardiovascular screening tests that check your cholesterol and other blood fat (lipid) levels are recommended every five years.   
 
Colorectal Cancer screening tests help to find pre-cancerous polyps (growths in the colon) so they can be removed before they turn into cancer. Tests ordered for screening depend on your personal and family history risk factors. up to date Screening for Breast Cancer is recommended yearly with a mammogram.will order Screening for Cervical Cancer is recommended every two years (annually for certain risk factors, such as previous history of STD or abnormal PAP in past 7 years), with a Pelvic Exam with PAP. up to date Here is a list of your current Health Maintenance items with a due date: 
Health Maintenance Topic Date Due  
 PAP AKA CERVICAL CYTOLOGY  04/25/2017  ZOSTER VACCINE AGE 60>  03/19/2018  Influenza Age 5 to Adult  08/01/2018  BREAST CANCER SCRN MAMMOGRAM  03/28/2019  MEDICARE YEARLY EXAM  05/16/2019  
 DTaP/Tdap/Td series (2 - Td) 06/19/2027  COLONOSCOPY  07/05/2027  Hepatitis C Screening  Completed High-Fiber Diet: Care Instructions Your Care Instructions A high-fiber diet may help you relieve constipation and feel less bloated. Your doctor and dietitian will help you make a high-fiber eating plan based on your personal needs. The plan will include the things you like to eat. It will also make sure that you get 30 grams of fiber a day. Before you make changes to the way you eat, be sure to talk with your doctor or dietitian. Follow-up care is a key part of your treatment and safety. Be sure to make and go to all appointments, and call your doctor if you are having problems. It's also a good idea to know your test results and keep a list of the medicines you take. How can you care for yourself at home? · You can increase how much fiber you get if you eat more of certain foods. These foods include: ¨ Whole-grain breads and cereals. ¨ Fruits, such as pears, apples, and peaches. Eat the skins, peels, and seeds, if you can. ¨ Vegetables, such as broccoli, cabbage, spinach, carrots, asparagus, and squash. ¨ Starchy vegetables. These include potatoes with skins, kidney beans, and lima beans. · Take a fiber supplement every day if your doctor recommends it. Examples are Benefiber, Citrucel, FiberCon, and Metamucil. Ask your doctor how much to take. · Drink plenty of fluids, enough so that your urine is light yellow or clear like water. If you have kidney, heart, or liver disease and have to limit fluids, talk with your doctor before you increase the amount of fluids you drink. · Get some exercise every day. Exercise helps stool move through the colon. It also helps prevent constipation. · Keep a food diary. Try to notice and write down what foods cause gas, pain, or other symptoms. Then you can avoid these foods. Where can you learn more? Go to http://tung-tg.info/. Enter C221 in the search box to learn more about \"High-Fiber Diet: Care Instructions. \" Current as of: May 12, 2017 Content Version: 11.4 © 3923-2006 [employer]. Care instructions adapted under license by [employer] (which disclaims liability or warranty for this information). If you have questions about a medical condition or this instruction, always ask your healthcare professional. [place] [address] any warranty or liability for your use of this information. High-Fiber Diet: Care Instructions Your Care Instructions A high-fiber diet may help you relieve constipation and feel less bloated. Your doctor and dietitian will help you make a high-fiber eating plan based on your personal needs. The plan will include the things you like to eat. It will also make sure that you get 30 grams of fiber a day. Before you make changes to the way you eat, be sure to talk with your doctor or dietitian. Follow-up care is a key part of your treatment and safety. Be sure to make and go to all appointments, and call your doctor if you are having problems. It's also a good idea to know your test results and keep a list of the medicines you take. How can you care for yourself at home? · You can increase how much fiber you get if you eat more of certain foods. These foods include: ¨ Whole-grain breads and cereals. ¨ Fruits, such as pears, apples, and peaches. Eat the skins, peels, and seeds, if you can. ¨ Vegetables, such as broccoli, cabbage, spinach, carrots, asparagus, and squash. ¨ Starchy vegetables. These include potatoes with skins, kidney beans, and lima beans. · Take a fiber supplement every day if your doctor recommends it. Examples are Benefiber, Citrucel, FiberCon, and Metamucil. Ask your doctor how much to take. · Drink plenty of fluids, enough so that your urine is light yellow or clear like water. If you have kidney, heart, or liver disease and have to limit fluids, talk with your doctor before you increase the amount of fluids you drink. · Get some exercise every day. Exercise helps stool move through the colon. It also helps prevent constipation. · Keep a food diary. Try to notice and write down what foods cause gas, pain, or other symptoms. Then you can avoid these foods. Where can you learn more? Go to http://tung-tg.info/. Enter Y692 in the search box to learn more about \"High-Fiber Diet: Care Instructions. \" Current as of: May 12, 2017 Content Version: 11.4 © 2932-4873 [employer]. Care instructions adapted under license by [employer] (which disclaims liability or warranty for this information). If you have questions about a medical condition or this instruction, always ask your healthcare professional. [address] any warranty or liability for your use of this information. Introducing [facility]! Dear Garry Crews: Thank you for requesting a [employer] account. Our records indicate that you already have an active [employer] account. You can access your account anytime at [email or web address] Did you know that you can access your hospital and ER discharge instructions at any time in [employer]? You can also review all of your test results from your hospital stay or ER visit. Additional Information If you have questions, please visit the Frequently Asked Questions section of the [employer] website at [email or web address] Remember, [employer] is NOT to be used for urgent needs. For medical emergencies, dial 911. Now available from your iPhone and Android! Please provide this summary of care documentation to your next provider. Your primary care clinician is listed as Gretchen Leong. If you have any questions after today's visit, please call [phone].

## 2018-05-24 ENCOUNTER — TELEPHONE (OUTPATIENT)
Dept: INTERNAL MEDICINE CLINIC | Age: 58
End: 2018-05-24

## 2018-05-24 NOTE — TELEPHONE ENCOUNTER
Pt is requesting requisition for routine mammogram.  She is doing them now at [facility]

## 2018-05-24 NOTE — TELEPHONE ENCOUNTER
Pt made aware that she can take same requisition, writer advised that if the testing center requires any additional orders they will contact us

## 2018-05-26 DIAGNOSIS — E78.2 ELEVATED TRIGLYCERIDES WITH HIGH CHOLESTEROL: ICD-10-CM

## 2018-05-28 DIAGNOSIS — I10 ESSENTIAL HYPERTENSION: ICD-10-CM

## 2018-05-29 RX ORDER — LOSARTAN POTASSIUM 25 MG/1
TABLET ORAL
Qty: 30 TAB | Refills: 3 | Status: SHIPPED | OUTPATIENT
Start: 2018-05-29 | End: 2018-09-11 | Stop reason: SDUPTHER

## 2018-05-29 RX ORDER — FENOFIBRATE 145 MG/1
TABLET, COATED ORAL
Qty: 30 TAB | Refills: 5 | Status: SHIPPED | OUTPATIENT
Start: 2018-05-29 | End: 2018-06-13 | Stop reason: SDUPTHER

## 2018-06-01 DIAGNOSIS — K21.9 GASTROESOPHAGEAL REFLUX DISEASE WITHOUT ESOPHAGITIS: ICD-10-CM

## 2018-06-04 DIAGNOSIS — K21.9 GASTROESOPHAGEAL REFLUX DISEASE WITHOUT ESOPHAGITIS: ICD-10-CM

## 2018-06-04 RX ORDER — FAMOTIDINE 20 MG/1
20 TABLET, FILM COATED ORAL 2 TIMES DAILY
Qty: 60 TAB | Refills: 3 | Status: SHIPPED | OUTPATIENT
Start: 2018-06-04 | End: 2018-06-04 | Stop reason: SDUPTHER

## 2018-06-05 RX ORDER — AMITRIPTYLINE HYDROCHLORIDE 75 MG/1
TABLET, FILM COATED ORAL
Qty: 30 TAB | Refills: 0 | Status: SHIPPED | OUTPATIENT
Start: 2018-06-05 | End: 2018-06-22 | Stop reason: SDUPTHER

## 2018-06-05 RX ORDER — FAMOTIDINE 20 MG/1
20 TABLET, FILM COATED ORAL 2 TIMES DAILY
Qty: 60 TAB | Refills: 3 | Status: SHIPPED | OUTPATIENT
Start: 2018-06-05 | End: 2018-06-12 | Stop reason: SDUPTHER

## 2018-06-05 NOTE — TELEPHONE ENCOUNTER
Refill request from [facility] for pts  Amitriptyline 75mg tablets.     LOV  12/19/17    Pending  6/22/18      Please review for Dr Bijan Gregory

## 2018-06-08 DIAGNOSIS — M81.0 OSTEOPOROSIS, UNSPECIFIED OSTEOPOROSIS TYPE, UNSPECIFIED PATHOLOGICAL FRACTURE PRESENCE: ICD-10-CM

## 2018-06-11 RX ORDER — ALENDRONATE SODIUM 70 MG/1
TABLET ORAL
Qty: 4 TAB | Refills: 11 | Status: SHIPPED | OUTPATIENT
Start: 2018-06-11 | End: 2018-08-23 | Stop reason: SDUPTHER

## 2018-06-12 DIAGNOSIS — K21.9 GASTROESOPHAGEAL REFLUX DISEASE WITHOUT ESOPHAGITIS: ICD-10-CM

## 2018-06-12 RX ORDER — FAMOTIDINE 20 MG/1
20 TABLET, FILM COATED ORAL 2 TIMES DAILY
Qty: 180 TAB | Refills: 1 | Status: SHIPPED | OUTPATIENT
Start: 2018-06-12 | End: 2018-09-18 | Stop reason: SDUPTHER

## 2018-06-13 DIAGNOSIS — E78.2 ELEVATED TRIGLYCERIDES WITH HIGH CHOLESTEROL: ICD-10-CM

## 2018-06-13 RX ORDER — FENOFIBRATE 145 MG/1
145 TABLET, COATED ORAL
Qty: 30 TAB | Refills: 5 | Status: SHIPPED | OUTPATIENT
Start: 2018-06-13 | End: 2019-06-17 | Stop reason: SDUPTHER

## 2018-06-21 ENCOUNTER — TELEPHONE (OUTPATIENT)
Dept: NEUROLOGY | Age: 58
End: 2018-06-21

## 2018-06-21 NOTE — TELEPHONE ENCOUNTER
----- Message from Divya Washington sent at 6/21/2018 11:08 AM EDT -----  Regarding: Dr. Karen Pike with [facility] request for th ept's \"Amitriptaline\" 75mg to be sent to them for refill. Janiss contact number is [phone].

## 2018-06-22 ENCOUNTER — OFFICE VISIT (OUTPATIENT)
Dept: NEUROLOGY | Age: 58
End: 2018-06-22

## 2018-06-22 VITALS
HEART RATE: 97 BPM | WEIGHT: 156 LBS | RESPIRATION RATE: 18 BRPM | DIASTOLIC BLOOD PRESSURE: 82 MMHG | SYSTOLIC BLOOD PRESSURE: 122 MMHG | OXYGEN SATURATION: 97 % | BODY MASS INDEX: 30.47 KG/M2

## 2018-06-22 DIAGNOSIS — F31.10 BIPOLAR AFFECTIVE DISORDER, MANIC (HCC): ICD-10-CM

## 2018-06-22 DIAGNOSIS — G44.219 EPISODIC TENSION-TYPE HEADACHE, NOT INTRACTABLE: Primary | ICD-10-CM

## 2018-06-22 DIAGNOSIS — R51.9 CHRONIC DAILY HEADACHE: ICD-10-CM

## 2018-06-22 RX ORDER — AMITRIPTYLINE HYDROCHLORIDE 75 MG/1
TABLET, FILM COATED ORAL
Qty: 90 TAB | Refills: 3 | Status: SHIPPED | OUTPATIENT
Start: 2018-06-22 | End: 2019-06-17 | Stop reason: SDUPTHER

## 2018-06-22 NOTE — MR AVS SNAPSHOT
[facility] [address] [place] [address] 
[phone] Patient: Jarrod Mckee MRN: [id number] MCS:[birth year] Visit Information Date & Time Provider Department Dept. Phone Encounter #  
 6/22/2018  9:00 AM Olivia Ni, [facility] at [address] [id number] Follow-up Instructions Return in about 1 year (around 6/22/2019). Your Appointments 8/21/2018  8:45 AM  
Any with MD Jose Jaimes ([facility]) Appt Note: 4 mos f/u  
 [address] [place] [address]. [place] [phone]  
  
   
 [address] [place] [address]. [address] [place] [phone] Upcoming Health Maintenance Date Due  
 PAP AKA CERVICAL CYTOLOGY 4/25/2017 ZOSTER VACCINE AGE 60> 3/19/2018 Influenza Age 5 to Adult 8/1/2018 BREAST CANCER SCRN MAMMOGRAM 3/28/2019 MEDICARE YEARLY EXAM 5/16/2019 DTaP/Tdap/Td series (2 - Td) 6/19/2027 COLONOSCOPY 7/5/2027 Allergies as of 6/22/2018  Review Complete On: 6/22/2018 By: Mustapha Ferris LPN Severity Noted Reaction Type Reactions Other Food  03/20/2018    Other (comments) Oranges, cabbage, beans, peppers, raw onions, foods with caffeine, popcorn, soda because of IBS Buspar [Buspirone] Medium 02/09/2017   Side Effect Other (comments) Causes \"stimulation\" that could exacerbate a manic attack/phase of pt's Biplar. Reported by patient Amlodipine  06/28/2017    Other (comments) Ankle swelling Dicyclomine  10/14/2014    Nausea and Vomiting Extreme stomach pains Lithium  01/03/2014   Systemic Nausea and Vomiting Severe nausea and vomiting. Other Medication  11/19/2010    Other (comments) Intolerance to oranges, cabbage,beans,peppers,raw onions,foods with caffeine in them, popcorn, no pop sodas, because of IBS Current Immunizations  Reviewed on 5/15/2018 Name Date Influenza Vaccine 10/13/2014 Tdap 6/19/2017 Not reviewed this visit Vitals BP Pulse Resp Weight(growth percentile) SpO2 BMI  
 122/82 97 18 156 lb (70.8 kg) 97% 30.47 kg/m2 OB Status Smoking Status Hysterectomy Never Smoker BMI and BSA Data Body Mass Index Body Surface Area  
 30.47 kg/m 2 1.73 m 2 Preferred Pharmacy Pharmacy Name Phone [id number]- [place], [address] [place] [phone] Your Updated Medication List  
  
   
This list is accurate as of 6/22/18  9:06 AM.  Always use your most recent med list.  
  
  
  
  
 alendronate 70 mg tablet Commonly known as:  FOSAMAX TAKE 1 TABLET BY MOUTH EVERY SEVEN (7) DAYS. amitriptyline 75 mg tablet Commonly known as:  ELAVIL TAKE 1 TABLET BY MOUTH NIGHTLY. aspirin delayed-release 325 mg tablet Take 1 Tab by mouth two (2) times a day. BENADRYL ALLERGY 25 mg tablet Generic drug:  diphenhydrAMINE Take 50 mg by mouth nightly. black cohosh 540 mg Cap Take 1 Tab by mouth daily. calcium-cholecalciferol (D3) tablet Commonly known as:  CALTRATE 600+D Take 1 Tab by mouth daily. cetirizine 10 mg tablet Commonly known as:  ZYRTEC Take 10 mg by mouth daily. diclofenac EC 50 mg EC tablet Commonly known as:  VOLTAREN Take 1 Tab by mouth two (2) times a day. estradiol 0.05 mg/24 hr  
Commonly known as:  VIVELLE  
1 Patch by TransDERmal route two (2) times a week on Wednesday and Saturday. famotidine 20 mg tablet Commonly known as:  PEPCID Take 1 Tab by mouth two (2) times a day. fenofibrate nanocrystallized 145 mg tablet Commonly known as:  [employer] Take 1 Tab by mouth every fourty-eight (48) hours. FISH -160-1,000 mg Cap Generic drug:  omega 3-dha-epa-fish oil Take  by mouth.  
  
 gabapentin 100 mg capsule Commonly known as:  NEURONTIN  
TAKE 2 CAPSULES BY MOUTH TWO (2) TIMES A DAY. Lactobacillus acidophilus Cap Commonly known as:  ACIDOPHILUS Take 1 Cap by mouth daily. linaclotide 145 mcg Cap capsule Commonly known as:  Cheril Sorrow Take 1 Cap by mouth Daily (before breakfast). LORazepam 0.5 mg tablet Commonly known as:  ATIVAN Take 1 Tab by mouth every eight (8) hours as needed for Anxiety. Max Daily Amount: 1.5 mg. D/C valium  
  
 losartan 25 mg tablet Commonly known as:  COZAAR  
TAKE 1 TABLET BY MOUTH DAILY. OLANZapine 5 mg tablet Commonly known as:  ZyPREXA Take 10 mg by mouth daily (with dinner). polyethylene glycol 17 gram/dose powder Commonly known as:  Jackson Gram TAKE 17 GRAMS (1 TABLESPOONFUL) MIXED IN LIQUID BY MOUTH DAILY AS DIRECTED. REFRESH OP Apply  to eye daily as needed. REFRESH OPTIVE ADVANCED (PF) OP Apply 1 Drop to eye two (2) times daily as needed. SF 5000 PLUS 1.1 % Crea Generic drug:  fluoride (sodium) 1 Tube. VITAMIN D3 1,000 unit Cap Generic drug:  cholecalciferol Take 1 Tab by mouth daily. Indications: VITAMIN D DEFICIENCY  
  
 vitamin E 400 unit capsule Commonly known as:  [address] Take 400 Units by mouth daily. Prescriptions Sent to Pharmacy Refills  
 amitriptyline (ELAVIL) 75 mg tablet 3 Sig: TAKE 1 TABLET BY MOUTH NIGHTLY. Class: Normal  
 Pharmacy: [facility]/pharmacy [address] [place] [address] #: [phone] Follow-up Instructions Return in about 1 year (around 6/22/2019). Patient Instructions A Healthy Lifestyle: Care Instructions Your Care Instructions A healthy lifestyle can help you feel good, stay at a healthy weight, and have plenty of energy for both work and play. A healthy lifestyle is something you can share with your whole family.  
A healthy lifestyle also can lower your risk for serious health problems, such as high blood pressure, heart disease, and diabetes. You can follow a few steps listed below to improve your health and the health of your family. Follow-up care is a key part of your treatment and safety. Be sure to make and go to all appointments, and call your doctor if you are having problems. It's also a good idea to know your test results and keep a list of the medicines you take. How can you care for yourself at home? · Do not eat too much sugar, fat, or fast foods. You can still have dessert and treats now and then. The goal is moderation. · Start small to improve your eating habits. Pay attention to portion sizes, drink less juice and soda pop, and eat more fruits and vegetables. ¨ Eat a healthy amount of food. A 3-ounce serving of meat, for example, is about the size of a deck of cards. Fill the rest of your plate with vegetables and whole grains. ¨ Limit the amount of soda and sports drinks you have every day. Drink more water when you are thirsty. ¨ Eat at least 5 servings of fruits and vegetables every day. It may seem like a lot, but it is not hard to reach this goal. A serving or helping is 1 piece of fruit, 1 cup of vegetables, or 2 cups of leafy, raw vegetables. Have an apple or some carrot sticks as an afternoon snack instead of a candy bar. Try to have fruits and/or vegetables at every meal. 
· Make exercise part of your daily routine. You may want to start with simple activities, such as walking, bicycling, or slow swimming. Try to be active 30 to 60 minutes every day. You do not need to do all 30 to 60 minutes all at once. For example, you can exercise 3 times a day for 10 or 20 minutes. Moderate exercise is safe for most people, but it is always a good idea to talk to your doctor before starting an exercise program. 
· Keep moving. Wyona Aguiar the lawn, work in the garden, or [place]. Take the stairs instead of the elevator at work. · If you smoke, quit. People who smoke have an increased risk for heart attack, stroke, cancer, and other lung illnesses. Quitting is hard, but there are ways to boost your chance of quitting tobacco for good. ¨ Use nicotine gum, patches, or lozenges. ¨ Ask your doctor about stop-smoking programs and medicines. ¨ Keep trying. In addition to reducing your risk of diseases in the future, you will notice some benefits soon after you stop using tobacco. If you have shortness of breath or asthma symptoms, they will likely get better within a few weeks after you quit. · Limit how much alcohol you drink. Moderate amounts of alcohol (up to 2 drinks a day for men, 1 drink a day for women) are okay. But drinking too much can lead to liver problems, high blood pressure, and other health problems. Family health If you have a family, there are many things you can do together to improve your health. · Eat meals together as a family as often as possible. · Eat healthy foods. This includes fruits, vegetables, lean meats and dairy, and whole grains. · Include your family in your fitness plan. Most people think of activities such as jogging or tennis as the way to fitness, but there are many ways you and your family can be more active. Anything that makes you breathe hard and gets your heart pumping is exercise. Here are some tips: 
¨ Walk to do errands or to take your child to school or the bus. ¨ Go for a family bike ride after dinner instead of watching TV. Where can you learn more? Go to http://tung-tg.info/. Enter O122 in the search box to learn more about \"A Healthy Lifestyle: Care Instructions. \" Current as of: May 12, 2017 Content Version: 11.4 © 4662-5598 [employer]. Care instructions adapted under license by [employer] (which disclaims liability or warranty for this information).  If you have questions about a medical condition or this instruction, always ask your healthcare professional. [address] any warranty or liability for your use of this information. Introducing [facility] & HEALTH SERVICES! Dear Emiliano: Thank you for requesting a [employer] account. Our records indicate that you already have an active [employer]ayers account. You can access your account anytime at [email or web address]. [email or web address] Did you know that you can access your hospital and ER discharge instructions at any time in Web[employer]ayers? You can also review all of your test results from your hospital stay or ER visit. Additional Information If you have questions, please visit the Frequently Asked Questions section of the Web[employer] website at [email or web address]/. Remember, WebLayers is NOT to be used for urgent needs. For medical emergencies, dial 911. Now available from your iPhone and Android! Please provide this summary of care documentation to your next provider. Your primary care clinician is listed as Julia Cortez. If you have any questions after today's visit, please call [phone].

## 2018-06-22 NOTE — PROGRESS NOTES
Neurology Clinic Follow up Note    Patient ID:  Lindsey Johnson  [id number]  89 y.o.  1960      Ms. Anthony Yi is here for follow up today of headaches       Last Appointment:  12/19/17      Interval History:   Pt returns for f/u of headaches, previously followed by Dr. Zaki Olvera. HAs are sporadic and only 1-2x per month, improved since last visit. HA are non severe, no nausea or photophobia. She is using Elavil for preventative therapy. She feels the Elavil has been very helpful. Reports some dry eyes/mouth, denies excessive side effects. Using ibuprofen rarely for rescue HA tx. PMHx/ PSHx/ FHx/ SHx:  Reviewed and unchanged previous visit. Past Medical History:   Diagnosis Date    Arthritis     Bipolar disorder ([address]     Bladder pain     Choledocholithiasis 11/24/2010    GERD (gastroesophageal reflux disease)     Headache(784.0)     tension headaches    High cholesterol     History of cholecystectomy     Hypertension     Irritable bowel     Pelvic pain in female 2014    Psychiatric disorder     Stool color black     irritable bowel     Past Surgical History:   Procedure Laterality Date    COLONOSCOPY N/A 7/5/2017    COLONOSCOPY performed by Fredis Jones MD at [address] [facility]  2003, 2017    prolasped rectum    HX LAP CHOLECYSTECTOMY  11/23/10    HX KALEIGH AND BSO  1998    LAPAROSCOPY ABDOMEN DIAGNOSTIC  1987         ROS:  Comprehensive review of systems negative except for as noted above. Objective:       Meds:  Current Outpatient Prescriptions   Medication Sig Dispense Refill    fenofibrate nanocrystallized (TRICOR) 145 mg tablet Take 1 Tab by mouth every fourty-eight (48) hours. 30 Tab 5    famotidine (PEPCID) 20 mg tablet Take 1 Tab by mouth two (2) times a day. 180 Tab 1    alendronate (FOSAMAX) 70 mg tablet TAKE 1 TABLET BY MOUTH EVERY SEVEN (7) DAYS.  4 Tab 11    amitriptyline (ELAVIL) 75 mg tablet TAKE 1 TABLET BY MOUTH NIGHTLY. 30 Tab 0    Lactobacillus acidophilus (ACIDOPHILUS) cap Take 1 Cap by mouth daily. 30 Each 0    losartan (COZAAR) 25 mg tablet TAKE 1 TABLET BY MOUTH DAILY. 30 Tab 3    linaclotide (LINZESS) 145 mcg cap capsule Take 1 Cap by mouth Daily (before breakfast). 30 Cap 3    gabapentin (NEURONTIN) 100 mg capsule TAKE 2 CAPSULES BY MOUTH TWO (2) TIMES A DAY. 120 Cap 5    aspirin delayed-release 325 mg tablet Take 1 Tab by mouth two (2) times a day. 60 Tab 0    diclofenac EC (VOLTAREN) 50 mg EC tablet Take 1 Tab by mouth two (2) times a day. 60 Tab 0    polyethylene glycol (MIRALAX) 17 gram/dose powder TAKE 17 GRAMS (1 TABLESPOONFUL) MIXED IN LIQUID BY MOUTH DAILY AS DIRECTED. 850 g 3    cetirizine (ZYRTEC) 10 mg tablet Take 10 mg by mouth daily.  CARBOXYMETHYLCELLULOSE SODIUM (REFRESH OP) Apply  to eye daily as needed.  diphenhydrAMINE (BENADRYL ALLERGY) 25 mg tablet Take 50 mg by mouth nightly.  omega 3-dha-epa-fish oil (FISH OIL) 100-160-1,000 mg cap Take  by mouth.  LORazepam (ATIVAN) 0.5 mg tablet Take 1 Tab by mouth every eight (8) hours as needed for Anxiety. Max Daily Amount: 1.5 mg. D/C valium (Patient taking differently: Take 0.5 mg by mouth three (3) times daily. D/C valium) 90 Tab 2    CARBOXYMETHYL/GLY/POLY80/PF (REFRESH OPTIVE ADVANCED, PF, OP) Apply 1 Drop to eye two (2) times daily as needed.  black cohosh 540 mg cap Take 1 Tab by mouth daily.  calcium-cholecalciferol, D3, (CALTRATE 600+D) tablet Take 1 Tab by mouth daily. 30 Tab 12    estradiol (VIVELLE) 0.05 mg/24 hr 1 Patch by TransDERmal route two (2) times a week on Wednesday and Saturday. 3    SF 5000 PLUS 1.1 % crea 1 Tube.  4    OLANZapine (ZYPREXA) 5 mg tablet Take 10 mg by mouth daily (with dinner). 2    vitamin E (AQUA GEMS) 400 unit capsule Take 400 Units by mouth daily.  Cholecalciferol, Vitamin D3, (VITAMIN D3) 1,000 unit cap Take 1 Tab by mouth daily.  Indications: VITAMIN D DEFICIENCY Exam:  Visit Vitals    /82    Pulse 97    Resp 18    Wt 70.8 kg (156 lb)    SpO2 97%    BMI 30.47 kg/m2     NEUROLOGICAL EXAM:  Cardiac: RRR  Lungs: CTAB  General: Awake, alert, speech fluent  CN: PERRL, EOMI without nystagmus, VFF to confrontation, facial sensation and strength are normal and symmetric, hearing is intact to finger rub bilaterally, palate and tongue movements are intact and symmetric. Motor: Normal tone, bulk and strength bilaterally. Reflexes: 2/4 and symmetric, plantar stimulation is flexor. Coordination: FNF, XUAN, HTS intact. Sensation: LT intact throughout. Gait: Normal-based and steady. LABS  Results for orders placed or performed during the hospital encounter of 37/64/00   METABOLIC PANEL, BASIC   Result Value Ref Range    Sodium 141 136 - 145 mmol/L    Potassium 3.7 3.5 - 5.1 mmol/L    Chloride 110 (H) 97 - 108 mmol/L    CO2 26 21 - 32 mmol/L    Anion gap 5 5 - 15 mmol/L    Glucose 104 (H) 65 - 100 mg/dL    BUN 11 6 - 20 MG/DL    Creatinine 0.93 0.55 - 1.02 MG/DL    BUN/Creatinine ratio 12 12 - 20      GFR est AA >60 >60 ml/min/1.73m2    GFR est non-AA >60 >60 ml/min/1.73m2    Calcium 7.9 (L) 8.5 - 10.1 MG/DL   HEMOGLOBIN   Result Value Ref Range    HGB 10.5 (L) 11.5 - 16.0 g/dL   HEMOGLOBIN   Result Value Ref Range    HGB 10.6 (L) 11.5 - 16.0 g/dL   GLUCOSE, POC   Result Value Ref Range    Glucose (POC) 126 (H) 65 - 100 mg/dL    Performed by Maria Guadalupe [facility]    GLUCOSE, POC   Result Value Ref Range    Glucose (POC) 108 (H) 65 - 100 mg/dL    Performed by HEAD Gary    GLUCOSE, POC   Result Value Ref Range    Glucose (POC) 97 65 - 100 mg/dL    Performed by [address] [place] [address] Blvd:  MRI Results (most recent):  No results found for this or any previous visit. Assessment:     Encounter Diagnoses     ICD-10-CM ICD-9-CM   1. Episodic tension-type headache, not intractable G44.219 339.11   2. Chronic daily headache R51 784.0   3.  Bipolar affective disorder, manic ([facility] F31.10 296.40      59year old female with a h/o HTN, bipolar d/o here for f/u of chronic daily headaches. These are improved with limitation of abortive headache therapy and titration of Elavil. She is tolerating preventative medication without significant side effects. Will continue current tx. Plan:   Cont. Elavil 75mg qhs  Limit OTC analgesics to no more than twice weekly to prevent rebound headaches    Follow-up Disposition:  Return in about 1 year (around 6/22/2019).         Signed:  Tima Richardson,   6/22/2018

## 2018-06-22 NOTE — PATIENT INSTRUCTIONS

## 2018-07-01 DIAGNOSIS — K59.00 CONSTIPATION, UNSPECIFIED CONSTIPATION TYPE: ICD-10-CM

## 2018-07-02 RX ORDER — POLYETHYLENE GLYCOL 3350 17 G/17G
POWDER, FOR SOLUTION ORAL
Qty: 527 G | Refills: 0 | Status: SHIPPED | OUTPATIENT
Start: 2018-07-02 | End: 2018-08-21 | Stop reason: SDUPTHER

## 2018-07-10 ENCOUNTER — OFFICE VISIT (OUTPATIENT)
Dept: INTERNAL MEDICINE CLINIC | Age: 58
End: 2018-07-10

## 2018-07-10 VITALS
OXYGEN SATURATION: 95 % | WEIGHT: 158 LBS | BODY MASS INDEX: 31.02 KG/M2 | HEART RATE: 98 BPM | HEIGHT: 60 IN | SYSTOLIC BLOOD PRESSURE: 129 MMHG | DIASTOLIC BLOOD PRESSURE: 89 MMHG | RESPIRATION RATE: 20 BRPM | TEMPERATURE: 97.6 F

## 2018-07-10 DIAGNOSIS — Z96.651 STATUS POST RIGHT KNEE REPLACEMENT: ICD-10-CM

## 2018-07-10 DIAGNOSIS — T14.8XXA HEMATOMA: ICD-10-CM

## 2018-07-10 DIAGNOSIS — F31.9 BIPOLAR 1 DISORDER (HCC): ICD-10-CM

## 2018-07-10 DIAGNOSIS — G44.221 CHRONIC TENSION-TYPE HEADACHE, INTRACTABLE: ICD-10-CM

## 2018-07-10 DIAGNOSIS — M17.11 PRIMARY OSTEOARTHRITIS OF RIGHT KNEE: Primary | ICD-10-CM

## 2018-07-10 NOTE — MR AVS SNAPSHOT
[address]. [facility] [phone] 
[phone] Patient: Sonia Jain MRN: [id number] VRD:9/75/[birth year] Visit Information Date & Time Provider Department Dept. Phone Encounter #  
 7/10/2018  9:00 AM Yanira Hammond MD [facility] [phone] [id number] Your Appointments 8/21/2018  8:45 AM  
Any with Yanira Hammond MD  
[facility] ([facility]) Appt Note: 4 mos f/u  
 [address] [place] [address]. A [facility] 7 [phone]  
[phone]  
  
   
 [address] [place] [address]. [address] [place] [phone] Upcoming Health Maintenance Date Due  
 PAP AKA CERVICAL CYTOLOGY 4/25/2017 ZOSTER VACCINE AGE 60> 3/19/2018 Influenza Age 5 to Adult 8/1/2018 BREAST CANCER SCRN MAMMOGRAM 3/28/2019 MEDICARE YEARLY EXAM 5/16/2019 DTaP/Tdap/Td series (2 - Td) 6/19/2027 COLONOSCOPY 7/5/2027 Allergies as of 7/10/2018  Review Complete On: 7/10/2018 By: Yanira Hammond MD  
  
 Severity Noted Reaction Type Reactions Other Food  03/20/2018    Other (comments) Oranges, cabbage, beans, peppers, raw onions, foods with caffeine, popcorn, soda because of IBS Buspar [Buspirone] Medium 02/09/2017   Side Effect Other (comments) Causes \"stimulation\" that could exacerbate a manic attack/phase of pt's Biplar. Reported by patient Amlodipine  06/28/2017    Other (comments) Ankle swelling Dicyclomine  10/14/2014    Nausea and Vomiting Extreme stomach pains Lithium  01/03/2014   Systemic Nausea and Vomiting Severe nausea and vomiting. Other Medication  11/19/2010    Other (comments) Intolerance to oranges, cabbage,beans,peppers,raw onions,foods with caffeine in them, popcorn, no pop sodas, because of IBS Current Immunizations  Reviewed on 5/15/2018 Name Date Influenza Vaccine 10/13/2014 Tdap 6/19/2017 Not reviewed this visit You Were Diagnosed With   
  
 Codes Comments Primary osteoarthritis of right knee    -  Primary ICD-10-CM: M17.11 ICD-9-CM: 715.16 Status post right knee replacement     ICD-10-CM: U23.166 ICD-9-CM: V43.65 Bipolar 1 disorder (HCC)     ICD-10-CM: F31.9 ICD-9-CM: 296.7 Hematoma     ICD-10-CM: T14. Mariano Longo ICD-9-CM: 924.9 Chronic tension-type headache, intractable     ICD-10-CM: B53.551 ICD-9-CM: 339.12 Vitals BP Pulse Temp Resp Height(growth percentile) Weight(growth percentile) 129/89 (BP 1 Location: Left arm, BP Patient Position: Sitting) 98 97.6 °F (36.4 °C) (Oral) 20 5' (1.524 m) 158 lb (71.7 kg) SpO2 BMI OB Status Smoking Status 95% 30.86 kg/m2 Hysterectomy Never Smoker Vitals History BMI and BSA Data Body Mass Index Body Surface Area  
 30.86 kg/m 2 1.74 m 2 Preferred Pharmacy Pharmacy Name Phone [id number] [address] AT [facility] [phone] Your Updated Medication List  
  
   
This list is accurate as of 7/10/18  9:39 AM.  Always use your most recent med list.  
  
  
  
  
 alendronate 70 mg tablet Commonly known as:  FOSAMAX TAKE 1 TABLET BY MOUTH EVERY SEVEN (7) DAYS. amitriptyline 75 mg tablet Commonly known as:  ELAVIL TAKE 1 TABLET BY MOUTH NIGHTLY. aspirin delayed-release 325 mg tablet Take 1 Tab by mouth two (2) times a day. BENADRYL ALLERGY 25 mg tablet Generic drug:  diphenhydrAMINE Take 50 mg by mouth nightly. black cohosh 540 mg Cap Take 1 Tab by mouth daily. calcium-cholecalciferol (D3) tablet Commonly known as:  CALTRATE 600+D Take 1 Tab by mouth daily. cetirizine 10 mg tablet Commonly known as:  ZYRTEC Take 10 mg by mouth daily. diclofenac EC 50 mg EC tablet Commonly known as:  VOLTAREN Take 1 Tab by mouth two (2) times a day. estradiol 0.05 mg/24 hr  
Commonly known as:  VIVELLE  
1 Patch by TransDERmal route two (2) times a week on Wednesday and Saturday. famotidine 20 mg tablet Commonly known as:  PEPCID Take 1 Tab by mouth two (2) times a day. fenofibrate nanocrystallized 145 mg tablet Commonly known as:  [employer] Take 1 Tab by mouth every fourty-eight (48) hours. FISH -160-1,000 mg Cap Generic drug:  omega 3-dha-epa-fish oil Take  by mouth.  
  
 gabapentin 100 mg capsule Commonly known as:  NEURONTIN  
TAKE 2 CAPSULES BY MOUTH TWO (2) TIMES A DAY. Lactobacillus acidophilus Cap Commonly known as:  ACIDOPHILUS Take 1 Cap by mouth daily. linaclotide 145 mcg Cap capsule Commonly known as:  Kb Filbert Take 1 Cap by mouth Daily (before breakfast). LORazepam 0.5 mg tablet Commonly known as:  ATIVAN Take 1 Tab by mouth every eight (8) hours as needed for Anxiety. Max Daily Amount: 1.5 mg. D/C valium  
  
 losartan 25 mg tablet Commonly known as:  COZAAR  
TAKE 1 TABLET BY MOUTH DAILY. OLANZapine 5 mg tablet Commonly known as:  ZyPREXA Take 10 mg by mouth daily (with dinner). polyethylene glycol 17 gram/dose powder Commonly known as:  [employer] TAKE 17 GRAMS (1 TABLESPOONFUL) MIXED IN LIQUID BY MOUTH DAILY AS DIRECTED. REFRESH OPTIVE ADVANCED (PF) OP Apply 1 Drop to eye two (2) times daily as needed. SF 5000 PLUS 1.1 % Crea Generic drug:  fluoride (sodium) 1 Tube. VITAMIN D3 1,000 unit Cap Generic drug:  cholecalciferol Take 1 Tab by mouth daily. Indications: VITAMIN D DEFICIENCY  
  
 vitamin E 400 unit capsule Commonly known as:  [address] Take 400 Units by mouth daily. We Performed the Following [address] Comments:  
 Pt need to learn to use exercise equipments and how to use it. Referral Information Referral ID Referred By Referred To [id number] Grant Wu Not Available Visits Status Start Date End Date 1 New Request 7/10/18 7/10/19 If your referral has a status of pending review or denied, additional information will be sent to support the outcome of this decision. Introducing [facility] & HEALTH SERVICES! Dear Zhanna Pedro: Thank you for requesting a [employer] account. Our records indicate that you already have an active [employer] account. You can access your account anytime at [email or web address] Did you know that you can access your hospital and ER discharge instructions at any time in [employer]? You can also review all of your test results from your hospital stay or ER visit. Additional Information If you have questions, please visit the Frequently Asked Questions section of the [employer] website at [email or web address] Remember, [employer] is NOT to be used for urgent needs. For medical emergencies, dial 911. Now available from your iPhone and Android! Please provide this summary of care documentation to your next provider. Your primary care clinician is listed as Chidi Good. If you have any questions after today's visit, please call [phone].

## 2018-07-10 NOTE — PROGRESS NOTES
HISTORY OF PRESENT ILLNESS  Nicolette Rilye is a 59 y.o. female here for follow-up. She has recently right knee replacement done by Dr. Les Gonzalez. Doing well. Just finished up PT OT by home health. She would like to lose weight, would like to use steam need a  although need to learn about gym equipment. Has anxiety and depression, seeing Dr. Garrett Rand. Stable for now. Noticed to have bruise or a small lump on left lower extremities. She probably had an injury. Has chronic headache, Elavil is helping her. Has seen neurologist.  Was advised to continue it. Has elevated lipid, on medications. Labs reviewed, all are stable. Bleeding/Bruising   Associated symptoms include headaches. Pertinent negatives include no shortness of breath. Knee Pain   Associated symptoms include headaches. Pertinent negatives include no shortness of breath. Head Pain    Pertinent negatives include no shortness of breath. Mental Health Problem   Associated symptoms include headaches. Pertinent negatives include no shortness of breath. Review of Systems   Constitutional: Negative. HENT: Negative. Eyes: Negative. Respiratory: Negative. Negative for shortness of breath and wheezing. Cardiovascular: Negative. Gastrointestinal: Negative. Negative for blood in stool. Musculoskeletal: Positive for joint pain. Negative for falls. Skin: Negative. Negative for itching. Neurological: Positive for headaches. Negative for sensory change and speech change. Psychiatric/Behavioral: Positive for depression. Physical Exam   Constitutional: She appears well-developed and well-nourished. No distress. Neck: Normal range of motion. Neck supple. No JVD present. No thyromegaly present. Cardiovascular: Normal rate, regular rhythm, normal heart sounds and intact distal pulses. Pulmonary/Chest: Effort normal and breath sounds normal. No respiratory distress. She has no wheezes.    Musculoskeletal: She exhibits tenderness. She exhibits no edema. Right knee:surgical scar healed up. Slight swelling on the knee joint. Range of motion normal.   Skin:   Left lower extremities, approximately 2 by for centimeter size small indurated area noticed on lateral side of the left lower extremities. Possible hematoma. Nontender. Psychiatric: She has a normal mood and affect. Her behavior is normal.   Anxious. ASSESSMENT and PLAN    Diagnoses and all orders for this visit:    1. Primary osteoarthritis of right knee    She recently had a knee replacement by Dr. Doris Daniels. Pain is getting better. Just finished of PT OT via home health. Would like to try equipment in the gym. Refer her for another home health to learn about exercise equipments.  -     [address]    2. Status post right knee replacement    Has improved a lot. Pain has improved also. -     [address]    3. Bipolar 1 disorder ([facility]  On medicine, seen by Dr. Geena Montes. 4. Hematoma    On left lower extremities, reassured. Advised to do heating pad and massage. 5. Chronic tension-type headache, intractable    Seen by Dr. Weston Qunitanilla, on Elavil, stable. Discussed expected course/resolution/complications of diagnosis in detail with patient. Medication risks/benefits/costs/interactions/alternatives discussed with patient. Pt was given an after visit summary which includes diagnoses, current medications & vitals. Pt expressed understanding with the diagnosis and plan.

## 2018-07-10 NOTE — PROGRESS NOTES
Health Maintenance Due   Topic Date Due    PAP AKA CERVICAL CYTOLOGY  04/25/2017    ZOSTER VACCINE AGE 60>  03/19/2018       Chief Complaint   Patient presents with    Bleeding/Bruising     leg left lower discoloration, wants more PT       1. Have you been to the ER, urgent care clinic since your last visit? Hospitalized since your last visit? No    2. Have you seen or consulted any other health care providers outside of the [address] [place] [address] since your last visit? Include any pap smears or colon screening. No    3) Do you have an Advance Directive on file? yes    4) Are you interested in receiving information on Advance Directives? NO      Patient is accompanied by adult caretaker I have received verbal consent from Terrence Rust to discuss any/all medical information while they are present in the room.

## 2018-08-01 RX ORDER — SELENIUM 50 MCG
TABLET ORAL
Qty: 100 CAP | Refills: 0 | Status: SHIPPED | OUTPATIENT
Start: 2018-08-01 | End: 2018-12-26 | Stop reason: SDUPTHER

## 2018-08-21 ENCOUNTER — OFFICE VISIT (OUTPATIENT)
Dept: INTERNAL MEDICINE CLINIC | Age: 58
End: 2018-08-21

## 2018-08-21 VITALS
BODY MASS INDEX: 31.22 KG/M2 | DIASTOLIC BLOOD PRESSURE: 79 MMHG | HEART RATE: 93 BPM | HEIGHT: 60 IN | OXYGEN SATURATION: 95 % | TEMPERATURE: 97.3 F | SYSTOLIC BLOOD PRESSURE: 120 MMHG | RESPIRATION RATE: 20 BRPM | WEIGHT: 159 LBS

## 2018-08-21 DIAGNOSIS — I10 ESSENTIAL HYPERTENSION: Primary | ICD-10-CM

## 2018-08-21 DIAGNOSIS — F31.10 BIPOLAR AFFECTIVE DISORDER, MANIC (HCC): ICD-10-CM

## 2018-08-21 DIAGNOSIS — K59.00 CONSTIPATION, UNSPECIFIED CONSTIPATION TYPE: ICD-10-CM

## 2018-08-21 DIAGNOSIS — M17.11 PRIMARY OSTEOARTHRITIS OF RIGHT KNEE: ICD-10-CM

## 2018-08-21 DIAGNOSIS — G44.221 CHRONIC TENSION-TYPE HEADACHE, INTRACTABLE: ICD-10-CM

## 2018-08-21 RX ORDER — OLANZAPINE 15 MG/1
5 TABLET ORAL
Refills: 2 | COMMUNITY
Start: 2018-07-23 | End: 2020-03-10 | Stop reason: ALTCHOICE

## 2018-08-21 RX ORDER — POLYETHYLENE GLYCOL 3350 17 G/17G
POWDER, FOR SOLUTION ORAL
Qty: 527 G | Refills: 0 | Status: SHIPPED | OUTPATIENT
Start: 2018-08-21 | End: 2018-10-03 | Stop reason: SDUPTHER

## 2018-08-21 RX ORDER — OLANZAPINE 10 MG/1
TABLET ORAL
Refills: 0 | COMMUNITY
Start: 2018-07-23 | End: 2018-11-20 | Stop reason: DRUGHIGH

## 2018-08-21 NOTE — MR AVS SNAPSHOT
[address] [place] [address]. [place] [id number] [phone] Patient: Sonia Barfield MRN: [id number] MEZ:[id number] Visit Information Date & Time Provider Department Dept. Phone Encounter #  
 8/21/2018  8:45 AM Lida Venegas MD [facility] [phone] [id number] Upcoming Health Maintenance Date Due  
 PAP AKA CERVICAL CYTOLOGY 4/25/2017 ZOSTER VACCINE AGE 60> 3/19/2018 Influenza Age 5 to Adult 9/12/2018* BREAST CANCER SCRN MAMMOGRAM 3/28/2019 DTaP/Tdap/Td series (2 - Td) 6/19/2027 COLONOSCOPY 7/5/2027 *Topic was postponed. The date shown is not the original due date. Allergies as of 8/21/2018  Review Complete On: 8/21/2018 By: Lida Venegas MD  
  
 Severity Noted Reaction Type Reactions Other Food  03/20/2018    Other (comments) Oranges, cabbage, beans, peppers, raw onions, foods with caffeine, popcorn, soda because of IBS Buspar [Buspirone] Medium 02/09/2017   Side Effect Other (comments) Causes \"stimulation\" that could exacerbate a manic attack/phase of pt's Biplar. Reported by patient Amlodipine  06/28/2017    Other (comments) Ankle swelling Dicyclomine  10/14/2014    Nausea and Vomiting Extreme stomach pains Lithium  01/03/2014   Systemic Nausea and Vomiting Severe nausea and vomiting. Other Medication  11/19/2010    Other (comments) Intolerance to oranges, cabbage,beans,peppers,raw onions,foods with caffeine in them, popcorn, no pop sodas, because of IBS Current Immunizations  Reviewed on 8/21/2018 Name Date Influenza Vaccine 10/13/2014 Tdap 6/19/2017 Reviewed by Lida Venegas MD on 8/21/2018 at  9:04 AM  
You Were Diagnosed With   
  
 Codes Comments Essential hypertension    -  Primary ICD-10-CM: I10 
ICD-9-CM: 401.9 Bipolar affective disorder, manic ([facility]     ICD-10-CM: F31.10 ICD-9-CM: 296.40   
 Primary osteoarthritis of right knee     ICD-10-CM: M17.11 ICD-9-CM: 715.16 Chronic tension-type headache, intractable     ICD-10-CM: R38.482 ICD-9-CM: 339.12 Vitals BP Pulse Temp Resp Height(growth percentile) Weight(growth percentile) 120/79 (BP 1 Location: Left arm, BP Patient Position: Sitting) 93 97.3 °F (36.3 °C) (Oral) 20 5' (1.524 m) 159 lb (72.1 kg) SpO2 BMI OB Status Smoking Status 95% 31.05 kg/m2 Hysterectomy Never Smoker Vitals History BMI and BSA Data Body Mass Index Body Surface Area 31.05 kg/m 2 1.75 m 2 Preferred Pharmacy Pharmacy Name Phone [id number] [address] [facility] [phone] Your Updated Medication List  
  
   
This list is accurate as of 8/21/18  9:05 AM.  Always use your most recent med list.  
  
  
  
  
 ACIDOPHILUS Cap Generic drug:  Lactobacillus acidophilus TAKE ONE CAPSULE BY MOUTH DAILY  
  
 alendronate 70 mg tablet Commonly known as:  FOSAMAX TAKE 1 TABLET BY MOUTH EVERY SEVEN (7) DAYS. amitriptyline 75 mg tablet Commonly known as:  ELAVIL TAKE 1 TABLET BY MOUTH NIGHTLY. BENADRYL ALLERGY 25 mg tablet Generic drug:  diphenhydrAMINE Take 50 mg by mouth nightly. calcium-cholecalciferol (D3) tablet Commonly known as:  CALTRATE 600+D Take 1 Tab by mouth daily. cetirizine 10 mg tablet Commonly known as:  ZYRTEC Take 10 mg by mouth daily. famotidine 20 mg tablet Commonly known as:  PEPCID Take 1 Tab by mouth two (2) times a day. fenofibrate nanocrystallized 145 mg tablet Commonly known as:  [employer] Take 1 Tab by mouth every fourty-eight (48) hours. gabapentin 100 mg capsule Commonly known as:  NEURONTIN  
TAKE 2 CAPSULES BY MOUTH TWO (2) TIMES A DAY. linaclotide 145 mcg Cap capsule Commonly known as:  Ab[employer] Brew Take 1 Cap by mouth Daily (before breakfast). LORazepam 0.5 mg tablet Commonly known as:  ATIVAN Take 1 Tab by mouth every eight (8) hours as needed for Anxiety. Max Daily Amount: 1.5 mg. D/C valium  
  
 losartan 25 mg tablet Commonly known as:  COZAAR  
TAKE 1 TABLET BY MOUTH DAILY. * OLANZapine 10 mg tablet Commonly known as:  ZyPREXA  
TAKE 1 TABLET BY MOUTH EVERY DAY. * OLANZapine 15 mg tablet Commonly known as:  ZyPREXA  
TAKE ONE TABLET AT BEDTIME  
  
 polyethylene glycol 17 gram/dose powder Commonly known as:  [employer] TAKE 17 GRAMS (1 TABLESPOONFUL) MIXED IN LIQUID BY MOUTH DAILY AS DIRECTED. REFRESH OPTIVE ADVANCED (PF) OP Apply 1 Drop to eye two (2) times daily as needed. SF 5000 PLUS 1.1 % Crea Generic drug:  fluoride (sodium) 1 Tube. VITAMIN D3 1,000 unit Cap Generic drug:  cholecalciferol Take 1 Tab by mouth daily. Indications: VITAMIN D DEFICIENCY  
  
 vitamin E 400 unit capsule Commonly known as:  [address] Take 400 Units by mouth daily. * Notice: This list has 2 medication(s) that are the same as other medications prescribed for you. Read the directions carefully, and ask your doctor or other care provider to review them with you. Introducing [facility]! Dear Chance Hopson: Thank you for requesting a [employer] account. Our records indicate that you already have an active [employer] account. You can access your account anytime at [email or web address] Did you know that you can access your hospital and ER discharge instructions at any time in [employer]? You can also review all of your test results from your hospital stay or ER visit. Additional Information If you have questions, please visit the Frequently Asked Questions section of the [employer] website at [email or web address] Remember, [employer] is NOT to be used for urgent needs. For medical emergencies, dial 911. Now available from your iPhone and Android! Please provide this summary of care documentation to your next provider. Your primary care clinician is listed as Jovany Serrano. If you have any questions after today's visit, please call [phone].

## 2018-08-21 NOTE — PROGRESS NOTES
HISTORY OF PRESENT ILLNESS  Darius Magaña is a 59 y.o. female here for follow-up. She has improved a lot. Report  If she trimmed her toenail well. Sometimes it hurts. Has a small bump on the chest wall, I have explained his folliculitis, no sign of infection. Has anxiety and depression, seeing Dr. Mars Vaca. Stable for now. She had knee replacement done recently, no knee pain right now  Has elevated lipid, on medications. Labs reviewed, all are stable. All labs reviewed, all are stable. Abdominal Pain   Pertinent negatives include no shortness of breath. Bipolar   Pertinent negatives include no shortness of breath. Skin Problem   Pertinent negatives include no shortness of breath. Toe Pain    Pertinent negatives include no itching. Mental Health Problem   Pertinent negatives include no shortness of breath. Review of Systems   Constitutional: Negative. HENT: Negative. Eyes: Negative. Respiratory: Negative. Negative for shortness of breath and wheezing. Cardiovascular: Negative. Gastrointestinal: Negative. Negative for blood in stool. Musculoskeletal: Positive for joint pain. Negative for falls. Skin: Negative. Negative for itching. Psychiatric/Behavioral: Positive for depression. Physical Exam   Constitutional: She appears well-developed and well-nourished. No distress. Neck: Normal range of motion. Neck supple. No JVD present. No thyromegaly present. Cardiovascular: Normal rate, regular rhythm, normal heart sounds and intact distal pulses. Pulmonary/Chest: Effort normal and breath sounds normal. No respiratory distress. She has no wheezes. Musculoskeletal: She exhibits no edema or tenderness. Skin:   Both legs: Petechial lesion present in both lower leg multiple. red spots. No itching no rash. No purpura present. Psychiatric: She has a normal mood and affect. Her behavior is normal.   Anxious.        ASSESSMENT and PLAN    Diagnoses and all orders for this visit:    1. Essential hypertension    Stable, on losartan. CMP normal.    2. Bipolar affective disorder, manic (HCC)    On Zyprexa, seeing Dr. Jackelyn Hernández. Stable. 3. Primary osteoarthritis of right knee    Status post replacement, patient is pain-free. 4. Chronic tension-type headache, intractable    On elavil.stable. Discussed expected course/resolution/complications of diagnosis in detail with patient. Medication risks/benefits/costs/interactions/alternatives discussed with patient. Pt was given an after visit summary which includes diagnoses, current medications & vitals. Pt expressed understanding with the diagnosis and plan.

## 2018-08-21 NOTE — PROGRESS NOTES
Health Maintenance Due   Topic Date Due    PAP AKA CERVICAL CYTOLOGY  04/25/2017    ZOSTER VACCINE AGE 60>  03/19/2018       Chief Complaint   Patient presents with    Irritable Bowel Syndrome     4 month follow up    Bipolar    Osteoarthritis       1. Have you been to the ER, urgent care clinic since your last visit? Hospitalized since your last visit? No    2. Have you seen or consulted any other health care providers outside of the [address] [place] [address] since your last visit? Include any pap smears or colon screening. No    3) Do you have an Advance Directive on file? yes    4) Are you interested in receiving information on Advance Directives? NO      Patient is accompanied by self I have received verbal consent from Arlene Oates to discuss any/all medical information while they are present in the room.

## 2018-08-23 DIAGNOSIS — M81.0 OSTEOPOROSIS, UNSPECIFIED OSTEOPOROSIS TYPE, UNSPECIFIED PATHOLOGICAL FRACTURE PRESENCE: ICD-10-CM

## 2018-08-23 RX ORDER — ALENDRONATE SODIUM 70 MG/1
TABLET ORAL
Qty: 4 TAB | Refills: 11 | Status: SHIPPED | OUTPATIENT
Start: 2018-08-23 | End: 2018-08-24 | Stop reason: SDUPTHER

## 2018-08-24 DIAGNOSIS — M81.0 OSTEOPOROSIS, UNSPECIFIED OSTEOPOROSIS TYPE, UNSPECIFIED PATHOLOGICAL FRACTURE PRESENCE: ICD-10-CM

## 2018-08-27 RX ORDER — ALENDRONATE SODIUM 70 MG/1
TABLET ORAL
Qty: 12 TAB | Refills: 3 | Status: SHIPPED | OUTPATIENT
Start: 2018-08-27 | End: 2019-06-17 | Stop reason: SDUPTHER

## 2018-09-11 DIAGNOSIS — G44.221 CHRONIC TENSION-TYPE HEADACHE, INTRACTABLE: ICD-10-CM

## 2018-09-11 DIAGNOSIS — I10 ESSENTIAL HYPERTENSION: ICD-10-CM

## 2018-09-12 RX ORDER — GABAPENTIN 100 MG/1
CAPSULE ORAL
Qty: 120 CAP | Refills: 0 | Status: SHIPPED | OUTPATIENT
Start: 2018-09-12 | End: 2018-10-03 | Stop reason: SDUPTHER

## 2018-09-12 RX ORDER — LOSARTAN POTASSIUM 25 MG/1
TABLET ORAL
Qty: 30 TAB | Refills: 2 | Status: SHIPPED | OUTPATIENT
Start: 2018-09-12 | End: 2018-12-26 | Stop reason: SDUPTHER

## 2018-09-18 DIAGNOSIS — K21.9 GASTROESOPHAGEAL REFLUX DISEASE WITHOUT ESOPHAGITIS: ICD-10-CM

## 2018-09-18 RX ORDER — FAMOTIDINE 20 MG/1
20 TABLET, FILM COATED ORAL 2 TIMES DAILY
Qty: 180 TAB | Refills: 1 | Status: SHIPPED | OUTPATIENT
Start: 2018-09-18 | End: 2019-05-07 | Stop reason: ALTCHOICE

## 2018-10-03 DIAGNOSIS — K59.00 CONSTIPATION, UNSPECIFIED CONSTIPATION TYPE: ICD-10-CM

## 2018-10-03 DIAGNOSIS — G44.221 CHRONIC TENSION-TYPE HEADACHE, INTRACTABLE: ICD-10-CM

## 2018-10-03 RX ORDER — POLYETHYLENE GLYCOL 3350 17 G/17G
POWDER, FOR SOLUTION ORAL
Qty: 527 G | Refills: 0 | Status: SHIPPED | OUTPATIENT
Start: 2018-10-03 | End: 2018-11-21 | Stop reason: SDUPTHER

## 2018-10-03 RX ORDER — GABAPENTIN 100 MG/1
CAPSULE ORAL
Qty: 120 CAP | Refills: 0 | Status: SHIPPED | OUTPATIENT
Start: 2018-10-03 | End: 2018-11-21 | Stop reason: SDUPTHER

## 2018-11-20 ENCOUNTER — OFFICE VISIT (OUTPATIENT)
Dept: INTERNAL MEDICINE CLINIC | Age: 58
End: 2018-11-20

## 2018-11-20 ENCOUNTER — HOSPITAL ENCOUNTER (OUTPATIENT)
Dept: LAB | Age: 58
Discharge: HOME | End: 2018-11-20
Payer: MEDICARE

## 2018-11-20 VITALS
HEIGHT: 60 IN | WEIGHT: 159.4 LBS | DIASTOLIC BLOOD PRESSURE: 83 MMHG | RESPIRATION RATE: 15 BRPM | OXYGEN SATURATION: 96 % | HEART RATE: 96 BPM | BODY MASS INDEX: 31.29 KG/M2 | SYSTOLIC BLOOD PRESSURE: 130 MMHG

## 2018-11-20 DIAGNOSIS — G44.221 CHRONIC TENSION-TYPE HEADACHE, INTRACTABLE: Primary | ICD-10-CM

## 2018-11-20 DIAGNOSIS — F31.10 BIPOLAR AFFECTIVE DISORDER, MANIC (HCC): ICD-10-CM

## 2018-11-20 DIAGNOSIS — K58.9 IRRITABLE BOWEL SYNDROME WITHOUT DIARRHEA: ICD-10-CM

## 2018-11-20 PROCEDURE — 80053 COMPREHEN METABOLIC PANEL: CPT

## 2018-11-20 PROCEDURE — 85025 COMPLETE CBC W/AUTO DIFF WBC: CPT

## 2018-11-20 NOTE — PROGRESS NOTES
Health Maintenance Due   Topic Date Due    Shingrix Vaccine Age 49> (1 of 2) 05/19/2008    PAP AKA CERVICAL CYTOLOGY  04/25/2017    Influenza Age 5 to Adult  08/01/2018       Chief Complaint   Patient presents with    Hypertension     3 month f/up    Bipolar    Osteoarthritis       1. Have you been to the ER, urgent care clinic since your last visit? Hospitalized since your last visit? No    2. Have you seen or consulted any other health care providers outside of the [address] [place] [address] since your last visit? Include any pap smears or colon screening. No    3) Do you have an Advance Directive on file? yes    4) Are you interested in receiving information on Advance Directives? NO      Patient is accompanied by counselor I have received verbal consent from Jeff Jordan to discuss any/all medical information while they are present in the room.

## 2018-11-20 NOTE — PROGRESS NOTES
HISTORY OF PRESENT ILLNESS  Lucille Hernandez is a 59 y.o. female here to follow-up. Has chronic headache, amitriptyline is helping her a lot. She will continue it for now. Has anxiety and depression, seeing Dr. Edilberto Cristina. Stable for now. Has elevated lipid, on medications. Labs reviewed, all are stable. Has IBS with constipation. Right now her bowel movement is normal.  She has stopped taking Linzess for now. Need lab work. Mental Health Problem   Pertinent negatives include no shortness of breath. Medication Evaluation   Pertinent negatives include no shortness of breath. Hypertension    Pertinent negatives include no shortness of breath. Bipolar   Pertinent negatives include no shortness of breath. Review of Systems   Constitutional: Negative. HENT: Negative. Eyes: Negative. Respiratory: Negative. Negative for shortness of breath and wheezing. Cardiovascular: Negative. Gastrointestinal: Positive for constipation. Negative for blood in stool. Musculoskeletal: Positive for joint pain. Negative for falls. Skin: Negative. Negative for itching. Psychiatric/Behavioral: Positive for depression. Physical Exam   Constitutional: She appears well-developed and well-nourished. No distress. Neck: Normal range of motion. Neck supple. No JVD present. No thyromegaly present. Cardiovascular: Normal rate, regular rhythm, normal heart sounds and intact distal pulses. Pulmonary/Chest: Effort normal and breath sounds normal. No respiratory distress. She has no wheezes. Abdominal: Soft. Bowel sounds are normal. She exhibits no distension. There is no tenderness. Musculoskeletal: She exhibits tenderness. She exhibits no edema. Psychiatric: She has a normal mood and affect. Her behavior is normal.   Anxious. ASSESSMENT and PLAN    Diagnoses and all orders for this visit:    1. Chronic tension-type headache, intractable  Amitriptyline helps her a lot.   Will continue same dosage. 2. Irritable bowel syndrome without diarrhea    Right now she is not constipated. She is not using lenses right now. Advised to have more fiber in the diet. -     CBC WITH AUTOMATED DIFF  -     METABOLIC PANEL, COMPREHENSIVE    3. Bipolar affective disorder, manic ([facility] [address]    Seeing psychiatrist.  On Zyprexa. Doing well. -     CBC WITH AUTOMATED DIFF  -     METABOLIC PANEL, COMPREHENSIVE          Discussed expected course/resolution/complications of diagnosis in detail with patient. Medication risks/benefits/costs/interactions/alternatives discussed with patient. Pt was given an after visit summary which includes diagnoses, current medications & vitals. Pt expressed understanding with the diagnosis and plan.

## 2018-11-21 DIAGNOSIS — K59.00 CONSTIPATION, UNSPECIFIED CONSTIPATION TYPE: ICD-10-CM

## 2018-11-21 DIAGNOSIS — G44.221 CHRONIC TENSION-TYPE HEADACHE, INTRACTABLE: ICD-10-CM

## 2018-11-21 LAB
ALBUMIN SERPL-MCNC: 4.4 G/DL (ref 3.6–4.8)
ALBUMIN/GLOB SERPL: 1.6 {RATIO} (ref 1.2–2.2)
ALP SERPL-CCNC: 78 IU/L (ref 39–117)
ALT SERPL-CCNC: 23 IU/L (ref 0–32)
AST SERPL-CCNC: 18 IU/L (ref 0–40)
BASOPHILS # BLD AUTO: 0 X10E3/UL (ref 0–0.2)
BASOPHILS NFR BLD AUTO: 0 %
BILIRUB SERPL-MCNC: <0.2 MG/DL (ref 0–1.2)
BUN SERPL-MCNC: 17 MG/DL (ref 8–27)
BUN/CREAT SERPL: 22 (ref 12–28)
CALCIUM SERPL-MCNC: 9.3 MG/DL (ref 8.7–10.3)
CHLORIDE SERPL-SCNC: 109 MMOL/L (ref 96–106)
CO2 SERPL-SCNC: 22 MMOL/L (ref 20–29)
CREAT SERPL-MCNC: 0.76 MG/DL (ref 0.57–1)
EOSINOPHIL # BLD AUTO: 0.1 X10E3/UL (ref 0–0.4)
EOSINOPHIL NFR BLD AUTO: 2 %
ERYTHROCYTE [DISTWIDTH] IN BLOOD BY AUTOMATED COUNT: 14.4 % (ref 12.3–15.4)
GLOBULIN SER CALC-MCNC: 2.8 G/DL (ref 1.5–4.5)
GLUCOSE SERPL-MCNC: 88 MG/DL (ref 65–99)
HCT VFR BLD AUTO: 37.8 % (ref 34–46.6)
HGB BLD-MCNC: 12.5 G/DL (ref 11.1–15.9)
IMM GRANULOCYTES # BLD: 0 X10E3/UL (ref 0–0.1)
IMM GRANULOCYTES NFR BLD: 0 %
LYMPHOCYTES # BLD AUTO: 1.4 X10E3/UL (ref 0.7–3.1)
LYMPHOCYTES NFR BLD AUTO: 28 %
MCH RBC QN AUTO: 27.5 PG (ref 26.6–33)
MCHC RBC AUTO-ENTMCNC: 33.1 G/DL (ref 31.5–35.7)
MCV RBC AUTO: 83 FL (ref 79–97)
MONOCYTES # BLD AUTO: 0.3 X10E3/UL (ref 0.1–0.9)
MONOCYTES NFR BLD AUTO: 6 %
NEUTROPHILS # BLD AUTO: 3.4 X10E3/UL (ref 1.4–7)
NEUTROPHILS NFR BLD AUTO: 64 %
PLATELET # BLD AUTO: 318 X10E3/UL (ref 150–379)
POTASSIUM SERPL-SCNC: 3.9 MMOL/L (ref 3.5–5.2)
PROT SERPL-MCNC: 7.2 G/DL (ref 6–8.5)
RBC # BLD AUTO: 4.54 X10E6/UL (ref 3.77–5.28)
SODIUM SERPL-SCNC: 144 MMOL/L (ref 134–144)
WBC # BLD AUTO: 5.2 X10E3/UL (ref 3.4–10.8)

## 2018-11-21 RX ORDER — POLYETHYLENE GLYCOL 3350 17 G/17G
POWDER, FOR SOLUTION ORAL
Qty: 527 G | Refills: 0 | Status: SHIPPED | OUTPATIENT
Start: 2018-11-21 | End: 2018-12-26 | Stop reason: SDUPTHER

## 2018-11-21 RX ORDER — GABAPENTIN 100 MG/1
CAPSULE ORAL
Qty: 120 CAP | Refills: 0 | Status: SHIPPED | OUTPATIENT
Start: 2018-11-21 | End: 2018-12-26 | Stop reason: SDUPTHER

## 2018-12-26 DIAGNOSIS — I10 ESSENTIAL HYPERTENSION: ICD-10-CM

## 2018-12-26 DIAGNOSIS — K59.00 CONSTIPATION, UNSPECIFIED CONSTIPATION TYPE: ICD-10-CM

## 2018-12-26 DIAGNOSIS — G44.221 CHRONIC TENSION-TYPE HEADACHE, INTRACTABLE: ICD-10-CM

## 2018-12-26 RX ORDER — GABAPENTIN 100 MG/1
CAPSULE ORAL
Qty: 120 CAP | Refills: 0 | Status: SHIPPED | OUTPATIENT
Start: 2018-12-26 | End: 2019-01-20 | Stop reason: SDUPTHER

## 2018-12-26 RX ORDER — LOSARTAN POTASSIUM 25 MG/1
TABLET ORAL
Qty: 30 TAB | Refills: 2 | Status: SHIPPED | OUTPATIENT
Start: 2018-12-26 | End: 2019-02-27 | Stop reason: SDUPTHER

## 2018-12-26 RX ORDER — SELENIUM 50 MCG
TABLET ORAL
Qty: 100 CAP | Refills: 0 | Status: SHIPPED | OUTPATIENT
Start: 2018-12-26 | End: 2019-02-27 | Stop reason: SDUPTHER

## 2018-12-26 RX ORDER — POLYETHYLENE GLYCOL 3350 17 G/17G
POWDER, FOR SOLUTION ORAL
Qty: 527 G | Refills: 0 | Status: SHIPPED | OUTPATIENT
Start: 2018-12-26 | End: 2019-04-22 | Stop reason: SDUPTHER

## 2019-02-05 ENCOUNTER — OFFICE VISIT (OUTPATIENT)
Dept: INTERNAL MEDICINE CLINIC | Age: 59
End: 2019-02-05

## 2019-02-05 VITALS
DIASTOLIC BLOOD PRESSURE: 93 MMHG | HEART RATE: 95 BPM | RESPIRATION RATE: 16 BRPM | SYSTOLIC BLOOD PRESSURE: 109 MMHG | BODY MASS INDEX: 32.71 KG/M2 | HEIGHT: 60 IN | WEIGHT: 166.6 LBS | OXYGEN SATURATION: 96 % | TEMPERATURE: 97.8 F

## 2019-02-05 DIAGNOSIS — G44.021 INTRACTABLE CHRONIC CLUSTER HEADACHE: Primary | ICD-10-CM

## 2019-02-05 DIAGNOSIS — G44.221 CHRONIC TENSION-TYPE HEADACHE, INTRACTABLE: ICD-10-CM

## 2019-02-05 DIAGNOSIS — K21.9 GASTROESOPHAGEAL REFLUX DISEASE WITHOUT ESOPHAGITIS: ICD-10-CM

## 2019-02-05 RX ORDER — GABAPENTIN 100 MG/1
CAPSULE ORAL
Qty: 120 CAP | Refills: 5 | Status: SHIPPED | OUTPATIENT
Start: 2019-02-05 | End: 2019-02-19 | Stop reason: SDUPTHER

## 2019-02-05 RX ORDER — PHENOL/SODIUM PHENOLATE
20 AEROSOL, SPRAY (ML) MUCOUS MEMBRANE DAILY
Qty: 30 TAB | Refills: 1 | Status: SHIPPED | OUTPATIENT
Start: 2019-02-05 | End: 2019-03-01

## 2019-02-05 NOTE — PROGRESS NOTES
Health Maintenance Due Topic Date Due  Shingrix Vaccine Age 50> (1 of 2) 05/19/2008  PAP AKA CERVICAL CYTOLOGY  04/25/2017  BREAST CANCER SCRN MAMMOGRAM  03/28/2019 Chief Complaint Patient presents with  Medication Evaluation Review medications 1. Have you been to the ER, urgent care clinic since your last visit? Hospitalized since your last visit? No 
 
2. Have you seen or consulted any other health care providers outside of the [address] [place] [address] since your last visit? Include any pap smears or colon screening. No 
 
3) Do you have an Advance Directive on file? no 
 
4) Are you interested in receiving information on Advance Directives? NO Patient is accompanied by self I have received verbal consent from Fernando Lane to discuss any/all medical information while they are present in the room.

## 2019-02-05 NOTE — PROGRESS NOTES
HISTORY OF PRESENT ILLNESS Mari Rose is a 59 y.o. female here to follow-up. Report heartburn and acidity. She had guard, had endoscopy done years back. Using Pepcid, not helping her a lot. Has chronic headache, amitriptyline is helping her a lot. She is also taking gabapentin 2 tablets twice a day. Pharmacy is not going to cover 120 gabapentin a month. Gabapentin and amitriptyline is keeping her away from hospitals. Headache seems under control. Will do a preauthorization of gabapentin. Has anxiety and depression, seeing Dr. Kirke Gowers. Stable for now. Has elevated lipid, on medications. Labs reviewed, all are stable. Bipolar Associated symptoms include headaches. Pertinent negatives include no shortness of breath. Mental Health Problem Associated symptoms include headaches. Pertinent negatives include no shortness of breath. Medication Evaluation Associated symptoms include headaches. Pertinent negatives include no shortness of breath. Heartburn Associated symptoms include headaches. Review of Systems Constitutional: Negative. HENT: Negative. Eyes: Negative. Respiratory: Negative. Negative for shortness of breath and wheezing. Cardiovascular: Negative. Gastrointestinal: Positive for heartburn. Negative for blood in stool. Musculoskeletal: Negative. Negative for falls. Skin: Negative. Negative for itching. Neurological: Positive for headaches. Psychiatric/Behavioral: Positive for depression. Physical Exam  
Constitutional: She appears well-developed and well-nourished. No distress. Neck: Normal range of motion. Neck supple. No JVD present. No thyromegaly present. Cardiovascular: Normal rate, regular rhythm, normal heart sounds and intact distal pulses. Pulmonary/Chest: Effort normal and breath sounds normal. No respiratory distress. She has no wheezes. Abdominal: Soft. Bowel sounds are normal. She exhibits no distension. There is no tenderness. Nontender epigastrium. Musculoskeletal: She exhibits no edema or tenderness. Psychiatric: She has a normal mood and affect. Her behavior is normal.  
Anxious. ASSESSMENT and PLAN Diagnoses and all orders for this visit: 
 
1. Intractable chronic cluster headache She is using amitriptyline every night 75 mg tablets along with gabapentin 4 tablets a day. Which is controlling her headache for long time. Her pharmacy and insurance company is giving her hard time not to give 120 gabapentin which she need every day. Will call in, 
-     gabapentin (NEURONTIN) 100 mg capsule; TAKE 2 CAPSULES BY MOUTH TWO (2) TIMES A DAY. Will do preauthorization if needed. 2. Chronic tension-type headache, intractable 
 
 
-     gabapentin (NEURONTIN) 100 mg capsule; TAKE 2 CAPSULES BY MOUTH TWO (2) TIMES A DAY. 3. Gastroesophageal reflux disease without esophagitis Avoid spicy food. will cont pepcid. will add, 
-     Omeprazole delayed release (PRILOSEC D/R) 20 mg tablet; Take 1 Tab by mouth daily. Discussed expected course/resolution/complications of diagnosis in detail with patient. Medication risks/benefits/costs/interactions/alternatives discussed with patient. Pt was given an after visit summary which includes diagnoses, current medications & vitals. Pt expressed understanding with the diagnosis and plan.

## 2019-02-19 ENCOUNTER — OFFICE VISIT (OUTPATIENT)
Dept: INTERNAL MEDICINE CLINIC | Age: 59
End: 2019-02-19

## 2019-02-19 VITALS
OXYGEN SATURATION: 96 % | RESPIRATION RATE: 16 BRPM | TEMPERATURE: 96.9 F | HEIGHT: 60 IN | WEIGHT: 170.2 LBS | HEART RATE: 100 BPM | SYSTOLIC BLOOD PRESSURE: 140 MMHG | BODY MASS INDEX: 33.41 KG/M2 | DIASTOLIC BLOOD PRESSURE: 92 MMHG

## 2019-02-19 DIAGNOSIS — K21.9 GASTROESOPHAGEAL REFLUX DISEASE WITHOUT ESOPHAGITIS: Primary | ICD-10-CM

## 2019-02-19 DIAGNOSIS — G44.221 CHRONIC TENSION-TYPE HEADACHE, INTRACTABLE: ICD-10-CM

## 2019-02-19 DIAGNOSIS — G44.021 INTRACTABLE CHRONIC CLUSTER HEADACHE: ICD-10-CM

## 2019-02-19 DIAGNOSIS — F31.10 BIPOLAR AFFECTIVE DISORDER, MANIC (HCC): ICD-10-CM

## 2019-02-19 DIAGNOSIS — K58.9 IRRITABLE BOWEL SYNDROME WITHOUT DIARRHEA: ICD-10-CM

## 2019-02-19 RX ORDER — GABAPENTIN 100 MG/1
CAPSULE ORAL
Qty: 120 CAP | Refills: 5 | Status: SHIPPED | OUTPATIENT
Start: 2019-02-19 | End: 2019-04-01 | Stop reason: SDUPTHER

## 2019-02-19 NOTE — PROGRESS NOTES
HISTORY OF PRESENT ILLNESS  Rosamaria Jarvis is a 59 y.o. female here to follow-up. She is having heartburn a lot. She has taken omeprazole 20 mg in the morning, caused her more heartburn. She stopped taking it. Will continue Pepcid. Watching her diet. She did not see her GI for long time. Has chronic headache, amitriptyline is helping her a lot. She will continue it for now. Has anxiety and depression, seeing Dr. Cassie Millan. Stable for now. Has IBS with constipation. Right now her bowel movement is normal.  She has stopped taking Linzess for now. Hypertension    Pertinent negatives include no shortness of breath. Bipolar   Pertinent negatives include no shortness of breath. Mental Health Problem   Pertinent negatives include no shortness of breath. Medication Evaluation   Pertinent negatives include no shortness of breath. GERD   Pertinent negatives include no shortness of breath. Review of Systems   Constitutional: Negative. HENT: Negative. Eyes: Negative. Respiratory: Negative. Negative for shortness of breath and wheezing. Cardiovascular: Negative. Gastrointestinal: Positive for heartburn. Negative for blood in stool. Musculoskeletal: Positive for joint pain. Negative for falls. Skin: Negative. Negative for itching. Psychiatric/Behavioral: Positive for depression. Physical Exam   Constitutional: She is oriented to person, place, and time. She appears well-developed and well-nourished. No distress. Neck: Normal range of motion. Neck supple. No JVD present. No thyromegaly present. Cardiovascular: Normal rate, regular rhythm, normal heart sounds and intact distal pulses. Pulmonary/Chest: Effort normal and breath sounds normal. No respiratory distress. She has no wheezes. Abdominal: Soft. Bowel sounds are normal. She exhibits no distension. There is no tenderness. Musculoskeletal: She exhibits no edema.    Neurological: She is alert and oriented to person, place, and time. She has normal reflexes. Psychiatric: She has a normal mood and affect. Her behavior is normal.   Anxious. ASSESSMENT and PLAN    Diagnoses and all orders for this visit:    1. Gastroesophageal reflux disease without esophagitis    She is not willing to take any more omeprazole, giving her more heartburn. She would like to continue Pepcid 20 mg twice a day. Advised her to eat 2-3 hours before to go to bed and used to pillow while sleeping. She needs to see her GI specialist.  Will refer,  -     REFERRAL TO GASTROENTEROLOGY    2. Irritable bowel syndrome without diarrhea    Will refer,  -     REFERRAL TO GASTROENTEROLOGY    3. Bipolar affective disorder, manic ([facility] [address]  Stable with multiple medications. Seeing her psychiatrist Dr. Amy Langford. 4. Chronic tension-type headache, intractable    Patient need 4 capsules a day in order to control her headache. Will call [facility].  -     gabapentin (NEURONTIN) 100 mg capsule; TAKE 2 CAPSULES BY MOUTH TWO (2) TIMES A DAY. pt require 4 capsules a day    5. Intractable chronic cluster headache  -     gabapentin (NEURONTIN) 100 mg capsule; TAKE 2 CAPSULES BY MOUTH TWO (2) TIMES A DAY. pt require 4 capsules a day          Discussed expected course/resolution/complications of diagnosis in detail with patient. Medication risks/benefits/costs/interactions/alternatives discussed with patient. Pt was given an after visit summary which includes diagnoses, current medications & vitals. Pt expressed understanding with the diagnosis and plan.

## 2019-02-19 NOTE — PROGRESS NOTES
Health Maintenance Due   Topic Date Due    Shingrix Vaccine Age 49> (1 of 2) 05/19/2008    PAP AKA CERVICAL CYTOLOGY  04/25/2017    BREAST CANCER SCRN MAMMOGRAM  03/28/2019       Chief Complaint   Patient presents with    Medication Evaluation     Had an adverse reaction to the Prilosec; gabapentin dosage may change due to insurance    GERD       1. Have you been to the ER, urgent care clinic since your last visit? Hospitalized since your last visit? No    2. Have you seen or consulted any other health care providers outside of the [address] [place] [address] since your last visit? Include any pap smears or colon screening. No    3) Do you have an Advance Directive on file? yes    4) Are you interested in receiving information on Advance Directives? NO      Patient is accompanied by self I have received verbal consent from Orestes Connors to discuss any/all medical information while they are present in the room.

## 2019-02-21 RX ORDER — LOPERAMIDE HCL 2 MG
2 TABLET ORAL
Qty: 120 TAB | Refills: 0 | Status: SHIPPED | OUTPATIENT
Start: 2019-02-21 | End: 2019-03-01 | Stop reason: SDUPTHER

## 2019-02-21 NOTE — TELEPHONE ENCOUNTER
Called the pt who is requesting a refill on her prescription Imodium 2 mg QID. Advised that a script request would be sent to Dr. See Villalba and the pt would be called with any questions, concerns, or other issues. The pt voiced full understanding.

## 2019-02-27 DIAGNOSIS — I10 ESSENTIAL HYPERTENSION: ICD-10-CM

## 2019-02-27 RX ORDER — LOSARTAN POTASSIUM 25 MG/1
TABLET ORAL
Qty: 30 TAB | Refills: 2 | Status: SHIPPED | OUTPATIENT
Start: 2019-02-27 | End: 2019-06-17 | Stop reason: SDUPTHER

## 2019-03-01 ENCOUNTER — HOSPITAL ENCOUNTER (OUTPATIENT)
Dept: LAB | Age: 59
Discharge: HOME | End: 2019-03-01
Payer: MEDICARE

## 2019-03-01 ENCOUNTER — OFFICE VISIT (OUTPATIENT)
Dept: INTERNAL MEDICINE CLINIC | Age: 59
End: 2019-03-01

## 2019-03-01 VITALS
BODY MASS INDEX: 33.18 KG/M2 | TEMPERATURE: 96.8 F | WEIGHT: 169 LBS | SYSTOLIC BLOOD PRESSURE: 118 MMHG | DIASTOLIC BLOOD PRESSURE: 78 MMHG | OXYGEN SATURATION: 95 % | HEART RATE: 85 BPM | HEIGHT: 60 IN | RESPIRATION RATE: 15 BRPM

## 2019-03-01 DIAGNOSIS — R30.0 DYSURIA: Primary | ICD-10-CM

## 2019-03-01 DIAGNOSIS — R31.9 HEMATURIA, UNSPECIFIED TYPE: ICD-10-CM

## 2019-03-01 LAB
BILIRUB UR QL STRIP: NEGATIVE
GLUCOSE UR-MCNC: NEGATIVE MG/DL
KETONES P FAST UR STRIP-MCNC: NEGATIVE MG/DL
PH UR STRIP: 8 [PH] (ref 4.6–8)
PROT UR QL STRIP: NEGATIVE
SP GR UR STRIP: 1 (ref 1–1.03)
UA UROBILINOGEN AMB POC: NORMAL (ref 0.2–1)
URINALYSIS CLARITY POC: NORMAL
URINALYSIS COLOR POC: NORMAL
URINE BLOOD POC: NEGATIVE
URINE LEUKOCYTES POC: NEGATIVE
URINE NITRITES POC: NEGATIVE

## 2019-03-01 PROCEDURE — 84443 ASSAY THYROID STIM HORMONE: CPT

## 2019-03-01 PROCEDURE — 87086 URINE CULTURE/COLONY COUNT: CPT

## 2019-03-01 PROCEDURE — 80061 LIPID PANEL: CPT

## 2019-03-01 PROCEDURE — 80053 COMPREHEN METABOLIC PANEL: CPT

## 2019-03-01 PROCEDURE — 85027 COMPLETE CBC AUTOMATED: CPT

## 2019-03-01 RX ORDER — LOPERAMIDE HCL 2 MG
2 TABLET ORAL
Qty: 120 TAB | Refills: 0 | Status: SHIPPED | OUTPATIENT
Start: 2019-03-01 | End: 2019-04-01

## 2019-03-01 NOTE — PROGRESS NOTES
Health Maintenance Due   Topic Date Due    Shingrix Vaccine Age 49> (1 of 2) 05/19/2008    PAP AKA CERVICAL CYTOLOGY  04/25/2017    BREAST CANCER SCRN MAMMOGRAM  03/28/2019       No chief complaint on file. 1. Have you been to the ER, urgent care clinic since your last visit? Hospitalized since your last visit? No    2. Have you seen or consulted any other health care providers outside of the [address] [place] [address] since your last visit? Include any pap smears or colon screening. No    3) Do you have an Advance Directive on file? yes    4) Are you interested in receiving information on Advance Directives? NO      Patient is accompanied by  I have received verbal consent from Ursula Lombard to discuss any/all medical information while they are present in the room.   Results for orders placed or performed in visit on 03/01/19   AMB POC URINALYSIS DIP STICK MANUAL W/O MICRO   Result Value Ref Range    Color (UA POC) Light Yellow     Clarity (UA POC) Cloudy     Glucose (UA POC) Negative Negative    Bilirubin (UA POC) Negative Negative    Ketones (UA POC) Negative Negative    Specific gravity (UA POC) 1.005 1.001 - 1.035    Blood (UA POC) Negative Negative    pH (UA POC) 8.0 4.6 - 8.0    Protein (UA POC) Negative Negative    Urobilinogen (UA POC) 0.2 mg/dL 0.2 - 1    Nitrites (UA POC) Negative Negative    Leukocyte esterase (UA POC) Negative Negative

## 2019-03-01 NOTE — PROGRESS NOTES
HISTORY OF PRESENT ILLNESS  Prachi Son is a 59 y.o. female. This is a patient of Dr. Mikel Puente who presents today with complaints of urinary problem. The patient states yesterday she noted that her urine was brown in color. She also had dysuria and lower abdominal pain. No flank pain. No fever or chills. She has been drinking plenty of water, and she states symptoms have resolved this morning. Visit Vitals  /78 (BP 1 Location: Right arm, BP Patient Position: Sitting)   Pulse 85   Temp 96.8 °F (36 °C) (Oral)   Resp 15   Ht 5' (1.524 m)   Wt 169 lb (76.7 kg)   SpO2 95%   BMI 33.01 kg/m²     HPI    Review of Systems   Constitutional: Negative for chills and fever. HENT: Negative for congestion. Respiratory: Negative for cough and shortness of breath. Cardiovascular: Negative for chest pain and leg swelling. Gastrointestinal: Positive for abdominal pain. Negative for nausea and vomiting. Genitourinary: Positive for dysuria and hematuria. Negative for flank pain. Musculoskeletal: Negative. Neurological: Negative. Psychiatric/Behavioral: Negative. Physical Exam   Constitutional: She is oriented to person, place, and time. She appears well-developed and well-nourished. No distress. HENT:   Head: Normocephalic and atraumatic. Neck: Neck supple. Cardiovascular: Normal rate and regular rhythm. Pulmonary/Chest: Effort normal and breath sounds normal. No respiratory distress. She has no wheezes. She has no rales. Abdominal: Soft. Bowel sounds are normal. She exhibits no distension. There is no tenderness. Musculoskeletal: She exhibits no edema. Neurological: She is alert and oriented to person, place, and time. Skin: Skin is warm and dry. Psychiatric: She has a normal mood and affect. Her behavior is normal.       ASSESSMENT and PLAN  Encounter Diagnoses   Name Primary?     Dysuria Yes    Hematuria, unspecified type      In office,   AMB POC URINALYSIS DIP STICK MANUAL W/O MICRO- WNL     Will order   CULTURE, URINE     Will refill, per request:   loperamide (IMMODIUM) 2 mg tablet     Sig: Take 1 Tab by mouth four (4) times daily as needed for Diarrhea for up to 30 days. Dispense:  120 Tab     Refill:  0     Reviewed medications and side effects in detail    Patient encouraged to call or return to office if symptoms do not improve or worsen. Reviewed plan of care with patient who acknowledges understanding and agrees.

## 2019-03-02 LAB — BACTERIA UR CULT: NORMAL

## 2019-03-22 NOTE — TELEPHONE ENCOUNTER
----- Message from Genesis Moon sent at 3/22/2019 11:34 AM EDT -----  Regarding: Lexx Dominguez MD/ telephone  [facility] from [address] [facility] is requesting a refill for acidophils capsules.  [facility] [phone] fax [phone]

## 2019-04-01 ENCOUNTER — OFFICE VISIT (OUTPATIENT)
Dept: INTERNAL MEDICINE CLINIC | Age: 59
End: 2019-04-01

## 2019-04-01 VITALS
HEART RATE: 88 BPM | OXYGEN SATURATION: 97 % | WEIGHT: 167.2 LBS | DIASTOLIC BLOOD PRESSURE: 88 MMHG | SYSTOLIC BLOOD PRESSURE: 143 MMHG | HEIGHT: 60 IN | BODY MASS INDEX: 32.83 KG/M2 | TEMPERATURE: 96.4 F | RESPIRATION RATE: 20 BRPM

## 2019-04-01 DIAGNOSIS — G44.021 INTRACTABLE CHRONIC CLUSTER HEADACHE: ICD-10-CM

## 2019-04-01 DIAGNOSIS — G44.221 CHRONIC TENSION-TYPE HEADACHE, INTRACTABLE: ICD-10-CM

## 2019-04-01 RX ORDER — GABAPENTIN 100 MG/1
CAPSULE ORAL
Qty: 90 CAP | Refills: 5 | Status: SHIPPED | OUTPATIENT
Start: 2019-04-01 | End: 2019-07-17 | Stop reason: SDUPTHER

## 2019-04-01 NOTE — PROGRESS NOTES
Health Maintenance Due   Topic Date Due    Shingrix Vaccine Age 49> (1 of 2) 05/19/2008    PAP AKA CERVICAL CYTOLOGY  04/25/2017    BREAST CANCER SCRN MAMMOGRAM  03/28/2019       Chief Complaint   Patient presents with    Medication Refill    Medication Evaluation       1. Have you been to the ER, urgent care clinic since your last visit? Hospitalized since your last visit? No    2. Have you seen or consulted any other health care providers outside of the [address] [place] [address] since your last visit? Include any pap smears or colon screening. No    3) Do you have an Advance Directive on file? yes    4) Are you interested in receiving information on Advance Directives? NO      Patient is accompanied by self I have received verbal consent from Uma Mcintyre to discuss any/all medical information while they are present in the room.

## 2019-04-15 ENCOUNTER — OFFICE VISIT (OUTPATIENT)
Dept: INTERNAL MEDICINE CLINIC | Age: 59
End: 2019-04-15

## 2019-04-15 VITALS
WEIGHT: 168 LBS | BODY MASS INDEX: 32.98 KG/M2 | DIASTOLIC BLOOD PRESSURE: 81 MMHG | RESPIRATION RATE: 20 BRPM | SYSTOLIC BLOOD PRESSURE: 120 MMHG | OXYGEN SATURATION: 97 % | HEART RATE: 84 BPM | TEMPERATURE: 97.2 F | HEIGHT: 60 IN

## 2019-04-15 DIAGNOSIS — K58.0 IRRITABLE BOWEL SYNDROME WITH DIARRHEA: ICD-10-CM

## 2019-04-15 DIAGNOSIS — M79.89 LEFT LEG SWELLING: Primary | ICD-10-CM

## 2019-04-15 NOTE — PROGRESS NOTES
HISTORY OF PRESENT ILLNESS  Uli Fan is a 59 y.o. female. This is a patient of Dr. Lore Blel who presents today related to leg swelling. Patient states she woke up Friday morning with swelling to her left lower leg and foot. She denies injury. No pain or erythema. She denies recent activity change or increased salt in diet. Swelling has improved some since Friday. She has a history of IBS. She states she currently has had increase in loose stools. She took Lomotil last week with some improvement. No nausea or vomiting. Visit Vitals  /81 (BP 1 Location: Right arm, BP Patient Position: Sitting)   Pulse 84   Temp 97.2 °F (36.2 °C) (Oral)   Resp 20   Ht 5' (1.524 m)   Wt 168 lb (76.2 kg)   SpO2 97%   BMI 32.81 kg/m²       HPI    Review of Systems   Constitutional: Negative for chills and fever. HENT: Negative for congestion. Respiratory: Negative for cough and shortness of breath. Cardiovascular: Positive for leg swelling. Negative for chest pain. Gastrointestinal: Positive for diarrhea. Negative for blood in stool, constipation, nausea and vomiting. Genitourinary: Negative. Musculoskeletal: Negative. Neurological: Negative for headaches. Psychiatric/Behavioral: Negative. Physical Exam   Constitutional: She is oriented to person, place, and time. She appears well-developed and well-nourished. No distress. HENT:   Head: Normocephalic and atraumatic. Cardiovascular: Normal rate and regular rhythm. Pulmonary/Chest: Effort normal and breath sounds normal. No respiratory distress. She has no wheezes. She has no rales. Abdominal: Soft. Bowel sounds are normal. She exhibits no distension. There is no tenderness. Musculoskeletal: She exhibits edema. Trace-1+ edema left lower extremity   Neurological: She is alert and oriented to person, place, and time. Skin: Skin is warm and dry. Psychiatric: She has a normal mood and affect.  Her behavior is normal.       ASSESSMENT and PLAN  Encounter Diagnoses   Name Primary?  Left leg swelling Yes    Irritable bowel syndrome with diarrhea      Will order left leg venous doppler. Recommend bland diet as tolerated when experiencing loose stools. Recommend fiber supplement. Consider gastroenterology follow-up. Lab results and schedule of future lab studies reviewed with patient  Reviewed medications and side effects in detail    Patient encouraged to call or return to office if symptoms do not improve or worsen. Reviewed plan of care with patient who acknowledges understanding and agrees.

## 2019-04-15 NOTE — PROGRESS NOTES
Health Maintenance Due   Topic Date Due    Shingrix Vaccine Age 49> (1 of 2) 05/19/2008    PAP AKA CERVICAL CYTOLOGY  04/25/2017    BREAST CANCER SCRN MAMMOGRAM  03/28/2019       Chief Complaint   Patient presents with    Leg Swelling     left leg, started on friday but has improved    Irritable Bowel Syndrome     states its \"acting up\", states everything she eats shes in bathroom       1. Have you been to the ER, urgent care clinic since your last visit? Hospitalized since your last visit? No    2. Have you seen or consulted any other health care providers outside of the [address] [place] [address] since your last visit? Include any pap smears or colon screening. No    3) Do you have an Advance Directive on file? no    4) Are you interested in receiving information on Advance Directives? NO      Patient is accompanied by self I have received verbal consent from Jayce Caballero to discuss any/all medical information while they are present in the room.

## 2019-04-17 ENCOUNTER — TELEPHONE (OUTPATIENT)
Dept: INTERNAL MEDICINE CLINIC | Age: 59
End: 2019-04-17

## 2019-04-17 NOTE — TELEPHONE ENCOUNTER
Call placed to patient and made aware that her LLE venous doppler was negative for DVT, pt voiced understanding

## 2019-04-22 ENCOUNTER — TELEPHONE (OUTPATIENT)
Dept: INTERNAL MEDICINE CLINIC | Age: 59
End: 2019-04-22

## 2019-04-22 DIAGNOSIS — K59.00 CONSTIPATION, UNSPECIFIED CONSTIPATION TYPE: ICD-10-CM

## 2019-04-22 RX ORDER — POLYETHYLENE GLYCOL 3350 17 G/17G
POWDER, FOR SOLUTION ORAL
Qty: 527 G | Refills: 0 | Status: SHIPPED | OUTPATIENT
Start: 2019-04-22 | End: 2019-05-14 | Stop reason: SDUPTHER

## 2019-04-22 NOTE — TELEPHONE ENCOUNTER
Called and verified pt with name and . Informed pt we received her venous doppler results and they were negative for DVT. Pt stated she was made aware by [facility] but is glad to know we received a copy of the report. Pt had no further questions and was encouraged to call with any further questions or concerns.

## 2019-05-07 ENCOUNTER — OFFICE VISIT (OUTPATIENT)
Dept: INTERNAL MEDICINE CLINIC | Age: 59
End: 2019-05-07

## 2019-05-07 VITALS
OXYGEN SATURATION: 98 % | BODY MASS INDEX: 32.59 KG/M2 | HEIGHT: 60 IN | SYSTOLIC BLOOD PRESSURE: 123 MMHG | WEIGHT: 166 LBS | RESPIRATION RATE: 15 BRPM | DIASTOLIC BLOOD PRESSURE: 84 MMHG | HEART RATE: 96 BPM | TEMPERATURE: 97.2 F

## 2019-05-07 DIAGNOSIS — Z00.00 MEDICARE ANNUAL WELLNESS VISIT, SUBSEQUENT: ICD-10-CM

## 2019-05-07 DIAGNOSIS — F31.10 BIPOLAR AFFECTIVE DISORDER, MANIC (HCC): Primary | ICD-10-CM

## 2019-05-07 DIAGNOSIS — K58.9 IRRITABLE BOWEL SYNDROME, UNSPECIFIED TYPE: ICD-10-CM

## 2019-05-07 DIAGNOSIS — M81.0 OSTEOPOROSIS, UNSPECIFIED OSTEOPOROSIS TYPE, UNSPECIFIED PATHOLOGICAL FRACTURE PRESENCE: ICD-10-CM

## 2019-05-07 DIAGNOSIS — R10.13 DYSPEPSIA: ICD-10-CM

## 2019-05-07 RX ORDER — RANITIDINE 150 MG/1
150 TABLET, FILM COATED ORAL 2 TIMES DAILY
Qty: 60 TAB | Refills: 5 | Status: SHIPPED | OUTPATIENT
Start: 2019-05-07 | End: 2019-10-01 | Stop reason: ALTCHOICE

## 2019-05-07 NOTE — PATIENT INSTRUCTIONS
Medicare Wellness Visit, Female The best way to live healthy is to have a lifestyle where you eat a well-balanced diet, exercise regularly, limit alcohol use, and quit all forms of tobacco/nicotine, if applicable. Regular preventive services are another way to keep healthy. Preventive services (vaccines, screening tests, monitoring & exams) can help personalize your care plan, which helps you manage your own care. Screening tests can find health problems at the earliest stages, when they are easiest to treat. [facility] follows the current, evidence-based guidelines published by the [facility] ([facility]) when recommending preventive services for our patients. Because we follow these guidelines, sometimes recommendations change over time as research supports it. (For example, mammograms used to be recommended annually. Even though Medicare will still pay for an annual mammogram, the newer guidelines recommend a mammogram every two years for women of average risk.) Of course, you and your doctor may decide to screen more often for some diseases, based on your risk and your health status. Preventive services for you include: - Medicare offers their members a free annual wellness visit, which is time for you and your primary care provider to discuss and plan for your preventive service needs. Take advantage of this benefit every year! 
-All adults over the age of 72 should receive the recommended pneumonia vaccines. Current USPSTF guidelines recommend a series of two vaccines for the best pneumonia protection.  
-All adults should have a flu vaccine yearly and a tetanus vaccine every 10 years. All adults age 61 and older should receive a shingles vaccine once in their lifetime.   
-A bone mass density test is recommended when a woman turns 65 to screen for osteoporosis. This test is only recommended one time, as a screening. Some providers will use this same test as a disease monitoring tool if you already have osteoporosis. -All adults age 38-68 who are overweight should have a diabetes screening test once every three years.  
-Other screening tests and preventive services for persons with diabetes include: an eye exam to screen for diabetic retinopathy, a kidney function test, a foot exam, and stricter control over your cholesterol.  
-Cardiovascular screening for adults with routine risk involves an electrocardiogram (ECG) at intervals determined by your doctor.  
-Colorectal cancer screenings should be done for adults age 54-65 with no increased risk factors for colorectal cancer. There are a number of acceptable methods of screening for this type of cancer. Each test has its own benefits and drawbacks. Discuss with your doctor what is most appropriate for you during your annual wellness visit. The different tests include: colonoscopy (considered the best screening method), a fecal occult blood test, a fecal DNA test, and sigmoidoscopy. -Breast cancer screenings are recommended every other year for women of normal risk, age 54-69. 
-Cervical cancer screenings for women over age 72 are only recommended with certain risk factors.  
-All adults born between [facility] should be screened once for Hepatitis C. Here is a list of your current Health Maintenance items (your personalized list of preventive services) with a due date: 
Health Maintenance Due Topic Date Due  Shingles Vaccine (1 of 2) 05/19/2008  Pap Test  04/25/2017  Mammogram  03/28/2019

## 2019-05-07 NOTE — PROGRESS NOTES
This is the Subsequent Medicare Annual Wellness Exam, performed 12 months or more after the Initial AWV or the last Subsequent AWV I have reviewed the patient's medical history in detail and updated the computerized patient record. History Past Medical History:  
Diagnosis Date  Arthritis  Bipolar disorder ([address]  Bladder pain  Choledocholithiasis 11/24/2010  GERD (gastroesophageal reflux disease)  Headache(784.0)   
 tension headaches  High cholesterol  History of cholecystectomy  Hypertension  Irritable bowel  Pelvic pain in female 2014  Psychiatric disorder  Stool color black   
 irritable bowel Past Surgical History:  
Procedure Laterality Date  COLONOSCOPY N/A 7/5/2017 COLONOSCOPY performed by Ludmila Moon MD at [address]  HX GI  2003, 2017  
 prolasped rectum  HX LAP CHOLECYSTECTOMY  11/23/10  
 HX ORTHOPAEDIC    
 HX KALE[place] AND BSO  1998 [address] Current Outpatient Medications Medication Sig Dispense Refill  polyethylene glycol (MIRALAX) 17 gram/dose powder TAKE 17 GRAMS (1 TABLESPOONFUL) MIXED IN LIQUID BY MOUTH DAILY AS DIRECTED. 527 g 0  
 gabapentin (NEURONTIN) 100 mg capsule TAKE 2 CAPSULES BY MOUTH IN THE MORNING AND 1 CAP BY MOUTH IN THE EVENING 90 Cap 5  
 Lactobacillus acidophilus (ACIDOPHILUS) cap TAKE ONE CAPSULE BY MOUTH DAILY 100 Cap 0  
 losartan (COZAAR) 25 mg tablet TAKE 1 TABLET BY MOUTH DAILY. 30 Tab 2  
 brimonidine (LUMIFY) 0.025 % drop Apply  to eye.  carboxymethylcellulose sodium (THERATEARS) 0.25 % drop ophthalmic solution Administer 1 Drop to both eyes.  famotidine (PEPCID) 20 mg tablet Take 1 Tab by mouth two (2) times a day. 180 Tab 1  
 alendronate (FOSAMAX) 70 mg tablet TAKE 1 TABLET BY MOUTH EVERY SEVEN (7) DAYS.  12 Tab 3  
 OLANZapine (ZYPREXA) 15 mg tablet TAKE ONE TABLET AT BEDTIME  2  
  amitriptyline (ELAVIL) 75 mg tablet TAKE 1 TABLET BY MOUTH NIGHTLY. 90 Tab 3  
 fenofibrate nanocrystallized (TRICOR) 145 mg tablet Take 1 Tab by mouth every fourty-eight (48) hours. 30 Tab 5  
 linaclotide (LINZESS) 145 mcg cap capsule Take 1 Cap by mouth Daily (before breakfast). 30 Cap 3  cetirizine (ZYRTEC) 10 mg tablet Take 10 mg by mouth daily.  diphenhydrAMINE (BENADRYL ALLERGY) 25 mg tablet Take 50 mg by mouth nightly.  LORazepam (ATIVAN) 0.5 mg tablet Take 1 Tab by mouth every eight (8) hours as needed for Anxiety. Max Daily Amount: 1.5 mg. D/C valium (Patient taking differently: Take 0.5 mg by mouth three (3) times daily as needed. D/C valium) 90 Tab 2  
 calcium-cholecalciferol, D3, (CALTRATE 600+D) tablet Take 1 Tab by mouth daily. 30 Tab 12  
 SF 5000 PLUS 1.1 % crea 1 Tube. 4  
 vitamin E (AQUA GEMS) 400 unit capsule Take 400 Units by mouth daily.  Cholecalciferol, Vitamin D3, (VITAMIN D3) 1,000 unit cap Take 1 Tab by mouth daily. Indications: VITAMIN D DEFICIENCY Allergies Allergen Reactions  Other Food Other (comments) Oranges, cabbage, beans, peppers, raw onions, foods with caffeine, popcorn, soda because of IBS  Buspar [Buspirone] Other (comments) Causes \"stimulation\" that could exacerbate a manic attack/phase of pt's Biplar. Reported by patient  Amlodipine Other (comments) Ankle swelling  Dicyclomine Nausea and Vomiting Extreme stomach pains  Lithium Nausea and Vomiting Severe nausea and vomiting.  Other Medication Other (comments) Intolerance to oranges, cabbage,beans,peppers,raw onions,foods with caffeine in them, popcorn, no pop sodas, because of IBS Family History Problem Relation Age of Onset  No Known Problems Mother  Hypertension Father  Cancer Father SKIN CA  
 Colon Polyps Father  Other Maternal Uncle CHAROT-MARISELA-TOOTH  Diabetes Paternal Grandfather  No Known Problems Sister Social History Tobacco Use  Smoking status: Never Smoker  Smokeless tobacco: Never Used Substance Use Topics  Alcohol use: No  
 
Patient Active Problem List  
Diagnosis Code  Arthritis M19.90  Gallstones with obstruction of gallbladder K80.21  Choledocholithiasis K80.50  Bipolar affective disorder, manic ([facility]) F31.10  
 IBS (irritable bowel syndrome) K58.9  Chronic headache R51  Drop attack R55  Altered mental status R41.82  
 Delirium R41.0  Hyponatremia E87.1  Bipolar 1 disorder ([facility]) F31.9  ACP (advance care planning) Z71.89  
 Encounter for screening colonoscopy Z12.11  
 Rectal prolapse K62.3  
 Osteoarthritis of right knee M17.11  
 Total knee replacement status, right Z96.651 Depression Risk Factor Screening:  
 
3 most recent PHQ Screens 5/7/2019 Little interest or pleasure in doing things Nearly every day Feeling down, depressed, irritable, or hopeless Nearly every day Total Score PHQ 2 6 Alcohol Risk Factor Screening: You do not drink alcohol or very rarely. Functional Ability and Level of Safety:  
Hearing Loss Hearing is good. Activities of Daily Living The home contains: no safety equipment. Patient does total self care Fall Risk Fall Risk Assessment, last 12 mths 5/7/2019 Able to walk? Yes Fall in past 12 months? No  
Fall with injury? -  
Number of falls in past 12 months - Fall Risk Score -  
 
 
Abuse Screen Patient is not abused Cognitive Screening Evaluation of Cognitive Function: 
Has your family/caregiver stated any concerns about your memory: no 
Normal 
 
Patient Care Team  
Patient Care Team: 
Emerson Soares MD as PCP - General (Internal Medicine) Olivia Chaves MD (Gynecology) Tremaine Philip MD (Gastroenterology) Kandice Victoria MD (Psychiatry) Rick Lazo MD ([address] [place] [address] Vascular Surgery) Assessment/Plan Education and counseling provided: Are appropriate based on today's review and evaluation End-of-Life planning (with patient's consent) Pneumococcal Vaccine Bone mass measurement (DEXA) Screening for glaucoma Health Maintenance Due Topic Date Due  Shingrix Vaccine Age 50> (1 of 2) 05/19/2008  PAP AKA CERVICAL CYTOLOGY  04/25/2017  BREAST CANCER SCRN MAMMOGRAM  03/28/2019

## 2019-05-07 NOTE — PROGRESS NOTES
HISTORY OF PRESENT ILLNESS Mainor Man is a 59 y.o. female here to follow-up. She is having heartburn a lot. She is on PPI in the morning. Using Pepcid twice a day. She thinks Pepcid is not helping her at all. She is taking Zyprexa at 5 PM, she was told to the Percocet to take at night. At night she takes other medications which interact with Zyprexa and make her too sleepy. Advised her to stop taking Benadryl since in the morning she is very sleepy. Rogerio Chen Has chronic headache, amitriptyline is helping her a lot. She will continue it for now. Has anxiety and depression, seeing Dr. Jayce Fletcher. Stable for now. She has been taking Fosamax for osteoporosis for many years. Need new bone density. Here for Medicare wellness visit. Has living well. GERD Pertinent negatives include no shortness of breath. Hypertension Pertinent negatives include no shortness of breath. Bipolar Pertinent negatives include no shortness of breath. Mental Health Problem Pertinent negatives include no shortness of breath. Heartburn Her past medical history is significant for GERD. Review of Systems Constitutional: Negative. HENT: Negative. Eyes: Negative. Respiratory: Negative. Negative for shortness of breath and wheezing. Cardiovascular: Negative. Gastrointestinal: Positive for heartburn. Negative for blood in stool. Musculoskeletal: Negative. Negative for falls. Skin: Negative. Negative for itching. Psychiatric/Behavioral: Positive for depression. Physical Exam  
Constitutional: She is oriented to person, place, and time. She appears well-developed and well-nourished. No distress. Neck: Normal range of motion. Neck supple. No JVD present. No thyromegaly present. Cardiovascular: Normal rate, regular rhythm, normal heart sounds and intact distal pulses. Pulmonary/Chest: Effort normal and breath sounds normal. No respiratory distress. She has no wheezes. Abdominal: Soft. Bowel sounds are normal. She exhibits no distension. There is no tenderness. Musculoskeletal: She exhibits no edema. Neurological: She is alert and oriented to person, place, and time. She has normal reflexes. Psychiatric: She has a normal mood and affect. Her behavior is normal.  
Anxious. ASSESSMENT and PLAN Diagnoses and all orders for this visit: 
 
1. Bipolar affective disorder, manic ([address] Seeing psychiatrist..  On Zyprexa 15 mg bedtime. Patient is willing to take it 5 PM since which time she takes other medications which make her more sleepy. Advised her to take the Zyprexa at 5 PM. 
 
2. Dyspepsia Pepcid is not helping her. Still having heartburn. Will discontinue Pepcid. Will give, 
-     raNITIdine (ZANTAC) 150 mg tablet; Take 1 Tab by mouth two (2) times a day. D/c PEPCID 3. Medicare annual wellness visit, subsequent 4. Irritable bowel syndrome, unspecified type Doing well. 5. Osteoporosis, unspecified osteoporosis type, unspecified pathological fracture presence Will order, 
-     DEXA BONE DENSITY STUDY AXIAL; Future Discussed expected course/resolution/complications of diagnosis in detail with patient. Medication risks/benefits/costs/interactions/alternatives discussed with patient. Pt was given an after visit summary which includes diagnoses, current medications & vitals. Pt expressed understanding with the diagnosis and plan.

## 2019-05-07 NOTE — PROGRESS NOTES
Health Maintenance Due Topic Date Due  Shingrix Vaccine Age 50> (1 of 2) 05/19/2008  PAP AKA CERVICAL CYTOLOGY  04/25/2017  BREAST CANCER SCRN MAMMOGRAM  03/28/2019  MEDICARE YEARLY EXAM  05/16/2019 Chief Complaint Patient presents with  
 Heartburn  
  at night. Takes Pepcid, but still c/o symtpoms Ming Han Annual Wellness Visit  Bipolar  Medication Evaluation  
  discuss Calcium and when/how to take 1. Have you been to the ER, urgent care clinic since your last visit? Hospitalized since your last visit? No 
 
2. Have you seen or consulted any other health care providers outside of the [facility] since your last visit? Include any pap smears or colon screening. No 
 
3) Do you have an Advance Directive on file? yes 4) Are you interested in receiving information on Advance Directives? NO Patient is accompanied by self I have received verbal consent from Everett Lew to discuss any/all medical information while they are present in the room.

## 2019-05-14 DIAGNOSIS — K59.00 CONSTIPATION, UNSPECIFIED CONSTIPATION TYPE: ICD-10-CM

## 2019-05-14 RX ORDER — POLYETHYLENE GLYCOL 3350 17 G/17G
POWDER, FOR SOLUTION ORAL
Qty: 527 G | Refills: 0 | Status: SHIPPED | OUTPATIENT
Start: 2019-05-14 | End: 2019-09-03 | Stop reason: SDUPTHER

## 2019-05-21 ENCOUNTER — HOSPITAL ENCOUNTER (OUTPATIENT)
Dept: MAMMOGRAPHY | Age: 59
Discharge: HOME | End: 2019-05-21
Attending: INTERNAL MEDICINE
Payer: MEDICARE

## 2019-05-21 DIAGNOSIS — M81.0 OSTEOPOROSIS, UNSPECIFIED OSTEOPOROSIS TYPE, UNSPECIFIED PATHOLOGICAL FRACTURE PRESENCE: ICD-10-CM

## 2019-05-21 PROCEDURE — 77080 DXA BONE DENSITY AXIAL: CPT

## 2019-06-17 DIAGNOSIS — I10 ESSENTIAL HYPERTENSION: ICD-10-CM

## 2019-06-17 DIAGNOSIS — M81.0 OSTEOPOROSIS, UNSPECIFIED OSTEOPOROSIS TYPE, UNSPECIFIED PATHOLOGICAL FRACTURE PRESENCE: ICD-10-CM

## 2019-06-17 DIAGNOSIS — E78.2 ELEVATED TRIGLYCERIDES WITH HIGH CHOLESTEROL: ICD-10-CM

## 2019-06-18 RX ORDER — FENOFIBRATE 145 MG/1
145 TABLET, COATED ORAL
Qty: 30 TAB | Refills: 2 | Status: SHIPPED | OUTPATIENT
Start: 2019-06-18 | End: 2019-12-09 | Stop reason: SDUPTHER

## 2019-06-18 RX ORDER — ALENDRONATE SODIUM 70 MG/1
TABLET ORAL
Qty: 4 TAB | Refills: 2 | Status: SHIPPED | OUTPATIENT
Start: 2019-06-18 | End: 2019-09-17 | Stop reason: SDUPTHER

## 2019-06-18 RX ORDER — LOSARTAN POTASSIUM 25 MG/1
TABLET ORAL
Qty: 30 TAB | Refills: 2 | Status: SHIPPED | OUTPATIENT
Start: 2019-06-18 | End: 2019-09-08 | Stop reason: SDUPTHER

## 2019-07-17 DIAGNOSIS — G44.021 INTRACTABLE CHRONIC CLUSTER HEADACHE: ICD-10-CM

## 2019-07-17 DIAGNOSIS — G44.221 CHRONIC TENSION-TYPE HEADACHE, INTRACTABLE: ICD-10-CM

## 2019-07-17 RX ORDER — GABAPENTIN 100 MG/1
CAPSULE ORAL
Qty: 90 CAP | Refills: 5 | Status: SHIPPED | OUTPATIENT
Start: 2019-07-17 | End: 2020-01-08

## 2019-08-06 RX ORDER — LOPERAMIDE HYDROCHLORIDE 2 MG/1
CAPSULE ORAL
Qty: 120 CAP | Refills: 0 | Status: SHIPPED | OUTPATIENT
Start: 2019-08-06 | End: 2019-09-08 | Stop reason: SDUPTHER

## 2019-08-20 NOTE — TELEPHONE ENCOUNTER
----- Message from Alan Boss sent at 8/20/2019  4:12 PM EDT -----  Regarding: Dr. German Croft General Message/Vendor Calls    Caller's first and last name: Tae Ragland with ([address])       Reason for call: Requesting a call back regarding haven't received and order for PT's Rx Lorazepam 0.5mg. They need to be able to exopodite  the order my mid day tomorrow. Call back required yes/no and why: No       Best contact number(s): [phone]      Details to clarify the request: Please contact the pt if the order hasn't been able to be processed.       Alan Boss

## 2019-09-03 ENCOUNTER — OFFICE VISIT (OUTPATIENT)
Dept: INTERNAL MEDICINE CLINIC | Age: 59
End: 2019-09-03

## 2019-09-03 ENCOUNTER — HOSPITAL ENCOUNTER (OUTPATIENT)
Dept: LAB | Age: 59
Discharge: HOME | End: 2019-09-03
Payer: MEDICARE

## 2019-09-03 VITALS
SYSTOLIC BLOOD PRESSURE: 126 MMHG | BODY MASS INDEX: 33.08 KG/M2 | HEIGHT: 60 IN | HEART RATE: 95 BPM | RESPIRATION RATE: 15 BRPM | WEIGHT: 168.5 LBS | DIASTOLIC BLOOD PRESSURE: 82 MMHG | OXYGEN SATURATION: 97 % | TEMPERATURE: 97.4 F

## 2019-09-03 DIAGNOSIS — K59.00 CONSTIPATION, UNSPECIFIED CONSTIPATION TYPE: ICD-10-CM

## 2019-09-03 DIAGNOSIS — I10 ESSENTIAL HYPERTENSION: Primary | ICD-10-CM

## 2019-09-03 DIAGNOSIS — K13.0 ANGULAR CHEILITIS: ICD-10-CM

## 2019-09-03 DIAGNOSIS — G44.221 CHRONIC TENSION-TYPE HEADACHE, INTRACTABLE: ICD-10-CM

## 2019-09-03 DIAGNOSIS — F31.10 BIPOLAR AFFECTIVE DISORDER, MANIC (HCC): ICD-10-CM

## 2019-09-03 DIAGNOSIS — E55.9 VITAMIN D DEFICIENCY: ICD-10-CM

## 2019-09-03 DIAGNOSIS — I87.2 VENOUS INSUFFICIENCY: ICD-10-CM

## 2019-09-03 DIAGNOSIS — E78.00 PURE HYPERCHOLESTEROLEMIA: ICD-10-CM

## 2019-09-03 DIAGNOSIS — K58.9 IRRITABLE BOWEL SYNDROME WITHOUT DIARRHEA: ICD-10-CM

## 2019-09-03 DIAGNOSIS — Z23 ENCOUNTER FOR IMMUNIZATION: ICD-10-CM

## 2019-09-03 PROCEDURE — 85025 COMPLETE CBC W/AUTO DIFF WBC: CPT

## 2019-09-03 PROCEDURE — 36415 COLL VENOUS BLD VENIPUNCTURE: CPT

## 2019-09-03 PROCEDURE — 80061 LIPID PANEL: CPT

## 2019-09-03 PROCEDURE — 82306 VITAMIN D 25 HYDROXY: CPT

## 2019-09-03 PROCEDURE — 80053 COMPREHEN METABOLIC PANEL: CPT

## 2019-09-03 RX ORDER — NYSTATIN AND TRIAMCINOLONE ACETONIDE 100000; 1 [USP'U]/G; MG/G
CREAM TOPICAL 2 TIMES DAILY
Qty: 15 G | Refills: 0 | Status: SHIPPED | OUTPATIENT
Start: 2019-09-03 | End: 2019-10-01 | Stop reason: ALTCHOICE

## 2019-09-03 RX ORDER — POLYETHYLENE GLYCOL 3350 17 G/17G
POWDER, FOR SOLUTION ORAL
Qty: 527 G | Refills: 0 | Status: SHIPPED | OUTPATIENT
Start: 2019-09-03 | End: 2021-08-03 | Stop reason: ALTCHOICE

## 2019-09-03 NOTE — PROGRESS NOTES
Health Maintenance Due   Topic Date Due    Shingrix Vaccine Age 49> (1 of 2) 05/19/2008    PAP AKA CERVICAL CYTOLOGY  04/25/2017    BREAST CANCER SCRN MAMMOGRAM  03/28/2019    Influenza Age 5 to Adult  08/01/2019       Chief Complaint   Patient presents with    Bipolar    Headache    Osteoporosis       1. Have you been to the ER, urgent care clinic since your last visit? Hospitalized since your last visit? No    2. Have you seen or consulted any other health care providers outside of the [address] [place] [address] since your last visit? Include any pap smears or colon screening. No    3) Do you have an Advance Directive on file? yes    4) Are you interested in receiving information on Advance Directives? NO      Patient is accompanied by self I have received verbal consent from Giuliano Grimes to discuss any/all medical information while they are present in the room. Giuliano Grimes is a 62 y.o. female  who presents for routine immunization(s) Fluarix Quadrivalent. Patient denies any symptoms , reactions or allergies that would exclude them from being immunized today. Risks and adverse reactions were discussed and the VIS was given to them. Patient voiced full understanding and signed Adult Immunization Consent form. All questions were addressed. Patient was observed for 10 min post injection. There were no reactions observed.     Kai Calles LPN

## 2019-09-03 NOTE — PATIENT INSTRUCTIONS
Vaccine Information Statement    Influenza (Flu) Vaccine (Inactivated or Recombinant): What You Need to Know    Many Vaccine Information Statements are available in Romanian and other languages. See www.immunize.org/vis  Hojas de información sobre vacunas están disponibles en español y en muchos otros idiomas. Visite www.immunize.org/vis    1. Why get vaccinated? Influenza vaccine can prevent influenza (flu). Flu is a contagious disease that spreads around the [facility] every year, usually between October and May. Anyone can get the flu, but it is more dangerous for some people. Infants and young children, people 72years of age and older, pregnant women, and people with certain health conditions or a weakened immune system are at greatest risk of flu complications. Pneumonia, bronchitis, sinus infections and ear infections are examples of flu-related complications. If you have a medical condition, such as heart disease, cancer or diabetes, flu can make it worse. Flu can cause fever and chills, sore throat, muscle aches, fatigue, cough, headache, and runny or stuffy nose. Some people may have vomiting and diarrhea, though this is more common in children than adults. Each year thousands of people in the [facility] die from flu, and many more are hospitalized. Flu vaccine prevents millions of illnesses and flu-related visits to the doctor each year. 2. Influenza vaccines     CDC recommends everyone 10months of age and older get vaccinated every flu season. Children 6 months through 6years of age may need 2 doses during a single flu season. Everyone else needs only 1 dose each flu season. It takes about 2 weeks for protection to develop after vaccination. There are many flu viruses, and they are always changing. Each year a new flu vaccine is made to protect against three or four viruses that are likely to cause disease in the upcoming flu season.  Even when the vaccine doesnt exactly match these viruses, it may still provide some protection. Influenza vaccine does not cause flu. Influenza vaccine may be given at the same time as other vaccines. 3. Talk with your health care provider    Tell your vaccine provider if the person getting the vaccine:   Has had an allergic reaction after a previous dose of influenza vaccine, or has any severe, life-threatening allergies.  Has ever had Guillain-Barré Syndrome (also called GBS). In some cases, your health care provider may decide to postpone influenza vaccination to a future visit. People with minor illnesses, such as a cold, may be vaccinated. People who are moderately or severely ill should usually wait until they recover before getting influenza vaccine. Your health care provider can give you more information. 4. Risks of a reaction     Soreness, redness, and swelling where shot is given, fever, muscle aches, and headache can happen after influenza vaccine.  There may be a very small increased risk of Guillain-Barré Syndrome (GBS) after inactivated influenza vaccine (the flu shot). Trena Yancey children who get the flu shot along with pneumococcal vaccine (PCV13), and/or DTaP vaccine at the same time might be slightly more likely to have a seizure caused by fever. Tell your health care provider if a child who is getting flu vaccine has ever had a seizure. People sometimes faint after medical procedures, including vaccination. Tell your provider if you feel dizzy or have vision changes or ringing in the ears. As with any medicine, there is a very remote chance of a vaccine causing a severe allergic reaction, other serious injury, or death. 5. What if there is a serious problem? An allergic reaction could occur after the vaccinated person leaves the clinic.  If you see signs of a severe allergic reaction (hives, swelling of the face and throat, difficulty breathing, a fast heartbeat, dizziness, or weakness), call 9-1-1 and get the person to the nearest hospital.    For other signs that concern you, call your health care provider. Adverse reactions should be reported to the Vaccine Adverse Event Reporting System (VAERS). Your health care provider will usually file this report, or you can do it yourself. Visit the VAERS website at www.vaers. [facility].gov or call 0-839.892.3175. VAERS is only for reporting reactions, and VAERS staff do not give medical advice. 6. The National Vaccine Injury Compensation Program    The [facility] Vaccine Injury Compensation Program (VICP) is a federal program that was created to compensate people who may have been injured by certain vaccines. Visit the VICP website at www.hrsa.gov/vaccinecompensation or call 6-895.918.2768 to learn about the program and about filing a claim. There is a time limit to file a claim for compensation. 7. How can I learn more?  Ask your health care provider.  Call your local or state health department.  Contact the Centers for Disease Control and Prevention (CDC):  - Call 7-298.123.3110 (1-800-CDC-INFO) or  - Visit CDCs influenza website at www.cdc.gov/flu    Vaccine Information Statement (Interim)  Inactivated Influenza Vaccine   8/15/2019  42 DEUCE Green [id number]   Department of Health and Human Services  Centers for Disease Control and Prevention    Office Use Only

## 2019-09-03 NOTE — PROGRESS NOTES
HISTORY OF PRESENT ILLNESS  Fabiano Meyers is a 62 y.o. female here to follow-up. Has bipolar disorder. Seeing psychiatrist.  Need lab work. Has elevated lipid, on TriCor. No chest pain palpitation shortness of breath. Hypertension, on medicine. Doing well. She has been taking Fosamax for osteoporosis for many years. Need new bone density. Need flu shot. Bipolar   Pertinent negatives include no shortness of breath. GERD   Pertinent negatives include no shortness of breath. Hypertension    Pertinent negatives include no shortness of breath. Mental Health Problem   Pertinent negatives include no shortness of breath. Immunization/Injection   Pertinent negatives include no shortness of breath. Review of Systems   Constitutional: Negative. HENT: Negative. Eyes: Negative. Respiratory: Negative. Negative for shortness of breath and wheezing. Cardiovascular: Negative. Gastrointestinal: Positive for heartburn. Negative for blood in stool. Musculoskeletal: Negative. Negative for falls. Skin: Negative. Negative for itching. Psychiatric/Behavioral: Positive for depression. Physical Exam   Constitutional: She is oriented to person, place, and time. She appears well-developed and well-nourished. No distress. Neck: Normal range of motion. Neck supple. No JVD present. No thyromegaly present. Cardiovascular: Normal rate, regular rhythm, normal heart sounds and intact distal pulses. Pulmonary/Chest: Effort normal and breath sounds normal. No respiratory distress. She has no wheezes. Abdominal: Soft. Bowel sounds are normal. She exhibits no distension. There is no tenderness. Musculoskeletal: She exhibits no edema. Neurological: She is alert and oriented to person, place, and time. She has normal reflexes. Psychiatric: She has a normal mood and affect. Her behavior is normal.   Anxious. ASSESSMENT and PLAN  Diagnoses and all orders for this visit:    1. Encounter for immunization  -     NV IMMUNIZ ADMIN,1 SINGLE/COMB VAC/TOXOID  -     INFLUENZA VIRUS VAC QUAD,SPLIT,PRESV FREE SYRINGE IM    2. Bipolar affective disorder, manic (HCC)    Seeing Dr. Jacob Levine. On Zyprexa. Will check,  -     CBC WITH AUTOMATED DIFF  -     METABOLIC PANEL, COMPREHENSIVE    3. Irritable bowel syndrome without diarrhea  Stable. On Imodium as needed. 4. Chronic tension-type headache, intractable    5. Venous insufficiency  Advised to do leg elevation and use stockings. 6. Angular cheilitis    Keep ankle dry. Will give,  -     nystatin-triamcinolone (MYCOLOG II) topical cream; Apply  to affected area two (2) times a day. 7. Pure hypercholesterolemia  Low-cholesterol diet. Will check,    -     METABOLIC PANEL, COMPREHENSIVE  -     LIPID PANEL    8. Vitamin D deficiency  -     VITAMIN D, 25 HYDROXY                Discussed expected course/resolution/complications of diagnosis in detail with patient. Medication risks/benefits/costs/interactions/alternatives discussed with patient. Pt was given an after visit summary which includes diagnoses, current medications & vitals. Pt expressed understanding with the diagnosis and plan.

## 2019-09-05 LAB
25(OH)D3+25(OH)D2 SERPL-MCNC: 25.3 NG/ML (ref 30–100)
ALBUMIN SERPL-MCNC: 4.4 G/DL (ref 3.6–4.8)
ALBUMIN/GLOB SERPL: 1.7 {RATIO} (ref 1.2–2.2)
ALP SERPL-CCNC: 82 IU/L (ref 39–117)
ALT SERPL-CCNC: 26 IU/L (ref 0–32)
AST SERPL-CCNC: 22 IU/L (ref 0–40)
BASOPHILS # BLD AUTO: 0.1 X10E3/UL (ref 0–0.2)
BASOPHILS NFR BLD AUTO: 1 %
BILIRUB SERPL-MCNC: 0.2 MG/DL (ref 0–1.2)
BUN SERPL-MCNC: 16 MG/DL (ref 8–27)
BUN/CREAT SERPL: 17 (ref 12–28)
CALCIUM SERPL-MCNC: 9.4 MG/DL (ref 8.7–10.3)
CHLORIDE SERPL-SCNC: 104 MMOL/L (ref 96–106)
CHOLEST SERPL-MCNC: 187 MG/DL (ref 100–199)
CO2 SERPL-SCNC: 22 MMOL/L (ref 20–29)
CREAT SERPL-MCNC: 0.92 MG/DL (ref 0.57–1)
EOSINOPHIL # BLD AUTO: 0.1 X10E3/UL (ref 0–0.4)
EOSINOPHIL NFR BLD AUTO: 1 %
ERYTHROCYTE [DISTWIDTH] IN BLOOD BY AUTOMATED COUNT: 13.1 % (ref 12.3–15.4)
GLOBULIN SER CALC-MCNC: 2.6 G/DL (ref 1.5–4.5)
GLUCOSE SERPL-MCNC: 119 MG/DL (ref 65–99)
HCT VFR BLD AUTO: 38.1 % (ref 34–46.6)
HDLC SERPL-MCNC: 58 MG/DL
HGB BLD-MCNC: 12.3 G/DL (ref 11.1–15.9)
IMM GRANULOCYTES # BLD AUTO: 0 X10E3/UL (ref 0–0.1)
IMM GRANULOCYTES NFR BLD AUTO: 0 %
INTERPRETATION, 910389: NORMAL
LDLC SERPL CALC-MCNC: 108 MG/DL (ref 0–99)
LYMPHOCYTES # BLD AUTO: 1.3 X10E3/UL (ref 0.7–3.1)
LYMPHOCYTES NFR BLD AUTO: 21 %
MCH RBC QN AUTO: 27.8 PG (ref 26.6–33)
MCHC RBC AUTO-ENTMCNC: 32.3 G/DL (ref 31.5–35.7)
MCV RBC AUTO: 86 FL (ref 79–97)
MONOCYTES # BLD AUTO: 0.4 X10E3/UL (ref 0.1–0.9)
MONOCYTES NFR BLD AUTO: 6 %
NEUTROPHILS # BLD AUTO: 4.5 X10E3/UL (ref 1.4–7)
NEUTROPHILS NFR BLD AUTO: 71 %
PLATELET # BLD AUTO: 331 X10E3/UL (ref 150–450)
POTASSIUM SERPL-SCNC: 4.4 MMOL/L (ref 3.5–5.2)
PROT SERPL-MCNC: 7 G/DL (ref 6–8.5)
RBC # BLD AUTO: 4.43 X10E6/UL (ref 3.77–5.28)
SODIUM SERPL-SCNC: 141 MMOL/L (ref 134–144)
TRIGL SERPL-MCNC: 103 MG/DL (ref 0–149)
VLDLC SERPL CALC-MCNC: 21 MG/DL (ref 5–40)
WBC # BLD AUTO: 6.3 X10E3/UL (ref 3.4–10.8)

## 2019-09-10 NOTE — PROGRESS NOTES
Low vitamin D, advised to take vitamin D3 1000 units once a day for 4 months. All other labs are stable.

## 2019-09-17 DIAGNOSIS — M81.0 OSTEOPOROSIS, UNSPECIFIED OSTEOPOROSIS TYPE, UNSPECIFIED PATHOLOGICAL FRACTURE PRESENCE: ICD-10-CM

## 2019-09-17 RX ORDER — ALENDRONATE SODIUM 70 MG/1
TABLET ORAL
Qty: 4 TAB | Refills: 11 | Status: SHIPPED | OUTPATIENT
Start: 2019-09-17 | End: 2020-01-13

## 2019-09-25 PROBLEM — Z12.11 ENCOUNTER FOR SCREENING COLONOSCOPY: Status: RESOLVED | Noted: 2017-07-05 | Resolved: 2019-09-25

## 2019-10-01 ENCOUNTER — OFFICE VISIT (OUTPATIENT)
Dept: INTERNAL MEDICINE CLINIC | Age: 59
End: 2019-10-01

## 2019-10-01 ENCOUNTER — HOSPITAL ENCOUNTER (OUTPATIENT)
Dept: LAB | Age: 59
Discharge: HOME | End: 2019-10-01
Payer: MEDICARE

## 2019-10-01 VITALS
RESPIRATION RATE: 15 BRPM | WEIGHT: 172 LBS | BODY MASS INDEX: 33.77 KG/M2 | SYSTOLIC BLOOD PRESSURE: 134 MMHG | TEMPERATURE: 98.4 F | DIASTOLIC BLOOD PRESSURE: 82 MMHG | HEIGHT: 60 IN | HEART RATE: 86 BPM | OXYGEN SATURATION: 97 %

## 2019-10-01 DIAGNOSIS — G44.221 CHRONIC TENSION-TYPE HEADACHE, INTRACTABLE: ICD-10-CM

## 2019-10-01 DIAGNOSIS — F31.10 BIPOLAR AFFECTIVE DISORDER, CURRENT EPISODE MANIC, CURRENT EPISODE SEVERITY UNSPECIFIED (HCC): ICD-10-CM

## 2019-10-01 DIAGNOSIS — R73.01 ELEVATED FASTING BLOOD SUGAR: ICD-10-CM

## 2019-10-01 DIAGNOSIS — K58.9 IRRITABLE BOWEL SYNDROME WITHOUT DIARRHEA: ICD-10-CM

## 2019-10-01 DIAGNOSIS — K21.9 GASTROESOPHAGEAL REFLUX DISEASE WITHOUT ESOPHAGITIS: Primary | ICD-10-CM

## 2019-10-01 DIAGNOSIS — E55.9 VITAMIN D DEFICIENCY: ICD-10-CM

## 2019-10-01 PROCEDURE — 83036 HEMOGLOBIN GLYCOSYLATED A1C: CPT

## 2019-10-01 RX ORDER — CALCIUM CARBONATE 600 MG
600 TABLET ORAL 2 TIMES DAILY
COMMUNITY
End: 2022-10-17

## 2019-10-01 RX ORDER — PANTOPRAZOLE SODIUM 40 MG/1
40 TABLET, DELAYED RELEASE ORAL
Qty: 30 TAB | Refills: 4 | Status: SHIPPED | OUTPATIENT
Start: 2019-10-01 | End: 2020-01-13

## 2019-10-01 RX ORDER — ACETAMINOPHEN 500 MG
2000 TABLET ORAL DAILY
Qty: 30 CAP | Refills: 4 | Status: SHIPPED | OUTPATIENT
Start: 2019-10-01

## 2019-10-01 NOTE — PROGRESS NOTES
HISTORY OF PRESENT ILLNESS  Gianni Oleary is a 62 y.o. female here to follow-up. Noticed to have some nasal congestion and postnasal drip making her voice hoarse. Using Zyrtec as needed. No cough or wheezing. No fever. Having reflux, taking ranitidine, because of possible precancerous ingredient and ranitidine, she is not willing to take it. Still having reflux problem would like to get some medications twice a day. Already tried Pepcid did not work. Has bipolar disorder. Seeing psychiatrist.  Leti Hortno done, seems stable. Has elevated lipid, on TriCor. No chest pain palpitation shortness of breath. Found to have elevated fasting blood sugar, she is not watching diet. Has gained some weight. Hypertension, on medicine. Doing well. Vitamin D level still low. Has been taking vitamin D 1000 units for long time. Bipolar   Pertinent negatives include no shortness of breath. GERD   Pertinent negatives include no shortness of breath. Hypertension    Pertinent negatives include no shortness of breath. Mental Health Problem   Pertinent negatives include no shortness of breath. Medication Evaluation   Pertinent negatives include no shortness of breath. Hoarse    Associated symptoms include congestion. Pertinent negatives include no wheezing. Review of Systems   Constitutional: Negative. HENT: Positive for congestion and hoarse voice. Eyes: Negative. Respiratory: Negative. Negative for shortness of breath and wheezing. Cardiovascular: Negative. Gastrointestinal: Positive for heartburn. Negative for blood in stool. Musculoskeletal: Negative. Negative for falls. Skin: Negative. Negative for itching. Psychiatric/Behavioral: Positive for depression. Physical Exam   Constitutional: She is oriented to person, place, and time. She appears well-developed and well-nourished. No distress. HENT:   Head: Normocephalic and atraumatic.    Right Ear: External ear normal.   Left Ear: External ear normal.   Mouth/Throat: Oropharynx is clear and moist. No oropharyngeal exudate. Nasal turbinates:red inflamed,NT    Cobble stoning present. Neck: Normal range of motion. Neck supple. No JVD present. No thyromegaly present. Cardiovascular: Normal rate, regular rhythm, normal heart sounds and intact distal pulses. Pulmonary/Chest: Effort normal and breath sounds normal. No respiratory distress. She has no wheezes. Abdominal: Soft. Bowel sounds are normal. She exhibits no distension. There is no tenderness. Musculoskeletal: She exhibits no edema. Neurological: She is alert and oriented to person, place, and time. She has normal reflexes. Psychiatric: She has a normal mood and affect. Her behavior is normal.   Anxious. ASSESSMENT and PLAN  Diagnoses and all orders for this visit:    1. Gastroesophageal reflux disease without esophagitis    Not willing to take ranitidine. Will discontinue ranitidine. Will give,  -     pantoprazole (PROTONIX) 40 mg tablet; Take 1 Tab by mouth nightly. DC raitidine    2. Bipolar affective disorder, current episode manic, current episode severity unspecified ([address]  Stable, seeing Dr. Román Rapp. On Zyprexa. 3. Irritable bowel syndrome without diarrhea  On imodium. 4. Chronic tension-type headache, intractable  On amitriptyline and gabapentin. Stable. 5. Elevated fasting blood sugar     be on low-carb diet. Will check,      -     HEMOGLOBIN A1C WITH EAG    6. Vitamin D deficiency  Vitamin D level still low. Will increase,    -     cholecalciferol (VITAMIN D3) 2,000 unit cap capsule; Take 2,000 Units by mouth daily. Discussed expected course/resolution/complications of diagnosis in detail with patient. Medication risks/benefits/costs/interactions/alternatives discussed with patient. Pt was given an after visit summary which includes diagnoses, current medications & vitals. Pt expressed understanding with the diagnosis and plan.

## 2019-10-01 NOTE — PATIENT INSTRUCTIONS

## 2019-10-01 NOTE — PROGRESS NOTES
Health Maintenance Due   Topic Date Due    Shingrix Vaccine Age 49> (1 of 2) 05/19/2008    PAP AKA CERVICAL CYTOLOGY  04/25/2017    BREAST CANCER SCRN MAMMOGRAM  03/28/2019       Chief Complaint   Patient presents with    Medication Evaluation     Review medications and dosages    Hypertension    Cholesterol Problem    Bipolar       1. Have you been to the ER, urgent care clinic since your last visit? Hospitalized since your last visit? No    2. Have you seen or consulted any other health care providers outside of the [address] [place] [address] since your last visit? Include any pap smears or colon screening. No    3) Do you have an Advance Directive on file? yes    4) Are you interested in receiving information on Advance Directives? NO      Patient is accompanied by counselor I have received verbal consent from Gerry Colin to discuss any/all medical information while they are present in the room.

## 2019-10-02 LAB
EST. AVERAGE GLUCOSE BLD GHB EST-MCNC: 117 MG/DL
HBA1C MFR BLD: 5.7 % (ref 4.8–5.6)

## 2019-10-02 NOTE — PROGRESS NOTES
Eating healthier- a Mediterranean style diet with 55% or less of calories from carbohydrates has been shown to be very helpful for people with pre-diabetes. Try to eat more vegetables, whole fruit, nuts, whole grains, yogurt and less refined grains. Eat less red meat. Chicken and fish would be good protein sources. Aim to eat less than 45 grams of carbohydrates per meal. [facility] has a nice review.

## 2019-10-15 ENCOUNTER — OFFICE VISIT (OUTPATIENT)
Dept: NEUROLOGY | Age: 59
End: 2019-10-15

## 2019-10-15 VITALS
SYSTOLIC BLOOD PRESSURE: 130 MMHG | HEART RATE: 88 BPM | OXYGEN SATURATION: 98 % | DIASTOLIC BLOOD PRESSURE: 76 MMHG | WEIGHT: 168 LBS | BODY MASS INDEX: 32.81 KG/M2 | RESPIRATION RATE: 18 BRPM

## 2019-10-15 DIAGNOSIS — I10 HYPERTENSION, UNSPECIFIED TYPE: ICD-10-CM

## 2019-10-15 DIAGNOSIS — R51.9 CHRONIC DAILY HEADACHE: Primary | ICD-10-CM

## 2019-10-15 DIAGNOSIS — F31.9 BIPOLAR AFFECTIVE DISORDER, REMISSION STATUS UNSPECIFIED (HCC): ICD-10-CM

## 2019-10-15 NOTE — PATIENT INSTRUCTIONS
[facility]   Statement to Patients  April 1, 2014      In an effort to ensure the large volume of patient prescription refills is processed in the most efficient and expeditious manner, we are asking our patients to assist us by calling your Pharmacy for all prescription refills, this will include also your  Mail Order Pharmacy. The pharmacy will contact our office electronically to continue the refill process. Please do not wait until the last minute to call your pharmacy. We need at least 48 hours (2days) to fill prescriptions. We also encourage you to call your pharmacy before going to  your prescription to make sure it is ready. With regard to controlled substance prescription refill requests (narcotic refills) that need to be picked up at our office, we ask your cooperation by providing us with at least 72 hours (3days) notice that you will need a refill. We will not refill narcotic prescription refill requests after 4:00pm on any weekday, Monday through Thursday, or after 2:00pm on Fridays, or on the weekends. We encourage everyone to explore another way of getting your prescription refill request processed using [employer], our patient web portal through our electronic medical record system. [employer] is an efficient and effective way to communicate your medication request directly to the office and  downloadable as an priscilla on your smart phone . [employer] also features a review functionality that allows you to view your medication list as well as leave messages for your physician. Are you ready to get connected? If so please review the attatched instructions or speak to any of our staff to get you set up right away! Thank you so much for your cooperation. Should you have any questions please contact our Practice Administrator.     The Physicians and Staff,  [facility]

## 2019-10-15 NOTE — PROGRESS NOTES
Neurology Clinic Follow up Note    Patient ID:  Nixon Ruiz  [id number]  56 y.o.  1960      Ms. Lita Malone is here for follow up today of headaches       Last Appointment:  6/22/18      Interval History:   Pt returns for f/u of headaches, previously followed by Dr. Thelma Kruse. HAs are sporadic and only 3-4x per month, improved since last visit. HA are non severe, no nausea or photophobia. She is using Elavil for preventative therapy. She feels the Elavil has been very helpful. Reports some dry eyes/mouth, denies excessive side effects. Using ibuprofen rarely for rescue HA tx. PMHx/ PSHx/ FHx/ SHx:  Reviewed and unchanged previous visit. Past Medical History:   Diagnosis Date    Arthritis     Bipolar disorder ([facility]     Bladder pain     Choledocholithiasis 11/24/2010    GERD (gastroesophageal reflux disease)     Headache(784.0)     tension headaches    High cholesterol     History of cholecystectomy     Hypertension     Irritable bowel     Pelvic pain in female 2014    Psychiatric disorder     Stool color black     irritable bowel     Past Surgical History:   Procedure Laterality Date    COLONOSCOPY N/A 7/5/2017    COLONOSCOPY performed by Frances Kothari MD at [address] [facility]  2003, 2017    prolasped rectum    HX LAP CHOLECYSTECTOMY  11/23/10    HX ORTHOPAEDIC      HX KALEIGH AND BSO  1998    LAPAROSCOPY ABDOMEN DIAGNOSTIC  1987         ROS:  Comprehensive review of systems negative except for as noted above. Objective:       Meds:  Current Outpatient Medications   Medication Sig Dispense Refill    calcium carbonate (CALTREX) 600 mg calcium (1,500 mg) tablet Take 600 mg by mouth two (2) times a day.  pantoprazole (PROTONIX) 40 mg tablet Take 1 Tab by mouth nightly. DC raitidine 30 Tab 4    cholecalciferol (VITAMIN D3) 2,000 unit cap capsule Take 2,000 Units by mouth daily.  30 Cap 4    alendronate (FOSAMAX) 70 mg tablet TAKE 1 TABLET BY MOUTH EVERY SEVEN (7) DAYS. 4 Tab 11    losartan (COZAAR) 25 mg tablet TAKE 1 TABLET BY MOUTH DAILY. 90 Tab 2    loperamide (IMODIUM) 2 mg capsule TAKE 1 CAPSULE BY MOUTH FOUR (4) TIMES DAILY AS NEEDED FOR DIARRHEA FOR UP TO 30 DAYS. 120 Cap 0    polyethylene glycol (MIRALAX) 17 gram/dose powder TAKE 17 GRAMS (1 TABLESPOONFUL) MIXED IN LIQUID BY MOUTH DAILY AS DIRECTED. 527 g 0    gabapentin (NEURONTIN) 100 mg capsule TAKE 2 CAPSULES BY MOUTH IN THE MORNING AND 1 CAP BY MOUTH IN THE EVENING 90 Cap 5    fenofibrate nanocrystallized (TRICOR) 145 mg tablet TAKE 1 TAB BY MOUTH EVERY FOURTY-EIGHT (48) HOURS. 30 Tab 2    amitriptyline (ELAVIL) 75 mg tablet TAKE 1 TABLET BY MOUTH EVERY DAY AT NIGHT 90 Tab 1    Lactobacillus acidophilus (ACIDOPHILUS) cap TAKE ONE CAPSULE BY MOUTH DAILY 100 Cap 0    brimonidine (LUMIFY) 0.025 % drop Apply  to eye.  carboxymethylcellulose sodium (THERATEARS) 0.25 % drop ophthalmic solution Administer 1 Drop to both eyes.  OLANZapine (ZYPREXA) 15 mg tablet TAKE ONE TABLET AT BEDTIME  2    cetirizine (ZYRTEC) 10 mg tablet Take 10 mg by mouth daily.  LORazepam (ATIVAN) 0.5 mg tablet Take 1 Tab by mouth every eight (8) hours as needed for Anxiety. Max Daily Amount: 1.5 mg. D/C valium (Patient taking differently: Take 0.5 mg by mouth three (3) times daily as needed. D/C valium) 90 Tab 2    vitamin E (AQUA GEMS) 400 unit capsule Take 400 Units by mouth daily. Exam:  Visit Vitals  /76   Pulse 88   Resp 18   Wt 76.2 kg (168 lb)   SpO2 98%   BMI 32.81 kg/m²     NEUROLOGICAL EXAM:  Cardiac: RRR  Lungs: CTAB  General: Awake, alert, speech fluent  CN: PERRL, EOMI without nystagmus, VFF to confrontation, facial sensation and strength are normal and symmetric, hearing is intact to finger rub bilaterally, palate and tongue movements are intact and symmetric. Motor: Normal tone, bulk and strength bilaterally.   Reflexes: 2/4 and symmetric, plantar stimulation is flexor. Coordination: FNF, XUAN, HTS intact. Sensation: LT intact throughout. Gait: Normal-based and steady. LABS  Results for orders placed or performed in visit on 10/01/19   HEMOGLOBIN A1C WITH EAG   Result Value Ref Range    Hemoglobin A1c 5.7 (H) 4.8 - 5.6 %    Estimated average glucose 117 mg/dL       IMAGING:  MRI Results (most recent):  No results found for this or any previous visit. Assessment:     Encounter Diagnoses     ICD-10-CM ICD-9-CM   1. Chronic daily headache R51 784.0   2. Bipolar affective disorder, remission status unspecified ([facility] F31.9 296.80   3. Hypertension, unspecified type I836.9   62year old female with a h/o HTN, bipolar d/o here for f/u of chronic daily headaches. These are improved with limitation of abortive headache therapy and titration of Elavil. She is tolerating preventative medication without significant side effects. Will continue current tx. Plan:   Cont.  Elavil 75mg qhs  Limit OTC analgesics to no more than twice weekly to prevent rebound headaches  PRN f/u if new/worsening symptoms      Signed:  Noé Frost DO  10/15/2019

## 2019-10-18 ENCOUNTER — OFFICE VISIT (OUTPATIENT)
Dept: INTERNAL MEDICINE CLINIC | Age: 59
End: 2019-10-18

## 2019-10-18 VITALS
HEART RATE: 96 BPM | SYSTOLIC BLOOD PRESSURE: 138 MMHG | OXYGEN SATURATION: 97 % | BODY MASS INDEX: 33.18 KG/M2 | WEIGHT: 169 LBS | DIASTOLIC BLOOD PRESSURE: 84 MMHG | HEIGHT: 60 IN | RESPIRATION RATE: 20 BRPM | TEMPERATURE: 98.3 F

## 2019-10-18 DIAGNOSIS — K21.9 GASTROESOPHAGEAL REFLUX DISEASE, ESOPHAGITIS PRESENCE NOT SPECIFIED: ICD-10-CM

## 2019-10-18 DIAGNOSIS — I10 ESSENTIAL HYPERTENSION: ICD-10-CM

## 2019-10-18 DIAGNOSIS — B37.9 YEAST INFECTION: Primary | ICD-10-CM

## 2019-10-18 DIAGNOSIS — T14.8XXA BLISTER: ICD-10-CM

## 2019-10-18 RX ORDER — MICONAZOLE NITRATE 2 %
1 CREAM WITH APPLICATOR VAGINAL
Qty: 45 G | Refills: 0 | Status: SHIPPED | OUTPATIENT
Start: 2019-10-18 | End: 2019-12-09 | Stop reason: ALTCHOICE

## 2019-10-18 NOTE — PROGRESS NOTES
HISTORY OF PRESENT ILLNESS  Miri Munoz is a 62 y.o. female. This is a patient of Dr. Martin Cantu who presents today with complaints of rash. The patient states she developed bumps to an area on buttocks and labia 3-4 days ago. Area is sore. They have decreased in size and tenderness since onset. Patient also has some concerns about Pantoprazole. She states she has been taking it for several weeks. She has read about potential side effects and wonders if she can switch to Nexium instead, which she saw a commercial about on TV. She has not experienced any side effects from Pantoprazole. She states she took Omeprazole in the past for GERD prior to use of Pantoprazole. Visit Vitals  /84 (BP 1 Location: Left arm, BP Patient Position: Sitting)   Pulse 96   Temp 98.3 °F (36.8 °C) (Oral)   Resp 20   Ht 5' (1.524 m)   Wt 169 lb (76.7 kg)   SpO2 97%   BMI 33.01 kg/m²     HPI    Review of Systems   Constitutional: Negative for chills and fever. HENT: Negative for congestion. Respiratory: Negative for cough and shortness of breath. Cardiovascular: Negative for chest pain. Gastrointestinal: Negative for blood in stool, constipation, nausea and vomiting. Genitourinary: Negative. Musculoskeletal: Negative. Skin: Positive for rash. Neurological: Negative for headaches. Psychiatric/Behavioral: Negative. Physical Exam   Constitutional: She is oriented to person, place, and time. She appears well-developed and well-nourished. No distress. HENT:   Head: Normocephalic and atraumatic. Cardiovascular: Normal rate and regular rhythm. Pulmonary/Chest: Effort normal and breath sounds normal. No respiratory distress. She has no wheezes. She has no rales. Abdominal: Soft. Bowel sounds are normal. She exhibits no distension. There is no tenderness. Genitourinary: There is erythema in the vagina. No tenderness or bleeding in the vagina. No foreign body in the vagina.    Genitourinary Comments: Small area that appears as blister to left buttock, no further blisters noted   Musculoskeletal: She exhibits no edema. Neurological: She is alert and oriented to person, place, and time. Skin: Skin is warm and dry. Psychiatric: She has a normal mood and affect. Her behavior is normal.   Nursing note and vitals reviewed. ASSESSMENT and PLAN  Encounter Diagnoses   Name Primary?  Yeast infection Yes    Blister     Gastroesophageal reflux disease, esophagitis presence not specified     Essential hypertension      Discussed possibility of viral cause of blisters in genital area, Herpes virus. Patient denies any history of this or experience of blisters in the past.  Only one blister noted on buttock at this time, which patient states is improving. Will treat for vaginal yeast infection and patient to notify of further experience of blistered rash. Orders Placed This Encounter    miconazole (MONISTAT 7) 2 % vaginal cream     Discussed use and potential side effects of PPIs with patient in detail. Reviewed that omeprazole, pantoprazole, and Nexium are all within this same class of medication. Discussed recommendation to take medication for the shortest amount of time possible, to control symptoms. Patient elects to continue with pantoprazole. Reviewed medications and side effects in detail    Patient encouraged to call or return to office if symptoms do not improve or worsen. Reviewed plan of care with patient who acknowledges understanding and agrees. Follow-up with Dr. Bridgette Orellana in 3 months, as scheduled or sooner as needed.

## 2019-10-18 NOTE — PROGRESS NOTES
Health Maintenance Due   Topic Date Due    Shingrix Vaccine Age 49> (1 of 2) 05/19/2008    PAP AKA CERVICAL CYTOLOGY  04/25/2017    BREAST CANCER SCRN MAMMOGRAM  03/28/2019       Chief Complaint   Patient presents with    Skin Problem     states bumps on genital area, buttocks and posterior thigh, states bumps are painful, noted them thursday    Medication Evaluation     want to talk about changing PPI from pantoprazole       1. Have you been to the ER, urgent care clinic since your last visit? Hospitalized since your last visit? No    2. Have you seen or consulted any other health care providers outside of the [address] [place] [address] since your last visit? Include any pap smears or colon screening. No    3) Do you have an Advance Directive on file? no    4) Are you interested in receiving information on Advance Directives? NO      Patient is accompanied by self I have received verbal consent from Yadira Grant to discuss any/all medical information while they are present in the room.

## 2019-11-08 ENCOUNTER — TELEPHONE (OUTPATIENT)
Dept: INTERNAL MEDICINE CLINIC | Age: 59
End: 2019-11-08

## 2019-11-08 NOTE — TELEPHONE ENCOUNTER
Queen calling re: Pantoprazole being denied. Last time called in was 10/1 States next refill is in December. Pls call to clarify refill date?

## 2019-11-08 NOTE — TELEPHONE ENCOUNTER
Returned [employer] and after verifying HIPAA reviewed the issue on the patient's account. Asked Naomi Mcleanstiven to review the pt's last script and upon her review it was found that the patient received a 90 days supply with her last fill and that is why insurance is denying her refill so soon.

## 2019-12-09 ENCOUNTER — OFFICE VISIT (OUTPATIENT)
Dept: INTERNAL MEDICINE CLINIC | Age: 59
End: 2019-12-09

## 2019-12-09 VITALS
HEART RATE: 85 BPM | TEMPERATURE: 99.3 F | DIASTOLIC BLOOD PRESSURE: 80 MMHG | HEIGHT: 60 IN | RESPIRATION RATE: 18 BRPM | SYSTOLIC BLOOD PRESSURE: 125 MMHG | WEIGHT: 160.6 LBS | OXYGEN SATURATION: 98 % | BODY MASS INDEX: 31.53 KG/M2

## 2019-12-09 DIAGNOSIS — I10 ESSENTIAL HYPERTENSION: Primary | ICD-10-CM

## 2019-12-09 DIAGNOSIS — R73.03 PRE-DIABETES: ICD-10-CM

## 2019-12-09 DIAGNOSIS — E78.2 ELEVATED TRIGLYCERIDES WITH HIGH CHOLESTEROL: ICD-10-CM

## 2019-12-09 DIAGNOSIS — G44.221 CHRONIC TENSION-TYPE HEADACHE, INTRACTABLE: ICD-10-CM

## 2019-12-09 DIAGNOSIS — F31.10 BIPOLAR AFFECTIVE DISORDER, CURRENT EPISODE MANIC, CURRENT EPISODE SEVERITY UNSPECIFIED (HCC): ICD-10-CM

## 2019-12-09 DIAGNOSIS — K21.9 GASTROESOPHAGEAL REFLUX DISEASE WITHOUT ESOPHAGITIS: ICD-10-CM

## 2019-12-09 DIAGNOSIS — K58.9 IRRITABLE BOWEL SYNDROME WITHOUT DIARRHEA: ICD-10-CM

## 2019-12-09 RX ORDER — FENOFIBRATE 145 MG/1
145 TABLET, COATED ORAL
Qty: 30 TAB | Refills: 2 | Status: SHIPPED | OUTPATIENT
Start: 2019-12-09 | End: 2020-01-13

## 2019-12-09 RX ORDER — LOPERAMIDE HYDROCHLORIDE 2 MG/1
CAPSULE ORAL
Qty: 120 CAP | Refills: 0 | Status: SHIPPED | OUTPATIENT
Start: 2019-12-09 | End: 2021-09-13 | Stop reason: CLARIF

## 2019-12-09 NOTE — PROGRESS NOTES
HISTORY OF PRESENT ILLNESS  Samia Da Silva is a 62 y.o. female. This is a patient of Dr. Marisela Gramajo who presents today for follow-up and medication review. She states she has been feeling drowsy, even during the day. She wonders if she should take her medications at different times, as she feels they may be causing the drowsiness. She currently takes gabapentin 200 mg in the morning and 100 mg at 5 o' clock. She also takes cetirizine in the morning. Visit Vitals  /80   Pulse 85   Temp 99.3 °F (37.4 °C)   Resp 18   Ht 5' (1.524 m)   Wt 160 lb 9.6 oz (72.8 kg)   SpO2 98%   BMI 31.37 kg/m²     HPI    Review of Systems   Constitutional: Positive for malaise/fatigue. Negative for chills and fever. Drowsiness   HENT: Negative for congestion. Respiratory: Negative for cough and shortness of breath. Cardiovascular: Negative for chest pain. Gastrointestinal: Negative for abdominal pain. Genitourinary: Negative. Musculoskeletal: Negative. Skin: Negative. Neurological: Negative for headaches. Psychiatric/Behavioral: Negative. Physical Exam  Vitals signs and nursing note reviewed. Constitutional:       General: She is not in acute distress. Appearance: She is well-developed. HENT:      Head: Normocephalic and atraumatic. Cardiovascular:      Rate and Rhythm: Normal rate and regular rhythm. Pulmonary:      Effort: Pulmonary effort is normal. No respiratory distress. Breath sounds: Normal breath sounds. No wheezing or rales. Abdominal:      General: Bowel sounds are normal. There is no distension. Palpations: Abdomen is soft. Tenderness: There is no tenderness. Skin:     General: Skin is warm and dry. Neurological:      Mental Status: She is alert and oriented to person, place, and time. Psychiatric:         Behavior: Behavior normal.         ASSESSMENT and PLAN  Encounter Diagnoses   Name Primary?     Essential hypertension Yes    Gastroesophageal reflux disease without esophagitis     Chronic tension-type headache, intractable     Irritable bowel syndrome without diarrhea     Bipolar affective disorder, current episode manic, current episode severity unspecified ([facility]     Elevated triglycerides with high cholesterol     Pre-diabetes      Reviewed current medications, usages, and possible side effects in detail. Recommend patient take Pantoprazole in the morning, at least 30 minutes before meal.  May take cetirizine in the evening. May take gabapentin 200 mg at noon and 100 mg in the evening. Lab results and schedule of future lab studies reviewed with patient  reviewed medications and side effects in detail    Patient encouraged to call or return to office if symptoms do not improve or worsen. Reviewed plan of care with patient who acknowledges understanding and agrees. Follow-up with Dr. Adiel Beltre in one month, as scheduled, or sooner as needed.

## 2019-12-16 RX ORDER — AMITRIPTYLINE HYDROCHLORIDE 75 MG/1
TABLET, FILM COATED ORAL
Qty: 30 TAB | Refills: 5 | Status: SHIPPED | OUTPATIENT
Start: 2019-12-16 | End: 2020-06-02

## 2019-12-17 ENCOUNTER — OFFICE VISIT (OUTPATIENT)
Dept: INTERNAL MEDICINE CLINIC | Age: 59
End: 2019-12-17

## 2019-12-17 VITALS
TEMPERATURE: 98 F | HEIGHT: 60 IN | WEIGHT: 162.4 LBS | RESPIRATION RATE: 16 BRPM | HEART RATE: 97 BPM | BODY MASS INDEX: 31.88 KG/M2 | SYSTOLIC BLOOD PRESSURE: 118 MMHG | OXYGEN SATURATION: 98 % | DIASTOLIC BLOOD PRESSURE: 64 MMHG

## 2019-12-17 DIAGNOSIS — I10 ESSENTIAL HYPERTENSION: ICD-10-CM

## 2019-12-17 DIAGNOSIS — F31.10 BIPOLAR AFFECTIVE DISORDER, CURRENT EPISODE MANIC, CURRENT EPISODE SEVERITY UNSPECIFIED (HCC): ICD-10-CM

## 2019-12-17 DIAGNOSIS — R30.0 DYSURIA: Primary | ICD-10-CM

## 2019-12-17 LAB
BILIRUB UR QL STRIP: NEGATIVE
GLUCOSE UR-MCNC: NEGATIVE MG/DL
KETONES P FAST UR STRIP-MCNC: NEGATIVE MG/DL
PH UR STRIP: 5 [PH] (ref 4.6–8)
PROT UR QL STRIP: NEGATIVE
SP GR UR STRIP: 1.01 (ref 1–1.03)
UA UROBILINOGEN AMB POC: NORMAL (ref 0.2–1)
URINALYSIS CLARITY POC: CLEAR
URINALYSIS COLOR POC: YELLOW
URINE BLOOD POC: NEGATIVE
URINE LEUKOCYTES POC: NEGATIVE
URINE NITRITES POC: NEGATIVE

## 2019-12-17 NOTE — PROGRESS NOTES
HISTORY OF PRESENT ILLNESS  Adenike Sanchez is a 62 y.o. female here to follow-up. Report dysuria for last 2 days. No flank pain or fever. She is uncomfortable, having more urinary frequency. Has bipolar disorder. Seeing psychiatrist.  Yari Barreto done, seems stable. Hypertension, on medicine. Doing well. Medication Evaluation   Pertinent negatives include no shortness of breath. Hypertension    Pertinent negatives include no shortness of breath. Bipolar   Pertinent negatives include no shortness of breath. Bladder Infection          Review of Systems   Constitutional: Negative. HENT: Positive for hoarse voice. Eyes: Negative. Respiratory: Negative. Negative for shortness of breath and wheezing. Cardiovascular: Negative. Gastrointestinal: Negative. Negative for blood in stool. Genitourinary: Positive for dysuria. Musculoskeletal: Negative. Negative for falls. Skin: Negative. Negative for itching. Endo/Heme/Allergies: Negative. Psychiatric/Behavioral: Positive for depression. Physical Exam  Constitutional:       General: She is not in acute distress. Appearance: Normal appearance. She is well-developed and normal weight. Neck:      Musculoskeletal: Normal range of motion and neck supple. Thyroid: No thyromegaly. Vascular: No JVD. Cardiovascular:      Rate and Rhythm: Normal rate and regular rhythm. Heart sounds: Normal heart sounds. Pulmonary:      Effort: Pulmonary effort is normal. No respiratory distress. Breath sounds: Normal breath sounds. No wheezing. Abdominal:      General: Abdomen is flat. Bowel sounds are normal. There is no distension. Palpations: Abdomen is soft. Tenderness: There is no tenderness. Comments: KUB  nontender. Neurological:      Mental Status: She is alert. Deep Tendon Reflexes: Reflexes are normal and symmetric.    Psychiatric:         Mood and Affect: Mood normal.         Behavior: Behavior normal.      Comments: Anxious. ASSESSMENT and PLAN  Diagnoses and all orders for this visit:    1. Dysuria    Need to drink more fluid. Will order,  -     AMB POC URINALYSIS DIP STICK MANUAL W/O MICRO negative for UTI. Will send out,  -     CULTURE, URINE    2. Essential hypertension    On losartan. Stable. 3. Bipolar affective disorder, current episode manic, current episode severity unspecified ([facility] [address] [address]  Seeing Dr. Anai Osborn. On Zyprexa and amitriptyline. Doing well. Discussed expected course/resolution/complications of diagnosis in detail with patient. Medication risks/benefits/costs/interactions/alternatives discussed with patient. Pt was given an after visit summary which includes diagnoses, current medications & vitals. Pt expressed understanding with the diagnosis and plan.

## 2019-12-17 NOTE — PROGRESS NOTES
There are no preventive care reminders to display for this patient. Chief Complaint   Patient presents with    Bladder Infection    Hypertension    Bipolar       1. Have you been to the ER, urgent care clinic since your last visit? Hospitalized since your last visit? No    2. Have you seen or consulted any other health care providers outside of the [address] [place] [address] since your last visit? Include any pap smears or colon screening. No    3) Do you have an Advance Directive on file? yes    4) Are you interested in receiving information on Advance Directives? NO      Patient is accompanied by self I have received verbal consent from Sami Alamo to discuss any/all medical information while they are present in the room.     Results for orders placed or performed in visit on 12/17/19   AMB POC URINALYSIS DIP STICK MANUAL W/O MICRO   Result Value Ref Range    Color (UA POC) Yellow     Clarity (UA POC) Clear     Glucose (UA POC) Negative Negative    Bilirubin (UA POC) Negative Negative    Ketones (UA POC) Negative Negative    Specific gravity (UA POC) 1.015 1.001 - 1.035    Blood (UA POC) Negative Negative    pH (UA POC) 5.0 4.6 - 8.0    Protein (UA POC) Negative Negative    Urobilinogen (UA POC) 0.2 mg/dL 0.2 - 1    Nitrites (UA POC) Negative Negative    Leukocyte esterase (UA POC) Negative Negative

## 2019-12-19 LAB — BACTERIA UR CULT: NORMAL

## 2020-01-07 DIAGNOSIS — G44.021 INTRACTABLE CHRONIC CLUSTER HEADACHE: ICD-10-CM

## 2020-01-07 DIAGNOSIS — G44.221 CHRONIC TENSION-TYPE HEADACHE, INTRACTABLE: ICD-10-CM

## 2020-01-08 RX ORDER — GABAPENTIN 100 MG/1
CAPSULE ORAL
Qty: 90 CAP | Refills: 0 | Status: SHIPPED | OUTPATIENT
Start: 2020-01-08 | End: 2020-01-13

## 2020-01-13 DIAGNOSIS — M81.0 OSTEOPOROSIS, UNSPECIFIED OSTEOPOROSIS TYPE, UNSPECIFIED PATHOLOGICAL FRACTURE PRESENCE: ICD-10-CM

## 2020-01-13 DIAGNOSIS — E78.2 ELEVATED TRIGLYCERIDES WITH HIGH CHOLESTEROL: ICD-10-CM

## 2020-01-13 DIAGNOSIS — G44.021 INTRACTABLE CHRONIC CLUSTER HEADACHE: ICD-10-CM

## 2020-01-13 DIAGNOSIS — K21.9 GASTROESOPHAGEAL REFLUX DISEASE WITHOUT ESOPHAGITIS: ICD-10-CM

## 2020-01-13 DIAGNOSIS — G44.221 CHRONIC TENSION-TYPE HEADACHE, INTRACTABLE: ICD-10-CM

## 2020-01-13 DIAGNOSIS — I10 ESSENTIAL HYPERTENSION: ICD-10-CM

## 2020-01-13 RX ORDER — ALENDRONATE SODIUM 70 MG/1
TABLET ORAL
Qty: 4 TAB | Refills: 11 | Status: SHIPPED | OUTPATIENT
Start: 2020-01-13 | End: 2020-09-30

## 2020-01-13 RX ORDER — FENOFIBRATE 145 MG/1
145 TABLET, COATED ORAL
Qty: 15 TAB | Refills: 5 | Status: SHIPPED | OUTPATIENT
Start: 2020-01-13 | End: 2020-04-06

## 2020-01-13 RX ORDER — PANTOPRAZOLE SODIUM 40 MG/1
TABLET, DELAYED RELEASE ORAL
Qty: 30 TAB | Refills: 1 | Status: SHIPPED | OUTPATIENT
Start: 2020-01-13 | End: 2020-02-11

## 2020-01-13 RX ORDER — LOSARTAN POTASSIUM 25 MG/1
TABLET ORAL
Qty: 30 TAB | Refills: 8 | Status: SHIPPED | OUTPATIENT
Start: 2020-01-13 | End: 2020-02-11 | Stop reason: SDUPTHER

## 2020-01-13 RX ORDER — GABAPENTIN 100 MG/1
CAPSULE ORAL
Qty: 90 CAP | Refills: 1 | Status: SHIPPED | OUTPATIENT
Start: 2020-01-13 | End: 2020-02-12

## 2020-02-11 ENCOUNTER — OFFICE VISIT (OUTPATIENT)
Dept: INTERNAL MEDICINE CLINIC | Age: 60
End: 2020-02-11

## 2020-02-11 VITALS
BODY MASS INDEX: 31.18 KG/M2 | HEART RATE: 90 BPM | DIASTOLIC BLOOD PRESSURE: 80 MMHG | HEIGHT: 60 IN | OXYGEN SATURATION: 97 % | TEMPERATURE: 98.2 F | SYSTOLIC BLOOD PRESSURE: 132 MMHG | RESPIRATION RATE: 15 BRPM | WEIGHT: 158.8 LBS

## 2020-02-11 DIAGNOSIS — E55.9 VITAMIN D DEFICIENCY: ICD-10-CM

## 2020-02-11 DIAGNOSIS — K21.9 GASTROESOPHAGEAL REFLUX DISEASE WITHOUT ESOPHAGITIS: ICD-10-CM

## 2020-02-11 DIAGNOSIS — F31.9 BIPOLAR 1 DISORDER (HCC): ICD-10-CM

## 2020-02-11 DIAGNOSIS — I10 ESSENTIAL HYPERTENSION: Primary | ICD-10-CM

## 2020-02-11 DIAGNOSIS — R73.03 PREDIABETES: ICD-10-CM

## 2020-02-11 DIAGNOSIS — L60.0 INGROWN NAIL OF GREAT TOE OF RIGHT FOOT: ICD-10-CM

## 2020-02-11 DIAGNOSIS — L84 CALLUS OF FOOT: ICD-10-CM

## 2020-02-11 RX ORDER — PANTOPRAZOLE SODIUM 40 MG/1
40 TABLET, DELAYED RELEASE ORAL DAILY
Qty: 30 TAB | Refills: 1 | Status: SHIPPED | OUTPATIENT
Start: 2020-02-11 | End: 2020-03-30

## 2020-02-11 RX ORDER — LOSARTAN POTASSIUM 25 MG/1
TABLET ORAL
Qty: 60 TAB | Refills: 1 | Status: SHIPPED | OUTPATIENT
Start: 2020-02-11 | End: 2020-03-10 | Stop reason: SDUPTHER

## 2020-02-11 NOTE — PROGRESS NOTES
HISTORY OF PRESENT ILLNESS  Terrence Rust is a 62 y.o. female here to follow-up. Report right great toe pain for last several days. Has ingrown toenail and callus. She is getting physical therapy through texting. Blood pressures closely monitor. Sodium blood pressure has been 160/90. She is worried about it. Taking losartan 25 mg a day. No headache or dizziness. Has bipolar disorder. Seeing psychiatrist.  Depression seems stable. Weaning of Zyprexa. Has prediabetes, watching carbohydrate. Has lost weight. Need lab work. Hypertension    Pertinent negatives include no shortness of breath. Bipolar   Pertinent negatives include no shortness of breath. Medication Evaluation   Pertinent negatives include no shortness of breath. GERD   Pertinent negatives include no shortness of breath. Toe Pain    Pertinent negatives include no itching. Review of Systems   Constitutional: Negative. Eyes: Negative. Respiratory: Negative. Negative for shortness of breath and wheezing. Cardiovascular: Negative. Gastrointestinal: Negative. Negative for blood in stool. Genitourinary: Positive for dysuria. Musculoskeletal: Negative. Negative for falls. Skin: Negative. Negative for itching. Endo/Heme/Allergies: Negative. Psychiatric/Behavioral: Positive for depression. Physical Exam  Constitutional:       General: She is not in acute distress. Appearance: Normal appearance. She is well-developed and normal weight. Neck:      Musculoskeletal: Normal range of motion and neck supple. Thyroid: No thyromegaly. Vascular: No JVD. Cardiovascular:      Rate and Rhythm: Normal rate and regular rhythm. Heart sounds: Normal heart sounds. Pulmonary:      Effort: Pulmonary effort is normal. No respiratory distress. Breath sounds: Normal breath sounds. No wheezing. Abdominal:      General: Abdomen is flat. Bowel sounds are normal. There is no distension. Palpations: Abdomen is soft. Tenderness: There is no abdominal tenderness. Comments: KUB  nontender. Neurological:      Mental Status: She is alert. Deep Tendon Reflexes: Reflexes are normal and symmetric. Psychiatric:         Mood and Affect: Mood normal.         Behavior: Behavior normal.      Comments: Anxious. ASSESSMENT and PLAN  Diagnoses and all orders for this visit:    1. Essential hypertension    Blood pressure log reviewed, couple of time blood pressure is 160/90. She has been taking losartan 25 mg once a day. Advised her to take losartan 25 mg tablets 2 tablet a day. Monitor blood pressure closely. If blood pressure drops too low, advised her to call me back early. Otherwise I will see her in a month. Will check,  -     METABOLIC PANEL, COMPREHENSIVE  -     losartan (COZAAR) 25 mg tablet; Take 2 tab po QD    2. Ingrown nail of great toe of right foot    Will refer,  -     REFERRAL TO PODIATRY    3. Callus of foot    Will refer,  -     REFERRAL TO PODIATRY    4. Prediabetes    Watching carbohydrate. Will check,  -     METABOLIC PANEL, COMPREHENSIVE  -     HEMOGLOBIN A1C WITH EAG    5. Vitamin D deficiency  -     VITAMIN D, 25 HYDROXY    6. Bipolar 1 disorder ([facility]     seeing Dr. Susie Simpson, on vraylar, weaning off Zyprexa. -     METABOLIC PANEL, COMPREHENSIVE  -     CBC W/O DIFF          Discussed expected course/resolution/complications of diagnosis in detail with patient. Medication risks/benefits/costs/interactions/alternatives discussed with patient. Pt was given an after visit summary which includes diagnoses, current medications & vitals. Pt expressed understanding with the diagnosis and plan.

## 2020-02-11 NOTE — PROGRESS NOTES
There are no preventive care reminders to display for this patient. Chief Complaint   Patient presents with    Hypertension    GERD    Bipolar       1. Have you been to the ER, urgent care clinic since your last visit? Hospitalized since your last visit? No    2. Have you seen or consulted any other health care providers outside of the [address] [place] [address] since your last visit? Include any pap smears or colon screening. No    3) Do you have an Advance Directive on file? yes    4) Are you interested in receiving information on Advance Directives? NO      Patient is accompanied by self I have received verbal consent from [facility] to discuss any/all medical information while they are present in the room.

## 2020-02-12 LAB
25(OH)D3+25(OH)D2 SERPL-MCNC: 26.3 NG/ML (ref 30–100)
ALBUMIN SERPL-MCNC: 4.3 G/DL (ref 3.8–4.8)
ALBUMIN/GLOB SERPL: 2 {RATIO} (ref 1.2–2.2)
ALP SERPL-CCNC: 55 IU/L (ref 39–117)
ALT SERPL-CCNC: 25 IU/L (ref 0–32)
AST SERPL-CCNC: 18 IU/L (ref 0–40)
BILIRUB SERPL-MCNC: <0.2 MG/DL (ref 0–1.2)
BUN SERPL-MCNC: 17 MG/DL (ref 8–27)
BUN/CREAT SERPL: 18 (ref 12–28)
CALCIUM SERPL-MCNC: 9.5 MG/DL (ref 8.7–10.3)
CHLORIDE SERPL-SCNC: 110 MMOL/L (ref 96–106)
CO2 SERPL-SCNC: 21 MMOL/L (ref 20–29)
CREAT SERPL-MCNC: 0.94 MG/DL (ref 0.57–1)
ERYTHROCYTE [DISTWIDTH] IN BLOOD BY AUTOMATED COUNT: 13.4 % (ref 11.7–15.4)
EST. AVERAGE GLUCOSE BLD GHB EST-MCNC: 108 MG/DL
GLOBULIN SER CALC-MCNC: 2.2 G/DL (ref 1.5–4.5)
GLUCOSE SERPL-MCNC: 93 MG/DL (ref 65–99)
HBA1C MFR BLD: 5.4 % (ref 4.8–5.6)
HCT VFR BLD AUTO: 36.5 % (ref 34–46.6)
HGB BLD-MCNC: 11.7 G/DL (ref 11.1–15.9)
MCH RBC QN AUTO: 28.1 PG (ref 26.6–33)
MCHC RBC AUTO-ENTMCNC: 32.1 G/DL (ref 31.5–35.7)
MCV RBC AUTO: 88 FL (ref 79–97)
PLATELET # BLD AUTO: 318 X10E3/UL (ref 150–450)
POTASSIUM SERPL-SCNC: 4.1 MMOL/L (ref 3.5–5.2)
PROT SERPL-MCNC: 6.5 G/DL (ref 6–8.5)
RBC # BLD AUTO: 4.17 X10E6/UL (ref 3.77–5.28)
SODIUM SERPL-SCNC: 147 MMOL/L (ref 134–144)
WBC # BLD AUTO: 5.1 X10E3/UL (ref 3.4–10.8)

## 2020-02-13 NOTE — PROGRESS NOTES
No diabetes right now. Low vitamin D, advised to take vitamin D3 1000 unit once a day for 4 months. All other labs are stable.

## 2020-03-10 ENCOUNTER — OFFICE VISIT (OUTPATIENT)
Dept: INTERNAL MEDICINE CLINIC | Age: 60
End: 2020-03-10

## 2020-03-10 VITALS
SYSTOLIC BLOOD PRESSURE: 132 MMHG | RESPIRATION RATE: 14 BRPM | OXYGEN SATURATION: 98 % | DIASTOLIC BLOOD PRESSURE: 82 MMHG | BODY MASS INDEX: 30.19 KG/M2 | WEIGHT: 153.8 LBS | HEART RATE: 93 BPM | HEIGHT: 60 IN | TEMPERATURE: 98.4 F

## 2020-03-10 DIAGNOSIS — I10 ESSENTIAL HYPERTENSION: Primary | ICD-10-CM

## 2020-03-10 DIAGNOSIS — E55.9 VITAMIN D DEFICIENCY: ICD-10-CM

## 2020-03-10 DIAGNOSIS — F31.9 BIPOLAR 1 DISORDER (HCC): ICD-10-CM

## 2020-03-10 DIAGNOSIS — R73.03 PREDIABETES: ICD-10-CM

## 2020-03-10 RX ORDER — LOSARTAN POTASSIUM 25 MG/1
25 TABLET ORAL DAILY
Qty: 90 TAB | Refills: 0
Start: 2020-03-10 | End: 2020-05-04

## 2020-03-10 NOTE — PROGRESS NOTES
There are no preventive care reminders to display for this patient. Chief Complaint   Patient presents with    Hypertension    Blood sugar problem     Prediabetes    Bipolar       1. Have you been to the ER, urgent care clinic since your last visit? Hospitalized since your last visit? No    2. Have you seen or consulted any other health care providers outside of the [address] [place] [address] since your last visit? Include any pap smears or colon screening. No    3) Do you have an Advance Directive on file? yes    4) Are you interested in receiving information on Advance Directives? NO      Patient is accompanied by self I have received verbal consent from Arlet Richards to discuss any/all medical information while they are present in the room.

## 2020-03-10 NOTE — PROGRESS NOTES
HISTORY OF PRESENT ILLNESS  Stevie Queen is a 62 y.o. female here to follow-up. Here for blood pressure follow-up. She tried losartan 50 mg and 1 day and which made her having chest pain. She has stopped doing it. It is losartan 25 mg a day. She is monitoring her blood pressure, seems stable. No chest pain palpitation or shortness of breath  Has bipolar disorder. Seeing psychiatrist.  Depression seems stable. Weaning of Zyprexa. Has prediabetes, watching carbohydrate. Has lost weight. Need lab work. Hypertension    Pertinent negatives include no shortness of breath. GERD   Pertinent negatives include no shortness of breath. Bipolar   Pertinent negatives include no shortness of breath. Medication Evaluation   Pertinent negatives include no shortness of breath. Review of Systems   Constitutional: Negative. HENT: Negative. Eyes: Negative. Respiratory: Negative. Negative for shortness of breath and wheezing. Cardiovascular: Negative. Gastrointestinal: Negative. Negative for blood in stool. Genitourinary: Negative. Musculoskeletal: Negative. Negative for falls. Skin: Negative. Negative for itching. Endo/Heme/Allergies: Negative. Psychiatric/Behavioral: Positive for depression. The patient is nervous/anxious. Physical Exam  Constitutional:       General: She is not in acute distress. Appearance: Normal appearance. She is well-developed and normal weight. Neck:      Musculoskeletal: Normal range of motion and neck supple. Thyroid: No thyromegaly. Vascular: No JVD. Cardiovascular:      Rate and Rhythm: Normal rate and regular rhythm. Pulses: Normal pulses. Heart sounds: Normal heart sounds. Pulmonary:      Effort: Pulmonary effort is normal. No respiratory distress. Breath sounds: Normal breath sounds. No wheezing. Abdominal:      Comments: KUB  nontender. Neurological:      Mental Status: She is alert.       Deep Tendon Reflexes: Reflexes are normal and symmetric. Psychiatric:         Mood and Affect: Mood normal.         Behavior: Behavior normal.      Comments: Anxious. ASSESSMENT and PLAN  Diagnoses and all orders for this visit:    1. Essential hypertension    Stable blood pressure. She tried losartan 50 mg 1 day which caused her chest pain. She is not willing to take to 25 mg tablets. She believed that losartan 25 is doing its job. We will continue losartan 25 mg a day. -     losartan (COZAAR) 25 mg tablet; Take 1 Tab by mouth daily. 2. Bipolar 1 disorder ([address]    On vraylar, dosage was increased. Doing well. 3. Vitamin D deficiency  On vitamin D supplement. 4. Prediabetes  Be on ADA  diet and exercise. Discussed expected course/resolution/complications of diagnosis in detail with patient. Medication risks/benefits/costs/interactions/alternatives discussed with patient. Pt was given an after visit summary which includes diagnoses, current medications & vitals. Pt expressed understanding with the diagnosis and plan.

## 2020-05-04 DIAGNOSIS — I10 ESSENTIAL HYPERTENSION: ICD-10-CM

## 2020-05-04 RX ORDER — LOSARTAN POTASSIUM 25 MG/1
TABLET ORAL
Qty: 60 TAB | Refills: 0 | Status: SHIPPED | OUTPATIENT
Start: 2020-05-04 | End: 2020-07-06 | Stop reason: SDUPTHER

## 2020-05-19 DIAGNOSIS — K21.9 GASTROESOPHAGEAL REFLUX DISEASE WITHOUT ESOPHAGITIS: ICD-10-CM

## 2020-05-19 RX ORDER — PANTOPRAZOLE SODIUM 40 MG/1
TABLET, DELAYED RELEASE ORAL
Qty: 30 TAB | Refills: 0 | Status: SHIPPED | OUTPATIENT
Start: 2020-05-19 | End: 2020-06-18

## 2020-05-28 DIAGNOSIS — G44.221 CHRONIC TENSION-TYPE HEADACHE, INTRACTABLE: ICD-10-CM

## 2020-05-28 DIAGNOSIS — G44.021 INTRACTABLE CHRONIC CLUSTER HEADACHE: ICD-10-CM

## 2020-05-28 RX ORDER — GABAPENTIN 100 MG/1
CAPSULE ORAL
Qty: 90 CAP | Refills: 0 | Status: SHIPPED | OUTPATIENT
Start: 2020-05-28 | End: 2020-07-06 | Stop reason: SDUPTHER

## 2020-06-02 RX ORDER — AMITRIPTYLINE HYDROCHLORIDE 75 MG/1
TABLET, FILM COATED ORAL
Qty: 30 TAB | Refills: 0 | Status: SHIPPED | OUTPATIENT
Start: 2020-06-02 | End: 2020-07-06 | Stop reason: SDUPTHER

## 2020-06-12 ENCOUNTER — TELEPHONE (OUTPATIENT)
Dept: NEUROLOGY | Age: 60
End: 2020-06-12

## 2020-06-12 NOTE — TELEPHONE ENCOUNTER
Spoke with patient to schedule virtual visit. She says she is unable to, but can do a phone call or come in to the office to see  and wanted to know if that was okay.

## 2020-06-18 DIAGNOSIS — K21.9 GASTROESOPHAGEAL REFLUX DISEASE WITHOUT ESOPHAGITIS: ICD-10-CM

## 2020-06-18 RX ORDER — PANTOPRAZOLE SODIUM 40 MG/1
TABLET, DELAYED RELEASE ORAL
Qty: 30 TAB | Refills: 0 | Status: SHIPPED | OUTPATIENT
Start: 2020-06-18 | End: 2020-07-20 | Stop reason: SDUPTHER

## 2020-07-20 DIAGNOSIS — K21.9 GASTROESOPHAGEAL REFLUX DISEASE WITHOUT ESOPHAGITIS: ICD-10-CM

## 2020-07-21 ENCOUNTER — OFFICE VISIT (OUTPATIENT)
Dept: INTERNAL MEDICINE CLINIC | Age: 60
End: 2020-07-21

## 2020-07-21 VITALS
HEART RATE: 93 BPM | BODY MASS INDEX: 29.88 KG/M2 | OXYGEN SATURATION: 99 % | RESPIRATION RATE: 14 BRPM | WEIGHT: 152.2 LBS | HEIGHT: 60 IN | SYSTOLIC BLOOD PRESSURE: 108 MMHG | TEMPERATURE: 97.7 F | DIASTOLIC BLOOD PRESSURE: 62 MMHG

## 2020-07-21 DIAGNOSIS — Z71.89 ACP (ADVANCE CARE PLANNING): ICD-10-CM

## 2020-07-21 DIAGNOSIS — E55.9 VITAMIN D DEFICIENCY: ICD-10-CM

## 2020-07-21 DIAGNOSIS — F31.9 BIPOLAR 1 DISORDER (HCC): ICD-10-CM

## 2020-07-21 DIAGNOSIS — Z00.00 MEDICARE ANNUAL WELLNESS VISIT, SUBSEQUENT: ICD-10-CM

## 2020-07-21 DIAGNOSIS — R73.03 PREDIABETES: ICD-10-CM

## 2020-07-21 DIAGNOSIS — I10 ESSENTIAL HYPERTENSION: Primary | ICD-10-CM

## 2020-07-21 DIAGNOSIS — Z23 ENCOUNTER FOR IMMUNIZATION: ICD-10-CM

## 2020-07-21 RX ORDER — PANTOPRAZOLE SODIUM 40 MG/1
40 TABLET, DELAYED RELEASE ORAL DAILY
Qty: 90 TAB | Refills: 1 | Status: SHIPPED | OUTPATIENT
Start: 2020-07-21 | End: 2020-12-22

## 2020-07-21 RX ORDER — ZOSTER VACCINE RECOMBINANT, ADJUVANTED 50 MCG/0.5
0.5 KIT INTRAMUSCULAR ONCE
Qty: 0.5 ML | Refills: 1 | Status: SHIPPED | OUTPATIENT
Start: 2020-07-21 | End: 2020-07-21

## 2020-07-21 RX ORDER — KETOTIFEN FUMARATE 0.35 MG/ML
1 SOLUTION/ DROPS OPHTHALMIC 2 TIMES DAILY
COMMUNITY
End: 2021-08-03 | Stop reason: ALTCHOICE

## 2020-07-21 NOTE — PROGRESS NOTES
Health Maintenance Due   Topic Date Due    Medicare Yearly Exam  05/07/2020       Chief Complaint   Patient presents with    Annual Wellness Visit    Hypertension    Blood sugar problem     Prediabetes    Bipolar       1. Have you been to the ER, urgent care clinic since your last visit? Hospitalized since your last visit? No    2. Have you seen or consulted any other health care providers outside of the [address] [place] [address] since your last visit? Include any pap smears or colon screening. No    3) Do you have an Advance Directive on file? yes    4) Are you interested in receiving information on Advance Directives? NO      Patient is accompanied by self I have received verbal consent from Nicole Delatorre to discuss any/all medical information while they are present in the room.

## 2020-07-21 NOTE — PATIENT INSTRUCTIONS

## 2020-07-21 NOTE — PROGRESS NOTES
HISTORY OF PRESENT ILLNESS  Sonia Hutchinson is a 56 y.o. female here to follow-up. Has hypertension, compliant with medicine. Denies chest pain palpitation or shortness of breath. Has bipolar disorder. Seeing psychiatrist.  Depression seems stable. Weaning of Zyprexa. Has prediabetes, watching carbohydrate. Has lost weight. Need lab work. Here for Medicare wellness visit. Has no living will. Needs shingles vaccine. Hypertension    Pertinent negatives include no shortness of breath. Bipolar   Pertinent negatives include no shortness of breath. GERD   Pertinent negatives include no shortness of breath. Medication Evaluation   Pertinent negatives include no shortness of breath. Blood sugar problem   Pertinent negatives include no shortness of breath. Review of Systems   Constitutional: Negative. HENT: Negative. Eyes: Negative. Respiratory: Negative. Negative for shortness of breath and wheezing. Cardiovascular: Negative. Gastrointestinal: Negative. Negative for blood in stool. Genitourinary: Negative. Musculoskeletal: Negative. Negative for falls. Skin: Negative. Negative for itching. Endo/Heme/Allergies: Negative. Psychiatric/Behavioral: Positive for depression. The patient is nervous/anxious. Physical Exam  Constitutional:       General: She is not in acute distress. Appearance: Normal appearance. She is well-developed and normal weight. Neck:      Musculoskeletal: Normal range of motion and neck supple. Thyroid: No thyromegaly. Vascular: No JVD. Cardiovascular:      Rate and Rhythm: Normal rate and regular rhythm. Pulses: Normal pulses. Heart sounds: Normal heart sounds. Pulmonary:      Effort: Pulmonary effort is normal. No respiratory distress. Breath sounds: Normal breath sounds. No wheezing. Abdominal:      Comments: KUB  nontender. Neurological:      Mental Status: She is alert.       Deep Tendon Reflexes: Reflexes are normal and symmetric. Psychiatric:         Mood and Affect: Mood normal.         Behavior: Behavior normal.         ASSESSMENT and PLAN  Diagnoses and all orders for this visit:    1. Essential hypertension  Stable blood pressure. On losartan. Will check,    -     METABOLIC PANEL, COMPREHENSIVE    2. Encounter for immunization  We will give,    -     varicella-zoster recombinant, PF, (Shingrix, PF,) 50 mcg/0.5 mL susr injection; 0.5 mL by IntraMUSCular route once for 1 dose. 3. Bipolar 1 disorder (HCC)    Stable. And seeing psychiatrist on multiple medications.  -     METABOLIC PANEL, COMPREHENSIVE  -     CBC W/O DIFF    4. Prediabetes    Watch carbohydrate. Will check,  -     HEMOGLOBIN A1C WITH EAG    5. Vitamin D deficiency  -     VITAMIN D, 25 HYDROXY    6. Medicare annual wellness visit, subsequent    7. ACP (advance care planning)            Discussed expected course/resolution/complications of diagnosis in detail with patient. Medication risks/benefits/costs/interactions/alternatives discussed with patient. Pt was given an after visit summary which includes diagnoses, current medications & vitals. Pt expressed understanding with the diagnosis and plan.

## 2020-07-21 NOTE — ACP (ADVANCE CARE PLANNING)

## 2020-07-21 NOTE — PROGRESS NOTES
This is the Subsequent Medicare Annual Wellness Exam, performed 12 months or more after the Initial AWV or the last Subsequent AWV    I have reviewed the patient's medical history in detail and updated the computerized patient record. History     Patient Active Problem List   Diagnosis Code    Arthritis M19.90    Gallstones with obstruction of gallbladder K80.21    Choledocholithiasis K80.50    Bipolar affective disorder, manic ([facility] F31.10    IBS (irritable bowel syndrome) K58.9    Chronic headache R51    Drop attack R55    Altered mental status R41.82    Delirium R41.0    Hyponatremia E87.1    Bipolar 1 disorder ([address] F31.9    ACP (advance care planning) Z71.89    Rectal prolapse K62.3    Osteoarthritis of right knee M17.11    Total knee replacement status, right Z96.651     Past Medical History:   Diagnosis Date    Arthritis     Bipolar disorder ([address]     Bladder pain     Choledocholithiasis 11/24/2010    GERD (gastroesophageal reflux disease)     Headache(784.0)     tension headaches    High cholesterol     History of cholecystectomy     Hypertension     Irritable bowel     Pelvic pain in female 2014    Psychiatric disorder     Stool color black     irritable bowel      Past Surgical History:   Procedure Laterality Date    COLONOSCOPY N/A 7/5/2017    COLONOSCOPY performed by Fran Phoenix, MD at [address] [facility]  2003, 2017    prolasped rectum    HX LAP CHOLECYSTECTOMY  11/23/10    HX ORTHOPAEDIC      HX KALEIGH AND BSO  1998    LAPAROSCOPY ABDOMEN DIAGNOSTIC  1987     Current Outpatient Medications   Medication Sig Dispense Refill    ketotifen (Alaway) 0.025 % (0.035 %) ophthalmic solution Administer 1 Drop to both eyes two (2) times a day.       losartan (COZAAR) 25 mg tablet TAKE TWO TABLETS BY MOUTH EVERY DAY 60 Tab 3    amitriptyline (ELAVIL) 75 mg tablet TAKE ONE TABLET BY MOUTH EVERY NIGHT 30 Tab 3    gabapentin (NEURONTIN) 100 mg capsule 2 caps po am and 1 cap po bedtime. 90 Cap 2    pantoprazole (PROTONIX) 40 mg tablet TAKE ONE TABLET BY MOUTH EVERY NIGHT (STOP RANTIDINE). Do not crush, break or chew. Swallow whole. (Patient taking differently: Take 40 mg by mouth daily.) 30 Tab 0    fenofibrate nanocrystallized (TRICOR) 145 mg tablet TAKE ONE TABLET BY MOUTH EVERY 48 HOURS 15 Tab 5    cariprazine (VRAYLAR) 1.5 mg capsule Take 3 mg by mouth daily.  alendronate (FOSAMAX) 70 mg tablet TAKE 1 TABLET BY MOUTH EVERY SEVEN (7) DAYS. 4 Tab 11    calcium carbonate (CALTREX) 600 mg calcium (1,500 mg) tablet Take 600 mg by mouth two (2) times a day.  cholecalciferol (VITAMIN D3) 2,000 unit cap capsule Take 2,000 Units by mouth daily. 30 Cap 4    polyethylene glycol (MIRALAX) 17 gram/dose powder TAKE 17 GRAMS (1 TABLESPOONFUL) MIXED IN LIQUID BY MOUTH DAILY AS DIRECTED. 527 g 0    Lactobacillus acidophilus (ACIDOPHILUS) cap TAKE ONE CAPSULE BY MOUTH DAILY 100 Cap 0    brimonidine (LUMIFY) 0.025 % drop Apply  to eye.  cetirizine (ZYRTEC) 10 mg tablet Take 10 mg by mouth daily.  LORazepam (ATIVAN) 0.5 mg tablet Take 1 Tab by mouth every eight (8) hours as needed for Anxiety. Max Daily Amount: 1.5 mg. D/C valium (Patient taking differently: Take 0.5 mg by mouth three (3) times daily as needed. D/C valium) 90 Tab 2    vitamin E (AQUA GEMS) 400 unit capsule Take 400 Units by mouth daily.  loperamide (IMODIUM) 2 mg capsule TAKE 1 CAPSULE BY MOUTH 4 TIMES A DAY AS NEEDED FOR DIARRHEA 120 Cap 0    carboxymethylcellulose sodium (THERATEARS) 0.25 % drop ophthalmic solution Administer 1 Drop to both eyes. Allergies   Allergen Reactions    Other Food Other (comments)     Oranges, cabbage, beans, peppers, raw onions, foods with caffeine, popcorn, soda because of IBS    Buspar [Buspirone] Other (comments)     Causes \"stimulation\" that could exacerbate a manic attack/phase of pt's Biplar.  Reported by patient    Amlodipine Other (comments)     Ankle swelling    Dicyclomine Nausea and Vomiting     Extreme stomach pains    Lithium Nausea and Vomiting     Severe nausea and vomiting.  Other Medication Other (comments)     Intolerance to oranges, cabbage,beans,peppers,raw onions,foods with caffeine in them, popcorn, no pop sodas, because of IBS       Family History   Problem Relation Age of Onset    No Known Problems Mother     Hypertension Father     Cancer Father         SKIN CA    Colon Polyps Father     Other Maternal Uncle         CHAROT-MARISELA-TOOTH    Diabetes Paternal Grandfather     No Known Problems Sister      Social History     Tobacco Use    Smoking status: Never Smoker    Smokeless tobacco: Never Used   Substance Use Topics    Alcohol use: No       Depression Risk Factor Screening:     3 most recent PHQ Screens 7/21/2020   Little interest or pleasure in doing things Nearly every day   Feeling down, depressed, irritable, or hopeless Nearly every day   Total Score PHQ 2 6       Alcohol Risk Factor Screening:   Do you average 1 drink per night or more than 7 drinks a week:  No    On any one occasion in the past three months have you have had more than 3 drinks containing alcohol:  No      Functional Ability and Level of Safety:   Hearing: Hearing is good. Activities of Daily Living: The home contains: no safety equipment. Patient does total self care     Ambulation: with no difficulty     Fall Risk:  Fall Risk Assessment, last 12 mths 7/21/2020   Able to walk? Yes   Fall in past 12 months?  No   Fall with injury? -   Number of falls in past 12 months -   Fall Risk Score -     Abuse Screen:  Patient is not abused       Cognitive Screening   Has your family/caregiver stated any concerns about your memory: no     Cognitive Screening: Normal - MMSE (Mini Mental Status Exam)    Patient Care Team   Patient Care Team:  Lizz Carrera MD as PCP - General (Internal Medicine)  Lizz Carrera MD as PCP - [facility] Empaneled Provider  Eliezer Santoyo MD (Gynecology)  Jordan Brar MD (Gastroenterology)  Gm Rojas MD (Psychiatry)  Julian Meza MD (Cardio Vascular Surgery)    Assessment/Plan   Education and counseling provided:  Are appropriate based on today's review and evaluation  End-of-Life planning (with patient's consent)  Colorectal cancer screening tests  Bone mass measurement (DEXA)  Screening for glaucoma        Health Maintenance Due   Topic Date Due    Medicare Yearly Exam  05/07/2020

## 2020-07-22 LAB
25(OH)D3+25(OH)D2 SERPL-MCNC: 30.8 NG/ML (ref 30–100)
ALBUMIN SERPL-MCNC: 4.6 G/DL (ref 3.8–4.8)
ALBUMIN/GLOB SERPL: 2.1 {RATIO} (ref 1.2–2.2)
ALP SERPL-CCNC: 63 IU/L (ref 39–117)
ALT SERPL-CCNC: 26 IU/L (ref 0–32)
AST SERPL-CCNC: 21 IU/L (ref 0–40)
BILIRUB SERPL-MCNC: <0.2 MG/DL (ref 0–1.2)
BUN SERPL-MCNC: 20 MG/DL (ref 8–27)
BUN/CREAT SERPL: 20 (ref 12–28)
CALCIUM SERPL-MCNC: 9.5 MG/DL (ref 8.7–10.3)
CHLORIDE SERPL-SCNC: 104 MMOL/L (ref 96–106)
CO2 SERPL-SCNC: 23 MMOL/L (ref 20–29)
CREAT SERPL-MCNC: 0.98 MG/DL (ref 0.57–1)
ERYTHROCYTE [DISTWIDTH] IN BLOOD BY AUTOMATED COUNT: 13.1 % (ref 11.7–15.4)
EST. AVERAGE GLUCOSE BLD GHB EST-MCNC: 108 MG/DL
GLOBULIN SER CALC-MCNC: 2.2 G/DL (ref 1.5–4.5)
GLUCOSE SERPL-MCNC: 80 MG/DL (ref 65–99)
HBA1C MFR BLD: 5.4 % (ref 4.8–5.6)
HCT VFR BLD AUTO: 39.1 % (ref 34–46.6)
HGB BLD-MCNC: 12.7 G/DL (ref 11.1–15.9)
MCH RBC QN AUTO: 27.9 PG (ref 26.6–33)
MCHC RBC AUTO-ENTMCNC: 32.5 G/DL (ref 31.5–35.7)
MCV RBC AUTO: 86 FL (ref 79–97)
PLATELET # BLD AUTO: 312 X10E3/UL (ref 150–450)
POTASSIUM SERPL-SCNC: 3.8 MMOL/L (ref 3.5–5.2)
PROT SERPL-MCNC: 6.8 G/DL (ref 6–8.5)
RBC # BLD AUTO: 4.55 X10E6/UL (ref 3.77–5.28)
SODIUM SERPL-SCNC: 145 MMOL/L (ref 134–144)
WBC # BLD AUTO: 4.6 X10E3/UL (ref 3.4–10.8)

## 2020-07-22 NOTE — PROGRESS NOTES
Sodium mildly elevated. Encourage oral water intake, as tolerated. Otherwise, labs within normal range.

## 2020-09-03 ENCOUNTER — OFFICE VISIT (OUTPATIENT)
Dept: NEUROLOGY | Facility: CLINIC | Age: 60
End: 2020-09-03
Payer: MEDICARE

## 2020-09-03 VITALS
SYSTOLIC BLOOD PRESSURE: 112 MMHG | RESPIRATION RATE: 16 BRPM | DIASTOLIC BLOOD PRESSURE: 70 MMHG | OXYGEN SATURATION: 100 % | HEART RATE: 68 BPM

## 2020-09-03 DIAGNOSIS — F31.9 BIPOLAR AFFECTIVE DISORDER, REMISSION STATUS UNSPECIFIED (HCC): ICD-10-CM

## 2020-09-03 DIAGNOSIS — F31.10 BIPOLAR AFFECTIVE DISORDER, CURRENT EPISODE MANIC, CURRENT EPISODE SEVERITY UNSPECIFIED (HCC): ICD-10-CM

## 2020-09-03 DIAGNOSIS — R51.9 CHRONIC DAILY HEADACHE: Primary | ICD-10-CM

## 2020-09-03 PROCEDURE — G8752 SYS BP LESS 140: HCPCS | Performed by: NURSE PRACTITIONER

## 2020-09-03 PROCEDURE — 3017F COLORECTAL CA SCREEN DOC REV: CPT | Performed by: NURSE PRACTITIONER

## 2020-09-03 PROCEDURE — G8754 DIAS BP LESS 90: HCPCS | Performed by: NURSE PRACTITIONER

## 2020-09-03 PROCEDURE — G9717 DOC PT DX DEP/BP F/U NT REQ: HCPCS | Performed by: NURSE PRACTITIONER

## 2020-09-03 PROCEDURE — G8427 DOCREV CUR MEDS BY ELIG CLIN: HCPCS | Performed by: NURSE PRACTITIONER

## 2020-09-03 PROCEDURE — G8417 CALC BMI ABV UP PARAM F/U: HCPCS | Performed by: NURSE PRACTITIONER

## 2020-09-03 PROCEDURE — G9899 SCRN MAM PERF RSLTS DOC: HCPCS | Performed by: NURSE PRACTITIONER

## 2020-09-03 PROCEDURE — 99214 OFFICE O/P EST MOD 30 MIN: CPT | Performed by: NURSE PRACTITIONER

## 2020-09-03 RX ORDER — AMITRIPTYLINE HYDROCHLORIDE 100 MG/1
TABLET, FILM COATED ORAL
Qty: 30 TAB | Refills: 3 | Status: SHIPPED | OUTPATIENT
Start: 2020-09-03 | End: 2020-11-17 | Stop reason: DRUGHIGH

## 2020-09-03 NOTE — PROGRESS NOTES
Date:  20     Name:  Edu Da Silva  :  1960  MRN:  [id number]     PCP:  Isidoro Hartley MD    Chief Complaint   Patient presents with    Follow-up     patient states she thinks she been doing well since her last vusut.  Headache     patient states her migraines/headaches has came back maybe 2-3 a week. she is taking OTC tylenolol and ibuprofen       HISTORY OF PRESENT ILLNESS Follow-up visit for chronic tension headaches. This is a patient of Dr. Chang Bowen. Patient previously was well controlled on amitriptyline 75 mg nightly. However over the last couple weeks she is now having 2-3 headache days a week. She is under increasing amount of stress. She reports that her daughter is a professor at a college with high COVID cases and her grandson as well is attending college withCOVIDcases. She is also dealing wit her bipolar disease and not being able to get out much. ROS      Current Outpatient Medications   Medication Sig    amitriptyline (ELAVIL) 100 mg tablet TAKE ONE TABLET BY MOUTH EVERY NIGHT    pantoprazole (PROTONIX) 40 mg tablet Take 1 Tab by mouth daily.  ketotifen (Alaway) 0.025 % (0.035 %) ophthalmic solution Administer 1 Drop to both eyes two (2) times a day.  losartan (COZAAR) 25 mg tablet TAKE TWO TABLETS BY MOUTH EVERY DAY    gabapentin (NEURONTIN) 100 mg capsule 2 caps po am and 1 cap po bedtime.  fenofibrate nanocrystallized (TRICOR) 145 mg tablet TAKE ONE TABLET BY MOUTH EVERY 48 HOURS    cariprazine (VRAYLAR) 1.5 mg capsule Take 3 mg by mouth daily.  alendronate (FOSAMAX) 70 mg tablet TAKE 1 TABLET BY MOUTH EVERY SEVEN (7) DAYS.  loperamide (IMODIUM) 2 mg capsule TAKE 1 CAPSULE BY MOUTH 4 TIMES A DAY AS NEEDED FOR DIARRHEA    calcium carbonate (CALTREX) 600 mg calcium (1,500 mg) tablet Take 600 mg by mouth two (2) times a day.  cholecalciferol (VITAMIN D3) 2,000 unit cap capsule Take 2,000 Units by mouth daily.     polyethylene glycol (MIRALAX) 17 gram/dose powder TAKE 17 GRAMS (1 TABLESPOONFUL) MIXED IN LIQUID BY MOUTH DAILY AS DIRECTED.  Lactobacillus acidophilus (ACIDOPHILUS) cap TAKE ONE CAPSULE BY MOUTH DAILY    brimonidine (LUMIFY) 0.025 % drop Apply  to eye.  carboxymethylcellulose sodium (THERATEARS) 0.25 % drop ophthalmic solution Administer 1 Drop to both eyes.  cetirizine (ZYRTEC) 10 mg tablet Take 10 mg by mouth daily.  LORazepam (ATIVAN) 0.5 mg tablet Take 1 Tab by mouth every eight (8) hours as needed for Anxiety. Max Daily Amount: 1.5 mg. D/C valium (Patient taking differently: Take 0.5 mg by mouth three (3) times daily as needed. D/C valium)    vitamin E (AQUA GEMS) 400 unit capsule Take 400 Units by mouth daily. No current facility-administered medications for this visit. Allergies   Allergen Reactions    Other Food Other (comments)     Oranges, cabbage, beans, peppers, raw onions, foods with caffeine, popcorn, soda because of IBS    Buspar [Buspirone] Other (comments)     Causes \"stimulation\" that could exacerbate a manic attack/phase of pt's Biplar. Reported by patient    Amlodipine Other (comments)     Ankle swelling    Dicyclomine Nausea and Vomiting     Extreme stomach pains    Lithium Nausea and Vomiting     Severe nausea and vomiting.     Other Medication Other (comments)     Intolerance to oranges, cabbage,beans,peppers,raw onions,foods with caffeine in them, popcorn, no pop sodas, because of IBS     Past Medical History:   Diagnosis Date    Arthritis     Bipolar disorder ([address]     Bladder pain     Choledocholithiasis 11/24/2010    GERD (gastroesophageal reflux disease)     Headache(784.0)     tension headaches    High cholesterol     History of cholecystectomy     Hypertension     Irritable bowel     Pelvic pain in female 2014    Psychiatric disorder     Stool color black     irritable bowel     Past Surgical History:   Procedure Laterality Date    COLONOSCOPY N/A 7/5/2017    COLONOSCOPY performed by Henok Manrique MD at [address] [facility]  2003, 2017    prolasped rectum    HX LAP CHOLECYSTECTOMY  11/23/10    HX ORTHOPAEDIC      HX KALEIGH AND BSO  1998    LAPAROSCOPY ABDOMEN DIAGNOSTIC  1987     Social History     Socioeconomic History    Marital status:      Spouse name: Not on file    Number of children: Not on file    Years of education: Not on file    Highest education level: Not on file   Occupational History    Not on file   Social Needs    Financial resource strain: Not on file    Food insecurity     Worry: Not on file     Inability: Not on file    Transportation needs     Medical: Not on file     Non-medical: Not on file   Tobacco Use    Smoking status: Never Smoker    Smokeless tobacco: Never Used   Substance and Sexual Activity    Alcohol use: No    Drug use: No    Sexual activity: Never     Comment:  has 1 child   Lifestyle    Physical activity     Days per week: Not on file     Minutes per session: Not on file    Stress: Not on file   Relationships    Social connections     Talks on phone: Not on file     Gets together: Not on file     Attends Taoism service: Not on file     Active member of club or organization: Not on file     Attends meetings of clubs or organizations: Not on file     Relationship status: Not on file    Intimate partner violence     Fear of current or ex partner: Not on file     Emotionally abused: Not on file     Physically abused: Not on file     Forced sexual activity: Not on file   Other Topics Concern    Not on file   Social History Narrative    Not on file     Family History   Problem Relation Age of Onset    No Known Problems Mother     Hypertension Father     Cancer Father         SKIN CA    Colon Polyps Father     Other Maternal Uncle         CHAROT-MARISELA-TOOTH    Diabetes Paternal Grandfather     No Known Problems Sister        PHYSICAL EXAMINATION:    Visit Vitals  BP 112/70 (BP 1 Location: Left arm, BP Patient Position: Sitting)   Pulse 68   Resp 16   SpO2 100%       General: Well developed, well nourished. Patient in no apparent distress   Head: Normocephalic, atraumatic, anicteric sclera   Lungs:  Clear to auscultation bilaterally, No wheezes or rubs   Cardiac: Regular rate and rhythm with no murmurs. Abd: Bowel sounds were audible. No tenderness on palpation   Ext: No pedal edema   Skin: No overt signs of rash      Neurological Exam:  Mental Status: Alert and oriented to person place and time   Speech: Fluent no aphasia or dysarthria. Cranial Nerves:   Intact visual fields. Facial sensation is normal. Facial movement is symmetric. Palate is midline. Normal sternocleidomastoid strength. Tongue is midline. Hearing is intact bilaterally. Eyes: PERRL, EOM's full, no nystagmus, no ptosis. Motor:  Full and symmetric strength of upper and lower proximal and distal muscles. Normal bulk and tone. Reflexes:   Deep tendon reflexes 2+/4 and symmetrical.  Plantar response is downgoing b/l. Sensory:   Symmetrically intact  with no perceived deficits modalities involving small or large fibers. Gait:  Gait is balanced and fluid with normal arm swing. Tremor:   No tremor noted. Cerebellar:  Finger to nose and heel over shin to knee was demonstrated competently. Neurovascular: No carotid bruits. ASSESSMENT AND PLAN    ICD-10-CM ICD-9-CM    1. Chronic daily headache  R51 784.0    2. Bipolar affective disorder, remission status unspecified ([facility] [address]  F31.9 296.80    3. Bipolar affective disorder, current episode manic, current episode severity unspecified ([address]  F31.10 296.40 amitriptyline (ELAVIL) 100 mg tablet     1. Chronic daily headache   - Increase Amitriptyline 100mg nightly    -Work on minimizing stressors   - Track headaches    - follow up in 8 weeks   This note will not be viewable in [address]   Alf Thompson, NP

## 2020-09-03 NOTE — PROGRESS NOTES
patient states her migraines/headaches has came back maybe 2-3 a week. she is taking OTC tylenolol and ibuprofen. patient states she thinks she been doing well since her last visit. .  Chief Complaint   Patient presents with    Follow-up     patient states she thinks she been doing well since her last vusut.  Headache     patient states her migraines/headaches has came back maybe 2-3 a week. she is taking OTC tylenolol and ibuprofen     .   Visit Vitals  /70 (BP 1 Location: Left arm, BP Patient Position: Sitting)   Pulse 68   Resp 16   SpO2 100%

## 2020-09-22 ENCOUNTER — CLINICAL SUPPORT (OUTPATIENT)
Dept: INTERNAL MEDICINE CLINIC | Facility: CLINIC | Age: 60
End: 2020-09-22
Payer: MEDICARE

## 2020-09-22 DIAGNOSIS — Z23 ENCOUNTER FOR IMMUNIZATION: ICD-10-CM

## 2020-09-22 DIAGNOSIS — Z23 NEEDS FLU SHOT: Primary | ICD-10-CM

## 2020-09-22 PROCEDURE — 90686 IIV4 VACC NO PRSV 0.5 ML IM: CPT | Performed by: INTERNAL MEDICINE

## 2020-09-22 PROCEDURE — G0008 ADMIN INFLUENZA VIRUS VAC: HCPCS | Performed by: INTERNAL MEDICINE

## 2020-09-22 NOTE — PATIENT INSTRUCTIONS
Vaccine Information Statement Influenza (Flu) Vaccine (Live, Intranasal): What You Need to Know Many Vaccine Information Statements are available in [place] and other languages. See www.immunize.org/vis Hojas de información sobre vacunas están disponibles en español y en muchos otros idiomas. Visite www.immunize.org/vis 1. Why get vaccinated? Influenza vaccine can prevent influenza (flu). Flu is a contagious disease that spreads around the [facility] every year, usually between October and May. Anyone can get the flu, but it is more dangerous for some people. Infants and young children, people 72years of age and older, pregnant women, and people with certain health conditions or a weakened immune system are at greatest risk of flu complications. Pneumonia, bronchitis, sinus infections and ear infections are examples of flu-related complications. If you have a medical condition, such as heart disease, cancer or diabetes, flu can make it worse. Flu can cause fever and chills, sore throat, muscle aches, fatigue, cough, headache, and runny or stuffy nose. Some people may have vomiting and diarrhea, though this is more common in children than adults. Each year thousands of people in the [facility] die from flu, and many more are hospitalized. Flu vaccine prevents millions of illnesses and flu-related visits to the doctor each year. 2. Live, attenuated influenza vaccine CDC recommends everyone 10months of age and older get vaccinated every flu season. Children 6 months through 6years of age may need 2 doses during a single flu season. Everyone else needs only 1 dose each flu season. Live, attenuated influenza vaccine (called LAIV) is a nasal spray vaccine that may be given to non-pregnant people 2 through 52years of age. It takes about 2 weeks for protection to develop after vaccination. There are many flu viruses, and they are always changing.  Each year a new flu vaccine is made to protect against three or four viruses that are likely to cause disease in the upcoming flu season. Even when the vaccine doesnt exactly match these viruses, it may still provide some protection. Influenza vaccine does not cause flu. Influenza vaccine may be given at the same time as other vaccines. 3. Talk with your health care provider Tell your vaccine provider if the person getting the vaccine:  Is younger than 2 years or older than 52years of age.  Is pregnant.  Has had an allergic reaction after a previous dose of influenza vaccine, or has any severe, life-threatening allergies.  Is a child or adolescent 2 through 16years of age who is receiving aspirin or aspirin-containing products.  Has a weakened immune system.  Is a child 3through 3years old who has asthma or a history of wheezing in the past 12 months.  Has taken influenza antiviral medication in the previous 48 hours.  Cares for severely immunocompromised persons who require a protected environment.  Is 5 years or older and has asthma.  Has other underlying medical conditions that can put people at higher risk of serious flu complications (such as lung disease, heart disease, kidney disease, kidney or liver disorders, neurologic or neuromuscular or metabolic disorders).  Has had Guillain-Barré Syndrome within 6 weeks after a previous dose of influenza vaccine. In some cases, your health care provider may decide to postpone influenza vaccination to a future visit. For some patients, a different type of influenza vaccine (inactivated or recombinant influenza vaccine) might be more appropriate than live, attenuated influenza vaccine. People with minor illnesses, such as a cold, may be vaccinated. People who are moderately or severely ill should usually wait until they recover before getting influenza vaccine. Your health care provider can give you more information. 4. Risks of a vaccine reaction  Runny nose or nasal congestion, wheezing and headache can happen after LAIV.  Vomiting, muscle aches, fever, sore throat and cough are other possible side effects. If these problems occur, they usually begin soon after vaccination and are mild and short-lived. As with any medicine, there is a very remote chance of a vaccine causing a severe allergic reaction, other serious injury, or death. 5. What if there is a serious problem? An allergic reaction could occur after the vaccinated person leaves the clinic. If you see signs of a severe allergic reaction (hives, swelling of the face and throat, difficulty breathing, a fast heartbeat, dizziness, or weakness), call 9-1-1 and get the person to the nearest hospital. 
 
For other signs that concern you, call your health care provider. Adverse reactions should be reported to the Vaccine Adverse Event Reporting System (VAERS). Your health care provider will usually file this report, or you can do it yourself. Visit the VAERS website at www.vaers. hhs.gov or call 7-316.191.3352. VAERS is only for reporting reactions, and VAERS staff do not give medical advice. 6. The National Vaccine Injury Compensation Program 
 
The [facility] Vaccine Injury Compensation Program (VICP) is a federal program that was created to compensate people who may have been injured by certain vaccines. Visit the VICP website at www.hrsa.gov/vaccinecompensation or call 0-741.759.4154 to learn about the program and about filing a claim. There is a time limit to file a claim for compensation. 7. How can I learn more?  Ask your health care provider.  Call your local or state health department.  Contact the Centers for Disease Control and Prevention (CDC): 
- Call 3-740.935.7756 (1-800-CDC-INFO) or 
- Visit CDCs influenza website at www.cdc.gov/flu Vaccine Information Statement (Interim) Live Attenuated Influenza Vaccine 8/15/2019 [address] [facility] [id number] [facility] and [facility] Centers for Disease Control and Prevention Office Use Only

## 2020-09-22 NOTE — PROGRESS NOTES
Samia Da Silva is a 56 y.o. female  who presents for routine immunization(s)-Flu vaccine. Patient denies any symptoms , reactions or allergies that would exclude them from being immunized today. Risks and adverse reactions were discussed. The patient/caregiver was provided the VIS and allotted time to read and ask questions prior to administration of vaccine. Patient voiced full understanding and signed Adult Immunization Consent form. All questions were addressed. Patient was observed for 10 min post injection. There were no reactions observed.     Jose Jeffers LPN

## 2020-09-30 DIAGNOSIS — M81.0 OSTEOPOROSIS, UNSPECIFIED OSTEOPOROSIS TYPE, UNSPECIFIED PATHOLOGICAL FRACTURE PRESENCE: ICD-10-CM

## 2020-09-30 DIAGNOSIS — E78.2 ELEVATED TRIGLYCERIDES WITH HIGH CHOLESTEROL: ICD-10-CM

## 2020-09-30 DIAGNOSIS — G44.221 CHRONIC TENSION-TYPE HEADACHE, INTRACTABLE: ICD-10-CM

## 2020-09-30 DIAGNOSIS — G44.021 INTRACTABLE CHRONIC CLUSTER HEADACHE: ICD-10-CM

## 2020-09-30 RX ORDER — FENOFIBRATE 145 MG/1
TABLET, COATED ORAL
Qty: 15 TAB | Refills: 0 | Status: SHIPPED | OUTPATIENT
Start: 2020-09-30 | End: 2020-11-03

## 2020-09-30 RX ORDER — GABAPENTIN 100 MG/1
CAPSULE ORAL
Qty: 90 CAP | Refills: 0 | Status: SHIPPED | OUTPATIENT
Start: 2020-09-30 | End: 2020-11-03

## 2020-09-30 RX ORDER — ALENDRONATE SODIUM 70 MG/1
TABLET ORAL
Qty: 4 TAB | Refills: 0 | Status: SHIPPED | OUTPATIENT
Start: 2020-09-30 | End: 2020-10-29

## 2020-10-29 DIAGNOSIS — M81.0 OSTEOPOROSIS, UNSPECIFIED OSTEOPOROSIS TYPE, UNSPECIFIED PATHOLOGICAL FRACTURE PRESENCE: ICD-10-CM

## 2020-10-29 RX ORDER — ALENDRONATE SODIUM 70 MG/1
TABLET ORAL
Qty: 4 TAB | Refills: 0 | Status: SHIPPED | OUTPATIENT
Start: 2020-10-29 | End: 2020-11-23

## 2020-11-03 DIAGNOSIS — E78.2 ELEVATED TRIGLYCERIDES WITH HIGH CHOLESTEROL: ICD-10-CM

## 2020-11-03 DIAGNOSIS — G44.221 CHRONIC TENSION-TYPE HEADACHE, INTRACTABLE: ICD-10-CM

## 2020-11-03 DIAGNOSIS — G44.021 INTRACTABLE CHRONIC CLUSTER HEADACHE: ICD-10-CM

## 2020-11-03 RX ORDER — FENOFIBRATE 145 MG/1
TABLET, COATED ORAL
Qty: 15 TAB | Refills: 0 | Status: SHIPPED | OUTPATIENT
Start: 2020-11-03 | End: 2020-12-01

## 2020-11-03 RX ORDER — GABAPENTIN 100 MG/1
CAPSULE ORAL
Qty: 90 CAP | Refills: 0 | Status: SHIPPED | OUTPATIENT
Start: 2020-11-03 | End: 2020-12-01

## 2020-11-17 ENCOUNTER — HOSPITAL ENCOUNTER (OUTPATIENT)
Dept: LAB | Age: 60
Discharge: HOME | End: 2020-11-17
Payer: MEDICAID

## 2020-11-17 ENCOUNTER — OFFICE VISIT (OUTPATIENT)
Dept: INTERNAL MEDICINE CLINIC | Facility: CLINIC | Age: 60
End: 2020-11-17
Payer: MEDICARE

## 2020-11-17 VITALS
HEIGHT: 60 IN | OXYGEN SATURATION: 98 % | SYSTOLIC BLOOD PRESSURE: 124 MMHG | HEART RATE: 83 BPM | BODY MASS INDEX: 30.59 KG/M2 | DIASTOLIC BLOOD PRESSURE: 82 MMHG | WEIGHT: 155.8 LBS | RESPIRATION RATE: 18 BRPM | TEMPERATURE: 98.6 F

## 2020-11-17 DIAGNOSIS — K21.9 GASTROESOPHAGEAL REFLUX DISEASE WITHOUT ESOPHAGITIS: ICD-10-CM

## 2020-11-17 DIAGNOSIS — M25.521 PAIN OF BOTH ELBOWS: ICD-10-CM

## 2020-11-17 DIAGNOSIS — E78.2 ELEVATED TRIGLYCERIDES WITH HIGH CHOLESTEROL: ICD-10-CM

## 2020-11-17 DIAGNOSIS — I87.2 CHRONIC VENOUS INSUFFICIENCY: ICD-10-CM

## 2020-11-17 DIAGNOSIS — E53.8 B12 DEFICIENCY: ICD-10-CM

## 2020-11-17 DIAGNOSIS — F31.10 BIPOLAR AFFECTIVE DISORDER, CURRENT EPISODE MANIC, CURRENT EPISODE SEVERITY UNSPECIFIED (HCC): ICD-10-CM

## 2020-11-17 DIAGNOSIS — M25.522 PAIN OF BOTH ELBOWS: ICD-10-CM

## 2020-11-17 DIAGNOSIS — E03.9 ACQUIRED HYPOTHYROIDISM: ICD-10-CM

## 2020-11-17 DIAGNOSIS — R00.2 PALPITATION: Primary | ICD-10-CM

## 2020-11-17 PROCEDURE — G9899 SCRN MAM PERF RSLTS DOC: HCPCS | Performed by: INTERNAL MEDICINE

## 2020-11-17 PROCEDURE — G9717 DOC PT DX DEP/BP F/U NT REQ: HCPCS | Performed by: INTERNAL MEDICINE

## 2020-11-17 PROCEDURE — 3017F COLORECTAL CA SCREEN DOC REV: CPT | Performed by: INTERNAL MEDICINE

## 2020-11-17 PROCEDURE — 80053 COMPREHEN METABOLIC PANEL: CPT

## 2020-11-17 PROCEDURE — G8754 DIAS BP LESS 90: HCPCS | Performed by: INTERNAL MEDICINE

## 2020-11-17 PROCEDURE — G8427 DOCREV CUR MEDS BY ELIG CLIN: HCPCS | Performed by: INTERNAL MEDICINE

## 2020-11-17 PROCEDURE — G8752 SYS BP LESS 140: HCPCS | Performed by: INTERNAL MEDICINE

## 2020-11-17 PROCEDURE — 85025 COMPLETE CBC W/AUTO DIFF WBC: CPT

## 2020-11-17 PROCEDURE — 99214 OFFICE O/P EST MOD 30 MIN: CPT | Performed by: INTERNAL MEDICINE

## 2020-11-17 PROCEDURE — 80061 LIPID PANEL: CPT

## 2020-11-17 PROCEDURE — 84443 ASSAY THYROID STIM HORMONE: CPT

## 2020-11-17 PROCEDURE — 82607 VITAMIN B-12: CPT

## 2020-11-17 PROCEDURE — G8417 CALC BMI ABV UP PARAM F/U: HCPCS | Performed by: INTERNAL MEDICINE

## 2020-11-17 RX ORDER — AMITRIPTYLINE HYDROCHLORIDE 50 MG/1
50 TABLET, FILM COATED ORAL
Qty: 30 TAB | Refills: 3 | Status: SHIPPED | OUTPATIENT
Start: 2020-11-17 | End: 2021-03-01

## 2020-11-17 RX ORDER — DICLOFENAC SODIUM 10 MG/G
GEL TOPICAL
Qty: 100 G | Refills: 1 | Status: SHIPPED | OUTPATIENT
Start: 2020-11-17 | End: 2022-10-17

## 2020-11-17 NOTE — PROGRESS NOTES
There are no preventive care reminders to display for this patient. Chief Complaint   Patient presents with    Bipolar     4 month follow up    Irritable Bowel Syndrome    Osteoporosis       1. Have you been to the ER, urgent care clinic since your last visit? Hospitalized since your last visit? No    2. Have you seen or consulted any other health care providers outside of the [address] [place] [address] since your last visit? Include any pap smears or colon screening. No    3) Do you have an Advance Directive on file? no    4) Are you interested in receiving information on Advance Directives? NO      Patient is accompanied by self I have received verbal consent from Michael Goodwin to discuss any/all medical information while they are present in the room.

## 2020-11-17 NOTE — PROGRESS NOTES
HISTORY OF PRESENT ILLNESS  Mc Hoffmann is a 56 y.o. female here to follow-up. Report occasional palpitation for past several weeks. No chest pain or shortness of breath. Noticed to have no leg swelling, left leg swelled up more than the right one. No shortness of breath. Has hypertension, compliant with medicine. Denies chest pain palpitation or shortness of breath. Has bipolar disorder. Seeing psychiatrist.  Depression seems stable. Want to go down with amitriptyline dosage since she is gaining more weight. Report pain on the elbows and knee. Would like to get Voltaren gel refill. Has GERD, using PPI for long time. Would like to get B12 level checked. Need new labs. Hypertension    Pertinent negatives include no shortness of breath. Blood sugar problem   Pertinent negatives include no shortness of breath. Bipolar   Pertinent negatives include no shortness of breath. GERD   Pertinent negatives include no shortness of breath. Medication Evaluation   Pertinent negatives include no shortness of breath. Osteoporosis   Pertinent negatives include no shortness of breath. Review of Systems   Constitutional: Negative. HENT: Negative. Eyes: Negative. Respiratory: Negative. Negative for shortness of breath and wheezing. Cardiovascular: Negative. Gastrointestinal: Negative for blood in stool. Genitourinary: Negative. Musculoskeletal: Negative. Negative for falls. Skin: Negative. Negative for itching. Endo/Heme/Allergies: Negative. Psychiatric/Behavioral: Positive for depression. The patient is nervous/anxious. Physical Exam  Constitutional:       General: She is not in acute distress. Appearance: Normal appearance. She is well-developed and normal weight. Neck:      Musculoskeletal: Normal range of motion and neck supple. Thyroid: No thyromegaly. Vascular: No JVD. Cardiovascular:      Rate and Rhythm: Normal rate and regular rhythm. Pulses: Normal pulses. Heart sounds: Normal heart sounds. Pulmonary:      Effort: Pulmonary effort is normal. No respiratory distress. Breath sounds: Normal breath sounds. No wheezing. Abdominal:      Comments: KUB  nontender. Neurological:      Mental Status: She is alert. Deep Tendon Reflexes: Reflexes are normal and symmetric. Psychiatric:         Mood and Affect: Mood normal.         Behavior: Behavior normal.         ASSESSMENT and PLAN  Diagnoses and all orders for this visit:    1. Palpitation  Episodic palpitation. We will check TSH. Will order,  -     CARDIAC HOLTER MONITOR; Future    2. Chronic venous insufficiency    Advised to watch salt. Need to do leg elevation. Will order,  -     miscellaneous medical supply (Anti-Embolism Stockings) misc; 15 to 30 mm HG below knee stocking in both legs    3. Bipolar affective disorder, current episode manic, current episode severity unspecified ([address]    She is gaining more weight. She thinks she is able to handle her depression better would like to go down to amitriptyline dosage. We will give,  -     amitriptyline (ELAVIL) 50 mg tablet; Take 1 Tab by mouth nightly. Discontinue amitriptyline 100 Norco tablet. 4. B12 deficiency    She is on PPI, might have a low B12 level. Will check,  -     VITAMIN B12; Future    5. Gastroesophageal reflux disease without esophagitis    Doing okay. Using PPI every day. -     METABOLIC PANEL, COMPREHENSIVE; Future  -     CBC WITH AUTOMATED DIFF; Future    6. Elevated triglycerides with high cholesterol    On TriCor every other day. Watching carbohydrate. Will check,  -     LIPID PANEL; Future  -     CBC WITH AUTOMATED DIFF; Future    7. Pain of both elbows    Having pain on elbow and knee. Will order,  -     diclofenac (VOLTAREN) 1 % gel; Top BID on elbow and knee    8. Acquired hypothyroidism    On Synthroid. Will check,  -     TSH 3RD GENERATION;  Future              Discussed expected course/resolution/complications of diagnosis in detail with patient. Medication risks/benefits/costs/interactions/alternatives discussed with patient. Pt was given an after visit summary which includes diagnoses, current medications & vitals. Pt expressed understanding with the diagnosis and plan.

## 2020-11-18 DIAGNOSIS — R79.89 ELEVATED SERUM CREATININE: ICD-10-CM

## 2020-11-18 DIAGNOSIS — R79.89 ELEVATED SERUM CREATININE: Primary | ICD-10-CM

## 2020-11-18 LAB
ALBUMIN SERPL-MCNC: 4.6 G/DL (ref 3.8–4.8)
ALBUMIN/GLOB SERPL: 1.9 {RATIO} (ref 1.2–2.2)
ALP SERPL-CCNC: 66 IU/L (ref 39–117)
ALT SERPL-CCNC: 35 IU/L (ref 0–32)
AST SERPL-CCNC: 31 IU/L (ref 0–40)
BASOPHILS # BLD AUTO: 0.1 X10E3/UL (ref 0–0.2)
BASOPHILS NFR BLD AUTO: 1 %
BILIRUB SERPL-MCNC: 0.2 MG/DL (ref 0–1.2)
BUN SERPL-MCNC: 18 MG/DL (ref 8–27)
BUN/CREAT SERPL: 16 (ref 12–28)
CALCIUM SERPL-MCNC: 9.7 MG/DL (ref 8.7–10.3)
CHLORIDE SERPL-SCNC: 104 MMOL/L (ref 96–106)
CHOLEST SERPL-MCNC: 216 MG/DL (ref 100–199)
CO2 SERPL-SCNC: 25 MMOL/L (ref 20–29)
CREAT SERPL-MCNC: 1.14 MG/DL (ref 0.57–1)
EOSINOPHIL # BLD AUTO: 0.1 X10E3/UL (ref 0–0.4)
EOSINOPHIL NFR BLD AUTO: 2 %
ERYTHROCYTE [DISTWIDTH] IN BLOOD BY AUTOMATED COUNT: 13.6 % (ref 11.7–15.4)
GLOBULIN SER CALC-MCNC: 2.4 G/DL (ref 1.5–4.5)
GLUCOSE SERPL-MCNC: 110 MG/DL (ref 65–99)
HCT VFR BLD AUTO: 39.4 % (ref 34–46.6)
HDLC SERPL-MCNC: 61 MG/DL
HGB BLD-MCNC: 12.9 G/DL (ref 11.1–15.9)
IMM GRANULOCYTES # BLD AUTO: 0 X10E3/UL (ref 0–0.1)
IMM GRANULOCYTES NFR BLD AUTO: 0 %
INTERPRETATION, 910389: NORMAL
INTERPRETATION: NORMAL
LDLC SERPL CALC-MCNC: 135 MG/DL (ref 0–99)
LYMPHOCYTES # BLD AUTO: 1.8 X10E3/UL (ref 0.7–3.1)
LYMPHOCYTES NFR BLD AUTO: 35 %
MCH RBC QN AUTO: 28.4 PG (ref 26.6–33)
MCHC RBC AUTO-ENTMCNC: 32.7 G/DL (ref 31.5–35.7)
MCV RBC AUTO: 87 FL (ref 79–97)
MONOCYTES # BLD AUTO: 0.3 X10E3/UL (ref 0.1–0.9)
MONOCYTES NFR BLD AUTO: 6 %
NEUTROPHILS # BLD AUTO: 2.9 X10E3/UL (ref 1.4–7)
NEUTROPHILS NFR BLD AUTO: 56 %
PDF IMAGE, 910387: NORMAL
PLATELET # BLD AUTO: 316 X10E3/UL (ref 150–450)
POTASSIUM SERPL-SCNC: 4.4 MMOL/L (ref 3.5–5.2)
PROT SERPL-MCNC: 7 G/DL (ref 6–8.5)
RBC # BLD AUTO: 4.54 X10E6/UL (ref 3.77–5.28)
SODIUM SERPL-SCNC: 142 MMOL/L (ref 134–144)
TRIGL SERPL-MCNC: 115 MG/DL (ref 0–149)
TSH SERPL DL<=0.005 MIU/L-ACNC: 1.24 UIU/ML (ref 0.45–4.5)
VIT B12 SERPL-MCNC: 457 PG/ML (ref 232–1245)
VLDLC SERPL CALC-MCNC: 20 MG/DL (ref 5–40)
WBC # BLD AUTO: 5.2 X10E3/UL (ref 3.4–10.8)

## 2020-11-18 NOTE — PROGRESS NOTES
Creatinine slightly elevated. Encourage oral water intake, as tolerated. Repeat CMP one month. Cholesterol mildly elevated. Recommend that patient watch diet for fatty foods and exercise as tolerated. Otherwise, stable labs.

## 2020-11-23 DIAGNOSIS — M81.0 OSTEOPOROSIS, UNSPECIFIED OSTEOPOROSIS TYPE, UNSPECIFIED PATHOLOGICAL FRACTURE PRESENCE: ICD-10-CM

## 2020-11-23 RX ORDER — ALENDRONATE SODIUM 70 MG/1
TABLET ORAL
Qty: 4 TAB | Refills: 0 | Status: SHIPPED | OUTPATIENT
Start: 2020-11-23 | End: 2020-12-23

## 2020-11-24 ENCOUNTER — TELEPHONE (OUTPATIENT)
Dept: INTERNAL MEDICINE CLINIC | Age: 60
End: 2020-11-24

## 2020-12-01 DIAGNOSIS — E78.2 ELEVATED TRIGLYCERIDES WITH HIGH CHOLESTEROL: ICD-10-CM

## 2020-12-01 DIAGNOSIS — G44.021 INTRACTABLE CHRONIC CLUSTER HEADACHE: ICD-10-CM

## 2020-12-01 DIAGNOSIS — G44.221 CHRONIC TENSION-TYPE HEADACHE, INTRACTABLE: ICD-10-CM

## 2020-12-01 RX ORDER — GABAPENTIN 100 MG/1
CAPSULE ORAL
Qty: 90 CAP | Refills: 0 | Status: SHIPPED | OUTPATIENT
Start: 2020-12-01 | End: 2020-12-30

## 2020-12-01 RX ORDER — FENOFIBRATE 145 MG/1
TABLET, COATED ORAL
Qty: 15 TAB | Refills: 0 | Status: SHIPPED | OUTPATIENT
Start: 2020-12-01 | End: 2020-12-30

## 2020-12-07 ENCOUNTER — HOSPITAL ENCOUNTER (OUTPATIENT)
Dept: NON INVASIVE DIAGNOSTICS | Age: 60
Discharge: HOME | End: 2020-12-07
Attending: INTERNAL MEDICINE
Payer: MEDICARE

## 2020-12-07 DIAGNOSIS — R00.2 PALPITATION: ICD-10-CM

## 2020-12-07 PROCEDURE — 93225 XTRNL ECG REC<48 HRS REC: CPT

## 2020-12-15 ENCOUNTER — OFFICE VISIT (OUTPATIENT)
Dept: INTERNAL MEDICINE CLINIC | Facility: CLINIC | Age: 60
End: 2020-12-15
Payer: MEDICARE

## 2020-12-15 VITALS
TEMPERATURE: 98.5 F | DIASTOLIC BLOOD PRESSURE: 84 MMHG | HEIGHT: 60 IN | WEIGHT: 155 LBS | SYSTOLIC BLOOD PRESSURE: 132 MMHG | HEART RATE: 84 BPM | RESPIRATION RATE: 20 BRPM | OXYGEN SATURATION: 97 % | BODY MASS INDEX: 30.43 KG/M2

## 2020-12-15 DIAGNOSIS — F51.01 PRIMARY INSOMNIA: ICD-10-CM

## 2020-12-15 DIAGNOSIS — K58.9 IRRITABLE BOWEL SYNDROME WITHOUT DIARRHEA: ICD-10-CM

## 2020-12-15 DIAGNOSIS — I10 ESSENTIAL HYPERTENSION: ICD-10-CM

## 2020-12-15 DIAGNOSIS — J01.00 ACUTE NON-RECURRENT MAXILLARY SINUSITIS: Primary | ICD-10-CM

## 2020-12-15 PROCEDURE — G8417 CALC BMI ABV UP PARAM F/U: HCPCS | Performed by: INTERNAL MEDICINE

## 2020-12-15 PROCEDURE — G8427 DOCREV CUR MEDS BY ELIG CLIN: HCPCS | Performed by: INTERNAL MEDICINE

## 2020-12-15 PROCEDURE — G9717 DOC PT DX DEP/BP F/U NT REQ: HCPCS | Performed by: INTERNAL MEDICINE

## 2020-12-15 PROCEDURE — 3017F COLORECTAL CA SCREEN DOC REV: CPT | Performed by: INTERNAL MEDICINE

## 2020-12-15 PROCEDURE — 99214 OFFICE O/P EST MOD 30 MIN: CPT | Performed by: INTERNAL MEDICINE

## 2020-12-15 PROCEDURE — G8752 SYS BP LESS 140: HCPCS | Performed by: INTERNAL MEDICINE

## 2020-12-15 PROCEDURE — G9899 SCRN MAM PERF RSLTS DOC: HCPCS | Performed by: INTERNAL MEDICINE

## 2020-12-15 PROCEDURE — G8754 DIAS BP LESS 90: HCPCS | Performed by: INTERNAL MEDICINE

## 2020-12-15 RX ORDER — AMOXICILLIN 875 MG/1
875 TABLET, FILM COATED ORAL 2 TIMES DAILY
Qty: 20 TAB | Refills: 0 | Status: SHIPPED | OUTPATIENT
Start: 2020-12-15 | End: 2021-02-16 | Stop reason: ALTCHOICE

## 2020-12-15 NOTE — PROGRESS NOTES
There are no preventive care reminders to display for this patient. Chief Complaint   Patient presents with    Pressure Behind the Eyes     states zyrtec not effective anymore       1. Have you been to the ER, urgent care clinic since your last visit? Hospitalized since your last visit? No    2. Have you seen or consulted any other health care providers outside of the [address] [place] [address] since your last visit? Include any pap smears or colon screening. No    3) Do you have an Advance Directive on file? no    4) Are you interested in receiving information on Advance Directives? NO      Patient is accompanied by self I have received verbal consent from Becky Henriquez to discuss any/all medical information while they are present in the room.

## 2020-12-15 NOTE — PROGRESS NOTES
HISTORY OF PRESENT ILLNESS  Bettina Resendez is a 56 y.o. female here to with a complaint of mild cough, nasal congestion and postnasal drip for past several weeks. She thinks she is having allergy problem. She has been taking Benadryl which is helping her but still coughing. Sputum is nonproductive. No shortness of breath orthopnea. She is not having any fever. Report insomnia since she has reduce the dosage of amitriptyline which made her constipated. She is using Benadryl which is helping her. Has hypertension, compliant with medicine. Denies chest pain palpitation or shortness of breath. Has bipolar disorder. Seeing psychiatrist.  Depression seems stable. Weaning of Zyprexa. Labs reviewed, blood sugar slightly elevated. Last 2 times A1c was normal.  We will do lab work next time. Hypertension    Pertinent negatives include no malaise/fatigue and no shortness of breath. Blood sugar problem   Pertinent negatives include no shortness of breath. Bipolar   Pertinent negatives include no shortness of breath. Pressure Behind the Eyes    Associated symptoms include congestion and cough. Pertinent negatives include no shortness of breath. Review of Systems   Constitutional: Negative. Negative for malaise/fatigue. HENT: Positive for congestion. Eyes: Negative. Respiratory: Positive for cough. Negative for shortness of breath and wheezing. Cardiovascular: Negative. Gastrointestinal: Negative. Negative for blood in stool. Genitourinary: Negative. Musculoskeletal: Negative. Negative for falls. Skin: Negative. Negative for itching. Endo/Heme/Allergies: Negative. Psychiatric/Behavioral: Positive for depression. The patient has insomnia. Physical Exam  Constitutional:       General: She is not in acute distress. Appearance: Normal appearance. She is well-developed and normal weight. HENT:      Head: Normocephalic and atraumatic.       Right Ear: Tympanic membrane normal.      Left Ear: Tympanic membrane normal.      Nose: Congestion present. Comments: Nasal turbinates:red inflamed,. Tenderness on maxillary sinus. Cobble stoning present. Eyes:      Pupils: Pupils are equal, round, and reactive to light. Neck:      Musculoskeletal: Normal range of motion and neck supple. Thyroid: No thyromegaly. Vascular: No JVD. Cardiovascular:      Rate and Rhythm: Normal rate and regular rhythm. Pulses: Normal pulses. Heart sounds: Normal heart sounds. Pulmonary:      Effort: Pulmonary effort is normal. No respiratory distress. Breath sounds: Normal breath sounds. No wheezing. Abdominal:      Comments: KUB  nontender. Neurological:      Mental Status: She is alert. Deep Tendon Reflexes: Reflexes are normal and symmetric. Psychiatric:         Mood and Affect: Mood normal.         Behavior: Behavior normal.         ASSESSMENT and PLAN  Diagnoses and all orders for this visit:    1. Acute non-recurrent maxillary sinusitis    Sinus wash, will call in,  -     amoxicillin (AMOXIL) 875 mg tablet; Take 1 Tab by mouth two (2) times a day. -     sodium chloride-aloe vera (AYR) topical gel; Apply  to affected area once for 1 dose. 2. Primary insomnia  We will reduce amitriptyline because it is making her constipated. She is using Benadryl at night to sleep with which is helping her. Advised her to talk to her psychiatrist about insomnia. 3. Irritable bowel syndrome without diarrhea  Doing well for now. 4. Essential hypertension    Stable. On losartan. Discussed expected course/resolution/complications of diagnosis in detail with patient. Medication risks/benefits/costs/interactions/alternatives discussed with patient. Discussed COVID-19 infection precaution with patient. Pt was given an after visit summary which includes diagnoses, current medications & vitals. Pt expressed understanding with the diagnosis and plan.

## 2020-12-22 DIAGNOSIS — K21.9 GASTROESOPHAGEAL REFLUX DISEASE WITHOUT ESOPHAGITIS: ICD-10-CM

## 2020-12-22 RX ORDER — PANTOPRAZOLE SODIUM 40 MG/1
TABLET, DELAYED RELEASE ORAL
Qty: 90 TAB | Refills: 0 | Status: SHIPPED | OUTPATIENT
Start: 2020-12-22 | End: 2021-04-08

## 2021-01-18 DIAGNOSIS — I10 ESSENTIAL HYPERTENSION: ICD-10-CM

## 2021-01-18 DIAGNOSIS — E78.2 ELEVATED TRIGLYCERIDES WITH HIGH CHOLESTEROL: ICD-10-CM

## 2021-01-19 RX ORDER — LOSARTAN POTASSIUM 25 MG/1
TABLET ORAL
Qty: 30 TAB | Refills: 0 | Status: SHIPPED | OUTPATIENT
Start: 2021-01-19 | End: 2021-02-16

## 2021-01-26 DIAGNOSIS — G44.021 INTRACTABLE CHRONIC CLUSTER HEADACHE: ICD-10-CM

## 2021-01-26 DIAGNOSIS — G44.221 CHRONIC TENSION-TYPE HEADACHE, INTRACTABLE: ICD-10-CM

## 2021-01-27 RX ORDER — GABAPENTIN 100 MG/1
CAPSULE ORAL
Qty: 90 CAP | Refills: 0 | Status: SHIPPED | OUTPATIENT
Start: 2021-01-27 | End: 2021-02-24

## 2021-02-16 ENCOUNTER — OFFICE VISIT (OUTPATIENT)
Dept: INTERNAL MEDICINE CLINIC | Facility: CLINIC | Age: 61
End: 2021-02-16
Payer: MEDICARE

## 2021-02-16 VITALS
BODY MASS INDEX: 30.43 KG/M2 | RESPIRATION RATE: 20 BRPM | DIASTOLIC BLOOD PRESSURE: 84 MMHG | OXYGEN SATURATION: 98 % | HEIGHT: 60 IN | WEIGHT: 155 LBS | SYSTOLIC BLOOD PRESSURE: 138 MMHG | TEMPERATURE: 98.3 F | HEART RATE: 92 BPM

## 2021-02-16 DIAGNOSIS — I10 ESSENTIAL HYPERTENSION: ICD-10-CM

## 2021-02-16 DIAGNOSIS — E78.2 ELEVATED TRIGLYCERIDES WITH HIGH CHOLESTEROL: ICD-10-CM

## 2021-02-16 DIAGNOSIS — R00.2 PALPITATION: Primary | ICD-10-CM

## 2021-02-16 DIAGNOSIS — F41.0 PANIC ATTACK: ICD-10-CM

## 2021-02-16 DIAGNOSIS — F31.9 BIPOLAR 1 DISORDER (HCC): ICD-10-CM

## 2021-02-16 DIAGNOSIS — J30.1 ALLERGIC RHINITIS DUE TO POLLEN, UNSPECIFIED SEASONALITY: ICD-10-CM

## 2021-02-16 PROCEDURE — G8752 SYS BP LESS 140: HCPCS | Performed by: INTERNAL MEDICINE

## 2021-02-16 PROCEDURE — 99214 OFFICE O/P EST MOD 30 MIN: CPT | Performed by: INTERNAL MEDICINE

## 2021-02-16 PROCEDURE — G8417 CALC BMI ABV UP PARAM F/U: HCPCS | Performed by: INTERNAL MEDICINE

## 2021-02-16 PROCEDURE — G9717 DOC PT DX DEP/BP F/U NT REQ: HCPCS | Performed by: INTERNAL MEDICINE

## 2021-02-16 PROCEDURE — G8754 DIAS BP LESS 90: HCPCS | Performed by: INTERNAL MEDICINE

## 2021-02-16 PROCEDURE — 3017F COLORECTAL CA SCREEN DOC REV: CPT | Performed by: INTERNAL MEDICINE

## 2021-02-16 PROCEDURE — G8427 DOCREV CUR MEDS BY ELIG CLIN: HCPCS | Performed by: INTERNAL MEDICINE

## 2021-02-16 RX ORDER — MINERAL OIL
180 ENEMA (ML) RECTAL
Qty: 30 TAB | Refills: 3 | Status: SHIPPED | OUTPATIENT
Start: 2021-02-16 | End: 2021-03-16 | Stop reason: ALTCHOICE

## 2021-02-16 RX ORDER — LOSARTAN POTASSIUM 25 MG/1
TABLET ORAL
Qty: 30 TAB | Refills: 0 | Status: SHIPPED | OUTPATIENT
Start: 2021-02-16 | End: 2021-03-12

## 2021-02-16 NOTE — PROGRESS NOTES
Health Maintenance Due   Topic Date Due    COVID-19 Vaccine (1 of 2) 05/19/1976    Shingrix Vaccine Age 50> (1 of 2) 05/19/2008       Chief Complaint   Patient presents with    Panic Attack     states had 5 hour panic attack yesterday       1. Have you been to the ER, urgent care clinic since your last visit? Hospitalized since your last visit? No    2. Have you seen or consulted any other health care providers outside of the [address] [place] [address] since your last visit? Include any pap smears or colon screening. No    3) Do you have an Advance Directive on file? no    4) Are you interested in receiving information on Advance Directives? NO      Patient is accompanied by self I have received verbal consent from Francine Wadsworth to discuss any/all medical information while they are present in the room.

## 2021-02-16 NOTE — PROGRESS NOTES
HISTORY OF PRESENT ILLNESS  Ana Brown is a 56 y.o. female here with the complaining of panic attack and palpitations several days back. She called [facility]. Had EKG done came back normal.  Her panic attacks subsided after taking an extra lorazepam.  Upon asking she mentioned she has been taking Benadryl 50 mg twice a day for allergy and insomnia. Advised her to wean off Benadryl. For allergies she is supposed to be on Allegra which she did not take a refill yet. Will call it in again. Has insomnia, on amitriptyline. Seeing psychiatrist.  Has hypertension, compliant with medicine. Denies chest pain palpitation or shortness of breath. Has bipolar disorder. Seeing psychiatrist.  Depression seems stable. Has more panic anxiety attack. Wondering if she should take more lorazepam.  Labs stable. Hypertension   Associated symptoms include palpitations. Pertinent negatives include no malaise/fatigue and no shortness of breath. Blood sugar problem  Pertinent negatives include no shortness of breath. Bipolar  Pertinent negatives include no shortness of breath. Panic Attack  Pertinent negatives include no shortness of breath. Review of Systems   Constitutional: Negative. Negative for malaise/fatigue. Eyes: Negative. Respiratory: Negative. Negative for shortness of breath and wheezing. Cardiovascular: Positive for palpitations. Gastrointestinal: Negative. Negative for blood in stool. Genitourinary: Negative. Musculoskeletal: Negative. Negative for falls. Skin: Negative. Negative for itching. Endo/Heme/Allergies: Negative. Psychiatric/Behavioral: Positive for depression. The patient has insomnia. Physical Exam  Constitutional:       General: She is not in acute distress. Appearance: Normal appearance. She is well-developed and normal weight.    HENT:      Left Ear: Tympanic membrane normal.   Eyes:      Pupils: Pupils are equal, round, and reactive to light.   Neck:      Musculoskeletal: Normal range of motion and neck supple. Thyroid: No thyromegaly. Vascular: No JVD. Cardiovascular:      Rate and Rhythm: Normal rate and regular rhythm. Pulses: Normal pulses. Heart sounds: Normal heart sounds. Pulmonary:      Effort: Pulmonary effort is normal. No respiratory distress. Breath sounds: Normal breath sounds. No wheezing. Abdominal:      Comments: KUB  nontender. Neurological:      Mental Status: She is alert. Deep Tendon Reflexes: Reflexes are normal and symmetric. Psychiatric:         Mood and Affect: Mood normal.         Behavior: Behavior normal.      Comments: Panic attack. ASSESSMENT and PLAN  Diagnoses and all orders for this visit:    1. Palpitation     Probably this is because of high-dose of Benadryl which is causing has seen a stimulation paradoxically. Advised her to wean off slowly Benadryl. Heart rate is normal right now. 2. Bipolar 1 disorder (HCC)  Seeing Dr. Jenn Macias. On multiple medications including right lower and amitriptyline. She is also lorazepam 3 times a day. 3. Panic attack  Probably because of high dose of Benadryl. Advised her to wean herself off of Benadryl. She wants to go up with lorazepam dosage. Advised her to talk to her psychiatrist.  She was advised to take extra few pills for lorazepam rather than increasing it to 1 mg tablet. 4. Allergic rhinitis due to pollen, unspecified seasonality    Discontinue Benadryl. Will give,  -     fexofenadine (ALLEGRA) 180 mg tablet; Take 1 Tab by mouth daily as needed for Allergies. DC benadryl and zyrtec        . Discussed expected course/resolution/complications of diagnosis in detail with patient. Medication risks/benefits/costs/interactions/alternatives discussed with patient. Discussed COVID-19 infection precaution with patient. Pt was given an after visit summary which includes diagnoses, current medications & vitals.    Pt expressed understanding with the diagnosis and plan. Fall

## 2021-02-23 DIAGNOSIS — G44.221 CHRONIC TENSION-TYPE HEADACHE, INTRACTABLE: ICD-10-CM

## 2021-02-23 DIAGNOSIS — G44.021 INTRACTABLE CHRONIC CLUSTER HEADACHE: ICD-10-CM

## 2021-02-24 RX ORDER — GABAPENTIN 100 MG/1
CAPSULE ORAL
Qty: 90 CAP | Refills: 0 | Status: SHIPPED | OUTPATIENT
Start: 2021-02-24 | End: 2021-04-01

## 2021-03-01 DIAGNOSIS — F31.10 BIPOLAR AFFECTIVE DISORDER, CURRENT EPISODE MANIC, CURRENT EPISODE SEVERITY UNSPECIFIED (HCC): ICD-10-CM

## 2021-03-01 RX ORDER — AMITRIPTYLINE HYDROCHLORIDE 50 MG/1
TABLET, FILM COATED ORAL
Qty: 30 TAB | Refills: 0 | Status: SHIPPED | OUTPATIENT
Start: 2021-03-01 | End: 2021-04-01

## 2021-03-16 ENCOUNTER — OFFICE VISIT (OUTPATIENT)
Dept: INTERNAL MEDICINE CLINIC | Facility: CLINIC | Age: 61
End: 2021-03-16
Payer: MEDICARE

## 2021-03-16 VITALS
HEART RATE: 88 BPM | WEIGHT: 151.4 LBS | BODY MASS INDEX: 29.72 KG/M2 | DIASTOLIC BLOOD PRESSURE: 82 MMHG | TEMPERATURE: 98.8 F | SYSTOLIC BLOOD PRESSURE: 130 MMHG | RESPIRATION RATE: 20 BRPM | OXYGEN SATURATION: 99 % | HEIGHT: 60 IN

## 2021-03-16 DIAGNOSIS — G47.00 INSOMNIA, UNSPECIFIED TYPE: ICD-10-CM

## 2021-03-16 DIAGNOSIS — R00.2 PALPITATION: Primary | ICD-10-CM

## 2021-03-16 DIAGNOSIS — R73.01 ELEVATED FASTING BLOOD SUGAR: ICD-10-CM

## 2021-03-16 DIAGNOSIS — F31.9 BIPOLAR 1 DISORDER (HCC): ICD-10-CM

## 2021-03-16 DIAGNOSIS — J30.1 ALLERGIC RHINITIS DUE TO POLLEN, UNSPECIFIED SEASONALITY: ICD-10-CM

## 2021-03-16 DIAGNOSIS — I10 ESSENTIAL HYPERTENSION: ICD-10-CM

## 2021-03-16 PROCEDURE — G8752 SYS BP LESS 140: HCPCS | Performed by: INTERNAL MEDICINE

## 2021-03-16 PROCEDURE — G8754 DIAS BP LESS 90: HCPCS | Performed by: INTERNAL MEDICINE

## 2021-03-16 PROCEDURE — G9717 DOC PT DX DEP/BP F/U NT REQ: HCPCS | Performed by: INTERNAL MEDICINE

## 2021-03-16 PROCEDURE — G8417 CALC BMI ABV UP PARAM F/U: HCPCS | Performed by: INTERNAL MEDICINE

## 2021-03-16 PROCEDURE — 3017F COLORECTAL CA SCREEN DOC REV: CPT | Performed by: INTERNAL MEDICINE

## 2021-03-16 PROCEDURE — 99214 OFFICE O/P EST MOD 30 MIN: CPT | Performed by: INTERNAL MEDICINE

## 2021-03-16 PROCEDURE — G8427 DOCREV CUR MEDS BY ELIG CLIN: HCPCS | Performed by: INTERNAL MEDICINE

## 2021-03-16 RX ORDER — ACETAMINOPHEN, DIPHENHYDRAMINE HCL, PHENYLEPHRINE HCL 325; 25; 5 MG/1; MG/1; MG/1
TABLET ORAL
Qty: 30 TAB | Refills: 2 | Status: SHIPPED | OUTPATIENT
Start: 2021-03-16 | End: 2021-08-03 | Stop reason: ALTCHOICE

## 2021-03-16 RX ORDER — FLUTICASONE PROPIONATE 50 MCG
2 SPRAY, SUSPENSION (ML) NASAL DAILY
Qty: 1 BOTTLE | Refills: 2 | Status: SHIPPED | OUTPATIENT
Start: 2021-03-16 | End: 2021-06-29 | Stop reason: ALTCHOICE

## 2021-03-16 NOTE — PATIENT INSTRUCTIONS
Learning About Low-Carbohydrate Diets  What is a low-carbohydrate diet? A low-carbohydrate (or \"low-carb\") diet limits foods and drinks that have carbohydrates. This includes grains, fruits, milk and yogurt, and starchy vegetables like potatoes, beans, and corn. It also avoids foods and drinks that have added sugar. Instead, low-carb diets include foods that are high in protein and fat. Why might you follow a low-carb diet? Low-carb diets may be used for a variety of reasons, such as for weight loss. People who have diabetes may use a low-carb diet to help manage their blood sugar levels. What should you do before you start the diet? Talk to your doctor before you try any diet. This is even more important if you have health problems like kidney disease, heart disease, or diabetes. Your doctor may suggest that you meet with a registered dietitian. A dietitian can help you make an eating plan that works for you. What foods do you eat on a low-carb diet? On a low-carb diet, you choose foods that are high in protein and fat. Examples of these are:  · Meat, poultry, and fish. · Eggs. · Nuts, such as walnuts, pecans, almonds, and peanuts. · Peanut butter and other nut butters. · Tofu. · Avocado. · Kennedi Reyes. · Non-starchy vegetables like broccoli, cauliflower, green beans, mushrooms, peppers, lettuce, and spinach. · Unsweetened non-dairy milks like almond milk and coconut milk. · Cheese, cottage cheese, and cream cheese. Current as of: August 22, 2019               Content Version: 12.6  © 6250-7628 [employer]. Care instructions adapted under license by [employer] (which disclaims liability or warranty for this information). If you have questions about a medical condition or this instruction, always ask your healthcare professional. [place] [address] any warranty or liability for your use of this information.

## 2021-03-16 NOTE — PROGRESS NOTES
HISTORY OF PRESENT ILLNESS  Landon Middleton is a 56 y.o. female here to follow-up. Report occasional palpitation for past several weeks. She has stopped taking Benadryl, but still on Allegra can cause palpitation sometimes. No chest pain or shortness of breath. She has more palpitation early morning. Holter monitor already done, was normal.  Had elevated fasting blood sugar in the past.  Need lab work. Has hypertension, compliant with medicine. Denies chest pain palpitation or shortness of breath. Has bipolar disorder. Seeing psychiatrist.  Depression seems stable. Seeing Dr. Javier Roberts. Report on insomnia, using lorazepam and amitriptyline, not helping her. Wondering if she can take melatonin. Has IBS, using a probiotic for long time, abdomen seems okay. She is wondering if she can stop taking probiotic. Has GERD, using PPI for long time. Need new labs. Bipolar  Pertinent negatives include no chest pain and no shortness of breath. Hypertension   Associated symptoms include palpitations. Pertinent negatives include no chest pain and no shortness of breath. GERD  Pertinent negatives include no chest pain and no shortness of breath. Medication Evaluation  Pertinent negatives include no chest pain and no shortness of breath. Abdominal Pain  Pertinent negatives include no chest pain and no shortness of breath. Review of Systems   Constitutional: Negative. HENT: Negative. Eyes: Negative. Respiratory: Negative. Negative for shortness of breath and wheezing. Cardiovascular: Positive for palpitations. Negative for chest pain. Gastrointestinal: Negative for blood in stool. Genitourinary: Negative. Musculoskeletal: Negative. Negative for falls. Skin: Negative. Negative for itching. Endo/Heme/Allergies: Negative. Psychiatric/Behavioral: Positive for depression. The patient is nervous/anxious and has insomnia.         Physical Exam  Constitutional:       General: She is not in acute distress. Appearance: Normal appearance. She is well-developed and normal weight. Neck:      Musculoskeletal: Normal range of motion and neck supple. Thyroid: No thyromegaly. Vascular: No JVD. Cardiovascular:      Rate and Rhythm: Normal rate and regular rhythm. Pulses: Normal pulses. Heart sounds: Normal heart sounds. Pulmonary:      Effort: Pulmonary effort is normal. No respiratory distress. Breath sounds: Normal breath sounds. No wheezing. Abdominal:      Comments: KUB  nontender. Neurological:      Mental Status: She is alert. Deep Tendon Reflexes: Reflexes are normal and symmetric. Psychiatric:         Mood and Affect: Mood normal.         Behavior: Behavior normal.      Comments: Anxiety and depression seems under control. She has insomnia. ASSESSMENT and PLAN  Diagnoses and all orders for this visit:    1. Palpitation    She believes even Allegra can cause Palpitation. Will discontinue Allegra. She will use the local groin honey for allergy control. Will get her back on Flonase. Still having occasional palpitation in the early morning and some of the times. Last TSH was normal but we will repeat it again. She had Holter monitor done came back normal.  Will check,  -     METABOLIC PANEL, COMPREHENSIVE; Future  -     TSH 3RD GENERATION; Future  -     CBC WITH AUTOMATED DIFF; Future    2. Allergic rhinitis due to pollen, unspecified seasonality    We will stop using Allegra, advised to use locally grown honey. Will order,  -     fluticasone propionate (FLONASE) 50 mcg/actuation nasal spray; 2 Sprays by Both Nostrils route daily. 3. Essential hypertension  Stable blood pressure on losartan. 4. Bipolar 1 disorder (HCC)  Seeing Dr. Raheel Lerner. More on multiple medication including a Vraylar, amitriptyline and lorazepam.  5. Elevated fasting blood sugar    Watch carbohydrate. Will check,  -     METABOLIC PANEL, COMPREHENSIVE;  Future  - HEMOGLOBIN A1C WITH EAG; Future    6. Insomnia, unspecified type    She is on lorazepam amitriptyline, still not able to sleep. Will give,  -     melatonin 10 mg tab; 1 tab po hs    Advised to talk to Dr. Sheree Borrego regarding insomnia. Discussed expected course/resolution/complications of diagnosis in detail with patient. Medication risks/benefits/costs/interactions/alternatives discussed with patient. Pt was given an after visit summary which includes diagnoses, current medications & vitals. Pt expressed understanding with the diagnosis and plan.

## 2021-03-16 NOTE — PROGRESS NOTES
Health Maintenance Due   Topic Date Due    Shingrix Vaccine Age 49> (1 of 2) Never done       Chief Complaint   Patient presents with    Irritable Bowel Syndrome     4 month follow up    Manic Behavior    Osteoarthritis       1. Have you been to the ER, urgent care clinic since your last visit? Hospitalized since your last visit? No    2. Have you seen or consulted any other health care providers outside of the [address] [place] [address] since your last visit? Include any pap smears or colon screening. No    3) Do you have an Advance Directive on file? no    4) Are you interested in receiving information on Advance Directives? NO      Patient is accompanied by self I have received verbal consent from Robyn Dumont to discuss any/all medical information while they are present in the room.

## 2021-03-17 LAB
ALBUMIN SERPL-MCNC: 4.4 G/DL (ref 3.8–4.8)
ALBUMIN/GLOB SERPL: 1.8 {RATIO} (ref 1.2–2.2)
ALP SERPL-CCNC: 56 IU/L (ref 39–117)
ALT SERPL-CCNC: 22 IU/L (ref 0–32)
AST SERPL-CCNC: 24 IU/L (ref 0–40)
BASOPHILS # BLD AUTO: 0.1 X10E3/UL (ref 0–0.2)
BASOPHILS NFR BLD AUTO: 2 %
BILIRUB SERPL-MCNC: 0.2 MG/DL (ref 0–1.2)
BUN SERPL-MCNC: 15 MG/DL (ref 8–27)
BUN/CREAT SERPL: 16 (ref 12–28)
CALCIUM SERPL-MCNC: 9.5 MG/DL (ref 8.7–10.3)
CHLORIDE SERPL-SCNC: 107 MMOL/L (ref 96–106)
CO2 SERPL-SCNC: 22 MMOL/L (ref 20–29)
CREAT SERPL-MCNC: 0.96 MG/DL (ref 0.57–1)
EOSINOPHIL # BLD AUTO: 0.2 X10E3/UL (ref 0–0.4)
EOSINOPHIL NFR BLD AUTO: 5 %
ERYTHROCYTE [DISTWIDTH] IN BLOOD BY AUTOMATED COUNT: 13.4 % (ref 11.7–15.4)
EST. AVERAGE GLUCOSE BLD GHB EST-MCNC: 114 MG/DL
GLOBULIN SER CALC-MCNC: 2.4 G/DL (ref 1.5–4.5)
GLUCOSE SERPL-MCNC: 103 MG/DL (ref 65–99)
HBA1C MFR BLD: 5.6 % (ref 4.8–5.6)
HCT VFR BLD AUTO: 40.1 % (ref 34–46.6)
HGB BLD-MCNC: 12.9 G/DL (ref 11.1–15.9)
IMM GRANULOCYTES # BLD AUTO: 0 X10E3/UL (ref 0–0.1)
IMM GRANULOCYTES NFR BLD AUTO: 0 %
LYMPHOCYTES # BLD AUTO: 1.8 X10E3/UL (ref 0.7–3.1)
LYMPHOCYTES NFR BLD AUTO: 41 %
MCH RBC QN AUTO: 27.8 PG (ref 26.6–33)
MCHC RBC AUTO-ENTMCNC: 32.2 G/DL (ref 31.5–35.7)
MCV RBC AUTO: 86 FL (ref 79–97)
MONOCYTES # BLD AUTO: 0.2 X10E3/UL (ref 0.1–0.9)
MONOCYTES NFR BLD AUTO: 5 %
NEUTROPHILS # BLD AUTO: 2.1 X10E3/UL (ref 1.4–7)
NEUTROPHILS NFR BLD AUTO: 47 %
PLATELET # BLD AUTO: 279 X10E3/UL (ref 150–450)
POTASSIUM SERPL-SCNC: 4.3 MMOL/L (ref 3.5–5.2)
PROT SERPL-MCNC: 6.8 G/DL (ref 6–8.5)
RBC # BLD AUTO: 4.64 X10E6/UL (ref 3.77–5.28)
SODIUM SERPL-SCNC: 142 MMOL/L (ref 134–144)
SPECIMEN STATUS REPORT, ROLRST: NORMAL
TSH SERPL DL<=0.005 MIU/L-ACNC: 0.89 UIU/ML (ref 0.45–4.5)
WBC # BLD AUTO: 4.4 X10E3/UL (ref 3.4–10.8)

## 2021-03-22 ENCOUNTER — TELEPHONE (OUTPATIENT)
Dept: PHARMACY | Age: 61
End: 2021-03-22

## 2021-03-22 NOTE — TELEPHONE ENCOUNTER
CLINICAL PHARMACY: ADHERENCE REVIEW  Identified care gap per [place]; fills at [address]: ACE/ARB adherence    Last Visit: 3/16/21    Patient identified as LIS = 2, therefore patient may be able to receive a 90-day supply for the same cost as a 30-day supply    Patient found in Outcomes MTM and is not currently eligible for CMR/TIP    ASSESSMENT  ACE/ARB ADHERENCE    Per Insurance Records through 3/14/21 (YTD [facility] = 84%): Losartan 25 mg tablet last filled on 3/12/21 30 tablets for 15 day supply. Previous fill before that, filled 2/16/21 30 tablets for a 15 day supply. Per [facility]:  [place] once monthly for residents   Losartan 25 mg tablets last filled on 3/12/21 30 tablets for a 15 day supply. 4 refills remaining. Billed through [employer]. Per pharmacist, SIG 1 tablet by mouth daily, 30 tablets dispense, he is unsure why being processed 15 day supply, should be a 30 day supply. States will fix for future fills. BP Readings from Last 3 Encounters:   03/16/21 130/82   02/16/21 138/84   12/15/20 132/84     Estimated Creatinine Clearance: 52.6 mL/min (by C-G formula based on SCr of 0.96 mg/dL). PLAN  No patient out reach planned at this time.     Future Appointments   Date Time Provider Aurora Baeza   7/13/2021  9:15 AM Vinita Cohen MD [address] [place] [address], PharmD, [id number].   Direct: [phone]  Department, toll free: [phone], option 7    CLINICAL PHARMACY CONSULT: MED RECONCILIATION/REVIEW ADDENDUM    For Pharmacy Admin Tracking Only    PHSO: PHSO Patient?: Yes  Total # of Interventions Recommended: Count: 1  Recommended intervention potential cost savings: 1  Total Interventions Accepted: 1  Time Spent (min): 15

## 2021-03-31 DIAGNOSIS — F31.10 BIPOLAR AFFECTIVE DISORDER, CURRENT EPISODE MANIC, CURRENT EPISODE SEVERITY UNSPECIFIED (HCC): ICD-10-CM

## 2021-03-31 DIAGNOSIS — G44.021 INTRACTABLE CHRONIC CLUSTER HEADACHE: ICD-10-CM

## 2021-03-31 DIAGNOSIS — G44.221 CHRONIC TENSION-TYPE HEADACHE, INTRACTABLE: ICD-10-CM

## 2021-04-01 RX ORDER — GABAPENTIN 100 MG/1
CAPSULE ORAL
Qty: 90 CAP | Refills: 0 | Status: SHIPPED | OUTPATIENT
Start: 2021-04-01 | End: 2021-04-29 | Stop reason: SDUPTHER

## 2021-04-01 RX ORDER — AMITRIPTYLINE HYDROCHLORIDE 50 MG/1
TABLET, FILM COATED ORAL
Qty: 30 TAB | Refills: 0 | Status: SHIPPED | OUTPATIENT
Start: 2021-04-01 | End: 2021-04-28 | Stop reason: SDUPTHER

## 2021-04-08 DIAGNOSIS — K21.9 GASTROESOPHAGEAL REFLUX DISEASE WITHOUT ESOPHAGITIS: ICD-10-CM

## 2021-04-08 RX ORDER — PANTOPRAZOLE SODIUM 40 MG/1
TABLET, DELAYED RELEASE ORAL
Qty: 90 TAB | Refills: 0 | Status: SHIPPED | OUTPATIENT
Start: 2021-04-08 | End: 2021-07-16

## 2021-04-28 DIAGNOSIS — F31.10 BIPOLAR AFFECTIVE DISORDER, CURRENT EPISODE MANIC, CURRENT EPISODE SEVERITY UNSPECIFIED (HCC): ICD-10-CM

## 2021-04-28 RX ORDER — AMITRIPTYLINE HYDROCHLORIDE 50 MG/1
TABLET, FILM COATED ORAL
Qty: 30 TAB | Refills: 0 | Status: SHIPPED | OUTPATIENT
Start: 2021-04-28 | End: 2021-05-26

## 2021-04-29 DIAGNOSIS — G44.221 CHRONIC TENSION-TYPE HEADACHE, INTRACTABLE: ICD-10-CM

## 2021-04-29 DIAGNOSIS — G44.021 INTRACTABLE CHRONIC CLUSTER HEADACHE: ICD-10-CM

## 2021-04-29 RX ORDER — GABAPENTIN 100 MG/1
CAPSULE ORAL
Qty: 90 CAP | Refills: 0 | Status: SHIPPED | OUTPATIENT
Start: 2021-04-29 | End: 2021-05-26

## 2021-05-26 DIAGNOSIS — G44.021 INTRACTABLE CHRONIC CLUSTER HEADACHE: ICD-10-CM

## 2021-05-26 DIAGNOSIS — G44.221 CHRONIC TENSION-TYPE HEADACHE, INTRACTABLE: ICD-10-CM

## 2021-05-26 RX ORDER — GABAPENTIN 100 MG/1
CAPSULE ORAL
Qty: 90 CAPSULE | Refills: 0 | Status: SHIPPED | OUTPATIENT
Start: 2021-05-26 | End: 2021-06-29 | Stop reason: SDUPTHER

## 2021-06-14 ENCOUNTER — TELEPHONE (OUTPATIENT)
Dept: INTERNAL MEDICINE CLINIC | Age: 61
End: 2021-06-14

## 2021-06-29 ENCOUNTER — OFFICE VISIT (OUTPATIENT)
Dept: INTERNAL MEDICINE CLINIC | Facility: CLINIC | Age: 61
End: 2021-06-29
Payer: MEDICARE

## 2021-06-29 VITALS
HEIGHT: 60 IN | DIASTOLIC BLOOD PRESSURE: 80 MMHG | WEIGHT: 140.4 LBS | HEART RATE: 94 BPM | BODY MASS INDEX: 27.56 KG/M2 | SYSTOLIC BLOOD PRESSURE: 122 MMHG | TEMPERATURE: 98.8 F | RESPIRATION RATE: 20 BRPM | OXYGEN SATURATION: 98 %

## 2021-06-29 DIAGNOSIS — F31.10 BIPOLAR AFFECTIVE DISORDER, CURRENT EPISODE MANIC, CURRENT EPISODE SEVERITY UNSPECIFIED (HCC): ICD-10-CM

## 2021-06-29 DIAGNOSIS — G44.021 INTRACTABLE CHRONIC CLUSTER HEADACHE: ICD-10-CM

## 2021-06-29 DIAGNOSIS — K62.3 RECTAL PROLAPSE: Primary | ICD-10-CM

## 2021-06-29 DIAGNOSIS — G44.221 CHRONIC TENSION-TYPE HEADACHE, INTRACTABLE: ICD-10-CM

## 2021-06-29 DIAGNOSIS — I10 ESSENTIAL HYPERTENSION: ICD-10-CM

## 2021-06-29 PROCEDURE — G8754 DIAS BP LESS 90: HCPCS | Performed by: INTERNAL MEDICINE

## 2021-06-29 PROCEDURE — G8752 SYS BP LESS 140: HCPCS | Performed by: INTERNAL MEDICINE

## 2021-06-29 PROCEDURE — G8427 DOCREV CUR MEDS BY ELIG CLIN: HCPCS | Performed by: INTERNAL MEDICINE

## 2021-06-29 PROCEDURE — G8417 CALC BMI ABV UP PARAM F/U: HCPCS | Performed by: INTERNAL MEDICINE

## 2021-06-29 PROCEDURE — 3017F COLORECTAL CA SCREEN DOC REV: CPT | Performed by: INTERNAL MEDICINE

## 2021-06-29 PROCEDURE — 99214 OFFICE O/P EST MOD 30 MIN: CPT | Performed by: INTERNAL MEDICINE

## 2021-06-29 PROCEDURE — G9717 DOC PT DX DEP/BP F/U NT REQ: HCPCS | Performed by: INTERNAL MEDICINE

## 2021-06-29 RX ORDER — GABAPENTIN 100 MG/1
CAPSULE ORAL
Qty: 90 CAPSULE | Refills: 3 | Status: SHIPPED | OUTPATIENT
Start: 2021-06-29 | End: 2021-10-18

## 2021-06-29 NOTE — PROGRESS NOTES
HISTORY OF PRESENT ILLNESS  Lashell Damon is a 59 y.o. female here to follow-up. She has been suffering from prolapsed rectum which is very uncomfortable. She is scheduled to have rectum surgery next week. She will do preop tomorrow. No history of coronary artery disease. Denies chest pain palpitation or shortness of breath. Has hypertension, compliant with medicine. Denies chest pain palpitation or shortness of breath. Has bipolar disorder. Seeing psychiatrist.  Depression seems stable. Seeing Dr. Stefany Johnson. Labs reviewed. Bipolar  Pertinent negatives include no chest pain and no shortness of breath. Hypertension   Pertinent negatives include no chest pain and no shortness of breath. GERD  Pertinent negatives include no chest pain and no shortness of breath. Medication Evaluation  Pertinent negatives include no chest pain and no shortness of breath. Medication Refill  Pertinent negatives include no chest pain and no shortness of breath. Review of Systems   Constitutional: Negative. HENT: Negative. Eyes: Negative. Respiratory: Negative. Negative for shortness of breath and wheezing. Cardiovascular: Negative. Negative for chest pain. Gastrointestinal: Negative. Negative for blood in stool. Genitourinary: Negative. Musculoskeletal: Negative. Negative for falls. Skin: Negative. Negative for itching. Endo/Heme/Allergies: Negative. Psychiatric/Behavioral: Positive for depression. Physical Exam  Constitutional:       General: She is not in acute distress. Appearance: Normal appearance. She is well-developed and normal weight. Neck:      Thyroid: No thyromegaly. Vascular: No JVD. Cardiovascular:      Rate and Rhythm: Normal rate and regular rhythm. Pulses: Normal pulses. Heart sounds: Normal heart sounds. Pulmonary:      Effort: Pulmonary effort is normal. No respiratory distress. Breath sounds: Normal breath sounds. No wheezing. Abdominal:      Comments: KUB  nontender. Musculoskeletal:      Cervical back: Normal range of motion and neck supple. Neurological:      Mental Status: She is alert. Deep Tendon Reflexes: Reflexes are normal and symmetric. Psychiatric:         Mood and Affect: Mood normal.         Behavior: Behavior normal.      Comments: Anxiety and depression seems under control. She has insomnia. ASSESSMENT and PLAN  Diagnoses and all orders for this visit:    1. Rectal prolapse    She is scheduled to have surgery to fix prolapsed rectum by colorectal surgeon next week. Preop will be done tomorrow. Medications reviewed with patient and foot forward to take prior to surgery. She will have a blood bowel prep and the previous night, probably she can skip night medications prior to surgery. 2. Chronic tension-type headache, intractable   We will refill,  -     gabapentin (NEURONTIN) 100 mg capsule; 2 caps am and 1 cap PM    3. Intractable chronic cluster headache  -     gabapentin (NEURONTIN) 100 mg capsule; 2 caps am and 1 cap PM    4. Essential hypertension  Stable blood pressure. On losartan. 5. Bipolar affective disorder, current episode manic, current episode severity unspecified [facility]  Seeing Dr. Marco A Quispe. On multiple medications including Vraylar and amitriptyline. Discussed expected course/resolution/complications of diagnosis in detail with patient. Medication risks/benefits/costs/interactions/alternatives discussed with patient. Pt was given an after visit summary which includes diagnoses, current medications & vitals. Pt expressed understanding with the diagnosis and plan.

## 2021-06-29 NOTE — PROGRESS NOTES
Health Maintenance Due   Topic Date Due    Shingrix Vaccine Age 49> (1 of 2) Never done    Breast Cancer Screen Mammogram  06/01/2021    Medicare Yearly Exam  07/22/2021       Chief Complaint   Patient presents with    Medication Refill    Discuss Medications    Abdominal Pain    Other     Going in for surgery in July 1. Have you been to the ER, urgent care clinic since your last visit? Hospitalized since your last visit? No    2. Have you seen or consulted any other health care providers outside of the [address] [place] [address] since your last visit? Include any pap smears or colon screening. No    3) Do you have an Advance Directive on file? no    4) Are you interested in receiving information on Advance Directives? NO      Patient is accompanied by self I have received verbal consent from Erin Guerrero to discuss any/all medical information while they are present in the room.

## 2021-06-30 ENCOUNTER — HOSPITAL ENCOUNTER (OUTPATIENT)
Dept: PREADMISSION TESTING | Age: 61
Discharge: HOME | End: 2021-06-30
Attending: SURGERY
Payer: MEDICARE

## 2021-06-30 VITALS
BODY MASS INDEX: 27.66 KG/M2 | HEART RATE: 97 BPM | OXYGEN SATURATION: 98 % | HEIGHT: 60 IN | WEIGHT: 140.87 LBS | RESPIRATION RATE: 16 BRPM | TEMPERATURE: 98.5 F | DIASTOLIC BLOOD PRESSURE: 88 MMHG | SYSTOLIC BLOOD PRESSURE: 157 MMHG

## 2021-06-30 LAB
ABO + RH BLD: NORMAL
ALBUMIN SERPL-MCNC: 4.4 G/DL (ref 3.5–5)
ALBUMIN/GLOB SERPL: 1.3 {RATIO} (ref 1.1–2.2)
ALP SERPL-CCNC: 52 U/L (ref 45–117)
ALT SERPL-CCNC: 33 U/L (ref 12–78)
ANION GAP SERPL CALC-SCNC: 2 MMOL/L (ref 5–15)
APPEARANCE UR: CLEAR
APTT PPP: 24.5 SEC (ref 22.1–31)
AST SERPL-CCNC: 25 U/L (ref 15–37)
ATRIAL RATE: 81 BPM
BACTERIA URNS QL MICRO: NEGATIVE /HPF
BILIRUB SERPL-MCNC: 0.5 MG/DL (ref 0.2–1)
BILIRUB UR QL: NEGATIVE
BLOOD GROUP ANTIBODIES SERPL: NORMAL
BUN SERPL-MCNC: 13 MG/DL (ref 6–20)
BUN/CREAT SERPL: 14 (ref 12–20)
CALCIUM SERPL-MCNC: 9.6 MG/DL (ref 8.5–10.1)
CALCULATED P AXIS, ECG09: 46 DEGREES
CALCULATED R AXIS, ECG10: -12 DEGREES
CALCULATED T AXIS, ECG11: 36 DEGREES
CHLORIDE SERPL-SCNC: 108 MMOL/L (ref 97–108)
CO2 SERPL-SCNC: 31 MMOL/L (ref 21–32)
COLOR UR: NORMAL
CREAT SERPL-MCNC: 0.92 MG/DL (ref 0.55–1.02)
DIAGNOSIS, 93000: NORMAL
EPITH CASTS URNS QL MICRO: NORMAL /LPF
ERYTHROCYTE [DISTWIDTH] IN BLOOD BY AUTOMATED COUNT: 12.9 % (ref 11.5–14.5)
GLOBULIN SER CALC-MCNC: 3.5 G/DL (ref 2–4)
GLUCOSE SERPL-MCNC: 93 MG/DL (ref 65–100)
GLUCOSE UR STRIP.AUTO-MCNC: NEGATIVE MG/DL
HCT VFR BLD AUTO: 39.6 % (ref 35–47)
HGB BLD-MCNC: 12.7 G/DL (ref 11.5–16)
HGB UR QL STRIP: NEGATIVE
HYALINE CASTS URNS QL MICRO: NORMAL /LPF (ref 0–5)
INR PPP: 1.1 (ref 0.9–1.1)
KETONES UR QL STRIP.AUTO: NEGATIVE MG/DL
LEUKOCYTE ESTERASE UR QL STRIP.AUTO: NEGATIVE
MCH RBC QN AUTO: 28.3 PG (ref 26–34)
MCHC RBC AUTO-ENTMCNC: 32.1 G/DL (ref 30–36.5)
MCV RBC AUTO: 88.4 FL (ref 80–99)
NITRITE UR QL STRIP.AUTO: NEGATIVE
NRBC # BLD: 0 K/UL (ref 0–0.01)
NRBC BLD-RTO: 0 PER 100 WBC
P-R INTERVAL, ECG05: 158 MS
PH UR STRIP: 8 [PH] (ref 5–8)
PLATELET # BLD AUTO: 270 K/UL (ref 150–400)
PMV BLD AUTO: 9.3 FL (ref 8.9–12.9)
POTASSIUM SERPL-SCNC: 4.4 MMOL/L (ref 3.5–5.1)
PROT SERPL-MCNC: 7.9 G/DL (ref 6.4–8.2)
PROT UR STRIP-MCNC: NEGATIVE MG/DL
PROTHROMBIN TIME: 11.7 SEC (ref 9–11.1)
Q-T INTERVAL, ECG07: 386 MS
QRS DURATION, ECG06: 90 MS
QTC CALCULATION (BEZET), ECG08: 448 MS
RBC # BLD AUTO: 4.48 M/UL (ref 3.8–5.2)
RBC #/AREA URNS HPF: NORMAL /HPF (ref 0–5)
SODIUM SERPL-SCNC: 141 MMOL/L (ref 136–145)
SP GR UR REFRACTOMETRY: 1.01 (ref 1–1.03)
SPECIMEN EXP DATE BLD: NORMAL
THERAPEUTIC RANGE,PTTT: NORMAL SECS (ref 58–77)
UA: UC IF INDICATED,UAUC: NORMAL
UROBILINOGEN UR QL STRIP.AUTO: 0.2 EU/DL (ref 0.2–1)
VENTRICULAR RATE, ECG03: 81 BPM
WBC # BLD AUTO: 5.3 K/UL (ref 3.6–11)
WBC URNS QL MICRO: NORMAL /HPF (ref 0–4)

## 2021-06-30 PROCEDURE — 81001 URINALYSIS AUTO W/SCOPE: CPT

## 2021-06-30 PROCEDURE — 86900 BLOOD TYPING SEROLOGIC ABO: CPT

## 2021-06-30 PROCEDURE — 36415 COLL VENOUS BLD VENIPUNCTURE: CPT

## 2021-06-30 PROCEDURE — 85027 COMPLETE CBC AUTOMATED: CPT

## 2021-06-30 PROCEDURE — 80053 COMPREHEN METABOLIC PANEL: CPT

## 2021-06-30 PROCEDURE — 85730 THROMBOPLASTIN TIME PARTIAL: CPT

## 2021-06-30 PROCEDURE — 93005 ELECTROCARDIOGRAM TRACING: CPT

## 2021-06-30 PROCEDURE — 85610 PROTHROMBIN TIME: CPT

## 2021-06-30 RX ORDER — SODIUM CHLORIDE, SODIUM LACTATE, POTASSIUM CHLORIDE, CALCIUM CHLORIDE 600; 310; 30; 20 MG/100ML; MG/100ML; MG/100ML; MG/100ML
25 INJECTION, SOLUTION INTRAVENOUS CONTINUOUS
Status: CANCELLED | OUTPATIENT
Start: 2021-07-06

## 2021-06-30 RX ORDER — DIPHENHYDRAMINE HCL 25 MG
25 TABLET ORAL
COMMUNITY
End: 2022-01-20 | Stop reason: SINTOL

## 2021-06-30 RX ORDER — ACETAMINOPHEN 500 MG
1000 TABLET ORAL 2 TIMES DAILY
Status: ON HOLD | COMMUNITY
End: 2022-10-24 | Stop reason: CLARIF

## 2021-06-30 NOTE — PERIOP NOTES
[facility]  Preoperative Instructions        Surgery Date July 6         Time of Arrival Vishal Gomez  Contact# [phone]    1. On the day of your surgery, please report to the Surgical Services Registration Desk and sign in at your designated time. The Surgery Center is located to the right of the Emergency Room. 2. You must have someone with you to drive you home. You should not drive a car for 24 hours following surgery. Please make arrangements for a friend or family member to stay with you for the first 24 hours after your surgery. 3. Do not have anything to eat or drink (including water, gum, mints, coffee, juice) after midnight ?? July 5      . Follow Dr Farias's Bowel Prep Instructions - has print out. ? This may not apply to medications prescribed by your physician. ?(Please note below the special instructions with medications to take the morning of your procedure.)    4. We recommend you do not drink any alcoholic beverages for 24 hours before and after your surgery. 5. Contact your surgeons office for instructions on the following medications: non-steroidal anti-inflammatory drugs (i.e. Advil, Aleve), vitamins, and supplements. (Some surgeons will want you to stop these medications prior to surgery and others may allow you to take them)  **If you are currently taking Plavix, Coumadin, Aspirin and/or other blood-thinning agents, contact your surgeon for instructions. ** Your surgeon will partner with the physician prescribing these medications to determine if it is safe to stop or if you need to continue taking. Please do not stop taking these medications without instructions from your surgeon    6. Wear comfortable clothes. Wear glasses instead of contacts. Do not bring any money or jewelry. Please bring picture ID, insurance card, and any prearranged co-payment or hospital payment. Do not wear make-up, particularly mascara the morning of your surgery.   Do not wear nail polish, particularly if you are having foot /hand surgery. Wear your hair loose or down, no ponytails, buns, lorna pins or clips. All body piercings must be removed. Please shower with antibacterial soap for three consecutive days before and on the morning of surgery, but do not apply any lotions, powders or deodorants after the shower on the day of surgery. Please use a fresh towels after each shower. Please sleep in clean clothes and change bed linens the night before surgery. Please do not shave for 48 hours prior to surgery. Shaving of the face is acceptable. 7. You should understand that if you do not follow these instructions your surgery may be cancelled. If your physical condition changes (I.e. fever, cold or flu) please contact your surgeon as soon as possible. 8. It is important that you be on time. If a situation occurs where you may be late, please call [phone] (OR Holding Area). 9. If you have any questions and or problems, please call [phone] (Pre-admission Testing). 10. Your surgery time may be subject to change. You will receive a phone call the evening prior if your time changes. 11.  If having outpatient surgery, you must have someone to drive you here, stay with you during the duration of your stay, and to drive you home at time of discharge. Special Instructions: Follow surgeon instructions for holding all non-steroidal anti-inflammatory drugs (NSAIDs), blood-thinning agents, vitamins & supplements prior to surgery. Practice incentive spirometry twice a day  ten times each - bring day of suregry    TAKE ALL MEDICATIONS DAY OF SURGERY EXCEPT:  Losartan, Vitamins and Supplements    I understand a pre-operative phone call will be made to verify my surgery time. In the event that I am not available, I give permission for a message to be left on my answering service and/or with another person?   yes         ___________________      __________ _________    (Signature of Patient)             (Witness)                (Date and Time)

## 2021-06-30 NOTE — ADVANCED PRACTICE NURSE
EKG from 6/30/21 reviewed with Dr. Vannesa Clark, anesthesiologist and compared with previous EKG from 3/12/18. Planned procedure, PMHx, functional status reviewed.  Pt ok to proceed with planned procedure per Dr. Vannesa Clark

## 2021-06-30 NOTE — PERIOP NOTES
Incentive Spirometer        Using the incentive spirometer helps expand the small air sacs of your lungs, helps you breathe deeply, and helps improve your lung function. Use your incentive spirometer twice a day (10 breaths each time) prior to surgery. How to Use Your Incentive Spirometer:  1. Hold the incentive spirometer in an upright position. 2. Breathe out as usual.   3. Place the mouthpiece in your mouth and seal your lips tightly around it. 4. Take a deep breath. Breathe in slowly and as deeply as possible. Keep the blue flow rate guide between the arrows. 5. Hold your breath as long as possible. Then exhale slowly and allow the piston to fall to the bottom of the column. 6. Rest for a few seconds and repeat steps one through five at least 10 times. PAT Tidal Volume_______1250___________  x____2____________  Date______6/30/21_________________    Dhruv Bearded THE INCENTIVE SPIROMETER WITH YOU TO THE HOSPITAL ON THE DAY OF YOUR SURGERY. Opportunity given to ask and answer questions as well as to observe return demonstration.     Patient signature_____________________________    Witness____________________________

## 2021-07-06 ENCOUNTER — ANESTHESIA EVENT (OUTPATIENT)
Dept: SURGERY | Age: 61
DRG: 331 | End: 2021-07-06
Payer: MEDICARE

## 2021-07-06 ENCOUNTER — ANESTHESIA (OUTPATIENT)
Dept: SURGERY | Age: 61
DRG: 331 | End: 2021-07-06
Payer: MEDICARE

## 2021-07-06 ENCOUNTER — HOSPITAL ENCOUNTER (INPATIENT)
Age: 61
LOS: 1 days | Discharge: HOME | DRG: 331 | End: 2021-07-07
Attending: SURGERY | Admitting: SURGERY
Payer: MEDICARE

## 2021-07-06 PROCEDURE — 77030002986 HC SUT PROL J&J -A: Performed by: SURGERY

## 2021-07-06 PROCEDURE — 74011250636 HC RX REV CODE- 250/636: Performed by: ANESTHESIOLOGY

## 2021-07-06 PROCEDURE — 0DQP4ZZ REPAIR RECTUM, PERCUTANEOUS ENDOSCOPIC APPROACH: ICD-10-PCS | Performed by: SURGERY

## 2021-07-06 PROCEDURE — C1758 CATHETER, URETERAL: HCPCS | Performed by: SURGERY

## 2021-07-06 PROCEDURE — 77030008771 HC TU NG SALEM SUMP -A: Performed by: ANESTHESIOLOGY

## 2021-07-06 PROCEDURE — 77030040361 HC SLV COMPR DVT MDII -B: Performed by: SURGERY

## 2021-07-06 PROCEDURE — 74011250636 HC RX REV CODE- 250/636: Performed by: NURSE ANESTHETIST, CERTIFIED REGISTERED

## 2021-07-06 PROCEDURE — 0T788DZ DILATION OF BILATERAL URETERS WITH INTRALUMINAL DEVICE, VIA NATURAL OR ARTIFICIAL OPENING ENDOSCOPIC: ICD-10-PCS | Performed by: UROLOGY

## 2021-07-06 PROCEDURE — 74011250636 HC RX REV CODE- 250/636: Performed by: SURGERY

## 2021-07-06 PROCEDURE — 77030026438 HC STYL ET INTUB CARD -A: Performed by: ANESTHESIOLOGY

## 2021-07-06 PROCEDURE — 77030018673: Performed by: SURGERY

## 2021-07-06 PROCEDURE — 77030002933 HC SUT MCRYL J&J -A: Performed by: SURGERY

## 2021-07-06 PROCEDURE — 74011000250 HC RX REV CODE- 250: Performed by: SURGERY

## 2021-07-06 PROCEDURE — 76060000036 HC ANESTHESIA 2.5 TO 3 HR: Performed by: SURGERY

## 2021-07-06 PROCEDURE — 77030005513 HC CATH URETH FOL11 MDII -B: Performed by: SURGERY

## 2021-07-06 PROCEDURE — 8E0W4CZ ROBOTIC ASSISTED PROCEDURE OF TRUNK REGION, PERCUTANEOUS ENDOSCOPIC APPROACH: ICD-10-PCS | Performed by: SURGERY

## 2021-07-06 PROCEDURE — 2709999900 HC NON-CHARGEABLE SUPPLY

## 2021-07-06 PROCEDURE — 2709999900 HC NON-CHARGEABLE SUPPLY: Performed by: SURGERY

## 2021-07-06 PROCEDURE — 76010000877 HC OR TIME 2.5 TO 3HR INTENSV - TIER 2: Performed by: SURGERY

## 2021-07-06 PROCEDURE — 94760 N-INVAS EAR/PLS OXIMETRY 1: CPT

## 2021-07-06 PROCEDURE — 77030020703 HC SEAL CANN DISP INTU -B: Performed by: SURGERY

## 2021-07-06 PROCEDURE — 65270000029 HC RM PRIVATE

## 2021-07-06 PROCEDURE — 77030035029 HC NDL INSUF VERES DISP COVD -B: Performed by: SURGERY

## 2021-07-06 PROCEDURE — 77030002966 HC SUT PDS J&J -A: Performed by: SURGERY

## 2021-07-06 PROCEDURE — 0TP98DZ REMOVAL OF INTRALUMINAL DEVICE FROM URETER, VIA NATURAL OR ARTIFICIAL OPENING ENDOSCOPIC: ICD-10-PCS | Performed by: UROLOGY

## 2021-07-06 PROCEDURE — 77030008756 HC TU IRR SUC STRY -B: Performed by: SURGERY

## 2021-07-06 PROCEDURE — 76210000005 HC OR PH I REC 5 TO 5.5 HR: Performed by: SURGERY

## 2021-07-06 PROCEDURE — 77030010507 HC ADH SKN DERMBND J&J -B: Performed by: SURGERY

## 2021-07-06 PROCEDURE — 77010033678 HC OXYGEN DAILY

## 2021-07-06 PROCEDURE — 77030020263 HC SOL INJ SOD CL0.9% LFCR 1000ML: Performed by: SURGERY

## 2021-07-06 PROCEDURE — 77030012961 HC IRR KT CYSTO/TUR ICUM -A: Performed by: SURGERY

## 2021-07-06 PROCEDURE — 77030035279 HC SEAL VSL ENDOWR XI INTU -I2: Performed by: SURGERY

## 2021-07-06 PROCEDURE — 74011250637 HC RX REV CODE- 250/637: Performed by: SURGERY

## 2021-07-06 PROCEDURE — 77030002996 HC SUT SLK J&J -A: Performed by: SURGERY

## 2021-07-06 PROCEDURE — 77030008684 HC TU ET CUF COVD -B: Performed by: ANESTHESIOLOGY

## 2021-07-06 PROCEDURE — 74011000250 HC RX REV CODE- 250: Performed by: NURSE ANESTHETIST, CERTIFIED REGISTERED

## 2021-07-06 PROCEDURE — 77030035277 HC OBTRTR BLDELSS DISP INTU -B: Performed by: SURGERY

## 2021-07-06 PROCEDURE — 77030013079 HC BLNKT BAIR HGGR 3M -A: Performed by: ANESTHESIOLOGY

## 2021-07-06 PROCEDURE — 77030018832 HC SOL IRR H20 ICUM -A: Performed by: SURGERY

## 2021-07-06 PROCEDURE — 77030040922 HC BLNKT HYPOTHRM STRY -A

## 2021-07-06 PROCEDURE — 77030034133 HC APPL ENDOSCP FLX SPRY BAXT -C: Performed by: SURGERY

## 2021-07-06 PROCEDURE — 77030031139 HC SUT VCRL2 J&J -A: Performed by: SURGERY

## 2021-07-06 RX ORDER — SODIUM CHLORIDE, SODIUM LACTATE, POTASSIUM CHLORIDE, CALCIUM CHLORIDE 600; 310; 30; 20 MG/100ML; MG/100ML; MG/100ML; MG/100ML
75 INJECTION, SOLUTION INTRAVENOUS CONTINUOUS
Status: DISCONTINUED | OUTPATIENT
Start: 2021-07-06 | End: 2021-07-07 | Stop reason: HOSPADM

## 2021-07-06 RX ORDER — SODIUM CHLORIDE 0.9 % (FLUSH) 0.9 %
5-40 SYRINGE (ML) INJECTION EVERY 8 HOURS
Status: DISCONTINUED | OUTPATIENT
Start: 2021-07-06 | End: 2021-07-06 | Stop reason: HOSPADM

## 2021-07-06 RX ORDER — SODIUM CHLORIDE, SODIUM LACTATE, POTASSIUM CHLORIDE, CALCIUM CHLORIDE 600; 310; 30; 20 MG/100ML; MG/100ML; MG/100ML; MG/100ML
25 INJECTION, SOLUTION INTRAVENOUS CONTINUOUS
Status: CANCELLED | OUTPATIENT
Start: 2021-07-06 | End: 2021-07-07

## 2021-07-06 RX ORDER — ENOXAPARIN SODIUM 100 MG/ML
40 INJECTION SUBCUTANEOUS DAILY
Status: DISCONTINUED | OUTPATIENT
Start: 2021-07-07 | End: 2021-07-07 | Stop reason: HOSPADM

## 2021-07-06 RX ORDER — SODIUM CHLORIDE, SODIUM LACTATE, POTASSIUM CHLORIDE, CALCIUM CHLORIDE 600; 310; 30; 20 MG/100ML; MG/100ML; MG/100ML; MG/100ML
25 INJECTION, SOLUTION INTRAVENOUS CONTINUOUS
Status: DISCONTINUED | OUTPATIENT
Start: 2021-07-06 | End: 2021-07-06 | Stop reason: HOSPADM

## 2021-07-06 RX ORDER — HYDROMORPHONE HYDROCHLORIDE 1 MG/ML
0.25 INJECTION, SOLUTION INTRAMUSCULAR; INTRAVENOUS; SUBCUTANEOUS
Status: DISCONTINUED | OUTPATIENT
Start: 2021-07-06 | End: 2021-07-07 | Stop reason: HOSPADM

## 2021-07-06 RX ORDER — ONDANSETRON 2 MG/ML
4 INJECTION INTRAMUSCULAR; INTRAVENOUS
Status: DISCONTINUED | OUTPATIENT
Start: 2021-07-06 | End: 2021-07-07 | Stop reason: HOSPADM

## 2021-07-06 RX ORDER — MORPHINE SULFATE 2 MG/ML
2 INJECTION, SOLUTION INTRAMUSCULAR; INTRAVENOUS
Status: DISCONTINUED | OUTPATIENT
Start: 2021-07-06 | End: 2021-07-06 | Stop reason: HOSPADM

## 2021-07-06 RX ORDER — ROCURONIUM BROMIDE 10 MG/ML
INJECTION, SOLUTION INTRAVENOUS AS NEEDED
Status: DISCONTINUED | OUTPATIENT
Start: 2021-07-06 | End: 2021-07-06 | Stop reason: HOSPADM

## 2021-07-06 RX ORDER — MIDAZOLAM HYDROCHLORIDE 1 MG/ML
INJECTION, SOLUTION INTRAMUSCULAR; INTRAVENOUS AS NEEDED
Status: DISCONTINUED | OUTPATIENT
Start: 2021-07-06 | End: 2021-07-06 | Stop reason: HOSPADM

## 2021-07-06 RX ORDER — SODIUM CHLORIDE 0.9 % (FLUSH) 0.9 %
5-40 SYRINGE (ML) INJECTION AS NEEDED
Status: DISCONTINUED | OUTPATIENT
Start: 2021-07-06 | End: 2021-07-06 | Stop reason: HOSPADM

## 2021-07-06 RX ORDER — BUPIVACAINE HYDROCHLORIDE AND EPINEPHRINE 2.5; 5 MG/ML; UG/ML
INJECTION, SOLUTION EPIDURAL; INFILTRATION; INTRACAUDAL; PERINEURAL AS NEEDED
Status: DISCONTINUED | OUTPATIENT
Start: 2021-07-06 | End: 2021-07-06 | Stop reason: HOSPADM

## 2021-07-06 RX ORDER — SODIUM CHLORIDE 0.9 % (FLUSH) 0.9 %
5-40 SYRINGE (ML) INJECTION EVERY 8 HOURS
Status: DISCONTINUED | OUTPATIENT
Start: 2021-07-06 | End: 2021-07-07 | Stop reason: HOSPADM

## 2021-07-06 RX ORDER — HYDROMORPHONE HYDROCHLORIDE 1 MG/ML
0.5 INJECTION, SOLUTION INTRAMUSCULAR; INTRAVENOUS; SUBCUTANEOUS
Status: DISCONTINUED | OUTPATIENT
Start: 2021-07-06 | End: 2021-07-07 | Stop reason: HOSPADM

## 2021-07-06 RX ORDER — SODIUM CHLORIDE 0.9 % (FLUSH) 0.9 %
5-40 SYRINGE (ML) INJECTION EVERY 8 HOURS
Status: CANCELLED | OUTPATIENT
Start: 2021-07-06

## 2021-07-06 RX ORDER — DIPHENHYDRAMINE HYDROCHLORIDE 50 MG/ML
12.5 INJECTION, SOLUTION INTRAMUSCULAR; INTRAVENOUS AS NEEDED
Status: DISCONTINUED | OUTPATIENT
Start: 2021-07-06 | End: 2021-07-06 | Stop reason: HOSPADM

## 2021-07-06 RX ORDER — ONDANSETRON 2 MG/ML
INJECTION INTRAMUSCULAR; INTRAVENOUS AS NEEDED
Status: DISCONTINUED | OUTPATIENT
Start: 2021-07-06 | End: 2021-07-06 | Stop reason: HOSPADM

## 2021-07-06 RX ORDER — LIDOCAINE HYDROCHLORIDE 10 MG/ML
0.1 INJECTION, SOLUTION EPIDURAL; INFILTRATION; INTRACAUDAL; PERINEURAL AS NEEDED
Status: CANCELLED | OUTPATIENT
Start: 2021-07-06

## 2021-07-06 RX ORDER — SUCCINYLCHOLINE CHLORIDE 20 MG/ML
INJECTION INTRAMUSCULAR; INTRAVENOUS AS NEEDED
Status: DISCONTINUED | OUTPATIENT
Start: 2021-07-06 | End: 2021-07-06 | Stop reason: HOSPADM

## 2021-07-06 RX ORDER — ONDANSETRON 2 MG/ML
4 INJECTION INTRAMUSCULAR; INTRAVENOUS AS NEEDED
Status: DISCONTINUED | OUTPATIENT
Start: 2021-07-06 | End: 2021-07-06 | Stop reason: HOSPADM

## 2021-07-06 RX ORDER — OXYCODONE HYDROCHLORIDE 5 MG/1
5 TABLET ORAL
Status: DISCONTINUED | OUTPATIENT
Start: 2021-07-06 | End: 2021-07-07 | Stop reason: HOSPADM

## 2021-07-06 RX ORDER — SODIUM CHLORIDE 0.9 % (FLUSH) 0.9 %
5-40 SYRINGE (ML) INJECTION AS NEEDED
Status: CANCELLED | OUTPATIENT
Start: 2021-07-06

## 2021-07-06 RX ORDER — LORAZEPAM 0.5 MG/1
0.5 TABLET ORAL
Status: DISCONTINUED | OUTPATIENT
Start: 2021-07-06 | End: 2021-07-07 | Stop reason: HOSPADM

## 2021-07-06 RX ORDER — DEXMEDETOMIDINE HYDROCHLORIDE 100 UG/ML
INJECTION, SOLUTION INTRAVENOUS AS NEEDED
Status: DISCONTINUED | OUTPATIENT
Start: 2021-07-06 | End: 2021-07-06 | Stop reason: HOSPADM

## 2021-07-06 RX ORDER — ONDANSETRON 4 MG/1
4 TABLET, ORALLY DISINTEGRATING ORAL
Status: DISCONTINUED | OUTPATIENT
Start: 2021-07-06 | End: 2021-07-07 | Stop reason: HOSPADM

## 2021-07-06 RX ORDER — FENTANYL CITRATE 50 UG/ML
25 INJECTION, SOLUTION INTRAMUSCULAR; INTRAVENOUS
Status: DISCONTINUED | OUTPATIENT
Start: 2021-07-06 | End: 2021-07-06 | Stop reason: HOSPADM

## 2021-07-06 RX ORDER — OXYCODONE HYDROCHLORIDE 5 MG/1
10 TABLET ORAL
Status: DISCONTINUED | OUTPATIENT
Start: 2021-07-06 | End: 2021-07-07 | Stop reason: HOSPADM

## 2021-07-06 RX ORDER — HYDROMORPHONE HYDROCHLORIDE 1 MG/ML
.2-.5 INJECTION, SOLUTION INTRAMUSCULAR; INTRAVENOUS; SUBCUTANEOUS
Status: DISCONTINUED | OUTPATIENT
Start: 2021-07-06 | End: 2021-07-06 | Stop reason: HOSPADM

## 2021-07-06 RX ORDER — HYDROMORPHONE HYDROCHLORIDE 2 MG/ML
INJECTION, SOLUTION INTRAMUSCULAR; INTRAVENOUS; SUBCUTANEOUS AS NEEDED
Status: DISCONTINUED | OUTPATIENT
Start: 2021-07-06 | End: 2021-07-06 | Stop reason: HOSPADM

## 2021-07-06 RX ORDER — DEXAMETHASONE SODIUM PHOSPHATE 4 MG/ML
INJECTION, SOLUTION INTRA-ARTICULAR; INTRALESIONAL; INTRAMUSCULAR; INTRAVENOUS; SOFT TISSUE AS NEEDED
Status: DISCONTINUED | OUTPATIENT
Start: 2021-07-06 | End: 2021-07-06 | Stop reason: HOSPADM

## 2021-07-06 RX ORDER — LIDOCAINE HYDROCHLORIDE ANHYDROUS AND DEXTROSE MONOHYDRATE .8; 5 G/100ML; G/100ML
1 INJECTION, SOLUTION INTRAVENOUS CONTINUOUS
Status: DISCONTINUED | OUTPATIENT
Start: 2021-07-06 | End: 2021-07-06

## 2021-07-06 RX ORDER — CEFOXITIN 2 G/1
INJECTION, POWDER, FOR SOLUTION INTRAVENOUS AS NEEDED
Status: DISCONTINUED | OUTPATIENT
Start: 2021-07-06 | End: 2021-07-06 | Stop reason: HOSPADM

## 2021-07-06 RX ORDER — PROPOFOL 10 MG/ML
INJECTION, EMULSION INTRAVENOUS AS NEEDED
Status: DISCONTINUED | OUTPATIENT
Start: 2021-07-06 | End: 2021-07-06 | Stop reason: HOSPADM

## 2021-07-06 RX ORDER — FENTANYL CITRATE 50 UG/ML
INJECTION, SOLUTION INTRAMUSCULAR; INTRAVENOUS AS NEEDED
Status: DISCONTINUED | OUTPATIENT
Start: 2021-07-06 | End: 2021-07-06 | Stop reason: HOSPADM

## 2021-07-06 RX ORDER — ACETAMINOPHEN 325 MG/1
650 TABLET ORAL EVERY 6 HOURS
Status: DISCONTINUED | OUTPATIENT
Start: 2021-07-06 | End: 2021-07-07 | Stop reason: HOSPADM

## 2021-07-06 RX ORDER — SODIUM CHLORIDE 0.9 % (FLUSH) 0.9 %
5-40 SYRINGE (ML) INJECTION AS NEEDED
Status: DISCONTINUED | OUTPATIENT
Start: 2021-07-06 | End: 2021-07-07 | Stop reason: HOSPADM

## 2021-07-06 RX ORDER — LIDOCAINE HYDROCHLORIDE 20 MG/ML
INJECTION, SOLUTION EPIDURAL; INFILTRATION; INTRACAUDAL; PERINEURAL AS NEEDED
Status: DISCONTINUED | OUTPATIENT
Start: 2021-07-06 | End: 2021-07-06 | Stop reason: HOSPADM

## 2021-07-06 RX ADMIN — ROCURONIUM BROMIDE 10 MG: 10 INJECTION INTRAVENOUS at 09:45

## 2021-07-06 RX ADMIN — LORAZEPAM 0.5 MG: 0.5 TABLET ORAL at 23:52

## 2021-07-06 RX ADMIN — Medication 3 AMPULE: at 07:14

## 2021-07-06 RX ADMIN — HYDROMORPHONE HYDROCHLORIDE 0.4 MG: 2 INJECTION, SOLUTION INTRAMUSCULAR; INTRAVENOUS; SUBCUTANEOUS at 08:51

## 2021-07-06 RX ADMIN — HYDROMORPHONE HYDROCHLORIDE 0.5 MG: 1 INJECTION, SOLUTION INTRAMUSCULAR; INTRAVENOUS; SUBCUTANEOUS at 14:09

## 2021-07-06 RX ADMIN — SUCCINYLCHOLINE CHLORIDE 80 MG: 20 INJECTION, SOLUTION INTRAMUSCULAR; INTRAVENOUS at 08:00

## 2021-07-06 RX ADMIN — SUGAMMADEX 200 MG: 100 INJECTION, SOLUTION INTRAVENOUS at 10:30

## 2021-07-06 RX ADMIN — DEXMEDETOMIDINE HYDROCHLORIDE 10 MCG: 100 INJECTION, SOLUTION, CONCENTRATE INTRAVENOUS at 08:33

## 2021-07-06 RX ADMIN — SODIUM CHLORIDE 20 MCG/MIN: 900 INJECTION, SOLUTION INTRAVENOUS at 09:08

## 2021-07-06 RX ADMIN — Medication 10 ML: at 16:54

## 2021-07-06 RX ADMIN — PROPOFOL 150 MG: 10 INJECTION, EMULSION INTRAVENOUS at 08:00

## 2021-07-06 RX ADMIN — SODIUM CHLORIDE, POTASSIUM CHLORIDE, SODIUM LACTATE AND CALCIUM CHLORIDE 75 ML/HR: 600; 310; 30; 20 INJECTION, SOLUTION INTRAVENOUS at 10:50

## 2021-07-06 RX ADMIN — MIDAZOLAM HYDROCHLORIDE 2 MG: 1 INJECTION, SOLUTION INTRAMUSCULAR; INTRAVENOUS at 07:52

## 2021-07-06 RX ADMIN — HYDROMORPHONE HYDROCHLORIDE 0.4 MG: 2 INJECTION, SOLUTION INTRAMUSCULAR; INTRAVENOUS; SUBCUTANEOUS at 10:08

## 2021-07-06 RX ADMIN — DEXAMETHASONE SODIUM PHOSPHATE 4 MG: 4 INJECTION, SOLUTION INTRAMUSCULAR; INTRAVENOUS at 08:09

## 2021-07-06 RX ADMIN — SODIUM CHLORIDE, POTASSIUM CHLORIDE, SODIUM LACTATE AND CALCIUM CHLORIDE 25 ML/HR: 600; 310; 30; 20 INJECTION, SOLUTION INTRAVENOUS at 07:14

## 2021-07-06 RX ADMIN — ONDANSETRON HYDROCHLORIDE 4 MG: 2 INJECTION, SOLUTION INTRAMUSCULAR; INTRAVENOUS at 10:11

## 2021-07-06 RX ADMIN — FENTANYL CITRATE 50 MCG: 50 INJECTION, SOLUTION INTRAMUSCULAR; INTRAVENOUS at 08:43

## 2021-07-06 RX ADMIN — OXYCODONE 10 MG: 5 TABLET ORAL at 19:55

## 2021-07-06 RX ADMIN — Medication 10 ML: at 22:00

## 2021-07-06 RX ADMIN — ACETAMINOPHEN 650 MG: 325 TABLET ORAL at 16:54

## 2021-07-06 RX ADMIN — CEFOXITIN 2 G: 2 INJECTION, POWDER, FOR SOLUTION INTRAVENOUS at 10:06

## 2021-07-06 RX ADMIN — ROCURONIUM BROMIDE 25 MG: 10 INJECTION INTRAVENOUS at 08:14

## 2021-07-06 RX ADMIN — ROCURONIUM BROMIDE 20 MG: 10 INJECTION INTRAVENOUS at 08:55

## 2021-07-06 RX ADMIN — ROCURONIUM BROMIDE 5 MG: 10 INJECTION INTRAVENOUS at 08:00

## 2021-07-06 RX ADMIN — SODIUM CHLORIDE, POTASSIUM CHLORIDE, SODIUM LACTATE AND CALCIUM CHLORIDE: 600; 310; 30; 20 INJECTION, SOLUTION INTRAVENOUS at 08:00

## 2021-07-06 RX ADMIN — LIDOCAINE HYDROCHLORIDE 60 MG: 20 INJECTION, SOLUTION EPIDURAL; INFILTRATION; INTRACAUDAL; PERINEURAL at 08:00

## 2021-07-06 RX ADMIN — FENTANYL CITRATE 50 MCG: 50 INJECTION, SOLUTION INTRAMUSCULAR; INTRAVENOUS at 08:00

## 2021-07-06 RX ADMIN — CEFOXITIN 2 G: 2 INJECTION, POWDER, FOR SOLUTION INTRAVENOUS at 08:08

## 2021-07-06 RX ADMIN — ACETAMINOPHEN 650 MG: 325 TABLET ORAL at 22:00

## 2021-07-06 NOTE — PERIOP NOTES
No covid test done. Pt received her 2  covid vaccination  Shots. No s/s covid virus nor has she been in contact with anyone with the covid virus that she knows of. Wears mask in public. Abdirahman completed. Pt repaorts die prep yesterday results liquid stools. Pt states she does have some rectal bleeding. Opt has pad in place and disposable underwear on.

## 2021-07-06 NOTE — BRIEF OP NOTE
Brief Postoperative Note    Patient: Dusty Worthy  YOB: 1960  MRN: [id number]    Date of Procedure: 7/6/2021     Pre-Op Diagnosis: RECTAL PROLAPSE    Post-Op Diagnosis: Same as preoperative diagnosis. Procedure(s):  ROBOTIC ASSISTED LAPAROSCOPIC SUTURE RECTOPEXY AND CYSTOSCOPY BILATERAL URETERAL STENT INSERTION  CYSTOSCOPY INSERTION URETERAL CATHETERS    Surgeon(s):  MD Linwood Saldaña MD    Surgical Assistant: Surg Asst-1: Noel Mantilla    Anesthesia: General     Estimated Blood Loss (mL): less than 50     Complications: None    Specimens: * No specimens in log *     Implants: * No implants in log *    Drains: * No LDAs found *    Findings: Extensive scarring posteriorly along prior rectopexy site.   Dissection was carried anteriorly to fix the anterior prolapse    Electronically Signed by Bailey Albarado MD on 7/6/2021 at 10:15 AM

## 2021-07-06 NOTE — ANESTHESIA POSTPROCEDURE EVALUATION
Procedure(s):  ROBOTIC ASSISTED LAPAROSCOPIC SUTURE RECTOPEXY AND CYSTOSCOPY BILATERAL URETERAL STENT INSERTION  CYSTOSCOPY INSERTION URETERAL CATHETERS. general    Anesthesia Post Evaluation        Patient location during evaluation: PACU  Note status: Adequate. Level of consciousness: responsive to verbal stimuli and sleepy but conscious  Pain management: satisfactory to patient  Airway patency: patent  Anesthetic complications: no  Cardiovascular status: acceptable  Respiratory status: acceptable  Hydration status: acceptable  Comments: +Post-Anesthesia Evaluation and Assessment    Patient: Dusty Worthy MRN: [id number]  SSN: [id number]   YOB: 1960  Age: 59 y.o. Sex: female      Cardiovascular Function/Vital Signs    BP (!) 144/64   Pulse 79   Temp 36.7 °C (98.1 °F)   Resp 18   Ht 5' (1.524 m)   Wt 62.5 kg (137 lb 12.6 oz)   SpO2 100%   BMI 26.91 kg/m²     Patient is status post Procedure(s):  ROBOTIC ASSISTED LAPAROSCOPIC SUTURE RECTOPEXY AND CYSTOSCOPY BILATERAL URETERAL STENT INSERTION  CYSTOSCOPY INSERTION URETERAL CATHETERS. Nausea/Vomiting: Controlled. Postoperative hydration reviewed and adequate. Pain:  Pain Scale 1: Numeric (0 - 10) (07/06/21 1100)  Pain Intensity 1: 0 (07/06/21 1100)   Managed. Neurological Status:   Neuro (WDL): Exceptions to WDL (bipolar history) (07/06/21 0625)   At baseline. Mental Status and Level of Consciousness: Arousable. Pulmonary Status:   O2 Device: Nasal cannula (07/06/21 1100)   Adequate oxygenation and airway patent. Complications related to anesthesia: None    Post-anesthesia assessment completed. No concerns.     Signed By: Javi Calzada MD    7/6/2021  Post anesthesia nausea and vomiting:  controlled      INITIAL Post-op Vital signs:   Vitals Value Taken Time   /57 07/06/21 1130   Temp 36.7 °C (98.1 °F) 07/06/21 1118   Pulse 70 07/06/21 1135   Resp 9 07/06/21 1135   SpO2 100 % 07/06/21 1135   Vitals shown include unvalidated device data.

## 2021-07-06 NOTE — PROGRESS NOTES
Admit from PACU. VSS no complaint of pain at the moment  Bed alarm active. Call bell within reach. Pt instructed to call for assistance. Pt verbalizes understanding.  Patient has no further concerns at this time

## 2021-07-06 NOTE — PERIOP NOTES
7887 Right ureteral stent placed  0811 Left ureteral stent placed    Bilateral ureteral stents removed, tip intact

## 2021-07-06 NOTE — PERIOP NOTES
TRANSFER - OUT REPORT:    Verbal report given to Fuentes RN  on Yoandy Chance  being transferred to 2123(unit) for routine post - op       Report consisted of patients Situation, Background, Assessment and   Recommendations(SBAR). Information from the following report(s) SBAR, OR Summary, Procedure Summary, Intake/Output, MAR and Cardiac Rhythm ST was reviewed with the receiving nurse. Opportunity for questions and clarification was provided.       Patient transported with:   O2 @ 1 liters  Tech

## 2021-07-06 NOTE — OP NOTES
[address]  OPERATIVE REPORT    Name:  Oliva Patel  MR#:  [id number]  :  1960  ACCOUNT #:  [de-identified]  DATE OF SERVICE:  2021    PREOPERATIVE DIAGNOSIS:  Rectal prolapse. POSTOPERATIVE DIAGNOSIS:  Rectal prolapse. PROCEDURE PERFORMED:  Robotic-assisted laparoscopic suture rectopexy. SURGEON:  Miko Blackwell MD    ASSISTANT:  Marizol Nunes. ANESTHESIA:  General.    COMPLICATIONS:  None. SPECIMENS REMOVED:  None. IMPLANTS:  None. ESTIMATED BLOOD LOSS:  Less than 50 mL. DRAINS:  None. FINDINGS:  Extensive scar tissue along the posterior dissection of her previous rectopexy all along the presacral space. Dissection was carried anteriorly along the rectovaginal septum. INDICATIONS:  This is a 58-year-old female who presents with recurrent rectal prolapse. She previously had a rectopexy several years ago and now presents with full-thickness rectal prolapse anteriorly. It seemed in my office that the posterior part of her dissection remained intact, but the anterior rectal wall was completely prolapsing. I explained the risks and benefits of the surgery including, but not limited to bleeding, infection, recurrence, postoperative pain, and need for an ostomy. She understood the risks and elected to proceed. DESCRIPTION OF PROCEDURE:  The patient was brought to the operating suite, placed in the supine position. General endotracheal anesthesia was induced. Venodyne stockings were placed. She was then placed in the low lithotomy position. Her abdomen was prepped and draped in the usual sterile fashion and a time-out was performed indicating the correct patient and procedure to be performed as per hospital protocol. A stab incision was made in the left mid abdomen. A Veress needle was inserted in the abdomen. The abdomen was insufflated. I then placed an 8-mm robotic trocar under direct visualization using a 5-mm 0-degree scope. Three more 8-mm robotic trocars were placed in the left lateral abdomen, right lateral abdomen and right mid abdomen, and a 5-mm assistant trocar was placed in the right upper quadrant. It was through these 5 trocars that the entirety of the procedure was completed. The patient was placed in the steep Trendelenburg position and the robot was docked. The small bowel was swept out of the pelvis. Interestingly enough, the patient had absolutely no adhesions at the anterior abdominal wall or anteriorly along the pelvis. The difficult portion of the dissection was identifying the actual rectal wall which was encased in fat and scar tissue and plastered into the presacral space. I attempted dissection into the presacral space to free that up, however, this was met with significant amount of scar tissue and bleeding with friable tissue. I decided to hold off on the posterior part of this dissection and simply focus on the anterior part since that seem to be causing her the most symptoms and prolapse. I scored along the peritoneal edge of the right lateral rectal wall continued anteriorly to the rectovaginal septum and then across to the left side. I freed up the rectum laterally taking care to avoid any injury to the right ureter which was identified and preserved throughout this dissection. After mobilizing the right aspect of the rectum, I then worked my way anteriorly into the rectovaginal septum and dissected this all the way down into the peritoneal body. Once I fully  the anterior rectum and the vagina, I then worked my way to the left side and freed up the lesser aspect of the rectum. With these lateral aspects completely freed up, I then cleared off a piece of sacral promontory for my suture rectopexy. Five sutures using 2-0 Prolene were placed along the sacral promontory and attached to the right anterolateral aspect of the rectosigmoid.   This allowed me to secure the rectum up into the pelvis and prevent prolapse. Once these sutures were secured and hemostasis was achieved, I placed 4 mL of Tisseel anteriorly along the rectovaginal septum to promote scarring and prevent further prolapse. Once this was completed, the robot was undocked. The trocar sites were closed with 4-0 Monocryl. The patient was awakened, extubated and taken to the recovery room in stable condition.         Jessica Holguin MD      [id number]  D:  07/06/2021 10:28  T:  07/06/2021 13:44  JOB #:  [id number]

## 2021-07-06 NOTE — PERIOP NOTES
[facility], patient's counselor Windy Barnhart received post op update via cell [phone]

## 2021-07-06 NOTE — OP NOTES
[address]  OPERATIVE REPORT    Name:  Mariah Turpin  MR#:  [id number]  :  1960  ACCOUNT #:  [de-identified]  DATE OF SERVICE:  2021      PREOPERATIVE DIAGNOSIS:  Rectal prolapse. POSTOPERATIVE DIAGNOSIS:  Rectal prolapse. PROCEDURES PERFORMED:  Cystoscopy, placement of bilateral ureteral catheters for localization. SURGEON:  Robby King MD    ASSISTANT:  none    ANESTHESIA:  GETA. COMPLICATIONS: none. SPECIMENS REMOVED:  None. IMPLANTS:  Bilateral ureteral caths    ESTIMATED BLOOD LOSS:  None. IV FLUIDS:  Per Anesthesia. DISPOSITION:  The patient to undergo primary procedure, ureteral catheters can be removed at the end of the case. PROCEDURE:  After the patient was correctly identified and informed consent was obtained, she was given preoperative antibiotics at the direction of primary surgeon. The patient and procedure were confirmed with time-out. She was positioned in lithotomy. The perineum and genitalia were prepped and draped in routine sterile fashion. Cystoscope was inserted per urethra. A large central defect cystocele noted and extreme downward deflection needed to identify the ureteral orifices. [place]-tip catheters were slid upward on each side without resistance and offloaded from the scope. They were fashioned to ICG dye syringes and a 16-Wong was placed.         Carmen Del Toro MD      [id number]  D:  2021 8:17  T:  2021 10:39  JOB #:  [id number]

## 2021-07-06 NOTE — PERIOP NOTES
Handoff Report from Operating Room to PACU    Report received from [address] [facility] [place] regarding Krys Sutherland. Surgeon(s):  MD Jersey Elkins MD  And Procedure(s) (LRB):  ROBOTIC ASSISTED LAPAROSCOPIC SUTURE RECTOPEXY AND CYSTOSCOPY BILATERAL URETERAL STENT INSERTION (N/A)  CYSTOSCOPY INSERTION URETERAL CATHETERS (N/A)  confirmed   with allergies and dressings discussed. Anesthesia type, drugs, patient history, complications, estimated blood loss, vital signs, intake and output, and last pain medication and reversal medications were reviewed.

## 2021-07-06 NOTE — ANESTHESIA PREPROCEDURE EVALUATION
Relevant Problems   NEUROLOGY   (+) Bipolar 1 disorder (HCC)   (+) Bipolar affective disorder, manic (HCC)   (+) Delirium      ENDOCRINE   (+) Arthritis   Anesthetic History   No history of anesthetic complications            Review of Systems / Medical History  Patient summary reviewed, nursing notes reviewed and pertinent labs reviewed    Pulmonary  Within defined limits                 Neuro/Psych         Psychiatric history     Cardiovascular    Hypertension              Exercise tolerance: >4 METS     GI/Hepatic/Renal     GERD           Endo/Other        Obesity and arthritis     Other Findings   Comments: Bipolar  IBS  RECURRENT RECTAL PROLAPSE         Physical Exam    Airway  Mallampati: II  TM Distance: 4 - 6 cm  Neck ROM: normal range of motion   Mouth opening: Diminished (comment)     Cardiovascular  Regular rate and rhythm,  S1 and S2 normal,  no murmur, click, rub, or gallop             Dental  No notable dental hx       Pulmonary  Breath sounds clear to auscultation               Abdominal  GI exam deferred       Other Findings            Anesthetic Plan    ASA: 2  Anesthesia type: general            Anesthetic plan and risks discussed with: Patient          Anesthetic History   No history of anesthetic complications            Review of Systems / Medical History  Patient summary reviewed, nursing notes reviewed and pertinent labs reviewed    Pulmonary  Within defined limits                 Neuro/Psych         Psychiatric history     Cardiovascular    Hypertension              Exercise tolerance: >4 METS     GI/Hepatic/Renal     GERD           Endo/Other        Obesity and arthritis     Other Findings   Comments: Bipolar  IBS  RECURRENT RECTAL PROLAPSE         Physical Exam    Airway  Mallampati: II  TM Distance: 4 - 6 cm  Neck ROM: normal range of motion   Mouth opening: Diminished (comment)     Cardiovascular  Regular rate and rhythm,  S1 and S2 normal,  no murmur, click, rub, or gallop Dental  No notable dental hx       Pulmonary  Breath sounds clear to auscultation               Abdominal  GI exam deferred       Other Findings            Anesthetic Plan    ASA: 2  Anesthesia type: general            Anesthetic plan and risks discussed with: Patient              Anesthetic History   No history of anesthetic complications            Review of Systems / Medical History  Patient summary reviewed, nursing notes reviewed and pertinent labs reviewed    Pulmonary  Within defined limits                 Neuro/Psych         Headaches and psychiatric history (bipolar)     Cardiovascular    Hypertension              Exercise tolerance: >4 METS     GI/Hepatic/Renal     GERD           Endo/Other        Arthritis     Other Findings   Comments: IBS    Rectal prolapse           Physical Exam    Airway  Mallampati: II  TM Distance: 4 - 6 cm  Neck ROM: normal range of motion   Mouth opening: Normal     Cardiovascular  Regular rate and rhythm,  S1 and S2 normal,  no murmur, click, rub, or gallop             Dental  No notable dental hx       Pulmonary  Breath sounds clear to auscultation               Abdominal  GI exam deferred       Other Findings            Anesthetic Plan    ASA: 2  Anesthesia type: general    Monitoring Plan: BIS      Induction: Intravenous  Anesthetic plan and risks discussed with: Patient

## 2021-07-07 VITALS
DIASTOLIC BLOOD PRESSURE: 79 MMHG | BODY MASS INDEX: 27.05 KG/M2 | HEIGHT: 60 IN | WEIGHT: 137.79 LBS | TEMPERATURE: 98.2 F | SYSTOLIC BLOOD PRESSURE: 146 MMHG | RESPIRATION RATE: 16 BRPM | OXYGEN SATURATION: 99 % | HEART RATE: 84 BPM

## 2021-07-07 LAB
ANION GAP SERPL CALC-SCNC: 5 MMOL/L (ref 5–15)
BASOPHILS # BLD: 0 K/UL (ref 0–0.1)
BASOPHILS NFR BLD: 0 % (ref 0–1)
BUN SERPL-MCNC: 10 MG/DL (ref 6–20)
BUN/CREAT SERPL: 14 (ref 12–20)
CALCIUM SERPL-MCNC: 8.2 MG/DL (ref 8.5–10.1)
CHLORIDE SERPL-SCNC: 109 MMOL/L (ref 97–108)
CO2 SERPL-SCNC: 27 MMOL/L (ref 21–32)
CREAT SERPL-MCNC: 0.7 MG/DL (ref 0.55–1.02)
DIFFERENTIAL METHOD BLD: ABNORMAL
EOSINOPHIL # BLD: 0 K/UL (ref 0–0.4)
EOSINOPHIL NFR BLD: 0 % (ref 0–7)
ERYTHROCYTE [DISTWIDTH] IN BLOOD BY AUTOMATED COUNT: 12.9 % (ref 11.5–14.5)
GLUCOSE SERPL-MCNC: 90 MG/DL (ref 65–100)
HCT VFR BLD AUTO: 35.2 % (ref 35–47)
HGB BLD-MCNC: 11.4 G/DL (ref 11.5–16)
IMM GRANULOCYTES # BLD AUTO: 0 K/UL (ref 0–0.04)
IMM GRANULOCYTES NFR BLD AUTO: 0 % (ref 0–0.5)
LYMPHOCYTES # BLD: 2.3 K/UL (ref 0.8–3.5)
LYMPHOCYTES NFR BLD: 29 % (ref 12–49)
MCH RBC QN AUTO: 28.6 PG (ref 26–34)
MCHC RBC AUTO-ENTMCNC: 32.4 G/DL (ref 30–36.5)
MCV RBC AUTO: 88.2 FL (ref 80–99)
MONOCYTES # BLD: 0.5 K/UL (ref 0–1)
MONOCYTES NFR BLD: 7 % (ref 5–13)
NEUTS SEG # BLD: 4.9 K/UL (ref 1.8–8)
NEUTS SEG NFR BLD: 64 % (ref 32–75)
NRBC # BLD: 0 K/UL (ref 0–0.01)
NRBC BLD-RTO: 0 PER 100 WBC
PLATELET # BLD AUTO: 234 K/UL (ref 150–400)
PMV BLD AUTO: 9.4 FL (ref 8.9–12.9)
POTASSIUM SERPL-SCNC: 3.3 MMOL/L (ref 3.5–5.1)
RBC # BLD AUTO: 3.99 M/UL (ref 3.8–5.2)
SODIUM SERPL-SCNC: 141 MMOL/L (ref 136–145)
WBC # BLD AUTO: 7.7 K/UL (ref 3.6–11)

## 2021-07-07 PROCEDURE — 36415 COLL VENOUS BLD VENIPUNCTURE: CPT

## 2021-07-07 PROCEDURE — 74011250636 HC RX REV CODE- 250/636: Performed by: SURGERY

## 2021-07-07 PROCEDURE — 74011250637 HC RX REV CODE- 250/637: Performed by: SURGERY

## 2021-07-07 PROCEDURE — 74011250637 HC RX REV CODE- 250/637: Performed by: NURSE PRACTITIONER

## 2021-07-07 PROCEDURE — 85025 COMPLETE CBC W/AUTO DIFF WBC: CPT

## 2021-07-07 PROCEDURE — 80048 BASIC METABOLIC PNL TOTAL CA: CPT

## 2021-07-07 PROCEDURE — 77010033678 HC OXYGEN DAILY

## 2021-07-07 RX ORDER — FENOFIBRATE 145 MG/1
145 TABLET, COATED ORAL DAILY
Status: DISCONTINUED | OUTPATIENT
Start: 2021-07-07 | End: 2021-07-07 | Stop reason: HOSPADM

## 2021-07-07 RX ORDER — GABAPENTIN 100 MG/1
200 CAPSULE ORAL DAILY
Status: DISCONTINUED | OUTPATIENT
Start: 2021-07-07 | End: 2021-07-07 | Stop reason: HOSPADM

## 2021-07-07 RX ORDER — LANOLIN ALCOHOL/MO/W.PET/CERES
9 CREAM (GRAM) TOPICAL
Status: DISCONTINUED | OUTPATIENT
Start: 2021-07-07 | End: 2021-07-07 | Stop reason: HOSPADM

## 2021-07-07 RX ORDER — LORAZEPAM 0.5 MG/1
0.5 TABLET ORAL
Status: DISCONTINUED | OUTPATIENT
Start: 2021-07-07 | End: 2021-07-07 | Stop reason: HOSPADM

## 2021-07-07 RX ORDER — AMITRIPTYLINE HYDROCHLORIDE 50 MG/1
50 TABLET, FILM COATED ORAL
Status: DISCONTINUED | OUTPATIENT
Start: 2021-07-07 | End: 2021-07-07 | Stop reason: HOSPADM

## 2021-07-07 RX ORDER — LOSARTAN POTASSIUM 25 MG/1
25 TABLET ORAL DAILY
Status: DISCONTINUED | OUTPATIENT
Start: 2021-07-07 | End: 2021-07-07 | Stop reason: HOSPADM

## 2021-07-07 RX ORDER — PANTOPRAZOLE SODIUM 40 MG/1
40 TABLET, DELAYED RELEASE ORAL
Status: DISCONTINUED | OUTPATIENT
Start: 2021-07-08 | End: 2021-07-07 | Stop reason: HOSPADM

## 2021-07-07 RX ADMIN — Medication 10 ML: at 05:00

## 2021-07-07 RX ADMIN — ENOXAPARIN SODIUM 40 MG: 40 INJECTION SUBCUTANEOUS at 09:45

## 2021-07-07 RX ADMIN — CARIPRAZINE 3 MG: 3 CAPSULE, GELATIN COATED ORAL at 11:42

## 2021-07-07 RX ADMIN — LOSARTAN POTASSIUM 25 MG: 25 TABLET, FILM COATED ORAL at 09:45

## 2021-07-07 RX ADMIN — Medication 10 ML: at 14:00

## 2021-07-07 RX ADMIN — FENOFIBRATE 145 MG: 145 TABLET ORAL at 11:16

## 2021-07-07 RX ADMIN — LORAZEPAM 0.5 MG: 0.5 TABLET ORAL at 09:54

## 2021-07-07 RX ADMIN — GABAPENTIN 200 MG: 100 CAPSULE ORAL at 11:16

## 2021-07-07 RX ADMIN — ACETAMINOPHEN 650 MG: 325 TABLET ORAL at 05:00

## 2021-07-07 RX ADMIN — NALOXEGOL OXALATE 25 MG: 25 TABLET, FILM COATED ORAL at 09:45

## 2021-07-07 RX ADMIN — ACETAMINOPHEN 650 MG: 325 TABLET ORAL at 11:16

## 2021-07-07 NOTE — DISCHARGE SUMMARY
Post- Surgical Discharge Summary    Patient ID:  Manas Santana  [id number]  female  59 y.o.  1960    Admit date: 7/6/2021    Discharge date: 7/7/2021    Admitting Physician: J Carlos Heard MD     Consulting Physician(s):   Treatment Team: Attending Provider: Colton Montero MD; Primary Nurse: Richa Wagoner, RN; Care Manager: Evie Snow RN; Utilization Review: [facility]    Date of Surgery:   7/6/2021     Preoperative Diagnosis:  RECTAL PROLAPSE    Postoperative Diagnosis:   RECTAL PROLAPSE    Procedure(s):  ROBOTIC ASSISTED LAPAROSCOPIC SUTURE RECTOPEXY AND CYSTOSCOPY BILATERAL URETERAL STENT INSERTION     Anesthesia Type:   General     Surgeon: Colton Montero MD                            HPI:  Pt is a 59 y.o. female who has a history of RECTAL PROLAPSE who presents at this time for a suture rectopexy following the failure of conservative management.     Problem List:   Problem List as of 7/7/2021 Date Reviewed: 12/15/2020        Codes Class Noted - Resolved    Bipolar affective disorder, manic ([facility] ICD-10-CM: F31.10  ICD-9-CM: 296.40 Acute 5/16/2014 - Present        Total knee replacement status, right ICD-10-CM: H15.791  ICD-9-CM: V43.65  3/22/2018 - Present        Osteoarthritis of right knee ICD-10-CM: M17.11  ICD-9-CM: 715.96  3/19/2018 - Present        Rectal prolapse ICD-10-CM: K62.3  ICD-9-CM: 569.1  8/3/2017 - Present        ACP (advance care planning) ICD-10-CM: Z71.89  ICD-9-CM: V65.49  5/30/2017 - Present        Bipolar 1 disorder ([facility] ICD-10-CM: F31.9  ICD-9-CM: 296.7  1/31/2017 - Present        Altered mental status ICD-10-CM: R41.82  ICD-9-CM: 780.97  1/24/2017 - Present        Delirium ICD-10-CM: R41.0  ICD-9-CM: 780.09  1/24/2017 - Present        Hyponatremia ICD-10-CM: E87.1  ICD-9-CM: 276.1  1/24/2017 - Present        Drop attack ICD-10-CM: R55  ICD-9-CM: 780.2  3/25/2015 - Present        IBS (irritable bowel syndrome) ICD-10-CM: K58.9  ICD-9-CM: 564.1  10/30/2014 - Present Chronic headache ICD-10-CM: R51.9, G89.29  ICD-9-CM: 784.0  10/30/2014 - Present        Choledocholithiasis ICD-10-CM: K80.50  ICD-9-CM: 574.50  11/24/2010 - Present        Gallstones with obstruction of gallbladder ICD-10-CM: K80.21  ICD-9-CM: 574.21  11/23/2010 - Present        Arthritis ICD-10-CM: M19.90  ICD-9-CM: 716.90  Unknown - Present        RESOLVED: Encounter for screening colonoscopy ICD-10-CM: Z12.11  ICD-9-CM: V76.51  7/5/2017 - 9/25/2019               Hospital Course: The patient underwent surgery. Intra-operative complications: None; patient tolerated the procedure well. Was taken to the PACU in stable condition and then transferred to the surgical floor. Postoperative Pain Management:  Multimodal, opioid sparing pain control methods were used. Postoperative transfusions:    Number of units banked PRBCs =   none     Post Op complications: None     Incisions  - clean, dry and intact. No significant erythema or swelling. Wound(s) appear to be healing without any evidence of infection. Patient mobilized with nursing and was found to be safe and steady with ambulation. Discharged to: Home     Condition on Discharge: Stable     Discharge instructions:    - Take pain medications as prescribed  - Diet Regular  - Discharge activity:    - Activity as tolerated    - Ambulate several times a day   - No heavy lifting for 4 weeks   - Do not drive for two weeks or while on opioid pain medications  - Wound Care: Keep wound(s) clean and dry. See discharge instruction sheet. Allergies: Allergies   Allergen Reactions    Other Food Other (comments)     Oranges, cabbage, beans, peppers, raw onions, foods with caffeine, popcorn, soda because of IBS    Buspar [Buspirone] Other (comments)     Causes \"stimulation\" that could exacerbate a manic attack/phase of pt's Biplar.  Reported by patient    Amlodipine Other (comments)     Ankle swelling    Dicyclomine Nausea and Vomiting     Extreme stomach pains    Lithium Nausea and Vomiting     Severe nausea and vomiting.  Other Medication Other (comments)     Intolerance to oranges, cabbage,beans,peppers,raw onions,foods with caffeine in them, popcorn, no pop sodas, because of IBS              -DISCHARGE MEDICATION LIST     Current Discharge Medication List      CONTINUE these medications which have NOT CHANGED    Details   gabapentin (NEURONTIN) 100 mg capsule 2 caps am and 1 cap PM  Qty: 90 Capsule, Refills: 3    Associated Diagnoses: Chronic tension-type headache, intractable; Intractable chronic cluster headache      amitriptyline (ELAVIL) 50 mg tablet Take 1 tablet by mouth nightly. Discontinue amitriptyline 100MG & Norco tablet. Qty: 30 Tablet, Refills: 3    Associated Diagnoses: Bipolar affective disorder, current episode manic, current episode severity unspecified (HCC)      pantoprazole (PROTONIX) 40 mg tablet TAKE ONE TABLET BY MOUTH DAILY  Qty: 90 Tab, Refills: 0    Associated Diagnoses: Gastroesophageal reflux disease without esophagitis      fenofibrate nanocrystallized (TRICOR) 145 mg tablet TAKE ONE TABLET BY MOUTH EVERY 48 HOURS  Qty: 15 Tab, Refills: 5    Associated Diagnoses: Elevated triglycerides with high cholesterol      alendronate (FOSAMAX) 70 mg tablet TAKE ONE TABLET BY MOUTH EVERY 7 DAYS  Qty: 4 Tab, Refills: 11    Associated Diagnoses: Osteoporosis, unspecified osteoporosis type, unspecified pathological fracture presence      cariprazine (VRAYLAR) 1.5 mg capsule Take 3 mg by mouth daily. cholecalciferol (VITAMIN D3) 2,000 unit cap capsule Take 2,000 Units by mouth daily. Qty: 30 Cap, Refills: 4    Associated Diagnoses: Vitamin D deficiency      LORazepam (ATIVAN) 0.5 mg tablet Take 1 Tab by mouth every eight (8) hours as needed for Anxiety.  Max Daily Amount: 1.5 mg. D/C valium  Qty: 90 Tab, Refills: 2    Associated Diagnoses: RACHEL (generalized anxiety disorder)      vitamin E (AQUA GEMS) 400 unit capsule Take 400 Units by mouth daily. acetaminophen (TYLENOL) 500 mg tablet Take 1,000 mg by mouth daily. Evening as needed      diphenhydrAMINE (BENADRYL) 25 mg tablet Take 25 mg by mouth nightly as needed for Sleep. ZZZ Quil      melatonin 10 mg tab 1 tab po hs  Qty: 30 Tab, Refills: 2    Associated Diagnoses: Insomnia, unspecified type      losartan (COZAAR) 25 mg tablet TAKE 1 TABLET BY MOUTH DAILY  Qty: 30 Tab, Refills: 4    Associated Diagnoses: Essential hypertension; Elevated triglycerides with high cholesterol      miscellaneous medical supply (Anti-Embolism Stockings) misc 15 to 30 mm HG below knee stocking in both legs  Qty: 2 Each, Refills: 0    Associated Diagnoses: Chronic venous insufficiency      diclofenac (VOLTAREN) 1 % gel Top BID on elbow and knee  Qty: 100 g, Refills: 1    Associated Diagnoses: Pain of both elbows      ketotifen (Alaway) 0.025 % (0.035 %) ophthalmic solution Administer 1 Drop to both eyes two (2) times a day. loperamide (IMODIUM) 2 mg capsule TAKE 1 CAPSULE BY MOUTH 4 TIMES A DAY AS NEEDED FOR DIARRHEA  Qty: 120 Cap, Refills: 0      calcium carbonate (CALTREX) 600 mg calcium (1,500 mg) tablet Take 600 mg by mouth two (2) times a day. polyethylene glycol (MIRALAX) 17 gram/dose powder TAKE 17 GRAMS (1 TABLESPOONFUL) MIXED IN LIQUID BY MOUTH DAILY AS DIRECTED. Qty: 527 g, Refills: 0    Associated Diagnoses: Constipation, unspecified constipation type      Lactobacillus acidophilus (ACIDOPHILUS) cap TAKE ONE CAPSULE BY MOUTH DAILY  Qty: 100 Cap, Refills: 0      brimonidine (LUMIFY) 0.025 % drop Apply  to eye.      carboxymethylcellulose sodium (THERATEARS) 0.25 % drop ophthalmic solution Administer 1 Drop to both eyes. per medical continuation form      -Follow up in office in 2 weeks      Signed:  Naheed Rose  MSN, APRN, FNP-C, [facility]  Surgical Nurse Practitioner    7/7/2021  2:39 PM

## 2021-07-07 NOTE — PROGRESS NOTES
Transition of Care Plan:    RUR:14%  Disposition:Home with family  Follow up appointments: PCP appt on AVS; patient to schedule surgery follow-up  DME needed: No needs identified  Transportation at Discharge: sister  Caryl Watkins or means to access home:     yes   IM Medicare letter: 1st IM letter provided to patient on 7/6/2021 - 2nd letter not indicated  Caregiver Contact: Deon Burrell -  - [phone]  Discharge Caregiver contacted prior to discharge? Yes     Reason for Admission:  Rectal prolapse                   RUR Score:       14%              Plan for utilizing home health:      No previous Home Health or Rehab services - patient does have a mental health counselor that assists her with transportation and care - Christopher Krishnamurthy - [phone]    PCP: First and Last name:  Jarett Patino MD     Name of Practice:    Are you a current patient: Yes/No:    Approximate date of last visit:    Can you participate in a virtual visit with your PCP:                     Current Advanced Directive/Advance Care Plan: Full Code      Healthcare Decision Maker:   Click here to complete [address] including selection of the Healthcare Decision Maker Relationship (ie \"Primary\")           Johanny Colon (ACP) Conversation      Date of Conversation: 7/7/2021  Conducted with: Patient with Delilah 153:     Click here to [address] including selection of the Healthcare Decision Maker Relationship (ie \"Primary\")  Today we documented Decision Maker(s) consistent with ACP documents on file. Content/Action Overview:    Has ACP document(s) on file - reflects the patient's care preferences  Reviewed DNR/DNI and patient elects Full Code (Attempt Resuscitation)    Length of Voluntary ACP Conversation in minutes:  20 minutes    Doris Neff RN         Transition of Care Plan:                        CM in to see patient at bedside - introduced self and role of CM. Patient verified all demographic information and insurance  As [facility] Medicare. Patient reports she lives alone usually with support of a mental health . Patient will plan to be discharged home with her sister following surgery where she will be staying in ground level apartment with her mother and sister. Patient denies any problems with getting to medical appointments or getting her medications. Care Management Interventions  PCP Verified by CM: Yes (Dr. Orestes Ibrahim - [phone])  Mode of Transport at Discharge: Other (see comment) (sister - private vehicle)  Transition of Care Consult (CM Consult):  Other (CM Assessment and discharge planning)  MyChart Signup: No  Discharge Durable Medical Equipment: No (None indicated)  Physical Therapy Consult: No  Occupational Therapy Consult: No  Speech Therapy Consult: No  Current Support Network: Lives Alone, Own Home, Other (Patient lives alone, but currently staying with sister for several weeks)  Confirm Follow Up Transport: Family  Discharge Location  Discharge Placement: Home    Felisa Childress RN, BSN, [address] [place] [address]    Coordinator  [phone]

## 2021-07-07 NOTE — DISCHARGE INSTRUCTIONS
Peyton Shaw MD, FACS  Yue Carson. MD Brian Madrid MD Sofia Hosteller, MD Marisa Poncho, MD    [facility] Discharge Instructions for Colon Surgery Patients    1. Diagnosis: Suture Rectopexy  2. Low fiber diet. 3. Do not drive while taking narcotic pain medications. 4. Leave surgical glue on incision. It may fall off on it's own. 5. May take a shower. 6. No lifting any objects weighing more than 15 pounds. Do not do any housework, such as vacuuming, scrubbing, etc for at least a month. 7. When you get tired during the day, take naps, as you need your rest.  8. Multiple bowel movements are normal each day for a while. 9. May walk as desired. May go up and down stairs. 10. Take pain medication as prescribed: (NO DRIVING WHILE ON PAIN MEDICATIONS). 11.  See me in the office in 10-14 days. Call as soon as discharged for an appointment [phone]. IF SURGERY INVOLVED AN OSTOMY BAG, PLEASE BRING YOUR SUPPLIES TO YOUR 1ST VISIT! 12.  Call the Exchange [phone], if you have any questions or problems after office hours.             Jorge Escalante [address], [address]  [place], [facility] [address]  [phone] · Fax [phone] [address] [facility] [place], [place]  [phone] · Fax [phone],  [address]  [place], [address]  [phone] · Fax [phone]

## 2021-07-07 NOTE — PROGRESS NOTES
Problem: Falls - Risk of  Goal: *Absence of Falls  Description: Document Meera Layne Fall Risk and appropriate interventions in the flowsheet.   Outcome: Progressing Towards Goal  Note: Fall Risk Interventions:  Mobility Interventions: Bed/chair exit alarm         Medication Interventions: Teach patient to arise slowly, Assess postural VS orthostatic hypotension    Elimination Interventions: Call light in reach, Bed/chair exit alarm    History of Falls Interventions: Bed/chair exit alarm         Problem: Patient Education: Go to Patient Education Activity  Goal: Patient/Family Education  Outcome: Progressing Towards Goal     Problem: Pain  Goal: *Control of Pain  Outcome: Progressing Towards Goal  Goal: *PALLIATIVE CARE:  Alleviation of Pain  Outcome: Progressing Towards Goal     Problem: Patient Education: Go to Patient Education Activity  Goal: Patient/Family Education  Outcome: Progressing Towards Goal

## 2021-07-07 NOTE — PROGRESS NOTES
End of Shift Note    Bedside shift change report given to JYOTSNA Iglesias (oncoming nurse) by Lulu Perez LPN (offgoing nurse). Report included the following information SBAR, Kardex, ED Summary, Intake/Output, MAR and Recent Results    Shift worked:  [id number]     Shift summary and any significant changes:     Patient tolerated care well throughout shift. PRN medication given once for pain and for anxiety. Patient slept throughout the night. No significant changes noted     Concerns for physician to address:   Patient is concerned that her daily home meds have not been prescribed while she has been admitted. She is specifically concerned about her meds for her bipolar disorder. Zone phone for oncoming shift:   [phone]       Activity:  Activity Level: Up ad alejandra  Number times ambulated in hallways past shift: 0  Number of times OOB to chair past shift: 0    Cardiac:   Cardiac Monitoring: No      Cardiac Rhythm: Sinus Rhythm    Access:   Current line(s): PIV     Genitourinary:   Urinary status: voiding    Respiratory:   O2 Device: Nasal cannula  Chronic home O2 use?: NO  Incentive spirometer at bedside: YES     GI:  Last Bowel Movement Date: 07/06/21 (draining    liquid   stools)  Current diet:  ADULT DIET Regular; Low Fiber  Passing flatus: YES  Tolerating current diet: YES       Pain Management:   Patient states pain is manageable on current regimen: YES    Skin:  Rbandon Score: 21  Interventions: increase time out of bed    Patient Safety:  Fall Score:  Total Score: 1  Interventions: bed/chair alarm, gripper socks, pt to call before getting OOB and stay with me (per policy)       Length of Stay:  Expected LOS: - - -  Actual LOS: 1      Lulu Perez LPN

## 2021-07-07 NOTE — PROGRESS NOTES
Surgery NP Progress Note    Iva Frederick  [id number]  female  59 y.o.  1960    s/p ROBOTIC ASSISTED LAPAROSCOPIC SUTURE RECTOPEXY AND CYSTOSCOPY BILATERAL URETERAL STENT INSERTION on 2021       Assessment:   Active Problems:    Rectal prolapse (8/3/2017)        Expected post-op progress. Plan/Recommendations/Medical Decision Making:     - Mobilize with nursing and OOB to chair for meals  - Continue diet  - Pain management- Continue current pain control methods.   - VTE Prophylaxis: Lovenox   - Discharge to home today or tomorrow. Subjective:     Patient have loose bowel function, positive flatus. Tolerating diet. Pain well controlled. Objective:     Blood pressure (!) 152/83, pulse 84, temperature 98.2 °F (36.8 °C), resp. rate 16, height 5' (1.524 m), weight 62.5 kg (137 lb 12.6 oz), SpO2 99 %.     Temp (24hrs), Av.1 °F (36.7 °C), Min:97.6 °F (36.4 °C), Max:98.4 °F (36.9 °C)      Recent Results (from the past 48 hour(s))   METABOLIC PANEL, BASIC    Collection Time: 21  2:10 AM   Result Value Ref Range    Sodium 141 136 - 145 mmol/L    Potassium 3.3 (L) 3.5 - 5.1 mmol/L    Chloride 109 (H) 97 - 108 mmol/L    CO2 27 21 - 32 mmol/L    Anion gap 5 5 - 15 mmol/L    Glucose 90 65 - 100 mg/dL    BUN 10 6 - 20 MG/DL    Creatinine 0.70 0.55 - 1.02 MG/DL    BUN/Creatinine ratio 14 12 - 20      GFR est AA >60 >60 ml/min/1.73m2    GFR est non-AA >60 >60 ml/min/1.73m2    Calcium 8.2 (L) 8.5 - 10.1 MG/DL   CBC WITH AUTOMATED DIFF    Collection Time: 21  2:10 AM   Result Value Ref Range    WBC 7.7 3.6 - 11.0 K/uL    RBC 3.99 3.80 - 5.20 M/uL    HGB 11.4 (L) 11.5 - 16.0 g/dL    HCT 35.2 35.0 - 47.0 %    MCV 88.2 80.0 - 99.0 FL    MCH 28.6 26.0 - 34.0 PG    MCHC 32.4 30.0 - 36.5 g/dL    RDW 12.9 11.5 - 14.5 %    PLATELET 628 519 - 477 K/uL    MPV 9.4 8.9 - 12.9 FL    NRBC 0.0 0  WBC    ABSOLUTE NRBC 0.00 0.00 - 0.01 K/uL    NEUTROPHILS 64 32 - 75 %    LYMPHOCYTES 29 12 - 49 %    MONOCYTES 7 5 - 13 %    EOSINOPHILS 0 0 - 7 %    BASOPHILS 0 0 - 1 %    IMMATURE GRANULOCYTES 0 0.0 - 0.5 %    ABS. NEUTROPHILS 4.9 1.8 - 8.0 K/UL    ABS. LYMPHOCYTES 2.3 0.8 - 3.5 K/UL    ABS. MONOCYTES 0.5 0.0 - 1.0 K/UL    ABS. EOSINOPHILS 0.0 0.0 - 0.4 K/UL    ABS. BASOPHILS 0.0 0.0 - 0.1 K/UL    ABS. IMM. GRANS. 0.0 0.00 - 0.04 K/UL    DF AUTOMATED         Pt resting in bed. NAD   Incisions CDI. Abd soft and appropriately tender. SCDs for mechanical DVT proph while in bed     Body mass index is 26.91 kg/m². Reference: BMI greater than 30 is classified as obesity and greater than 40 is classified as morbid obesity.      Last 3 Recorded Weights in this Encounter    07/06/21 0640   Weight: 62.5 kg (137 lb 12.6 oz)         Isiah Gallego NP   MSN, APRN, FNP-C, General [place]    07/07/21

## 2021-07-07 NOTE — PROGRESS NOTES
DISCHARGE NOTE FROM [facility] [place] [facility] NURSE    Patient determined to be stable for discharge by attending provider. I have reviewed the discharge instructions and follow-up appointments with the patient and other sister . They verbalized understanding and all questions were answered to their satisfaction. No complaints or further questions were expressed. No new medication scripts. Appropriate educational materials and medication side effect teaching were provided. PIV were removed prior to discharge. Patient did not discharge with any line, pritchett, or drain. All personal items collected during admission were returned to the patient prior to discharge. Post-op patient: Yes- Patient given post-op discharge kit and instructed on use.        Mayito Martínez RN

## 2021-07-08 ENCOUNTER — PATIENT OUTREACH (OUTPATIENT)
Dept: CASE MANAGEMENT | Age: 61
End: 2021-07-08

## 2021-07-08 ENCOUNTER — OFFICE VISIT (OUTPATIENT)
Dept: INTERNAL MEDICINE CLINIC | Age: 61
End: 2021-07-08
Payer: MEDICARE

## 2021-07-08 VITALS
OXYGEN SATURATION: 98 % | TEMPERATURE: 98.2 F | DIASTOLIC BLOOD PRESSURE: 78 MMHG | SYSTOLIC BLOOD PRESSURE: 124 MMHG | WEIGHT: 138.2 LBS | RESPIRATION RATE: 16 BRPM | BODY MASS INDEX: 27.13 KG/M2 | HEIGHT: 60 IN | HEART RATE: 103 BPM

## 2021-07-08 DIAGNOSIS — Z71.89 ACP (ADVANCE CARE PLANNING): ICD-10-CM

## 2021-07-08 DIAGNOSIS — Z00.00 MEDICARE ANNUAL WELLNESS VISIT, SUBSEQUENT: ICD-10-CM

## 2021-07-08 DIAGNOSIS — E87.6 HYPOKALEMIA: Primary | ICD-10-CM

## 2021-07-08 DIAGNOSIS — K62.3 RECTAL PROLAPSE: ICD-10-CM

## 2021-07-08 DIAGNOSIS — I10 ESSENTIAL HYPERTENSION: ICD-10-CM

## 2021-07-08 DIAGNOSIS — K58.9 IRRITABLE BOWEL SYNDROME WITHOUT DIARRHEA: ICD-10-CM

## 2021-07-08 PROCEDURE — G0439 PPPS, SUBSEQ VISIT: HCPCS | Performed by: INTERNAL MEDICINE

## 2021-07-08 PROCEDURE — 99214 OFFICE O/P EST MOD 30 MIN: CPT | Performed by: INTERNAL MEDICINE

## 2021-07-08 PROCEDURE — G9717 DOC PT DX DEP/BP F/U NT REQ: HCPCS | Performed by: INTERNAL MEDICINE

## 2021-07-08 PROCEDURE — 3017F COLORECTAL CA SCREEN DOC REV: CPT | Performed by: INTERNAL MEDICINE

## 2021-07-08 PROCEDURE — G8427 DOCREV CUR MEDS BY ELIG CLIN: HCPCS | Performed by: INTERNAL MEDICINE

## 2021-07-08 PROCEDURE — G8752 SYS BP LESS 140: HCPCS | Performed by: INTERNAL MEDICINE

## 2021-07-08 PROCEDURE — 1111F DSCHRG MED/CURRENT MED MERGE: CPT | Performed by: INTERNAL MEDICINE

## 2021-07-08 PROCEDURE — 99497 ADVNCD CARE PLAN 30 MIN: CPT | Performed by: INTERNAL MEDICINE

## 2021-07-08 PROCEDURE — G8754 DIAS BP LESS 90: HCPCS | Performed by: INTERNAL MEDICINE

## 2021-07-08 PROCEDURE — G8417 CALC BMI ABV UP PARAM F/U: HCPCS | Performed by: INTERNAL MEDICINE

## 2021-07-08 RX ORDER — POTASSIUM CHLORIDE 750 MG/1
10 TABLET, EXTENDED RELEASE ORAL DAILY
Qty: 14 TABLET | Refills: 0 | Status: SHIPPED | OUTPATIENT
Start: 2021-07-08 | End: 2021-08-03 | Stop reason: ALTCHOICE

## 2021-07-08 RX ORDER — OXYCODONE HYDROCHLORIDE 5 MG/1
TABLET ORAL
COMMUNITY
End: 2021-08-03 | Stop reason: ALTCHOICE

## 2021-07-08 NOTE — PROGRESS NOTES
Care Transitions Initial Call    Call within 2 business days of discharge: Yes     Patient: Mary Jimenez Patient : 1960 MRN: [id number]    Last Discharge [address]       Complaint Diagnosis Description Type Department Provider    21   Admission (Discharged) Александр Landa MD          Was this an external facility discharge? No Discharge Facility: [address] [place] [address] 2021    Challenges to be reviewed by the provider   -K+ 3.3       Method of communication with provider : none    Discussed COVID-19 related testing which was not done at this time. Advance Care Planning:   Does patient have an Advance Directive: yes, reviewed and current. Inpatient Readmission Risk score: Unplanned Readmit Risk Score: 13    Was this a readmission? no   Patient stated reason for the admission: \"surgery\"    Patients top risk factors for readmission: none   Interventions to address risk factors: Obtained and reviewed discharge summary and/or continuity of care documents    Care Transition Nurse (CTN) contacted the patient by telephone to perform post hospital discharge assessment. Verified name and  with patient as identifiers. Provided introduction to self, and explanation of the CTN role. CTN reviewed discharge instructions, medical action plan and red flags with patient who verbalized understanding. Were discharge instructions available to patient? yes. Reviewed appropriate site of care based on symptoms and resources available to patient including: PCP, Specialist, When to call 911 and [address]. Patient given an opportunity to ask questions and does not have any further questions or concerns at this time. The patient agrees to contact the PCP office for questions related to their healthcare. Medication reconciliation was performed with patient, who verbalizes understanding of administration of home medications. Advised obtaining a 90-day supply of all daily and as-needed medications. Referral to Pharm D needed: no     Home Health/Outpatient orders at discharge: none    Durable Medical Equipment ordered at discharge: None      Covid Risk Education    Educated patient about risk for severe COVID-19 due to risk factors according to CDC guidelines. CTN reviewed discharge instructions, medical action plan and red flag symptoms with the patient who verbalized understanding. Discussed COVID vaccination status: no. Education provided on COVID-19 vaccination as appropriate. Discussed exposure protocols and quarantine with CDC Guidelines. Patient was given an opportunity to verbalize any questions and concerns and agrees to contact CTN or health care provider for questions related to their healthcare. Was patient discharged with a pulse oximeter? no.       Discussed follow-up appointments. If no appointment was previously scheduled, appointment scheduling offered: yes. Is follow up appointment scheduled within 7 days of discharge? yes. [facility] follow up appointment(s):   Future Appointments   Date Time Provider [place] [facility]   7/8/2021 11:15 AM Felecia Garcia MD [facility]   8/3/2021 10:15 AM Felecia Garcia MD [address] [place] [address] follow up appointment(s): Dr. Darshan Collier 7/31    Plan for follow-up call in 7-10 days based on severity of symptoms and risk factors. Plan for next call: follow up appointment-PCP  CTN provided contact information for future needs. Goals Addressed                 This Visit's Progress     Attends follow-up appointments as directed. 7/8/2021  -Will attend PCP follow up 7/8. Sister, Fatmata Peterson, to drive patient  -Will attend surgical follow up with Dr. Darshan Collier on 7/31  -CTN to follow up in 1 week  SP       Prevent complications post hospitalization. 7/8/2021  -CTN educated patient on symptoms of infection. Will monitor for fever, chills, increased pain at incision, increased redness at incision and changes in drainage.  Will report to provider immediately.  -Will follow Low fiber diet until directed otherwise by surgeon. CTN discussed foods to avoid per [facility] low fiber guidelines.   -Will use Incentive Spirometer 10x BID as directed by surgeon  -No heavy lifting for 4 weeks  -reports bowel movement today and yesterday. Passing gas normally. Will ambulate as tolerated.   -CTN to follow up in 1 week  SP

## 2021-07-08 NOTE — PROGRESS NOTES
HISTORY OF PRESENT ILLNESS  Amado Ashley is a 59 y.o. female here for hospital follow-up. She was admitted to hospital for rectal prolapse surgery. She has tolerated the procedure well. Incision is healing. Still having some abdominal discomfort. Has lab work done recently, potassium level is low. She mentioned she is having watery stool. She was told to have low fiber diet. Discussed low fiber diet options with her. She is afraid of constipation because she had recent surgery. She stopped taking pain medication. She is able to tolerate pain well. Has hypertension, compliant with medicine. Denies chest pain palpitation or shortness of breath. Has bipolar disorder. Seeing psychiatrist.  Depression seems stable. Here for Medicare wellness visit. Has living will. Need new labs. Bipolar  Associated symptoms include abdominal pain. Pertinent negatives include no chest pain and no shortness of breath. Hypertension   Pertinent negatives include no chest pain and no shortness of breath. GERD  Associated symptoms include abdominal pain. Pertinent negatives include no chest pain and no shortness of breath. Medication Evaluation  Associated symptoms include abdominal pain. Pertinent negatives include no chest pain and no shortness of breath. Hospital Follow Up  Associated symptoms include abdominal pain. Pertinent negatives include no chest pain and no shortness of breath. Review of Systems   Constitutional: Negative. HENT: Negative. Eyes: Negative. Respiratory: Negative. Negative for shortness of breath and wheezing. Cardiovascular: Negative for chest pain. Gastrointestinal: Positive for abdominal pain and diarrhea. Negative for blood in stool. Genitourinary: Negative. Musculoskeletal: Negative. Negative for falls. Skin: Negative. Negative for itching. Endo/Heme/Allergies: Negative. Psychiatric/Behavioral: The patient is nervous/anxious and has insomnia. Physical Exam  Constitutional:       General: She is not in acute distress. Appearance: Normal appearance. She is well-developed and normal weight. Neck:      Thyroid: No thyromegaly. Vascular: No JVD. Cardiovascular:      Rate and Rhythm: Normal rate and regular rhythm. Pulses: Normal pulses. Heart sounds: Normal heart sounds. Pulmonary:      Effort: Pulmonary effort is normal. No respiratory distress. Breath sounds: Normal breath sounds. No wheezing. Abdominal:      General: Abdomen is flat. Bowel sounds are normal. There is distension. Comments: All 5 incisions looks healthy. Dermabond present. No sign of infection. Distended abdomen. Musculoskeletal:      Cervical back: Normal range of motion and neck supple. Neurological:      Mental Status: She is alert. Deep Tendon Reflexes: Reflexes are normal and symmetric. Psychiatric:         Mood and Affect: Mood normal.         Behavior: Behavior normal.      Comments: Anxiety and depression seems under control. She has insomnia. ASSESSMENT and PLAN  Diagnoses and all orders for this visit:    1. Hypokalemia    She is having watery diarrhea. Last lab work done yesterday, showed low potassium. Will give potassium chloride for 2 weeks. -     potassium chloride (KLOR-CON) 10 mEq tablet; Take 1 Tablet by mouth daily. 2. Rectal prolapse  Status post rectopexy done laparoscopically. Incisions area without any sign of infection. Dermabond used. Pain has improved. 3. Essential hypertension    On losartan.stable. 4. Irritable bowel syndrome without diarrhea  Doing well now. 5. Medicare annual wellness visit, subsequent    6. ACP (advance care planning)                Discussed expected course/resolution/complications of diagnosis in detail with patient. Medication risks/benefits/costs/interactions/alternatives discussed with patient.    Pt was given an after visit summary which includes diagnoses, current medications & vitals. Pt expressed understanding with the diagnosis and plan.

## 2021-07-08 NOTE — ACP (ADVANCE CARE PLANNING)

## 2021-07-08 NOTE — PATIENT INSTRUCTIONS

## 2021-07-08 NOTE — PROGRESS NOTES
This is the Subsequent Medicare Annual Wellness Exam, performed 12 months or more after the Initial AWV or the last Subsequent AWV    I have reviewed the patient's medical history in detail and updated the computerized patient record. Assessment/Plan   Education and counseling provided:  Are appropriate based on today's review and evaluation  End-of-Life planning (with patient's consent)  Screening Mammography  Bone mass measurement (DEXA)  Screening for glaucoma         Depression Risk Factor Screening     3 most recent PHQ Screens 7/8/2021   Little interest or pleasure in doing things Not at all   Feeling down, depressed, irritable, or hopeless Not at all   Total Score PHQ 2 0       Alcohol Risk Screen    Do you average more than 1 drink per night or more than 7 drinks a week:  No    On any one occasion in the past three months have you have had more than 3 drinks containing alcohol:  No        Functional Ability and Level of Safety    Hearing: Hearing is good. Activities of Daily Living: The home contains: no safety equipment. Patient does total self care      Ambulation: with no difficulty     Fall Risk:  Fall Risk Assessment, last 12 mths 7/8/2021   Able to walk? Yes   Fall in past 12 months? 0   Do you feel unsteady? 0   Are you worried about falling 0   Number of falls in past 12 months -   Fall with injury?  -      Abuse Screen:  Patient is not abused       Cognitive Screening    Has your family/caregiver stated any concerns about your memory: no     Cognitive Screening: Normal - MMSE (Mini Mental Status Exam)    Health Maintenance Due     Health Maintenance Due   Topic Date Due    Shingrix Vaccine Age 49> (1 of 2) Never done    Breast Cancer Screen Mammogram  06/01/2021       Patient Care Team   Patient Care Team:  Debra Cunningham MD as PCP - General (Internal Medicine)  Debra Cunningham MD as PCP - [facility] Empaneled Provider  Bernie Dudley MD (Gynecology)  Tete Brothers MD (Gastroenterology)  Leti Garcia MD (Psychiatry)  Rolanda Fallon MD ([address] Vascular Surgery)  Emi Melendrez, RN as Care Transitions Nurse    History     Patient Active Problem List   Diagnosis Code    Arthritis M19.90    Gallstones with obstruction of gallbladder K80.21    Choledocholithiasis K80.50    Bipolar affective disorder, manic ([address] F31.10    IBS (irritable bowel syndrome) K58.9    Chronic headache R51.9, G89.29    Drop attack R55    Altered mental status R41.82    Delirium R41.0    Hyponatremia E87.1    Bipolar 1 disorder ([address] F31.9    ACP (advance care planning) Z71.89    Rectal prolapse K62.3    Osteoarthritis of right knee M17.11    Total knee replacement status, right Z96.651     Past Medical History:   Diagnosis Date    Arthritis     Bipolar disorder ([address]     Bladder pain     Choledocholithiasis 11/24/2010    GERD (gastroesophageal reflux disease)     Headache(784.0)     tension headaches    High cholesterol     History of cholecystectomy     Hypertension     Ill-defined condition     IBS    Irritable bowel     Pelvic pain in female 2014    Psychiatric disorder     Stool color black     irritable bowel      Past Surgical History:   Procedure Laterality Date    COLONOSCOPY N/A 7/5/2017    COLONOSCOPY performed by Yamile Lawrence MD at [address] HX APPENDECTOMY  1988    HX GI  2003, 2017    prolasped rectum    HX LAP CHOLECYSTECTOMY  11/23/10    HX ORTHOPAEDIC Right 2018    total knee arthroplasty    HX KALEIGH AND BSO  1980    LAPAROSCOPY ABDOMEN DIAGNOSTIC  1987     Current Outpatient Medications   Medication Sig Dispense Refill    acetaminophen (TYLENOL) 500 mg tablet Take 1,000 mg by mouth daily. Evening as needed      diphenhydrAMINE (BENADRYL) 25 mg tablet Take 25 mg by mouth nightly as needed for Sleep.  ZZZ Quil      gabapentin (NEURONTIN) 100 mg capsule 2 caps am and 1 cap PM 90 Capsule 3    amitriptyline (ELAVIL) 50 mg tablet Take 1 tablet by mouth nightly. Discontinue amitriptyline 100MG & Norco tablet. 30 Tablet 3    pantoprazole (PROTONIX) 40 mg tablet TAKE ONE TABLET BY MOUTH DAILY 90 Tab 0    melatonin 10 mg tab 1 tab po hs 30 Tab 2    losartan (COZAAR) 25 mg tablet TAKE 1 TABLET BY MOUTH DAILY 30 Tab 4    fenofibrate nanocrystallized (TRICOR) 145 mg tablet TAKE ONE TABLET BY MOUTH EVERY 48 HOURS 15 Tab 5    alendronate (FOSAMAX) 70 mg tablet TAKE ONE TABLET BY MOUTH EVERY 7 DAYS 4 Tab 11    miscellaneous medical supply (Anti-Embolism Stockings) misc 15 to 30 mm HG below knee stocking in both legs 2 Each 0    diclofenac (VOLTAREN) 1 % gel Top BID on elbow and knee 100 g 1    ketotifen (Alaway) 0.025 % (0.035 %) ophthalmic solution Administer 1 Drop to both eyes two (2) times a day.  cariprazine (VRAYLAR) 1.5 mg capsule Take 3 mg by mouth daily.  loperamide (IMODIUM) 2 mg capsule TAKE 1 CAPSULE BY MOUTH 4 TIMES A DAY AS NEEDED FOR DIARRHEA 120 Cap 0    calcium carbonate (CALTREX) 600 mg calcium (1,500 mg) tablet Take 600 mg by mouth two (2) times a day.  cholecalciferol (VITAMIN D3) 2,000 unit cap capsule Take 2,000 Units by mouth daily. 30 Cap 4    polyethylene glycol (MIRALAX) 17 gram/dose powder TAKE 17 GRAMS (1 TABLESPOONFUL) MIXED IN LIQUID BY MOUTH DAILY AS DIRECTED. 527 g 0    Lactobacillus acidophilus (ACIDOPHILUS) cap TAKE ONE CAPSULE BY MOUTH DAILY 100 Cap 0    brimonidine (LUMIFY) 0.025 % drop Apply  to eye.  carboxymethylcellulose sodium (THERATEARS) 0.25 % drop ophthalmic solution Administer 1 Drop to both eyes.  LORazepam (ATIVAN) 0.5 mg tablet Take 1 Tab by mouth every eight (8) hours as needed for Anxiety. Max Daily Amount: 1.5 mg. D/C valium (Patient taking differently: Take 0.5 mg by mouth three (3) times daily as needed. 6am, 2pm,10 pm) 90 Tab 2    vitamin E (AQUA GEMS) 400 unit capsule Take 400 Units by mouth daily.       oxyCODONE IR (ROXICODONE) 5 mg immediate release tablet oxycodone 5 mg tablet (Patient not taking: Reported on 7/8/2021)       Allergies   Allergen Reactions    Other Food Other (comments)     Oranges, cabbage, beans, peppers, raw onions, foods with caffeine, popcorn, soda because of IBS    Buspar [Buspirone] Other (comments)     Causes \"stimulation\" that could exacerbate a manic attack/phase of pt's Biplar. Reported by patient    Amlodipine Other (comments)     Ankle swelling    Dicyclomine Nausea and Vomiting     Extreme stomach pains    Lithium Nausea and Vomiting     Severe nausea and vomiting.     Other Medication Other (comments)     Intolerance to oranges, cabbage,beans,peppers,raw onions,foods with caffeine in them, popcorn, no pop sodas, because of IBS       Family History   Problem Relation Age of Onset    Deep Vein Thrombosis Mother     Hypertension Father     Cancer Father         SKIN CA    Colon Polyps Father     Other Maternal Uncle         CHAROT-MARISELA-TOOTH    Diabetes Paternal Grandfather     No Known Problems Sister      Social History     Tobacco Use    Smoking status: Never Smoker    Smokeless tobacco: Never Used   Substance Use Topics    Alcohol use: No         Tremaine Carvalho MD

## 2021-07-08 NOTE — PROGRESS NOTES
Health Maintenance Due   Topic Date Due    Shingrix Vaccine Age 49> (1 of 2) Never done    Breast Cancer Screen Mammogram  06/01/2021    Medicare Yearly Exam  07/22/2021       Chief Complaint   Patient presents with   [facility] Follow Up    Diet Concern     Questions about low fiber     Other     DAVEY       1. Have you been to the ER, urgent care clinic since your last visit? Hospitalized since your last visit? No    2. Have you seen or consulted any other health care providers outside of the [address] [place] [address] since your last visit? Include any pap smears or colon screening. No    3) Do you have an Advance Directive on file? no    4) Are you interested in receiving information on Advance Directives? NO      Patient is accompanied by self I have received verbal consent from Sergio Palomo to discuss any/all medical information while they are present in the room.

## 2021-07-15 ENCOUNTER — PATIENT OUTREACH (OUTPATIENT)
Dept: CASE MANAGEMENT | Age: 61
End: 2021-07-15

## 2021-07-15 NOTE — PROGRESS NOTES
Goals      Attends follow-up appointments as directed. 7/15/2021  -Attended PCP follow up on 7/8  -Dr. Fabian Gentile on 7/19/2021. Appointment has been bumped up. -CTN to follow up in 2 weeks  SP  7/8/2021  -Will attend PCP follow up 7/8. Sister, Primo Lyons, to drive patient  -Will attend surgical follow up with Dr. Fabian Gentile on 7/31  -CTN to follow up in 1 week  SP       Prevent complications post hospitalization. 7/15/2021  -Patient reports that she has prolapsed again. Dr. Fabian Gentile aware and has bumped up her appointment.  -Continues to monitor for infection and constipation  -CTN to follow up in 1 week  SP  7/8/2021  -CTN educated patient on symptoms of infection. Will monitor for fever, chills, increased pain at incision, increased redness at incision and changes in drainage. Will report to provider immediately.  -Will follow Low fiber diet until directed otherwise by surgeon. CTN discussed foods to avoid per [facility] low fiber guidelines.   -Will use Incentive Spirometer 10x BID as directed by surgeon  -No heavy lifting for 4 weeks  -reports bowel movement today and yesterday. Passing gas normally. Will ambulate as tolerated.   -CTN to follow up in 1 week  SP

## 2021-07-16 DIAGNOSIS — K21.9 GASTROESOPHAGEAL REFLUX DISEASE WITHOUT ESOPHAGITIS: ICD-10-CM

## 2021-07-16 RX ORDER — PANTOPRAZOLE SODIUM 40 MG/1
TABLET, DELAYED RELEASE ORAL
Qty: 90 TABLET | Refills: 0 | Status: SHIPPED | OUTPATIENT
Start: 2021-07-16 | End: 2021-10-18

## 2021-08-03 ENCOUNTER — OFFICE VISIT (OUTPATIENT)
Dept: INTERNAL MEDICINE CLINIC | Facility: CLINIC | Age: 61
End: 2021-08-03
Payer: MEDICARE

## 2021-08-03 VITALS
BODY MASS INDEX: 27.21 KG/M2 | SYSTOLIC BLOOD PRESSURE: 124 MMHG | TEMPERATURE: 97.9 F | RESPIRATION RATE: 20 BRPM | HEART RATE: 94 BPM | WEIGHT: 138.6 LBS | OXYGEN SATURATION: 97 % | DIASTOLIC BLOOD PRESSURE: 78 MMHG | HEIGHT: 60 IN

## 2021-08-03 DIAGNOSIS — K62.3 RECTAL PROLAPSE: ICD-10-CM

## 2021-08-03 DIAGNOSIS — F31.10 BIPOLAR AFFECTIVE DISORDER, CURRENT EPISODE MANIC, CURRENT EPISODE SEVERITY UNSPECIFIED (HCC): ICD-10-CM

## 2021-08-03 DIAGNOSIS — M85.80 OSTEOPENIA, UNSPECIFIED LOCATION: ICD-10-CM

## 2021-08-03 DIAGNOSIS — K21.9 GASTROESOPHAGEAL REFLUX DISEASE WITHOUT ESOPHAGITIS: ICD-10-CM

## 2021-08-03 DIAGNOSIS — K59.00 CONSTIPATION, UNSPECIFIED CONSTIPATION TYPE: Primary | ICD-10-CM

## 2021-08-03 DIAGNOSIS — E78.2 ELEVATED TRIGLYCERIDES WITH HIGH CHOLESTEROL: ICD-10-CM

## 2021-08-03 PROCEDURE — G8752 SYS BP LESS 140: HCPCS | Performed by: INTERNAL MEDICINE

## 2021-08-03 PROCEDURE — G8427 DOCREV CUR MEDS BY ELIG CLIN: HCPCS | Performed by: INTERNAL MEDICINE

## 2021-08-03 PROCEDURE — 99214 OFFICE O/P EST MOD 30 MIN: CPT | Performed by: INTERNAL MEDICINE

## 2021-08-03 PROCEDURE — G8417 CALC BMI ABV UP PARAM F/U: HCPCS | Performed by: INTERNAL MEDICINE

## 2021-08-03 PROCEDURE — G9717 DOC PT DX DEP/BP F/U NT REQ: HCPCS | Performed by: INTERNAL MEDICINE

## 2021-08-03 PROCEDURE — G8754 DIAS BP LESS 90: HCPCS | Performed by: INTERNAL MEDICINE

## 2021-08-03 PROCEDURE — 1111F DSCHRG MED/CURRENT MED MERGE: CPT | Performed by: INTERNAL MEDICINE

## 2021-08-03 PROCEDURE — 3017F COLORECTAL CA SCREEN DOC REV: CPT | Performed by: INTERNAL MEDICINE

## 2021-08-03 RX ORDER — FENOFIBRATE 145 MG/1
TABLET, COATED ORAL
Qty: 15 TABLET | Refills: 5 | Status: SHIPPED | OUTPATIENT
Start: 2021-08-03 | End: 2021-12-10

## 2021-08-03 RX ORDER — AMOXICILLIN 250 MG
1 CAPSULE ORAL DAILY
Qty: 30 TABLET | Refills: 1 | Status: SHIPPED | OUTPATIENT
Start: 2021-08-03 | End: 2021-09-13 | Stop reason: CLARIF

## 2021-08-03 NOTE — PROGRESS NOTES
HISTORY OF PRESENT ILLNESS  Carol Adam is a 59 y.o. female here to follow-up. She recently had a rectal prolapse surgery by Dr. Nicola Tse, it failed and prolapse came out from rectum again. She is scheduled to have another surgery done end of this month. She is on and off constipated. She mentioned she did try fruits and drink a lot of fluid which helped her. Not on stool softener. Has elevated triglyceride, on fenofibrate, doing well. Need refill. Has hypertension, compliant with medicine. Denies chest pain palpitation or shortness of breath. Has bipolar disorder. Seeing psychiatrist.  Depression seems stable. Seeing Dr. Marcus Freeman. Labs reviewed. Mammogram done by [facility], was normal.  Need bone density. Medication Refill  Pertinent negatives include no chest pain and no shortness of breath. Bipolar  Pertinent negatives include no chest pain and no shortness of breath. Hypertension   Pertinent negatives include no chest pain and no shortness of breath. GERD  Pertinent negatives include no chest pain and no shortness of breath. Constipation   Associated symptoms include constipation. Review of Systems   Constitutional: Negative. HENT: Negative. Eyes: Negative. Respiratory: Negative. Negative for shortness of breath and wheezing. Cardiovascular: Negative. Negative for chest pain. Gastrointestinal: Positive for constipation. Negative for blood in stool. Genitourinary: Negative. Musculoskeletal: Negative. Negative for falls. Skin: Negative. Negative for itching. Endo/Heme/Allergies: Negative. Psychiatric/Behavioral: Positive for depression. Physical Exam  Constitutional:       General: She is not in acute distress. Appearance: Normal appearance. She is well-developed and normal weight. Neck:      Thyroid: No thyromegaly. Vascular: No JVD. Cardiovascular:      Rate and Rhythm: Normal rate and regular rhythm.       Pulses: Normal pulses. Heart sounds: Normal heart sounds. Pulmonary:      Effort: Pulmonary effort is normal. No respiratory distress. Breath sounds: Normal breath sounds. No wheezing. Abdominal:      General: Abdomen is flat. Bowel sounds are normal.      Palpations: Abdomen is soft. Comments: KUB  nontender. Musculoskeletal:      Cervical back: Normal range of motion and neck supple. Neurological:      Mental Status: She is alert. Deep Tendon Reflexes: Reflexes are normal and symmetric. Psychiatric:         Mood and Affect: Mood normal.         Behavior: Behavior normal.      Comments: Anxiety and depression seems under control. ASSESSMENT and PLAN  Diagnoses and all orders for this visit:    1. Constipation, unspecified constipation type  Continue high-fiber diet. Will order,    -     senna-docusate (PERICOLACE) 8.6-50 mg per tablet; Take 1 Tablet by mouth daily. 2. Elevated triglycerides with high cholesterol    Doing well. Will refill,  -     fenofibrate nanocrystallized (TRICOR) 145 mg tablet; 1 tab po qod    3. Rectal prolapse  Surgery failed, she is scheduled to have another surgery end of this month. Advised her not to strain. 4. Bipolar affective disorder, current episode manic, current episode severity unspecified ([facility] [address]  Seeing Dr. Ashleigh Pablo. On Vraylar. Doing well. 5. Gastroesophageal reflux disease without esophagitis  Taking Protonix, doing well. 6. Osteopenia, unspecified location    We will order,  -     DEXA BONE DENSITY STUDY AXIAL; Future          Discussed expected course/resolution/complications of diagnosis in detail with patient. Medication risks/benefits/costs/interactions/alternatives discussed with patient. Pt was given an after visit summary which includes diagnoses, current medications & vitals. Pt expressed understanding with the diagnosis and plan.

## 2021-08-03 NOTE — PROGRESS NOTES
Health Maintenance Due   Topic Date Due    Shingrix Vaccine Age 49> (1 of 2) Never done    Breast Cancer Screen Mammogram  06/01/2021       Chief Complaint   Patient presents with    Bipolar    Abdominal Pain    Osteoarthritis       1. Have you been to the ER, urgent care clinic since your last visit? Hospitalized since your last visit? No    2. Have you seen or consulted any other health care providers outside of the [address] [place] [address] since your last visit? Include any pap smears or colon screening. No    3) Do you have an Advance Directive on file? no    4) Are you interested in receiving information on Advance Directives? NO      Patient is accompanied by self I have received verbal consent from Dimitrios Ray to discuss any/all medical information while they are present in the room.

## 2021-08-05 ENCOUNTER — PATIENT OUTREACH (OUTPATIENT)
Dept: CASE MANAGEMENT | Age: 61
End: 2021-08-05

## 2021-08-05 NOTE — PROGRESS NOTES
Patient has graduated from the Transitions of Care Coordination  program on 8/5/2021. Patient/family has the ability to self-manage at this time Care management goals have been completed. Patient was not referred to the [facility] team for further management. Goals Addressed                 This Visit's Progress     COMPLETED: Attends follow-up appointments as directed. 8/5/2021  Attended follow up with Dr. Posada Led   7/15/2021  -Attended PCP follow up on 7/8  -Dr. Posada Led on 7/19/2021. Appointment has been bumped up. -CTN to follow up in 2 weeks  SP  7/8/2021  -Will attend PCP follow up 7/8. Sister, Gaby Low, to drive patient  -Will attend surgical follow up with Dr. Posada Led on 7/31  -CTN to follow up in 1 week  SP       COMPLETED: Prevent complications post hospitalization. 7/15/2021  -Patient reports that she has prolapsed again. Dr. Posada Led aware and has bumped up her appointment.  -Continues to monitor for infection and constipation  -CTN to follow up in 1 week  SP  7/8/2021  -CTN educated patient on symptoms of infection. Will monitor for fever, chills, increased pain at incision, increased redness at incision and changes in drainage. Will report to provider immediately.  -Will follow Low fiber diet until directed otherwise by surgeon. CTN discussed foods to avoid per [address] low fiber guidelines.   -Will use Incentive Spirometer 10x BID as directed by surgeon  -No heavy lifting for 4 weeks  -reports bowel movement today and yesterday. Passing gas normally. Will ambulate as tolerated. -CTN to follow up in 1 week  SP            Patient has Care Transition Nurse's contact information for any further questions, concerns, or needs.   Patients upcoming visits:    Future Appointments   Date Time Provider Aurora Baeza   8/31/2021 10:30 AM Olamide Rondon MD [facility] BS AMB

## 2021-09-13 ENCOUNTER — HOSPITAL ENCOUNTER (OUTPATIENT)
Dept: PREADMISSION TESTING | Age: 61
Discharge: HOME | DRG: 349 | End: 2021-09-13
Payer: MEDICARE

## 2021-09-13 DIAGNOSIS — F31.10 BIPOLAR AFFECTIVE DISORDER, CURRENT EPISODE MANIC, CURRENT EPISODE SEVERITY UNSPECIFIED (HCC): ICD-10-CM

## 2021-09-13 PROCEDURE — U0003 INFECTIOUS AGENT DETECTION BY NUCLEIC ACID (DNA OR RNA); SEVERE ACUTE RESPIRATORY SYNDROME CORONAVIRUS 2 (SARS-COV-2) (CORONAVIRUS DISEASE [COVID-19]), AMPLIFIED PROBE TECHNIQUE, MAKING USE OF HIGH THROUGHPUT TECHNOLOGIES AS DESCRIBED BY CMS-2020-01-R: HCPCS

## 2021-09-13 RX ORDER — AMITRIPTYLINE HYDROCHLORIDE 50 MG/1
TABLET, FILM COATED ORAL
Qty: 30 TABLET | Refills: 3 | Status: SHIPPED | OUTPATIENT
Start: 2021-09-13 | End: 2021-12-10 | Stop reason: SDUPTHER

## 2021-09-13 NOTE — PERIOP NOTES
[facility]  Preoperative Instructions        Surgery Date 9/16/21          Time of Arrival 1215    1. On the day of your surgery, please report to the Surgical Services Registration Desk and sign in at your designated time. The Surgery Center is located to the right of the Emergency Room. 2. You must have someone with you to drive you home. You should not drive a car for 24 hours following surgery. Please make arrangements for a friend or family member to stay with you for the first 24 hours after your surgery. 3. Do not have anything to eat or drink (including water, gum, mints, coffee, juice) after midnight ? 9/15/21? Payam Brand ? This may not apply to medications prescribed by your physician. ?(Please note below the special instructions with medications to take the morning of your procedure.)    4. We recommend you do not drink any alcoholic beverages for 24 hours before and after your surgery. 5. Contact your surgeons office for instructions on the following medications: non-steroidal anti-inflammatory drugs (i.e. Advil, Aleve), vitamins, and supplements. (Some surgeons will want you to stop these medications prior to surgery and others may allow you to take them)  **If you are currently taking Plavix, Coumadin, Aspirin and/or other blood-thinning agents, contact your surgeon for instructions. ** Your surgeon will partner with the physician prescribing these medications to determine if it is safe to stop or if you need to continue taking. Please do not stop taking these medications without instructions from your surgeon    6. Wear comfortable clothes. Wear glasses instead of contacts. Do not bring any money or jewelry. Please bring picture ID, insurance card, and any prearranged co-payment or hospital payment. Do not wear make-up, particularly mascara the morning of your surgery. Do not wear nail polish, particularly if you are having foot /hand surgery.   Wear your hair loose or down, no ponytails, buns, lorna pins or clips. All body piercings must be removed. Please shower with antibacterial soap for three consecutive days before and on the morning of surgery, but do not apply any lotions, powders or deodorants after the shower on the day of surgery. Please use a fresh towels after each shower. Please sleep in clean clothes and change bed linens the night before surgery. Please do not shave for 48 hours prior to surgery. Shaving of the face is acceptable. 7. You should understand that if you do not follow these instructions your surgery may be cancelled. If your physical condition changes (I.e. fever, cold or flu) please contact your surgeon as soon as possible. 8. It is important that you be on time. If a situation occurs where you may be late, please call [phone] (OR Holding Area). 9. If you have any questions and or problems, please call [phone] (Pre-admission Testing). 10. Your surgery time may be subject to change. You will receive a phone call the evening prior if your time changes. 11.  If having outpatient surgery, you must have someone to drive you here, stay with you during the duration of your stay, and to drive you home at time of discharge. Special Instructions:     TAKE ALL MEDICATIONS DAY OF SURGERY EXCEPT:  Vitamins/supplements, losartan    I understand a pre-operative phone call will be made to verify my surgery time. In the event that I am not available, I give permission for a message to be left on my answering service and/or with another person?   Yes [phone]         ___________________      __________   _________    (Signature of Patient)             (Witness)                (Date and Time)

## 2021-09-14 LAB
SARS-COV-2, XPLCVT: NOT DETECTED
SOURCE, COVRS: NORMAL

## 2021-09-16 ENCOUNTER — ANESTHESIA EVENT (OUTPATIENT)
Dept: SURGERY | Age: 61
DRG: 349 | End: 2021-09-16
Payer: MEDICARE

## 2021-09-16 ENCOUNTER — HOSPITAL ENCOUNTER (INPATIENT)
Age: 61
LOS: 1 days | Discharge: HOME | DRG: 349 | End: 2021-09-17
Attending: SURGERY | Admitting: SURGERY
Payer: MEDICARE

## 2021-09-16 ENCOUNTER — ANESTHESIA (OUTPATIENT)
Dept: SURGERY | Age: 61
DRG: 349 | End: 2021-09-16
Payer: MEDICARE

## 2021-09-16 DIAGNOSIS — K62.3 RECTAL PROLAPSE: Primary | ICD-10-CM

## 2021-09-16 PROCEDURE — 74011250636 HC RX REV CODE- 250/636: Performed by: NURSE ANESTHETIST, CERTIFIED REGISTERED

## 2021-09-16 PROCEDURE — 77030011278 HC ELECTRD LIG IMPT COVD -F: Performed by: SURGERY

## 2021-09-16 PROCEDURE — 2709999900 HC NON-CHARGEABLE SUPPLY: Performed by: SURGERY

## 2021-09-16 PROCEDURE — 88307 TISSUE EXAM BY PATHOLOGIST: CPT

## 2021-09-16 PROCEDURE — 76210000016 HC OR PH I REC 1 TO 1.5 HR: Performed by: SURGERY

## 2021-09-16 PROCEDURE — 77030003029 HC SUT VCRL J&J -B: Performed by: SURGERY

## 2021-09-16 PROCEDURE — 77030040361 HC SLV COMPR DVT MDII -B: Performed by: SURGERY

## 2021-09-16 PROCEDURE — 65270000029 HC RM PRIVATE

## 2021-09-16 PROCEDURE — 76060000034 HC ANESTHESIA 1.5 TO 2 HR: Performed by: SURGERY

## 2021-09-16 PROCEDURE — 74011000250 HC RX REV CODE- 250: Performed by: NURSE ANESTHETIST, CERTIFIED REGISTERED

## 2021-09-16 PROCEDURE — 76010000153 HC OR TIME 1.5 TO 2 HR: Performed by: SURGERY

## 2021-09-16 PROCEDURE — 77030013079 HC BLNKT BAIR HGGR 3M -A: Performed by: ANESTHESIOLOGY

## 2021-09-16 PROCEDURE — 74011250636 HC RX REV CODE- 250/636: Performed by: SURGERY

## 2021-09-16 PROCEDURE — 88305 TISSUE EXAM BY PATHOLOGIST: CPT

## 2021-09-16 PROCEDURE — 77030031139 HC SUT VCRL2 J&J -A: Performed by: SURGERY

## 2021-09-16 PROCEDURE — 74011250636 HC RX REV CODE- 250/636: Performed by: ANESTHESIOLOGY

## 2021-09-16 PROCEDURE — 2709999900 HC NON-CHARGEABLE SUPPLY

## 2021-09-16 PROCEDURE — 77030021052 HC RNG RETRCTR STAY COOP -A: Performed by: SURGERY

## 2021-09-16 PROCEDURE — 74011000250 HC RX REV CODE- 250: Performed by: SURGERY

## 2021-09-16 PROCEDURE — 77030008684 HC TU ET CUF COVD -B: Performed by: ANESTHESIOLOGY

## 2021-09-16 PROCEDURE — 74011250637 HC RX REV CODE- 250/637: Performed by: SURGERY

## 2021-09-16 PROCEDURE — 77030026438 HC STYL ET INTUB CARD -A: Performed by: ANESTHESIOLOGY

## 2021-09-16 PROCEDURE — 77030003028 HC SUT VCRL J&J -A: Performed by: SURGERY

## 2021-09-16 PROCEDURE — 0DBP7ZZ EXCISION OF RECTUM, VIA NATURAL OR ARTIFICIAL OPENING: ICD-10-PCS | Performed by: SURGERY

## 2021-09-16 RX ORDER — ONDANSETRON 2 MG/ML
INJECTION INTRAMUSCULAR; INTRAVENOUS AS NEEDED
Status: DISCONTINUED | OUTPATIENT
Start: 2021-09-16 | End: 2021-09-16 | Stop reason: HOSPADM

## 2021-09-16 RX ORDER — HYDROMORPHONE HYDROCHLORIDE 2 MG/ML
INJECTION, SOLUTION INTRAMUSCULAR; INTRAVENOUS; SUBCUTANEOUS AS NEEDED
Status: DISCONTINUED | OUTPATIENT
Start: 2021-09-16 | End: 2021-09-16 | Stop reason: HOSPADM

## 2021-09-16 RX ORDER — SODIUM CHLORIDE 9 MG/ML
50 INJECTION, SOLUTION INTRAVENOUS CONTINUOUS
Status: DISCONTINUED | OUTPATIENT
Start: 2021-09-16 | End: 2021-09-16 | Stop reason: HOSPADM

## 2021-09-16 RX ORDER — FENTANYL CITRATE 50 UG/ML
50 INJECTION, SOLUTION INTRAMUSCULAR; INTRAVENOUS AS NEEDED
Status: DISCONTINUED | OUTPATIENT
Start: 2021-09-16 | End: 2021-09-16 | Stop reason: HOSPADM

## 2021-09-16 RX ORDER — PROPOFOL 10 MG/ML
INJECTION, EMULSION INTRAVENOUS AS NEEDED
Status: DISCONTINUED | OUTPATIENT
Start: 2021-09-16 | End: 2021-09-16 | Stop reason: HOSPADM

## 2021-09-16 RX ORDER — SODIUM CHLORIDE 0.9 % (FLUSH) 0.9 %
5-40 SYRINGE (ML) INJECTION AS NEEDED
Status: DISCONTINUED | OUTPATIENT
Start: 2021-09-16 | End: 2021-09-16 | Stop reason: HOSPADM

## 2021-09-16 RX ORDER — ENOXAPARIN SODIUM 100 MG/ML
40 INJECTION SUBCUTANEOUS DAILY
Status: DISCONTINUED | OUTPATIENT
Start: 2021-09-17 | End: 2021-09-17 | Stop reason: HOSPADM

## 2021-09-16 RX ORDER — SODIUM CHLORIDE 0.9 % (FLUSH) 0.9 %
5-40 SYRINGE (ML) INJECTION EVERY 8 HOURS
Status: DISCONTINUED | OUTPATIENT
Start: 2021-09-16 | End: 2021-09-16 | Stop reason: HOSPADM

## 2021-09-16 RX ORDER — ACETAMINOPHEN 325 MG/1
650 TABLET ORAL EVERY 6 HOURS
Status: DISCONTINUED | OUTPATIENT
Start: 2021-09-16 | End: 2021-09-17 | Stop reason: HOSPADM

## 2021-09-16 RX ORDER — MIDAZOLAM HYDROCHLORIDE 1 MG/ML
0.5 INJECTION, SOLUTION INTRAMUSCULAR; INTRAVENOUS
Status: DISCONTINUED | OUTPATIENT
Start: 2021-09-16 | End: 2021-09-16 | Stop reason: HOSPADM

## 2021-09-16 RX ORDER — SODIUM CHLORIDE 0.9 % (FLUSH) 0.9 %
5-40 SYRINGE (ML) INJECTION EVERY 8 HOURS
Status: DISCONTINUED | OUTPATIENT
Start: 2021-09-16 | End: 2021-09-17 | Stop reason: HOSPADM

## 2021-09-16 RX ORDER — SODIUM CHLORIDE, SODIUM LACTATE, POTASSIUM CHLORIDE, CALCIUM CHLORIDE 600; 310; 30; 20 MG/100ML; MG/100ML; MG/100ML; MG/100ML
75 INJECTION, SOLUTION INTRAVENOUS CONTINUOUS
Status: DISCONTINUED | OUTPATIENT
Start: 2021-09-16 | End: 2021-09-17 | Stop reason: HOSPADM

## 2021-09-16 RX ORDER — LIDOCAINE HYDROCHLORIDE 10 MG/ML
0.1 INJECTION, SOLUTION EPIDURAL; INFILTRATION; INTRACAUDAL; PERINEURAL AS NEEDED
Status: DISCONTINUED | OUTPATIENT
Start: 2021-09-16 | End: 2021-09-16 | Stop reason: HOSPADM

## 2021-09-16 RX ORDER — SODIUM CHLORIDE, SODIUM LACTATE, POTASSIUM CHLORIDE, CALCIUM CHLORIDE 600; 310; 30; 20 MG/100ML; MG/100ML; MG/100ML; MG/100ML
75 INJECTION, SOLUTION INTRAVENOUS CONTINUOUS
Status: DISCONTINUED | OUTPATIENT
Start: 2021-09-16 | End: 2021-09-16

## 2021-09-16 RX ORDER — MORPHINE SULFATE 2 MG/ML
2 INJECTION, SOLUTION INTRAMUSCULAR; INTRAVENOUS
Status: DISCONTINUED | OUTPATIENT
Start: 2021-09-16 | End: 2021-09-16 | Stop reason: HOSPADM

## 2021-09-16 RX ORDER — HYDROMORPHONE HYDROCHLORIDE 1 MG/ML
0.2 INJECTION, SOLUTION INTRAMUSCULAR; INTRAVENOUS; SUBCUTANEOUS
Status: DISCONTINUED | OUTPATIENT
Start: 2021-09-16 | End: 2021-09-16 | Stop reason: HOSPADM

## 2021-09-16 RX ORDER — ACETAMINOPHEN 325 MG/1
325 TABLET ORAL
Status: DISCONTINUED | OUTPATIENT
Start: 2021-09-16 | End: 2021-09-17 | Stop reason: HOSPADM

## 2021-09-16 RX ORDER — LIDOCAINE HYDROCHLORIDE 20 MG/ML
INJECTION, SOLUTION EPIDURAL; INFILTRATION; INTRACAUDAL; PERINEURAL AS NEEDED
Status: DISCONTINUED | OUTPATIENT
Start: 2021-09-16 | End: 2021-09-16 | Stop reason: HOSPADM

## 2021-09-16 RX ORDER — SODIUM CHLORIDE, SODIUM LACTATE, POTASSIUM CHLORIDE, CALCIUM CHLORIDE 600; 310; 30; 20 MG/100ML; MG/100ML; MG/100ML; MG/100ML
25 INJECTION, SOLUTION INTRAVENOUS CONTINUOUS
Status: DISCONTINUED | OUTPATIENT
Start: 2021-09-16 | End: 2021-09-16

## 2021-09-16 RX ORDER — PHENYLEPHRINE HCL IN 0.9% NACL 0.4MG/10ML
SYRINGE (ML) INTRAVENOUS AS NEEDED
Status: DISCONTINUED | OUTPATIENT
Start: 2021-09-16 | End: 2021-09-16 | Stop reason: HOSPADM

## 2021-09-16 RX ORDER — DEXAMETHASONE SODIUM PHOSPHATE 4 MG/ML
INJECTION, SOLUTION INTRA-ARTICULAR; INTRALESIONAL; INTRAMUSCULAR; INTRAVENOUS; SOFT TISSUE AS NEEDED
Status: DISCONTINUED | OUTPATIENT
Start: 2021-09-16 | End: 2021-09-16 | Stop reason: HOSPADM

## 2021-09-16 RX ORDER — MIDAZOLAM HYDROCHLORIDE 1 MG/ML
INJECTION, SOLUTION INTRAMUSCULAR; INTRAVENOUS AS NEEDED
Status: DISCONTINUED | OUTPATIENT
Start: 2021-09-16 | End: 2021-09-16 | Stop reason: HOSPADM

## 2021-09-16 RX ORDER — MIDAZOLAM HYDROCHLORIDE 1 MG/ML
1 INJECTION, SOLUTION INTRAMUSCULAR; INTRAVENOUS AS NEEDED
Status: DISCONTINUED | OUTPATIENT
Start: 2021-09-16 | End: 2021-09-16 | Stop reason: HOSPADM

## 2021-09-16 RX ORDER — BUPIVACAINE HYDROCHLORIDE AND EPINEPHRINE 2.5; 5 MG/ML; UG/ML
INJECTION, SOLUTION EPIDURAL; INFILTRATION; INTRACAUDAL; PERINEURAL AS NEEDED
Status: DISCONTINUED | OUTPATIENT
Start: 2021-09-16 | End: 2021-09-16 | Stop reason: HOSPADM

## 2021-09-16 RX ORDER — NEOSTIGMINE METHYLSULFATE 1 MG/ML
INJECTION, SOLUTION INTRAVENOUS AS NEEDED
Status: DISCONTINUED | OUTPATIENT
Start: 2021-09-16 | End: 2021-09-16 | Stop reason: HOSPADM

## 2021-09-16 RX ORDER — GLYCOPYRROLATE 0.2 MG/ML
INJECTION INTRAMUSCULAR; INTRAVENOUS AS NEEDED
Status: DISCONTINUED | OUTPATIENT
Start: 2021-09-16 | End: 2021-09-16 | Stop reason: HOSPADM

## 2021-09-16 RX ORDER — ROCURONIUM BROMIDE 10 MG/ML
INJECTION, SOLUTION INTRAVENOUS AS NEEDED
Status: DISCONTINUED | OUTPATIENT
Start: 2021-09-16 | End: 2021-09-16 | Stop reason: HOSPADM

## 2021-09-16 RX ORDER — OXYCODONE HYDROCHLORIDE 5 MG/1
5 TABLET ORAL
Status: DISCONTINUED | OUTPATIENT
Start: 2021-09-16 | End: 2021-09-17 | Stop reason: HOSPADM

## 2021-09-16 RX ORDER — OXYCODONE AND ACETAMINOPHEN 5; 325 MG/1; MG/1
1 TABLET ORAL AS NEEDED
Status: DISCONTINUED | OUTPATIENT
Start: 2021-09-16 | End: 2021-09-16 | Stop reason: HOSPADM

## 2021-09-16 RX ORDER — SODIUM CHLORIDE, SODIUM LACTATE, POTASSIUM CHLORIDE, CALCIUM CHLORIDE 600; 310; 30; 20 MG/100ML; MG/100ML; MG/100ML; MG/100ML
75 INJECTION, SOLUTION INTRAVENOUS CONTINUOUS
Status: DISCONTINUED | OUTPATIENT
Start: 2021-09-16 | End: 2021-09-16 | Stop reason: HOSPADM

## 2021-09-16 RX ORDER — ONDANSETRON 2 MG/ML
4 INJECTION INTRAMUSCULAR; INTRAVENOUS
Status: DISCONTINUED | OUTPATIENT
Start: 2021-09-16 | End: 2021-09-17 | Stop reason: HOSPADM

## 2021-09-16 RX ORDER — SODIUM CHLORIDE 0.9 % (FLUSH) 0.9 %
5-40 SYRINGE (ML) INJECTION AS NEEDED
Status: DISCONTINUED | OUTPATIENT
Start: 2021-09-16 | End: 2021-09-17 | Stop reason: HOSPADM

## 2021-09-16 RX ORDER — SODIUM CHLORIDE 9 MG/ML
50 INJECTION, SOLUTION INTRAVENOUS CONTINUOUS
Status: DISCONTINUED | OUTPATIENT
Start: 2021-09-16 | End: 2021-09-16

## 2021-09-16 RX ORDER — DIPHENHYDRAMINE HYDROCHLORIDE 50 MG/ML
12.5 INJECTION, SOLUTION INTRAMUSCULAR; INTRAVENOUS AS NEEDED
Status: DISCONTINUED | OUTPATIENT
Start: 2021-09-16 | End: 2021-09-16 | Stop reason: HOSPADM

## 2021-09-16 RX ORDER — SUCCINYLCHOLINE CHLORIDE 20 MG/ML
INJECTION INTRAMUSCULAR; INTRAVENOUS AS NEEDED
Status: DISCONTINUED | OUTPATIENT
Start: 2021-09-16 | End: 2021-09-16 | Stop reason: HOSPADM

## 2021-09-16 RX ORDER — ONDANSETRON 2 MG/ML
4 INJECTION INTRAMUSCULAR; INTRAVENOUS AS NEEDED
Status: DISCONTINUED | OUTPATIENT
Start: 2021-09-16 | End: 2021-09-16 | Stop reason: HOSPADM

## 2021-09-16 RX ORDER — FENTANYL CITRATE 50 UG/ML
INJECTION, SOLUTION INTRAMUSCULAR; INTRAVENOUS AS NEEDED
Status: DISCONTINUED | OUTPATIENT
Start: 2021-09-16 | End: 2021-09-16 | Stop reason: HOSPADM

## 2021-09-16 RX ORDER — FENTANYL CITRATE 50 UG/ML
25 INJECTION, SOLUTION INTRAMUSCULAR; INTRAVENOUS
Status: DISCONTINUED | OUTPATIENT
Start: 2021-09-16 | End: 2021-09-16 | Stop reason: HOSPADM

## 2021-09-16 RX ORDER — ONDANSETRON 4 MG/1
4 TABLET, ORALLY DISINTEGRATING ORAL
Status: DISCONTINUED | OUTPATIENT
Start: 2021-09-16 | End: 2021-09-17 | Stop reason: HOSPADM

## 2021-09-16 RX ADMIN — MIDAZOLAM HYDROCHLORIDE 2 MG: 1 INJECTION, SOLUTION INTRAMUSCULAR; INTRAVENOUS at 13:42

## 2021-09-16 RX ADMIN — SODIUM CHLORIDE, POTASSIUM CHLORIDE, SODIUM LACTATE AND CALCIUM CHLORIDE: 600; 310; 30; 20 INJECTION, SOLUTION INTRAVENOUS at 15:05

## 2021-09-16 RX ADMIN — Medication 80 MCG: at 14:47

## 2021-09-16 RX ADMIN — OXYCODONE 5 MG: 5 TABLET ORAL at 23:17

## 2021-09-16 RX ADMIN — SODIUM CHLORIDE, POTASSIUM CHLORIDE, SODIUM LACTATE AND CALCIUM CHLORIDE 75 ML/HR: 600; 310; 30; 20 INJECTION, SOLUTION INTRAVENOUS at 16:39

## 2021-09-16 RX ADMIN — Medication 60 MCG: at 14:29

## 2021-09-16 RX ADMIN — HYDROMORPHONE HYDROCHLORIDE 0.2 MG: 2 INJECTION, SOLUTION INTRAMUSCULAR; INTRAVENOUS; SUBCUTANEOUS at 14:22

## 2021-09-16 RX ADMIN — HYDROMORPHONE HYDROCHLORIDE 0.5 MG: 2 INJECTION, SOLUTION INTRAMUSCULAR; INTRAVENOUS; SUBCUTANEOUS at 15:32

## 2021-09-16 RX ADMIN — FENTANYL CITRATE 25 MCG: 50 INJECTION INTRAMUSCULAR; INTRAVENOUS at 15:58

## 2021-09-16 RX ADMIN — GLYCOPYRROLATE 0.6 MG: 0.2 INJECTION, SOLUTION INTRAMUSCULAR; INTRAVENOUS at 15:10

## 2021-09-16 RX ADMIN — PROPOFOL 40 MG: 10 INJECTION, EMULSION INTRAVENOUS at 14:03

## 2021-09-16 RX ADMIN — SUCCINYLCHOLINE CHLORIDE 120 MG: 20 INJECTION, SOLUTION INTRAMUSCULAR; INTRAVENOUS at 13:59

## 2021-09-16 RX ADMIN — Medication 5 ML: at 21:35

## 2021-09-16 RX ADMIN — FENTANYL CITRATE 50 MCG: 50 INJECTION, SOLUTION INTRAMUSCULAR; INTRAVENOUS at 13:59

## 2021-09-16 RX ADMIN — PROPOFOL 120 MG: 10 INJECTION, EMULSION INTRAVENOUS at 13:59

## 2021-09-16 RX ADMIN — ROCURONIUM BROMIDE 5 MG: 10 INJECTION INTRAVENOUS at 13:57

## 2021-09-16 RX ADMIN — SODIUM CHLORIDE, POTASSIUM CHLORIDE, SODIUM LACTATE AND CALCIUM CHLORIDE 75 ML/HR: 600; 310; 30; 20 INJECTION, SOLUTION INTRAVENOUS at 21:36

## 2021-09-16 RX ADMIN — Medication 80 MCG: at 14:52

## 2021-09-16 RX ADMIN — DEXAMETHASONE SODIUM PHOSPHATE 4 MG: 4 INJECTION, SOLUTION INTRAMUSCULAR; INTRAVENOUS at 14:09

## 2021-09-16 RX ADMIN — SUCCINYLCHOLINE CHLORIDE 40 MG: 20 INJECTION, SOLUTION INTRAMUSCULAR; INTRAVENOUS at 14:03

## 2021-09-16 RX ADMIN — FENTANYL CITRATE 25 MCG: 50 INJECTION INTRAMUSCULAR; INTRAVENOUS at 16:13

## 2021-09-16 RX ADMIN — MIDAZOLAM HYDROCHLORIDE 1 MG: 1 INJECTION, SOLUTION INTRAMUSCULAR; INTRAVENOUS at 13:44

## 2021-09-16 RX ADMIN — Medication 80 MCG: at 14:38

## 2021-09-16 RX ADMIN — LIDOCAINE HYDROCHLORIDE 80 MG: 20 INJECTION, SOLUTION EPIDURAL; INFILTRATION; INTRACAUDAL; PERINEURAL at 13:59

## 2021-09-16 RX ADMIN — ACETAMINOPHEN 650 MG: 325 TABLET ORAL at 17:35

## 2021-09-16 RX ADMIN — ROCURONIUM BROMIDE 10 MG: 10 INJECTION INTRAVENOUS at 14:36

## 2021-09-16 RX ADMIN — ACETAMINOPHEN 650 MG: 325 TABLET ORAL at 23:15

## 2021-09-16 RX ADMIN — NEOSTIGMINE METHYLSULFATE 4 MG: 1 INJECTION, SOLUTION INTRAVENOUS at 15:10

## 2021-09-16 RX ADMIN — Medication 1 AMPULE: at 21:35

## 2021-09-16 RX ADMIN — MIDAZOLAM HYDROCHLORIDE 1 MG: 1 INJECTION, SOLUTION INTRAMUSCULAR; INTRAVENOUS at 13:55

## 2021-09-16 RX ADMIN — ROCURONIUM BROMIDE 20 MG: 10 INJECTION INTRAVENOUS at 14:11

## 2021-09-16 RX ADMIN — Medication 60 MCG: at 14:34

## 2021-09-16 RX ADMIN — ONDANSETRON HYDROCHLORIDE 4 MG: 2 INJECTION, SOLUTION INTRAMUSCULAR; INTRAVENOUS at 14:53

## 2021-09-16 RX ADMIN — OXYCODONE 5 MG: 5 TABLET ORAL at 17:35

## 2021-09-16 RX ADMIN — Medication 10 ML: at 17:37

## 2021-09-16 RX ADMIN — MEPERIDINE HYDROCHLORIDE 12.5 MG: 25 INJECTION, SOLUTION INTRAMUSCULAR; INTRAVENOUS; SUBCUTANEOUS at 16:07

## 2021-09-16 RX ADMIN — FENTANYL CITRATE 50 MCG: 50 INJECTION, SOLUTION INTRAMUSCULAR; INTRAVENOUS at 14:14

## 2021-09-16 RX ADMIN — SODIUM CHLORIDE, POTASSIUM CHLORIDE, SODIUM LACTATE AND CALCIUM CHLORIDE 25 ML/HR: 600; 310; 30; 20 INJECTION, SOLUTION INTRAVENOUS at 10:53

## 2021-09-16 NOTE — PERIOP NOTES
Omar Cox from Operating Room to PACU    Report received from Daryl Hugo and Bronson Gramajo CRNA regarding Mary Jimenez. Surgeon(s):  Eduar Barth MD  And Procedure(s) (LRB):  ALTEMEIER PROCEDURE/ PERINEAL PROCTECTOMY (N/A)  confirmed   with allergies and dressings discussed. Anesthesia type, drugs, patient history, complications, estimated blood loss, vital signs, intake and output, and last pain medication, lines, reversal medications and temperature were reviewed. 1630 Updated pt's sister, Jennifer Stanley, on pt's current status & pending transfer to inpatient unit. 1640 TRANSFER - OUT REPORT:    Verbal report given to Lore Randall RN(name) on Mary Jimenez  being transferred to Onc/Obs(unit) for routine post - op       Report consisted of patients Situation, Background, Assessment and   Recommendations(SBAR). Information from the following report(s) SBAR, Kardex, OR Summary and MAR was reviewed with the receiving nurse. Lines:   Peripheral IV 09/16/21 Anterior;Left;Proximal Forearm (Active)   Site Assessment Clean, dry, & intact 09/16/21 1630   Phlebitis Assessment 0 09/16/21 1630   Infiltration Assessment 0 09/16/21 1630   Dressing Status Clean, dry, & intact 09/16/21 1630   Dressing Type Transparent;Tape 09/16/21 1630   Hub Color/Line Status Pink; Infusing 09/16/21 1630        Opportunity for questions and clarification was provided.       Patient transported with:   [employer]

## 2021-09-16 NOTE — ANESTHESIA POSTPROCEDURE EVALUATION
Procedure(s):  ALTEMEIER PROCEDURE/ PERINEAL PROCTECTOMY. general    Anesthesia Post Evaluation      Multimodal analgesia: multimodal analgesia used between 6 hours prior to anesthesia start to PACU discharge  Patient location during evaluation: PACU  Level of consciousness: sleepy but conscious  Pain management: adequate  Airway patency: patent  Anesthetic complications: no  Cardiovascular status: acceptable  Respiratory status: acceptable  Hydration status: acceptable  Comments: +Post-Anesthesia Evaluation and Assessment    Patient: Jaqueline Figueredo MRN: [id number]  SSN: [id number]   YOB: 1960  Age: 59 y.o. Sex: female      Cardiovascular Function/Vital Signs    /79   Pulse 97   Temp 36.5 °C (97.7 °F)   Resp 23   Ht 5' (1.524 m)   Wt 60.5 kg (133 lb 6.1 oz)   SpO2 100%   BMI 26.05 kg/m²     Patient is status post Procedure(s):  ALTEMEIER PROCEDURE/ PERINEAL PROCTECTOMY. Nausea/Vomiting: Controlled. Postoperative hydration reviewed and adequate. Pain:  Pain Scale 1: Visual (09/16/21 1618)  Pain Intensity 1: 0 (09/16/21 1618)   Managed. Neurological Status:   Neuro (WDL): Within Defined Limits (09/16/21 1529)   At baseline. Mental Status and Level of Consciousness: Arousable. Pulmonary Status:   O2 Device: None (Room air) (09/16/21 1618)   Adequate oxygenation and airway patent. Complications related to anesthesia: None    Post-anesthesia assessment completed. No concerns. Signed By: Patience Willis MD    9/16/2021  Post anesthesia nausea and vomiting:  controlled  Final Post Anesthesia Temperature Assessment:  Normothermia (36.0-37.5 degrees C)      INITIAL Post-op Vital signs:   Vitals Value Taken Time   /79 09/16/21 1618   Temp 36.5 °C (97.7 °F) 09/16/21 1600   Pulse 96 09/16/21 1627   Resp 15 09/16/21 1627   SpO2 100 % 09/16/21 1627   Vitals shown include unvalidated device data.

## 2021-09-16 NOTE — ANESTHESIA PREPROCEDURE EVALUATION
Relevant Problems   NEUROLOGY   (+) Bipolar 1 disorder (HCC)   (+) Bipolar affective disorder, manic (HCC)   (+) Delirium      ENDOCRINE   (+) Arthritis   Anesthetic History   No history of anesthetic complications            Review of Systems / Medical History  Patient summary reviewed, nursing notes reviewed and pertinent labs reviewed    Pulmonary  Within defined limits                 Neuro/Psych         Psychiatric history     Cardiovascular    Hypertension              Exercise tolerance: >4 METS     GI/Hepatic/Renal     GERD           Endo/Other        Obesity and arthritis     Other Findings   Comments: Bipolar  IBS  RECURRENT RECTAL PROLAPSE         Physical Exam    Airway  Mallampati: II  TM Distance: 4 - 6 cm  Neck ROM: normal range of motion   Mouth opening: Diminished (comment)     Cardiovascular  Regular rate and rhythm,  S1 and S2 normal,  no murmur, click, rub, or gallop             Dental  No notable dental hx       Pulmonary  Breath sounds clear to auscultation               Abdominal  GI exam deferred       Other Findings            Anesthetic Plan    ASA: 2  Anesthesia type: general            Anesthetic plan and risks discussed with: Patient          Anesthetic History   No history of anesthetic complications            Review of Systems / Medical History  Patient summary reviewed, nursing notes reviewed and pertinent labs reviewed    Pulmonary  Within defined limits                 Neuro/Psych         Psychiatric history     Cardiovascular    Hypertension              Exercise tolerance: >4 METS     GI/Hepatic/Renal     GERD           Endo/Other        Obesity and arthritis     Other Findings   Comments: Bipolar  IBS  RECURRENT RECTAL PROLAPSE         Physical Exam    Airway  Mallampati: II  TM Distance: 4 - 6 cm  Neck ROM: normal range of motion   Mouth opening: Diminished (comment)     Cardiovascular  Regular rate and rhythm,  S1 and S2 normal,  no murmur, click, rub, or gallop Dental  No notable dental hx       Pulmonary  Breath sounds clear to auscultation               Abdominal  GI exam deferred       Other Findings            Anesthetic Plan    ASA: 2  Anesthesia type: general            Anesthetic plan and risks discussed with: Patient              Anesthetic History   No history of anesthetic complications            Review of Systems / Medical History  Patient summary reviewed, nursing notes reviewed and pertinent labs reviewed    Pulmonary  Within defined limits                 Neuro/Psych         Headaches and psychiatric history (bipolar)     Cardiovascular    Hypertension          Hyperlipidemia    Exercise tolerance: >4 METS     GI/Hepatic/Renal     GERD          Comments: IBS Endo/Other        Arthritis     Other Findings   Comments: IBS    Rectal prolapse           Physical Exam    Airway  Mallampati: II  TM Distance: 4 - 6 cm  Neck ROM: normal range of motion   Mouth opening: Normal     Cardiovascular  Regular rate and rhythm,  S1 and S2 normal,  no murmur, click, rub, or gallop  Rhythm: regular  Rate: normal         Dental  No notable dental hx       Pulmonary  Breath sounds clear to auscultation               Abdominal  GI exam deferred       Other Findings            Anesthetic Plan    ASA: 3  Anesthesia type: general    Monitoring Plan: BIS      Induction: Intravenous  Anesthetic plan and risks discussed with: Patient

## 2021-09-16 NOTE — PROGRESS NOTES
End of Shift Note    Bedside shift change report given to [employer] (oncoming nurse) by Ada Pinedo RN (offgoing nurse). Report included the following information SBAR, Kardex and ED Summary    Shift worked:  7a-7p     Shift summary and any significant changes:     Pt arrived end of shift from PACU     Concerns for physician to address:  none     Zone phone for oncoming shift:   [phone]       Activity:     Number times ambulated in hallways past shift: 1  Number of times OOB to chair past shift: 2    Cardiac:   Cardiac Monitoring: No      Cardiac Rhythm: Sinus Rhythm    Access:   Current line(s): PIV     Genitourinary:   Urinary status: voiding    Respiratory:   O2 Device: None (Room air)  Chronic home O2 use?: NO  Incentive spirometer at bedside: NO     GI:     Current diet:  ADULT DIET Regular; Low Fiber  Passing flatus: YES  Tolerating current diet: YES       Pain Management:   Patient states pain is manageable on current regimen: NO    Skin:  Brandon Score: 21  Interventions: increase time out of bed    Patient Safety:  Fall Score:  Total Score: 1  Interventions: bed/chair alarm  High Fall Risk: Yes    Length of Stay:  Expected LOS: - - -  Actual LOS: 0      Ada Pinedo RN

## 2021-09-16 NOTE — BRIEF OP NOTE
Brief Postoperative Note    Patient: Dimitrios Ray  YOB: 1960  MRN: [id number]    Date of Procedure: 9/16/2021     Pre-Op Diagnosis: RECTAL PROLAPSE    Post-Op Diagnosis: Same as preoperative diagnosis.       Procedure(s):  ALTEMEIER PROCEDURE/ PERINEAL PROCTECTOMY    Surgeon(s):  Jewel Fallon MD    Surgical Assistant: Surg Asst-1: Marichuy Blake    Anesthesia: General     Estimated Blood Loss (mL): Minimal    Complications: None    Specimens:   ID Type Source Tests Collected by Time Destination   1 : rectum Preservative Anus  Jewel Fallon MD 9/16/2021 1443 Pathology        Implants: * No implants in log *    Drains: * No LDAs found *    Findings: Anterior rectal prolapse    Electronically Signed by Anuja Elaine MD on 9/16/2021 at 3:16 PM

## 2021-09-17 VITALS
DIASTOLIC BLOOD PRESSURE: 72 MMHG | OXYGEN SATURATION: 100 % | HEIGHT: 60 IN | RESPIRATION RATE: 18 BRPM | SYSTOLIC BLOOD PRESSURE: 148 MMHG | HEART RATE: 99 BPM | WEIGHT: 133.38 LBS | BODY MASS INDEX: 26.19 KG/M2 | TEMPERATURE: 98 F

## 2021-09-17 LAB
ANION GAP SERPL CALC-SCNC: 5 MMOL/L (ref 5–15)
BASOPHILS # BLD: 0 K/UL (ref 0–0.1)
BASOPHILS NFR BLD: 0 % (ref 0–1)
BUN SERPL-MCNC: 7 MG/DL (ref 6–20)
BUN/CREAT SERPL: 9 (ref 12–20)
CALCIUM SERPL-MCNC: 8.3 MG/DL (ref 8.5–10.1)
CHLORIDE SERPL-SCNC: 108 MMOL/L (ref 97–108)
CO2 SERPL-SCNC: 25 MMOL/L (ref 21–32)
CREAT SERPL-MCNC: 0.79 MG/DL (ref 0.55–1.02)
DIFFERENTIAL METHOD BLD: ABNORMAL
EOSINOPHIL # BLD: 0 K/UL (ref 0–0.4)
EOSINOPHIL NFR BLD: 0 % (ref 0–7)
ERYTHROCYTE [DISTWIDTH] IN BLOOD BY AUTOMATED COUNT: 13.2 % (ref 11.5–14.5)
GLUCOSE SERPL-MCNC: 110 MG/DL (ref 65–100)
HCT VFR BLD AUTO: 35.2 % (ref 35–47)
HGB BLD-MCNC: 10.8 G/DL (ref 11.5–16)
IMM GRANULOCYTES # BLD AUTO: 0 K/UL (ref 0–0.04)
IMM GRANULOCYTES NFR BLD AUTO: 0 % (ref 0–0.5)
LYMPHOCYTES # BLD: 1.3 K/UL (ref 0.8–3.5)
LYMPHOCYTES NFR BLD: 15 % (ref 12–49)
MCH RBC QN AUTO: 27.7 PG (ref 26–34)
MCHC RBC AUTO-ENTMCNC: 30.7 G/DL (ref 30–36.5)
MCV RBC AUTO: 90.3 FL (ref 80–99)
MONOCYTES # BLD: 0.6 K/UL (ref 0–1)
MONOCYTES NFR BLD: 7 % (ref 5–13)
NEUTS SEG # BLD: 6.8 K/UL (ref 1.8–8)
NEUTS SEG NFR BLD: 78 % (ref 32–75)
NRBC # BLD: 0 K/UL (ref 0–0.01)
NRBC BLD-RTO: 0 PER 100 WBC
PLATELET # BLD AUTO: 247 K/UL (ref 150–400)
PMV BLD AUTO: 9.3 FL (ref 8.9–12.9)
POTASSIUM SERPL-SCNC: 3.4 MMOL/L (ref 3.5–5.1)
RBC # BLD AUTO: 3.9 M/UL (ref 3.8–5.2)
SODIUM SERPL-SCNC: 138 MMOL/L (ref 136–145)
WBC # BLD AUTO: 8.8 K/UL (ref 3.6–11)

## 2021-09-17 PROCEDURE — 36415 COLL VENOUS BLD VENIPUNCTURE: CPT

## 2021-09-17 PROCEDURE — 74011250636 HC RX REV CODE- 250/636: Performed by: SURGERY

## 2021-09-17 PROCEDURE — 74011250637 HC RX REV CODE- 250/637: Performed by: NURSE PRACTITIONER

## 2021-09-17 PROCEDURE — 77030012865 HC BG URIN LEG MDII -A

## 2021-09-17 PROCEDURE — 80048 BASIC METABOLIC PNL TOTAL CA: CPT

## 2021-09-17 PROCEDURE — 51798 US URINE CAPACITY MEASURE: CPT

## 2021-09-17 PROCEDURE — 85025 COMPLETE CBC W/AUTO DIFF WBC: CPT

## 2021-09-17 PROCEDURE — 74011250637 HC RX REV CODE- 250/637: Performed by: SURGERY

## 2021-09-17 RX ORDER — NALOXONE HYDROCHLORIDE 4 MG/.1ML
1 SPRAY NASAL AS NEEDED
Qty: 1 EACH | Refills: 0 | Status: SHIPPED | OUTPATIENT
Start: 2021-09-17 | End: 2022-05-19 | Stop reason: ALTCHOICE

## 2021-09-17 RX ORDER — TAMSULOSIN HYDROCHLORIDE 0.4 MG/1
0.4 CAPSULE ORAL 2 TIMES DAILY
Status: DISCONTINUED | OUTPATIENT
Start: 2021-09-17 | End: 2021-09-17 | Stop reason: HOSPADM

## 2021-09-17 RX ORDER — TAMSULOSIN HYDROCHLORIDE 0.4 MG/1
0.4 CAPSULE ORAL DAILY
Qty: 10 CAPSULE | Refills: 0 | Status: SHIPPED | OUTPATIENT
Start: 2021-09-17 | End: 2021-09-17

## 2021-09-17 RX ORDER — OXYCODONE HYDROCHLORIDE 5 MG/1
5 TABLET ORAL
Qty: 15 TABLET | Refills: 0 | Status: SHIPPED | OUTPATIENT
Start: 2021-09-17 | End: 2021-09-17

## 2021-09-17 RX ORDER — OXYCODONE HYDROCHLORIDE 5 MG/1
5 TABLET ORAL
Qty: 15 TABLET | Refills: 0 | Status: SHIPPED | OUTPATIENT
Start: 2021-09-17 | End: 2021-09-20

## 2021-09-17 RX ORDER — TAMSULOSIN HYDROCHLORIDE 0.4 MG/1
0.4 CAPSULE ORAL DAILY
Qty: 10 CAPSULE | Refills: 0 | Status: SHIPPED | OUTPATIENT
Start: 2021-09-17 | End: 2021-09-27

## 2021-09-17 RX ADMIN — ENOXAPARIN SODIUM 40 MG: 40 INJECTION SUBCUTANEOUS at 08:22

## 2021-09-17 RX ADMIN — TAMSULOSIN HYDROCHLORIDE 0.4 MG: 0.4 CAPSULE ORAL at 08:50

## 2021-09-17 RX ADMIN — ACETAMINOPHEN 650 MG: 325 TABLET ORAL at 08:22

## 2021-09-17 RX ADMIN — Medication 1 AMPULE: at 08:50

## 2021-09-17 RX ADMIN — Medication 5 ML: at 06:33

## 2021-09-17 NOTE — OP NOTES
[address]  OPERATIVE REPORT    Name:  Edward Baez  MR#:  [id number]  :  1960  ACCOUNT #:  [de-identified]  DATE OF SERVICE:  2021      PREOPERATIVE DIAGNOSIS:  Rectal prolapse. POSTOPERATIVE DIAGNOSIS:  Rectal prolapse. PROCEDURE PERFORMED:  Altemeier procedure, perineal proctectomy with levatorplasty. SURGEON:  Kobe Mills MD     ASSISTANT:  Rosa Colin. ANESTHESIA:  General.    COMPLICATIONS:  None. SPECIMENS REMOVED:  Rectum. IMPLANTS:  None. ESTIMATED BLOOD LOSS:  Minimal.    DRAINS:  None. FINDINGS:  Anterior rectal prolapse. INDICATIONS:  This is a 77-year-old female who has had multiple episodes of rectal prolapse. She previously had a resection rectopexy many years ago with Dr. Wale Montague and subsequently developed a recurrence; however, her recurrence was only anterior. I tried to perform a repeat rectopexy several months ago; however, the posterior repair was completely stuck at the sacrum posteriorly. However, the anterior side which I dissected down to the perineal body did not hold and she developed recurrence even after the second rectopexy. She continued to have rectal prolapse simply along the anterior side and nothing posteriorly. I explained the risks and benefits of a perineal proctectomy, including but not limited to bleeding, infection, damage to surrounding structures, leak, and need for an ostomy. She understood those risks and has elected to proceed. PROCEDURE:  The patient was brought to the operating suite, placed in the supine position, general endotracheal anesthesia was induced. Venodyne stockings were placed. She was then placed in a prone jackknife position. Her buttocks were taped apart and her perianal area was prepped and draped in the usual sterile fashion. A time-out was performed, indicating the correct patient and procedure to be performed as per hospital protocol.     Raymond Primrose was used to exteriorize the prolapse anteriorly, I scored along the rectum approximately 1-2 cm proximal to the dentate line, this was scored from the left lateral to the right lateral position extending anteriorly. As I mentioned earlier, the posterior rectum was intact and was not prolapsing, therefore this did not require repair. The dissection was deepened to full-thickness of the rectal wall, going down into the rectovaginal septum and identifying the wall of the rectum from the opposite side. This dissection was continued along the entirety of the 3 o'clock to 9 o'clock position of the rectum. I then deepened the dissection into the rectovaginal septum taking care to avoid any injury to the vaginal wall itself. Continuing proximally, I then divided along the peritoneum gaining access into the pelvic cavity. Once that was encountered, I identified area of appropriate tension for the anastomosis. LigaSure was used to divide the excess rectum redundancy anteriorly. I then performed a levatorplasty by placing interrupted 2-0 Vicryl sutures along the levator muscles laterally and cinching that down to appropriate tension. The anastomosis was created using interrupted 2-0 Vicryl sutures from the distal rectum to the mid portion of the rectum that was divided. The anastomosis was completed circumferentially, hemostasis was achieved. A 30 mL of 0.25% Marcaine with epinephrine was injected subcutaneously. The patient was awakened, extubated, and taken to recovery room in stable condition.         MD JELLY [id number]  D:  09/16/2021 15:27  T:  09/16/2021 22:45  JOB #:  [id number]

## 2021-09-17 NOTE — PROGRESS NOTES
Transition of Care Plan:    RUR: 15%  Disposition: home with family  Follow up appointments: PCP, Surgery, Urology   DME needed: going home with pritchett (leg bag)  Transportation at Discharge: SIVAKUMAR john 11:30AM   [place] or means to access home: yes      IM Medicare Letter: delivered on 21  Is patient a BCPI-A Bundle: No          If yes, was Bundle Letter given?:     Caregiver Contact: Khalif Lewis (sister) [phone]   Discharge Caregiver contacted prior to discharge? Yes    Reason for Admission: rectal prolapse                       RUR Score: 15%                   Plan for utilizing home health:  No        PCP: First and Last name:  Zaida Esparza MD     Name of Practice: [facility]    Are you a current patient: Yes/No: yes   Approximate date of last visit: 2021   Can you participate in a virtual visit with your PCP: yes                    Current Advanced Directive/Advance Care Plan: Full Code    Advance Care Planning     General Advance Care Planning (ACP) Conversation    Date of Conversation: 2021    Conducted with: Patient with Decision Making Capacity    Healthcare Decision Maker:     Primary Decision Maker (Active): Sonia Mock -  - [phone]  Click here to complete [address] including selection of the Healthcare Decision Maker Relationship (ie \"Primary\")      Today we documented Decision Maker(s) consistent with ACP documents on file. Content/Action Overview: Has ACP document(s) on file - reflects the patient's care preferences  Reviewed DNR/DNI and patient elects Full Code (Attempt Resuscitation)    Length of Voluntary ACP Conversation in minutes:  <16 minutes (Non-Billable)    Sandee Rumpf                       Transition of Care Plan: home with family assistance, outpatient follow up appointments     Chart reviewed. CM aware of discharge order. Met with pt at bedside to introduce self and role and to confirm d/c plan.  Pt verified , contact information, and demographics. Pt admitted with rectal prolapse. Prior to admission, pt has been residing with sister and her mother in a two level home with a basement for the past 5 months. Pt resides in basement apartment with her mother with no steps to enter. Prior to that, pt was residing at [facility]. Pt reports being independent with ADLs and ambulatory without a device. Pt is unemployed and has not applied for disability benefits. Pt reports hx of bipolar disorder and is followed by a mental health counselor [facility]) once or twice a week. ([phone]) Pt reports no prior hx of HHC or SNF/rehab stays. Preferred pharmacy is [facility]. PCP is Dr. Roel Yin with last known visit in August 2021. ACP on file and up to date. Pt is FULL code. CM contacted Kelvin Xiong to notify of d/c today and need for continuity of care. Oval Genera in training today and will follow up with pt at a later time. [facility] delivered at bedside and verbally reviewed with pt. CM contacted sister, Dennis Mccoy, to notify of d/c today. Pt has expressed to staff that sister cannot transport her home until [employer]. After speaking with Dennis Mccoy, she stated that she will arrive at 11:30AM today to transport pt home. Riya agreeable to d/c plan and no further questions/concerns voiced at this time. No C needs identified. Pt is discharging home with a pritchett/leg bag. Bedside education provided by RN. Pt has urology follow up scheduled on 9/27. PCP appointment made for 9/21. Pt to follow up with surgery on 9/29. See AVS for details. Pt is ready for d/c from a CM standpoint. Assigned RN informed. Care Management Interventions  PCP Verified by CM: Yes  Palliative Care Criteria Met (RRAT>21 & CHF Dx)?: No  Mode of Transport at Discharge:  Other (see comment) (sister)  Hospital Transport Time of Discharge: 1130  Transition of Care Consult (CM Consult): Discharge Planning  Discharge Durable Medical Equipment: No  Physical Therapy Consult: No  Occupational Therapy Consult: No  Speech Therapy Consult: No  Support Systems: Other Family Member(s), Parent(s), /, Therapist  Confirm Follow Up Transport: Family  The Patient and/or Patient Representative was Provided with a Choice of Provider and Agrees with the Discharge Plan?: Yes  Name of the Patient Representative Who was Provided with a Choice of Provider and Agrees with the Discharge Plan: Brittnee Randall (sister )  Freedom of Choice List was Provided with Basic Dialogue that Supports the Patient's Individualized Plan of Care/Goals, Treatment Preferences and Shares the Quality Data Associated with the Providers?: No  [place] Resource Information Provided?: No  Discharge Location  Discharge Placement: Home with family assistance    RENZO Peguero   Care Manager, [facility]  [phone]

## 2021-09-17 NOTE — PROGRESS NOTES
Problem: Falls - Risk of  Goal: *Absence of Falls  Description: Document Della Dudley Fall Risk and appropriate interventions in the flowsheet.   Outcome: Resolved/Not Met  Note: Fall Risk Interventions:            Medication Interventions: Patient to call before getting OOB, Teach patient to arise slowly

## 2021-09-17 NOTE — DISCHARGE SUMMARY
Post- Surgical Discharge Summary    Patient ID:  Dusty Worthy  [id number]  female  59 y.o.  1960    Admit date: 9/16/2021    Discharge date: 9/17/2021    Admitting Physician: Bailey Albarado MD     Consulting Physician(s):   Treatment Team: Attending Provider: Linwood Mayfield MD; Charge Nurse: Rebeca Fitzgerald RN; Utilization Review: Jonas Files    Date of Surgery:   9/16/2021     Preoperative Diagnosis:  RECTAL PROLAPSE    Postoperative Diagnosis:   RECTAL PROLAPSE    Procedure(s):  ALTEMEIER PROCEDURE/ PERINEAL PROCTECTOMY     Anesthesia Type:   General     Surgeon: Linwood Mayfield MD                            HPI:  Pt is a 59 y.o. female who has a history of RECTAL PROLAPSE who presents at this time for a recurrent rectal prolapse and underwent a a Altemeier Procedure following the failure of previous operative management.     Problem List:   Problem List as of 9/17/2021 Date Reviewed: 9/16/2021        Codes Class Noted - Resolved    Bipolar affective disorder, manic ([facility] ICD-10-CM: F31.10  ICD-9-CM: 296.40 Acute 5/16/2014 - Present        Total knee replacement status, right ICD-10-CM: R94.578  ICD-9-CM: V43.65  3/22/2018 - Present        Osteoarthritis of right knee ICD-10-CM: M17.11  ICD-9-CM: 715.96  3/19/2018 - Present        Rectal prolapse ICD-10-CM: K62.3  ICD-9-CM: 569.1  8/3/2017 - Present        ACP (advance care planning) ICD-10-CM: Z71.89  ICD-9-CM: V65.49  5/30/2017 - Present        Bipolar 1 disorder ([facility] ICD-10-CM: F31.9  ICD-9-CM: 296.7  1/31/2017 - Present        Altered mental status ICD-10-CM: R41.82  ICD-9-CM: 780.97  1/24/2017 - Present        Delirium ICD-10-CM: R41.0  ICD-9-CM: 780.09  1/24/2017 - Present        Hyponatremia ICD-10-CM: E87.1  ICD-9-CM: 276.1  1/24/2017 - Present        Drop attack ICD-10-CM: R55  ICD-9-CM: 780.2  3/25/2015 - Present        IBS (irritable bowel syndrome) ICD-10-CM: K58.9  ICD-9-CM: 564.1  10/30/2014 - Present        Chronic headache ICD-10-CM: R51.9, G89.29  ICD-9-CM: 784.0  10/30/2014 - Present        Choledocholithiasis ICD-10-CM: K80.50  ICD-9-CM: 574.50  11/24/2010 - Present        Gallstones with obstruction of gallbladder ICD-10-CM: K80.21  ICD-9-CM: 574.21  11/23/2010 - Present        Arthritis ICD-10-CM: M19.90  ICD-9-CM: 716.90  Unknown - Present        RESOLVED: Encounter for screening colonoscopy ICD-10-CM: Z12.11  ICD-9-CM: V76.51  7/5/2017 - 9/25/2019               Hospital Course: The patient underwent surgery. Intra-operative complications: None; patient tolerated the procedure well. Was taken to the PACU in stable condition and then transferred to the surgical floor. Postoperative Pain Management:  Oxycodone    Postoperative transfusions:    Number of units banked PRBCs =   none     Post Op complications: Post-op urinary retention with overflow voiding- pritchett re-inserted and urology follow-up arranged for voiding trial.     Patient mobilized with nursing and was found to be safe and steady with ambulation. Discharged to: Home    Condition on Discharge: Stable     Discharge instructions:    - Take pain medications as prescribed  - Diet Regular  - Discharge activity:    - Activity as tolerated    - Ambulate several times a day   - No heavy lifting for 4 weeks   - Do not drive for two weeks or while on opioid pain medications  - Wound Care: Keep wound(s) clean and dry. See discharge instruction sheet. Allergies: Allergies   Allergen Reactions    Other Food Other (comments)     Oranges, cabbage, beans, peppers, raw onions, foods with caffeine, popcorn, soda because of IBS    Buspar [Buspirone] Other (comments)     Causes \"stimulation\" that could exacerbate a manic attack/phase of pt's Biplar.  Reported by patient    Amlodipine Other (comments)     Ankle swelling    Caffeine Other (comments)     Patient does not take this because of anxiety    Dicyclomine Nausea and Vomiting     Extreme stomach pains    Lithium Nausea and Vomiting     Severe nausea and vomiting.  Other Medication Other (comments)     Intolerance to oranges, cabbage,beans,peppers,raw onions,foods with caffeine in them, popcorn, no pop sodas, because of IBS              -DISCHARGE MEDICATION LIST     Current Discharge Medication List      START taking these medications    Details   oxyCODONE IR (ROXICODONE) 5 mg immediate release tablet Take 1 Tablet by mouth every four (4) hours as needed for Pain for up to 3 days. Max Daily Amount: 30 mg.  Qty: 15 Tablet, Refills: 0  Start date: 9/17/2021, End date: 9/20/2021    Associated Diagnoses: Rectal prolapse      tamsulosin (FLOMAX) 0.4 mg capsule Take 1 Capsule by mouth daily for 10 days. Qty: 10 Capsule, Refills: 0  Start date: 9/17/2021, End date: 9/27/2021      naloxone (Narcan) 4 mg/actuation nasal spray 1 Solway by IntraNASal route as needed (Respiratory depression). Use 1 spray intranasally into 1 nostril. Call 911. Use a new Narcan nasal spray for subsequent doses and administer into alternating nostrils. May repeat every 2 to 3 minutes as needed. Qty: 1 Each, Refills: 0  Start date: 9/17/2021         CONTINUE these medications which have NOT CHANGED    Details   amitriptyline (ELAVIL) 50 mg tablet Take 1 Tab by mouth nightly. Qty: 30 Tablet, Refills: 3    Associated Diagnoses: Bipolar affective disorder, current episode manic, current episode severity unspecified (HCC)      magnesium hydroxide (MILK OF MAGNESIA PO) Take  by mouth daily as needed.       fenofibrate nanocrystallized (TRICOR) 145 mg tablet 1 tab po qod  Qty: 15 Tablet, Refills: 5    Associated Diagnoses: Elevated triglycerides with high cholesterol      losartan (COZAAR) 25 mg tablet TAKE 1 TABLET BY MOUTH DAILY  Qty: 30 Tablet, Refills: 3    Associated Diagnoses: Essential hypertension; Elevated triglycerides with high cholesterol      pantoprazole (PROTONIX) 40 mg tablet TAKE ONE TABLET BY MOUTH DAILY  Qty: 90 Tablet, Refills: 0 Associated Diagnoses: Gastroesophageal reflux disease without esophagitis      acetaminophen (TYLENOL) 500 mg tablet Take 1,000 mg by mouth daily. Evening as needed      diphenhydrAMINE (BENADRYL) 25 mg tablet Take 25 mg by mouth nightly as needed for Sleep. ZZZ Quil      gabapentin (NEURONTIN) 100 mg capsule 2 caps am and 1 cap PM  Qty: 90 Capsule, Refills: 3    Associated Diagnoses: Chronic tension-type headache, intractable; Intractable chronic cluster headache      alendronate (FOSAMAX) 70 mg tablet TAKE ONE TABLET BY MOUTH EVERY 7 DAYS  Qty: 4 Tab, Refills: 11    Associated Diagnoses: Osteoporosis, unspecified osteoporosis type, unspecified pathological fracture presence      cariprazine (VRAYLAR) 1.5 mg capsule Take 3 mg by mouth daily. Indications: bipolar I disorder with most recent episode mixed      calcium carbonate (CALTREX) 600 mg calcium (1,500 mg) tablet Take 600 mg by mouth two (2) times a day. cholecalciferol (VITAMIN D3) 2,000 unit cap capsule Take 2,000 Units by mouth daily. Qty: 30 Cap, Refills: 4    Associated Diagnoses: Vitamin D deficiency      carboxymethylcellulose sodium (THERATEARS) 0.25 % drop ophthalmic solution Administer 1 Drop to both eyes four (4) times daily. LORazepam (ATIVAN) 0.5 mg tablet Take 1 Tab by mouth every eight (8) hours as needed for Anxiety. Max Daily Amount: 1.5 mg. D/C valium  Qty: 90 Tab, Refills: 2    Associated Diagnoses: RACHEL (generalized anxiety disorder)      miscellaneous medical supply (Anti-Embolism Stockings) misc 15 to 30 mm HG below knee stocking in both legs  Qty: 2 Each, Refills: 0    Associated Diagnoses: Chronic venous insufficiency      diclofenac (VOLTAREN) 1 % gel Top BID on elbow and knee  Qty: 100 g, Refills: 1    Associated Diagnoses: Pain of both elbows          per medical continuation form      -Follow up in office in 2 weeks      Signed:  Cristiano Cobb  MSN, APRN, FNP-C, [facility]  Surgical Nurse Practitioner    9/17/2021  9:52 AM

## 2021-09-17 NOTE — DISCHARGE INSTRUCTIONS
Peyton Thomas MD, FACS  [facility]. MD Gianni Funez MD Toney Short, MD Kyung Bare, MD    [facility] Discharge Instructions for Colon Surgery Patients    1. Diagnosis: Rectal prolapse repair  2. Low fiber diet. 3. Do not drive while taking narcotic pain medications. 4. Leave surgical glue on incision. It may fall off on it's own. 5. May take a shower. 6. No lifting any objects weighing more than 15 pounds. Do not do any housework, such as vacuuming, scrubbing, etc for at least a month. 7. When you get tired during the day, take naps, as you need your rest.  8. Multiple bowel movements are normal each day for a while. 9. May walk as desired. May go up and down stairs. 10. Take pain medication as prescribed: (NO DRIVING WHILE ON PAIN MEDICATIONS). *** EVERY 4-6 HOURS AS NEEDED. Other Medications: ***  Ostomy Supplies: ***  11.  See me in the office in 10-14 days. Call as soon as discharged for an appointment 21 [phone]. IF SURGERY INVOLVED AN OSTOMY BAG, PLEASE BRING YOUR SUPPLIES TO YOUR 1ST VISIT! 12.  Call the Exchange [phone], if you have any questions or problems after office hours.             Jorge Escalante [address], [address]  [place], [address]  [phone] · Fax [phone] [address] [facility], [address]  [phone] · Fax [phone] [place], [address] [facility] [address]  [place], [address]  [phone] · Fax [phone]

## 2021-09-20 ENCOUNTER — PATIENT OUTREACH (OUTPATIENT)
Dept: CASE MANAGEMENT | Age: 61
End: 2021-09-20

## 2021-09-20 NOTE — PROGRESS NOTES
Care Transitions Initial Call    Call within 2 business days of discharge: Yes     Patient: Sima Sood Patient : 1960 MRN: [id number]    Last Discharge [address]       Complaint Diagnosis Description Type Department Provider    21  Rectal prolapse Admission (Discharged) Lawrence aSlas, Tonia Kirkland MD          Was this an external facility discharge? No     Challenges to be reviewed by the provider   Additional needs identified to be addressed with provider: yes  No current HIPAA on file  METABOLIC PANEL, BASIC  Order: [id number]  Collected:  2021 03:30 Status:  Final result   0 Result Notes   Ref Range & Units 21 0330 21 0210 21 0952 3/16/21 0000 20 0000   Sodium 136 - 145 mmol/L 138  141  141  142 R  142 R    Potassium 3.5 - 5.1 mmol/L 3.4Low   3.3Low   4.4  4.3 R  4.4 R    Chloride 97 - 108 mmol/L 108  109High   108  107High  R  104 R    CO2 21 - 32 mmol/L 25  27  31  22 R  25 R    Anion gap 5 - 15 mmol/L 5  5  2Low       Glucose 65 - 100 mg/dL 110High   90  93  103High  R  110High  R    BUN 6 - 20 MG/DL 7  10  13  15 R  18 R    Creatinine 0.55 - 1.02 MG/DL 0.79  0.70  0.92  0.96 R  1. 14High  R    BUN/Creatinine ratio 12 - 20   9Low   14  14  16 R  16 R    GFR est AA >60 ml/min/1.73m2 >60  >60  >60  73 R  60 R    GFR est non-AA >60 ml/min/1.73m2 >60  >60  >60 CM  64 R  52Low  R                  Method of communication with provider : staff message    Discussed COVID-19 related testing which was available at this time. Test results were negative. Patient informed of results, if available? no     Advance Care Planning:   Does patient have an Advance Directive:  yes; reviewed and current     Inpatient Readmission Risk score: Unplanned Readmit Risk Score: 13    Was this a readmission?  no   Patient stated reason for the admission: scheduled surgery    Patients top risk factors for readmission: medical condition-Wong cath and Post up pain and complication   Interventions to address risk factors: Scheduled appointment with PCP-Dr Joellen Barfield  at 10am, Scheduled appointment with Specialist-Urology 21 at 10:30am, Surgery 21 at 11:15am. and Assessment and support for treatment adherence and medication management-post op recatal prolaspe    Care Transition Nurse (CTN) contacted the family by telephone to perform post hospital discharge assessment. Verified name and  with patient as identifiers. Provided introduction to self, and explanation of the CTN role. CTN reviewed discharge instructions, medical action plan and red flags with patient who verbalized understanding. Were discharge instructions available to patient? yes. Reviewed appropriate site of care based on symptoms and resources available to patient including: PCP and Specialist. Patient given an opportunity to ask questions and does not have any further questions or concerns at this time. The patient agrees to contact the PCP office for questions related to their healthcare. Medication reconciliation was performed with patient, who verbalizes understanding of administration of home medications. Referral to Pharm D needed: no     Home Health/Outpatient orders at discharge: none  Durable Medical Equipment ordered at discharge: None      Covid Risk Education    Patient has received COVID Vaccines    Discussed follow-up appointments. If no appointment was previously scheduled, appointment scheduling offered: yes. Is follow up appointment scheduled within 7 days of discharge? yes. [facility] follow up appointment(s):   Future Appointments   Date Time Provider Aurora Baeza   2021 10:00 AM Alfred Philip MD [address] [facility]     Non[facility] follow up appointment(s): Urology 21 and surgery on 21    Plan for follow-up call in 7-10 days based on severity of symptoms and risk factors.   Plan for next call: self management-Wong and post op rectal prolapse and follow up appointment-PCP and Urology and surgery  CTN provided contact information for future needs. Goals Addressed                 This Visit's Progress     Attends follow-up appointments as directed. 09/20/21    Patient has an appt with PCP Dr Miranda Carr on 9/21 at 10 am   Patient has an appt with Urology on 9/28 at 10 am   Patient has an appt with Surgery on 9/29/21 at 10:30 am.    Monitor status of these appt on next call. Olga Enriquez MSN, RN, [facility] / Care Transition Nurse / [phone]        Understands red flags post discharge. 09/20/21    Patient main C/O is pritchett cath insertion site, after discussion found out patient does not have the leg attachment and is wearing attends and this is irritating her.  Patient is wearing attends because she is still having some bleeding at rectum and does not know how to put underwear on.  Reviewed how she can put on underwear and a pad, just needs to put the pritchett bag through the leg of underwear and pull up.  Also discussed what she can use to hold the pritchett to her leg, gauze, bandana, large Band-Aid.  Patient is passing gas, has not had a BM since 9/16, has only taken 5 Roxicodone since 9/17/21. Is not on a stool softener or Miralax, does have MOM. Encouraged to take Miralax today and she sees PCP 9/21/21.  Patient states that she is eating and drinking well, her urine is light yellow. Monitor urinary OP, pritchett irritation and constipation on next call.     Olga Enriquez MSN, RN, [facility] / Care Transition Nurse / [phone]

## 2021-09-21 ENCOUNTER — OFFICE VISIT (OUTPATIENT)
Dept: INTERNAL MEDICINE CLINIC | Facility: CLINIC | Age: 61
End: 2021-09-21
Payer: MEDICARE

## 2021-09-21 VITALS
SYSTOLIC BLOOD PRESSURE: 132 MMHG | DIASTOLIC BLOOD PRESSURE: 76 MMHG | RESPIRATION RATE: 18 BRPM | BODY MASS INDEX: 26.6 KG/M2 | WEIGHT: 135.5 LBS | TEMPERATURE: 98 F | HEART RATE: 99 BPM | OXYGEN SATURATION: 100 % | HEIGHT: 60 IN

## 2021-09-21 DIAGNOSIS — K59.03 DRUG-INDUCED CONSTIPATION: ICD-10-CM

## 2021-09-21 DIAGNOSIS — K62.3 RECTAL PROLAPSE: ICD-10-CM

## 2021-09-21 DIAGNOSIS — Z23 ENCOUNTER FOR IMMUNIZATION: ICD-10-CM

## 2021-09-21 DIAGNOSIS — F31.10 BIPOLAR AFFECTIVE DISORDER, CURRENT EPISODE MANIC, CURRENT EPISODE SEVERITY UNSPECIFIED (HCC): ICD-10-CM

## 2021-09-21 DIAGNOSIS — T85.9XXA COMPLICATION OF CATHETER, INITIAL ENCOUNTER: Primary | ICD-10-CM

## 2021-09-21 DIAGNOSIS — I10 ESSENTIAL HYPERTENSION: ICD-10-CM

## 2021-09-21 DIAGNOSIS — Z09 HOSPITAL DISCHARGE FOLLOW-UP: ICD-10-CM

## 2021-09-21 PROCEDURE — 99214 OFFICE O/P EST MOD 30 MIN: CPT | Performed by: INTERNAL MEDICINE

## 2021-09-21 PROCEDURE — 1111F DSCHRG MED/CURRENT MED MERGE: CPT | Performed by: INTERNAL MEDICINE

## 2021-09-21 PROCEDURE — G9717 DOC PT DX DEP/BP F/U NT REQ: HCPCS | Performed by: INTERNAL MEDICINE

## 2021-09-21 PROCEDURE — G8417 CALC BMI ABV UP PARAM F/U: HCPCS | Performed by: INTERNAL MEDICINE

## 2021-09-21 PROCEDURE — 3017F COLORECTAL CA SCREEN DOC REV: CPT | Performed by: INTERNAL MEDICINE

## 2021-09-21 PROCEDURE — G8752 SYS BP LESS 140: HCPCS | Performed by: INTERNAL MEDICINE

## 2021-09-21 PROCEDURE — G8427 DOCREV CUR MEDS BY ELIG CLIN: HCPCS | Performed by: INTERNAL MEDICINE

## 2021-09-21 PROCEDURE — G8754 DIAS BP LESS 90: HCPCS | Performed by: INTERNAL MEDICINE

## 2021-09-21 RX ORDER — LIDOCAINE HYDROCHLORIDE 20 MG/ML
15 SOLUTION OROPHARYNGEAL AS NEEDED
Qty: 100 ML | Refills: 0 | Status: SHIPPED | OUTPATIENT
Start: 2021-09-21 | End: 2022-03-17 | Stop reason: ALTCHOICE

## 2021-09-21 RX ORDER — AMOXICILLIN 250 MG
2 CAPSULE ORAL DAILY
Qty: 45 TABLET | Refills: 0 | Status: SHIPPED | OUTPATIENT
Start: 2021-09-21 | End: 2022-10-17

## 2021-09-21 RX ORDER — POLYETHYLENE GLYCOL 3350 17 G/17G
17 POWDER, FOR SOLUTION ORAL DAILY
Qty: 100 EACH | Refills: 0 | Status: SHIPPED | OUTPATIENT
Start: 2021-09-21 | End: 2022-05-19 | Stop reason: ALTCHOICE

## 2021-09-21 NOTE — PROGRESS NOTES
Health Maintenance Due   Topic Date Due    Shingrix Vaccine Age 49> (1 of 2) Never done    Breast Cancer Screen Mammogram  06/01/2021    Flu Vaccine (1) 09/01/2021       Chief Complaint   Patient presents with   [facility] Follow Up     rectal prolapse surgery       1. Have you been to the ER, urgent care clinic since your last visit? Hospitalized since your last visit? No    2. Have you seen or consulted any other health care providers outside of the [address] [place] [address] since your last visit? Include any pap smears or colon screening. No    3) Do you have an Advance Directive on file? no    4) Are you interested in receiving information on Advance Directives? NO      Patient is accompanied by self I have received verbal consent from Lolita Ibarra to discuss any/all medical information while they are present in the room.

## 2021-09-21 NOTE — PROGRESS NOTES
HISTORY OF PRESENT ILLNESS  Becky Henriquez is a 59 y.o. female here for hospital follow-up. She underwent proctectomy for rectal prolapse. Right now she is on catheter. Catheter site is painful. She is very uncomfortable. No fever no flank pain. Has hypertension, compliant with medicine. Denies chest pain palpitation or shortness of breath. Has bipolar disorder. Seeing psychiatrist.  Depression seems stable. Report constipation from pain medications. Not having bowel movement regularly. No abdominal pain. Need flu shot. Hospital Follow Up  Pertinent negatives include no chest pain and no shortness of breath. Bipolar  Pertinent negatives include no chest pain and no shortness of breath. Hypertension   Pertinent negatives include no chest pain and no shortness of breath. GERD  Pertinent negatives include no chest pain and no shortness of breath. Medication Evaluation  Pertinent negatives include no chest pain and no shortness of breath. Review of Systems   Constitutional: Negative. HENT: Negative. Eyes: Negative. Respiratory: Negative. Negative for shortness of breath and wheezing. Cardiovascular: Negative for chest pain. Gastrointestinal: Negative for blood in stool. Genitourinary: Negative. Musculoskeletal: Negative. Negative for falls. Skin: Negative. Negative for itching. Endo/Heme/Allergies: Negative. Psychiatric/Behavioral: The patient is nervous/anxious and has insomnia. Physical Exam  Constitutional:       General: She is not in acute distress. Appearance: Normal appearance. She is well-developed and normal weight. Neck:      Thyroid: No thyromegaly. Vascular: No JVD. Cardiovascular:      Rate and Rhythm: Normal rate and regular rhythm. Pulses: Normal pulses. Heart sounds: Normal heart sounds. Pulmonary:      Effort: Pulmonary effort is normal. No respiratory distress. Breath sounds: Normal breath sounds.  No wheezing. Abdominal:      General: Abdomen is flat. Bowel sounds are normal.   Musculoskeletal:      Cervical back: Normal range of motion and neck supple. Neurological:      Mental Status: She is alert. Deep Tendon Reflexes: Reflexes are normal and symmetric. Psychiatric:         Mood and Affect: Mood normal.         Behavior: Behavior normal.      Comments: Anxiety and depression seems under control. She has insomnia. ASSESSMENT and PLAN  Diagnoses and all orders for this visit:    1. Complication of catheter, initial encounter    She is still on catheter, had proctectomy done recently. Will give lidocaine viscous to use any other catheter. She will have catheter removed next week. -     lidocaine (XYLOCAINE) 2 % solution; Take 15 mL by mouth as needed for Pain. 2. Rectal prolapse  Status post proctectomy done. Doing well. 3. Essential hypertension  Stable blood pressure. On losartan. 4. Bipolar affective disorder, current episode manic, current episode severity unspecified [facility])  Seeing psychiatrist, on multiple medications. Stable. 5. Drug-induced constipation    High-fiber diet and fluid. Will give,  -     polyethylene glycol (MIRALAX) 17 gram packet; Take 1 Packet by mouth daily. -     senna-docusate (PERICOLACE) 8.6-50 mg per tablet; Take 2 Tablets by mouth daily. 6. Encounter for immunization  We will give flu shot next week. 7. Hospital discharge follow-up  -     [place] DISCHARGE MEDS RECONCILED W/ CURRENT OUTPATIENT MED LIST                    Discussed expected course/resolution/complications of diagnosis in detail with patient. Medication risks/benefits/costs/interactions/alternatives discussed with patient. Pt was given an after visit summary which includes diagnoses, current medications & vitals. Pt expressed understanding with the diagnosis and plan.

## 2021-09-28 ENCOUNTER — PATIENT OUTREACH (OUTPATIENT)
Dept: CASE MANAGEMENT | Age: 61
End: 2021-09-28

## 2021-09-28 NOTE — PROGRESS NOTES
Goals Addressed                 This Visit's Progress     Attends follow-up appointments as directed. On track     09/20/21    Patient has an appt with PCP Dr Lupillo Bajwa on 9/21 at 10 am   Patient has an appt with Urology on 9/27 at 10 am   Patient has an appt with Surgery on 9/29/21 at 10:30 am.    Monitor status of these appt on next call. Destiny Gonzalez MSN, RN, CCM / Care Transition Nurse / [phone]       09/28/21    Patient seen by PCP and Urology on 9/27/21.  Has an appt with surgery on 9/29/21. Monitor status of surgery appt on next call. Destiny Gonzalez MSN, RN, CCM / Care Transition Nurse / [phone]        Understands red flags post discharge. On track     09/20/21    Patient main C/O is pritchett cath insertion site, after discussion found out patient does not have the leg attachment and is wearing attends and this is irritating her.  Patient is wearing attends because she is still having some bleeding at rectum and does not know how to put underwear on.  Reviewed how she can put on underwear and a pad, just needs to put the pritchett bag through the leg of underwear and pull up.  Also discussed what she can use to hold the pritchett to her leg, gauze, bandana, large Band-Aid.  Patient is passing gas, has not had a BM since 9/16, has only taken 5 Roxicodone since 9/17/21. Is not on a stool softener or Miralax, does have MOM. Encouraged to take Miralax today and she sees PCP 9/21/21.  Patient states that she is eating and drinking well, her urine is light yellow. Monitor urinary OP, pritchett irritation and constipation on next call. Destiny Gonzalez MSN, RN, CCM / Care Transition Nurse / [phone]     09/28/21    Patient states she had her pritchett cath removed yesterday and feels much better, starting on ABX today.  Still having some rectal bleeding but that is getting lighter as time goes by. On senna plus and Miralax and not constipated.      Patient states she is eating and drinking well. Monitor rectal bleeding and constipation and PO intake on next call.     Shena Alfaro MSN, RN, CCM / Care Transition Nurse / [phone]

## 2021-10-12 ENCOUNTER — PATIENT OUTREACH (OUTPATIENT)
Dept: CASE MANAGEMENT | Age: 61
End: 2021-10-12

## 2021-10-12 NOTE — PROGRESS NOTES
Goals Addressed                 This Visit's Progress     COMPLETED: Attends follow-up appointments as directed. 09/20/21    Patient has an appt with PCP Dr Shahnaz Brewster on 9/21 at 10 am   Patient has an appt with Urology on 9/27 at 10 am   Patient has an appt with Surgery on 9/29/21 at 10:30 am.    Monitor status of these appt on next call. Ronel NG, RN, CCM / Care Transition Nurse / [phone]       09/28/21    Patient seen by PCP and Urology on 9/27/21.  Has an appt with surgery on 9/29/21. Monitor status of surgery appt on next call. Ronel NG, RN, CCM / Care Transition Nurse / [phone]     10/12/21    Patient was seen by Surgery and needs to F/U again end of Oct.  Ronel NG, RN, CCM / Care Transition Nurse / [phone]                Understands red flags post discharge. On track     09/20/21    Patient main C/O is pritchett cath insertion site, after discussion found out patient does not have the leg attachment and is wearing attends and this is irritating her.  Patient is wearing attends because she is still having some bleeding at rectum and does not know how to put underwear on.  Reviewed how she can put on underwear and a pad, just needs to put the pritchett bag through the leg of underwear and pull up.  Also discussed what she can use to hold the pritchett to her leg, gauze, bandana, large Band-Aid.  Patient is passing gas, has not had a BM since 9/16, has only taken 5 Roxicodone since 9/17/21. Is not on a stool softener or Miralax, does have MOM. Encouraged to take Miralax today and she sees PCP 9/21/21.  Patient states that she is eating and drinking well, her urine is light yellow. Monitor urinary OP, pritchett irritation and constipation on next call. Ronel NG, RN, CCM / Care Transition Nurse / [phone]     09/28/21    Patient states she had her pritchett cath removed yesterday and feels much better, starting on ABX today.    Still having some rectal bleeding but that is getting lighter as time goes by. On senna plus and Miralax and not constipated.  Patient states she is eating and drinking well. Monitor rectal bleeding and constipation and PO intake on next call. Ayde NG, RN, CCM / Care Transition Nurse / [phone]     10/12/21    Patient denies any difficulty with urination. Still has some blood from rectum not much. Patient states that she has IBS and has either constipation or diarrhea. Still taking her Miralax and stool softener. Monitor rectal bleeding and constipation on next call.     Ayde NG, RN, CCM / Care Transition Nurse / [phone]

## 2021-10-15 DIAGNOSIS — G44.221 CHRONIC TENSION-TYPE HEADACHE, INTRACTABLE: ICD-10-CM

## 2021-10-15 DIAGNOSIS — G44.021 INTRACTABLE CHRONIC CLUSTER HEADACHE: ICD-10-CM

## 2021-10-18 RX ORDER — GABAPENTIN 100 MG/1
CAPSULE ORAL
Qty: 90 CAPSULE | Refills: 0 | Status: SHIPPED | OUTPATIENT
Start: 2021-10-18 | End: 2021-10-19 | Stop reason: SDUPTHER

## 2021-10-19 ENCOUNTER — OFFICE VISIT (OUTPATIENT)
Dept: INTERNAL MEDICINE CLINIC | Facility: CLINIC | Age: 61
End: 2021-10-19
Payer: MEDICARE

## 2021-10-19 VITALS
SYSTOLIC BLOOD PRESSURE: 128 MMHG | HEART RATE: 87 BPM | OXYGEN SATURATION: 97 % | DIASTOLIC BLOOD PRESSURE: 62 MMHG | TEMPERATURE: 98.3 F | RESPIRATION RATE: 18 BRPM | BODY MASS INDEX: 26.31 KG/M2 | WEIGHT: 134 LBS | HEIGHT: 60 IN

## 2021-10-19 DIAGNOSIS — G44.021 INTRACTABLE CHRONIC CLUSTER HEADACHE: Primary | ICD-10-CM

## 2021-10-19 DIAGNOSIS — K21.9 GASTROESOPHAGEAL REFLUX DISEASE WITHOUT ESOPHAGITIS: ICD-10-CM

## 2021-10-19 DIAGNOSIS — G44.221 CHRONIC TENSION-TYPE HEADACHE, INTRACTABLE: ICD-10-CM

## 2021-10-19 DIAGNOSIS — K62.3 RECTAL PROLAPSE: ICD-10-CM

## 2021-10-19 DIAGNOSIS — F31.10 BIPOLAR AFFECTIVE DISORDER, CURRENT EPISODE MANIC, CURRENT EPISODE SEVERITY UNSPECIFIED (HCC): ICD-10-CM

## 2021-10-19 PROCEDURE — G8752 SYS BP LESS 140: HCPCS | Performed by: INTERNAL MEDICINE

## 2021-10-19 PROCEDURE — G8427 DOCREV CUR MEDS BY ELIG CLIN: HCPCS | Performed by: INTERNAL MEDICINE

## 2021-10-19 PROCEDURE — 99214 OFFICE O/P EST MOD 30 MIN: CPT | Performed by: INTERNAL MEDICINE

## 2021-10-19 PROCEDURE — 3017F COLORECTAL CA SCREEN DOC REV: CPT | Performed by: INTERNAL MEDICINE

## 2021-10-19 PROCEDURE — G8417 CALC BMI ABV UP PARAM F/U: HCPCS | Performed by: INTERNAL MEDICINE

## 2021-10-19 PROCEDURE — G8754 DIAS BP LESS 90: HCPCS | Performed by: INTERNAL MEDICINE

## 2021-10-19 PROCEDURE — G9717 DOC PT DX DEP/BP F/U NT REQ: HCPCS | Performed by: INTERNAL MEDICINE

## 2021-10-19 RX ORDER — GABAPENTIN 100 MG/1
CAPSULE ORAL
Qty: 90 CAPSULE | Refills: 2 | Status: SHIPPED | OUTPATIENT
Start: 2021-10-19 | End: 2021-11-16 | Stop reason: SDUPTHER

## 2021-10-19 RX ORDER — PANTOPRAZOLE SODIUM 40 MG/1
40 TABLET, DELAYED RELEASE ORAL DAILY
Qty: 90 TABLET | Refills: 1 | Status: SHIPPED | OUTPATIENT
Start: 2021-10-19 | End: 2022-01-12 | Stop reason: SDUPTHER

## 2021-10-19 NOTE — PROGRESS NOTES
HISTORY OF PRESENT ILLNESS  Felicia Almonte is a 59 y.o. female here for hospital follow-up. She suffers from headache. Gabapentin helped her to control headache. Need refill. She underwent proctectomy for rectal prolapse. Almost healed up. Using stool softener to move bowel. Doing well. Has gastric reflux, taking Protonix. Needed refill. Has hypertension, compliant with medicine. Denies chest pain palpitation or shortness of breath. Has bipolar disorder. Seeing psychiatrist.  Depression seems stable. Labs reviewed, seems stable. Bipolar  Pertinent negatives include no chest pain and no shortness of breath. Hypertension   Pertinent negatives include no chest pain and no shortness of breath. GERD  Pertinent negatives include no chest pain and no shortness of breath. Medication Refill  Pertinent negatives include no chest pain and no shortness of breath. Review of Systems   Constitutional: Negative. HENT: Negative. Eyes: Negative. Respiratory: Negative. Negative for shortness of breath and wheezing. Cardiovascular: Negative for chest pain. Gastrointestinal: Negative for blood in stool. Genitourinary: Negative. Musculoskeletal: Negative. Negative for falls. Skin: Negative. Negative for itching. Endo/Heme/Allergies: Negative. Psychiatric/Behavioral: The patient has insomnia. Physical Exam  Constitutional:       General: She is not in acute distress. Appearance: Normal appearance. She is well-developed and normal weight. Neck:      Thyroid: No thyromegaly. Vascular: No JVD. Cardiovascular:      Rate and Rhythm: Normal rate and regular rhythm. Pulses: Normal pulses. Heart sounds: Normal heart sounds. Pulmonary:      Effort: Pulmonary effort is normal. No respiratory distress. Breath sounds: Normal breath sounds. No wheezing. Abdominal:      General: Abdomen is flat.  Bowel sounds are normal.   Musculoskeletal:      Cervical back: Normal range of motion and neck supple. Neurological:      Mental Status: She is alert. Deep Tendon Reflexes: Reflexes are normal and symmetric. Psychiatric:         Mood and Affect: Mood normal.         Behavior: Behavior normal.      Comments: Anxiety and depression seems under control. She has insomnia. ASSESSMENT and PLAN  Diagnoses and all orders for this visit:    1. Intractable chronic cluster headache    Her headache controlled with gabapentin. Will refill,  -     gabapentin (NEURONTIN) 100 mg capsule; TAKE 2 CAPSULES BY MOUTH EVERY MORNING AND 1 CAPSULE IN THE EVENING    2. Chronic tension-type headache, intractable  -     gabapentin (NEURONTIN) 100 mg capsule; TAKE 2 CAPSULES BY MOUTH EVERY MORNING AND 1 CAPSULE IN THE EVENING  Follow-up in 3 months. 3. Gastroesophageal reflux disease without esophagitis    Doing well. Will refill,  -     pantoprazole (PROTONIX) 40 mg tablet; Take 1 Tablet by mouth daily. 4. Bipolar affective disorder, current episode manic, current episode severity unspecified [facility]  Seeing Dr. Priya Kelly. Taking Vraylar and lorazepam.  5. Rectal prolapse  Status post surgery done, doing well. Taking senna soft which is helping her to move bowel. Discussed expected course/resolution/complications of diagnosis in detail with patient. Medication risks/benefits/costs/interactions/alternatives discussed with patient. Pt was given an after visit summary which includes diagnoses, current medications & vitals. Pt expressed understanding with the diagnosis and plan.

## 2021-10-19 NOTE — PROGRESS NOTES
Health Maintenance Due   Topic Date Due    Shingrix Vaccine Age 49> (1 of 2) Never done    Breast Cancer Screen Mammogram  06/01/2021       Chief Complaint   Patient presents with    Medication Refill     gabapentin       1. Have you been to the ER, urgent care clinic since your last visit? Hospitalized since your last visit? No    2. Have you seen or consulted any other health care providers outside of the [address] [place] [address] since your last visit? Include any pap smears or colon screening. No    3) Do you have an Advance Directive on file? no    4) Are you interested in receiving information on Advance Directives? NO      Patient is accompanied by self I have received verbal consent from Paul Parsons to discuss any/all medical information while they are present in the room.

## 2021-10-20 ENCOUNTER — PATIENT OUTREACH (OUTPATIENT)
Dept: CASE MANAGEMENT | Age: 61
End: 2021-10-20

## 2021-10-20 NOTE — PROGRESS NOTES
Patient has graduated from the Transitions of Care Coordination  program on 10/20/21. Patient/family has the ability to self-manage at this time Care management goals have been completed. Patient was not referred to the [facility] team for further management. Goals Addressed                 This Visit's Progress     COMPLETED: Understands red flags post discharge. 09/20/21    Patient main C/O is pritchett cath insertion site, after discussion found out patient does not have the leg attachment and is wearing attends and this is irritating her.  Patient is wearing attends because she is still having some bleeding at rectum and does not know how to put underwear on.  Reviewed how she can put on underwear and a pad, just needs to put the pritchett bag through the leg of underwear and pull up.  Also discussed what she can use to hold the pritchett to her leg, gauze, bandana, large Band-Aid.  Patient is passing gas, has not had a BM since 9/16, has only taken 5 Roxicodone since 9/17/21. Is not on a stool softener or Miralax, does have MOM. Encouraged to take Miralax today and she sees PCP 9/21/21.  Patient states that she is eating and drinking well, her urine is light yellow. Monitor urinary OP, pritchett irritation and constipation on next call. Missy Pinto MSN, RN, CCM / Care Transition Nurse / [phone]     09/28/21    Patient states she had her pritchett cath removed yesterday and feels much better, starting on ABX today.  Still having some rectal bleeding but that is getting lighter as time goes by. On senna plus and Miralax and not constipated.  Patient states she is eating and drinking well. Monitor rectal bleeding and constipation and PO intake on next call. Missy Pinto MSN, RN, CCM / Care Transition Nurse / [phone]     10/12/21    Patient denies any difficulty with urination. Still has some blood from rectum not much.   Patient states that she has IBS and has either constipation or diarrhea. Still taking her Miralax and stool softener. Monitor rectal bleeding and constipation on next call. Ronel Foss MSN, RN, CCM / Care Transition Nurse / [phone]     10/20/21    Patient any difficulty with urinary difficulty.  Not having any constipation, taking Miralax, dried apricots and senna. For her diarrhea is eating cheese and banana.  Patient states her rectal bleeding has slowed down. Ronel NG, RN, CCM / Care Transition Nurse / [phone]                   Patient has Care Transition Nurse's contact information for any further questions, concerns, or needs.   Patients upcoming visits:    Future Appointments   Date Time Provider Aurora Baeza   1/20/2022  2:00 PM Ndaia Izaguirre MD [facility]

## 2021-11-16 DIAGNOSIS — G44.021 INTRACTABLE CHRONIC CLUSTER HEADACHE: ICD-10-CM

## 2021-11-16 DIAGNOSIS — I10 ESSENTIAL HYPERTENSION: ICD-10-CM

## 2021-11-16 DIAGNOSIS — G44.221 CHRONIC TENSION-TYPE HEADACHE, INTRACTABLE: ICD-10-CM

## 2021-11-16 DIAGNOSIS — E78.2 ELEVATED TRIGLYCERIDES WITH HIGH CHOLESTEROL: ICD-10-CM

## 2021-11-16 NOTE — TELEPHONE ENCOUNTER
Requested Prescriptions     Pending Prescriptions Disp Refills    gabapentin (NEURONTIN) 100 mg capsule 90 Capsule 2     Sig: TAKE 2 CAPSULES BY MOUTH EVERY MORNING AND 1 CAPSULE IN THE EVENING    losartan (COZAAR) 25 mg tablet       Sig: Take 1 Tablet by mouth daily.  TAKE 1 TABLET BY MOUTH DAILY     10/19/2021  01/20/2022  [facility]

## 2021-11-17 RX ORDER — GABAPENTIN 100 MG/1
CAPSULE ORAL
Qty: 90 CAPSULE | Refills: 2 | Status: SHIPPED | OUTPATIENT
Start: 2021-11-17 | End: 2022-01-20 | Stop reason: SDUPTHER

## 2021-11-17 RX ORDER — LOSARTAN POTASSIUM 25 MG/1
25 TABLET ORAL DAILY
Qty: 90 TABLET | Refills: 1 | Status: SHIPPED | OUTPATIENT
Start: 2021-11-17 | End: 2022-04-11

## 2021-12-10 DIAGNOSIS — E78.2 ELEVATED TRIGLYCERIDES WITH HIGH CHOLESTEROL: ICD-10-CM

## 2021-12-10 DIAGNOSIS — F31.10 BIPOLAR AFFECTIVE DISORDER, CURRENT EPISODE MANIC, CURRENT EPISODE SEVERITY UNSPECIFIED (HCC): ICD-10-CM

## 2021-12-10 RX ORDER — FENOFIBRATE 145 MG/1
145 TABLET, COATED ORAL
Qty: 15 TABLET | Refills: 5 | Status: SHIPPED | OUTPATIENT
Start: 2021-12-10 | End: 2022-04-11

## 2021-12-10 RX ORDER — AMITRIPTYLINE HYDROCHLORIDE 50 MG/1
TABLET, FILM COATED ORAL
Qty: 30 TABLET | Refills: 3 | Status: SHIPPED | OUTPATIENT
Start: 2021-12-10 | End: 2022-02-04 | Stop reason: SDUPTHER

## 2022-01-11 ENCOUNTER — VIRTUAL VISIT (OUTPATIENT)
Dept: INTERNAL MEDICINE CLINIC | Age: 62
End: 2022-01-11
Payer: MEDICARE

## 2022-01-11 DIAGNOSIS — M81.0 OSTEOPOROSIS, UNSPECIFIED OSTEOPOROSIS TYPE, UNSPECIFIED PATHOLOGICAL FRACTURE PRESENCE: ICD-10-CM

## 2022-01-11 DIAGNOSIS — R30.0 DYSURIA: Primary | ICD-10-CM

## 2022-01-11 DIAGNOSIS — N39.0 URINARY TRACT INFECTION WITHOUT HEMATURIA, SITE UNSPECIFIED: ICD-10-CM

## 2022-01-11 PROCEDURE — 99441 PR PHYS/QHP TELEPHONE EVALUATION 5-10 MIN: CPT | Performed by: NURSE PRACTITIONER

## 2022-01-11 RX ORDER — ALENDRONATE SODIUM 70 MG/1
70 TABLET ORAL
Qty: 12 TABLET | Refills: 3 | Status: SHIPPED | OUTPATIENT
Start: 2022-01-11

## 2022-01-11 RX ORDER — NITROFURANTOIN 25; 75 MG/1; MG/1
100 CAPSULE ORAL 2 TIMES DAILY
Qty: 14 CAPSULE | Refills: 0 | Status: SHIPPED | OUTPATIENT
Start: 2022-01-11 | End: 2022-03-17 | Stop reason: ALTCHOICE

## 2022-01-11 NOTE — PROGRESS NOTES
Kandice Saint is a 59 y.o. female, evaluated via audio-only technology on 1/11/2022 for Urinary Pain and Urinary Frequency  . Assessment & Plan:   Diagnoses and all orders for this visit:    1. Dysuria    2. Urinary tract infection without hematuria, site unspecified    Patient unable to provide urine sample at time of visit, as visit conducted virtually due to Covid-19 pandemic. If symptoms do not improve or worsen, patient to notify NP/MD for consideration of in-office/ in-lab visit for urinalysis/ urine culture. Will order  -     nitrofurantoin, macrocrystal-monohydrate, (MACROBID) 100 mg capsule; Take 1 Capsule by mouth two (2) times a day. Encourage oral water intake, as tolerated. Patient encouraged to call or return to office if symptoms do not improve or worsen. Reviewed medications and side effects in detail. Reviewed plan of care with patient who acknowledges understanding and agrees. 12  Subjective: This is a patient of Dr. Renu Sterling who presents today with complaints of urinary problem. The patient has complaints of dysuria and urinary pressure. She has had urinary frequency as well. Symptoms began about 5 days ago. She has been trying to drink plenty of fluids without much improvement. No fever or chills. No flank pain. Prior to Admission medications    Medication Sig Start Date End Date Taking? Authorizing Provider   fenofibrate nanocrystallized (TRICOR) 145 mg tablet Take 1 Tablet by mouth every fourty-eight (48) hours. 1 tab po qod 12/10/21  Yes Jesusita Jordan MD   amitriptyline (ELAVIL) 50 mg tablet Take 1 Tab by mouth nightly. 12/10/21  Yes Zohra Montilla NP   gabapentin (NEURONTIN) 100 mg capsule TAKE 2 CAPSULES BY MOUTH EVERY MORNING AND 1 CAPSULE IN THE EVENING 11/17/21  Yes Jesusita Jordan MD   losartan (COZAAR) 25 mg tablet Take 1 Tablet by mouth daily.  TAKE 1 TABLET BY MOUTH DAILY 11/17/21  Yes Jesusita Jordan MD   pantoprazole (PROTONIX) 40 mg tablet Take 1 Tablet by mouth daily. 10/19/21  Yes Zuri Brenner MD   lidocaine (XYLOCAINE) 2 % solution Take 15 mL by mouth as needed for Pain. 9/21/21  Yes Zuri Brenner MD   polyethylene glycol [facility]) 17 gram packet Take 1 Packet by mouth daily. 9/21/21  Yes Zuri Brenner MD   senna-docusate (PERICOLACE) 8.6-50 mg per tablet Take 2 Tablets by mouth daily. 9/21/21  Yes Zuri Brenner MD   naloxone (Narcan) 4 mg/actuation nasal spray 1 [place] by IntraNASal route as needed (Respiratory depression). Use 1 spray intranasally into 1 nostril. Call 911. Use a new Narcan nasal spray for subsequent doses and administer into alternating nostrils. May repeat every 2 to 3 minutes as needed. 9/17/21  Yes Patti Iyer NP   magnesium hydroxide (MILK OF MAGNESIA PO) Take  by mouth daily as needed. Yes Provider, Historical   acetaminophen (TYLENOL) 500 mg tablet Take 1,000 mg by mouth daily. Evening as needed   Yes Provider, Historical   diphenhydrAMINE (BENADRYL) 25 mg tablet Take 25 mg by mouth nightly as needed for Sleep. ZZZ Quil   Yes Provider, Historical   alendronate (FOSAMAX) 70 mg tablet TAKE ONE TABLET BY MOUTH EVERY 7 DAYS  Patient taking differently: Take 70 mg by mouth every seven (7) days. TAKE ONE TABLET BY MOUTH EVERY 7 DAYS/sat 12/23/20  Yes Zuri Brenner MD   miscellaneous medical supply (Anti-Embolism Stockings) misc 15 to 30 mm HG below knee stocking in both legs 11/17/20  Yes Zuri Brenner MD   diclofenac (VOLTAREN) 1 % gel Top BID on elbow and knee 11/17/20  Yes Zuri Brenner MD   cariprazine (VRAYLAR) 1.5 mg capsule Take 3 mg by mouth daily. Indications: bipolar I disorder with most recent episode mixed   Yes Provider, Historical   calcium carbonate (CALTREX) 600 mg calcium (1,500 mg) tablet Take 600 mg by mouth two (2) times a day. Yes Provider, Historical   cholecalciferol (VITAMIN D3) 2,000 unit cap capsule Take 2,000 Units by mouth daily.  10/1/19  Yes Zuri Brenner MD   carboxymethylcellulose sodium (THERATEARS) 0.25 % drop ophthalmic solution Administer 1 Drop to both eyes four (4) times daily. Yes Provider, Historical   LORazepam (ATIVAN) 0.5 mg tablet Take 1 Tab by mouth every eight (8) hours as needed for Anxiety. Max Daily Amount: 1.5 mg. D/C valium 10/3/17  Yes Ashkan Jerez MD     Allergies   Allergen Reactions    Other Food Other (comments)     Oranges, cabbage, beans, peppers, raw onions, foods with caffeine, popcorn, soda because of IBS    Buspar [Buspirone] Other (comments)     Causes \"stimulation\" that could exacerbate a manic attack/phase of pt's Biplar. Reported by patient    Amlodipine Other (comments)     Ankle swelling    Caffeine Other (comments)     Patient does not take this because of anxiety    Dicyclomine Nausea and Vomiting     Extreme stomach pains    Lithium Nausea and Vomiting     Severe nausea and vomiting.     Other Medication Other (comments)     Intolerance to oranges, cabbage,beans,peppers,raw onions,foods with caffeine in them, popcorn, no pop sodas, because of IBS     Past Medical History:   Diagnosis Date    Arthritis     Bipolar disorder ([address]     Bladder pain     Choledocholithiasis 11/24/2010    GERD (gastroesophageal reflux disease)     Headache(784.0)     tension headaches    High cholesterol     History of cholecystectomy     Hypertension     Ill-defined condition     IBS    Irritable bowel     Pelvic pain in female 2014    Psychiatric disorder     Stool color black     irritable bowel     Past Surgical History:   Procedure Laterality Date    COLONOSCOPY N/A 7/5/2017    COLONOSCOPY performed by Ese Avila MD at [address] [facility]  2003, 2017    prolasped rectum    HX GI  2021    rectopexy    HX LAP CHOLECYSTECTOMY  11/23/10    HX ORTHOPAEDIC Right 2018    total knee arthroplasty    HX KALEIGH AND BSO  1980    LAPAROSCOPY ABDOMEN DIAGNOSTIC  1987       Review of Systems   Constitutional: Negative for chills, fever and malaise/fatigue. HENT: Negative. Respiratory: Negative. Cardiovascular: Negative. Gastrointestinal: Negative. Genitourinary: Positive for dysuria, frequency and urgency. Negative for flank pain and hematuria. Musculoskeletal: Negative. Skin: Negative. Neurological: Negative. Endo/Heme/Allergies: Negative. Psychiatric/Behavioral: Negative. No flowsheet data found. Jaskaran Moses, who was evaluated through a patient-initiated, synchronous (real-time) audio only encounter, and/or her healthcare decision maker, is aware that it is a billable service, with coverage as determined by her insurance carrier. She provided verbal consent to proceed: Yes. She has not had a related appointment within my department in the past 7 days or scheduled within the next 24 hours. On this date 01/11/2022 I have spent 10 minutes reviewing previous notes, test results and face to face (virtual) with the patient discussing the diagnosis and importance of compliance with the treatment plan as well as documenting on the day of the visit.     David Galloway NP

## 2022-01-11 NOTE — PROGRESS NOTES
ADVISED PATIENT OF THE FOLLOWING HEALTH MAINTAINCE DUE  Health Maintenance Due   Topic Date Due    Shingrix Vaccine Age 49> (1 of 2) Never done    Breast Cancer Screen Mammogram  06/01/2021      Chief Complaint   Patient presents with    Urinary Pain    Urinary Frequency       1. Have you been to the ER, urgent care clinic since your last visit? Hospitalized since your last visit? No    2. Have you seen or consulted any other health care providers outside of the [address] [place] [address] since your last visit? Include any DEXA scan, mammography  or colon screening. No    3. Do you have an Advance Directive on file? no    4. Do you have a DNR on file? no    Patient is accompanied by self I have received verbal consent from Cornelia Colunga to discuss any/all medical information while they are present in the room. Advance Care Planning 9/20/2021   Patient's Healthcare Decision Maker is: -   Primary Decision Maker Name -   Primary Decision Maker Phone Number -   Primary Decision Maker Relationship to Patient -   Confirm Advance Directive Yes, on file   Patient Would Like to Complete Advance Directive -   Some encounter information is confidential and restricted. Go to Review Flowsheets activity to see all data.          [id number] - [address], [address] [place] [address]  Jennifer Willson [id number]  Phone: [phone] Fax: [phone]

## 2022-01-12 DIAGNOSIS — K21.9 GASTROESOPHAGEAL REFLUX DISEASE WITHOUT ESOPHAGITIS: ICD-10-CM

## 2022-01-12 RX ORDER — PANTOPRAZOLE SODIUM 40 MG/1
40 TABLET, DELAYED RELEASE ORAL DAILY
Qty: 90 TABLET | Refills: 1 | Status: SHIPPED | OUTPATIENT
Start: 2022-01-12 | End: 2022-05-19 | Stop reason: ALTCHOICE

## 2022-01-20 ENCOUNTER — OFFICE VISIT (OUTPATIENT)
Dept: INTERNAL MEDICINE CLINIC | Age: 62
End: 2022-01-20
Payer: MEDICARE

## 2022-01-20 VITALS
SYSTOLIC BLOOD PRESSURE: 132 MMHG | TEMPERATURE: 98.4 F | DIASTOLIC BLOOD PRESSURE: 70 MMHG | BODY MASS INDEX: 25.72 KG/M2 | RESPIRATION RATE: 18 BRPM | OXYGEN SATURATION: 98 % | HEIGHT: 60 IN | HEART RATE: 99 BPM | WEIGHT: 131 LBS

## 2022-01-20 DIAGNOSIS — K58.0 IRRITABLE BOWEL SYNDROME WITH DIARRHEA: Primary | ICD-10-CM

## 2022-01-20 DIAGNOSIS — G44.021 INTRACTABLE CHRONIC CLUSTER HEADACHE: ICD-10-CM

## 2022-01-20 DIAGNOSIS — G44.221 CHRONIC TENSION-TYPE HEADACHE, INTRACTABLE: ICD-10-CM

## 2022-01-20 DIAGNOSIS — F31.10 BIPOLAR AFFECTIVE DISORDER, CURRENT EPISODE MANIC, CURRENT EPISODE SEVERITY UNSPECIFIED (HCC): ICD-10-CM

## 2022-01-20 DIAGNOSIS — R73.03 PREDIABETES: ICD-10-CM

## 2022-01-20 PROCEDURE — G8417 CALC BMI ABV UP PARAM F/U: HCPCS | Performed by: INTERNAL MEDICINE

## 2022-01-20 PROCEDURE — 99214 OFFICE O/P EST MOD 30 MIN: CPT | Performed by: INTERNAL MEDICINE

## 2022-01-20 PROCEDURE — G8427 DOCREV CUR MEDS BY ELIG CLIN: HCPCS | Performed by: INTERNAL MEDICINE

## 2022-01-20 PROCEDURE — 3017F COLORECTAL CA SCREEN DOC REV: CPT | Performed by: INTERNAL MEDICINE

## 2022-01-20 PROCEDURE — G9717 DOC PT DX DEP/BP F/U NT REQ: HCPCS | Performed by: INTERNAL MEDICINE

## 2022-01-20 RX ORDER — GABAPENTIN 100 MG/1
CAPSULE ORAL
Qty: 90 CAPSULE | Refills: 2 | Status: SHIPPED | OUTPATIENT
Start: 2022-01-20 | End: 2022-05-19 | Stop reason: SDUPTHER

## 2022-01-20 NOTE — PROGRESS NOTES
HISTORY OF PRESENT ILLNESS  Usama Aguilar is a 59 y.o. female here with the complaining about abdominal discomfort and frequent loose stool from IBS for past several weeks. Last night she was unable to sleep because of frequent loose stool. No blood in the stool. She has GERD, taking Protonix for many years. She do not believe Protonix is causing the problem. She suffers from headache. Gabapentin helped her to control headache. Need refill. Has hypertension, compliant with medicine. Denies chest pain palpitation or shortness of breath. Has bipolar disorder. Seeing psychiatrist.  Depression seems stable. Has prediabetes, need to repeat lab work. Medication Refill  Associated symptoms include abdominal pain. Pertinent negatives include no chest pain and no shortness of breath. Bipolar  Associated symptoms include abdominal pain. Pertinent negatives include no chest pain and no shortness of breath. Hypertension   Pertinent negatives include no chest pain and no shortness of breath. Abdominal Pain  Associated symptoms include abdominal pain. Pertinent negatives include no chest pain and no shortness of breath. Review of Systems   Constitutional: Negative. HENT: Negative. Eyes: Negative. Respiratory: Negative. Negative for shortness of breath and wheezing. Cardiovascular: Negative for chest pain. Gastrointestinal: Positive for abdominal pain and diarrhea. Negative for blood in stool. Genitourinary: Negative. Musculoskeletal: Negative. Negative for falls. Skin: Negative. Negative for itching. Endo/Heme/Allergies: Negative. Psychiatric/Behavioral: Positive for depression. Physical Exam  Constitutional:       General: She is not in acute distress. Appearance: Normal appearance. She is well-developed and normal weight. Neck:      Thyroid: No thyromegaly. Vascular: No JVD. Cardiovascular:      Rate and Rhythm: Normal rate and regular rhythm.       Pulses: Normal pulses. Heart sounds: Normal heart sounds. Pulmonary:      Effort: Pulmonary effort is normal. No respiratory distress. Breath sounds: Normal breath sounds. No wheezing. Abdominal:      General: Abdomen is flat. Bowel sounds are normal.   Musculoskeletal:      Cervical back: Normal range of motion and neck supple. Neurological:      Mental Status: She is alert. Deep Tendon Reflexes: Reflexes are normal and symmetric. Psychiatric:         Mood and Affect: Mood normal.         Behavior: Behavior normal.         ASSESSMENT and PLAN  Diagnoses and all orders for this visit:    1. Irritable bowel syndrome with diarrhea    Imodium is not helping however, blocking her intestine. We will try,  -     eluxadoline (VIBERZI) 100 mg tablet; Take 1 Tablet by mouth daily as needed for Diarrhea. 2. Chronic tension-type headache, intractable    Doing well. Will refill,  -     gabapentin (NEURONTIN) 100 mg capsule; TAKE 2 CAPSULES BY MOUTH EVERY MORNING AND 1 CAPSULE IN THE EVENING    3. Intractable chronic cluster headache  -     gabapentin (NEURONTIN) 100 mg capsule; TAKE 2 CAPSULES BY MOUTH EVERY MORNING AND 1 CAPSULE IN THE EVENING    4. Bipolar affective disorder, current episode manic, current episode severity unspecified ([address]    Stable. Taking Vraylar, seeing psychiatrist.  -     CBC WITH AUTOMATED DIFF  -     METABOLIC PANEL, COMPREHENSIVE    5. Prediabetes    Advised to watch carbohydrate and sweets. We will check,  -     METABOLIC PANEL, COMPREHENSIVE  -     HEMOGLOBIN A1C WITH EAG    Discussed expected course/resolution/complications of diagnosis in detail with patient. Medication risks/benefits/costs/interactions/alternatives discussed with patient. Discussed COVID-19 infection precaution with patient. Pt was given an after visit summary which includes diagnoses, current medications & vitals. Pt expressed understanding with the diagnosis and plan.

## 2022-01-20 NOTE — PROGRESS NOTES
Health Maintenance Due   Topic Date Due    Shingrix Vaccine Age 49> (1 of 2) Never done    Breast Cancer Screen Mammogram  06/01/2021       Chief Complaint   Patient presents with    Abdominal Pain    Medication Evaluation       1. Have you been to the ER, urgent care clinic since your last visit? Hospitalized since your last visit? No    2. Have you seen or consulted any other health care providers outside of the [address] [place] [address] since your last visit? Include any pap smears or colon screening. No    3) Do you have an Advance Directive on file? no    4) Are you interested in receiving information on Advance Directives? NO      Patient is accompanied by self I have received verbal consent from Yanet Oviedo to discuss any/all medical information while they are present in the room.

## 2022-01-21 LAB
ALBUMIN SERPL-MCNC: 4.7 G/DL (ref 3.8–4.8)
ALBUMIN/GLOB SERPL: 1.8 {RATIO} (ref 1.2–2.2)
ALP SERPL-CCNC: 58 IU/L (ref 44–121)
ALT SERPL-CCNC: 16 IU/L (ref 0–32)
AST SERPL-CCNC: 20 IU/L (ref 0–40)
BASOPHILS # BLD AUTO: 0.1 X10E3/UL (ref 0–0.2)
BASOPHILS NFR BLD AUTO: 1 %
BILIRUB SERPL-MCNC: <0.2 MG/DL (ref 0–1.2)
BUN SERPL-MCNC: 19 MG/DL (ref 8–27)
BUN/CREAT SERPL: 15 (ref 12–28)
CALCIUM SERPL-MCNC: 10.7 MG/DL (ref 8.7–10.3)
CHLORIDE SERPL-SCNC: 101 MMOL/L (ref 96–106)
CO2 SERPL-SCNC: 27 MMOL/L (ref 20–29)
CREAT SERPL-MCNC: 1.23 MG/DL (ref 0.57–1)
EOSINOPHIL # BLD AUTO: 0 X10E3/UL (ref 0–0.4)
EOSINOPHIL NFR BLD AUTO: 0 %
ERYTHROCYTE [DISTWIDTH] IN BLOOD BY AUTOMATED COUNT: 13.1 % (ref 11.7–15.4)
EST. AVERAGE GLUCOSE BLD GHB EST-MCNC: 111 MG/DL
GLOBULIN SER CALC-MCNC: 2.6 G/DL (ref 1.5–4.5)
GLUCOSE SERPL-MCNC: 108 MG/DL (ref 65–99)
HBA1C MFR BLD: 5.5 % (ref 4.8–5.6)
HCT VFR BLD AUTO: 38.4 % (ref 34–46.6)
HGB BLD-MCNC: 12.4 G/DL (ref 11.1–15.9)
IMM GRANULOCYTES # BLD AUTO: 0 X10E3/UL (ref 0–0.1)
IMM GRANULOCYTES NFR BLD AUTO: 0 %
INTERPRETATION: NORMAL
LYMPHOCYTES # BLD AUTO: 2.2 X10E3/UL (ref 0.7–3.1)
LYMPHOCYTES NFR BLD AUTO: 23 %
MCH RBC QN AUTO: 28.1 PG (ref 26.6–33)
MCHC RBC AUTO-ENTMCNC: 32.3 G/DL (ref 31.5–35.7)
MCV RBC AUTO: 87 FL (ref 79–97)
MONOCYTES # BLD AUTO: 0.7 X10E3/UL (ref 0.1–0.9)
MONOCYTES NFR BLD AUTO: 7 %
NEUTROPHILS # BLD AUTO: 6.6 X10E3/UL (ref 1.4–7)
NEUTROPHILS NFR BLD AUTO: 69 %
PLATELET # BLD AUTO: 359 X10E3/UL (ref 150–450)
POTASSIUM SERPL-SCNC: 3.6 MMOL/L (ref 3.5–5.2)
PROT SERPL-MCNC: 7.3 G/DL (ref 6–8.5)
RBC # BLD AUTO: 4.41 X10E6/UL (ref 3.77–5.28)
SODIUM SERPL-SCNC: 145 MMOL/L (ref 134–144)
WBC # BLD AUTO: 9.7 X10E3/UL (ref 3.4–10.8)

## 2022-01-24 ENCOUNTER — DOCUMENTATION ONLY (OUTPATIENT)
Dept: INTERNAL MEDICINE CLINIC | Age: 62
End: 2022-01-24

## 2022-01-24 DIAGNOSIS — R79.89 ELEVATED SERUM CREATININE: Primary | ICD-10-CM

## 2022-01-24 NOTE — PROGRESS NOTES
Chief Complaint   Patient presents with    Prior Auth     Viberzi 100MG tablets     ramya reece (Key: [id number])    Sent to plan today       [employer] Part D Electronic    Wait for Determination  Please wait for [employer] 2017 NCPDP to return a determination.

## 2022-01-25 ENCOUNTER — DOCUMENTATION ONLY (OUTPATIENT)
Dept: INTERNAL MEDICINE CLINIC | Age: 62
End: 2022-01-25

## 2022-01-25 NOTE — PROGRESS NOTES
Serum creatinine and sodium more elevated. Encourage oral water intake, as tolerated. Repeat CMP 2 weeks.

## 2022-01-25 NOTE — PROGRESS NOTES
Chief Complaint   Patient presents with    Prior Auth     Viberzi 100MG tablets      Request Reference Number: [id number].  VIBERZI TAB 100MG is denied

## 2022-01-26 ENCOUNTER — TELEPHONE (OUTPATIENT)
Dept: INTERNAL MEDICINE CLINIC | Age: 62
End: 2022-01-26

## 2022-01-26 DIAGNOSIS — K58.9 IRRITABLE BOWEL SYNDROME WITHOUT DIARRHEA: ICD-10-CM

## 2022-01-26 DIAGNOSIS — K58.0 IRRITABLE BOWEL SYNDROME WITH DIARRHEA: Primary | ICD-10-CM

## 2022-01-28 ENCOUNTER — DOCUMENTATION ONLY (OUTPATIENT)
Dept: INTERNAL MEDICINE CLINIC | Age: 62
End: 2022-01-28

## 2022-01-28 NOTE — PROGRESS NOTES
Chief Complaint   Patient presents with   Iovianus.Renais Prior Deitra Rick 550MG tablets      Request Reference Number: [id number]. Elisabeth Devoid TAB 550MG is approved through 02/11/2022. Your patient may now fill this prescription and it will be covered.

## 2022-01-28 NOTE — PROGRESS NOTES
Chief Complaint   Patient presents with    Prior Auth     Xifaxan 550MG tablets      ramya reece (KeyJil Ace)  Rx #: [id number]    Sent to plan today

## 2022-02-01 ENCOUNTER — TELEPHONE (OUTPATIENT)
Dept: INTERNAL MEDICINE CLINIC | Age: 62
End: 2022-02-01

## 2022-02-01 NOTE — TELEPHONE ENCOUNTER
Pt refusing to go to gastroenterologist (per her recent referral), she stated that she does not want to go through more testing because of her recent surgeries. Pt stated that her stomach is feeling way better. Pt also requesting a prescription be sent over for her diarrhea. Please call pt back to discuss her recent labs and repeating her CMP in 2 weeks.

## 2022-02-04 DIAGNOSIS — F31.10 BIPOLAR AFFECTIVE DISORDER, CURRENT EPISODE MANIC, CURRENT EPISODE SEVERITY UNSPECIFIED (HCC): ICD-10-CM

## 2022-02-04 RX ORDER — AMITRIPTYLINE HYDROCHLORIDE 50 MG/1
TABLET, FILM COATED ORAL
Qty: 30 TABLET | Refills: 3 | Status: SHIPPED | OUTPATIENT
Start: 2022-02-04 | End: 2022-02-08 | Stop reason: SDUPTHER

## 2022-02-04 NOTE — TELEPHONE ENCOUNTER
Spoke w/ pt and advised her thar Dr. Danielle Tapia wants her to f/u w/ Jil Martin as sh referred in regards of IBS and sypmtoms since that is out of DR. Danielle Tapia scope for further plan of care, and meds they may be needed for IBS concerns. Pt states she feels better and may consider f/u with gastro but I expressed the importance.

## 2022-02-11 ENCOUNTER — DOCUMENTATION ONLY (OUTPATIENT)
Dept: INTERNAL MEDICINE CLINIC | Age: 62
End: 2022-02-11

## 2022-02-11 NOTE — PROGRESS NOTES
Chief Complaint   Patient presents with    Prior Auth     Viberzi 100MG tablets     [employer] Medicare Part D Electronic Prior Authorization Form    Sent to plan today

## 2022-02-14 ENCOUNTER — DOCUMENTATION ONLY (OUTPATIENT)
Dept: INTERNAL MEDICINE CLINIC | Age: 62
End: 2022-02-14

## 2022-02-14 NOTE — PROGRESS NOTES
Chief Complaint   Patient presents with    Prior Auth     Viberzi 100MG tablets        We received your request for prior authorization for VIBERZI TAB 100MG,, for the above member; however, [employer] has a denied request on file for VIBERZI TAB 100MG, for this member.

## 2022-02-22 ENCOUNTER — HOSPITAL ENCOUNTER (OUTPATIENT)
Dept: MAMMOGRAPHY | Age: 62
Discharge: HOME | End: 2022-02-22
Attending: INTERNAL MEDICINE
Payer: MEDICARE

## 2022-02-22 DIAGNOSIS — M85.80 OSTEOPENIA, UNSPECIFIED LOCATION: ICD-10-CM

## 2022-02-22 PROCEDURE — 77080 DXA BONE DENSITY AXIAL: CPT

## 2022-03-16 LAB
BUN SERPL-MCNC: 15 MG/DL (ref 8–27)
BUN/CREAT SERPL: 17 (ref 12–28)
CALCIUM SERPL-MCNC: 9.7 MG/DL (ref 8.7–10.3)
CHLORIDE SERPL-SCNC: 105 MMOL/L (ref 96–106)
CO2 SERPL-SCNC: 24 MMOL/L (ref 20–29)
CREAT SERPL-MCNC: 0.89 MG/DL (ref 0.57–1)
EGFR: 73 ML/MIN/1.73
GLUCOSE SERPL-MCNC: 83 MG/DL (ref 65–99)
INTERPRETATION: NORMAL
POTASSIUM SERPL-SCNC: 4 MMOL/L (ref 3.5–5.2)
SODIUM SERPL-SCNC: 143 MMOL/L (ref 134–144)

## 2022-03-17 ENCOUNTER — VIRTUAL VISIT (OUTPATIENT)
Dept: INTERNAL MEDICINE CLINIC | Age: 62
End: 2022-03-17
Payer: MEDICARE

## 2022-03-17 DIAGNOSIS — R30.0 DYSURIA: ICD-10-CM

## 2022-03-17 DIAGNOSIS — F31.10 BIPOLAR AFFECTIVE DISORDER, CURRENT EPISODE MANIC, CURRENT EPISODE SEVERITY UNSPECIFIED (HCC): ICD-10-CM

## 2022-03-17 DIAGNOSIS — N30.90 CYSTITIS: Primary | ICD-10-CM

## 2022-03-17 DIAGNOSIS — I10 ESSENTIAL HYPERTENSION: ICD-10-CM

## 2022-03-17 PROCEDURE — 99213 OFFICE O/P EST LOW 20 MIN: CPT | Performed by: INTERNAL MEDICINE

## 2022-03-17 PROCEDURE — G9717 DOC PT DX DEP/BP F/U NT REQ: HCPCS | Performed by: INTERNAL MEDICINE

## 2022-03-17 PROCEDURE — 3017F COLORECTAL CA SCREEN DOC REV: CPT | Performed by: INTERNAL MEDICINE

## 2022-03-17 PROCEDURE — G8756 NO BP MEASURE DOC: HCPCS | Performed by: INTERNAL MEDICINE

## 2022-03-17 PROCEDURE — G8427 DOCREV CUR MEDS BY ELIG CLIN: HCPCS | Performed by: INTERNAL MEDICINE

## 2022-03-17 PROCEDURE — G8419 CALC BMI OUT NRM PARAM NOF/U: HCPCS | Performed by: INTERNAL MEDICINE

## 2022-03-17 RX ORDER — NITROFURANTOIN 25; 75 MG/1; MG/1
100 CAPSULE ORAL 2 TIMES DAILY
Qty: 10 CAPSULE | Refills: 0 | Status: SHIPPED | OUTPATIENT
Start: 2022-03-17 | End: 2022-05-19 | Stop reason: ALTCHOICE

## 2022-03-17 NOTE — PROGRESS NOTES
Albert Valladares is a 59 y.o. female who was seen by synchronous (real-time) audio-video technology on 3/17/2022 for Bladder Infection (6/10 pain upon urination)        Assessment & Plan:   Diagnoses and all orders for this visit:    1. Cystitis    She has symptoms of her dysuria and increased urinary frequency and lower back pain for last several days. Advised her to drink more fluid. Will call in,  -     nitrofurantoin, macrocrystal-monohydrate, (MACROBID) 100 mg capsule; Take 1 Capsule by mouth two (2) times a day. If symptoms persist, she needs to come to the office to get a UA and urine culture. 2. Dysuria  Possible UTI. Will give,  -     nitrofurantoin, macrocrystal-monohydrate, (MACROBID) 100 mg capsule; Take 1 Capsule by mouth two (2) times a day. 3. Essential hypertension  Stable blood pressure. On losartan. 4. Bipolar affective disorder, current episode manic, current episode severity unspecified [facility])  Doing well, seeing psychiatrist, on [facility]. I spent at least 22 minutes on this visit with this established patient. Subjective:   Kalpesh Marin is here for follow-up. She is having urinary discomfort for last 4 days. Having dysuria and increased urinary frequency. No fever. But have lower back pain. She is very uncomfortable. Has hypertension, compliant with medication. Denies chest reverberation or shortness of breath. She suffered from bipolar disease  , Seeing psychiatrist.  Brock Hatchet were reviewed with her, stable. Prior to Admission medications    Medication Sig Start Date End Date Taking? Authorizing Provider   nitrofurantoin, macrocrystal-monohydrate, (MACROBID) 100 mg capsule Take 1 Capsule by mouth two (2) times a day. 3/17/22  Yes Riya Fields MD   amitriptyline (ELAVIL) 50 mg tablet Take 1 Tab by mouth nightly. 2/8/22  Yes Sergei Watt NP   rifAXIMin (Xifaxan) 550 mg tablet Take 1 Tablet by mouth three (3) times daily.  1/26/22  Yes Riya Fields MD   elderberry fruit (ELDERBERRY PO) Take  by mouth daily. Yes Provider, Historical   gabapentin (NEURONTIN) 100 mg capsule TAKE 2 CAPSULES BY MOUTH EVERY MORNING AND 1 CAPSULE IN THE EVENING 1/20/22  Yes Gayatri Deleon MD   alendronate (FOSAMAX) 70 mg tablet Take 1 Tablet by mouth every seven (7) days. TAKE ONE TABLET BY MOUTH EVERY 7 DAYS/sat 1/11/22  Yes Gayatri Deleon MD   fenofibrate nanocrystallized (TRICOR) 145 mg tablet Take 1 Tablet by mouth every fourty-eight (48) hours. 1 tab po qod 12/10/21  Yes Gayatri Deleon MD   losartan (COZAAR) 25 mg tablet Take 1 Tablet by mouth daily. TAKE 1 TABLET BY MOUTH DAILY 11/17/21  Yes Gayatri Deleon MD   polyethylene glycol [facility]) 17 gram packet Take 1 Packet by mouth daily. 9/21/21  Yes Gayatri Deleon MD   acetaminophen (TYLENOL) 500 mg tablet Take 1,000 mg by mouth daily. Evening as needed   Yes Provider, Historical   diclofenac (VOLTAREN) 1 % gel Top BID on elbow and knee 11/17/20  Yes Gayatri Deleon MD   cariprazine (VRAYLAR) 1.5 mg capsule Take 3 mg by mouth daily. Indications: bipolar I disorder with most recent episode mixed   Yes Provider, Historical   calcium carbonate (CALTREX) 600 mg calcium (1,500 mg) tablet Take 600 mg by mouth two (2) times a day. Yes Provider, Historical   cholecalciferol (VITAMIN D3) 2,000 unit cap capsule Take 2,000 Units by mouth daily. 10/1/19  Yes Gayatri Deleon MD   carboxymethylcellulose sodium (THERATEARS) 0.25 % drop ophthalmic solution Administer 1 Drop to both eyes four (4) times daily. Yes Provider, Historical   LORazepam (ATIVAN) 0.5 mg tablet Take 1 Tab by mouth every eight (8) hours as needed for Anxiety. Max Daily Amount: 1.5 mg. D/C valium 10/3/17  Yes Gayatri Deleon MD   eluxadoline (VIBERZI) 100 mg tablet Take 1 Tablet by mouth daily as needed for Diarrhea. Patient not taking: Reported on 3/17/2022 1/20/22 3/17/22  Gayatri Deleon MD   pantoprazole (PROTONIX) 40 mg tablet Take 1 Tablet by mouth daily.   Patient not taking: Reported on 3/17/2022 1/12/22 Tan Goldberg MD   nitrofurantoin, macrocrystal-monohydrate, (MACROBID) 100 mg capsule Take 1 Capsule by mouth two (2) times a day. Patient not taking: Reported on 3/17/2022 1/11/22 3/17/22  Flynn Germain NP   senna-docusate (PERICOLACE) 8.6-50 mg per tablet Take 2 Tablets by mouth daily. 9/21/21   Tan Goldberg MD   lidocaine (XYLOCAINE) 2 % solution Take 15 mL by mouth as needed for Pain. Patient not taking: Reported on 3/17/2022 9/21/21 3/17/22  Tan Goldberg MD   naloxone (Narcan) 4 mg/actuation nasal spray 1 Serena by IntraNASal route as needed (Respiratory depression). Use 1 spray intranasally into 1 nostril. Call 911. Use a new Narcan nasal spray for subsequent doses and administer into alternating nostrils. May repeat every 2 to 3 minutes as needed. Patient not taking: Reported on 3/17/2022 9/17/21   Eric Mistry NP   magnesium hydroxide (MILK OF MAGNESIA PO) Take  by mouth daily as needed. Patient not taking: Reported on 3/17/2022  3/17/22  Provider, Historical   miscellaneous medical supply (Anti-Embolism Stockings) misc 15 to 30 mm HG below knee stocking in both legs  Patient not taking: Reported on 3/17/2022 11/17/20 3/17/22  Tan Goldberg MD     Past Medical History:   Diagnosis Date    Arthritis     Bipolar disorder [facility])     Bladder pain     Choledocholithiasis 11/24/2010    GERD (gastroesophageal reflux disease)     Headache(784.0)     tension headaches    High cholesterol     History of cholecystectomy     Hypertension     Ill-defined condition     IBS    Irritable bowel     Pelvic pain in female 2014    Psychiatric disorder     Stool color black     irritable bowel       ROS significant for urinary frequency and dysuria.     Objective:     Patient-Reported Vitals 3/17/2022   Patient-Reported Weight 130lb          Constitutional: [x] Appears well-developed and well-nourished [x] No apparent distress      [] Abnormal -     Mental status: [x] Alert and awake  [x] Oriented to person/place/time [x] Able to follow commands    [] Abnormal -       HENT: [x] Normocephalic, atraumatic  [] Abnormal -   [x] Mouth/Throat: Mucous membranes are moist    External Ears [x] Normal  [] Abnormal -    Neck: [x] No visualized mass [] Abnormal -     Pulmonary/Chest: [x] Respiratory effort normal   [x] No visualized signs of difficulty breathing or respiratory distress        [] Abnormal -      Musculoskeletal:   [x] Normal gait with no signs of ataxia         [x] Normal range of motion of neck        [] Abnormal -     Neurological:        [x] No Facial Asymmetry (Cranial nerve 7 motor function) (limited exam due to video visit)          [x] No gaze palsy        [] Abnormal -                   Psychiatric:       [x] Normal Affect [] Abnormal -   Stable depression     [x] No Hallucinations    Other pertinent observable physical exam findings:-        We discussed the expected course, resolution and complications of the diagnosis(es) in detail. Medication risks, benefits, costs, interactions, and alternatives were discussed as indicated. I advised her to contact the office if her condition worsens, changes or fails to improve as anticipated. She expressed understanding with the diagnosis(es) and plan. Aretha Vigil, was evaluated through a synchronous (real-time) audio-video encounter. The patient (or guardian if applicable) is aware that this is a billable service, which includes applicable co-pays. Verbal consent to proceed has been obtained. The visit was conducted pursuant to the emergency declaration under the [address] [place] [address] authority and the [employer] General Act. Patient identification was verified, and a caregiver was present when appropriate. The patient was located at home in a state where the provider was licensed to provide care.       Agatha Grigsby MD

## 2022-03-17 NOTE — PROGRESS NOTES
Cornelia Colunga is a 59 y.o. female  HIPAA verified by two patient identifiers. Health Maintenance Due   Topic    Shingrix Vaccine Age 49> (1 of 2)    Breast Cancer Screen Mammogram      Chief Complaint   Patient presents with    Bladder Infection     6/10 pain upon urination         Pain Scale: /6/10  Pain Location:  bladder     1. Have you been to the ER, urgent care clinic since your last visit? Hospitalized since your last visit? No    2. Have you seen or consulted any other health care providers outside of the [address] [place] [address] since your last visit? Include any pap smears or colon screening.  No

## 2022-03-18 PROBLEM — Z96.651 TOTAL KNEE REPLACEMENT STATUS, RIGHT: Status: ACTIVE | Noted: 2018-03-22

## 2022-03-18 PROBLEM — M17.11 OSTEOARTHRITIS OF RIGHT KNEE: Status: ACTIVE | Noted: 2018-03-19

## 2022-03-18 PROBLEM — E87.1 HYPONATREMIA: Status: ACTIVE | Noted: 2017-01-24

## 2022-03-18 PROBLEM — F31.9 BIPOLAR 1 DISORDER (HCC): Status: ACTIVE | Noted: 2017-01-31

## 2022-03-18 PROBLEM — K62.3 RECTAL PROLAPSE: Status: ACTIVE | Noted: 2017-08-03

## 2022-03-19 PROBLEM — R41.82 ALTERED MENTAL STATUS: Status: ACTIVE | Noted: 2017-01-24

## 2022-03-19 PROBLEM — R41.0 DELIRIUM: Status: ACTIVE | Noted: 2017-01-24

## 2022-03-20 PROBLEM — Z71.89 ACP (ADVANCE CARE PLANNING): Status: ACTIVE | Noted: 2017-05-30

## 2022-04-11 DIAGNOSIS — I10 ESSENTIAL HYPERTENSION: ICD-10-CM

## 2022-04-11 DIAGNOSIS — E78.2 ELEVATED TRIGLYCERIDES WITH HIGH CHOLESTEROL: ICD-10-CM

## 2022-04-11 RX ORDER — LOSARTAN POTASSIUM 25 MG/1
TABLET ORAL
Qty: 90 TABLET | Refills: 1 | Status: SHIPPED | OUTPATIENT
Start: 2022-04-11 | End: 2022-10-20 | Stop reason: SDUPTHER

## 2022-04-11 RX ORDER — FENOFIBRATE 145 MG/1
TABLET, COATED ORAL
Qty: 45 TABLET | Refills: 1 | Status: SHIPPED | OUTPATIENT
Start: 2022-04-11 | End: 2022-09-22

## 2022-05-19 ENCOUNTER — OFFICE VISIT (OUTPATIENT)
Dept: INTERNAL MEDICINE CLINIC | Age: 62
End: 2022-05-19
Payer: MEDICARE

## 2022-05-19 VITALS
DIASTOLIC BLOOD PRESSURE: 69 MMHG | OXYGEN SATURATION: 100 % | WEIGHT: 122 LBS | SYSTOLIC BLOOD PRESSURE: 130 MMHG | BODY MASS INDEX: 23.95 KG/M2 | HEART RATE: 80 BPM | TEMPERATURE: 98 F | HEIGHT: 60 IN

## 2022-05-19 DIAGNOSIS — G44.221 CHRONIC TENSION-TYPE HEADACHE, INTRACTABLE: ICD-10-CM

## 2022-05-19 DIAGNOSIS — Z23 ENCOUNTER FOR IMMUNIZATION: ICD-10-CM

## 2022-05-19 DIAGNOSIS — G44.021 INTRACTABLE CHRONIC CLUSTER HEADACHE: ICD-10-CM

## 2022-05-19 DIAGNOSIS — N18.31 STAGE 3A CHRONIC KIDNEY DISEASE (HCC): ICD-10-CM

## 2022-05-19 DIAGNOSIS — F31.10 BIPOLAR AFFECTIVE DISORDER, CURRENT EPISODE MANIC, CURRENT EPISODE SEVERITY UNSPECIFIED (HCC): ICD-10-CM

## 2022-05-19 DIAGNOSIS — K58.0 IRRITABLE BOWEL SYNDROME WITH DIARRHEA: ICD-10-CM

## 2022-05-19 DIAGNOSIS — K52.9 CHRONIC DIARRHEA: Primary | ICD-10-CM

## 2022-05-19 PROBLEM — N18.30 CHRONIC RENAL DISEASE, STAGE III (HCC): Status: ACTIVE | Noted: 2022-05-19

## 2022-05-19 PROCEDURE — G9899 SCRN MAM PERF RSLTS DOC: HCPCS | Performed by: INTERNAL MEDICINE

## 2022-05-19 PROCEDURE — G9717 DOC PT DX DEP/BP F/U NT REQ: HCPCS | Performed by: INTERNAL MEDICINE

## 2022-05-19 PROCEDURE — G8752 SYS BP LESS 140: HCPCS | Performed by: INTERNAL MEDICINE

## 2022-05-19 PROCEDURE — 3017F COLORECTAL CA SCREEN DOC REV: CPT | Performed by: INTERNAL MEDICINE

## 2022-05-19 PROCEDURE — G8420 CALC BMI NORM PARAMETERS: HCPCS | Performed by: INTERNAL MEDICINE

## 2022-05-19 PROCEDURE — G8754 DIAS BP LESS 90: HCPCS | Performed by: INTERNAL MEDICINE

## 2022-05-19 PROCEDURE — G8427 DOCREV CUR MEDS BY ELIG CLIN: HCPCS | Performed by: INTERNAL MEDICINE

## 2022-05-19 PROCEDURE — 99214 OFFICE O/P EST MOD 30 MIN: CPT | Performed by: INTERNAL MEDICINE

## 2022-05-19 RX ORDER — GABAPENTIN 100 MG/1
CAPSULE ORAL
Qty: 90 CAPSULE | Refills: 2 | Status: SHIPPED | OUTPATIENT
Start: 2022-05-19 | End: 2022-07-22 | Stop reason: SDUPTHER

## 2022-05-19 NOTE — PROGRESS NOTES
HISTORY OF PRESENT ILLNESS  Enrique Jimenez is a 57 y.o. female here for follow-up. She is staying with her sister in [place]. Doing well. Suffer from chronic diarrhea sometimes. Rifaximin is helping her a lot. Would like to get a refill. Sometimes she remained constipated. She is using Senokot as which helped her. MiraLAX did not help. Has no gastric reflux, stopped taking Protonix. She suffers from headache. Gabapentin helped her to control headache. Need refill. Also on amitriptyline. Has hypertension, compliant with medicine. Denies chest pain palpitation or shortness of breath. Has bipolar disorder. Seeing psychiatrist.  Depression seems stable. Has prediabetes, need to repeat lab work. Labs reviewed, seems stable. Has osteoporosis, on Fosamax and calcium. Doing well. Review of Systems   Constitutional: Negative. HENT: Negative. Eyes: Negative. Respiratory: Negative. Negative for shortness of breath and wheezing. Cardiovascular: Negative for chest pain. Gastrointestinal: Positive for diarrhea. Negative for blood in stool. Genitourinary: Negative. Musculoskeletal: Negative. Negative for falls. Skin: Negative. Negative for itching. Endo/Heme/Allergies: Negative. Psychiatric/Behavioral: Positive for depression. Physical Exam  Constitutional:       General: She is not in acute distress. Appearance: Normal appearance. She is well-developed and normal weight. Neck:      Thyroid: No thyromegaly. Vascular: No JVD. Cardiovascular:      Rate and Rhythm: Normal rate and regular rhythm. Pulses: Normal pulses. Heart sounds: Normal heart sounds. Pulmonary:      Effort: Pulmonary effort is normal. No respiratory distress. Breath sounds: Normal breath sounds. No wheezing. Abdominal:      General: Abdomen is flat. Bowel sounds are normal.   Musculoskeletal:      Cervical back: Normal range of motion and neck supple.    Neurological: Mental Status: She is alert. Deep Tendon Reflexes: Reflexes are normal and symmetric. Psychiatric:         Mood and Affect: Mood normal.         Behavior: Behavior normal.      Comments: Stable depression. ASSESSMENT and PLAN  Diagnoses and all orders for this visit:    1. Chronic diarrhea    Probable IBS with diarrhea. Pal Clarity did not help, all of the medication failed. Rifaximin only helps her with the diarrhea episode. We will give,  -     rifAXIMin (Xifaxan) 550 mg tablet; Take 1 Tablet by mouth three (3) times daily. 2. Bipolar affective disorder, current episode manic, current episode severity unspecified [facility])  Seeing psychiatrist.  On [place]. Stable. 3. Stage 3a chronic kidney disease ([facility] [address]  Doing well. 4. Chronic tension-type headache, intractable    Taking amitriptyline. Also on gabapentin. Stable. Will refill,  -     gabapentin (NEURONTIN) 100 mg capsule; TAKE 2 CAPSULES BY MOUTH EVERY MORNING AND 1 CAPSULE IN THE EVENING    5. Intractable chronic cluster headache  -     gabapentin (NEURONTIN) 100 mg capsule; TAKE 2 CAPSULES BY MOUTH EVERY MORNING AND 1 CAPSULE IN THE EVENING    6. Irritable bowel syndrome with diarrhea  -     rifAXIMin (Xifaxan) 550 mg tablet; Take 1 Tablet by mouth three (3) times daily. She sometimes gets constipated and use Senokot which helps her. She is not mixing up with Xifaxan. 7. Encounter for immunization    We will give,  -     varicella-zoster recombinant, PF, (SHINGRIX) 50 mcg/0.5 mL susr injection; 0.5 mL by IntraMUSCular route once for 1 dose. Discussed expected course/resolution/complications of diagnosis in detail with patient. Medication risks/benefits/costs/interactions/alternatives discussed with patient. Discussed COVID-19 infection precaution with patient. Pt was given an after visit summary which includes diagnoses, current medications & vitals. Pt expressed understanding with the diagnosis and plan.

## 2022-05-19 NOTE — PROGRESS NOTES
Health Maintenance Due   Topic Date Due    Shingrix Vaccine Age 49> (1 of 2) Never done       Chief Complaint   Patient presents with    Bipolar    Hypertension       1. Have you been to the ER, urgent care clinic since your last visit? Hospitalized since your last visit? No    2. Have you seen or consulted any other health care providers outside of the [address] [place] [address] since your last visit? Include any pap smears or colon screening. No    3) Do you have an Advance Directive on file? no    4) Are you interested in receiving information on Advance Directives? NO      Patient is accompanied by self I have received verbal consent from Tatiana Grimes to discuss any/all medical information while they are present in the room.

## 2022-05-23 ENCOUNTER — DOCUMENTATION ONLY (OUTPATIENT)
Dept: INTERNAL MEDICINE CLINIC | Age: 62
End: 2022-05-23

## 2022-05-23 NOTE — PROGRESS NOTES
Chief Complaint   Patient presents with    Prior [place] 550MG tablets        Request Reference Number: [id number]. [place] Driscoll TAB 550MG is approved through 06/06/2022. Your patient may now fill this prescription and it will be covered.

## 2022-06-09 ENCOUNTER — VIRTUAL VISIT (OUTPATIENT)
Dept: INTERNAL MEDICINE CLINIC | Age: 62
End: 2022-06-09
Payer: MEDICARE

## 2022-06-09 DIAGNOSIS — R39.89 ABNORMAL URINE COLOR: ICD-10-CM

## 2022-06-09 DIAGNOSIS — R30.0 DYSURIA: Primary | ICD-10-CM

## 2022-06-09 PROCEDURE — G8756 NO BP MEASURE DOC: HCPCS | Performed by: INTERNAL MEDICINE

## 2022-06-09 PROCEDURE — G8420 CALC BMI NORM PARAMETERS: HCPCS | Performed by: INTERNAL MEDICINE

## 2022-06-09 PROCEDURE — 3017F COLORECTAL CA SCREEN DOC REV: CPT | Performed by: INTERNAL MEDICINE

## 2022-06-09 PROCEDURE — G9717 DOC PT DX DEP/BP F/U NT REQ: HCPCS | Performed by: INTERNAL MEDICINE

## 2022-06-09 PROCEDURE — G8427 DOCREV CUR MEDS BY ELIG CLIN: HCPCS | Performed by: INTERNAL MEDICINE

## 2022-06-09 PROCEDURE — 99213 OFFICE O/P EST LOW 20 MIN: CPT | Performed by: INTERNAL MEDICINE

## 2022-06-09 PROCEDURE — G9899 SCRN MAM PERF RSLTS DOC: HCPCS | Performed by: INTERNAL MEDICINE

## 2022-06-09 RX ORDER — NITROFURANTOIN 25; 75 MG/1; MG/1
100 CAPSULE ORAL 2 TIMES DAILY
Qty: 10 CAPSULE | Refills: 0 | Status: SHIPPED | OUTPATIENT
Start: 2022-06-09 | End: 2022-08-11 | Stop reason: ALTCHOICE

## 2022-06-09 RX ORDER — CARIPRAZINE 3 MG/1
3 CAPSULE, GELATIN COATED ORAL DAILY
COMMUNITY
Start: 2022-06-09

## 2022-06-09 NOTE — PROGRESS NOTES
Esme Byrd is a 57 y.o. female who was seen by synchronous (real-time) audio-video technology on 6/9/2022 for Bladder Infection (starting 6/3 still having bad burning sensation, urine is backk to normal color.)        Assessment & Plan:   Diagnoses and all orders for this visit:    1. Dysuria  -     nitrofurantoin, macrocrystal-monohydrate, (MACROBID) 100 mg capsule; Take 1 Capsule by mouth two (2) times a day. -     Encouraged fluids         -     Suggested Macrobid PRN  2. Abnormal urine color      Will need to test urine is symptoms persist.     Follow-up and Dispositions    · Return if symptoms worsen or fail to improve. Subjective:     Patient reports that after starting doxycyline for a ingrown toenail given by her podiatrist she began to have an increase in burning and pink urine. The abx was stopped and she was placed on a topical. Reports that the urine color has improved but she still remains with the burning. Has UTI was a few months ago. No fever, flank pain or pressure. No frequency. Prior to Admission medications    Medication Sig Start Date End Date Taking? Authorizing Provider   nitrofurantoin, macrocrystal-monohydrate, (MACROBID) 100 mg capsule Take 1 Capsule by mouth two (2) times a day. 6/9/22  Yes Van Love NP   gabapentin (NEURONTIN) 100 mg capsule TAKE 2 CAPSULES BY MOUTH EVERY MORNING AND 1 CAPSULE IN THE EVENING 5/19/22  Yes Jimmie Lujan MD   rifAXIMin (Xifaxan) 550 mg tablet Take 1 Tablet by mouth three (3) times daily. 5/19/22  Yes Jimmie Lujan MD   losartan (COZAAR) 25 mg tablet TAKE 1 TABLET BY MOUTH DAILY. 4/11/22  Yes Jimmie Lujan MD   fenofibrate nanocrystallized (TRICOR) 145 mg tablet TAKE 1 TABLET BY MOUTH EVERY 48 HOURS 4/11/22  Yes Jimmie Lujan MD   amitriptyline (ELAVIL) 50 mg tablet Take 1 Tab by mouth nightly. 2/8/22  Yes Laurent Wong NP   elderberry fruit (ELDERBERRY PO) Take  by mouth daily.    Yes Provider, Historical   alendronate (FOSAMAX) 70 mg tablet Take 1 Tablet by mouth every seven (7) days. TAKE ONE TABLET BY MOUTH EVERY 7 DAYS/sat 1/11/22  Yes Cass Wylie MD   acetaminophen (TYLENOL) 500 mg tablet Take 1,000 mg by mouth daily. Evening as needed   Yes Provider, Historical   diclofenac (VOLTAREN) 1 % gel Top BID on elbow and knee 11/17/20  Yes Cass Wylie MD   cariprazine (VRAYLAR) 1.5 mg capsule Take 3 mg by mouth daily. Indications: bipolar I disorder with most recent episode mixed   Yes Provider, Historical   calcium carbonate (CALTREX) 600 mg calcium (1,500 mg) tablet Take 600 mg by mouth two (2) times a day. Yes Provider, Historical   cholecalciferol (VITAMIN D3) 2,000 unit cap capsule Take 2,000 Units by mouth daily. 10/1/19  Yes Cass Wylie MD   carboxymethylcellulose sodium (THERATEARS) 0.25 % drop ophthalmic solution Administer 1 Drop to both eyes four (4) times daily. Yes Provider, Historical   LORazepam (ATIVAN) 0.5 mg tablet Take 1 Tab by mouth every eight (8) hours as needed for Anxiety. Max Daily Amount: 1.5 mg. D/C valium 10/3/17  Yes Cass Wylie MD   Vraylar 3 mg capsule  6/9/22   Provider, Historical   senna-docusate (PERICOLACE) 8.6-50 mg per tablet Take 2 Tablets by mouth daily.  9/21/21   Cass Wylie MD     Patient Active Problem List   Diagnosis Code    Arthritis M19.90    Gallstones with obstruction of gallbladder K80.21    Choledocholithiasis K80.50    [facility], manic ([facility] F31.10    IBS (irritable bowel syndrome) K58.9    Chronic headache R51.9, G89.29    Drop attack R55    Altered mental status R41.82    Delirium R41.0    Hyponatremia E87.1    Bipolar 1 disorder ([facility] F31.9    ACP (advance care planning) Z71.89    Rectal prolapse K62.3    Osteoarthritis of right knee M17.11    Total knee replacement status, right Z96.651    Chronic renal disease, stage III N18.30     Patient Active Problem List    Diagnosis Date Noted    Bipolar affective disorder, manic ([facility] 05/16/2014    Chronic renal disease, stage III 05/19/2022    Total knee replacement status, right 03/22/2018    Osteoarthritis of right knee 03/19/2018    Rectal prolapse 08/03/2017    ACP (advance care planning) 05/30/2017    Bipolar 1 disorder ([facility] 01/31/2017    Altered mental status 01/24/2017    Delirium 01/24/2017    Hyponatremia 01/24/2017    Drop attack 03/25/2015    IBS (irritable bowel syndrome) 10/30/2014    Chronic headache 10/30/2014    Choledocholithiasis 11/24/2010    Gallstones with obstruction of gallbladder 11/23/2010    Arthritis      Current Outpatient Medications   Medication Sig Dispense Refill    nitrofurantoin, macrocrystal-monohydrate, (MACROBID) 100 mg capsule Take 1 Capsule by mouth two (2) times a day. 10 Capsule 0    gabapentin (NEURONTIN) 100 mg capsule TAKE 2 CAPSULES BY MOUTH EVERY MORNING AND 1 CAPSULE IN THE EVENING 90 Capsule 2    rifAXIMin (Xifaxan) 550 mg tablet Take 1 Tablet by mouth three (3) times daily. 63 Tablet 1    losartan (COZAAR) 25 mg tablet TAKE 1 TABLET BY MOUTH DAILY. 90 Tablet 1    fenofibrate nanocrystallized (TRICOR) 145 mg tablet TAKE 1 TABLET BY MOUTH EVERY 48 HOURS 45 Tablet 1    amitriptyline (ELAVIL) 50 mg tablet Take 1 Tab by mouth nightly. 90 Tablet 1    elderberry fruit (ELDERBERRY PO) Take  by mouth daily.  alendronate (FOSAMAX) 70 mg tablet Take 1 Tablet by mouth every seven (7) days. TAKE ONE TABLET BY MOUTH EVERY 7 DAYS/sat 12 Tablet 3    acetaminophen (TYLENOL) 500 mg tablet Take 1,000 mg by mouth daily. Evening as needed      diclofenac (VOLTAREN) 1 % gel Top BID on elbow and knee 100 g 1    cariprazine (VRAYLAR) 1.5 mg capsule Take 3 mg by mouth daily. Indications: bipolar I disorder with most recent episode mixed      calcium carbonate (CALTREX) 600 mg calcium (1,500 mg) tablet Take 600 mg by mouth two (2) times a day.  cholecalciferol (VITAMIN D3) 2,000 unit cap capsule Take 2,000 Units by mouth daily.  30 Cap 4  carboxymethylcellulose sodium (THERATEARS) 0.25 % drop ophthalmic solution Administer 1 Drop to both eyes four (4) times daily.  LORazepam (ATIVAN) 0.5 mg tablet Take 1 Tab by mouth every eight (8) hours as needed for Anxiety. Max Daily Amount: 1.5 mg. D/C valium 90 Tab 2    Vraylar 3 mg capsule       senna-docusate (PERICOLACE) 8.6-50 mg per tablet Take 2 Tablets by mouth daily. 45 Tablet 0     Allergies   Allergen Reactions    Other Food Other (comments)     Oranges, cabbage, beans, peppers, raw onions, foods with caffeine, popcorn, soda because of IBS    Buspar [Buspirone] Other (comments)     Causes \"stimulation\" that could exacerbate a manic attack/phase of pt's Biplar. Reported by patient    Amlodipine Other (comments)     Ankle swelling    Caffeine Other (comments)     Patient does not take this because of anxiety    Dicyclomine Nausea and Vomiting     Extreme stomach pains    Doxycycline Other (comments)     UTI like symptoms     Lithium Nausea and Vomiting     Severe nausea and vomiting.     Other Medication Other (comments)     Intolerance to oranges, cabbage,beans,peppers,raw onions,foods with caffeine in them, popcorn, no pop sodas, because of IBS     Past Medical History:   Diagnosis Date    Arthritis     Bipolar disorder ([address]     Bladder pain     Choledocholithiasis 11/24/2010    GERD (gastroesophageal reflux disease)     Headache(784.0)     tension headaches    High cholesterol     History of cholecystectomy     Hypertension     Ill-defined condition     IBS    Irritable bowel     Pelvic pain in female 2014    Psychiatric disorder     Stool color black     irritable bowel     Past Surgical History:   Procedure Laterality Date    COLONOSCOPY N/A 7/5/2017    COLONOSCOPY performed by Vanessa Lakhani MD at [address] [facility]  2003, 2017    prolasped rectum    HX GI  2021    rectopexy    HX LAP CHOLECYSTECTOMY  11/23/10    HX ORTHOPAEDIC Right 2018    total knee arthroplasty    HX KALEIGH AND BSO  1980    LAPAROSCOPY ABDOMEN DIAGNOSTIC  1987     Family History   Problem Relation Age of Onset    Deep Vein Thrombosis Mother     Hypertension Father     Cancer Father         SKIN CA    Colon Polyps Father     Other Maternal Uncle         CHAROT-MARISELA-TOOTH    Diabetes Paternal Grandfather     No Known Problems Sister      Social History     Tobacco Use    Smoking status: Never Smoker    Smokeless tobacco: Never Used   Substance Use Topics    Alcohol use: No       Review of Systems   Constitutional: Negative. Respiratory: Negative. Cardiovascular: Negative. Gastrointestinal: Negative. Genitourinary: Positive for dysuria. Negative for flank pain. Urine discolored    Neurological: Negative.         Objective:     Patient-Reported Vitals 3/17/2022   Patient-Reported Weight 130lb        [INSTRUCTIONS:  \"[x]\" Indicates a positive item  \"[]\" Indicates a negative item  -- DELETE ALL ITEMS NOT EXAMINED]    Constitutional: [x] Appears well-developed and well-nourished [x] No apparent distress      [] Abnormal -     Mental status: [x] Alert and awake  [x] Oriented to person/place/time [x] Able to follow commands    [] Abnormal -     Eyes:   EOM    [x]  Normal    [] Abnormal -   Sclera  [x]  Normal    [] Abnormal -          Discharge [x]  None visible   [] Abnormal -     HENT: [x] Normocephalic, atraumatic  [] Abnormal -   [x] Mouth/Throat: Mucous membranes are moist    External Ears [x] Normal  [] Abnormal -    Neck: [x] No visualized mass [] Abnormal -     Pulmonary/Chest: [x] Respiratory effort normal   [x] No visualized signs of difficulty breathing or respiratory distress        [] Abnormal -      Musculoskeletal:   [x] Normal gait with no signs of ataxia         [x] Normal range of motion of neck        [] Abnormal -     Neurological:        [x] No Facial Asymmetry (Cranial nerve 7 motor function) (limited exam due to video visit)          [x] No gaze palsy        [] Abnormal -          Skin:        [x] No significant exanthematous lesions or discoloration noted on facial skin         [] Abnormal -            Psychiatric:       [x] Normal Affect [] Abnormal -        [x] No Hallucinations    Other pertinent observable physical exam findings:-        We discussed the expected course, resolution and complications of the diagnosis(es) in detail. Medication risks, benefits, costs, interactions, and alternatives were discussed as indicated. I advised her to contact the office if her condition worsens, changes or fails to improve as anticipated. She expressed understanding with the diagnosis(es) and plan. Damian Stovall, was evaluated through a synchronous (real-time) audio-video encounter. The patient (or guardian if applicable) is aware that this is a billable service, which includes applicable co-pays. This Virtual Visit was conducted with patient's (and/or legal guardian's) consent. The visit was conducted pursuant to the emergency declaration under the [address] [place] [address] authority and the [employer] Act. Patient identification was verified, and a caregiver was present when appropriate. The patient was located at: Home: [address] [place] [address]  The provider was located at:  Other: valorie Baldwin NP

## 2022-06-09 NOTE — PROGRESS NOTES
Rm    Chief Complaint   Patient presents with    Bladder Infection     starting 6/3 still having bad burning sensation, urine is backk to normal color. There were no vitals taken for this visit. 1. Have you been to the ER, urgent care clinic since your last visit? Hospitalized since your last visit? No    2. Have you seen or consulted any other health care providers outside of the [address] [place] [address] since your last visit? Include any pap smears or colon screening. No     Health Maintenance Due   Topic Date Due    Shingrix Vaccine Age 49> (1 of 2) Never done    Medicare Yearly Exam  07/09/2022        3 most recent PHQ Screens 6/9/2022   Little interest or pleasure in doing things Not at all   Feeling down, depressed, irritable, or hopeless Not at all   Total Score PHQ 2 0   Trouble falling or staying asleep, or sleeping too much -   Feeling tired or having little energy -   Poor appetite, weight loss, or overeating -   Feeling bad about yourself - or that you are a failure or have let yourself or your family down -   Trouble concentrating on things such as school, work, reading, or watching TV -   Moving or speaking so slowly that other people could have noticed; or the opposite being so fidgety that others notice -   Thoughts of being better off dead, or hurting yourself in some way -   PHQ 9 Score -        Fall Risk Assessment, last 12 mths 1/11/2022   Able to walk? Yes   Fall in past 12 months? 0   Do you feel unsteady? 0   Are you worried about falling 0   Number of falls in past 12 months -   Fall with injury?  -       Learning Assessment 2/10/2015   PRIMARY LEARNER Patient   PRIMARY LANGUAGE ENGLISH   LEARNER PREFERENCE PRIMARY READING   ANSWERED BY patient   RELATIONSHIP SELF

## 2022-06-13 ENCOUNTER — TELEPHONE (OUTPATIENT)
Dept: INTERNAL MEDICINE CLINIC | Age: 62
End: 2022-06-13

## 2022-06-13 NOTE — TELEPHONE ENCOUNTER
----- Message from Derek Alberts sent at 6/13/2022  9:08 AM EDT -----  Subject: Message to Provider    QUESTIONS  Information for Provider? SUMEET, saw John Marmolejo NP, on June 6, 2022   prescribed some medication but still having the problem   ---------------------------------------------------------------------------  --------------  CALL BACK INFO  What is the best way for the office to contact you? OK to leave message on   voicemail  Preferred Call Back Phone Number? [phone]  ---------------------------------------------------------------------------  --------------  SCRIPT ANSWERS  Relationship to Patient? Self  Have you recently (14 days) seen a provider for this problem?  Yes

## 2022-06-14 ENCOUNTER — CLINICAL SUPPORT (OUTPATIENT)
Dept: INTERNAL MEDICINE CLINIC | Age: 62
End: 2022-06-14
Payer: MEDICARE

## 2022-06-14 DIAGNOSIS — N39.0 URINARY TRACT INFECTION WITH HEMATURIA, SITE UNSPECIFIED: Primary | ICD-10-CM

## 2022-06-14 DIAGNOSIS — R31.9 URINARY TRACT INFECTION WITH HEMATURIA, SITE UNSPECIFIED: Primary | ICD-10-CM

## 2022-06-14 LAB
BILIRUB UR QL STRIP: NEGATIVE
GLUCOSE UR-MCNC: NEGATIVE MG/DL
KETONES P FAST UR STRIP-MCNC: NEGATIVE MG/DL
PH UR STRIP: 7 [PH] (ref 4.6–8)
PROT UR QL STRIP: NEGATIVE
SP GR UR STRIP: 1.02 (ref 1–1.03)
UA UROBILINOGEN AMB POC: NORMAL (ref 0.2–1)
URINALYSIS CLARITY POC: NORMAL
URINALYSIS COLOR POC: NORMAL
URINE BLOOD POC: NORMAL
URINE LEUKOCYTES POC: NORMAL
URINE NITRITES POC: NEGATIVE

## 2022-06-14 PROCEDURE — 81003 URINALYSIS AUTO W/O SCOPE: CPT | Performed by: INTERNAL MEDICINE

## 2022-06-14 NOTE — PROGRESS NOTES
Health Maintenance Due   Topic Date Due    Shingrix Vaccine Age 49> (1 of 2) Never done    Medicare Yearly Exam  07/09/2022       Chief Complaint   Patient presents with    Bladder Infection       1. Have you been to the ER, urgent care clinic since your last visit? Hospitalized since your last visit? No    2. Have you seen or consulted any other health care providers outside of the [address] [place] [address] since your last visit? Include any pap smears or colon screening. No    3) Do you have an Advance Directive on file? no    4) Are you interested in receiving information on Advance Directives? NO      Patient is accompanied by self I have received verbal consent from [facility] to discuss any/all medical information while they are present in the room.

## 2022-06-14 NOTE — TELEPHONE ENCOUNTER
Renee Hanks was called. Eri Landaverde states that she finished last pill of antibiotic this AM, \"no improvement with the macrobid, still having the burning when going to the bathroom and being up and around. \"     Fever: \"low grade 99.4\"  Chills: No  Flank pain?: Yes (right side)    Can see someone today, please advise if follow up is needed

## 2022-06-14 NOTE — TELEPHONE ENCOUNTER
Patient was called by Ayanna Albert NP. Patient will present in clinic with new urine sample today 06/14.

## 2022-06-17 LAB — BACTERIA UR CULT: NO GROWTH

## 2022-06-18 DIAGNOSIS — F31.10 BIPOLAR AFFECTIVE DISORDER, CURRENT EPISODE MANIC, CURRENT EPISODE SEVERITY UNSPECIFIED (HCC): ICD-10-CM

## 2022-06-20 RX ORDER — AMITRIPTYLINE HYDROCHLORIDE 50 MG/1
TABLET, FILM COATED ORAL
Qty: 90 TABLET | Refills: 1 | Status: SHIPPED | OUTPATIENT
Start: 2022-06-20

## 2022-07-05 ENCOUNTER — NURSE TRIAGE (OUTPATIENT)
Dept: OTHER | Facility: CLINIC | Age: 62
End: 2022-07-05

## 2022-07-05 NOTE — TELEPHONE ENCOUNTER
Received call from [facility] at [facility] with [facility]. Subjective: Caller states \"I started about 1 week ago with swelling in my L ankle and foot. I've had this happen before when taking amlodipine but never just on one side. The first day I had it I went to [facility] and it was hurting. It's better in the morning but as the day goes on it get puffier. \"    Current Symptoms: swelling in L leg (ankle/foot)    Onset: 1 week    Associated Symptoms: denies other symptoms    Pain Severity: denies pain    Temperature: denies fever    What has been tried: elevating    LMP: NA Pregnant: NA    Recommended disposition: Go to ED/UCC Now (Or to Office with PCP Approval)    Care advice provided, patient verbalizes understanding; denies any other questions or concerns; instructed to call back for any new or worsening symptoms. Writer provided warm transfer to Johan Frederick at [facility] for 2nd level triage     2nd level triage completed with Johan Frederick who will transfer to clinical team for recommendation. Attention Provider: Thank you for allowing me to participate in the care of your patient. The patient was connected to triage in response to information provided to the [facility]. Please do not respond through this encounter as the response is not directed to a shared pool.     Reason for Disposition   Thigh, calf, or ankle swelling in only one leg    Protocols used: LEG SWELLING AND EDEMA-ADULT-OH

## 2022-07-06 NOTE — PROGRESS NOTES
Bedside shift change report given to Patricio Ko (oncoming nurse) by Edi Heard (offgoing nurse). Report included the following information SBAR. 5cm/Air/17 ga Chance/27 ga Rolan

## 2022-07-21 ENCOUNTER — OFFICE VISIT (OUTPATIENT)
Dept: INTERNAL MEDICINE CLINIC | Age: 62
End: 2022-07-21
Payer: MEDICARE

## 2022-07-21 VITALS
BODY MASS INDEX: 23.71 KG/M2 | DIASTOLIC BLOOD PRESSURE: 90 MMHG | RESPIRATION RATE: 16 BRPM | OXYGEN SATURATION: 97 % | TEMPERATURE: 98 F | HEART RATE: 88 BPM | SYSTOLIC BLOOD PRESSURE: 140 MMHG | HEIGHT: 60 IN | WEIGHT: 120.8 LBS

## 2022-07-21 DIAGNOSIS — I10 ESSENTIAL HYPERTENSION: ICD-10-CM

## 2022-07-21 DIAGNOSIS — G44.221 CHRONIC TENSION-TYPE HEADACHE, INTRACTABLE: ICD-10-CM

## 2022-07-21 DIAGNOSIS — I87.2 CHRONIC VENOUS INSUFFICIENCY: ICD-10-CM

## 2022-07-21 DIAGNOSIS — N32.81 OAB (OVERACTIVE BLADDER): Primary | ICD-10-CM

## 2022-07-21 DIAGNOSIS — E78.2 ELEVATED TRIGLYCERIDES WITH HIGH CHOLESTEROL: ICD-10-CM

## 2022-07-21 DIAGNOSIS — Z23 ENCOUNTER FOR IMMUNIZATION: ICD-10-CM

## 2022-07-21 DIAGNOSIS — Z00.00 MEDICARE ANNUAL WELLNESS VISIT, SUBSEQUENT: ICD-10-CM

## 2022-07-21 DIAGNOSIS — Z71.89 ACP (ADVANCE CARE PLANNING): ICD-10-CM

## 2022-07-21 DIAGNOSIS — M62.830 BACK SPASM: ICD-10-CM

## 2022-07-21 PROCEDURE — G9899 SCRN MAM PERF RSLTS DOC: HCPCS | Performed by: INTERNAL MEDICINE

## 2022-07-21 PROCEDURE — G8420 CALC BMI NORM PARAMETERS: HCPCS | Performed by: INTERNAL MEDICINE

## 2022-07-21 PROCEDURE — G9717 DOC PT DX DEP/BP F/U NT REQ: HCPCS | Performed by: INTERNAL MEDICINE

## 2022-07-21 PROCEDURE — G8755 DIAS BP > OR = 90: HCPCS | Performed by: INTERNAL MEDICINE

## 2022-07-21 PROCEDURE — G8753 SYS BP > OR = 140: HCPCS | Performed by: INTERNAL MEDICINE

## 2022-07-21 PROCEDURE — G0439 PPPS, SUBSEQ VISIT: HCPCS | Performed by: INTERNAL MEDICINE

## 2022-07-21 PROCEDURE — 99214 OFFICE O/P EST MOD 30 MIN: CPT | Performed by: INTERNAL MEDICINE

## 2022-07-21 PROCEDURE — 3017F COLORECTAL CA SCREEN DOC REV: CPT | Performed by: INTERNAL MEDICINE

## 2022-07-21 PROCEDURE — G8427 DOCREV CUR MEDS BY ELIG CLIN: HCPCS | Performed by: INTERNAL MEDICINE

## 2022-07-21 PROCEDURE — 99497 ADVNCD CARE PLAN 30 MIN: CPT | Performed by: INTERNAL MEDICINE

## 2022-07-21 RX ORDER — ATENOLOL 25 MG/1
25 TABLET ORAL DAILY
Qty: 30 TABLET | Refills: 2 | Status: SHIPPED | OUTPATIENT
Start: 2022-07-21 | End: 2022-09-22

## 2022-07-21 RX ORDER — TIZANIDINE 2 MG/1
2 TABLET ORAL
Qty: 20 TABLET | Refills: 0 | Status: SHIPPED | OUTPATIENT
Start: 2022-07-21 | End: 2022-10-17

## 2022-07-21 RX ORDER — SOLIFENACIN SUCCINATE 5 MG/1
5 TABLET, FILM COATED ORAL DAILY
Qty: 30 TABLET | Refills: 2 | Status: SHIPPED | OUTPATIENT
Start: 2022-07-21 | End: 2022-09-22

## 2022-07-21 NOTE — ACP (ADVANCE CARE PLANNING)

## 2022-07-21 NOTE — PATIENT INSTRUCTIONS

## 2022-07-21 NOTE — PROGRESS NOTES
This is the Subsequent Medicare Annual Wellness Exam, performed 12 months or more after the Initial AWV or the last Subsequent AWV    I have reviewed the patient's medical history in detail and updated the computerized patient record. Assessment/Plan   Education and counseling provided:  Are appropriate based on today's review and evaluation  End-of-Life planning (with patient's consent)  Pneumococcal Vaccine  Colorectal cancer screening tests  Bone mass measurement (DEXA)  Screening for glaucoma    1. OAB (overactive bladder)  -     solifenacin (VESICARE) 5 mg tablet; Take 1 Tablet by mouth in the morning., Normal, Disp-30 Tablet, R-2  2. Chronic venous insufficiency  3. Essential hypertension  -     atenoloL (TENORMIN) 25 mg tablet; Take 1 Tablet by mouth in the morning., Normal, Disp-30 Tablet, R-2  4. Elevated triglycerides with high cholesterol  5. Chronic tension-type headache, intractable  -     atenoloL (TENORMIN) 25 mg tablet;  Take 1 Tablet by mouth in the morning., Normal, Disp-30 Tablet, R-2       Depression Risk Factor Screening     3 most recent PHQ Screens 7/21/2022   Little interest or pleasure in doing things Several days   Feeling down, depressed, irritable, or hopeless Several days   Total Score PHQ 2 2   Trouble falling or staying asleep, or sleeping too much -   Feeling tired or having little energy -   Poor appetite, weight loss, or overeating -   Feeling bad about yourself - or that you are a failure or have let yourself or your family down -   Trouble concentrating on things such as school, work, reading, or watching TV -   Moving or speaking so slowly that other people could have noticed; or the opposite being so fidgety that others notice -   Thoughts of being better off dead, or hurting yourself in some way -   PHQ 9 Score -       Alcohol & Drug Abuse Risk Screen    Do you average more than 1 drink per night or more than 7 drinks a week:  No    On any one occasion in the past three months have you have had more than 3 drinks containing alcohol:  No          Functional Ability and Level of Safety    Hearing: Hearing is good. Activities of Daily Living: The home contains: no safety equipment. Patient does total self care      Ambulation: with mild difficulty     Fall Risk:  Fall Risk Assessment, last 12 mths 1/11/2022   Able to walk? Yes   Fall in past 12 months? 0   Do you feel unsteady? 0   Are you worried about falling 0   Number of falls in past 12 months -   Fall with injury?  -      Abuse Screen:  Patient is not abused       Cognitive Screening    Has your family/caregiver stated any concerns about your memory: no     Cognitive Screening: Normal -      Health Maintenance Due     Health Maintenance Due   Topic Date Due    Shingrix Vaccine Age 49> (1 of 2) Never done    COVID-19 Vaccine (4 - Booster for [employer] [facility] series) 04/28/2022       Patient Care Team   Patient Care Team:  John Novak MD as PCP - General (Internal Medicine Physician)  John Novak MD as PCP - [facility] Empaneled Provider  Denzel Frank MD (Gynecology)  Radha King MD (Gastroenterology)  Abelardo Fernandez MD (Psychiatry)  Gurwinder Bean MD (Cardio Vascular Surgery)    History     Patient Active Problem List   Diagnosis Code    Arthritis M19.90    Gallstones with obstruction of gallbladder K80.21    Choledocholithiasis K80.50    Bipolar affective disorder, manic ([address] F31.10    IBS (irritable bowel syndrome) K58.9    Chronic headache R51.9, G89.29    Drop attack R55    Altered mental status R41.82    Delirium R41.0    Hyponatremia E87.1    Bipolar 1 disorder ([address] F31.9    ACP (advance care planning) Z71.89    Rectal prolapse K62.3    Osteoarthritis of right knee M17.11    Total knee replacement status, right Z96.651    Chronic renal disease, stage III N18.30     Past Medical History:   Diagnosis Date    Arthritis     Bipolar disorder ([address]     Bladder pain     Choledocholithiasis 11/24/2010    GERD (gastroesophageal reflux disease)     Headache(784.0)     tension headaches    High cholesterol     History of cholecystectomy     Hypertension     Ill-defined condition     IBS    Irritable bowel     Pelvic pain in female 2014    Psychiatric disorder     Stool color black     irritable bowel      Past Surgical History:   Procedure Laterality Date    COLONOSCOPY N/A 7/5/2017    COLONOSCOPY performed by Mari Mclean MD at [address] [facility]    HX GI  2003, 2017    prolasped rectum    HX GI  2021    rectopexy    HX LAP CHOLECYSTECTOMY  11/23/10    HX ORTHOPAEDIC Right 2018    total knee arthroplasty    HX KALEIGH AND BSO  1980    LAPAROSCOPY ABDOMEN DIAGNOSTIC  1987     Current Outpatient Medications   Medication Sig Dispense Refill    solifenacin (VESICARE) 5 mg tablet Take 1 Tablet by mouth in the morning. 30 Tablet 2    atenoloL (TENORMIN) 25 mg tablet Take 1 Tablet by mouth in the morning. 30 Tablet 2    amitriptyline (ELAVIL) 50 mg tablet TAKE 1 TABLET BY MOUTH EVERY DAY AT NIGHT 90 Tablet 1    Vraylar 3 mg capsule       gabapentin (NEURONTIN) 100 mg capsule TAKE 2 CAPSULES BY MOUTH EVERY MORNING AND 1 CAPSULE IN THE EVENING 90 Capsule 2    rifAXIMin (Xifaxan) 550 mg tablet Take 1 Tablet by mouth three (3) times daily. 63 Tablet 1    losartan (COZAAR) 25 mg tablet TAKE 1 TABLET BY MOUTH DAILY. 90 Tablet 1    fenofibrate nanocrystallized (TRICOR) 145 mg tablet TAKE 1 TABLET BY MOUTH EVERY 48 HOURS 45 Tablet 1    elderberry fruit (ELDERBERRY PO) Take  by mouth daily. alendronate (FOSAMAX) 70 mg tablet Take 1 Tablet by mouth every seven (7) days. TAKE ONE TABLET BY MOUTH EVERY 7 DAYS/sat 12 Tablet 3    senna-docusate (PERICOLACE) 8.6-50 mg per tablet Take 2 Tablets by mouth daily. 45 Tablet 0    acetaminophen (TYLENOL) 500 mg tablet Take 1,000 mg by mouth daily.  Evening as needed      diclofenac (VOLTAREN) 1 % gel Top BID on elbow and knee 100 g 1    calcium carbonate (CALTREX) 600 mg calcium (1,500 mg) tablet Take 600 mg by mouth two (2) times a day. cholecalciferol (VITAMIN D3) 2,000 unit cap capsule Take 2,000 Units by mouth daily. 30 Cap 4    carboxymethylcellulose sodium (THERATEARS) 0.25 % drop ophthalmic solution Administer 1 Drop to both eyes four (4) times daily. LORazepam (ATIVAN) 0.5 mg tablet Take 1 Tab by mouth every eight (8) hours as needed for Anxiety. Max Daily Amount: 1.5 mg. D/C valium 90 Tab 2    nitrofurantoin, macrocrystal-monohydrate, (MACROBID) 100 mg capsule Take 1 Capsule by mouth two (2) times a day. (Patient not taking: Reported on 7/21/2022) 10 Capsule 0     Allergies   Allergen Reactions    Other Food Other (comments)     Oranges, cabbage, beans, peppers, raw onions, foods with caffeine, popcorn, soda because of IBS    Buspar [Buspirone] Other (comments)     Causes \"stimulation\" that could exacerbate a manic attack/phase of pt's Biplar. Reported by patient    Amlodipine Other (comments)     Ankle swelling    Caffeine Other (comments)     Patient does not take this because of anxiety    Dicyclomine Nausea and Vomiting     Extreme stomach pains    Doxycycline Other (comments)     UTI like symptoms     Lithium Nausea and Vomiting     Severe nausea and vomiting.     Other Medication Other (comments)     Intolerance to oranges, cabbage,beans,peppers,raw onions,foods with caffeine in them, popcorn, no pop sodas, because of IBS       Family History   Problem Relation Age of Onset    Deep Vein Thrombosis Mother     Hypertension Father     Cancer Father         SKIN CA    Colon Polyps Father     Other Maternal Uncle         CHAROT-MARISELA-TOOTH    Diabetes Paternal Grandfather     No Known Problems Sister      Social History     Tobacco Use    Smoking status: Never    Smokeless tobacco: Never   Substance Use Topics    Alcohol use: No         Candice Griggs MD

## 2022-07-21 NOTE — PROGRESS NOTES
Chief Complaint   Patient presents with    Complete Physical     3 most recent PHQ Screens 7/21/2022   Little interest or pleasure in doing things Several days   Feeling down, depressed, irritable, or hopeless Several days   Total Score PHQ 2 2   Trouble falling or staying asleep, or sleeping too much -   Feeling tired or having little energy -   Poor appetite, weight loss, or overeating -   Feeling bad about yourself - or that you are a failure or have let yourself or your family down -   Trouble concentrating on things such as school, work, reading, or watching TV -   Moving or speaking so slowly that other people could have noticed; or the opposite being so fidgety that others notice -   Thoughts of being better off dead, or hurting yourself in some way -   PHQ 9 Score -     Abuse Screening Questionnaire 7/21/2022   Do you ever feel afraid of your partner? N   Are you in a relationship with someone who physically or mentally threatens you? N   Is it safe for you to go home? Y     Visit Vitals  BP (!) 140/90 (BP 1 Location: Left upper arm, BP Patient Position: Sitting, BP Cuff Size: Large adult)   Pulse 88   Temp 98 °F (36.7 °C) (Temporal)   Resp 16   Ht 5' (1.524 m)   Wt 120 lb 12.8 oz (54.8 kg)   SpO2 97%   BMI 23.59 kg/m²     1. \"Have you been to the ER, urgent care clinic since your last visit? Hospitalized since your last visit? \" Yes dispatch health to home    2. \"Have you seen or consulted any other health care providers outside of the [address] [place] [address] since your last visit? \" no  3. For patients aged 39-70: Has the patient had a colonoscopy / FIT/ Cologuard? Due 09/22      If the patient is female:    4. For patients aged 41-77: Has the patient had a mammogram within the past 2 years? 08/22      5.  For patients aged 21-65: Has the patient had a pap smear? yes

## 2022-07-21 NOTE — PROGRESS NOTES
HISTORY OF PRESENT ILLNESS  Aristides Sidhu is a 57 y.o. female here for follow-up. Reported urinary frequency and urgency mostly at nighttime. She is going to bathroom 2-3 times at night. No incontinence but she is incontinent liner. No history of elevated blood pressure. Has palpitation and ongoing headache for last several months. Taking amitriptyline, not doing much now. She suffers from headache. Gabapentin helped her to control headache. Need refill. Also on amitriptyline. Suffer from lower back pain and stiffness. Sometimes pain radiating to leg. Has bipolar disorder. Seeing psychiatrist.  Depression seems stable. Labs reviewed, seems stable. Here for medical needs visit. Has living will. Review of Systems   Constitutional: Negative. HENT: Negative. Eyes: Negative. Respiratory: Negative. Negative for shortness of breath and wheezing. Cardiovascular:  Negative for chest pain. Gastrointestinal: Negative. Negative for blood in stool. Genitourinary: Negative. Musculoskeletal:  Positive for back pain. Negative for falls. Skin: Negative. Negative for itching. Neurological:  Positive for headaches. Endo/Heme/Allergies: Negative. Psychiatric/Behavioral:  Positive for depression. Physical Exam  Constitutional:       General: She is not in acute distress. Appearance: Normal appearance. She is normal weight. Cardiovascular:      Rate and Rhythm: Normal rate and regular rhythm. Pulses: Normal pulses. Heart sounds: Normal heart sounds. Pulmonary:      Effort: Pulmonary effort is normal. No respiratory distress. Breath sounds: Normal breath sounds. No wheezing. Abdominal:      General: Abdomen is flat. Bowel sounds are normal.   Musculoskeletal:         General: Tenderness present. Cervical back: Normal range of motion and neck supple. Comments: Lower back: Spine nontender. Paravertebral muscle spasm present. No spinal restricted. Straight leg raising test negative. Neurological:      General: No focal deficit present. Mental Status: She is alert and oriented to person, place, and time. Mental status is at baseline. Psychiatric:         Mood and Affect: Mood normal.         Behavior: Behavior normal.      Comments: Stable depression. ASSESSMENT and PLAN    Diagnoses and all orders for this visit:    1. OAB (overactive bladder)    No incontinence. But sometimes she has accidents because she is going to frequent. Will start,  -     solifenacin (VESICARE) 5 mg tablet; Take 1 Tablet by mouth in the morning. 2. Chronic venous insufficiency  Ultrasound of left lower extremity did not show any blood clot. Advised to do leg elevation and use stockings. 3. Essential hypertension    On low-dose of losartan. Blood pressure slightly elevated. Patient has ongoing headache. Also have anxiety and tachycardia. We will start,  -     atenoloL (TENORMIN) 25 mg tablet; Take 1 Tablet by mouth in the morning. 4. Elevated triglycerides with high cholesterol  Watching carbohydrate. Be on low-cholesterol diet and exercise. 5. Chronic tension-type headache, intractable    On Elavil. We will add,  -     atenoloL (TENORMIN) 25 mg tablet; Take 1 Tablet by mouth in the morning. 6. Medicare annual wellness visit, subsequent    7. ACP (advance care planning)    8. Back spasm    Heating pad and massage. We will give,  -     tiZANidine (ZANAFLEX) 2 mg tablet; Take 1 Tablet by mouth two (2) times daily as needed for Muscle Spasm(s). 9. Encounter for immunization  -     varicella-zoster recombinant, PF, (SHINGRIX) 50 mcg/0.5 mL susr injection; 0.5 mL by IntraMUSCular route once for 1 dose. Discussed expected course/resolution/complications of diagnosis in detail with patient. Medication risks/benefits/costs/interactions/alternatives discussed with patient. Discussed COVID-19 infection precaution with patient.   Pt was given an after visit summary which includes diagnoses, current medications & vitals. Pt expressed understanding with the diagnosis and plan.

## 2022-07-22 DIAGNOSIS — G44.221 CHRONIC TENSION-TYPE HEADACHE, INTRACTABLE: ICD-10-CM

## 2022-07-22 DIAGNOSIS — G44.021 INTRACTABLE CHRONIC CLUSTER HEADACHE: ICD-10-CM

## 2022-07-22 RX ORDER — GABAPENTIN 100 MG/1
CAPSULE ORAL
Qty: 90 CAPSULE | Refills: 2 | Status: SHIPPED | OUTPATIENT
Start: 2022-07-22 | End: 2022-08-18

## 2022-07-22 NOTE — TELEPHONE ENCOUNTER
Requested Prescriptions     Pending Prescriptions Disp Refills    gabapentin (NEURONTIN) 100 mg capsule 90 Capsule 2     Sig: TAKE 2 CAPSULES BY MOUTH EVERY MORNING AND 1 CAPSULE IN THE EVENING         [id number], [facility]! [address] AT [place] [address]  Phone: [phone] Fax: [phone]       7/21/2022 is LAST OFFICE VISIT     Future Appointments   Date Time Provider Aurora Baeza   10/20/2022 10:45 AM Kristel Browning MD [facility]

## 2022-07-22 NOTE — TELEPHONE ENCOUNTER
----- Message from Kai Andre sent at 7/22/2022 11:30 AM EDT -----  Subject: Refill Request    QUESTIONS  Name of Medication? gabapentin (NEURONTIN) 100 mg capsule  Patient-reported dosage and instructions? 100 MG 2 in am 1 pm  How many days do you have left? 30  Preferred Pharmacy? [id number]  Pharmacy phone number (if available)? [phone]  Additional Information for Provider? She forgot to tell Dr. Heriberto Benítez at her   appt yesterday that she needed a 3 month supply until her next appt on   Oct. 20th. She will be out at the end of the month.  ---------------------------------------------------------------------------  --------------  CALL BACK INFO  What is the best way for the office to contact you? OK to leave message on   voicemail  Preferred Call Back Phone Number? [phone]  ---------------------------------------------------------------------------  --------------  SCRIPT ANSWERS  Relationship to Patient?  Self

## 2022-08-09 ENCOUNTER — TELEPHONE (OUTPATIENT)
Dept: INTERNAL MEDICINE CLINIC | Age: 62
End: 2022-08-09

## 2022-08-09 NOTE — TELEPHONE ENCOUNTER
Patient called to follow up on her next steps on a recent xray performed by Vishal Chambers. Patient stated that they found a fracture and was told to reach out to pcp or ortho to follow up. No fax was found from [place].    Follow up appointment scheduled for 08/11/2022 with Mukesh Blank NP.

## 2022-08-11 ENCOUNTER — VIRTUAL VISIT (OUTPATIENT)
Dept: INTERNAL MEDICINE CLINIC | Age: 62
End: 2022-08-11
Payer: MEDICARE

## 2022-08-11 DIAGNOSIS — S82.832D CLOSED FRACTURE OF DISTAL END OF LEFT FIBULA WITH ROUTINE HEALING, UNSPECIFIED FRACTURE MORPHOLOGY, SUBSEQUENT ENCOUNTER: Primary | ICD-10-CM

## 2022-08-11 PROCEDURE — 99213 OFFICE O/P EST LOW 20 MIN: CPT | Performed by: NURSE PRACTITIONER

## 2022-08-11 PROCEDURE — G8427 DOCREV CUR MEDS BY ELIG CLIN: HCPCS | Performed by: NURSE PRACTITIONER

## 2022-08-11 PROCEDURE — 3017F COLORECTAL CA SCREEN DOC REV: CPT | Performed by: NURSE PRACTITIONER

## 2022-08-11 PROCEDURE — G9899 SCRN MAM PERF RSLTS DOC: HCPCS | Performed by: NURSE PRACTITIONER

## 2022-08-11 PROCEDURE — G8420 CALC BMI NORM PARAMETERS: HCPCS | Performed by: NURSE PRACTITIONER

## 2022-08-11 PROCEDURE — G8756 NO BP MEASURE DOC: HCPCS | Performed by: NURSE PRACTITIONER

## 2022-08-11 PROCEDURE — G9717 DOC PT DX DEP/BP F/U NT REQ: HCPCS | Performed by: NURSE PRACTITIONER

## 2022-08-11 NOTE — PROGRESS NOTES
Iva Grover is a 57 y.o. female who was seen by synchronous (real-time) audio-video technology on 8/11/2022 for Foot Injury        Assessment & Plan:   Diagnoses and all orders for this visit:    1. Closed fracture of distal end of left fibula with routine healing, unspecified fracture morphology, subsequent encounter  Will order  -     REFERRAL TO ORTHOPEDICS  Called to [facility]. Appt scheduled from 08/17/22 at 915 AM at [place] location. Patient notified. Patient may continue compression wrapping to ankle. Limit weight bearing. May use ice for comfort. May present to ER for emergencies. Patient encouraged to call or return to office if symptoms do not improve or worsen. Reviewed medications and side effects in detail. Reviewed plan of care with patient who acknowledges understanding and agrees. Subjective: This is a patient of Dr. Earnestine Li who presents today for follow-up after urgent care visit. Patient states she had a fall on 07/30/22 after standing up to quickly. She has had pain to her left foot and ankle since that time. She contacted [facility] for visit 08/04/22 and had mobile imaging complete x-ray on 08/06/22. Patient states she was contacted and advised that x-ray showed non-displaced subtle acute fracture through the distal fibula. She was advised to follow-up with her PCP. Patient states she has been keeping the ankle wrapped and taking Tylenol PRN for pain, which is helping. She states there is some ongoing swelling to affected area. Prior to Admission medications    Medication Sig Start Date End Date Taking?  Authorizing Provider   gabapentin (NEURONTIN) 100 mg capsule TAKE 2 CAPSULES BY MOUTH EVERY MORNING AND 1 CAPSULE IN THE EVENING 7/22/22  Yes Clifton Johnson MD   solifenacin (VESICARE) 5 mg tablet Take 1 Tablet by mouth in the morning. 7/21/22  Yes Clifton Johnson MD   atenoloL (TENORMIN) 25 mg tablet Take 1 Tablet by mouth in the morning. 7/21/22 Yes Laurie Morrison MD   tiZANidine (ZANAFLEX) 2 mg tablet Take 1 Tablet by mouth two (2) times daily as needed for Muscle Spasm(s). 7/21/22  Yes Laurie Morrison MD   amitriptyline (ELAVIL) 50 mg tablet TAKE 1 TABLET BY MOUTH EVERY DAY AT NIGHT 6/20/22  Yes Marylen Fielding, NP   Vraylar 3 mg capsule  6/9/22  Yes Provider, Historical   losartan (COZAAR) 25 mg tablet TAKE 1 TABLET BY MOUTH DAILY. 4/11/22  Yes Laurie Morrison MD   fenofibrate nanocrystallized (TRICOR) 145 mg tablet TAKE 1 TABLET BY MOUTH EVERY 48 HOURS 4/11/22  Yes Laurie Morrison MD   elderberry fruit (ELDERBERRY PO) Take  by mouth daily. Yes Provider, Historical   alendronate (FOSAMAX) 70 mg tablet Take 1 Tablet by mouth every seven (7) days. TAKE ONE TABLET BY MOUTH EVERY 7 DAYS/sat 1/11/22  Yes Laurie Morrison MD   senna-docusate (PERICOLACE) 8.6-50 mg per tablet Take 2 Tablets by mouth daily. 9/21/21  Yes Laurie Morrison MD   acetaminophen (TYLENOL) 500 mg tablet Take 1,000 mg by mouth daily. Evening as needed   Yes Provider, Historical   diclofenac (VOLTAREN) 1 % gel Top BID on elbow and knee 11/17/20  Yes Laurie Morrison MD   calcium carbonate (CALTREX) 600 mg calcium (1,500 mg) tablet Take 600 mg by mouth two (2) times a day. Yes Provider, Historical   cholecalciferol (VITAMIN D3) 2,000 unit cap capsule Take 2,000 Units by mouth daily. 10/1/19  Yes Laurie Morrison MD   carboxymethylcellulose sodium (THERATEARS) 0.25 % drop ophthalmic solution Administer 1 Drop to both eyes four (4) times daily. Yes Provider, Historical   LORazepam (ATIVAN) 0.5 mg tablet Take 1 Tab by mouth every eight (8) hours as needed for Anxiety. Max Daily Amount: 1.5 mg. D/C valium 10/3/17  Yes Laurie Morrison MD   nitrofurantoin, macrocrystal-monohydrate, (MACROBID) 100 mg capsule Take 1 Capsule by mouth two (2) times a day. Patient not taking: Reported on 7/21/202221/2022 6/9/22 8/11/22  Somis NEGRO Lipscomb   rifAXIMin (Xifaxan) 550 mg tablet Take 1 Tablet by mouth three (3) times daily. 5/19/22   Wendi Wolf MD     Allergies   Allergen Reactions    Other Food Other (comments)     Oranges, cabbage, beans, peppers, raw onions, foods with caffeine, popcorn, soda because of IBS    Buspar [Buspirone] Other (comments)     Causes \"stimulation\" that could exacerbate a manic attack/phase of pt's Biplar. Reported by patient    Amlodipine Other (comments)     Ankle swelling    Caffeine Other (comments)     Patient does not take this because of anxiety    Dicyclomine Nausea and Vomiting     Extreme stomach pains    Doxycycline Other (comments)     UTI like symptoms     Lithium Nausea and Vomiting     Severe nausea and vomiting. Other Medication Other (comments)     Intolerance to oranges, cabbage,beans,peppers,raw onions,foods with caffeine in them, popcorn, no pop sodas, because of IBS     Past Medical History:   Diagnosis Date    Arthritis     Bipolar disorder ([address]     Bladder pain     Choledocholithiasis 11/24/2010    GERD (gastroesophageal reflux disease)     Headache(784.0)     tension headaches    High cholesterol     History of cholecystectomy     Hypertension     Ill-defined condition     IBS    Irritable bowel     Pelvic pain in female 2014    Psychiatric disorder     Stool color black     irritable bowel     Past Surgical History:   Procedure Laterality Date    COLONOSCOPY N/A 7/5/2017    COL[facility] by Nallely Olmos MD [facility]    HX GI  2003, 2017    prolasped rectum    HX GI  2021    rectopexy    HX LAP CHOLECYSTECTOMY  11/23/10    HX ORTHOPAEDIC Right 2018    total knee arthroplasty    HX KALEIGH AND BSO  1980    LAPAROSCOPY ABDOMEN DIAGNOSTIC  1987       Review of Systems   Constitutional:  Negative for chills and fever. HENT: Negative. Respiratory: Negative. Cardiovascular: Negative. Gastrointestinal: Negative. Genitourinary: Negative. Musculoskeletal:  Positive for falls and joint pain. Skin: Negative.     Neurological: Negative. Endo/Heme/Allergies: Negative. Psychiatric/Behavioral: Negative. Objective:     Patient-Reported Vitals 3/17/2022   Patient-Reported Weight 130lb        [INSTRUCTIONS:  \"[x]\" Indicates a positive item  \"[]\" Indicates a negative item  -- DELETE ALL ITEMS NOT EXAMINED]    Constitutional: [x] Appears well-developed and well-nourished [x] No apparent distress      [] Abnormal -     Mental status: [x] Alert and awake  [x] Oriented to person/place/time [x] Able to follow commands    [] Abnormal -     Eyes:   EOM    [x]  Normal    [] Abnormal -   Sclera  [x]  Normal    [] Abnormal -          Discharge [x]  None visible   [] Abnormal -     HENT: [x] Normocephalic, atraumatic  [] Abnormal -     Neck: [x] No visualized mass [] Abnormal -     Pulmonary/Chest: [x] Respiratory effort normal   [x] No visualized signs of difficulty breathing or respiratory distress        [] Abnormal -      Musculoskeletal:           [x] Normal range of motion of neck        [] Abnormal -     Neurological:        [x] No Facial Asymmetry (Cranial nerve 7 motor function) (limited exam due to video visit)          [x] No gaze palsy        [] Abnormal -          Skin:        [x] No significant exanthematous lesions or discoloration noted on facial skin         [] Abnormal -            Psychiatric:       [x] Normal Affect [] Abnormal -        [x] No Hallucinations    Other pertinent observable physical exam findings:-        We discussed the expected course, resolution and complications of the diagnosis(es) in detail. Medication risks, benefits, costs, interactions, and alternatives were discussed as indicated. I advised her to contact the office if her condition worsens, changes or fails to improve as anticipated. She expressed understanding with the diagnosis(es) and plan. Jaime Velasquez, was evaluated through a synchronous (real-time) audio-video encounter.  The patient (or guardian if applicable) is aware that this is a billable service, which includes applicable co-pays. This Virtual Visit was conducted with patient's (and/or legal guardian's) consent. The visit was conducted pursuant to the emergency declaration under the [address] [place] [address] and the [employer] and [employer] General Act. Patient identification was verified, and a caregiver was present when appropriate. The patient was located at: Home: [address] [place] [address]. [address] [id number]  The provider was located at:  Facility (Appt Department): Vandana Hart RD  [facility] [address] En[address] NP

## 2022-08-17 ENCOUNTER — OFFICE VISIT (OUTPATIENT)
Dept: ORTHOPEDIC SURGERY | Age: 62
End: 2022-08-17
Payer: MEDICARE

## 2022-08-17 VITALS — HEIGHT: 60 IN | WEIGHT: 120 LBS | BODY MASS INDEX: 23.56 KG/M2

## 2022-08-17 DIAGNOSIS — S82.65XA CLOSED NONDISPLACED FRACTURE OF LATERAL MALLEOLUS OF LEFT FIBULA, INITIAL ENCOUNTER: Primary | ICD-10-CM

## 2022-08-17 DIAGNOSIS — M89.8X6 PAIN OF LEFT FIBULA: ICD-10-CM

## 2022-08-17 DIAGNOSIS — M25.572 ACUTE LEFT ANKLE PAIN: ICD-10-CM

## 2022-08-17 PROCEDURE — G8420 CALC BMI NORM PARAMETERS: HCPCS | Performed by: ORTHOPAEDIC SURGERY

## 2022-08-17 PROCEDURE — 99202 OFFICE O/P NEW SF 15 MIN: CPT | Performed by: ORTHOPAEDIC SURGERY

## 2022-08-17 PROCEDURE — 27786 TREATMENT OF ANKLE FRACTURE: CPT | Performed by: ORTHOPAEDIC SURGERY

## 2022-08-17 PROCEDURE — 3017F COLORECTAL CA SCREEN DOC REV: CPT | Performed by: ORTHOPAEDIC SURGERY

## 2022-08-17 PROCEDURE — L1902 AFO ANKLE GAUNTLET PRE OTS: HCPCS | Performed by: ORTHOPAEDIC SURGERY

## 2022-08-17 PROCEDURE — G8756 NO BP MEASURE DOC: HCPCS | Performed by: ORTHOPAEDIC SURGERY

## 2022-08-17 PROCEDURE — G9717 DOC PT DX DEP/BP F/U NT REQ: HCPCS | Performed by: ORTHOPAEDIC SURGERY

## 2022-08-17 PROCEDURE — G8427 DOCREV CUR MEDS BY ELIG CLIN: HCPCS | Performed by: ORTHOPAEDIC SURGERY

## 2022-08-17 PROCEDURE — G9899 SCRN MAM PERF RSLTS DOC: HCPCS | Performed by: ORTHOPAEDIC SURGERY

## 2022-08-17 NOTE — PROGRESS NOTES
Audrey Stuart (: 1960) is a 57 y.o. female, patient,here for evaluation of the following   Chief Complaint   Patient presents with    Leg Pain        ASSESSMENT/PLAN:  Below is the assessment and plan developed based on review of pertinent history, physical exam, labs, studies, and medications. 1. Closed nondisplaced fracture of lateral malleolus of left fibula, initial encounter  -     REFERRAL TO DME  -     MI AFO ANKLE GAUNTLET PRE OTS  -     MI CLOSED TX DIST FIBULA FX  2. Pain of left fibula  -     XR TIB/FIB LT; Future  3. Acute left ankle pain  -     XR ANKLE LT MIN 3 V; Future  -     MI AFO ANKLE GAUNTLET PRE OTS      Patient informed of findings on x-rays and exam.  This is a nondisplaced fracture that should do well either with a tall boot or lace up brace. I think due to her mobility level and age, she may do better in the brace, she can then wear her regular shoes instead of a heavy boot that can be clinically and at risk for falls. She is fitted with a brace today. She is weightbearing as tolerated in. We will consider referring to physical therapy after next evaluation. We will see her again in approximately 4 weeks and get new x-rays left ankle 3 views nonweightbearing. Return in about 4 weeks (around 2022) for repeat xrays. Allergies   Allergen Reactions    Other Food Other (comments)     Oranges, cabbage, beans, peppers, raw onions, foods with caffeine, popcorn, soda because of IBS    Buspar [Buspirone] Other (comments)     Causes \"stimulation\" that could exacerbate a manic attack/phase of pt's Biplar. Reported by patient    Amlodipine Other (comments)     Ankle swelling    Caffeine Other (comments)     Patient does not take this because of anxiety    Dicyclomine Nausea and Vomiting     Extreme stomach pains    Doxycycline Other (comments)     UTI like symptoms     Lithium Nausea and Vomiting     Severe nausea and vomiting.     Other Medication Other (comments) Intolerance to oranges, cabbage,beans,peppers,raw onions,foods with caffeine in them, popcorn, no pop sodas, because of IBS       Current Outpatient Medications   Medication Sig    gabapentin (NEURONTIN) 100 mg capsule TAKE 2 CAPSULES BY MOUTH EVERY MORNING AND 1 CAPSULE IN THE EVENING    solifenacin (VESICARE) 5 mg tablet Take 1 Tablet by mouth in the morning. atenoloL (TENORMIN) 25 mg tablet Take 1 Tablet by mouth in the morning. tiZANidine (ZANAFLEX) 2 mg tablet Take 1 Tablet by mouth two (2) times daily as needed for Muscle Spasm(s). amitriptyline (ELAVIL) 50 mg tablet TAKE 1 TABLET BY MOUTH EVERY DAY AT NIGHT    Vraylar 3 mg capsule     rifAXIMin (Xifaxan) 550 mg tablet Take 1 Tablet by mouth three (3) times daily. losartan (COZAAR) 25 mg tablet TAKE 1 TABLET BY MOUTH DAILY. fenofibrate nanocrystallized (TRICOR) 145 mg tablet TAKE 1 TABLET BY MOUTH EVERY 48 HOURS    elderberry fruit (ELDERBERRY PO) Take  by mouth daily. alendronate (FOSAMAX) 70 mg tablet Take 1 Tablet by mouth every seven (7) days. TAKE ONE TABLET BY MOUTH EVERY 7 DAYS/sat    senna-docusate (PERICOLACE) 8.6-50 mg per tablet Take 2 Tablets by mouth daily. acetaminophen (TYLENOL) 500 mg tablet Take 1,000 mg by mouth daily. Evening as needed    diclofenac (VOLTAREN) 1 % gel Top BID on elbow and knee    calcium carbonate (CALTREX) 600 mg calcium (1,500 mg) tablet Take 600 mg by mouth two (2) times a day. cholecalciferol (VITAMIN D3) 2,000 unit cap capsule Take 2,000 Units by mouth daily. carboxymethylcellulose sodium (THERATEARS) 0.25 % drop ophthalmic solution Administer 1 Drop to both eyes four (4) times daily. LORazepam (ATIVAN) 0.5 mg tablet Take 1 Tab by mouth every eight (8) hours as needed for Anxiety. Max Daily Amount: 1.5 mg. D/C valium     No current facility-administered medications for this visit.        Past Medical History:   Diagnosis Date    Arthritis     Bipolar disorder ([facility] [address]     Bladder pain Choledocholithiasis 11/24/2010    GERD (gastroesophageal reflux disease)     Headache(784.0)     tension headaches    High cholesterol     History of cholecystectomy     Hypertension     Ill-defined condition     IBS    Irritable bowel     Pelvic pain in female 2014    Psychiatric disorder     Stool color black     irritable bowel       Past Surgical History:   Procedure Laterality Date    COLONOSCOPY N/A 07/05/2017    COLONOSCOPY performed by Meryle Billings, MD at [address]    HX GI  2003, 2017    prolasped rectum    HX GI  2021    rectopexy    HX KNEE REPLACEMENT      HX LAP CHOLECYSTECTOMY  11/23/2010    HX ORTHOPAEDIC Right 2018    total knee arthroplasty    HX KALEIGH AND BSO  1980    LAPAROSCOPY ABDOMEN DIAGNOSTIC  1987       Family History   Problem Relation Age of Onset    Deep Vein Thrombosis Mother     Hypertension Father     Cancer Father         SKIN CA    Colon Polyps Father     Other Maternal Uncle         CHAROT-MARISELA-TOOTH    Diabetes Paternal Grandfather     No Known Problems Sister        Social History     Socioeconomic History    Marital status:      Spouse name: Not on file    Number of children: Not on file    Years of education: Not on file    Highest education level: Not on file   Occupational History    Not on file   Tobacco Use    Smoking status: Never    Smokeless tobacco: Never   Vaping Use    Vaping Use: Never used   Substance and Sexual Activity    Alcohol use: No    Drug use: No    Sexual activity: Not Currently     Comment:  has 1 child   Other Topics Concern    Not on file   Social History Narrative    Not on file     Social Determinants of Health     Financial Resource Strain: Not on file   Food Insecurity: Not on file   Transportation Needs: Not on file   Physical Activity: Not on file   Stress: Not on file   Social Connections: Not on file   Intimate Partner Violence: Not on file   Housing Stability: Not on file Vitals:  Ht 5' (1.524 m)   Wt 120 lb (54.4 kg)   BMI 23.44 kg/m²    Body mass index is 23.44 kg/m². ROS    Positive for: Musculoskeletal  Last edited by Anuradha Aden on 2022  9:27 AM.              SUBJECTIVE:  Kamran Jacob (: 1960)   New patient presents today with complaint of left ankle pain related to an injury sustained on 2022. Patient states she fell at home and has had pain to the left ankle since that time. She was seen by primary care physician where x-rays obtained confirm she had nondisplaced fracture. She is experiencing moderate sharp dull pain that comes and goes associated with swelling and weakness. Running, walking, squatting, stairs/steps and standing make it worse. She has used bandages, ice, elevation and extra strength Tylenol. States not diabetic, non-smoker. OBJECTIVE EXAM:     Visit Vitals  Ht 5' (1.524 m)   Wt 120 lb (54.4 kg)   BMI 23.44 kg/m²       Appearance: Alert, well appearing and pleasant patient who is in no distress, oriented to person, place/time, and who follows commands. This patient is accompanied in the       office by her  self. Psychiatric: Affect and mood are appropriate. No dementia noted on examination  Musculoskeletal:  LOCATION: Diffuse tenderness around the ankle - left      Integumentary: No rashes, skin patches, wounds, or abrasions to the right or left legs       Warm and normal color. No regions of expressible drainage.       Gait: Normal      Tenderness: No tenderness        Motor/Strength/Tone Exam: Normal       Sensory Exam:   Intact Normal Sensation to ankle/foot      Stability Testing: No anterolateral or varus instability of the Ankle or Subtalar Joints               No peroneal tendon instability noted      ROM: Normal ROM noted to ankle/foot      Contractures: No Achilles or Gastrocnemius Contractures      Calf tenderness: Absent for calf or gastrocnemius muscle regions       Soft, supple, non tender, non taut lower extremity compartment  Alignment:      NEUTRAL Hindfoot,         none Metatarsus Adductus Metatarsus   Wounds/Abrasions:    None present  Extremities:   No embolic phenomena to the toes          No significant edema to the foot and or toes. Lower extremities are warm and appear well perfused    DVT: No evidence of DVT seen on examination at this time     No calf swelling, no tenderness to calf muscles  Lymphatic:  No Evidence of Lymphedema  Vascular: Medial Border of Tibia Region: Edema is not present         Pulses: Dorsalis Pedis &  Posterior Tibial Pulses : Palpable yes        Varicosities Lower Limbs :  None  noted  Neuro: Negative bilateral Straight leg raise (seated position)    See Musculoskeletal section for pertinent individual extremity examination    No abnormal hand/wrist, foot/ankle, or facial/neck tremors. Lower Extremity/Ankle/Foot:  Antalgic gait, satisfactory weightbearing stance. Left lower extremity/ankle: There is no malalignment or deformity to the ankle, there is mild swelling, tenderness to palpation is present, Achilles tendon intact negative Cohen test.  Negative ankle squeeze test.  Medial malleolus and deltoid ligament nontender. Left foot: No malalignment or deformities, nontender, no swelling, ligament stable. Contralateral lower extremity/ankle /foot exam:  Nontender, no swelling ligaments grossly stable. Normal weightbearing stance. Neurovascular exam is grossly intact light touch sensation, cap refill, flex/extension toes and ankle strength 5/5. Imaging:    XR Results (most recent):  Results from Appointment encounter on 08/17/22    XR TIB/FIB LT    Narrative  Left tib-fib with an oblique ankle shows a nondisplaced fibula fracture, normal ankle mortise seen on the oblique ankle portion of x-rays. An electronic signature was used to authenticate this note.   -- Patti Swenson MD

## 2022-08-17 NOTE — LETTER
8/17/2022    Patient: Bennie Patel   YOB: 1960   Date of Visit: 8/17/2022     Arturo Elizabeth MD  [address] [id number]  [address] [facility] [address]    Dear Arturo Elizabeth MD,      Thank you for referring Ms. Jodelle Harada to [facility] for evaluation. My notes for this consultation are attached. If you have questions, please do not hesitate to call me. I look forward to following your patient along with you.       Sincerely,    Patti Swenson MD

## 2022-08-18 DIAGNOSIS — G44.221 CHRONIC TENSION-TYPE HEADACHE, INTRACTABLE: ICD-10-CM

## 2022-08-18 DIAGNOSIS — G44.021 INTRACTABLE CHRONIC CLUSTER HEADACHE: ICD-10-CM

## 2022-08-18 RX ORDER — GABAPENTIN 100 MG/1
CAPSULE ORAL
Qty: 90 CAPSULE | Refills: 2 | Status: SHIPPED | OUTPATIENT
Start: 2022-08-18 | End: 2022-10-20 | Stop reason: SDUPTHER

## 2022-09-21 ENCOUNTER — OFFICE VISIT (OUTPATIENT)
Dept: ORTHOPEDIC SURGERY | Age: 62
End: 2022-09-21
Payer: MEDICARE

## 2022-09-21 VITALS — BODY MASS INDEX: 23.56 KG/M2 | HEIGHT: 60 IN | WEIGHT: 120 LBS

## 2022-09-21 DIAGNOSIS — I10 ESSENTIAL HYPERTENSION: ICD-10-CM

## 2022-09-21 DIAGNOSIS — E78.2 ELEVATED TRIGLYCERIDES WITH HIGH CHOLESTEROL: ICD-10-CM

## 2022-09-21 DIAGNOSIS — N32.81 OAB (OVERACTIVE BLADDER): ICD-10-CM

## 2022-09-21 DIAGNOSIS — G44.221 CHRONIC TENSION-TYPE HEADACHE, INTRACTABLE: ICD-10-CM

## 2022-09-21 DIAGNOSIS — M89.8X6 PAIN OF LEFT FIBULA: ICD-10-CM

## 2022-09-21 DIAGNOSIS — S82.65XD CLOSED NONDISPLACED FRACTURE OF LATERAL MALLEOLUS OF LEFT FIBULA WITH ROUTINE HEALING, SUBSEQUENT ENCOUNTER: Primary | ICD-10-CM

## 2022-09-21 PROCEDURE — G8420 CALC BMI NORM PARAMETERS: HCPCS | Performed by: ORTHOPAEDIC SURGERY

## 2022-09-21 PROCEDURE — 99024 POSTOP FOLLOW-UP VISIT: CPT | Performed by: ORTHOPAEDIC SURGERY

## 2022-09-21 PROCEDURE — 3017F COLORECTAL CA SCREEN DOC REV: CPT | Performed by: ORTHOPAEDIC SURGERY

## 2022-09-21 PROCEDURE — G9717 DOC PT DX DEP/BP F/U NT REQ: HCPCS | Performed by: ORTHOPAEDIC SURGERY

## 2022-09-21 PROCEDURE — G9899 SCRN MAM PERF RSLTS DOC: HCPCS | Performed by: ORTHOPAEDIC SURGERY

## 2022-09-21 PROCEDURE — G8756 NO BP MEASURE DOC: HCPCS | Performed by: ORTHOPAEDIC SURGERY

## 2022-09-21 PROCEDURE — G8427 DOCREV CUR MEDS BY ELIG CLIN: HCPCS | Performed by: ORTHOPAEDIC SURGERY

## 2022-09-21 RX ORDER — VITAMIN E 268 MG
400 CAPSULE ORAL DAILY
COMMUNITY

## 2022-09-21 NOTE — LETTER
9/23/2022    Patient: Aristides Sidhu   YOB: 1960   Date of Visit: 9/21/2022     Leann Delgadillo MD  [address] [place], [place] [phone]  [address] [place] [facility] [address]    Dear Leann Delgadillo MD,      Thank you for referring Ms. Yusuf Siegel to [facility] for evaluation. My notes for this consultation are attached. If you have questions, please do not hesitate to call me. I look forward to following your patient along with you.       Sincerely,    Stevie Marin MD

## 2022-09-21 NOTE — PROGRESS NOTES
Kiana Noel (: 1960) is a 57 y.o. female, patient,here for evaluation of the following   Chief Complaint   Patient presents with    Follow-up    Leg Pain        ASSESSMENT/PLAN:  Below is the assessment and plan developed based on review of pertinent history, physical exam, labs, studies, and medications. 1. Closed nondisplaced fracture of lateral malleolus of left fibula with routine healing, subsequent encounter  -     XR ANKLE LT MIN 3 V; Future  -     REFERRAL TO PHYSICAL THERAPY  -     REFERRAL TO HOME HEALTH  2. Pain of left fibula  -     XR ANKLE LT MIN 3 V; Future  -     REFERRAL TO PHYSICAL THERAPY  -     [facility]      Patient is doing well, the fracture is healing but not complete. She still has little bit of symptoms as well. She will continue in the brace for now. I provided a referral to physical therapy today. Next time she returns we will get new x-rays of the left ankle 3 views nonweightbearing. Return in about 6 weeks (around 2022) for repeat xrays. Allergies   Allergen Reactions    Other Food Other (comments)     Oranges, cabbage, beans, peppers, raw onions, foods with caffeine, popcorn, soda because of IBS    Buspar [Buspirone] Other (comments)     Causes \"stimulation\" that could exacerbate a manic attack/phase of pt's Biplar. Reported by patient    Amlodipine Other (comments)     Ankle swelling    Caffeine Other (comments)     Patient does not take this because of anxiety    Dicyclomine Nausea and Vomiting     Extreme stomach pains    Doxycycline Other (comments)     UTI like symptoms     Lithium Nausea and Vomiting     Severe nausea and vomiting. Other Medication Other (comments)     Intolerance to oranges, cabbage,beans,peppers,raw onions,foods with caffeine in them, popcorn, no pop sodas, because of IBS       Current Outpatient Medications   Medication Sig    vitamin E (AQUA GEMS) 268 mg (400 unit) capsule Take 400 Units by mouth daily. solifenacin (VESICARE) 5 mg tablet TAKE 1 TABLET BY MOUTH EVERY DAY IN THE MORNING    atenoloL (TENORMIN) 25 mg tablet TAKE 1 TABLET BY MOUTH EVERY DAY IN THE MORNING    fenofibrate nanocrystallized (TRICOR) 145 mg tablet TAKE 1 TABLET BY MOUTH EVERY 48 HOURS.    gabapentin (NEURONTIN) 100 mg capsule TAKE 2 CAPSULES BY MOUTH EVERY MORNING AND 1 CAPSULE IN THE EVENING    tiZANidine (ZANAFLEX) 2 mg tablet Take 1 Tablet by mouth two (2) times daily as needed for Muscle Spasm(s). amitriptyline (ELAVIL) 50 mg tablet TAKE 1 TABLET BY MOUTH EVERY DAY AT NIGHT    Vraylar 3 mg capsule     rifAXIMin (Xifaxan) 550 mg tablet Take 1 Tablet by mouth three (3) times daily. losartan (COZAAR) 25 mg tablet TAKE 1 TABLET BY MOUTH DAILY. elderberry fruit (ELDERBERRY PO) Take  by mouth daily. alendronate (FOSAMAX) 70 mg tablet Take 1 Tablet by mouth every seven (7) days. TAKE ONE TABLET BY MOUTH EVERY 7 DAYS/sat    senna-docusate (PERICOLACE) 8.6-50 mg per tablet Take 2 Tablets by mouth daily. acetaminophen (TYLENOL) 500 mg tablet Take 1,000 mg by mouth daily. Evening as needed    diclofenac (VOLTAREN) 1 % gel Top BID on elbow and knee    calcium carbonate (CALTREX) 600 mg calcium (1,500 mg) tablet Take 600 mg by mouth two (2) times a day. cholecalciferol (VITAMIN D3) 2,000 unit cap capsule Take 2,000 Units by mouth daily. carboxymethylcellulose sodium (THERATEARS) 0.25 % drop ophthalmic solution Administer 1 Drop to both eyes four (4) times daily. LORazepam (ATIVAN) 0.5 mg tablet Take 1 Tab by mouth every eight (8) hours as needed for Anxiety. Max Daily Amount: 1.5 mg. D/C valium     No current facility-administered medications for this visit.        Past Medical History:   Diagnosis Date    Arthritis     Bipolar disorder ([facility] [address]     Bladder pain     Choledocholithiasis 11/24/2010    GERD (gastroesophageal reflux disease)     Headache(784.0)     tension headaches    High cholesterol     History of cholecystectomy Hypertension     Ill-defined condition     IBS    Irritable bowel     Pelvic pain in female     Psychiatric disorder     Stool color black     irritable bowel       Past Surgical History:   Procedure Laterality Date    COLONOSCOPY N/A 2017    COLONOSCOPY performed by Nallely Olmos MD at [facility] [place]note 2]    HX GI  ,     prolasped rectum    HX GI      rectopexy    HX KNEE REPLACEMENT      HX LAP CHOLECYSTECTOMY  2010    HX ORTHOPAEDIC Right 2018    total knee arthroplasty    HX KALEIGH AND BSO  1980    LAPAROSCOPY ABDOMEN DIAGNOSTIC         Family History   Problem Relation Age of Onset    Deep Vein Thrombosis Mother     Hypertension Father     Cancer Father         SKIN CA    Colon Polyps Father     Other Maternal Uncle         CHAROT-MARISELA-TOOTH    Diabetes Paternal Grandfather     No Known Problems Sister        Social History     Socioeconomic History    Marital status:      Spouse name: Not on file    Number of children: Not on file    Years of education: Not on file    Highest education level: Not on file   Occupational History    Not on file   Tobacco Use    Smoking status: Never    Smokeless tobacco: Never   Vaping Use    Vaping Use: Never used   Substance and Sexual Activity    Alcohol use: No    Drug use: No    Sexual activity: Not Currently     Comment:  has 1 child   Other Topics Concern    Not on file   Social History Narrative    Not on file     Social Determinants of Health     Financial Resource Strain: Not on file   Food Insecurity: Not on file   Transportation Needs: Not on file   Physical Activity: Not on file   Stress: Not on file   Social Connections: Not on file   Intimate Partner Violence: Not on file   Housing Stability: Not on file           Vitals:  Ht 5' (1.524 m)   Wt 120 lb (54.4 kg)   BMI 23.44 kg/m²    Body mass index is 23.44 kg/m².             SUBJECTIVE:  Salvatore Velazquez (: 1960)   Patient returns today for follow-up of left ankle injury at the lateral malleolus where she had a fracture sustained on July 30, 2022. Patient has no new complaints today. OBJECTIVE EXAM:     Visit Vitals  Ht 5' (1.524 m)   Wt 120 lb (54.4 kg)   BMI 23.44 kg/m²       Appearance: Alert, well appearing and pleasant patient who is in no distress, oriented to person, place/time, and who follows commands. This patient is accompanied in the       office by her  self. Psychiatric: Affect and mood are appropriate. No dementia noted on examination  Musculoskeletal:  LOCATION: Mild discomfort with deep palpation of the lateral malleolus at ankle - left      Integumentary: No rashes, skin patches, wounds, or abrasions to the right or left legs       Warm and normal color. No regions of expressible drainage. Gait: Normal      Tenderness: No tenderness        Motor/Strength/Tone Exam: Normal       Sensory Exam:   Intact Normal Sensation to ankle/foot      Stability Testing: No anterolateral or varus instability of the Ankle or Subtalar Joints               No peroneal tendon instability noted      ROM: Normal ROM noted to ankle/foot      Contractures: No Achilles or Gastrocnemius Contractures      Calf tenderness: Absent for calf or gastrocnemius muscle regions       Soft, supple, non tender, non taut lower extremity compartment  Alignment:      NEUTRAL Hindfoot,         none Metatarsus Adductus Metatarsus   Wounds/Abrasions:    None present  Extremities:   No embolic phenomena to the toes          No significant edema to the foot and or toes.         Lower extremities are warm and appear well perfused    DVT: No evidence of DVT seen on examination at this time     No calf swelling, no tenderness to calf muscles  Lymphatic:  No Evidence of Lymphedema  Vascular: Medial Border of Tibia Region: Edema is not present         Pulses: Dorsalis Pedis &  Posterior Tibial Pulses : Palpable yes        Varicosities Lower Limbs :  None noted  Neuro: Negative bilateral Straight leg raise (seated position)    See Musculoskeletal section for pertinent individual extremity examination    No abnormal hand/wrist, foot/ankle, or facial/neck tremors. Lower Extremity/Ankle/Foot:  Mild antalgic gait, satisfactory weightbearing stance. Left lower extremity/ankle: Mild tenderness at the fibula, rest of ankle nontender, ligament stable, negative Cohen test, negative ankle squeeze test.    Left foot: No malalignment or deformity, nontender, no swelling, ligament stable. Contralateral lower extremity/ankle /foot exam:  Nontender, no swelling ligaments grossly stable. Normal weightbearing stance. Neurovascular exam grossly intact. Imaging:    XR Results (most recent):  Results from Appointment encounter on 09/21/22    XR ANKLE LT MIN 3 V    Narrative  Left ankle AP, lateral and oblique nonweightbearing x-rays show the fibula fracture at the distal aspect is healing, there is callus formation, the fracture however not completely healed but there is good alignment with a normal ankle mortise. Satisfactory bone density. Lateral view does not show significant shortening, the fracture is mostly nondisplaced and healing properly. An electronic signature was used to authenticate this note.   -- Orville Burton MD

## 2022-09-22 RX ORDER — SOLIFENACIN SUCCINATE 5 MG/1
TABLET, FILM COATED ORAL
Qty: 90 TABLET | Refills: 1 | Status: SHIPPED | OUTPATIENT
Start: 2022-09-22

## 2022-09-22 RX ORDER — ATENOLOL 25 MG/1
TABLET ORAL
Qty: 90 TABLET | Refills: 1 | Status: SHIPPED | OUTPATIENT
Start: 2022-09-22

## 2022-09-22 RX ORDER — FENOFIBRATE 145 MG/1
TABLET, COATED ORAL
Qty: 45 TABLET | Refills: 1 | Status: SHIPPED | OUTPATIENT
Start: 2022-09-22

## 2022-10-17 RX ORDER — AMOXICILLIN 250 MG
2 CAPSULE ORAL DAILY
COMMUNITY

## 2022-10-20 ENCOUNTER — OFFICE VISIT (OUTPATIENT)
Dept: INTERNAL MEDICINE CLINIC | Age: 62
End: 2022-10-20
Payer: MEDICARE

## 2022-10-20 VITALS
BODY MASS INDEX: 24.54 KG/M2 | RESPIRATION RATE: 16 BRPM | HEIGHT: 60 IN | TEMPERATURE: 98.9 F | HEART RATE: 78 BPM | SYSTOLIC BLOOD PRESSURE: 128 MMHG | DIASTOLIC BLOOD PRESSURE: 64 MMHG | WEIGHT: 125 LBS | OXYGEN SATURATION: 99 %

## 2022-10-20 DIAGNOSIS — E78.2 ELEVATED TRIGLYCERIDES WITH HIGH CHOLESTEROL: ICD-10-CM

## 2022-10-20 DIAGNOSIS — G44.221 CHRONIC TENSION-TYPE HEADACHE, INTRACTABLE: ICD-10-CM

## 2022-10-20 DIAGNOSIS — R73.03 PREDIABETES: ICD-10-CM

## 2022-10-20 DIAGNOSIS — G44.021 INTRACTABLE CHRONIC CLUSTER HEADACHE: ICD-10-CM

## 2022-10-20 DIAGNOSIS — F31.10 BIPOLAR AFFECTIVE DISORDER, CURRENT EPISODE MANIC, CURRENT EPISODE SEVERITY UNSPECIFIED (HCC): ICD-10-CM

## 2022-10-20 DIAGNOSIS — I10 ESSENTIAL HYPERTENSION: Primary | ICD-10-CM

## 2022-10-20 DIAGNOSIS — Z12.31 ENCOUNTER FOR SCREENING MAMMOGRAM FOR MALIGNANT NEOPLASM OF BREAST: ICD-10-CM

## 2022-10-20 DIAGNOSIS — Z23 ENCOUNTER FOR IMMUNIZATION: ICD-10-CM

## 2022-10-20 PROCEDURE — 90686 IIV4 VACC NO PRSV 0.5 ML IM: CPT | Performed by: INTERNAL MEDICINE

## 2022-10-20 PROCEDURE — 99214 OFFICE O/P EST MOD 30 MIN: CPT | Performed by: INTERNAL MEDICINE

## 2022-10-20 PROCEDURE — G8420 CALC BMI NORM PARAMETERS: HCPCS | Performed by: INTERNAL MEDICINE

## 2022-10-20 PROCEDURE — G0008 ADMIN INFLUENZA VIRUS VAC: HCPCS | Performed by: INTERNAL MEDICINE

## 2022-10-20 PROCEDURE — 3017F COLORECTAL CA SCREEN DOC REV: CPT | Performed by: INTERNAL MEDICINE

## 2022-10-20 PROCEDURE — G8754 DIAS BP LESS 90: HCPCS | Performed by: INTERNAL MEDICINE

## 2022-10-20 PROCEDURE — G8752 SYS BP LESS 140: HCPCS | Performed by: INTERNAL MEDICINE

## 2022-10-20 PROCEDURE — G9899 SCRN MAM PERF RSLTS DOC: HCPCS | Performed by: INTERNAL MEDICINE

## 2022-10-20 PROCEDURE — G9717 DOC PT DX DEP/BP F/U NT REQ: HCPCS | Performed by: INTERNAL MEDICINE

## 2022-10-20 PROCEDURE — G8427 DOCREV CUR MEDS BY ELIG CLIN: HCPCS | Performed by: INTERNAL MEDICINE

## 2022-10-20 RX ORDER — LOSARTAN POTASSIUM 25 MG/1
25 TABLET ORAL DAILY
Qty: 90 TABLET | Refills: 1 | Status: SHIPPED | OUTPATIENT
Start: 2022-10-20

## 2022-10-20 RX ORDER — POLYETHYLENE GLYCOL-3350 AND ELECTROLYTES 236; 6.74; 5.86; 2.97; 22.74 G/274.31G; G/274.31G; G/274.31G; G/274.31G; G/274.31G
POWDER, FOR SOLUTION ORAL
Status: ON HOLD | COMMUNITY
Start: 2022-09-01 | End: 2022-10-24 | Stop reason: CLARIF

## 2022-10-20 RX ORDER — GABAPENTIN 100 MG/1
CAPSULE ORAL
Qty: 90 CAPSULE | Refills: 2 | Status: SHIPPED | OUTPATIENT
Start: 2022-10-20

## 2022-10-20 NOTE — PROGRESS NOTES
HISTORY OF PRESENT ILLNESS  Jose De Jesus Boswell is a 57 y.o. female here for follow-up. Has hypertension, compliant on medication. Denies chest pain palpitation shortness of breath. Has elevated lipids, on Tricor. Need lab work. She suffers from headache. Gabapentin helped her to control headache. Need refill. Also on amitriptyline. Has bipolar disorder. Seeing psychiatrist.  Depression seems stable. Labs reviewed, seems stable. Need refill on gabapentin. Review of Systems   Constitutional: Negative. HENT: Negative. Eyes: Negative. Respiratory: Negative. Negative for shortness of breath and wheezing. Cardiovascular:  Negative for chest pain. Gastrointestinal: Negative. Negative for blood in stool. Genitourinary: Negative. Musculoskeletal:  Positive for back pain. Negative for falls. Skin: Negative. Negative for itching. Neurological:  Positive for headaches. Endo/Heme/Allergies: Negative. Psychiatric/Behavioral:  Positive for depression. Physical Exam  Constitutional:       General: She is not in acute distress. Appearance: Normal appearance. She is normal weight. Cardiovascular:      Rate and Rhythm: Normal rate and regular rhythm. Pulses: Normal pulses. Heart sounds: Normal heart sounds. Pulmonary:      Effort: Pulmonary effort is normal. No respiratory distress. Breath sounds: Normal breath sounds. No wheezing. Abdominal:      General: Abdomen is flat. Bowel sounds are normal.   Musculoskeletal:         General: Tenderness present. Cervical back: Normal range of motion and neck supple. Comments: Lower back: Spine nontender. Paravertebral muscle spasm present. No spinal restricted. Straight leg raising test negative. Neurological:      General: No focal deficit present. Mental Status: She is alert and oriented to person, place, and time. Mental status is at baseline.    Psychiatric:         Mood and Affect: Mood normal. Behavior: Behavior normal.      Comments: Stable depression. ASSESSMENT and PLAN    Diagnoses and all orders for this visit:    1. Essential hypertension    Stable blood pressure. Will refill,  -     losartan (COZAAR) 25 mg tablet; Take 1 Tablet by mouth daily.  -     METABOLIC PANEL, COMPREHENSIVE  -     LIPID PANEL    2. Encounter for immunization    Will give,  -     INFLUENZA, FLUARIX, FLULAVAL, FLUZONE (AGE 6 MO+), AFLURIA(AGE 3Y+) IM, PF, 0.5 ML  -     varicella-zoster recombinant, PF, (SHINGRIX) 50 mcg/0.5 mL susr injection; 0.5 mL by IntraMUSCular route once for 1 dose. 3. Chronic tension-type headache, intractable    Doing well. Will refill,  -     gabapentin (NEURONTIN) 100 mg capsule; 2 cap po am and 1 cap Hs    4. Intractable chronic cluster headache  -     gabapentin (NEURONTIN) 100 mg capsule; 2 cap po am and 1 cap Hs    5. Elevated triglycerides with high cholesterol    On fenofibrate. Advised to be on low-carb diet and exercise. -    -     LIPID PANEL    6. Bipolar affective disorder, current episode manic, current episode severity unspecified [facility])  Seeing psychiatrist.  On Vraylar and amitriptyline. Doing well. 7. Prediabetes    Be on low-carb diet. Will check,  -     HEMOGLOBIN A1C WITH EAG    8. Encounter for screening mammogram for malignant neoplasm of breast  -     MICHELL MAMMO BI SCREENING INCL CAD; Future     Issues reviewed with her: referral to Diabetic Education department, diabetic diet discussed in detail, written exchange diet given, home glucose monitoring emphasized, all medications, side effects and compliance discussed carefully, foot care discussed and Podiatry visits discussed, annual eye examinations at Ophthalmology discussed, long term diabetic complications discussed and labs immediately prior to next visit.

## 2022-10-20 NOTE — PROGRESS NOTES
Nursing staff confirmed patient with full name and . Prepared patient for visit today by obtaining vitals, verifying medication list and allergies, and briefly discussing reason for visit. Chief Complaint   Patient presents with    Irritable Bowel Syndrome    Rectal Prolapse    Osteoarthritis    Headache    Arthritis    Follow Up Chronic Condition       Current Outpatient Medications   Medication Sig    GaviLyte-G [id number] -5.86 gram suspension FOLLOW PREP INSTRUCTIONS FROM YOUR DOCTOR'S OFFICE, NOT THE BRAND LABEL. rifAXIMin (Xifaxan) 550 mg tablet Take 550-1,100 mg by mouth as needed for Diarrhea (at onset of severe diarrhea). senna-docusate (Senna-S) 8.6-50 mg per tablet Take 2 Tablets by mouth daily. CALCIUM-VITAMIN D3 PO Take 600 mg by mouth two (2) times a day. carboxymethylcellulose sodium (THERATEARS OP) Administer 2 Drops to both eyes two (2) times a day. OTHER Therabreath lozenges as needed for dry mouth.    solifenacin (VESICARE) 5 mg tablet TAKE 1 TABLET BY MOUTH EVERY DAY IN THE MORNING    atenoloL (TENORMIN) 25 mg tablet TAKE 1 TABLET BY MOUTH EVERY DAY IN THE MORNING    fenofibrate nanocrystallized (TRICOR) 145 mg tablet TAKE 1 TABLET BY MOUTH EVERY 48 HOURS.    vitamin E (AQUA GEMS) 268 mg (400 unit) capsule Take 400 Units by mouth daily. gabapentin (NEURONTIN) 100 mg capsule TAKE 2 CAPSULES BY MOUTH EVERY MORNING AND 1 CAPSULE IN THE EVENING    amitriptyline (ELAVIL) 50 mg tablet TAKE 1 TABLET BY MOUTH EVERY DAY AT NIGHT    Vraylar 3 mg capsule Take 3 mg by mouth daily. losartan (COZAAR) 25 mg tablet TAKE 1 TABLET BY MOUTH DAILY. elderberry fruit (ELDERBERRY PO) Take  by mouth daily. (Patient not taking: No sig reported)    alendronate (FOSAMAX) 70 mg tablet Take 1 Tablet by mouth every seven (7) days. TAKE ONE TABLET BY MOUTH EVERY 7 DAYS/sat    acetaminophen (TYLENOL) 500 mg tablet Take 1,000 mg by mouth two (2) times a day.     cholecalciferol (VITAMIN D3) 2,000 unit cap capsule Take 2,000 Units by mouth daily. LORazepam (ATIVAN) 0.5 mg tablet Take 1 Tab by mouth every eight (8) hours as needed for Anxiety. Max Daily Amount: 1.5 mg. D/C valium     No current facility-administered medications for this visit. 1. \"Have you been to the ER, urgent care clinic since your last visit? Hospitalized since your last visit? \" No    2. \"Have you seen or consulted any other health care providers outside of the [address] [place] [address] since your last visit? \" No     3. For patients aged 39-70: Has the patient had a colonoscopy / FIT/ Cologuard? Yes - no Care Gap present      If the patient is female:    4. For patients aged 41-77: Has the patient had a mammogram within the past 2 years? Yes - no Care Gap present      5. For patients aged 21-65: Has the patient had a pap smear?  NA - based on age or sex

## 2022-10-24 ENCOUNTER — HOSPITAL ENCOUNTER (OUTPATIENT)
Age: 62
Setting detail: OUTPATIENT SURGERY
Discharge: HOME | End: 2022-10-24
Attending: INTERNAL MEDICINE | Admitting: INTERNAL MEDICINE
Payer: MEDICARE

## 2022-10-24 ENCOUNTER — ANESTHESIA (OUTPATIENT)
Dept: ENDOSCOPY | Age: 62
End: 2022-10-24
Payer: MEDICARE

## 2022-10-24 ENCOUNTER — ANESTHESIA EVENT (OUTPATIENT)
Dept: ENDOSCOPY | Age: 62
End: 2022-10-24
Payer: MEDICARE

## 2022-10-24 VITALS
OXYGEN SATURATION: 100 % | HEART RATE: 83 BPM | HEIGHT: 60 IN | WEIGHT: 122 LBS | RESPIRATION RATE: 18 BRPM | BODY MASS INDEX: 23.95 KG/M2 | DIASTOLIC BLOOD PRESSURE: 72 MMHG | SYSTOLIC BLOOD PRESSURE: 123 MMHG | TEMPERATURE: 98.9 F

## 2022-10-24 PROCEDURE — 77030029384 HC SNR POLYP CAPTVR II BSC -B: Performed by: INTERNAL MEDICINE

## 2022-10-24 PROCEDURE — 74011250636 HC RX REV CODE- 250/636: Performed by: NURSE ANESTHETIST, CERTIFIED REGISTERED

## 2022-10-24 PROCEDURE — 74011250636 HC RX REV CODE- 250/636: Performed by: INTERNAL MEDICINE

## 2022-10-24 PROCEDURE — 2709999900 HC NON-CHARGEABLE SUPPLY: Performed by: INTERNAL MEDICINE

## 2022-10-24 PROCEDURE — 88305 TISSUE EXAM BY PATHOLOGIST: CPT

## 2022-10-24 PROCEDURE — 74011000250 HC RX REV CODE- 250: Performed by: NURSE ANESTHETIST, CERTIFIED REGISTERED

## 2022-10-24 PROCEDURE — 76040000019: Performed by: INTERNAL MEDICINE

## 2022-10-24 PROCEDURE — 74011250636 HC RX REV CODE- 250/636: Performed by: STUDENT IN AN ORGANIZED HEALTH CARE EDUCATION/TRAINING PROGRAM

## 2022-10-24 PROCEDURE — 77030021593 HC FCPS BIOP ENDOSC BSC -A: Performed by: INTERNAL MEDICINE

## 2022-10-24 PROCEDURE — 76060000031 HC ANESTHESIA FIRST 0.5 HR: Performed by: INTERNAL MEDICINE

## 2022-10-24 RX ORDER — SODIUM CHLORIDE 0.9 % (FLUSH) 0.9 %
5-40 SYRINGE (ML) INJECTION EVERY 8 HOURS
Status: DISCONTINUED | OUTPATIENT
Start: 2022-10-24 | End: 2022-10-24 | Stop reason: HOSPADM

## 2022-10-24 RX ORDER — FLUMAZENIL 0.1 MG/ML
0.2 INJECTION INTRAVENOUS
Status: CANCELLED | OUTPATIENT
Start: 2022-10-24 | End: 2022-10-24

## 2022-10-24 RX ORDER — PHENYLEPHRINE HCL IN 0.9% NACL 0.4MG/10ML
SYRINGE (ML) INTRAVENOUS AS NEEDED
Status: DISCONTINUED | OUTPATIENT
Start: 2022-10-24 | End: 2022-10-24 | Stop reason: HOSPADM

## 2022-10-24 RX ORDER — ATROPINE SULFATE 0.1 MG/ML
0.5 INJECTION INTRAVENOUS
Status: CANCELLED | OUTPATIENT
Start: 2022-10-24 | End: 2022-10-25

## 2022-10-24 RX ORDER — ONDANSETRON 2 MG/ML
4 INJECTION INTRAMUSCULAR; INTRAVENOUS AS NEEDED
Status: DISCONTINUED | OUTPATIENT
Start: 2022-10-24 | End: 2022-10-24 | Stop reason: HOSPADM

## 2022-10-24 RX ORDER — SODIUM CHLORIDE 9 MG/ML
50 INJECTION, SOLUTION INTRAVENOUS CONTINUOUS
Status: DISCONTINUED | OUTPATIENT
Start: 2022-10-24 | End: 2022-10-24 | Stop reason: HOSPADM

## 2022-10-24 RX ORDER — SODIUM CHLORIDE 0.9 % (FLUSH) 0.9 %
5-40 SYRINGE (ML) INJECTION AS NEEDED
Status: CANCELLED | OUTPATIENT
Start: 2022-10-24

## 2022-10-24 RX ORDER — SODIUM CHLORIDE 0.9 % (FLUSH) 0.9 %
5-40 SYRINGE (ML) INJECTION AS NEEDED
Status: DISCONTINUED | OUTPATIENT
Start: 2022-10-24 | End: 2022-10-24 | Stop reason: HOSPADM

## 2022-10-24 RX ORDER — NALOXONE HYDROCHLORIDE 0.4 MG/ML
0.4 INJECTION, SOLUTION INTRAMUSCULAR; INTRAVENOUS; SUBCUTANEOUS
Status: CANCELLED | OUTPATIENT
Start: 2022-10-24 | End: 2022-10-24

## 2022-10-24 RX ORDER — PROPOFOL 10 MG/ML
INJECTION, EMULSION INTRAVENOUS AS NEEDED
Status: DISCONTINUED | OUTPATIENT
Start: 2022-10-24 | End: 2022-10-24 | Stop reason: HOSPADM

## 2022-10-24 RX ORDER — HYDROCODONE BITARTRATE AND ACETAMINOPHEN 5; 325 MG/1; MG/1
1 TABLET ORAL AS NEEDED
Status: DISCONTINUED | OUTPATIENT
Start: 2022-10-24 | End: 2022-10-24 | Stop reason: HOSPADM

## 2022-10-24 RX ORDER — NORETHINDRONE AND ETHINYL ESTRADIOL 0.5-0.035
10 KIT ORAL ONCE
Status: DISCONTINUED | OUTPATIENT
Start: 2022-10-24 | End: 2022-10-24 | Stop reason: HOSPADM

## 2022-10-24 RX ORDER — LIDOCAINE HYDROCHLORIDE 20 MG/ML
INJECTION, SOLUTION EPIDURAL; INFILTRATION; INTRACAUDAL; PERINEURAL AS NEEDED
Status: DISCONTINUED | OUTPATIENT
Start: 2022-10-24 | End: 2022-10-24 | Stop reason: HOSPADM

## 2022-10-24 RX ORDER — DEXTROMETHORPHAN/PSEUDOEPHED 2.5-7.5/.8
1.2 DROPS ORAL
Status: CANCELLED | OUTPATIENT
Start: 2022-10-24

## 2022-10-24 RX ORDER — SODIUM CHLORIDE 9 MG/ML
50 INJECTION, SOLUTION INTRAVENOUS CONTINUOUS
Status: CANCELLED | OUTPATIENT
Start: 2022-10-24 | End: 2022-10-24

## 2022-10-24 RX ORDER — MIDAZOLAM HYDROCHLORIDE 1 MG/ML
0.5 INJECTION, SOLUTION INTRAMUSCULAR; INTRAVENOUS
Status: DISCONTINUED | OUTPATIENT
Start: 2022-10-24 | End: 2022-10-24 | Stop reason: HOSPADM

## 2022-10-24 RX ORDER — DIPHENHYDRAMINE HYDROCHLORIDE 50 MG/ML
12.5 INJECTION, SOLUTION INTRAMUSCULAR; INTRAVENOUS AS NEEDED
Status: DISCONTINUED | OUTPATIENT
Start: 2022-10-24 | End: 2022-10-24 | Stop reason: HOSPADM

## 2022-10-24 RX ORDER — SODIUM CHLORIDE 9 MG/ML
INJECTION, SOLUTION INTRAVENOUS
Status: DISCONTINUED | OUTPATIENT
Start: 2022-10-24 | End: 2022-10-24 | Stop reason: HOSPADM

## 2022-10-24 RX ORDER — FENTANYL CITRATE 50 UG/ML
25 INJECTION, SOLUTION INTRAMUSCULAR; INTRAVENOUS
Status: DISCONTINUED | OUTPATIENT
Start: 2022-10-24 | End: 2022-10-24 | Stop reason: HOSPADM

## 2022-10-24 RX ORDER — SODIUM CHLORIDE 0.9 % (FLUSH) 0.9 %
5-40 SYRINGE (ML) INJECTION EVERY 8 HOURS
Status: CANCELLED | OUTPATIENT
Start: 2022-10-24

## 2022-10-24 RX ORDER — EPINEPHRINE 0.1 MG/ML
1 INJECTION INTRACARDIAC; INTRAVENOUS
Status: CANCELLED | OUTPATIENT
Start: 2022-10-24 | End: 2022-10-25

## 2022-10-24 RX ADMIN — PROPOFOL 50 MG: 10 INJECTION, EMULSION INTRAVENOUS at 11:28

## 2022-10-24 RX ADMIN — SODIUM CHLORIDE: 900 INJECTION, SOLUTION INTRAVENOUS at 11:19

## 2022-10-24 RX ADMIN — PROPOFOL 25 MG: 10 INJECTION, EMULSION INTRAVENOUS at 11:39

## 2022-10-24 RX ADMIN — PROPOFOL 50 MG: 10 INJECTION, EMULSION INTRAVENOUS at 11:22

## 2022-10-24 RX ADMIN — LIDOCAINE HYDROCHLORIDE 40 MG: 20 INJECTION, SOLUTION EPIDURAL; INFILTRATION; INTRACAUDAL; PERINEURAL at 11:22

## 2022-10-24 RX ADMIN — PROPOFOL 25 MG: 10 INJECTION, EMULSION INTRAVENOUS at 11:36

## 2022-10-24 RX ADMIN — Medication 160 MCG: at 11:45

## 2022-10-24 RX ADMIN — PROPOFOL 50 MG: 10 INJECTION, EMULSION INTRAVENOUS at 11:31

## 2022-10-24 RX ADMIN — PROPOFOL 20 MG: 10 INJECTION, EMULSION INTRAVENOUS at 11:45

## 2022-10-24 RX ADMIN — Medication 40 MCG: at 11:41

## 2022-10-24 RX ADMIN — PROPOFOL 50 MG: 10 INJECTION, EMULSION INTRAVENOUS at 11:24

## 2022-10-24 RX ADMIN — PROPOFOL 25 MG: 10 INJECTION, EMULSION INTRAVENOUS at 11:34

## 2022-10-24 NOTE — DISCHARGE INSTRUCTIONS
[address] [place]. Kurt Lowery MD  [phone]      October 24, 2022    Adeline Stevens: 1960    COLONOSCOPY DISCHARGE INSTRUCTIONS    If there is redness at IV site you should apply warm compress to area. If redness or soreness persist contact Dr. Kurt Lowery or your primary care doctor. There may be a slight amount of blood passed from the rectum. Gaseous discomfort may develop, but walking, belching will help relieve this. You may not operate a vehicle for 12 hours  You may not operate machinery or dangerous appliances for rest of today  You may not drink alcoholic beverages for 12 hours  Avoid making any critical decisions for 24 hours    DIET:  You may resume your normal diet, but some patients find that heavy or large meals may lead to indigestion or vomiting. I suggest a light meal as first food intake. MEDICATIONS:  The use of some over-the-counter pain medication may lead to bleeding after colon biopsies or polyp removal.  Tylenol (also called acetaminophen) is safe to take even if you have had colonoscopy with polyp removal.  Based on the procedure you can resume baby-dose aspirin (81 mg) and may safely take anticoagulation (like apixaban (Eliquis), dabigatran (Pradaxa), edoxaban (Verlene Troy), rivaroxaban (Xarelto) or warfarin (Coumadin)) or antiplatelet medications (high dose aspirin 325mg, Clopidogrel (Plavix), Prasugrel (Effient), Ticagrelor (Brilinta))for the next two (48 hours) days. ACTIVITY:  You may resume your normal household activities, but it is recommended that you spend the remainder of the day resting -  avoid any strenuous activity. CALL DR. JAIME'S OFFICE IF:  Increasing pain, nausea, vomiting  Abdominal distension (swelling)  Significant new or increased bleeding (oral or rectal)  Fever/Chills  Chest pain/shortness of breath                       Additional instructions:   7 small polyps identified and resected on this exam. A small biopsy was performed at the end of the colon to make sure a small polyp seen there is benign and contains no pre-cancerous changes. We recommend a repeat colonoscopy in 3 years. This amount of time will be confirmed in a pathology report letter that will be mailed to you within 10 business days. It was an honor to be your doctor today. Please let me or my office staff know if you have any feedback about today's procedure. Grabiel Mckeon MD, October 24, 2022    Colonoscopy saves lives, and can prevent colon cancer. Everyone aged 48 or older needs colonoscopy.   Tell your family and friends: get the test!

## 2022-10-24 NOTE — H&P
Pre-Endoscopy H&P   Chief complaint: screening or surveillance of previous colon polyps    HPI:  Patient presents for procedure. The indication for the procedure, the patient's history and the patient's current medications are reviewed prior to the procedure and that data is reported on the endoscopy note in the chart to which this document is attached. Any significant complaints with regard to organ systems will be noted, and if not mentioned then a review of systems is not contributory.     Past Medical History:   Diagnosis Date    Arthritis     Bipolar disorder ([address]     Bladder pain     Choledocholithiasis 11/24/2010    GERD (gastroesophageal reflux disease)     Headache(784.0)     tension headaches    High cholesterol     History of cholecystectomy     Hypertension     Ill-defined condition     IBS    Irritable bowel     Pelvic pain in female 2014    Psychiatric disorder     Stool color black     irritable bowel      Past Surgical History:   Procedure Laterality Date    COLONOSCOPY N/A 07/05/2017    COLONOSCOPY performed by Sofia Manzano MD at [facility]    HX GI  2003, 2017    prolasped rectum    HX GI  2021    rectopexy    HX KNEE REPLACEMENT      HX LAP CHOLECYSTECTOMY  11/23/2010    HX ORTHOPAEDIC Right 2018    total knee arthroplasty    HX KALEIGH AND BSO  1980    LAPAROSCOPY ABDOMEN DIAGNOSTIC  1987     Social   Social History     Tobacco Use    Smoking status: Never    Smokeless tobacco: Never   Substance Use Topics    Alcohol use: No      Family History   Problem Relation Age of Onset    Deep Vein Thrombosis Mother     Hypertension Father     Cancer Father         SKIN CA    Colon Polyps Father     Other Maternal Uncle         CHAROT-MARISELA-TOOTH    Diabetes Paternal Grandfather     No Known Problems Sister       Allergies   Allergen Reactions    Other Food Other (comments)     Oranges, cabbage, beans, peppers, raw onions, foods with caffeine, popcorn, soda because of IBS    Buspar [Buspirone] Other (comments)     Causes \"stimulation\" that could exacerbate a manic attack/phase of pt's Biplar. Reported by patient    Amlodipine Other (comments)     Ankle swelling    Caffeine Other (comments)     Patient does not take this because of anxiety    Dicyclomine Nausea and Vomiting     Extreme stomach pains    Doxycycline Other (comments)     UTI like symptoms     Lithium Nausea and Vomiting     Severe nausea and vomiting. Other Medication Other (comments)     Intolerance to oranges, cabbage,beans,peppers,raw onions,foods with caffeine in them, popcorn, no pop sodas, because of IBS      Prior to Admission Medications   Prescriptions Last Dose Informant Patient Reported? Taking? CALCIUM-VITAMIN D3 PO 10/23/2022  Yes Yes   Sig: Take 600 mg by mouth two (2) times a day. LORazepam (ATIVAN) 0.5 mg tablet 10/24/2022  No Yes   Sig: Take 1 Tab by mouth every eight (8) hours as needed for Anxiety. Max Daily Amount: 1.5 mg. D/C valium   OTHER 10/23/2022  Yes Yes   Sig: Therabreath lozenges as needed for dry mouth. Vraylar 3 mg capsule 10/24/2022  Yes Yes   Sig: Take 3 mg by mouth daily. alendronate (FOSAMAX) 70 mg tablet 10/23/2022  No Yes   Sig: Take 1 Tablet by mouth every seven (7) days. TAKE ONE TABLET BY MOUTH EVERY 7 DAYS/sat   amitriptyline (ELAVIL) 50 mg tablet 10/23/2022  No Yes   Sig: TAKE 1 TABLET BY MOUTH EVERY DAY AT NIGHT   atenoloL (TENORMIN) 25 mg tablet 10/24/2022  No Yes   Sig: TAKE 1 TABLET BY MOUTH EVERY DAY IN THE MORNING   carboxymethylcellulose sodium (THERATEARS OP) 10/24/2022  Yes Yes   Sig: Administer 2 Drops to both eyes two (2) times a day. cholecalciferol (VITAMIN D3) 2,000 unit cap capsule 10/23/2022  No Yes   Sig: Take 2,000 Units by mouth daily. elderberry fruit (ELDERBERRY PO) Not Taking  Yes No   Sig: Take  by mouth daily. fenofibrate nanocrystallized (TRICOR) 145 mg tablet 10/23/2022  No Yes   Sig: TAKE 1 TABLET BY MOUTH EVERY 48 HOURS. gabapentin (NEURONTIN) 100 mg capsule 10/23/2022  No Yes   Si cap po am and 1 cap Hs   losartan (COZAAR) 25 mg tablet 10/24/2022  No Yes   Sig: Take 1 Tablet by mouth daily. rifAXIMin (XIFAXAN) 550 mg tablet Not Taking  Yes No   Sig: Take 550-1,100 mg by mouth as needed for Diarrhea (at onset of severe diarrhea). Patient not taking: Reported on 10/24/2022   senna-docusate (PERICOLACE) 8.6-50 mg per tablet 10/17/2022  Yes Yes   Sig: Take 2 Tablets by mouth daily. solifenacin (VESICARE) 5 mg tablet 10/23/2022  No Yes   Sig: TAKE 1 TABLET BY MOUTH EVERY DAY IN THE MORNING   vitamin E (AQUA GEMS) 268 mg (400 unit) capsule 10/23/2022  Yes Yes   Sig: Take 400 Units by mouth daily. Facility-Administered Medications: None       PHYSICAL EXAM:  The patient is examined immediately prior to the procedure. Visit Vitals  /76   Pulse 87   Temp 99.3 °F (37.4 °C)   Resp 16   Ht 5' (1.524 m)   Wt 55.3 kg (122 lb)   SpO2 98%   Breastfeeding No   BMI 23.83 kg/m²     Gen: Appears comfortable, no distress. Pulm: comfortable respirations with no abnormal audible breath sounds  CV: heart regular, well perfused  GI: abdomen flat. ASSESSMENT:  Patient here for procedure. The indication for the procedure, the patient's history and the patient's current medications are reviewed prior to the procedure and that data is reported on the endoscopy note in the chart to which this document is attached. PLAN:  Informed consent discussion held, patient afforded an opportunity to ask questions and all questions answered. After being advised of the risks, benefits, and alternatives, the patient requested that we proceed and indicated so on a written consent form. Will proceed with procedure as planned.   Newton Orourke MD

## 2022-10-24 NOTE — PROCEDURES
[address] [facility].  [address] [facility] [address] [place], [address]  [phone]                              Colonoscopy Procedure Note      Indications:    Screening colonoscopy     :  Zaki An MD    Staff: Endoscopy RN-1: Lynsey Hess RN  Endoscopy RN-2: Tavo Reynolds RN    Referring Provider: Sara Cardenas MD    Sedation: MAC    Procedure Details:  After informed consent was obtained with all risks and benefits of procedure explained and preoperative exam completed, the patient was taken to the endoscopy suite and placed in the left lateral decubitus position. Upon sequential sedation as per above, a digital rectal exam was performed per below. The Olympus videocolonoscope was inserted in the rectum and carefully advanced to the cecum, which was identified by the ileocecal valve and appendiceal orifice. The quality of preparation was excellent. [place] Bowel Prep Score : 3/3/3/9 . The colonoscope was slowly withdrawn with careful evaluation between folds. Retroflexion in the rectum was performed. Findings:   Rectum: small polypoid area of distal rectum likely hyperplastic but just adjacent to anal verge, biopsied to exclude cellular atypia   Sigmoid: normal   Descending Colon: normal  Transverse Colon:  Three sessile polyps, ranging from 2-4mm in size, resected completely with cold snare and retrieved  Ascending Colon: Two sessile polyps, both 4mm in size, resected completely with cold snare and retrieved  Cecum: Two sessile polyps, ranging from 3-5 mm in size, resected completely with cold snare and retrieved     Specimen Removed:    ID Type Source Tests Collected by Time Destination   1 : pathology Preservative Colon, Transverse  Jennifer De Luna MD 10/24/2022 1130 Pathology   2 : pathology Preservative Cecum  Jennifer De Luna MD 10/24/2022 1132 Pathology   3 : pathology Preservative Colon, Ascending  Jennifer De Luna MD 10/24/2022 1135 Pathology   4 : distal Preservative Rectum  Rea Cedeño MD 10/24/2022 1145 Pathology       Complications: None. EBL:  none    Impression:    See Postoperative diagnosis above    Recommendations:   - Await pathology evaluation of biopsy / resected tissue  - Resume normal medications. - Resume previous diet. - Recommend repeat colonoscopy in 3 years    Discharge Disposition:  Home in the company of a  when able to ambulate.     Brenna Bernal MD  10/24/2022  11:51 AM

## 2022-10-24 NOTE — ANESTHESIA PREPROCEDURE EVALUATION
Relevant Problems   NEUROLOGY   (+) Bipolar 1 disorder (HCC)   (+) Bipolar affective disorder, manic (HCC)   (+) Chronic headache   (+) Delirium      RENAL FAILURE   (+) Chronic renal disease, stage III      ENDOCRINE   (+) Arthritis       Anesthetic History               Review of Systems / Medical History  Patient summary reviewed, nursing notes reviewed and pertinent labs reviewed    Pulmonary                   Neuro/Psych         Psychiatric history     Cardiovascular    Hypertension                   GI/Hepatic/Renal     GERD    Renal disease: CRI       Endo/Other        Arthritis     Other Findings   Comments: Problem list:    Arthritis  Gallstones with obstruction of gallbladder  Choledocholithiasis  Bipolar affective disorder, manic (HCC)  IBS (irritable bowel syndrome)  Chronic headache  Drop attack  Altered mental status  Delirium  Hyponatremia  Bipolar 1 disorder (HCC)  ACP (advance care planning)  Rectal prolapse  Osteoarthritis of right knee  Total knee replacement status, right  Chronic renal disease, stage III             Physical Exam    Airway  Mallampati: II  TM Distance: 4 - 6 cm  Neck ROM: normal range of motion        Cardiovascular    Rhythm: regular  Rate: normal      Pertinent negatives: No murmur   Dental  No notable dental hx       Pulmonary  Breath sounds clear to auscultation               Abdominal         Other Findings            Anesthetic Plan    ASA: 3  Anesthesia type: MAC          Induction: Intravenous  Anesthetic plan and risks discussed with: Patient

## 2022-11-01 ENCOUNTER — OFFICE VISIT (OUTPATIENT)
Dept: ORTHOPEDIC SURGERY | Age: 62
End: 2022-11-01
Payer: MEDICARE

## 2022-11-01 VITALS — BODY MASS INDEX: 23.95 KG/M2 | WEIGHT: 122 LBS | HEIGHT: 60 IN

## 2022-11-01 DIAGNOSIS — S82.65XD CLOSED NONDISPLACED FRACTURE OF LATERAL MALLEOLUS OF LEFT FIBULA WITH ROUTINE HEALING, SUBSEQUENT ENCOUNTER: Primary | ICD-10-CM

## 2022-11-01 PROCEDURE — 99024 POSTOP FOLLOW-UP VISIT: CPT | Performed by: ORTHOPAEDIC SURGERY

## 2022-11-01 NOTE — LETTER
11/3/2022    Patient: Jaskaran Moses   YOB: 1960   Date of Visit: 11/1/2022     Kena Cabezas MD  [address] [facility] [address] [address]  [place]    Dear Kena Cabezas MD,      Thank you for referring Ms. Joellen Zepeda to [facility] for evaluation. My notes for this consultation are attached. If you have questions, please do not hesitate to call me. I look forward to following your patient along with you.       Sincerely,    David Goetz MD

## 2022-11-01 NOTE — PROGRESS NOTES
Leora Majano (: 1960) is a 57 y.o. female, patient,here for evaluation of the following   Chief Complaint   Patient presents with    Leg Pain        ASSESSMENT/PLAN:  Below is the assessment and plan developed based on review of pertinent history, physical exam, labs, studies, and medications. 1. Closed nondisplaced fracture of lateral malleolus of left fibula with routine healing, subsequent encounter  -     XR ANKLE LT MIN 3 V; Future  -     REFERRAL TO PHYSICAL THERAPY  -     REFERRAL TO HOME HEALTH      Patient informed of findings on exam and x-rays were obtained today. Overall she is doing much better, she is not having much pain or swelling but she does feel little bit unbalanced and has a little bit of weakness as well. I would like her to attend physical therapy program, physical therapy referral made today. Otherwise she is doing well and if she continues to improve without any problems, she does not need to return but if any problems she should probably return especially after 6 to 8 weeks from today's evaluation. If she does return in the future we will get new x-rays of the left ankle 3 views nonweightbearing. Return if symptoms worsen or fail to improve. Allergies   Allergen Reactions    Other Food Other (comments)     Oranges, cabbage, beans, peppers, raw onions, foods with caffeine, popcorn, soda because of IBS    Buspar [Buspirone] Other (comments)     Causes \"stimulation\" that could exacerbate a manic attack/phase of pt's Biplar. Reported by patient    Amlodipine Other (comments)     Ankle swelling    Caffeine Other (comments)     Patient does not take this because of anxiety    Dicyclomine Nausea and Vomiting     Extreme stomach pains    Doxycycline Other (comments)     UTI like symptoms     Lithium Nausea and Vomiting     Severe nausea and vomiting.     Other Medication Other (comments)     Intolerance to oranges, cabbage,beans,peppers,raw onions,foods with caffeine in them, popcorn, no pop sodas, because of IBS       Current Outpatient Medications   Medication Sig    gabapentin (NEURONTIN) 100 mg capsule 2 cap po am and 1 cap Hs    losartan (COZAAR) 25 mg tablet Take 1 Tablet by mouth daily. senna-docusate (PERICOLACE) 8.6-50 mg per tablet Take 2 Tablets by mouth daily. CALCIUM-VITAMIN D3 PO Take 600 mg by mouth two (2) times a day. carboxymethylcellulose sodium (THERATEARS OP) Administer 2 Drops to both eyes two (2) times a day. OTHER Therabreath lozenges as needed for dry mouth.    solifenacin (VESICARE) 5 mg tablet TAKE 1 TABLET BY MOUTH EVERY DAY IN THE MORNING    atenoloL (TENORMIN) 25 mg tablet TAKE 1 TABLET BY MOUTH EVERY DAY IN THE MORNING    fenofibrate nanocrystallized (TRICOR) 145 mg tablet TAKE 1 TABLET BY MOUTH EVERY 48 HOURS.    vitamin E (AQUA GEMS) 268 mg (400 unit) capsule Take 400 Units by mouth daily. amitriptyline (ELAVIL) 50 mg tablet TAKE 1 TABLET BY MOUTH EVERY DAY AT NIGHT    Vraylar 3 mg capsule Take 3 mg by mouth daily. elderberry fruit (ELDERBERRY PO) Take  by mouth daily. alendronate (FOSAMAX) 70 mg tablet Take 1 Tablet by mouth every seven (7) days. TAKE ONE TABLET BY MOUTH EVERY 7 DAYS/sat    cholecalciferol (VITAMIN D3) 2,000 unit cap capsule Take 2,000 Units by mouth daily. LORazepam (ATIVAN) 0.5 mg tablet Take 1 Tab by mouth every eight (8) hours as needed for Anxiety. Max Daily Amount: 1.5 mg. D/C valium     No current facility-administered medications for this visit.        Past Medical History:   Diagnosis Date    Arthritis     Bipolar disorder ([facility] [address]     Bladder pain     Choledocholithiasis 11/24/2010    GERD (gastroesophageal reflux disease)     Headache(784.0)     tension headaches    High cholesterol     History of cholecystectomy     Hypertension     Ill-defined condition     IBS    Irritable bowel     Pelvic pain in female 2014    Psychiatric disorder     Stool color black     irritable bowel       Past Surgical History: Procedure Laterality Date    COLONOSCOPY N/A 07/05/2017    COLONOSCOPY performed by Lynette Murguia MD at [facility] AMBUL[facility]ORY OR    COLONOSCOPY N/A 10/24/2022    COLONOSCOPY performed by Fannie Goodwin MD at [address] [place] [address] GI  2003, 2017    prolasped rectum    HX GI  2021    rectopexy    HX KNEE REPLACEMENT      HX LAP CHOLECYSTECTOMY  11/23/2010    HX ORTHOPAEDIC Right 2018    total knee arthroplasty    HX KALEIGH AND BSO  1980    LAPAROSCOPY ABDOMEN DIAGNOSTIC  1987       Family History   Problem Relation Age of Onset    Deep Vein Thrombosis Mother     Hypertension Father     Cancer Father         SKIN CA    Colon Polyps Father     Other Maternal Uncle         CHAROT-MARISELA-TOOTH    Diabetes Paternal Grandfather     No Known Problems Sister        Social History     Socioeconomic History    Marital status:      Spouse name: Not on file    Number of children: Not on file    Years of education: Not on file    Highest education level: Not on file   Occupational History    Not on file   Tobacco Use    Smoking status: Never    Smokeless tobacco: Never   Vaping Use    Vaping Use: Never used   Substance and Sexual Activity    Alcohol use: No    Drug use: No    Sexual activity: Not Currently     Comment:  has 1 child   Other Topics Concern    Not on file   Social History Narrative    Not on file     Social Determinants of Health     Financial Resource Strain: Low Risk     Difficulty of Paying Living Expenses: Not hard at all   Food Insecurity: No Food Insecurity    Worried About Running Out of Food in the Last Year: Never true    Ran Out of Food in the Last Year: Never true   Transportation Needs: Not on file   Physical Activity: Not on file   Stress: Not on file   Social Connections: Not on file   Intimate Partner Violence: Not on file   Housing Stability: Not on file           Vitals:  Ht 5' (1.524 m)   Wt 122 lb (55.3 kg)   BMI 23.83 kg/m²    Body mass index is 23.83 kg/m².            SUBJECTIVE:  Sarah Piña (: 1960)   Patient here for follow up of left ankle fracture on 2022, states doing better and minimal pain or swelling now. She does feel little weak and unbalanced at times. OBJECTIVE EXAM:     Visit Vitals  Ht 5' (1.524 m)   Wt 122 lb (55.3 kg)   BMI 23.83 kg/m²       Appearance: Alert, well appearing and pleasant patient who is in no distress, oriented to person, place/time, and who follows commands. This patient is accompanied in the       office by her  self. Psychiatric: Affect and mood are appropriate. No dementia noted on examination  Musculoskeletal:  LOCATION: No significant swelling or tenderness ankle - left      Integumentary: No rashes, skin patches, wounds, or abrasions to the right or left legs       Warm and normal color. No regions of expressible drainage. Gait: Normal      Tenderness: No tenderness        Motor/Strength/Tone Exam: Normal       Sensory Exam:   Intact Normal Sensation to ankle/foot      Stability Testing: No anterolateral or varus instability of the Ankle or Subtalar Joints               No peroneal tendon instability noted      ROM: Normal ROM noted to ankle/foot      Contractures: No Achilles or Gastrocnemius Contractures      Calf tenderness: Absent for calf or gastrocnemius muscle regions       Soft, supple, non tender, non taut lower extremity compartment  Alignment:      NEUTRAL Hindfoot,         none Metatarsus Adductus Metatarsus   Wounds/Abrasions:    None present  Extremities:   No embolic phenomena to the toes          No significant edema to the foot and or toes.         Lower extremities are warm and appear well perfused    DVT: No evidence of DVT seen on examination at this time     No calf swelling, no tenderness to calf muscles  Lymphatic:  No Evidence of Lymphedema  Vascular: Medial Border of Tibia Region: Edema is not present         Pulses: Dorsalis Pedis &  Posterior Tibial Pulses : Palpable yes        Varicosities Lower Limbs :  None  noted  Neuro: Negative bilateral Straight leg raise (seated position)    See Musculoskeletal section for pertinent individual extremity examination    No abnormal hand/wrist, foot/ankle, or facial/neck tremors. Lower Extremity/Ankle/Foot:  Mostly normal gait, normal weightbearing stance. Left lower extremity/ankle: Minimal tenderness to palpation with deep palpation of lateral malleolus, medial malleolus and rest of ankle nontender. Ligaments are grossly stable, Achilles tendon intact with negative Cohen test, negative ankle squeeze test.    Left foot: There is no malalignment or deformity, nontender, no swelling, ligaments are stable. Contralateral lower extremity/ankle /foot exam:  Nontender, no swelling ligaments grossly stable. Normal weightbearing stance. Neurovascular exam grossly intact. Imaging:    XR Results (most recent):  Results from Appointment encounter on 11/01/22    XR ANKLE LT MIN 3 V    Narrative  Left ankle AP, lateral and oblique nonweightbearing x-rays show the fracture at the lateral malleolus is healing, there is callus formation, the fracture is mostly healed. There is normal ankle mortise and ankle joint space. No widening of syndesmosis or medial clear space. An electronic signature was used to authenticate this note.   -- Reza Beltran MD

## 2022-11-03 NOTE — PROGRESS NOTES
Serum creatinine mildly elevated. Encourage oral water intake, as tolerated.   Otherwise stable labs

## 2022-11-04 ENCOUNTER — HOME HEALTH ADMISSION (OUTPATIENT)
Dept: HOME HEALTH SERVICES | Facility: HOME HEALTH | Age: 62
End: 2022-11-04
Payer: MEDICARE

## 2022-11-04 DIAGNOSIS — M25.572 ACUTE LEFT ANKLE PAIN: ICD-10-CM

## 2022-11-04 DIAGNOSIS — S82.65XD CLOSED NONDISPLACED FRACTURE OF LATERAL MALLEOLUS OF LEFT FIBULA WITH ROUTINE HEALING, SUBSEQUENT ENCOUNTER: Primary | ICD-10-CM

## 2022-11-04 DIAGNOSIS — M89.8X6 PAIN OF LEFT FIBULA: ICD-10-CM

## 2022-11-06 ENCOUNTER — HOME CARE VISIT (OUTPATIENT)
Dept: SCHEDULING | Facility: HOME HEALTH | Age: 62
End: 2022-11-06
Payer: MEDICARE

## 2022-11-06 PROCEDURE — 400013 HH SOC

## 2022-11-06 PROCEDURE — G0151 HHCP-SERV OF PT,EA 15 MIN: HCPCS

## 2022-11-07 ENCOUNTER — HOME CARE VISIT (OUTPATIENT)
Dept: SCHEDULING | Facility: HOME HEALTH | Age: 62
End: 2022-11-07
Payer: MEDICARE

## 2022-11-07 PROCEDURE — G0157 HHC PT ASSISTANT EA 15: HCPCS

## 2022-11-08 VITALS
SYSTOLIC BLOOD PRESSURE: 118 MMHG | HEART RATE: 71 BPM | OXYGEN SATURATION: 98 % | TEMPERATURE: 98.5 F | DIASTOLIC BLOOD PRESSURE: 72 MMHG

## 2022-11-09 ENCOUNTER — HOME CARE VISIT (OUTPATIENT)
Dept: SCHEDULING | Facility: HOME HEALTH | Age: 62
End: 2022-11-09
Payer: MEDICARE

## 2022-11-09 PROCEDURE — G0157 HHC PT ASSISTANT EA 15: HCPCS

## 2022-11-10 VITALS
TEMPERATURE: 98.1 F | DIASTOLIC BLOOD PRESSURE: 74 MMHG | HEART RATE: 69 BPM | SYSTOLIC BLOOD PRESSURE: 120 MMHG | OXYGEN SATURATION: 98 %

## 2022-11-14 ENCOUNTER — HOME CARE VISIT (OUTPATIENT)
Dept: SCHEDULING | Facility: HOME HEALTH | Age: 62
End: 2022-11-14
Payer: MEDICARE

## 2022-11-14 PROCEDURE — G0157 HHC PT ASSISTANT EA 15: HCPCS

## 2022-11-15 VITALS
SYSTOLIC BLOOD PRESSURE: 120 MMHG | TEMPERATURE: 97.3 F | OXYGEN SATURATION: 97 % | DIASTOLIC BLOOD PRESSURE: 70 MMHG | HEART RATE: 66 BPM

## 2022-11-16 ENCOUNTER — HOME CARE VISIT (OUTPATIENT)
Dept: SCHEDULING | Facility: HOME HEALTH | Age: 62
End: 2022-11-16
Payer: MEDICARE

## 2022-11-16 PROCEDURE — G0157 HHC PT ASSISTANT EA 15: HCPCS

## 2022-11-17 VITALS
SYSTOLIC BLOOD PRESSURE: 122 MMHG | OXYGEN SATURATION: 98 % | HEART RATE: 59 BPM | DIASTOLIC BLOOD PRESSURE: 68 MMHG | TEMPERATURE: 98.4 F

## 2022-11-20 VITALS
HEART RATE: 74 BPM | SYSTOLIC BLOOD PRESSURE: 130 MMHG | OXYGEN SATURATION: 98 % | RESPIRATION RATE: 17 BRPM | DIASTOLIC BLOOD PRESSURE: 67 MMHG | TEMPERATURE: 98.4 F

## 2022-11-21 ENCOUNTER — HOME CARE VISIT (OUTPATIENT)
Dept: SCHEDULING | Facility: HOME HEALTH | Age: 62
End: 2022-11-21
Payer: MEDICARE

## 2022-11-21 VITALS
HEART RATE: 66 BPM | TEMPERATURE: 98.7 F | SYSTOLIC BLOOD PRESSURE: 122 MMHG | DIASTOLIC BLOOD PRESSURE: 70 MMHG | OXYGEN SATURATION: 98 %

## 2022-11-21 PROCEDURE — G0157 HHC PT ASSISTANT EA 15: HCPCS

## 2022-11-23 ENCOUNTER — HOME CARE VISIT (OUTPATIENT)
Dept: SCHEDULING | Facility: HOME HEALTH | Age: 62
End: 2022-11-23
Payer: MEDICARE

## 2022-11-23 PROCEDURE — G0157 HHC PT ASSISTANT EA 15: HCPCS

## 2022-11-24 VITALS
OXYGEN SATURATION: 98 % | TEMPERATURE: 97.8 F | DIASTOLIC BLOOD PRESSURE: 74 MMHG | HEART RATE: 63 BPM | SYSTOLIC BLOOD PRESSURE: 112 MMHG

## 2022-11-28 ENCOUNTER — HOME CARE VISIT (OUTPATIENT)
Dept: HOME HEALTH SERVICES | Facility: HOME HEALTH | Age: 62
End: 2022-11-28
Payer: MEDICARE

## 2022-11-28 ENCOUNTER — HOME CARE VISIT (OUTPATIENT)
Dept: SCHEDULING | Facility: HOME HEALTH | Age: 62
End: 2022-11-28
Payer: MEDICARE

## 2022-11-28 PROCEDURE — G0151 HHCP-SERV OF PT,EA 15 MIN: HCPCS

## 2022-11-29 VITALS
TEMPERATURE: 98.2 F | RESPIRATION RATE: 17 BRPM | SYSTOLIC BLOOD PRESSURE: 124 MMHG | DIASTOLIC BLOOD PRESSURE: 75 MMHG | HEART RATE: 68 BPM | OXYGEN SATURATION: 98 %

## 2023-01-01 NOTE — PROGRESS NOTES
Follow up for headache Messaged Dr. Price about infant still not voiding.  Awaiting response from her.

## 2023-02-07 DIAGNOSIS — F31.10 BIPOLAR AFFECTIVE DISORDER, CURRENT EPISODE MANIC, CURRENT EPISODE SEVERITY UNSPECIFIED (HCC): ICD-10-CM

## 2023-02-07 RX ORDER — AMITRIPTYLINE HYDROCHLORIDE 50 MG/1
TABLET, FILM COATED ORAL
Qty: 90 TABLET | Refills: 1 | Status: SHIPPED | OUTPATIENT
Start: 2023-02-07

## 2023-02-07 NOTE — TELEPHONE ENCOUNTER
Requested Prescriptions     Pending Prescriptions Disp Refills    amitriptyline (ELAVIL) 50 mg tablet 90 Tablet 1     Sig: TAKE 1 TABLET BY MOUTH EVERY DAY AT NIGHT         [id number] [address], [place] - [address] [place] [address]  Phone: [phone] Fax: [phone]       10/20/2022 is LAST OFFICE VISIT     Future Appointments   Date Time Provider Aurora Baeza   4/20/2023 10:30 AM Colette Hale MD [facility] AMB

## 2023-03-16 NOTE — PROGRESS NOTES
Headaches improved on Elavil Itraconazole Counseling:  I discussed with the patient the risks of itraconazole including but not limited to liver damage, nausea/vomiting, neuropathy, and severe allergy.  The patient understands that this medication is best absorbed when taken with acidic beverages such as non-diet cola or ginger ale.  The patient understands that monitoring is required including baseline LFTs and repeat LFTs at intervals.  The patient understands that they are to contact us or the primary physician if concerning signs are noted.

## 2023-03-20 DIAGNOSIS — E78.2 ELEVATED TRIGLYCERIDES WITH HIGH CHOLESTEROL: ICD-10-CM

## 2023-03-20 RX ORDER — FENOFIBRATE 145 MG/1
TABLET, COATED ORAL
Qty: 45 TABLET | Refills: 1 | Status: SHIPPED | OUTPATIENT
Start: 2023-03-20

## 2023-03-23 DIAGNOSIS — I10 ESSENTIAL HYPERTENSION: ICD-10-CM

## 2023-03-23 DIAGNOSIS — N32.81 OAB (OVERACTIVE BLADDER): ICD-10-CM

## 2023-03-23 DIAGNOSIS — G44.221 CHRONIC TENSION-TYPE HEADACHE, INTRACTABLE: ICD-10-CM

## 2023-03-23 RX ORDER — SOLIFENACIN SUCCINATE 5 MG/1
TABLET, FILM COATED ORAL
Qty: 90 TABLET | Refills: 1 | Status: SHIPPED | OUTPATIENT
Start: 2023-03-23

## 2023-03-23 RX ORDER — ATENOLOL 25 MG/1
TABLET ORAL
Qty: 90 TABLET | Refills: 1 | Status: SHIPPED | OUTPATIENT
Start: 2023-03-23

## 2023-04-20 ENCOUNTER — OFFICE VISIT (OUTPATIENT)
Dept: INTERNAL MEDICINE CLINIC | Age: 63
End: 2023-04-20
Payer: MEDICARE

## 2023-04-20 VITALS
RESPIRATION RATE: 16 BRPM | HEART RATE: 80 BPM | WEIGHT: 128 LBS | HEIGHT: 60 IN | TEMPERATURE: 98 F | DIASTOLIC BLOOD PRESSURE: 78 MMHG | SYSTOLIC BLOOD PRESSURE: 124 MMHG | BODY MASS INDEX: 25.13 KG/M2 | OXYGEN SATURATION: 98 %

## 2023-04-20 DIAGNOSIS — K58.1 IRRITABLE BOWEL SYNDROME WITH CONSTIPATION: ICD-10-CM

## 2023-04-20 DIAGNOSIS — I10 ESSENTIAL HYPERTENSION: ICD-10-CM

## 2023-04-20 DIAGNOSIS — R73.03 PREDIABETES: ICD-10-CM

## 2023-04-20 DIAGNOSIS — F31.10 BIPOLAR AFFECTIVE DISORDER, CURRENT EPISODE MANIC, CURRENT EPISODE SEVERITY UNSPECIFIED (HCC): ICD-10-CM

## 2023-04-20 DIAGNOSIS — M25.572 LEFT ANKLE PAIN, UNSPECIFIED CHRONICITY: Primary | ICD-10-CM

## 2023-04-20 DIAGNOSIS — G44.221 CHRONIC TENSION-TYPE HEADACHE, INTRACTABLE: ICD-10-CM

## 2023-04-20 DIAGNOSIS — G44.021 INTRACTABLE CHRONIC CLUSTER HEADACHE: ICD-10-CM

## 2023-04-20 DIAGNOSIS — M81.0 OSTEOPOROSIS, UNSPECIFIED OSTEOPOROSIS TYPE, UNSPECIFIED PATHOLOGICAL FRACTURE PRESENCE: ICD-10-CM

## 2023-04-20 PROBLEM — R41.82 ALTERED MENTAL STATUS: Status: RESOLVED | Noted: 2017-01-24 | Resolved: 2023-04-20

## 2023-04-20 PROBLEM — N18.30 CHRONIC RENAL DISEASE, STAGE III (HCC): Status: RESOLVED | Noted: 2022-05-19 | Resolved: 2023-04-20

## 2023-04-20 PROCEDURE — G8419 CALC BMI OUT NRM PARAM NOF/U: HCPCS | Performed by: INTERNAL MEDICINE

## 2023-04-20 PROCEDURE — 99214 OFFICE O/P EST MOD 30 MIN: CPT | Performed by: INTERNAL MEDICINE

## 2023-04-20 PROCEDURE — G8427 DOCREV CUR MEDS BY ELIG CLIN: HCPCS | Performed by: INTERNAL MEDICINE

## 2023-04-20 PROCEDURE — G9717 DOC PT DX DEP/BP F/U NT REQ: HCPCS | Performed by: INTERNAL MEDICINE

## 2023-04-20 PROCEDURE — 3017F COLORECTAL CA SCREEN DOC REV: CPT | Performed by: INTERNAL MEDICINE

## 2023-04-20 PROCEDURE — G9899 SCRN MAM PERF RSLTS DOC: HCPCS | Performed by: INTERNAL MEDICINE

## 2023-04-20 RX ORDER — DICLOFENAC SODIUM 10 MG/G
GEL TOPICAL
Qty: 100 G | Refills: 1 | Status: SHIPPED | OUTPATIENT
Start: 2023-04-20

## 2023-04-20 RX ORDER — GABAPENTIN 100 MG/1
CAPSULE ORAL
Qty: 60 CAPSULE | Refills: 2 | Status: SHIPPED | OUTPATIENT
Start: 2023-04-20

## 2023-04-20 NOTE — PROGRESS NOTES
HISTORY OF PRESENT ILLNESS  Marylen Bender is a 57 y.o. female here for follow-up. Reported left ankle pain on and off for last several weeks. She had a left ankle malleoli fracture last year and was repaired by Dr. Olivia Manzano. No fall or injury. Reported constipation often. She is diagnosed with IBS and constipation. Would like to try some medicine. Has hypertension, compliant on medication. Denies chest pain palpitation shortness of breath. Has elevated lipids, on Tricor. Need lab work. She suffers from headache. Gabapentin helped her to control headache. She is planning to reduce pain and dosage. Already on amitriptyline. Has bipolar disorder. Seeing psychiatrist.  Depression seems stable. She is cutting down on Xanax dosage. Labs reviewed, seems stable. .  Review of Systems   Constitutional: Negative. HENT: Negative. Eyes: Negative. Respiratory: Negative. Negative for shortness of breath and wheezing. Cardiovascular:  Negative for chest pain. Gastrointestinal:  Positive for constipation. Negative for blood in stool. Genitourinary: Negative. Musculoskeletal:  Negative for falls. Skin: Negative. Negative for itching. Neurological:  Positive for headaches. Endo/Heme/Allergies: Negative. Psychiatric/Behavioral:  Positive for depression. The patient is nervous/anxious. Physical Exam  Constitutional:       General: She is not in acute distress. Appearance: Normal appearance. She is normal weight. Cardiovascular:      Rate and Rhythm: Normal rate and regular rhythm. Pulses: Normal pulses. Heart sounds: Normal heart sounds. Pulmonary:      Effort: Pulmonary effort is normal. No respiratory distress. Breath sounds: Normal breath sounds. No wheezing. Abdominal:      General: Abdomen is flat. Bowel sounds are normal.   Musculoskeletal:         General: Normal range of motion. Cervical back: Normal range of motion and neck supple. Comments: Left ankle: Mild tenderness on the ankle, no swelling. Knee motion mildly restricted. Neurological:      General: No focal deficit present. Mental Status: She is alert and oriented to person, place, and time. Mental status is at baseline. Psychiatric:         Mood and Affect: Mood normal.         Behavior: Behavior normal.      Comments: Stable depression. ASSESSMENT and PLAN    Diagnoses and all orders for this visit:    1. Left ankle pain, unspecified chronicity    Had left malleoli fracture last year and an surgery done and a area healed up. Right now she has some discomfort in the left ankle. Advised her to do ice pack ankle elevation and Ace bandage wrap. We will order,  -     diclofenac (VOLTAREN) 1 % gel; Apply  to affected area two (2) times daily as needed for Pain. Top BID on left ankle    2. Essential hypertension    Stable blood pressure. On losartan and atenolol. Will check,  -     METABOLIC PANEL, BASIC    3. Osteoporosis, unspecified osteoporosis type, unspecified pathological fracture presence  Doing well. On Fosamax. 4. Bipolar affective disorder, current episode manic, current episode severity unspecified ([facility] [address]    Stable. Is seeing Dr. Beth Cedeno. On Vraylar.  -     METABOLIC PANEL, BASIC  Reducing Xanax dosage. 5. Chronic tension-type headache, intractable  She is planning to reduce gabapentin dosage. We will refill,  -     gabapentin (NEURONTIN) 100 mg capsule; 1 cap po am and 1 cap Hs    6. Intractable chronic cluster headache  -     gabapentin (NEURONTIN) 100 mg capsule; 1 cap po am and 1 cap Hs    7. Irritable bowel syndrome with constipation    Need to drink more fluid and fiber. We will give,  -     linaCLOtide (Linzess) 145 mcg cap capsule; Take 1 Capsule by mouth Daily (before breakfast). 8. Prediabetes  Last A1c was normal.  Advised to be on low-carb diet and exercise.    Issues reviewed with her: referral to Diabetic Education department, diabetic diet discussed in detail, written exchange diet given, home glucose monitoring emphasized, all medications, side effects and compliance discussed carefully, foot care discussed and Podiatry visits discussed, annual eye examinations at Ophthalmology discussed, long term diabetic complications discussed and labs immediately prior to next visit.

## 2023-04-20 NOTE — PROGRESS NOTES
Nursing staff confirmed patient with full name and . Prepared patient for visit today by obtaining vitals, verifying medication list and allergies, and briefly discussing reason for visit. Chief Complaint   Patient presents with    Irritable Bowel Syndrome    Arthritis    Bipolar    Chronic Kidney Disease       Current Outpatient Medications   Medication Sig    alendronate (FOSAMAX) 70 mg tablet TAKE 1 TABLET BY MOUTH EVERY SEVEN (7) DAYS ON A SATURDAY    losartan (COZAAR) 25 mg tablet TAKE 1 TABLET BY MOUTH EVERY DAY    solifenacin (VESICARE) 5 mg tablet TAKE 1 TABLET BY MOUTH EVERY DAY IN THE MORNING    atenoloL (TENORMIN) 25 mg tablet TAKE 1 TABLET BY MOUTH EVERY DAY IN THE MORNING    fenofibrate nanocrystallized (TRICOR) 145 mg tablet TAKE 1 TABLET BY MOUTH EVERY 48 HOURS.    amitriptyline (ELAVIL) 50 mg tablet TAKE 1 TABLET BY MOUTH EVERY DAY AT NIGHT    acetaminophen (TYLENOL) 500 mg tablet Take 2 Tablets by mouth two (2) times a day. 2 in am and 2 in PM    tiZANidine (ZANAFLEX) 2 mg tablet Take 1 Tablet by mouth two (2) times daily as needed for Muscle Spasm(s). gabapentin (NEURONTIN) 100 mg capsule 2 cap po am and 1 cap Hs    senna-docusate (PERICOLACE) 8.6-50 mg per tablet Take 2 Tablets by mouth daily. CALCIUM-VITAMIN D3 PO Take 600 mg by mouth two (2) times a day. take one tablet 2x/day    carboxymethylcellulose sodium (THERATEARS OP) Administer 2 Drops to both eyes two (2) times a day. OTHER Therabreath lozenges as needed for dry mouth.    vitamin E (AQUA GEMS) 268 mg (400 unit) capsule Take 1 Capsule by mouth daily. Vraylar 3 mg capsule Take 1 Capsule by mouth daily. elderberry fruit (ELDERBERRY PO) Take 1 Capsule by mouth daily. cholecalciferol (VITAMIN D3) 2,000 unit cap capsule Take 2,000 Units by mouth daily. LORazepam (ATIVAN) 0.5 mg tablet Take 1 Tab by mouth every eight (8) hours as needed for Anxiety.  Max Daily Amount: 1.5 mg. D/C valium     No current facility-administered medications for this visit. 1. \"Have you been to the ER, urgent care clinic since your last visit? Hospitalized since your last visit? \" No    2. \"Have you seen or consulted any other health care providers outside of the [address] [place] [address] since your last visit? \" No     3. For patients aged 39-70: Has the patient had a colonoscopy / FIT/ Cologuard? Yes - no Care Gap present      If the patient is female:    4. For patients aged 41-77: Has the patient had a mammogram within the past 2 years? Yes - no Care Gap present      5. For patients aged 21-65: Has the patient had a pap smear?  NA - based on age or sex

## 2023-04-21 DIAGNOSIS — Z12.31 ENCOUNTER FOR SCREENING MAMMOGRAM FOR MALIGNANT NEOPLASM OF BREAST: Primary | ICD-10-CM

## 2023-04-21 LAB
BUN SERPL-MCNC: 19 MG/DL (ref 8–27)
BUN/CREAT SERPL: 21 (ref 12–28)
CALCIUM SERPL-MCNC: 10.2 MG/DL (ref 8.7–10.3)
CHLORIDE SERPL-SCNC: 102 MMOL/L (ref 96–106)
CO2 SERPL-SCNC: 27 MMOL/L (ref 20–29)
CREAT SERPL-MCNC: 0.91 MG/DL (ref 0.57–1)
EGFRCR SERPLBLD CKD-EPI 2021: 70 ML/MIN/1.73
GLUCOSE SERPL-MCNC: 97 MG/DL (ref 70–99)
POTASSIUM SERPL-SCNC: 4.6 MMOL/L (ref 3.5–5.2)
SODIUM SERPL-SCNC: 140 MMOL/L (ref 134–144)

## 2023-04-25 ENCOUNTER — HOSPITAL ENCOUNTER (OUTPATIENT)
Dept: MAMMOGRAPHY | Age: 63
Discharge: HOME | End: 2023-04-25
Attending: INTERNAL MEDICINE
Payer: MEDICARE

## 2023-04-25 DIAGNOSIS — Z12.31 ENCOUNTER FOR SCREENING MAMMOGRAM FOR MALIGNANT NEOPLASM OF BREAST: ICD-10-CM

## 2023-04-25 PROCEDURE — 77063 BREAST TOMOSYNTHESIS BI: CPT

## 2023-05-04 ENCOUNTER — TRANSCRIBE ORDERS (OUTPATIENT)
Facility: HOSPITAL | Age: 63
End: 2023-05-04

## 2023-05-04 DIAGNOSIS — R92.8 ABNORMAL MAMMOGRAM: Primary | ICD-10-CM

## 2023-05-08 DIAGNOSIS — G44.221 CHRONIC TENSION-TYPE HEADACHE, INTRACTABLE: ICD-10-CM

## 2023-05-08 DIAGNOSIS — G44.021 CHRONIC CLUSTER HEADACHE, INTRACTABLE: ICD-10-CM

## 2023-05-08 RX ORDER — GABAPENTIN 100 MG/1
CAPSULE ORAL
Qty: 90 CAPSULE | Refills: 0 | Status: SHIPPED | OUTPATIENT
Start: 2023-05-08 | End: 2023-08-06

## 2023-05-08 NOTE — TELEPHONE ENCOUNTER
Requested Prescriptions     Pending Prescriptions Disp Refills    gabapentin (NEURONTIN) 100 MG capsule [Pharmacy Med Name: GABAPENTIN 100 MG CAPSULE] 90 capsule 0     Sig: TAKE 2 CAPSULES BY MOUTH EVERY MORNING AND 1 CAPSULE AT BEDTIME         [id number] [address], [address] [facility] [phone] Mina Ruiz [phone]  [address] [facility]  [address] [place] [address]  Phone: [phone] Fax: [phone]       Last appt 4/20/2023      Future Appointments   Date Time Provider Violeta Marcos   6/1/2023  8:00 AM [facility] 2 [facility]   6/1/2023  8:30 AM [facility] 1 \[facility] [place] [facility]\""[facility]   8/24/2023 10:15 AM Eleazar Mckeon MD [facility] AMB

## 2023-06-01 ENCOUNTER — HOSPITAL ENCOUNTER (OUTPATIENT)
Facility: HOSPITAL | Age: 63
Discharge: HOME | End: 2023-06-04

## 2023-06-01 DIAGNOSIS — R92.8 ABNORMAL MAMMOGRAM: ICD-10-CM

## 2023-08-06 DIAGNOSIS — E78.2 MIXED HYPERLIPIDEMIA: ICD-10-CM

## 2023-08-06 DIAGNOSIS — F31.10 BIPOLAR DISORDER, CURRENT EPISODE MANIC WITHOUT PSYCHOTIC FEATURES, UNSPECIFIED (HCC): ICD-10-CM

## 2023-08-07 RX ORDER — FENOFIBRATE 145 MG/1
TABLET, COATED ORAL
Qty: 45 TABLET | Refills: 1 | Status: SHIPPED | OUTPATIENT
Start: 2023-08-07

## 2023-08-07 RX ORDER — AMITRIPTYLINE HYDROCHLORIDE 50 MG/1
TABLET, FILM COATED ORAL
Qty: 90 TABLET | Refills: 1 | Status: SHIPPED | OUTPATIENT
Start: 2023-08-07

## 2023-08-10 DIAGNOSIS — K58.1 IRRITABLE BOWEL SYNDROME WITH CONSTIPATION: ICD-10-CM

## 2023-08-11 RX ORDER — LINACLOTIDE 145 UG/1
CAPSULE, GELATIN COATED ORAL
Qty: 30 CAPSULE | Refills: 3 | Status: SHIPPED | OUTPATIENT
Start: 2023-08-11

## 2023-08-11 NOTE — TELEPHONE ENCOUNTER
Requested Prescriptions     Pending Prescriptions Disp Refills    LINZESS 145 MCG capsule [Pharmacy Med Name: Delvin Krishnan 145 MCG CAPSULE] 30 capsule 3     Sig: TAKE 1 CAPSULE BY MOUTH DAILY (BEFORE BREAKFAST).          [facility] [address] [phone] Richar Zambrano [phone]  [address] [place] [address]  Phone: [phone] Fax: [phone]       Last appt 4/20/2023      Future Appointments   Date Time Provider [address] [place] [address]   8/24/2023 10:15 AM Mark Rangel MD [facility]

## 2023-08-21 SDOH — ECONOMIC STABILITY: INCOME INSECURITY: HOW HARD IS IT FOR YOU TO PAY FOR THE VERY BASICS LIKE FOOD, HOUSING, MEDICAL CARE, AND HEATING?: NOT HARD AT ALL

## 2023-08-21 SDOH — ECONOMIC STABILITY: HOUSING INSECURITY
IN THE LAST 12 MONTHS, WAS THERE A TIME WHEN YOU DID NOT HAVE A STEADY PLACE TO SLEEP OR SLEPT IN A SHELTER (INCLUDING NOW)?: NO

## 2023-08-21 SDOH — ECONOMIC STABILITY: TRANSPORTATION INSECURITY
IN THE PAST 12 MONTHS, HAS LACK OF TRANSPORTATION KEPT YOU FROM MEETINGS, WORK, OR FROM GETTING THINGS NEEDED FOR DAILY LIVING?: NO

## 2023-08-21 SDOH — ECONOMIC STABILITY: FOOD INSECURITY: WITHIN THE PAST 12 MONTHS, THE FOOD YOU BOUGHT JUST DIDN'T LAST AND YOU DIDN'T HAVE MONEY TO GET MORE.: NEVER TRUE

## 2023-08-21 SDOH — HEALTH STABILITY: PHYSICAL HEALTH: ON AVERAGE, HOW MANY MINUTES DO YOU ENGAGE IN EXERCISE AT THIS LEVEL?: 10 MIN

## 2023-08-21 SDOH — ECONOMIC STABILITY: FOOD INSECURITY: WITHIN THE PAST 12 MONTHS, YOU WORRIED THAT YOUR FOOD WOULD RUN OUT BEFORE YOU GOT MONEY TO BUY MORE.: NEVER TRUE

## 2023-08-21 SDOH — HEALTH STABILITY: PHYSICAL HEALTH: ON AVERAGE, HOW MANY DAYS PER WEEK DO YOU ENGAGE IN MODERATE TO STRENUOUS EXERCISE (LIKE A BRISK WALK)?: 0 DAYS

## 2023-08-21 ASSESSMENT — PATIENT HEALTH QUESTIONNAIRE - PHQ9
SUM OF ALL RESPONSES TO PHQ9 QUESTIONS 1 & 2: 0
SUM OF ALL RESPONSES TO PHQ QUESTIONS 1-9: 0
1. LITTLE INTEREST OR PLEASURE IN DOING THINGS: 0
SUM OF ALL RESPONSES TO PHQ QUESTIONS 1-9: 0
2. FEELING DOWN, DEPRESSED OR HOPELESS: 0

## 2023-08-21 ASSESSMENT — LIFESTYLE VARIABLES
HOW OFTEN DO YOU HAVE A DRINK CONTAINING ALCOHOL: NEVER
HOW OFTEN DO YOU HAVE SIX OR MORE DRINKS ON ONE OCCASION: 1
HOW OFTEN DO YOU HAVE A DRINK CONTAINING ALCOHOL: 1
HOW MANY STANDARD DRINKS CONTAINING ALCOHOL DO YOU HAVE ON A TYPICAL DAY: 0
HOW MANY STANDARD DRINKS CONTAINING ALCOHOL DO YOU HAVE ON A TYPICAL DAY: PATIENT DOES NOT DRINK

## 2023-08-24 ENCOUNTER — OFFICE VISIT (OUTPATIENT)
Age: 63
End: 2023-08-24
Payer: MEDICAID

## 2023-08-24 VITALS
HEART RATE: 83 BPM | BODY MASS INDEX: 26.58 KG/M2 | DIASTOLIC BLOOD PRESSURE: 62 MMHG | OXYGEN SATURATION: 97 % | WEIGHT: 135.4 LBS | TEMPERATURE: 98.8 F | HEIGHT: 60 IN | RESPIRATION RATE: 16 BRPM | SYSTOLIC BLOOD PRESSURE: 98 MMHG

## 2023-08-24 DIAGNOSIS — Z71.89 ACP (ADVANCE CARE PLANNING): ICD-10-CM

## 2023-08-24 DIAGNOSIS — F51.01 PRIMARY INSOMNIA: ICD-10-CM

## 2023-08-24 DIAGNOSIS — R73.03 PREDIABETES: ICD-10-CM

## 2023-08-24 DIAGNOSIS — E78.2 MIXED HYPERLIPIDEMIA: ICD-10-CM

## 2023-08-24 DIAGNOSIS — F33.0 MAJOR DEPRESSIVE DISORDER, RECURRENT, MILD (HCC): ICD-10-CM

## 2023-08-24 DIAGNOSIS — I10 ESSENTIAL (PRIMARY) HYPERTENSION: Primary | ICD-10-CM

## 2023-08-24 DIAGNOSIS — R51.9 NONINTRACTABLE HEADACHE, UNSPECIFIED CHRONICITY PATTERN, UNSPECIFIED HEADACHE TYPE: ICD-10-CM

## 2023-08-24 DIAGNOSIS — Z00.00 MEDICARE ANNUAL WELLNESS VISIT, SUBSEQUENT: ICD-10-CM

## 2023-08-24 DIAGNOSIS — M81.0 AGE-RELATED OSTEOPOROSIS WITHOUT CURRENT PATHOLOGICAL FRACTURE: ICD-10-CM

## 2023-08-24 PROBLEM — R41.0 DELIRIUM: Status: RESOLVED | Noted: 2017-01-24 | Resolved: 2023-08-24

## 2023-08-24 LAB
BASOPHILS # BLD AUTO: 0.1 X10E3/UL (ref 0–0.2)
BASOPHILS NFR BLD AUTO: 1 %
EOSINOPHIL # BLD AUTO: 0.1 X10E3/UL (ref 0–0.4)
EOSINOPHIL NFR BLD AUTO: 2 %
ERYTHROCYTE [DISTWIDTH] IN BLOOD BY AUTOMATED COUNT: 13.1 % (ref 11.7–15.4)
HCT VFR BLD AUTO: 34.4 % (ref 34–46.6)
HGB BLD-MCNC: 11.3 G/DL (ref 11.1–15.9)
IMM GRANULOCYTES # BLD AUTO: 0 X10E3/UL (ref 0–0.1)
IMM GRANULOCYTES NFR BLD AUTO: 0 %
LYMPHOCYTES # BLD AUTO: 1.3 X10E3/UL (ref 0.7–3.1)
LYMPHOCYTES NFR BLD AUTO: 17 %
MCH RBC QN AUTO: 28.7 PG (ref 26.6–33)
MCHC RBC AUTO-ENTMCNC: 32.8 G/DL (ref 31.5–35.7)
MCV RBC AUTO: 87 FL (ref 79–97)
MONOCYTES # BLD AUTO: 0.5 X10E3/UL (ref 0.1–0.9)
MONOCYTES NFR BLD AUTO: 6 %
NEUTROPHILS # BLD AUTO: 5.6 X10E3/UL (ref 1.4–7)
NEUTROPHILS NFR BLD AUTO: 74 %
PLATELET # BLD AUTO: 248 X10E3/UL (ref 150–450)
RBC # BLD AUTO: 3.94 X10E6/UL (ref 3.77–5.28)
WBC # BLD AUTO: 7.6 X10E3/UL (ref 3.4–10.8)

## 2023-08-24 PROCEDURE — 99214 OFFICE O/P EST MOD 30 MIN: CPT | Performed by: INTERNAL MEDICINE

## 2023-08-24 RX ORDER — GABAPENTIN 100 MG/1
100 CAPSULE ORAL DAILY
Qty: 90 CAPSULE | Refills: 1 | Status: SHIPPED | OUTPATIENT
Start: 2023-08-24 | End: 2023-12-22

## 2023-08-24 RX ORDER — GABAPENTIN 100 MG/1
100 CAPSULE ORAL 2 TIMES DAILY
COMMUNITY
End: 2023-08-24 | Stop reason: SDUPTHER

## 2023-08-24 RX ORDER — HYDROXYZINE HYDROCHLORIDE 25 MG/1
25 TABLET, FILM COATED ORAL NIGHTLY PRN
Qty: 30 TABLET | Refills: 2 | Status: SHIPPED | OUTPATIENT
Start: 2023-08-24 | End: 2023-11-22

## 2023-08-24 ASSESSMENT — PATIENT HEALTH QUESTIONNAIRE - PHQ9
SUM OF ALL RESPONSES TO PHQ QUESTIONS 1-9: 0
2. FEELING DOWN, DEPRESSED OR HOPELESS: 0
SUM OF ALL RESPONSES TO PHQ QUESTIONS 1-9: 0
1. LITTLE INTEREST OR PLEASURE IN DOING THINGS: 0
4. FEELING TIRED OR HAVING LITTLE ENERGY: 0
7. TROUBLE CONCENTRATING ON THINGS, SUCH AS READING THE NEWSPAPER OR WATCHING TELEVISION: 0
SUM OF ALL RESPONSES TO PHQ QUESTIONS 1-9: 0
SUM OF ALL RESPONSES TO PHQ9 QUESTIONS 1 & 2: 0
5. POOR APPETITE OR OVEREATING: 0
10. IF YOU CHECKED OFF ANY PROBLEMS, HOW DIFFICULT HAVE THESE PROBLEMS MADE IT FOR YOU TO DO YOUR WORK, TAKE CARE OF THINGS AT HOME, OR GET ALONG WITH OTHER PEOPLE: 0
SUM OF ALL RESPONSES TO PHQ QUESTIONS 1-9: 0
3. TROUBLE FALLING OR STAYING ASLEEP: 0
8. MOVING OR SPEAKING SO SLOWLY THAT OTHER PEOPLE COULD HAVE NOTICED. OR THE OPPOSITE, BEING SO FIGETY OR RESTLESS THAT YOU HAVE BEEN MOVING AROUND A LOT MORE THAN USUAL: 0
6. FEELING BAD ABOUT YOURSELF - OR THAT YOU ARE A FAILURE OR HAVE LET YOURSELF OR YOUR FAMILY DOWN: 0
9. THOUGHTS THAT YOU WOULD BE BETTER OFF DEAD, OR OF HURTING YOURSELF: 0

## 2023-08-24 ASSESSMENT — ENCOUNTER SYMPTOMS
EYES NEGATIVE: 1
CONSTIPATION: 1
RESPIRATORY NEGATIVE: 1
ALLERGIC/IMMUNOLOGIC NEGATIVE: 1

## 2023-08-24 NOTE — ACP (ADVANCE CARE PLANNING)

## 2023-08-24 NOTE — PROGRESS NOTES
Medicare Annual Wellness Visit    Shawanda Walters is here for Medicare AWV, Arthritis, and Irritable Bowel Syndrome    Assessment & Plan   Essential (primary) hypertension  Major depressive disorder, recurrent, mild  Mixed hyperlipidemia  Age-related osteoporosis without current pathological fracture  Medicare annual wellness visit, subsequent  ACP (advance care planning)  Primary insomnia  -     hydrOXYzine HCl (ATARAX) 25 MG tablet; Take 1 tablet by mouth nightly as needed for Itching DC elavil, Disp-30 tablet, R-2Normal  Nonintractable headache, unspecified chronicity pattern, unspecified headache type  -     gabapentin (NEURONTIN) 100 MG capsule; Take 1 capsule by mouth Daily for 120 days. Max Daily Amount: 100 mg, Disp-90 capsule, R-1Normal    Recommendations for Preventive Services Due: see orders and patient instructions/AVS.  Recommended screening schedule for the next 5-10 years is provided to the patient in written form: see Patient Instructions/AVS.     No follow-ups on file. Subjective   The following acute and/or chronic problems were also addressed today:    Patient's complete Health Risk Assessment and screening values have been reviewed and are found in Flowsheets. The following problems were reviewed today and where indicated follow up appointments were made and/or referrals ordered.     Positive Risk Factor Screenings with Interventions:    Fall Risk:  Do you feel unsteady or are you worried about falling? : (!) yes  2 or more falls in past year?: no  Fall with injury in past year?: (!) yes     Interventions:    Patient declines any further evaluation or treatment              Weight and Activity:  Physical Activity: Inactive    Days of Exercise per Week: 0 days    Minutes of Exercise per Session: 10 min     On average, how many days per week do you engage in moderate to strenuous exercise (like a brisk walk)?: 0 days  Have you lost any weight without trying in the past 3 months?: No  Body mass

## 2023-08-24 NOTE — PROGRESS NOTES
Subjective:      Patient ID: Prem Delong is a 70 y.o. female here for follow-up. Reported constipation. This is not helping her. She would like to stop taking it but wanted it to be in a prescription. In case if she needed. She is using MiraLAX which is helping her. She would like to amitriptyline which is making her to have dry mouth and constipation. But she would like to have something for insomnia. Suffer from bipolar depression. Seeing psychiatrist, on [address]. Has an elevated triglyceride, on Tricor, no myalgia. She takes gabapentin for headache, she would like to go down with the dosage. Need lab work. Arthritis      Review of Systems   Constitutional: Negative. HENT: Negative. Eyes: Negative. Respiratory: Negative. Cardiovascular: Negative. Gastrointestinal:  Positive for constipation. Endocrine: Negative. Genitourinary: Negative. Musculoskeletal:  Positive for arthritis. Skin: Negative. Allergic/Immunologic: Negative. Neurological: Negative. Hematological: Negative. Psychiatric/Behavioral: Negative. Objective:   Physical Exam  Constitutional:       Appearance: Normal appearance. She is obese. Eyes:      Extraocular Movements: Extraocular movements intact. Conjunctiva/sclera: Conjunctivae normal.      Pupils: Pupils are equal, round, and reactive to light. Cardiovascular:      Rate and Rhythm: Normal rate and regular rhythm. Pulses: Normal pulses. Heart sounds: Normal heart sounds. Pulmonary:      Effort: Pulmonary effort is normal.      Breath sounds: Normal breath sounds. Abdominal:      General: Abdomen is flat. Bowel sounds are normal.      Palpations: Abdomen is soft. Musculoskeletal:         General: Normal range of motion. Cervical back: Normal range of motion and neck supple. Skin:     General: Skin is warm. Neurological:      General: No focal deficit present.       Mental Status: She is alert and oriented

## 2023-08-25 LAB
ALBUMIN SERPL-MCNC: 4.4 G/DL (ref 3.9–4.9)
ALBUMIN/GLOB SERPL: 1.8 {RATIO} (ref 1.2–2.2)
ALP SERPL-CCNC: 53 IU/L (ref 44–121)
ALT SERPL-CCNC: 15 IU/L (ref 0–32)
AST SERPL-CCNC: 19 IU/L (ref 0–40)
BILIRUB SERPL-MCNC: 0.3 MG/DL (ref 0–1.2)
BUN SERPL-MCNC: 15 MG/DL (ref 8–27)
BUN/CREAT SERPL: 16 (ref 12–28)
CALCIUM SERPL-MCNC: 9.5 MG/DL (ref 8.7–10.3)
CHLORIDE SERPL-SCNC: 103 MMOL/L (ref 96–106)
CO2 SERPL-SCNC: 25 MMOL/L (ref 20–29)
CREAT SERPL-MCNC: 0.94 MG/DL (ref 0.57–1)
EGFRCR SERPLBLD CKD-EPI 2021: 67 ML/MIN/1.73
GLOBULIN SER CALC-MCNC: 2.4 G/DL (ref 1.5–4.5)
GLUCOSE SERPL-MCNC: 119 MG/DL (ref 70–99)
HBA1C MFR BLD: 5.6 % (ref 4.8–5.6)
POTASSIUM SERPL-SCNC: 3.9 MMOL/L (ref 3.5–5.2)
PROT SERPL-MCNC: 6.8 G/DL (ref 6–8.5)
SODIUM SERPL-SCNC: 142 MMOL/L (ref 134–144)

## 2023-08-28 ENCOUNTER — TELEPHONE (OUTPATIENT)
Age: 63
End: 2023-08-28

## 2023-08-28 NOTE — TELEPHONE ENCOUNTER
----- Message from Gretel Cruz sent at 8/28/2023 10:58 AM EDT -----  Subject: Medication Problem    Medication: amitriptyline (ELAVIL) 50 MG tablet  Dosage: 50 MG   Ordering Provider: Bobbi Chowdary    Question/Problem: PT called wanting to discuss coming off this at night   and has not started the hydrOXYzine HCl as shes not sure she should take   these together . Pt wants a call back ASAP Thanks       Pharmacy: [address] [facility], [address] [facility] [phone] Nina Paste [phone]    ---------------------------------------------------------------------------  --------------  Karel CALDERON  [phone]; OK to leave message on voicemail  ---------------------------------------------------------------------------  --------------    SCRIPT ANSWERS  Relationship to Patient: Self

## 2023-08-28 NOTE — TELEPHONE ENCOUNTER
[facility] Clinical Staff  Subject: Message to Provider     QUESTIONS   Information for Provider? PT sees Cristina Adams at [facility]- PT received   S[facility]les [facility]- they advised her to get second dose in 2-6   months- pt wants clarification Please let her know   ---------------------------------------------------------------------------   --------------   Dedrick Morocho INFO   [phone]; OK to leave message on voicemail   ---------------------------------------------------------------------------   --------------   SCRIPT ANSWERS   Relationship to Patient?  Self

## 2023-08-28 NOTE — TELEPHONE ENCOUNTER
Spoke with patient and confirmed that shingles vaccine is given 2-6 months after first one. Patient would like to know if it's ok to stop amitriptyline at night and take hydroxyzine instead at night.

## 2023-09-12 DIAGNOSIS — N32.81 OVERACTIVE BLADDER: ICD-10-CM

## 2023-09-12 DIAGNOSIS — I10 ESSENTIAL (PRIMARY) HYPERTENSION: ICD-10-CM

## 2023-09-12 DIAGNOSIS — G44.221 CHRONIC TENSION-TYPE HEADACHE, INTRACTABLE: ICD-10-CM

## 2023-09-12 RX ORDER — SOLIFENACIN SUCCINATE 5 MG/1
5 TABLET, FILM COATED ORAL EVERY MORNING
Qty: 90 TABLET | Refills: 1 | Status: SHIPPED | OUTPATIENT
Start: 2023-09-12

## 2023-09-12 RX ORDER — ATENOLOL 25 MG/1
TABLET ORAL
Qty: 90 TABLET | Refills: 1 | Status: SHIPPED | OUTPATIENT
Start: 2023-09-12

## 2023-09-15 DIAGNOSIS — F51.01 PRIMARY INSOMNIA: ICD-10-CM

## 2023-09-18 RX ORDER — HYDROXYZINE HYDROCHLORIDE 25 MG/1
25 TABLET, FILM COATED ORAL NIGHTLY PRN
Qty: 30 TABLET | Refills: 2 | Status: SHIPPED | OUTPATIENT
Start: 2023-09-18 | End: 2023-12-17

## 2023-10-04 DIAGNOSIS — I10 ESSENTIAL (PRIMARY) HYPERTENSION: ICD-10-CM

## 2023-10-04 DIAGNOSIS — E78.2 MIXED HYPERLIPIDEMIA: ICD-10-CM

## 2023-10-04 RX ORDER — LOSARTAN POTASSIUM 25 MG/1
25 TABLET ORAL DAILY
Qty: 90 TABLET | Refills: 1 | Status: SHIPPED | OUTPATIENT
Start: 2023-10-04

## 2023-11-10 DIAGNOSIS — F51.01 PRIMARY INSOMNIA: ICD-10-CM

## 2023-11-10 RX ORDER — HYDROXYZINE HYDROCHLORIDE 25 MG/1
25 TABLET, FILM COATED ORAL NIGHTLY PRN
Qty: 30 TABLET | Refills: 2 | Status: SHIPPED | OUTPATIENT
Start: 2023-11-10 | End: 2024-02-08

## 2023-11-10 NOTE — TELEPHONE ENCOUNTER
[id number] [place], [address] [place] [address] [phone] [facility] [phone]  hydrOXYzine HCl (ATARAX) 25 MG tablet  08/24/2023 02/22/2024

## 2024-01-03 DIAGNOSIS — F51.01 PRIMARY INSOMNIA: ICD-10-CM

## 2024-01-03 RX ORDER — HYDROXYZINE HYDROCHLORIDE 25 MG/1
25 TABLET, FILM COATED ORAL NIGHTLY PRN
Qty: 30 TABLET | Refills: 2 | Status: SHIPPED | OUTPATIENT
Start: 2024-01-03 | End: 2024-04-02

## 2024-01-05 DIAGNOSIS — F51.01 PRIMARY INSOMNIA: ICD-10-CM

## 2024-01-06 DIAGNOSIS — I10 ESSENTIAL (PRIMARY) HYPERTENSION: ICD-10-CM

## 2024-01-06 DIAGNOSIS — G44.221 CHRONIC TENSION-TYPE HEADACHE, INTRACTABLE: ICD-10-CM

## 2024-01-06 DIAGNOSIS — M81.0 AGE-RELATED OSTEOPOROSIS WITHOUT CURRENT PATHOLOGICAL FRACTURE: ICD-10-CM

## 2024-01-06 DIAGNOSIS — F31.10 BIPOLAR DISORDER, CURRENT EPISODE MANIC WITHOUT PSYCHOTIC FEATURES, UNSPECIFIED (HCC): ICD-10-CM

## 2024-01-06 DIAGNOSIS — E78.2 MIXED HYPERLIPIDEMIA: ICD-10-CM

## 2024-01-08 RX ORDER — ATENOLOL 25 MG/1
TABLET ORAL
Qty: 90 TABLET | Refills: 1 | Status: SHIPPED | OUTPATIENT
Start: 2024-01-08

## 2024-01-08 RX ORDER — LOSARTAN POTASSIUM 25 MG/1
25 TABLET ORAL DAILY
Qty: 90 TABLET | Refills: 1 | Status: SHIPPED | OUTPATIENT
Start: 2024-01-08

## 2024-01-08 RX ORDER — AMITRIPTYLINE HYDROCHLORIDE 50 MG/1
TABLET, FILM COATED ORAL
Qty: 90 TABLET | Refills: 1 | Status: SHIPPED | OUTPATIENT
Start: 2024-01-08

## 2024-01-08 RX ORDER — HYDROXYZINE HYDROCHLORIDE 25 MG/1
25 TABLET, FILM COATED ORAL NIGHTLY PRN
Qty: 90 TABLET | Refills: 0 | Status: SHIPPED | OUTPATIENT
Start: 2024-01-08 | End: 2024-04-07

## 2024-01-08 RX ORDER — FENOFIBRATE 145 MG/1
TABLET, COATED ORAL
Qty: 45 TABLET | Refills: 1 | Status: SHIPPED | OUTPATIENT
Start: 2024-01-08

## 2024-01-08 RX ORDER — ALENDRONATE SODIUM 70 MG/1
TABLET ORAL
Qty: 12 TABLET | Refills: 3 | Status: SHIPPED | OUTPATIENT
Start: 2024-01-08

## 2024-01-22 DIAGNOSIS — M25.572 PAIN IN LEFT ANKLE AND JOINTS OF LEFT FOOT: ICD-10-CM

## 2024-02-22 ENCOUNTER — OFFICE VISIT (OUTPATIENT)
Age: 64
End: 2024-02-22
Payer: MEDICAID

## 2024-02-22 VITALS
RESPIRATION RATE: 16 BRPM | HEART RATE: 76 BPM | DIASTOLIC BLOOD PRESSURE: 72 MMHG | TEMPERATURE: 97.1 F | OXYGEN SATURATION: 96 % | SYSTOLIC BLOOD PRESSURE: 124 MMHG | BODY MASS INDEX: 28 KG/M2 | WEIGHT: 142.6 LBS | HEIGHT: 60 IN

## 2024-02-22 DIAGNOSIS — E78.2 MIXED HYPERLIPIDEMIA: ICD-10-CM

## 2024-02-22 DIAGNOSIS — I10 ESSENTIAL (PRIMARY) HYPERTENSION: Primary | ICD-10-CM

## 2024-02-22 DIAGNOSIS — G44.221 CHRONIC TENSION-TYPE HEADACHE, INTRACTABLE: ICD-10-CM

## 2024-02-22 DIAGNOSIS — Z11.59 NEED FOR HEPATITIS C SCREENING TEST: ICD-10-CM

## 2024-02-22 DIAGNOSIS — F33.0 MAJOR DEPRESSIVE DISORDER, RECURRENT, MILD (HCC): ICD-10-CM

## 2024-02-22 DIAGNOSIS — F31.10 BIPOLAR DISORDER, CURRENT EPISODE MANIC WITHOUT PSYCHOTIC FEATURES, UNSPECIFIED (HCC): ICD-10-CM

## 2024-02-22 DIAGNOSIS — Z12.31 ENCOUNTER FOR SCREENING MAMMOGRAM FOR MALIGNANT NEOPLASM OF BREAST: ICD-10-CM

## 2024-02-22 DIAGNOSIS — Z00.00 MEDICARE ANNUAL WELLNESS VISIT, SUBSEQUENT: ICD-10-CM

## 2024-02-22 DIAGNOSIS — M81.0 AGE-RELATED OSTEOPOROSIS WITHOUT CURRENT PATHOLOGICAL FRACTURE: ICD-10-CM

## 2024-02-22 DIAGNOSIS — Z71.89 ACP (ADVANCE CARE PLANNING): ICD-10-CM

## 2024-02-22 PROCEDURE — 1123F ACP DISCUSS/DSCN MKR DOCD: CPT | Performed by: INTERNAL MEDICINE

## 2024-02-22 PROCEDURE — 99214 OFFICE O/P EST MOD 30 MIN: CPT | Performed by: INTERNAL MEDICINE

## 2024-02-22 PROCEDURE — 99497 ADVNCD CARE PLAN 30 MIN: CPT | Performed by: INTERNAL MEDICINE

## 2024-02-22 PROCEDURE — G0439 PPPS, SUBSEQ VISIT: HCPCS | Performed by: INTERNAL MEDICINE

## 2024-02-22 PROCEDURE — 3074F SYST BP LT 130 MM HG: CPT | Performed by: INTERNAL MEDICINE

## 2024-02-22 PROCEDURE — 3078F DIAST BP <80 MM HG: CPT | Performed by: INTERNAL MEDICINE

## 2024-02-22 RX ORDER — ALENDRONATE SODIUM 70 MG/1
70 TABLET ORAL
Qty: 12 TABLET | Refills: 3 | Status: SHIPPED | OUTPATIENT
Start: 2024-02-22

## 2024-02-22 RX ORDER — FENOFIBRATE 145 MG/1
145 TABLET, COATED ORAL EVERY OTHER DAY
Qty: 45 TABLET | Refills: 1 | Status: SHIPPED | OUTPATIENT
Start: 2024-02-22

## 2024-02-22 RX ORDER — AMITRIPTYLINE HYDROCHLORIDE 50 MG/1
50 TABLET, FILM COATED ORAL NIGHTLY
Qty: 90 TABLET | Refills: 1 | Status: SHIPPED | OUTPATIENT
Start: 2024-02-22

## 2024-02-22 RX ORDER — ATENOLOL 25 MG/1
25 TABLET ORAL EVERY MORNING
Qty: 90 TABLET | Refills: 1 | Status: SHIPPED | OUTPATIENT
Start: 2024-02-22

## 2024-02-22 RX ORDER — LOSARTAN POTASSIUM 25 MG/1
25 TABLET ORAL DAILY
Qty: 90 TABLET | Refills: 1 | Status: SHIPPED | OUTPATIENT
Start: 2024-02-22

## 2024-02-22 ASSESSMENT — ENCOUNTER SYMPTOMS
RESPIRATORY NEGATIVE: 1
EYES NEGATIVE: 1
GASTROINTESTINAL NEGATIVE: 1

## 2024-02-22 ASSESSMENT — PATIENT HEALTH QUESTIONNAIRE - PHQ9
4. FEELING TIRED OR HAVING LITTLE ENERGY: 2
2. FEELING DOWN, DEPRESSED OR HOPELESS: 1
8. MOVING OR SPEAKING SO SLOWLY THAT OTHER PEOPLE COULD HAVE NOTICED. OR THE OPPOSITE, BEING SO FIGETY OR RESTLESS THAT YOU HAVE BEEN MOVING AROUND A LOT MORE THAN USUAL: 0
5. POOR APPETITE OR OVEREATING: 0
SUM OF ALL RESPONSES TO PHQ QUESTIONS 1-9: 6
SUM OF ALL RESPONSES TO PHQ QUESTIONS 1-9: 6
6. FEELING BAD ABOUT YOURSELF - OR THAT YOU ARE A FAILURE OR HAVE LET YOURSELF OR YOUR FAMILY DOWN: 0
SUM OF ALL RESPONSES TO PHQ9 QUESTIONS 1 & 2: 2
1. LITTLE INTEREST OR PLEASURE IN DOING THINGS: 1
SUM OF ALL RESPONSES TO PHQ QUESTIONS 1-9: 6
3. TROUBLE FALLING OR STAYING ASLEEP: 2
SUM OF ALL RESPONSES TO PHQ QUESTIONS 1-9: 6
9. THOUGHTS THAT YOU WOULD BE BETTER OFF DEAD, OR OF HURTING YOURSELF: 0
10. IF YOU CHECKED OFF ANY PROBLEMS, HOW DIFFICULT HAVE THESE PROBLEMS MADE IT FOR YOU TO DO YOUR WORK, TAKE CARE OF THINGS AT HOME, OR GET ALONG WITH OTHER PEOPLE: 1
7. TROUBLE CONCENTRATING ON THINGS, SUCH AS READING THE NEWSPAPER OR WATCHING TELEVISION: 0

## 2024-02-22 ASSESSMENT — LIFESTYLE VARIABLES
HOW MANY STANDARD DRINKS CONTAINING ALCOHOL DO YOU HAVE ON A TYPICAL DAY: PATIENT DOES NOT DRINK
HOW OFTEN DO YOU HAVE A DRINK CONTAINING ALCOHOL: NEVER

## 2024-02-22 NOTE — ACP (ADVANCE CARE PLANNING)

## 2024-02-22 NOTE — PROGRESS NOTES
Subjective:      Patient ID: Sudha Jaimes is a 63 y.o. female here for follow-up.  She is doing well, has bipolar depression.  Depression seems under control.  Seeing psychiatrist.  She is taking amitriptyline to sleep at night, doing well, need refill.  She is suffer from hypertension, multiple medication.  Doing well.  Elevated triglyceride, taking Tricor, lab work are stable.  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  No memory issues.  Has osteoporosis, taking Fosamax and calcium with vitamin D.  Need bone density.  Need mammogram.  Has chronic tension headache, taking Tenormin and amitriptyline.  Doing well.  Here for Medicare wellness visit.  Has no living will.  Knee Pain     Arthritis    Depression       Review of Systems   Constitutional: Negative.    HENT: Negative.     Eyes: Negative.    Respiratory: Negative.     Cardiovascular: Negative.    Gastrointestinal: Negative.    Endocrine: Negative.    Genitourinary: Negative.    Musculoskeletal:  Positive for arthritis.   Skin: Negative.    Neurological: Negative.    Hematological: Negative.    Psychiatric/Behavioral:  Positive for depression.        Objective:   Physical Exam  Constitutional:       Appearance: Normal appearance.   Cardiovascular:      Rate and Rhythm: Normal rate and regular rhythm.      Pulses: Normal pulses.      Heart sounds: Normal heart sounds.   Pulmonary:      Effort: Pulmonary effort is normal.      Breath sounds: Normal breath sounds.   Abdominal:      General: Abdomen is flat. Bowel sounds are normal.      Palpations: Abdomen is soft.   Musculoskeletal:         General: Normal range of motion.      Cervical back: Normal range of motion and neck supple.   Skin:     General: Skin is warm.   Neurological:      General: No focal deficit present.      Mental Status: She is alert and oriented to person, place, and time. Mental status is at baseline.   Psychiatric:         Mood and Affect: Mood normal.         Behavior: Behavior normal.

## 2024-02-23 LAB
ALBUMIN SERPL-MCNC: 4.6 G/DL (ref 3.9–4.9)
ALBUMIN/GLOB SERPL: 1.9 {RATIO} (ref 1.2–2.2)
ALP SERPL-CCNC: 52 IU/L (ref 44–121)
ALT SERPL-CCNC: 17 IU/L (ref 0–32)
AST SERPL-CCNC: 19 IU/L (ref 0–40)
BILIRUB SERPL-MCNC: 0.2 MG/DL (ref 0–1.2)
BUN SERPL-MCNC: 17 MG/DL (ref 8–27)
BUN/CREAT SERPL: 17 (ref 12–28)
CALCIUM SERPL-MCNC: 9.9 MG/DL (ref 8.7–10.3)
CHLORIDE SERPL-SCNC: 104 MMOL/L (ref 96–106)
CO2 SERPL-SCNC: 24 MMOL/L (ref 20–29)
CREAT SERPL-MCNC: 1.02 MG/DL (ref 0.57–1)
EGFRCR SERPLBLD CKD-EPI 2021: 61 ML/MIN/1.73
GLOBULIN SER CALC-MCNC: 2.4 G/DL (ref 1.5–4.5)
GLUCOSE SERPL-MCNC: 128 MG/DL (ref 70–99)
HCV IGG SERPL QL IA: NON REACTIVE
POTASSIUM SERPL-SCNC: 4.5 MMOL/L (ref 3.5–5.2)
PROT SERPL-MCNC: 7 G/DL (ref 6–8.5)
SODIUM SERPL-SCNC: 143 MMOL/L (ref 134–144)

## 2024-03-07 ENCOUNTER — TELEPHONE (OUTPATIENT)
Age: 64
End: 2024-03-07

## 2024-03-07 DIAGNOSIS — R92.30 DENSE BREASTS: ICD-10-CM

## 2024-03-07 DIAGNOSIS — Z12.31 SCREENING MAMMOGRAM FOR BREAST CANCER: Primary | ICD-10-CM

## 2024-03-07 DIAGNOSIS — R92.2 DENSE BREASTS: ICD-10-CM

## 2024-03-07 NOTE — TELEPHONE ENCOUNTER
----- Message from Bri Llanos Linda sent at 3/7/2024 11:07 AM EST -----  Subject: Referral Request    Reason for referral request? The patient is requesting a order for a 3D   mammogram. She stated she has very dense breast tissue and it was   recommended that she have the 3D done. please contact the patient about   this request   Provider patient wants to be referred to(if known):     Provider Phone Number(if known):    Additional Information for Provider?   ---------------------------------------------------------------------------  --------------  CALL BACK INFO    [phone]; OK to leave message on voicemail  ---------------------------------------------------------------------------  --------------

## 2024-03-20 DIAGNOSIS — M25.572 PAIN IN LEFT ANKLE AND JOINTS OF LEFT FOOT: ICD-10-CM

## 2024-05-08 ENCOUNTER — HOSPITAL ENCOUNTER (OUTPATIENT)
Facility: HOSPITAL | Age: 64
Discharge: HOME | End: 2024-05-11
Attending: INTERNAL MEDICINE
Payer: MEDICAID

## 2024-05-08 VITALS — WEIGHT: 147 LBS | HEIGHT: 58 IN | BODY MASS INDEX: 30.86 KG/M2

## 2024-05-08 DIAGNOSIS — R92.30 DENSE BREASTS: ICD-10-CM

## 2024-05-08 DIAGNOSIS — Z12.31 SCREENING MAMMOGRAM FOR BREAST CANCER: ICD-10-CM

## 2024-05-08 DIAGNOSIS — M81.0 AGE-RELATED OSTEOPOROSIS WITHOUT CURRENT PATHOLOGICAL FRACTURE: ICD-10-CM

## 2024-05-08 DIAGNOSIS — F51.01 PRIMARY INSOMNIA: ICD-10-CM

## 2024-05-08 PROCEDURE — 77080 DXA BONE DENSITY AXIAL: CPT

## 2024-05-08 PROCEDURE — 77063 BREAST TOMOSYNTHESIS BI: CPT

## 2024-05-08 RX ORDER — HYDROXYZINE HYDROCHLORIDE 25 MG/1
TABLET, FILM COATED ORAL
Qty: 90 TABLET | Refills: 0 | Status: SHIPPED | OUTPATIENT
Start: 2024-05-08

## 2024-05-21 DIAGNOSIS — M25.572 PAIN IN LEFT ANKLE AND JOINTS OF LEFT FOOT: ICD-10-CM

## 2024-08-09 DIAGNOSIS — F51.01 PRIMARY INSOMNIA: ICD-10-CM

## 2024-08-09 RX ORDER — HYDROXYZINE HYDROCHLORIDE 25 MG/1
TABLET, FILM COATED ORAL
Qty: 90 TABLET | Refills: 0 | Status: SHIPPED | OUTPATIENT
Start: 2024-08-09

## 2024-08-22 ENCOUNTER — OFFICE VISIT (OUTPATIENT)
Age: 64
End: 2024-08-22
Payer: MEDICAID

## 2024-08-22 VITALS
DIASTOLIC BLOOD PRESSURE: 86 MMHG | BODY MASS INDEX: 30.23 KG/M2 | RESPIRATION RATE: 16 BRPM | SYSTOLIC BLOOD PRESSURE: 124 MMHG | TEMPERATURE: 98 F | HEIGHT: 58 IN | OXYGEN SATURATION: 97 % | WEIGHT: 144 LBS | HEART RATE: 77 BPM

## 2024-08-22 DIAGNOSIS — F51.01 PRIMARY INSOMNIA: ICD-10-CM

## 2024-08-22 DIAGNOSIS — E55.9 VITAMIN D DEFICIENCY: ICD-10-CM

## 2024-08-22 DIAGNOSIS — F33.0 MAJOR DEPRESSIVE DISORDER, RECURRENT, MILD (HCC): Primary | ICD-10-CM

## 2024-08-22 DIAGNOSIS — M25.362 INSTABILITY OF LEFT KNEE JOINT: ICD-10-CM

## 2024-08-22 DIAGNOSIS — R73.03 PREDIABETES: ICD-10-CM

## 2024-08-22 DIAGNOSIS — E78.2 MIXED HYPERLIPIDEMIA: ICD-10-CM

## 2024-08-22 DIAGNOSIS — I10 ESSENTIAL (PRIMARY) HYPERTENSION: ICD-10-CM

## 2024-08-22 DIAGNOSIS — R35.0 URINE FREQUENCY: ICD-10-CM

## 2024-08-22 DIAGNOSIS — Z23 NEED FOR VACCINATION: ICD-10-CM

## 2024-08-22 LAB
APPEARANCE UR: CLEAR
BACTERIA #/AREA URNS HPF: NORMAL /[HPF]
BASOPHILS # BLD AUTO: 0.1 X10E3/UL (ref 0–0.2)
BASOPHILS NFR BLD AUTO: 1 %
BILIRUB UR QL STRIP: NEGATIVE
CASTS URNS QL MICRO: NORMAL /LPF
COLOR UR: YELLOW
EOSINOPHIL # BLD AUTO: 0.1 X10E3/UL (ref 0–0.4)
EOSINOPHIL NFR BLD AUTO: 2 %
EPI CELLS #/AREA URNS HPF: NORMAL /HPF (ref 0–10)
ERYTHROCYTE [DISTWIDTH] IN BLOOD BY AUTOMATED COUNT: 13.2 % (ref 11.7–15.4)
GLUCOSE UR QL STRIP: NEGATIVE
HBA1C MFR BLD: 5.7 % (ref 4.8–5.6)
HCT VFR BLD AUTO: 39.4 % (ref 34–46.6)
HGB BLD-MCNC: 12.5 G/DL (ref 11.1–15.9)
HGB UR QL STRIP: NEGATIVE
IMM GRANULOCYTES # BLD AUTO: 0 X10E3/UL (ref 0–0.1)
IMM GRANULOCYTES NFR BLD AUTO: 0 %
KETONES UR QL STRIP: NEGATIVE
LEUKOCYTE ESTERASE UR QL STRIP: NEGATIVE
LYMPHOCYTES # BLD AUTO: 1.7 X10E3/UL (ref 0.7–3.1)
LYMPHOCYTES NFR BLD AUTO: 27 %
MCH RBC QN AUTO: 27.7 PG (ref 26.6–33)
MCHC RBC AUTO-ENTMCNC: 31.7 G/DL (ref 31.5–35.7)
MCV RBC AUTO: 87 FL (ref 79–97)
MICRO URNS: NORMAL
MICRO URNS: NORMAL
MONOCYTES # BLD AUTO: 0.4 X10E3/UL (ref 0.1–0.9)
MONOCYTES NFR BLD AUTO: 6 %
NEUTROPHILS # BLD AUTO: 4.1 X10E3/UL (ref 1.4–7)
NEUTROPHILS NFR BLD AUTO: 64 %
NITRITE UR QL STRIP: NEGATIVE
PH UR STRIP: 7.5 [PH] (ref 5–7.5)
PLATELET # BLD AUTO: 308 X10E3/UL (ref 150–450)
PROT UR QL STRIP: NORMAL
RBC # BLD AUTO: 4.52 X10E6/UL (ref 3.77–5.28)
RBC #/AREA URNS HPF: NORMAL /HPF (ref 0–2)
SP GR UR STRIP: 1.03 (ref 1–1.03)
UROBILINOGEN UR STRIP-MCNC: 0.2 MG/DL (ref 0.2–1)
WBC # BLD AUTO: 6.4 X10E3/UL (ref 3.4–10.8)
WBC #/AREA URNS HPF: NORMAL /HPF (ref 0–5)

## 2024-08-22 PROCEDURE — 99214 OFFICE O/P EST MOD 30 MIN: CPT | Performed by: INTERNAL MEDICINE

## 2024-08-22 PROCEDURE — 3079F DIAST BP 80-89 MM HG: CPT | Performed by: INTERNAL MEDICINE

## 2024-08-22 PROCEDURE — 3074F SYST BP LT 130 MM HG: CPT | Performed by: INTERNAL MEDICINE

## 2024-08-22 PROCEDURE — 1123F ACP DISCUSS/DSCN MKR DOCD: CPT | Performed by: INTERNAL MEDICINE

## 2024-08-22 SDOH — ECONOMIC STABILITY: FOOD INSECURITY: WITHIN THE PAST 12 MONTHS, YOU WORRIED THAT YOUR FOOD WOULD RUN OUT BEFORE YOU GOT MONEY TO BUY MORE.: NEVER TRUE

## 2024-08-22 SDOH — ECONOMIC STABILITY: INCOME INSECURITY: HOW HARD IS IT FOR YOU TO PAY FOR THE VERY BASICS LIKE FOOD, HOUSING, MEDICAL CARE, AND HEATING?: NOT HARD AT ALL

## 2024-08-22 SDOH — ECONOMIC STABILITY: FOOD INSECURITY: WITHIN THE PAST 12 MONTHS, THE FOOD YOU BOUGHT JUST DIDN'T LAST AND YOU DIDN'T HAVE MONEY TO GET MORE.: NEVER TRUE

## 2024-08-22 ASSESSMENT — ENCOUNTER SYMPTOMS
EYES NEGATIVE: 1
RESPIRATORY NEGATIVE: 1
GASTROINTESTINAL NEGATIVE: 1

## 2024-08-22 ASSESSMENT — PATIENT HEALTH QUESTIONNAIRE - PHQ9
7. TROUBLE CONCENTRATING ON THINGS, SUCH AS READING THE NEWSPAPER OR WATCHING TELEVISION: NOT AT ALL
SUM OF ALL RESPONSES TO PHQ9 QUESTIONS 1 & 2: 0
SUM OF ALL RESPONSES TO PHQ QUESTIONS 1-9: 0
5. POOR APPETITE OR OVEREATING: NOT AT ALL
SUM OF ALL RESPONSES TO PHQ QUESTIONS 1-9: 0
2. FEELING DOWN, DEPRESSED OR HOPELESS: NOT AT ALL
9. THOUGHTS THAT YOU WOULD BE BETTER OFF DEAD, OR OF HURTING YOURSELF: NOT AT ALL
SUM OF ALL RESPONSES TO PHQ QUESTIONS 1-9: 0
4. FEELING TIRED OR HAVING LITTLE ENERGY: NOT AT ALL
1. LITTLE INTEREST OR PLEASURE IN DOING THINGS: NOT AT ALL
6. FEELING BAD ABOUT YOURSELF - OR THAT YOU ARE A FAILURE OR HAVE LET YOURSELF OR YOUR FAMILY DOWN: NOT AT ALL
8. MOVING OR SPEAKING SO SLOWLY THAT OTHER PEOPLE COULD HAVE NOTICED. OR THE OPPOSITE, BEING SO FIGETY OR RESTLESS THAT YOU HAVE BEEN MOVING AROUND A LOT MORE THAN USUAL: NOT AT ALL
3. TROUBLE FALLING OR STAYING ASLEEP: NOT AT ALL
SUM OF ALL RESPONSES TO PHQ QUESTIONS 1-9: 0
10. IF YOU CHECKED OFF ANY PROBLEMS, HOW DIFFICULT HAVE THESE PROBLEMS MADE IT FOR YOU TO DO YOUR WORK, TAKE CARE OF THINGS AT HOME, OR GET ALONG WITH OTHER PEOPLE: NOT DIFFICULT AT ALL

## 2024-08-22 NOTE — PROGRESS NOTES
Chief Complaint   Patient presents with    Knee Pain    Bipolar    Follow-up Chronic Condition    Hypertension    Osteoporosis    Headache           History of Present Illness  The patient presents for evaluation of multiple medical concerns.    She reports experiencing severe headaches that have incapacitated her for two days. The pain is described as a band-like stiffness encompassing her entire head. These headaches have only been present for the past few days, and prior to this, she did not experience any headaches. She has been sleeping well at night.    She is currently taking amitriptyline 50 mg nightly and has discontinued hydroxyzine. Her depression is well-managed, and she is able to sleep without difficulty. Vraylar has been effective in managing her anxiety and depression. She expresses a desire to discontinue hydroxyzine as she feels it is unnecessary due to the effectiveness of amitriptyline. She is also dealing with dementia.    She is on a regimen of losartan 25 mg, fenofibrate 145 mg every other day, atenolol 25 mg daily, Fosamax once weekly, and lorazepam as needed. She also takes vitamin D 2000 IU daily and is seeking advice on whether to continue this supplement. She requests a liver test during her next blood work due to her extensive medication use. Additionally, she wishes to be tested for a urinary tract infection (UTI) today.    She is experiencing instability in her knee due to worsening arthritis. She has consulted with Dr. Banegas, an orthopedic specialist, about this issue. She describes an incident where she nearly fell when her knee gave way. She has been using a brace for support since last year and has scheduled a follow-up appointment with Dr. Banegas.    SOCIAL HISTORY  She lives with her mother and sister in an apartment.    Past Medical History:   Diagnosis Date    Anxiety 1980    Arthritis     Arthritis of knee     Bipolar disorder (HCC)     Bladder pain     Choledocholithiasis

## 2024-08-23 LAB
25(OH)D3+25(OH)D2 SERPL-MCNC: 57.5 NG/ML (ref 30–100)
ALBUMIN SERPL-MCNC: 4.8 G/DL (ref 3.9–4.9)
ALP SERPL-CCNC: 61 IU/L (ref 44–121)
ALT SERPL-CCNC: 20 IU/L (ref 0–32)
AST SERPL-CCNC: 23 IU/L (ref 0–40)
BILIRUB SERPL-MCNC: 0.3 MG/DL (ref 0–1.2)
BUN SERPL-MCNC: 22 MG/DL (ref 8–27)
BUN/CREAT SERPL: 22 (ref 12–28)
CALCIUM SERPL-MCNC: 10.1 MG/DL (ref 8.7–10.3)
CHLORIDE SERPL-SCNC: 106 MMOL/L (ref 96–106)
CHOLEST SERPL-MCNC: 191 MG/DL (ref 100–199)
CO2 SERPL-SCNC: 25 MMOL/L (ref 20–29)
CREAT SERPL-MCNC: 0.98 MG/DL (ref 0.57–1)
EGFRCR SERPLBLD CKD-EPI 2021: 64 ML/MIN/1.73
GLOBULIN SER CALC-MCNC: 2.4 G/DL (ref 1.5–4.5)
GLUCOSE SERPL-MCNC: 99 MG/DL (ref 70–99)
HDLC SERPL-MCNC: 50 MG/DL
IMP & REVIEW OF LAB RESULTS: NORMAL
LDLC SERPL CALC-MCNC: 122 MG/DL (ref 0–99)
POTASSIUM SERPL-SCNC: 4.3 MMOL/L (ref 3.5–5.2)
PROT SERPL-MCNC: 7.2 G/DL (ref 6–8.5)
SODIUM SERPL-SCNC: 145 MMOL/L (ref 134–144)
TRIGL SERPL-MCNC: 107 MG/DL (ref 0–149)
VLDLC SERPL CALC-MCNC: 19 MG/DL (ref 5–40)

## 2024-09-03 PROBLEM — M17.12 ARTHRITIS OF LEFT KNEE: Status: ACTIVE | Noted: 2024-09-03

## 2024-09-04 DIAGNOSIS — G44.221 CHRONIC TENSION-TYPE HEADACHE, INTRACTABLE: ICD-10-CM

## 2024-09-04 DIAGNOSIS — I10 ESSENTIAL (PRIMARY) HYPERTENSION: ICD-10-CM

## 2024-09-04 DIAGNOSIS — F31.10 BIPOLAR DISORDER, CURRENT EPISODE MANIC WITHOUT PSYCHOTIC FEATURES, UNSPECIFIED (HCC): ICD-10-CM

## 2024-09-04 RX ORDER — ATENOLOL 25 MG/1
25 TABLET ORAL EVERY MORNING
Qty: 90 TABLET | Refills: 1 | Status: SHIPPED | OUTPATIENT
Start: 2024-09-04

## 2024-09-04 RX ORDER — AMITRIPTYLINE HYDROCHLORIDE 50 MG/1
50 TABLET ORAL NIGHTLY
Qty: 90 TABLET | Refills: 1 | Status: SHIPPED | OUTPATIENT
Start: 2024-09-04

## 2024-09-06 ENCOUNTER — TELEPHONE (OUTPATIENT)
Age: 64
End: 2024-09-06

## 2024-09-06 NOTE — TELEPHONE ENCOUNTER
----- Message from Nanette COSTELLO sent at 9/6/2024  3:45 PM EDT -----  Regarding: [facility] Appointment Request  [facility] Appointment Request    Patient needs appointment for [facility] Appointment Type: Pre-Op Visit.    Patient Requested Dates(s): 10/17/2024  Patient Requested Time: morning is preferable 10:00 AM or whatever time that day.  Provider Name: Maryellen Faulkner MD    Reason for Appointment Request: Other  --------------------------------------------------------------------------------------------------------------------------    Relationship to Patient: Self     Call Back Information: OK to leave message on voicemail  Preferred Call Back Number: Phone [phone]        Patient will be having a surgery 11/06/2024 and would like to have a pre-op visit on the 10/17/2024 preferably 10 AM if possible to have a physical clearance and the surgeon is Dr. Bardales.

## 2024-09-15 DIAGNOSIS — I10 ESSENTIAL (PRIMARY) HYPERTENSION: ICD-10-CM

## 2024-09-15 DIAGNOSIS — E78.2 MIXED HYPERLIPIDEMIA: ICD-10-CM

## 2024-09-16 RX ORDER — LOSARTAN POTASSIUM 25 MG/1
25 TABLET ORAL DAILY
Qty: 90 TABLET | Refills: 1 | Status: SHIPPED | OUTPATIENT
Start: 2024-09-16

## 2024-10-16 ENCOUNTER — OFFICE VISIT (OUTPATIENT)
Age: 64
End: 2024-10-16
Payer: MEDICARE

## 2024-10-16 VITALS
RESPIRATION RATE: 16 BRPM | OXYGEN SATURATION: 98 % | DIASTOLIC BLOOD PRESSURE: 76 MMHG | WEIGHT: 145 LBS | SYSTOLIC BLOOD PRESSURE: 122 MMHG | TEMPERATURE: 98.1 F | BODY MASS INDEX: 30.44 KG/M2 | HEIGHT: 58 IN | HEART RATE: 84 BPM

## 2024-10-16 DIAGNOSIS — M17.12 PRIMARY OSTEOARTHRITIS OF LEFT KNEE: ICD-10-CM

## 2024-10-16 DIAGNOSIS — I10 ESSENTIAL (PRIMARY) HYPERTENSION: Primary | ICD-10-CM

## 2024-10-16 DIAGNOSIS — Z01.818 PRE-OPERATIVE CLEARANCE: ICD-10-CM

## 2024-10-16 DIAGNOSIS — R73.03 PREDIABETES: ICD-10-CM

## 2024-10-16 DIAGNOSIS — E78.2 MIXED HYPERLIPIDEMIA: ICD-10-CM

## 2024-10-16 DIAGNOSIS — F33.0 MAJOR DEPRESSIVE DISORDER, RECURRENT, MILD (HCC): ICD-10-CM

## 2024-10-16 DIAGNOSIS — I10 ESSENTIAL (PRIMARY) HYPERTENSION: ICD-10-CM

## 2024-10-16 DIAGNOSIS — N39.46 MIXED STRESS AND URGE URINARY INCONTINENCE: ICD-10-CM

## 2024-10-16 DIAGNOSIS — S46.911A STRAIN OF RIGHT SHOULDER, INITIAL ENCOUNTER: ICD-10-CM

## 2024-10-16 PROCEDURE — G8417 CALC BMI ABV UP PARAM F/U: HCPCS | Performed by: INTERNAL MEDICINE

## 2024-10-16 PROCEDURE — G8399 PT W/DXA RESULTS DOCUMENT: HCPCS | Performed by: INTERNAL MEDICINE

## 2024-10-16 PROCEDURE — G8427 DOCREV CUR MEDS BY ELIG CLIN: HCPCS | Performed by: INTERNAL MEDICINE

## 2024-10-16 PROCEDURE — 99214 OFFICE O/P EST MOD 30 MIN: CPT | Performed by: INTERNAL MEDICINE

## 2024-10-16 PROCEDURE — 1036F TOBACCO NON-USER: CPT | Performed by: INTERNAL MEDICINE

## 2024-10-16 PROCEDURE — 1090F PRES/ABSN URINE INCON ASSESS: CPT | Performed by: INTERNAL MEDICINE

## 2024-10-16 PROCEDURE — 0509F URINE INCON PLAN DOCD: CPT | Performed by: INTERNAL MEDICINE

## 2024-10-16 PROCEDURE — 3017F COLORECTAL CA SCREEN DOC REV: CPT | Performed by: INTERNAL MEDICINE

## 2024-10-16 PROCEDURE — 3078F DIAST BP <80 MM HG: CPT | Performed by: INTERNAL MEDICINE

## 2024-10-16 PROCEDURE — 1123F ACP DISCUSS/DSCN MKR DOCD: CPT | Performed by: INTERNAL MEDICINE

## 2024-10-16 PROCEDURE — 3074F SYST BP LT 130 MM HG: CPT | Performed by: INTERNAL MEDICINE

## 2024-10-16 PROCEDURE — G8482 FLU IMMUNIZE ORDER/ADMIN: HCPCS | Performed by: INTERNAL MEDICINE

## 2024-10-16 RX ORDER — MIRABEGRON 50 MG/1
50 TABLET, FILM COATED, EXTENDED RELEASE ORAL DAILY
COMMUNITY
Start: 2024-09-23

## 2024-10-16 RX ORDER — HYDROXYZINE HYDROCHLORIDE 25 MG/1
TABLET, FILM COATED ORAL
COMMUNITY

## 2024-10-16 RX ORDER — SOLIFENACIN SUCCINATE 5 MG/1
5 TABLET, FILM COATED ORAL DAILY
Qty: 30 TABLET | Refills: 5 | Status: SHIPPED | OUTPATIENT
Start: 2024-10-16

## 2024-10-16 RX ORDER — DICLOFENAC SODIUM 75 MG/1
75 TABLET, DELAYED RELEASE ORAL 2 TIMES DAILY PRN
Qty: 20 TABLET | Refills: 0 | Status: SHIPPED | OUTPATIENT
Start: 2024-10-16

## 2024-10-16 ASSESSMENT — ENCOUNTER SYMPTOMS
EYES NEGATIVE: 1
RESPIRATORY NEGATIVE: 1
ALLERGIC/IMMUNOLOGIC NEGATIVE: 1
GASTROINTESTINAL NEGATIVE: 1

## 2024-10-16 NOTE — PROGRESS NOTES
Chief Complaint   Patient presents with    Pre-op Exam    Irritable Bowel Syndrome    Depression    Bipolar    Knee Pain    Incontinence           History of Present Illness  The patient presents for evaluation of multiple medical concerns.    She is scheduled for a left knee replacement surgery on 11/06/2024, to be performed by Dr. Jose Alberto Cardona. She reports no chest pain or palpitations.    On 09/01/2024, she noticed a prolapse of her bladder during a shower. Despite being prescribed Myrbetriq, she refrained from taking it due to its potential side effect of high blood pressure, which she already has. She is considering solifenacin as an alternative.    Her depression is currently well-managed.    She experiences occasional swelling in her leg, where she previously had a fracture.    She also reports a persistent muscle strain in her right shoulder, which she believes occurred while lifting her mother. Additionally, she experiences tingling sensations in her hands and feet.    IMMUNIZATIONS  She received COVID-19, influenza, and pneumonia vaccines on 10/10/2024.    Past Medical History:   Diagnosis Date    Anxiety 1980    Arthritis     Arthritis of knee     Bipolar disorder (HCC)     Bladder pain     Choledocholithiasis 11/24/2010    Depression 1988    Fracture, fibula     left    GERD (gastroesophageal reflux disease)     Headache(784.0)     tension headaches    High cholesterol     History of cholecystectomy     Hypertension     Ill-defined condition     IBS    Irritable bowel     Osteoarthritis 2016    Osteoporosis 2017    Pelvic pain in female 2014    Psychiatric disorder     Stool color black     irritable bowel     Review of Systems   Constitutional: Negative.    HENT: Negative.     Eyes: Negative.    Respiratory: Negative.     Cardiovascular: Negative.    Gastrointestinal: Negative.    Endocrine: Negative.    Genitourinary:  Positive for frequency.   Musculoskeletal:  Positive for arthralgias.

## 2024-10-21 NOTE — PROGRESS NOTES
Refill Decision Note   Anel Schmitt  is requesting a refill authorization.  Brief Assessment and Rationale for Refill:  Approve     Medication Therapy Plan:        Comments:     Note composed:2:56 PM 10/21/2024             Medicare Annual Wellness Visit    Sudha Jaimes is here for Knee Pain, Arthritis, Depression, Irritable Bowel Syndrome, and Medicare AWV    Assessment & Plan   Essential (primary) hypertension  Bipolar disorder, current episode manic without psychotic features, unspecified (HCC)  Major depressive disorder, recurrent, mild (HCC)  Medicare annual wellness visit, subsequent  ACP (advance care planning)    Recommendations for Preventive Services Due: see orders and patient instructions/AVS.  Recommended screening schedule for the next 5-10 years is provided to the patient in written form: see Patient Instructions/AVS.     No follow-ups on file.     Benjamin Haley is here for Medicare wellness visit.  Has living will.  Memory seems great.  No gait or balance problem.  The following acute and/or chronic problems were also addressed today:    Patient's complete Health Risk Assessment and screening values have been reviewed and are found in Flowsheets. The following problems were reviewed today and where indicated follow up appointments were made and/or referrals ordered.    Positive Risk Factor Screenings with Interventions:        Depression:  PHQ-2 Score: 2  PHQ-9 Total Score: 6    Interpretation:  5-9 mild   10-14 moderate   15-19 moderately severe   20-27 severe     Interventions:  Life style changes to improve mood reviewed           Activity, Diet, and Weight:  On average, how many days per week do you engage in moderate to strenuous exercise (like a brisk walk)?: 0 days  On average, how many minutes do you engage in exercise at this level?: 0 min    Do you eat balanced/healthy meals regularly?: Yes    Body mass index is 27.85 kg/m².        Inactivity Interventions:  Patient declined any further interventions or treatment                           Objective   Vitals:    02/22/24 0959   BP: 124/72   Site: Left Upper Arm   Position: Sitting   Cuff Size: Medium Adult   Pulse: 76   Resp: 16   Temp: 97.1 °F (36.2

## 2024-10-24 ENCOUNTER — HOSPITAL ENCOUNTER (OUTPATIENT)
Facility: HOSPITAL | Age: 64
Discharge: HOME | End: 2024-10-27
Payer: MEDICARE

## 2024-10-24 VITALS
HEART RATE: 99 BPM | DIASTOLIC BLOOD PRESSURE: 84 MMHG | TEMPERATURE: 98.5 F | BODY MASS INDEX: 30.06 KG/M2 | SYSTOLIC BLOOD PRESSURE: 143 MMHG | WEIGHT: 143.2 LBS | HEIGHT: 58 IN | RESPIRATION RATE: 18 BRPM

## 2024-10-24 LAB
ABO + RH BLD: NORMAL
ANION GAP SERPL CALC-SCNC: 2 MMOL/L (ref 2–12)
APPEARANCE UR: ABNORMAL
BACTERIA URNS QL MICRO: NEGATIVE /HPF
BILIRUB UR QL: NEGATIVE
BLOOD GROUP ANTIBODIES SERPL: NORMAL
BUN SERPL-MCNC: 22 MG/DL (ref 6–20)
BUN/CREAT SERPL: 22 (ref 12–20)
CALCIUM SERPL-MCNC: 9.9 MG/DL (ref 8.5–10.1)
CHLORIDE SERPL-SCNC: 107 MMOL/L (ref 97–108)
CO2 SERPL-SCNC: 31 MMOL/L (ref 21–32)
COLOR UR: ABNORMAL
CREAT SERPL-MCNC: 0.99 MG/DL (ref 0.55–1.02)
EPITH CASTS URNS QL MICRO: ABNORMAL /LPF
ERYTHROCYTE [DISTWIDTH] IN BLOOD BY AUTOMATED COUNT: 12.7 % (ref 11.5–14.5)
EST. AVERAGE GLUCOSE BLD GHB EST-MCNC: 108 MG/DL
GLUCOSE SERPL-MCNC: 116 MG/DL (ref 65–100)
GLUCOSE UR STRIP.AUTO-MCNC: NEGATIVE MG/DL
HBA1C MFR BLD: 5.4 % (ref 4–5.6)
HCT VFR BLD AUTO: 38.5 % (ref 35–47)
HGB BLD-MCNC: 12.1 G/DL (ref 11.5–16)
HGB UR QL STRIP: ABNORMAL
HYALINE CASTS URNS QL MICRO: ABNORMAL /LPF (ref 0–5)
INR PPP: 1.1 (ref 0.9–1.1)
KETONES UR QL STRIP.AUTO: NEGATIVE MG/DL
LEUKOCYTE ESTERASE UR QL STRIP.AUTO: NEGATIVE
MCH RBC QN AUTO: 28 PG (ref 26–34)
MCHC RBC AUTO-ENTMCNC: 31.4 G/DL (ref 30–36.5)
MCV RBC AUTO: 89.1 FL (ref 80–99)
NITRITE UR QL STRIP.AUTO: NEGATIVE
NRBC # BLD: 0 K/UL (ref 0–0.01)
NRBC BLD-RTO: 0 PER 100 WBC
PH UR STRIP: 7.5 (ref 5–8)
PLATELET # BLD AUTO: 332 K/UL (ref 150–400)
PMV BLD AUTO: 9 FL (ref 8.9–12.9)
POTASSIUM SERPL-SCNC: 4.2 MMOL/L (ref 3.5–5.1)
PROT UR STRIP-MCNC: NEGATIVE MG/DL
PROTHROMBIN TIME: 11.5 SEC (ref 9–11.1)
RBC # BLD AUTO: 4.32 M/UL (ref 3.8–5.2)
RBC #/AREA URNS HPF: ABNORMAL /HPF (ref 0–5)
SODIUM SERPL-SCNC: 140 MMOL/L (ref 136–145)
SP GR UR REFRACTOMETRY: 1.02 (ref 1–1.03)
SPECIMEN EXP DATE BLD: NORMAL
URINE CULTURE IF INDICATED: ABNORMAL
UROBILINOGEN UR QL STRIP.AUTO: 0.2 EU/DL (ref 0.2–1)
WBC # BLD AUTO: 7.7 K/UL (ref 3.6–11)
WBC URNS QL MICRO: ABNORMAL /HPF (ref 0–4)

## 2024-10-24 PROCEDURE — 85610 PROTHROMBIN TIME: CPT

## 2024-10-24 PROCEDURE — 83036 HEMOGLOBIN GLYCOSYLATED A1C: CPT

## 2024-10-24 PROCEDURE — 81001 URINALYSIS AUTO W/SCOPE: CPT

## 2024-10-24 PROCEDURE — 36415 COLL VENOUS BLD VENIPUNCTURE: CPT

## 2024-10-24 PROCEDURE — 86900 BLOOD TYPING SEROLOGIC ABO: CPT

## 2024-10-24 PROCEDURE — 86901 BLOOD TYPING SEROLOGIC RH(D): CPT

## 2024-10-24 PROCEDURE — 80048 BASIC METABOLIC PNL TOTAL CA: CPT

## 2024-10-24 PROCEDURE — 93005 ELECTROCARDIOGRAM TRACING: CPT | Performed by: ORTHOPAEDIC SURGERY

## 2024-10-24 PROCEDURE — 86850 RBC ANTIBODY SCREEN: CPT

## 2024-10-24 PROCEDURE — 85027 COMPLETE CBC AUTOMATED: CPT

## 2024-10-24 RX ORDER — POLYETHYLENE GLYCOL 3350 17 G/17G
17 POWDER, FOR SOLUTION ORAL DAILY PRN
COMMUNITY

## 2024-10-24 RX ORDER — CARBOXYMETHYLCELLULOSE SODIUM 10 MG/ML
1 GEL OPHTHALMIC 2 TIMES DAILY
COMMUNITY

## 2024-10-24 RX ORDER — SODIUM FLUORIDE 5 MG/ML
PASTE, DENTIFRICE DENTAL DAILY
COMMUNITY

## 2024-10-24 RX ORDER — LOPERAMIDE HYDROCHLORIDE 2 MG/1
2 CAPSULE ORAL 4 TIMES DAILY PRN
COMMUNITY

## 2024-10-24 RX ORDER — BISACODYL 5 MG/1
5 TABLET, DELAYED RELEASE ORAL DAILY PRN
COMMUNITY

## 2024-10-24 RX ORDER — VITAMIN E 268 MG
400 CAPSULE ORAL DAILY
COMMUNITY

## 2024-10-24 ASSESSMENT — PAIN DESCRIPTION - DESCRIPTORS: DESCRIPTORS: ACHING;SHOOTING

## 2024-10-24 ASSESSMENT — PAIN SCALES - GENERAL: PAINLEVEL_OUTOF10: 3

## 2024-10-24 ASSESSMENT — PAIN DESCRIPTION - LOCATION: LOCATION: SHOULDER

## 2024-10-24 ASSESSMENT — PAIN - FUNCTIONAL ASSESSMENT: PAIN_FUNCTIONAL_ASSESSMENT: PREVENTS OR INTERFERES SOME ACTIVE ACTIVITIES AND ADLS

## 2024-10-24 ASSESSMENT — PAIN DESCRIPTION - ORIENTATION: ORIENTATION: RIGHT

## 2024-10-24 NOTE — ANESTHESIA PRE-OP
[address] [address]   MAIN OR                     [phone]    MAIN PRE OP             [phone]                                                                                AMBULATORY PRE OP          [phone]  PRE-ADMISSION TESTING    [phone]     Surgery Date:  11/06/2024       Is surgery arrival time given by surgeon?  YES  NO    If “NO”, [facility] staff will call you between 4 and 7pm the day before your surgery with your arrival time. (If your surgery is on a Monday, we will call you the Friday before.)    Call [phone] after 7pm Monday-Friday if you did not receive this call.    INSTRUCTIONS BEFORE YOUR SURGERY   When You  Arrive Arrive at [facility] Patient Access on 1st floor the day of your surgery.  Have your insurance card, photo ID,living will/advanced directive/POA (if applicable),  and any copayment (if needed)     Food   and   Drink NO solid food after midnight the night before surgery. You can drink clear liquids from midnight until ONE hour prior to your arrival at the hospital on the day of your surgery.     Clear liquids include:  Water  CLEAR APPLE JUICE WITHOUT SEDIMENT   Carbonated beverages  Black coffee(no cream/milk)  Tea(no cream/milk)  Gatorade    No alcohol (beer, wine, liquor) or marijuana (smoking) 24 hours, edibles (3 days). Stop smoking cigarettes 14 days before surgery (helps w/healing and breathing).     Medications to   TAKE   Morning of Surgery MEDICATIONS TO TAKE THE MORNING OF SURGERY WITH A SIP OF WATER:   CARIPRAZINE, LORAZEPAM, ATENOLOL, SOLIFENACIN, SODIUM FLOURIDE, THERA TEARS, DRY EYE     You may take these medications, IF NEEDED, the morning of surgery:  TYLENOL: LAST DOSE TO BE 4 HOURS PRIOR TO ARRIVAL     Ask your surgeon/prescribing doctor for instructions on taking or stopping these medications prior to surgery: NONE     Medications to STOP  before surgery Non-Steroidal  surgery      Tips to help prevent infections after your surgery:  Protect your surgical wound from germs:  Hand washing is the most important thing you and your caregivers can do to prevent infections.  Keep your bandage clean and dry!  Do not touch your surgical wound.  Use clean, freshly washed towels and washcloths every time you shower; do not share bath linens with others.  Until your surgical wound is healed, wear clothing and sleep on bed linens that are clean.  Do not allow pets to sleep in your bed with you or touch your surgical wound.  Do not smoke - smoking delays wound healing. This may be a good time to stop smoking.  If you have diabetes, it is important for you to manage your blood sugar levels properly before your surgery as well as after your surgery. Poorly managed blood sugar levels slow down wound healing and prevent you from healing completely.        Testing for Staphylococcus aureus on your skin before surgery    Staphylococcus aureus (staph) is a common bacteria that is found on the body. It normally does not cause infection on healthy skin. Before surgery, you will be tested to see if you have staph by swabbing the inside of your nose. When you have an incision with surgery, the goal is to protect that incision from infection. Removal of the staph bacteria before surgery can decrease the risk of a surgical site infection.    If your nose swab is positive for staph you will be called. Your treatment will include 2 steps:  Prescription for Mupirocin ointment to be used in each nostril twice a day for 5 days.  Showering with Chlorhexidine (CHG) liquid soap for 5 days prior to surgery (follow same CHG Shower Instructions as above).    How to use Mupirocin ointment in your nose   the prescription from your pharmacy. You will receive a large tube of ointment which will be big enough for all of your treatments. You will apply this ointment to each nostril 2 times a day for 5 days.  Wash

## 2024-10-25 LAB
BACTERIA SPEC CULT: NORMAL
BACTERIA SPEC CULT: NORMAL
EKG ATRIAL RATE: 69 BPM
EKG DIAGNOSIS: NORMAL
EKG P AXIS: 51 DEGREES
EKG P-R INTERVAL: 170 MS
EKG Q-T INTERVAL: 400 MS
EKG QRS DURATION: 86 MS
EKG QTC CALCULATION (BAZETT): 428 MS
EKG R AXIS: 5 DEGREES
EKG T AXIS: 20 DEGREES
EKG VENTRICULAR RATE: 69 BPM
SERVICE CMNT-IMP: NORMAL

## 2024-10-25 PROCEDURE — 93010 ELECTROCARDIOGRAM REPORT: CPT | Performed by: SPECIALIST

## 2024-10-25 NOTE — PERIOP NOTE
NURSE CALLED [facility] TO MAKE THEM AWARE THAT EKG`S HAVE NOT BEEN READ, AND ASKED THAT THEY PLEASE DO IT ASAP.

## 2024-10-25 NOTE — PERIOP NOTE
PTS PT: 11.5 HIGH, NURSE CALLED  OFFICE AND SPOKE TO NICOLE. NICOLE STATES THAT SHE WILL NOTIFY SURGEON OF ABNORMAL LAB RESULT.

## 2024-11-06 ENCOUNTER — HOSPITAL ENCOUNTER (OUTPATIENT)
Facility: HOSPITAL | Age: 64
Setting detail: OBSERVATION
Discharge: HOME | End: 2024-11-07
Attending: ORTHOPAEDIC SURGERY | Admitting: ORTHOPAEDIC SURGERY
Payer: MEDICARE

## 2024-11-06 ENCOUNTER — ANESTHESIA (OUTPATIENT)
Facility: HOSPITAL | Age: 64
End: 2024-11-06
Payer: MEDICARE

## 2024-11-06 ENCOUNTER — ANESTHESIA EVENT (OUTPATIENT)
Facility: HOSPITAL | Age: 64
End: 2024-11-06
Payer: MEDICARE

## 2024-11-06 DIAGNOSIS — M81.0 AGE-RELATED OSTEOPOROSIS WITHOUT CURRENT PATHOLOGICAL FRACTURE: ICD-10-CM

## 2024-11-06 DIAGNOSIS — Z96.652 S/P TKR (TOTAL KNEE REPLACEMENT), LEFT: Primary | ICD-10-CM

## 2024-11-06 PROBLEM — M17.12 OSTEOARTHRITIS OF LEFT KNEE, UNSPECIFIED OSTEOARTHRITIS TYPE: Status: ACTIVE | Noted: 2024-11-06

## 2024-11-06 LAB
GLUCOSE BLD STRIP.AUTO-MCNC: 81 MG/DL (ref 65–117)
GLUCOSE BLD STRIP.AUTO-MCNC: 90 MG/DL (ref 65–117)
SERVICE CMNT-IMP: NORMAL
SERVICE CMNT-IMP: NORMAL

## 2024-11-06 PROCEDURE — 6370000000 HC RX 637 (ALT 250 FOR IP): Performed by: PHYSICIAN ASSISTANT

## 2024-11-06 PROCEDURE — 7100000001 HC PACU RECOVERY - ADDTL 15 MIN: Performed by: ORTHOPAEDIC SURGERY

## 2024-11-06 PROCEDURE — 27447 TOTAL KNEE ARTHROPLASTY: CPT | Performed by: PHYSICIAN ASSISTANT

## 2024-11-06 PROCEDURE — 2580000003 HC RX 258: Performed by: PHYSICIAN ASSISTANT

## 2024-11-06 PROCEDURE — 6360000002 HC RX W HCPCS: Performed by: PHYSICIAN ASSISTANT

## 2024-11-06 PROCEDURE — G0378 HOSPITAL OBSERVATION PER HR: HCPCS

## 2024-11-06 PROCEDURE — 2580000003 HC RX 258: Performed by: ORTHOPAEDIC SURGERY

## 2024-11-06 PROCEDURE — 7100000000 HC PACU RECOVERY - FIRST 15 MIN: Performed by: ORTHOPAEDIC SURGERY

## 2024-11-06 PROCEDURE — 6360000002 HC RX W HCPCS: Performed by: ORTHOPAEDIC SURGERY

## 2024-11-06 PROCEDURE — 3600000015 HC SURGERY LEVEL 5 ADDTL 15MIN: Performed by: ORTHOPAEDIC SURGERY

## 2024-11-06 PROCEDURE — 6360000002 HC RX W HCPCS: Performed by: NURSE ANESTHETIST, CERTIFIED REGISTERED

## 2024-11-06 PROCEDURE — 27447 TOTAL KNEE ARTHROPLASTY: CPT | Performed by: ORTHOPAEDIC SURGERY

## 2024-11-06 PROCEDURE — 97116 GAIT TRAINING THERAPY: CPT

## 2024-11-06 PROCEDURE — 2500000003 HC RX 250 WO HCPCS: Performed by: PHYSICIAN ASSISTANT

## 2024-11-06 PROCEDURE — 97530 THERAPEUTIC ACTIVITIES: CPT

## 2024-11-06 PROCEDURE — 82962 GLUCOSE BLOOD TEST: CPT

## 2024-11-06 PROCEDURE — 3700000001 HC ADD 15 MINUTES (ANESTHESIA): Performed by: ORTHOPAEDIC SURGERY

## 2024-11-06 PROCEDURE — 97161 PT EVAL LOW COMPLEX 20 MIN: CPT

## 2024-11-06 PROCEDURE — 2709999900 HC NON-CHARGEABLE SUPPLY: Performed by: ORTHOPAEDIC SURGERY

## 2024-11-06 PROCEDURE — 20985 CPTR-ASST DIR MS PX: CPT | Performed by: ORTHOPAEDIC SURGERY

## 2024-11-06 PROCEDURE — 6360000002 HC RX W HCPCS: Performed by: ANESTHESIOLOGY

## 2024-11-06 PROCEDURE — 3700000000 HC ANESTHESIA ATTENDED CARE: Performed by: ORTHOPAEDIC SURGERY

## 2024-11-06 PROCEDURE — C1713 ANCHOR/SCREW BN/BN,TIS/BN: HCPCS | Performed by: ORTHOPAEDIC SURGERY

## 2024-11-06 PROCEDURE — 3600000005 HC SURGERY LEVEL 5 BASE: Performed by: ORTHOPAEDIC SURGERY

## 2024-11-06 PROCEDURE — 2500000003 HC RX 250 WO HCPCS: Performed by: ORTHOPAEDIC SURGERY

## 2024-11-06 PROCEDURE — 2720000010 HC SURG SUPPLY STERILE: Performed by: ORTHOPAEDIC SURGERY

## 2024-11-06 PROCEDURE — 2580000003 HC RX 258: Performed by: NURSE ANESTHETIST, CERTIFIED REGISTERED

## 2024-11-06 PROCEDURE — C1776 JOINT DEVICE (IMPLANTABLE): HCPCS | Performed by: ORTHOPAEDIC SURGERY

## 2024-11-06 PROCEDURE — C9290 INJ, BUPIVACAINE LIPOSOME: HCPCS | Performed by: ORTHOPAEDIC SURGERY

## 2024-11-06 DEVICE — SMARTSET GHV GENTAMICIN HIGH VISCOSITY BONE CEMENT 40G
Type: IMPLANTABLE DEVICE | Site: KNEE | Status: FUNCTIONAL
Brand: SMARTSET

## 2024-11-06 DEVICE — ATTUNE PATELLA MEDIALIZED DOME 32MM CEMENTED AOX
Type: IMPLANTABLE DEVICE | Site: KNEE | Status: FUNCTIONAL
Brand: ATTUNE

## 2024-11-06 DEVICE — KNEE K1 TOT HEMI STD CEM IMPL CAPPED SYNTHES: Type: IMPLANTABLE DEVICE | Site: KNEE | Status: FUNCTIONAL

## 2024-11-06 DEVICE — ATTUNE KNEE SYSTEM TIBIAL BASE FIXED BEARING SIZE 4 CEMENTED
Type: IMPLANTABLE DEVICE | Site: KNEE | Status: FUNCTIONAL
Brand: ATTUNE

## 2024-11-06 DEVICE — ATTUNE KNEE SYSTEM TIBIAL INSERT FIXED BEARING MEDIAL STABILIZED LEFT AOX 4, 7MM
Type: IMPLANTABLE DEVICE | Site: KNEE | Status: FUNCTIONAL
Brand: ATTUNE

## 2024-11-06 DEVICE — ATTUNE KNEE SYSTEM FEMORAL CRUCIATE RETAINING NARROW SIZE 4N LEFT CEMENTED
Type: IMPLANTABLE DEVICE | Site: KNEE | Status: FUNCTIONAL
Brand: ATTUNE

## 2024-11-06 RX ORDER — MIDAZOLAM HYDROCHLORIDE 1 MG/ML
INJECTION, SOLUTION INTRAMUSCULAR; INTRAVENOUS
Status: DISCONTINUED | OUTPATIENT
Start: 2024-11-06 | End: 2024-11-06 | Stop reason: SDUPTHER

## 2024-11-06 RX ORDER — LOSARTAN POTASSIUM 50 MG/1
25 TABLET ORAL DAILY
Status: DISCONTINUED | OUTPATIENT
Start: 2024-11-07 | End: 2024-11-07 | Stop reason: HOSPADM

## 2024-11-06 RX ORDER — TRAMADOL HYDROCHLORIDE 50 MG/1
50 TABLET ORAL EVERY 6 HOURS PRN
Status: DISCONTINUED | OUTPATIENT
Start: 2024-11-06 | End: 2024-11-07 | Stop reason: HOSPADM

## 2024-11-06 RX ORDER — SODIUM CHLORIDE 0.9 % (FLUSH) 0.9 %
5-40 SYRINGE (ML) INJECTION PRN
Status: DISCONTINUED | OUTPATIENT
Start: 2024-11-06 | End: 2024-11-06 | Stop reason: HOSPADM

## 2024-11-06 RX ORDER — LIDOCAINE HYDROCHLORIDE 10 MG/ML
1 INJECTION, SOLUTION EPIDURAL; INFILTRATION; INTRACAUDAL; PERINEURAL
Status: DISCONTINUED | OUTPATIENT
Start: 2024-11-06 | End: 2024-11-06 | Stop reason: HOSPADM

## 2024-11-06 RX ORDER — HYDROMORPHONE HYDROCHLORIDE 1 MG/ML
0.5 INJECTION, SOLUTION INTRAMUSCULAR; INTRAVENOUS; SUBCUTANEOUS EVERY 5 MIN PRN
Status: DISCONTINUED | OUTPATIENT
Start: 2024-11-06 | End: 2024-11-06 | Stop reason: HOSPADM

## 2024-11-06 RX ORDER — OXYCODONE HYDROCHLORIDE 5 MG/1
5 TABLET ORAL EVERY 4 HOURS PRN
Status: DISCONTINUED | OUTPATIENT
Start: 2024-11-06 | End: 2024-11-07 | Stop reason: HOSPADM

## 2024-11-06 RX ORDER — DEXAMETHASONE SODIUM PHOSPHATE 10 MG/ML
8 INJECTION, SOLUTION INTRAMUSCULAR; INTRAVENOUS ONCE
Status: COMPLETED | OUTPATIENT
Start: 2024-11-06 | End: 2024-11-06

## 2024-11-06 RX ORDER — SENNA AND DOCUSATE SODIUM 50; 8.6 MG/1; MG/1
1 TABLET, FILM COATED ORAL 2 TIMES DAILY
Status: DISCONTINUED | OUTPATIENT
Start: 2024-11-06 | End: 2024-11-07 | Stop reason: HOSPADM

## 2024-11-06 RX ORDER — ONDANSETRON 2 MG/ML
INJECTION INTRAMUSCULAR; INTRAVENOUS
Status: DISCONTINUED | OUTPATIENT
Start: 2024-11-06 | End: 2024-11-06 | Stop reason: SDUPTHER

## 2024-11-06 RX ORDER — ALENDRONATE SODIUM 70 MG/1
70 TABLET ORAL
Status: DISCONTINUED | OUTPATIENT
Start: 2024-11-06 | End: 2024-11-06

## 2024-11-06 RX ORDER — SODIUM CHLORIDE 0.9 % (FLUSH) 0.9 %
5-40 SYRINGE (ML) INJECTION PRN
Status: DISCONTINUED | OUTPATIENT
Start: 2024-11-06 | End: 2024-11-07 | Stop reason: HOSPADM

## 2024-11-06 RX ORDER — ONDANSETRON 2 MG/ML
4 INJECTION INTRAMUSCULAR; INTRAVENOUS
Status: DISCONTINUED | OUTPATIENT
Start: 2024-11-06 | End: 2024-11-06 | Stop reason: HOSPADM

## 2024-11-06 RX ORDER — CELECOXIB 100 MG/1
100 CAPSULE ORAL ONCE
Status: COMPLETED | OUTPATIENT
Start: 2024-11-06 | End: 2024-11-06

## 2024-11-06 RX ORDER — KETOROLAC TROMETHAMINE 30 MG/ML
15 INJECTION, SOLUTION INTRAMUSCULAR; INTRAVENOUS EVERY 6 HOURS
Status: COMPLETED | OUTPATIENT
Start: 2024-11-06 | End: 2024-11-07

## 2024-11-06 RX ORDER — NALOXONE HYDROCHLORIDE 0.4 MG/ML
INJECTION, SOLUTION INTRAMUSCULAR; INTRAVENOUS; SUBCUTANEOUS PRN
Status: DISCONTINUED | OUTPATIENT
Start: 2024-11-06 | End: 2024-11-06 | Stop reason: HOSPADM

## 2024-11-06 RX ORDER — SODIUM CHLORIDE 9 MG/ML
INJECTION, SOLUTION INTRAVENOUS PRN
Status: DISCONTINUED | OUTPATIENT
Start: 2024-11-06 | End: 2024-11-06 | Stop reason: HOSPADM

## 2024-11-06 RX ORDER — ATENOLOL 50 MG/1
25 TABLET ORAL EVERY MORNING
Status: DISCONTINUED | OUTPATIENT
Start: 2024-11-07 | End: 2024-11-07 | Stop reason: HOSPADM

## 2024-11-06 RX ORDER — LIDOCAINE HYDROCHLORIDE 10 MG/ML
INJECTION, SOLUTION INFILTRATION; PERINEURAL PRN
Status: DISCONTINUED | OUTPATIENT
Start: 2024-11-06 | End: 2024-11-06 | Stop reason: HOSPADM

## 2024-11-06 RX ORDER — ONDANSETRON 2 MG/ML
4 INJECTION INTRAMUSCULAR; INTRAVENOUS EVERY 6 HOURS PRN
Status: DISCONTINUED | OUTPATIENT
Start: 2024-11-06 | End: 2024-11-07 | Stop reason: HOSPADM

## 2024-11-06 RX ORDER — FENOFIBRATE 160 MG/1
160 TABLET ORAL EVERY OTHER DAY
Status: DISCONTINUED | OUTPATIENT
Start: 2024-11-07 | End: 2024-11-07 | Stop reason: HOSPADM

## 2024-11-06 RX ORDER — FENOFIBRATE 160 MG/1
160 TABLET ORAL DAILY
Status: DISCONTINUED | OUTPATIENT
Start: 2024-11-06 | End: 2024-11-06

## 2024-11-06 RX ORDER — SODIUM CHLORIDE 9 MG/ML
INJECTION, SOLUTION INTRAVENOUS CONTINUOUS
Status: DISCONTINUED | OUTPATIENT
Start: 2024-11-06 | End: 2024-11-07 | Stop reason: HOSPADM

## 2024-11-06 RX ORDER — FAMOTIDINE 20 MG/1
20 TABLET, FILM COATED ORAL
Status: DISCONTINUED | OUTPATIENT
Start: 2024-11-06 | End: 2024-11-07 | Stop reason: HOSPADM

## 2024-11-06 RX ORDER — POLYETHYLENE GLYCOL 3350 17 G/17G
17 POWDER, FOR SOLUTION ORAL DAILY
Status: DISCONTINUED | OUTPATIENT
Start: 2024-11-06 | End: 2024-11-07 | Stop reason: HOSPADM

## 2024-11-06 RX ORDER — ONDANSETRON 4 MG/1
4 TABLET, ORALLY DISINTEGRATING ORAL EVERY 8 HOURS PRN
Status: DISCONTINUED | OUTPATIENT
Start: 2024-11-06 | End: 2024-11-07 | Stop reason: HOSPADM

## 2024-11-06 RX ORDER — SODIUM CHLORIDE 0.9 % (FLUSH) 0.9 %
5-40 SYRINGE (ML) INJECTION EVERY 12 HOURS SCHEDULED
Status: DISCONTINUED | OUTPATIENT
Start: 2024-11-06 | End: 2024-11-06 | Stop reason: HOSPADM

## 2024-11-06 RX ORDER — SODIUM CHLORIDE 9 MG/ML
INJECTION, SOLUTION INTRAVENOUS PRN
Status: DISCONTINUED | OUTPATIENT
Start: 2024-11-06 | End: 2024-11-07 | Stop reason: HOSPADM

## 2024-11-06 RX ORDER — HYDROXYZINE HYDROCHLORIDE 10 MG/1
10 TABLET, FILM COATED ORAL EVERY 8 HOURS PRN
Status: DISCONTINUED | OUTPATIENT
Start: 2024-11-06 | End: 2024-11-07 | Stop reason: HOSPADM

## 2024-11-06 RX ORDER — ACETAMINOPHEN 325 MG/1
650 TABLET ORAL EVERY 6 HOURS
Status: DISCONTINUED | OUTPATIENT
Start: 2024-11-06 | End: 2024-11-07 | Stop reason: HOSPADM

## 2024-11-06 RX ORDER — ACETAMINOPHEN 500 MG
1000 TABLET ORAL ONCE
Status: DISCONTINUED | OUTPATIENT
Start: 2024-11-06 | End: 2024-11-06 | Stop reason: SDUPTHER

## 2024-11-06 RX ORDER — NALOXONE HYDROCHLORIDE 0.4 MG/ML
0.4 INJECTION, SOLUTION INTRAMUSCULAR; INTRAVENOUS; SUBCUTANEOUS PRN
Status: DISCONTINUED | OUTPATIENT
Start: 2024-11-06 | End: 2024-11-07 | Stop reason: HOSPADM

## 2024-11-06 RX ORDER — SODIUM CHLORIDE, SODIUM LACTATE, POTASSIUM CHLORIDE, CALCIUM CHLORIDE 600; 310; 30; 20 MG/100ML; MG/100ML; MG/100ML; MG/100ML
INJECTION, SOLUTION INTRAVENOUS CONTINUOUS
Status: DISCONTINUED | OUTPATIENT
Start: 2024-11-06 | End: 2024-11-06 | Stop reason: HOSPADM

## 2024-11-06 RX ORDER — FENTANYL CITRATE 50 UG/ML
25 INJECTION, SOLUTION INTRAMUSCULAR; INTRAVENOUS EVERY 5 MIN PRN
Status: DISCONTINUED | OUTPATIENT
Start: 2024-11-06 | End: 2024-11-06 | Stop reason: HOSPADM

## 2024-11-06 RX ORDER — BISACODYL 10 MG
10 SUPPOSITORY, RECTAL RECTAL DAILY PRN
Status: DISCONTINUED | OUTPATIENT
Start: 2024-11-06 | End: 2024-11-07 | Stop reason: HOSPADM

## 2024-11-06 RX ORDER — FAMOTIDINE 20 MG/1
20 TABLET, FILM COATED ORAL 2 TIMES DAILY
Status: DISCONTINUED | OUTPATIENT
Start: 2024-11-06 | End: 2024-11-06

## 2024-11-06 RX ORDER — HYDRALAZINE HYDROCHLORIDE 20 MG/ML
10 INJECTION INTRAMUSCULAR; INTRAVENOUS
Status: DISCONTINUED | OUTPATIENT
Start: 2024-11-06 | End: 2024-11-06 | Stop reason: HOSPADM

## 2024-11-06 RX ORDER — MIDAZOLAM HYDROCHLORIDE 2 MG/2ML
2 INJECTION, SOLUTION INTRAMUSCULAR; INTRAVENOUS PRN
Status: DISCONTINUED | OUTPATIENT
Start: 2024-11-06 | End: 2024-11-06 | Stop reason: HOSPADM

## 2024-11-06 RX ORDER — 0.9 % SODIUM CHLORIDE 0.9 %
500 INTRAVENOUS SOLUTION INTRAVENOUS PRN
Status: DISCONTINUED | OUTPATIENT
Start: 2024-11-06 | End: 2024-11-07 | Stop reason: HOSPADM

## 2024-11-06 RX ORDER — ONDANSETRON 2 MG/ML
4 INJECTION INTRAMUSCULAR; INTRAVENOUS AS NEEDED
Status: DISCONTINUED | OUTPATIENT
Start: 2024-11-06 | End: 2024-11-06 | Stop reason: HOSPADM

## 2024-11-06 RX ORDER — TRANEXAMIC ACID 100 MG/ML
INJECTION, SOLUTION INTRAVENOUS PRN
Status: DISCONTINUED | OUTPATIENT
Start: 2024-11-06 | End: 2024-11-06 | Stop reason: HOSPADM

## 2024-11-06 RX ORDER — LORAZEPAM 0.5 MG/1
0.5 TABLET ORAL 3 TIMES DAILY
Status: DISCONTINUED | OUTPATIENT
Start: 2024-11-06 | End: 2024-11-07 | Stop reason: HOSPADM

## 2024-11-06 RX ORDER — ACETAMINOPHEN 500 MG
1000 TABLET ORAL ONCE
Status: COMPLETED | OUTPATIENT
Start: 2024-11-06 | End: 2024-11-06

## 2024-11-06 RX ORDER — PROCHLORPERAZINE EDISYLATE 5 MG/ML
5 INJECTION INTRAMUSCULAR; INTRAVENOUS
Status: DISCONTINUED | OUTPATIENT
Start: 2024-11-06 | End: 2024-11-06 | Stop reason: HOSPADM

## 2024-11-06 RX ORDER — ASPIRIN 81 MG/1
81 TABLET ORAL 2 TIMES DAILY
Status: DISCONTINUED | OUTPATIENT
Start: 2024-11-07 | End: 2024-11-07 | Stop reason: HOSPADM

## 2024-11-06 RX ORDER — TROSPIUM CHLORIDE 20 MG/1
20 TABLET, FILM COATED ORAL NIGHTLY
Status: DISCONTINUED | OUTPATIENT
Start: 2024-11-06 | End: 2024-11-07

## 2024-11-06 RX ORDER — FENTANYL CITRATE 50 UG/ML
100 INJECTION, SOLUTION INTRAMUSCULAR; INTRAVENOUS
Status: DISCONTINUED | OUTPATIENT
Start: 2024-11-06 | End: 2024-11-06 | Stop reason: HOSPADM

## 2024-11-06 RX ORDER — SODIUM CHLORIDE 0.9 % (FLUSH) 0.9 %
5-40 SYRINGE (ML) INJECTION EVERY 12 HOURS SCHEDULED
Status: DISCONTINUED | OUTPATIENT
Start: 2024-11-06 | End: 2024-11-07 | Stop reason: HOSPADM

## 2024-11-06 RX ORDER — OXYCODONE HYDROCHLORIDE 5 MG/1
5 TABLET ORAL
Status: DISCONTINUED | OUTPATIENT
Start: 2024-11-06 | End: 2024-11-06 | Stop reason: HOSPADM

## 2024-11-06 RX ADMIN — SODIUM CHLORIDE, PRESERVATIVE FREE 10 ML: 5 INJECTION INTRAVENOUS at 21:21

## 2024-11-06 RX ADMIN — FAMOTIDINE 20 MG: 20 TABLET, FILM COATED ORAL at 21:18

## 2024-11-06 RX ADMIN — WATER 2000 MG: 1 INJECTION INTRAMUSCULAR; INTRAVENOUS; SUBCUTANEOUS at 13:12

## 2024-11-06 RX ADMIN — SODIUM CHLORIDE: 9 INJECTION, SOLUTION INTRAVENOUS at 15:27

## 2024-11-06 RX ADMIN — MEPIVACAINE HYDROCHLORIDE 50 MG: 15 INJECTION, SOLUTION EPIDURAL; INFILTRATION at 13:04

## 2024-11-06 RX ADMIN — TRANEXAMIC ACID 1000 MG: 100 INJECTION, SOLUTION INTRAVENOUS at 13:08

## 2024-11-06 RX ADMIN — ONDANSETRON 4 MG: 2 INJECTION INTRAMUSCULAR; INTRAVENOUS at 14:07

## 2024-11-06 RX ADMIN — AMITRIPTYLINE HYDROCHLORIDE 50 MG: 25 TABLET, FILM COATED ORAL at 21:18

## 2024-11-06 RX ADMIN — LORAZEPAM 0.5 MG: 0.5 TABLET ORAL at 21:19

## 2024-11-06 RX ADMIN — KETOROLAC TROMETHAMINE 15 MG: 30 INJECTION, SOLUTION INTRAMUSCULAR at 21:13

## 2024-11-06 RX ADMIN — ACETAMINOPHEN 650 MG: 325 TABLET ORAL at 21:18

## 2024-11-06 RX ADMIN — SODIUM CHLORIDE: 9 INJECTION, SOLUTION INTRAVENOUS at 12:44

## 2024-11-06 RX ADMIN — KETOROLAC TROMETHAMINE 15 MG: 30 INJECTION, SOLUTION INTRAMUSCULAR at 17:07

## 2024-11-06 RX ADMIN — PHENYLEPHRINE HYDROCHLORIDE 40 MCG/MIN: 10 INJECTION INTRAVENOUS at 13:16

## 2024-11-06 RX ADMIN — WATER 2000 MG: 1 INJECTION INTRAMUSCULAR; INTRAVENOUS; SUBCUTANEOUS at 21:14

## 2024-11-06 RX ADMIN — MIDAZOLAM 2 MG: 1 INJECTION INTRAMUSCULAR; INTRAVENOUS at 12:44

## 2024-11-06 RX ADMIN — POLYETHYLENE GLYCOL 3350 17 G: 17 POWDER, FOR SOLUTION ORAL at 18:06

## 2024-11-06 RX ADMIN — TRAMADOL HYDROCHLORIDE 50 MG: 50 TABLET ORAL at 18:10

## 2024-11-06 RX ADMIN — DEXAMETHASONE SODIUM PHOSPHATE 8 MG: 10 INJECTION, SOLUTION INTRAMUSCULAR; INTRAVENOUS at 13:10

## 2024-11-06 RX ADMIN — SENNOSIDES AND DOCUSATE SODIUM 1 TABLET: 50; 8.6 TABLET ORAL at 21:19

## 2024-11-06 RX ADMIN — PROPOFOL 75 MCG/KG/MIN: 10 INJECTION, EMULSION INTRAVENOUS at 13:01

## 2024-11-06 RX ADMIN — CELECOXIB 100 MG: 100 CAPSULE ORAL at 11:47

## 2024-11-06 RX ADMIN — ACETAMINOPHEN 1000 MG: 500 TABLET ORAL at 11:47

## 2024-11-06 ASSESSMENT — PAIN SCALES - GENERAL
PAINLEVEL_OUTOF10: 6
PAINLEVEL_OUTOF10: 2
PAINLEVEL_OUTOF10: 5
PAINLEVEL_OUTOF10: 4
PAINLEVEL_OUTOF10: 6

## 2024-11-06 ASSESSMENT — PAIN - FUNCTIONAL ASSESSMENT
PAIN_FUNCTIONAL_ASSESSMENT: NONE - DENIES PAIN
PAIN_FUNCTIONAL_ASSESSMENT: ADULT NONVERBAL PAIN SCALE (NPVS)
PAIN_FUNCTIONAL_ASSESSMENT: 0-10

## 2024-11-06 ASSESSMENT — PAIN DESCRIPTION - ORIENTATION
ORIENTATION: LEFT
ORIENTATION: LEFT

## 2024-11-06 ASSESSMENT — PAIN DESCRIPTION - LOCATION
LOCATION: KNEE
LOCATION: KNEE

## 2024-11-06 ASSESSMENT — PAIN DESCRIPTION - DESCRIPTORS: DESCRIPTORS: ACHING

## 2024-11-06 NOTE — ANESTHESIA POSTPROCEDURE EVALUATION
.    Signed By: Vik Fischer MD    11/6/2024    Multimodal analgesia pain management approach  Pain management: satisfactory to patient    No notable events documented.

## 2024-11-06 NOTE — H&P
Update History & Physical    The patient's History and Physical  was reviewed with the patient and I examined the patient. There was no change. The surgical site was confirmed by the patient and me.       Plan: The risks, benefits, expected outcome, and alternative to the recommended procedure have been discussed with the patient. Patient understands and wants to proceed with the procedure.     Electronically signed by Jose Alberto Bardales MD on 11/6/2024 at 11:43 AM

## 2024-11-06 NOTE — DISCHARGE INSTRUCTIONS
Discharge Instructions Knee Replacement  Dr. Bardales  Patient Name:  Sudha Jaimes    Follow up care  Follow up visit with Dr. Bardales/Jenna Call PA-C on 12/3/24 at 3:50pm at our [facility] office.    Call [phone] to make any changes as needed  If Home Health has been arranged for you, they will call you to arrange dates/times for visits. Call them if you do not hear from them within 24 hours after you go home.    Activity at home  Take a short walk every hour; except at night when sleeping.  Do your Home Exercise Program 3 times every day.   After exercising lie down and elevate your leg on pillows for 15-30 minutes to decrease swelling.  Refer to your patient notebook for more information.  Bathing and caring for your incision  You have a waterproof surgical bandage (called prineo). This bandage is to remain in place until your follow up appointment.   You may shower the day after surgery.      Preventing blood clots  Take Aspirin 81mg twice each day for one month (30 days) following surgery.   Call Dr. Bardales for signs of a blood clot in your leg: calf pain, tenderness, redness, swelling of lower leg   Preventing lung congestion  Use your incentive spirometer 4 times a day; do 10 repetitions each time  Remember to keep the small blue ball between the two arrows when taking a slow, deep breath   Pain Management  Get up and walk a short distance to relieve pain and stiffness.  Place ice wrap on your knee except when you are walking. The gel ice packs should be changed about every 4 hours.  Elevate your leg on pillows for 15-30 minutes.   Pain Medications  Take Tylenol 650mg (take two 325mg tablets) every 6 hours for the next 4 weeks.  Take Meloxicam (Mobic) every day as prescribed to decrease swelling and inflammation. Do not take Meloxicam if you have had stomach ulcers.  Take Famotidine (Pepcid) 20mg twice a day to prevent an upset stomach (if taking Aspirin and/or Meloxicam).  If needed, take

## 2024-11-06 NOTE — ANESTHESIA PROCEDURE NOTES
Spinal Block    Patient location during procedure: OR  End time: 11/6/2024 1:04 PM  Reason for block: primary anesthetic  Staffing  Performed: anesthesiologist and resident/CRNA   Anesthesiologist: Adama Adam MD  Resident/CRNA: Beth Mcdowell APRN - CRNA  Performed by: Beth Mcdwoell APRN - CRNA  Authorized by: Vik Fischer MD    Spinal Block  Patient position: sitting  Prep: Betadine  Patient monitoring: cardiac monitor, continuous pulse ox, continuous capnometry, frequent blood pressure checks and oxygen  Approach: midline  Location: L3/L4  Provider prep: mask and sterile gloves  Local infiltration: lidocaine  Needle  Needle type: pencil-tip   Needle gauge: 24 G  Needle length: 4 in  Assessment  Swirl obtained: Yes  CSF: clear  Attempts: 2  Hemodynamics: stable  Preanesthetic Checklist  Completed: patient identified, IV checked, site marked, risks and benefits discussed, surgical/procedural consents, equipment checked, pre-op evaluation, timeout performed, anesthesia consent given, oxygen available, monitors applied/VS acknowledged, fire risk safety assessment completed and verbalized and blood product R/B/A discussed and consented

## 2024-11-06 NOTE — ANESTHESIA PRE PROCEDURE
Department of Anesthesiology  Preprocedure Note       Name:  Sudha Jaimes   Age:  64 y.o.  :  1960                                          MRN:  [id number]         Date:  2024      Surgeon: Surgeon(s):  Jose Alberto Bardales MD    Procedure: Procedure(s):  LEFT TOTAL KNEE ARTHROPLASTY (VELYS)    Medications prior to admission:   Prior to Admission medications    Medication Sig Start Date End Date Taking? Authorizing Provider   solifenacin (VESICARE) 5 MG tablet Take 1 tablet by mouth daily DC myrbetriq 10/16/24  Yes Maryellen Faulkner MD   losartan (COZAAR) 25 MG tablet TAKE 1 TABLET BY MOUTH EVERY DAY 24  Yes Maryellen Faulkner MD   atenolol (TENORMIN) 25 MG tablet TAKE 1 TABLET BY MOUTH EVERY DAY IN THE MORNING 24  Yes Maryellen Faulkner MD   amitriptyline (ELAVIL) 50 MG tablet TAKE 1 TABLET BY MOUTH EVERY DAY AT NIGHT 24  Yes Maryellen Faulkner MD   LORazepam (ATIVAN) 0.5 MG tablet Take 3 tablets by mouth 3 times daily. 10/3/17  Yes Automatic Reconciliation, Ar   acetaminophen (TYLENOL) 500 MG tablet Take 2 capsules by mouth in the morning and at bedtime   Yes Automatic Reconciliation, Ar   cariprazine hcl (VRAYLAR) 3 MG CAPS capsule Take 1 capsule by mouth daily 22  Yes Automatic Reconciliation, Ar   bisacodyl (DULCOLAX) 5 MG EC tablet Take 1 tablet by mouth daily as needed for Constipation    ProviderAnish MD   polyethylene glycol (GLYCOLAX) 17 g packet Take 1 packet by mouth daily as needed for Constipation    Anish Sun MD   SODIUM FLUORIDE, DENTAL GEL, 1.1 % CREA Place onto teeth daily    Anish Sun MD   loperamide (IMODIUM) 2 MG capsule Take 1 capsule by mouth 4 times daily as needed for Diarrhea    Anish Sun MD   vitamin E 400 UNIT capsule Take 1 capsule by mouth daily    Anish Sun MD   Black Elderberry 1000 MG CAPS Take 1 tablet by mouth daily    Anish Sun MD   carboxymethylcellulose (THERATEARS) 1 % ophthalmic gel Place 1 drop into

## 2024-11-06 NOTE — PROGRESS NOTES
Pharmacist Review and Automatic Discontinuation of Bisphosphonate    Order for Alendronate(Fosamax) was rejected by pharmacy.  Per policy, oral bisphosphonates are not to be used in the hospital due to risk of adverse events related to adherence to administration guidelines.  Patient may resume dosing after discharge.    Please call pharmacy with any questions.    Thank you,    Danielle Beaver [facility]  11/6/2024, 4:47 PM

## 2024-11-06 NOTE — PLAN OF CARE
knee, decreased AROM/strength and function left leg, decreased activity tolerance, decline in functional mobility and impaired standing balance/gait with RW.  Pt s/p right TKR 7 years ago.  Pt mobilized easily with no c/o's dizziness or nausea.  Anticipate pt will be able to increase amb distance, advance exercise and perform stairs during am session on 11/7 and be cleared for early discharge from PT standpoint.     Reviewed and performed supine TKA exercise.   Reviewed and instructed in proper positioning to avoid external rotation and knee flexion in supine or recliner position - using blanket roll to support leg in neutral position.  Reviewed importance of not placing pillow under knee to position knee in prolonged flexion and  continued use of ice - have staff replace frequently.   Patient will benefit from skilled intervention to address the above impairments.    Functional Outcome Measure:  The patient scored 21/24 on the AM-PAC outcome measure which is indicative of a Cutoff score <=171,2,3 had higher odds of discharging home with home health or need of SNF/IPR.      Pt educated on fall prevention and safety in hospital - Instructed to utilize call button and wait for assistance prior to attempting OOB.      PLAN :  Recommendations and Planned Interventions:   bed mobility training, transfer training, gait training, therapeutic exercises, patient and family training/education, and therapeutic activities    Frequency/Duration: Patient will be followed by physical therapy to address goals, PT Plan of Care: BID to address goals.        Recommendation for discharge: (in order for the patient to meet his/her long term goals):   Intermittent physical therapy up to 2-3x/week in previous living setting        IF patient discharges home will need the following DME: patient owns DME required for discharge                SUBJECTIVE:   Patient stated “I forgot how to use the breathing thing.”    OBJECTIVE DATA SUMMARY:  4   4. Standing up from a chair using your arms (e.g. wheelchair or bedside chair)? []  1 []  2 []  3  [x]  4   5.  Walking in hospital room? []  1 []  2 [x]  3  []  4   6.  Climbing 3-5 steps with a railing? []  1 []  2 [x]  3  []  4     Raw Score: 21/24                            Cutoff score <=171,2,3 had higher odds of discharging home with home health or need of SNF/IPR.    1. Lou Will, Adenike Dunn, Varghese Pavon, Audrey Lawson, Pankaj Valdivia, Nomi Will.  Validity of the AM-PAC “6-Clicks” Inpatient Daily Activity and Basic Mobility Short Forms. Physical Therapy Mar 2014, [phone]) [phone]; DOI: 10.2522/ptj.76691668  2. Moises HARRELL, Christin J, Rony J, Violeta J. Association of AM-PAC \"6-Clicks\" Basic Mobility and Daily Activity Scores With Discharge Destination. Phys Ther. 2021 Apr 4;101(4):kyah964. doi: 10.1093/ptj/hkzd944. PMID: 94241773.  3. Sary ABRAHAM, Guy D, Linda S, Delmy K, Hugo S. Activity Measure for Post-Acute Care \"6-Clicks\" Basic Mobility Scores Predict Discharge Destination After Acute Care Hospitalization in Select Patient Groups: A Retrospective, Observational Study. Arch Rehabil Res Clin Transl. 2022 Jul 16;4(3):871084. doi: 10.1016/j.arrct.2022.136732. PMID: 01377140; PMCID: [id number].  4. Suman SINGH, Giselle S, Lito W, Amira P. AM-PAC Short Forms Manual 4.0. Revised 2/2020.                                                                                                                                                                                                                              Pain Rating:  Left knee 4-5/10     Pain Intervention(s):   patient medicated for pain prior to session, ice, rest, repositioning, and pain is at a level acceptable to the patient    Activity Tolerance:   Good - POD# 0    After treatment:   Patient left in no apparent distress in bed, Call bell within reach, Caregiver / family present, and Side rails

## 2024-11-06 NOTE — OP NOTE
knee was ranged and  irrigated copiously with dilute sterile betadine followed by saline pulsatile lavage.     All surrounding soft tissues were infiltrated with local anesthetic. The arthrotomy was closed with running quill suture and #1 vicryl suture.  Skin and subcutaneous tissues were irrigated and closed in standard fashion with monocryl and prineo.     Jenna Call was critical for michaels portions of the case including retractor management, wound closure, and dressing application. There was no one else available to perform these specific duties.    There were no complications. The procedure was a robotic assisted  LEFT TOTAL KNEE REPLACEMENT.  No specimens were obtained/sent.  The patient was transferred to the recovery room in stable condition.      Jose Alberto Bardales MD

## 2024-11-06 NOTE — PERIOP NOTE
TRANSFER - OUT REPORT:    Verbal report given to RN(name) on Sudha Jaimes  being transferred to 570(unit) for routine post-op       Report consisted of patient’s Situation, Background, Assessment and   Recommendations(SBAR).     Time Pre op antibiotic given:Y  Anesthesia Stop time: Y    Information from the following report(s) SBAR was reviewed with the receiving nurse.    Opportunity for questions and clarification was provided.     Is the patient on 02? NO       L/Min        Other     Is the patient on a monitor? No    Is the nurse transporting with the patient? No    At transfer, are there Patient Belongings with the patient?  If Yes, please note/list:    Surgical Waiting Area notified of patient's transfer from PACU? Yes

## 2024-11-07 VITALS
RESPIRATION RATE: 14 BRPM | TEMPERATURE: 97.7 F | OXYGEN SATURATION: 98 % | HEIGHT: 58 IN | WEIGHT: 143 LBS | DIASTOLIC BLOOD PRESSURE: 60 MMHG | HEART RATE: 73 BPM | SYSTOLIC BLOOD PRESSURE: 132 MMHG | BODY MASS INDEX: 30.02 KG/M2

## 2024-11-07 DIAGNOSIS — E78.2 MIXED HYPERLIPIDEMIA: ICD-10-CM

## 2024-11-07 LAB
ANION GAP SERPL CALC-SCNC: 5 MMOL/L (ref 2–12)
BUN SERPL-MCNC: 18 MG/DL (ref 6–20)
BUN/CREAT SERPL: 18 (ref 12–20)
CALCIUM SERPL-MCNC: 8.4 MG/DL (ref 8.5–10.1)
CHLORIDE SERPL-SCNC: 113 MMOL/L (ref 97–108)
CO2 SERPL-SCNC: 23 MMOL/L (ref 21–32)
CREAT SERPL-MCNC: 1 MG/DL (ref 0.55–1.02)
GLUCOSE SERPL-MCNC: 149 MG/DL (ref 65–100)
HCT VFR BLD AUTO: 32.2 % (ref 35–47)
HGB BLD-MCNC: 10.3 G/DL (ref 11.5–16)
POTASSIUM SERPL-SCNC: 4 MMOL/L (ref 3.5–5.1)
SODIUM SERPL-SCNC: 141 MMOL/L (ref 136–145)

## 2024-11-07 PROCEDURE — G0378 HOSPITAL OBSERVATION PER HR: HCPCS

## 2024-11-07 PROCEDURE — 96376 TX/PRO/DX INJ SAME DRUG ADON: CPT

## 2024-11-07 PROCEDURE — 85014 HEMATOCRIT: CPT

## 2024-11-07 PROCEDURE — 85018 HEMOGLOBIN: CPT

## 2024-11-07 PROCEDURE — 96374 THER/PROPH/DIAG INJ IV PUSH: CPT

## 2024-11-07 PROCEDURE — 97116 GAIT TRAINING THERAPY: CPT

## 2024-11-07 PROCEDURE — 97535 SELF CARE MNGMENT TRAINING: CPT | Performed by: OCCUPATIONAL THERAPIST

## 2024-11-07 PROCEDURE — 80048 BASIC METABOLIC PNL TOTAL CA: CPT

## 2024-11-07 PROCEDURE — APPNB60 APP NON BILLABLE TIME 46-60 MINS: Performed by: NURSE PRACTITIONER

## 2024-11-07 PROCEDURE — 97530 THERAPEUTIC ACTIVITIES: CPT

## 2024-11-07 PROCEDURE — 6360000002 HC RX W HCPCS: Performed by: PHYSICIAN ASSISTANT

## 2024-11-07 PROCEDURE — 6370000000 HC RX 637 (ALT 250 FOR IP): Performed by: PHYSICIAN ASSISTANT

## 2024-11-07 PROCEDURE — 97165 OT EVAL LOW COMPLEX 30 MIN: CPT | Performed by: OCCUPATIONAL THERAPIST

## 2024-11-07 PROCEDURE — 2580000003 HC RX 258: Performed by: PHYSICIAN ASSISTANT

## 2024-11-07 PROCEDURE — 6370000000 HC RX 637 (ALT 250 FOR IP): Performed by: NURSE PRACTITIONER

## 2024-11-07 RX ORDER — ALENDRONATE SODIUM 70 MG/1
70 TABLET ORAL
Qty: 1 TABLET | Refills: 0 | Status: SHIPPED
Start: 2024-11-07

## 2024-11-07 RX ORDER — MELOXICAM 7.5 MG/1
7.5 TABLET ORAL DAILY
Qty: 30 TABLET | Refills: 0 | Status: SHIPPED | OUTPATIENT
Start: 2024-11-07

## 2024-11-07 RX ORDER — OXYCODONE HYDROCHLORIDE 5 MG/1
5 TABLET ORAL EVERY 4 HOURS PRN
Qty: 20 TABLET | Refills: 0 | Status: SHIPPED | OUTPATIENT
Start: 2024-11-07 | End: 2024-11-10

## 2024-11-07 RX ORDER — FAMOTIDINE 20 MG/1
20 TABLET, FILM COATED ORAL 2 TIMES DAILY
Qty: 60 TABLET | Refills: 0 | Status: SHIPPED | OUTPATIENT
Start: 2024-11-07

## 2024-11-07 RX ORDER — ASPIRIN 81 MG/1
81 TABLET ORAL 2 TIMES DAILY
Qty: 60 TABLET | Refills: 0 | Status: SHIPPED | OUTPATIENT
Start: 2024-11-07 | End: 2024-12-07

## 2024-11-07 RX ORDER — TROSPIUM CHLORIDE 20 MG/1
20 TABLET, FILM COATED ORAL DAILY
Status: DISCONTINUED | OUTPATIENT
Start: 2024-11-07 | End: 2024-11-07 | Stop reason: HOSPADM

## 2024-11-07 RX ORDER — FENOFIBRATE 145 MG/1
145 TABLET, COATED ORAL EVERY OTHER DAY
Qty: 45 TABLET | Refills: 1 | OUTPATIENT
Start: 2024-11-07

## 2024-11-07 RX ORDER — DEXAMETHASONE 4 MG/1
4 TABLET ORAL
Qty: 4 TABLET | Refills: 0 | Status: SHIPPED | OUTPATIENT
Start: 2024-11-07 | End: 2024-11-11

## 2024-11-07 RX ORDER — TRAMADOL HYDROCHLORIDE 50 MG/1
50 TABLET ORAL EVERY 6 HOURS PRN
Qty: 28 TABLET | Refills: 0 | Status: SHIPPED | OUTPATIENT
Start: 2024-11-07 | End: 2024-11-14

## 2024-11-07 RX ADMIN — WATER 2000 MG: 1 INJECTION INTRAMUSCULAR; INTRAVENOUS; SUBCUTANEOUS at 05:42

## 2024-11-07 RX ADMIN — ASPIRIN 81 MG: 81 TABLET, COATED ORAL at 08:26

## 2024-11-07 RX ADMIN — SENNOSIDES AND DOCUSATE SODIUM 1 TABLET: 50; 8.6 TABLET ORAL at 08:26

## 2024-11-07 RX ADMIN — CARIPRAZINE 3 MG: 1.5 CAPSULE, GELATIN COATED ORAL at 08:22

## 2024-11-07 RX ADMIN — LORAZEPAM 0.5 MG: 0.5 TABLET ORAL at 05:42

## 2024-11-07 RX ADMIN — POLYETHYLENE GLYCOL 3350 17 G: 17 POWDER, FOR SOLUTION ORAL at 08:22

## 2024-11-07 RX ADMIN — KETOROLAC TROMETHAMINE 15 MG: 30 INJECTION, SOLUTION INTRAMUSCULAR at 03:12

## 2024-11-07 RX ADMIN — SODIUM CHLORIDE, PRESERVATIVE FREE 10 ML: 5 INJECTION INTRAVENOUS at 08:37

## 2024-11-07 RX ADMIN — ACETAMINOPHEN 650 MG: 325 TABLET ORAL at 08:13

## 2024-11-07 RX ADMIN — FENOFIBRATE 160 MG: 160 TABLET ORAL at 08:38

## 2024-11-07 RX ADMIN — KETOROLAC TROMETHAMINE 15 MG: 30 INJECTION, SOLUTION INTRAMUSCULAR at 08:39

## 2024-11-07 RX ADMIN — ACETAMINOPHEN 650 MG: 325 TABLET ORAL at 03:14

## 2024-11-07 RX ADMIN — TROSPIUM CHLORIDE 20 MG: 20 TABLET, FILM COATED ORAL at 09:31

## 2024-11-07 RX ADMIN — LOSARTAN POTASSIUM 25 MG: 50 TABLET, FILM COATED ORAL at 08:27

## 2024-11-07 RX ADMIN — TRAMADOL HYDROCHLORIDE 50 MG: 50 TABLET ORAL at 09:35

## 2024-11-07 RX ADMIN — ATENOLOL 25 MG: 50 TABLET ORAL at 08:22

## 2024-11-07 ASSESSMENT — PAIN DESCRIPTION - ORIENTATION: ORIENTATION: LEFT

## 2024-11-07 ASSESSMENT — PAIN SCALES - GENERAL
PAINLEVEL_OUTOF10: 4

## 2024-11-07 ASSESSMENT — PAIN DESCRIPTION - DESCRIPTORS: DESCRIPTORS: ACHING;DISCOMFORT

## 2024-11-07 ASSESSMENT — PAIN DESCRIPTION - LOCATION: LOCATION: KNEE

## 2024-11-07 NOTE — PLAN OF CARE
Problem: Pain  Goal: Verbalizes/displays adequate comfort level or baseline comfort level  Outcome: Adequate for Discharge     Problem: Safety - Adult  Goal: Free from fall injury  Outcome: Adequate for Discharge     Problem: Discharge Planning  Goal: Discharge to home or other facility with appropriate resources  Outcome: Adequate for Discharge     Problem: Physical Therapy - Adult  Goal: By Discharge: Performs mobility at highest level of function for planned discharge setting.  See evaluation for individualized goals.  Description: FUNCTIONAL STATUS PRIOR TO ADMISSION: Patient was independent and active without use of DME - occ used cane.  Pt s/p right TKR 7 years ago.    HOME SUPPORT PRIOR TO ADMISSION: The patient lived with family and did not require assistance. Pt and her sister take care of mother with dementia.  Pt/mother live on first floor and sister and  live on 2nd floor.     Physical Therapy Goals  Initiated 11/6/2024  1.  Patient will move from supine to sit and sit to supine and scoot up and down in bed with modified independence within 4 day(s).    2.  Patient will perform sit to stand with modified independence within 4 day(s).  3.  Patient will transfer from bed to chair and chair to bed with modified independence using the least restrictive device within 4 day(s).  4.  Patient will ambulate with modified independence for > 150 feet with the least restrictive device within 4 day(s).   5.  Patient will ascend/descend 4 stairs with one handrail(s) with supervision within 4 day(s).  6. Patient will perform TKR home exercise program per protocol with supervision/set-up within 4 days.  7. Patient will demonstrate AROM 0-90 degrees in operative joint within 4 days.     11/7/2024 0942 by Danette King, PT  Outcome: Adequate for Discharge     Problem: ABCDS Injury Assessment  Goal: Absence of physical injury  Outcome: Adequate for Discharge

## 2024-11-07 NOTE — PROGRESS NOTES
Ortho NP Note    POD# 1  s/p LEFT TOTAL KNEE ARTHROPLASTY (VELYS)   Pt seen with no visitor.     Pt sitting up in chair comfortably. Feeling well.   Cleared by OT, waiting PT.   Reports postop knee pain well controlled with tramadol   Tolerating regular diet. No nausea.  Postop plan reviewed - No complaints/concerns.    VSS Afebrile.    Visit Vitals  /60   Pulse 73   Temp 97.7 °F (36.5 °C) (Oral)   Resp 17   Ht 1.473 m (4' 10\")   Wt 64.9 kg (143 lb)   SpO2 98%   BMI 29.89 kg/m²       Voiding status: voiding          Labs    Lab Results   Component Value Date/Time    HGB 10.3 11/07/2024 04:28 AM      Lab Results   Component Value Date/Time    INR 1.1 10/24/2024 11:23 AM      Lab Results   Component Value Date/Time     11/07/2024 04:28 AM    K 4.0 11/07/2024 04:28 AM     11/07/2024 04:28 AM    CO2 23 11/07/2024 04:28 AM    BUN 18 11/07/2024 04:28 AM     Recent Glucose Results:   Glucose   Date Value Ref Range Status   11/07/2024 149 (H) 65 - 100 mg/dL Final   10/24/2024 116 (H) 65 - 100 mg/dL Final   08/22/2024 99 70 - 99 mg/dL Final   02/22/2024 128 (H) 70 - 99 mg/dL Final   08/24/2023 119 (H) 70 - 99 mg/dL Final           Body mass index is 29.89 kg/m². : A BMI > 30 is classified as obesity and > 40 is classified as morbid obesity.       Ace wrap removed. Dressing c.d.i  Cryotherapy in place over incision  Calves soft and supple; No pain with passive stretch  Sensation and motor intact. +PF/DF/EHL intact   SCDs for mechanical DVT proph while in bed     PLAN:  1) PT BID, OT - WBAT  2) Aspirin 81 mg PO BID for DVT Prophylaxis. Encouraged early mobilization, bed exercises, and SCD use.  3) GI Prophylaxis - Pepcid    4) Pain control - scheduled tylenol  and toradol, and prn  tramadol and oxycodone . Start Mobic and Decadron at discharge.   5) Post op care - Continue bowel regimen, encouraged IS. Straight cath per protocol. Prineo to remain in place until follow up unless integrity is lost.    6)

## 2024-11-07 NOTE — PROGRESS NOTES
standing position during ADLs in order to increase even weight bearing through bilateral LEs in order to increase independence with ADLs.  Goal to be reached 30 days post - op, per orthopedic surgeon or per PT.  Patient indicated understanding.       Transfer Training  Transfer Training: Yes  Overall Level of Assistance: Supervision;Additional time (RW)  Interventions: Safety awareness training;Verbal cues  Sit to Stand: Supervision;Additional time;Assist X1  Stand to Sit: Supervision;Additional time;Assist X1  Stand Pivot Transfers: Supervision;Additional time;Assist X1  Bed to Chair: Supervision;Additional time;Assist X1  Toilet Transfer: Supervision;Additional time;Assist X1    [facility] \"[facility] Clicks\"                                                       Daily Activity Inpatient Short Form  AM-PAC Daily Activity - Inpatient   How much help is needed for putting on and taking off regular lower body clothing?: A Little  How much help is needed for bathing (which includes washing, rinsing, drying)?: A Little  How much help is needed for toileting (which includes using toilet, bedpan, or urinal)?: A Little  How much help is needed for putting on and taking off regular upper body clothing?: None  How much help is needed for taking care of personal grooming?: None  How much help for eating meals?: None  AM-[facility] Inpatient Daily Activity Raw Score: 21  AM-PAC Inpatient ADL T-Scale Score : 44.27  ADL Inpatient CMS 0-100% Score: 32.79  ADL Inpatient CMS G-Code Modifier : CJ     Interpretation of Tool:  Represents clinically-significant functional categories (i.e. Activities of daily living).  Cutoff score 39.4 (19) correlates to a good likelihood of discharging home versus a facility  Lou Will, Adenike Dunn, Varghese Pavon, Audrey Lawson, Pankaj Valdivia, Nomi Will, AM-PAC “6-Clicks” Functional Assessment Scores Predict Acute Care Hospital Discharge Destination, Physical Therapy, Volume  94, Issue 9, 2014, Pages 4540-3145, [email or web address]://[email or web address]       Pain Ratin/10   Pain Intervention(s):   nursing notified and addressing and ice    Activity Tolerance:   Good    After treatment:   Patient left in no apparent distress sitting up in chair and Call bell within reach    COMMUNICATION/EDUCATION:   The patient's plan of care was discussed with: physical therapist and registered nurse    Patient Education  Education Given To: Patient  Education Provided: Role of Therapy;Plan of Care;Precautions;ADL Adaptive Strategies;Transfer Training;Energy Conservation;Fall Prevention Strategies  Education Method: Demonstration;Verbal  Barriers to Learning: None  Education Outcome: Verbalized understanding;Demonstrated understanding    Thank you for this referral.  Erin Austin OT  Minutes: 36    Occupational Therapy Evaluation Charge Determination   History Examination Decision-Making   LOW Complexity : Brief history review  LOW Complexity: 1-3 Performance deficits relating to physical, cognitive, or psychosocial skills that result in activity limitations and/or participation restrictions LOW Complexity: No comorbidities that affect functional and  no verbal  or physical assist needed to complete eval tasks   Based on the above components, the patient evaluation is determined to be of the following complexity level: Low

## 2024-11-07 NOTE — DISCHARGE SUMMARY
Ortho Discharge Summary    Patient ID:  Sudha Jaimes  [id number]  female  64 y.o.  1960    Admit date: 11/6/2024    Discharge date: 11/7/2024    Admitting Physician: Jose Alberto Bardales MD     Consulting Physician(s):   Treatment Team:   Mudrick, Colin A, MD Mudrick, Colin A, MD [place], Roudy, RN Werner, Ann, RN Tepe, Melinda, Erin Peterson OT Timberlake, Trinia, RN McKinley, Mary    Date of Surgery:   11/6/2024     Preoperative Diagnosis:  Arthritis of left knee [M17.12]    Postoperative Diagnosis:   * No post-op diagnosis entered *    Procedure(s):   LEFT TOTAL KNEE ARTHROPLASTY (VELYS)     Anesthesia Type:   Spinal     Surgeon: Jose Alberto Bardales MD                            HPI:  Pt is a 64 y.o. female who has a history of Arthritis of left knee [M17.12]  with pain and limitations of activities of daily living who presents at this time for a LEFT TOTAL KNEE ARTHROPLASTY (VELYS) following the failure of conservative management.    PMH:   Past Medical History:   Diagnosis Date    Anxiety 1980    Arthritis     Arthritis of knee     Bipolar disorder (HCC)     Bladder pain     Choledocholithiasis 11/24/2010    Depression 1988    Female bladder prolapse     Fracture, fibula     left    GERD (gastroesophageal reflux disease)     Headache(784.0)     tension headaches    High cholesterol     History of cholecystectomy     Hypertension     Ill-defined condition     IBS    Irritable bowel     Osteoarthritis 2016    Osteoporosis 2017    Pelvic pain in female 2014    Psychiatric disorder     BI-POLAR 1    Stool color black     irritable bowel       Body mass index is 29.89 kg/m². : A BMI > 30 is classified as obesity and > 40 is classified as morbid obesity.     Medications upon admission :   Prior to Admission Medications   Prescriptions Last Dose Informant Patient Reported? Taking?   Artificial Saliva (THERABREATH DRY MOUTH) LOZG   Yes No   Sig: Take by mouth as needed   Black Elderberry 1000 MG

## 2024-11-07 NOTE — PLAN OF CARE
Problem: Physical Therapy - Adult  Goal: By Discharge: Performs mobility at highest level of function for planned discharge setting.  See evaluation for individualized goals.  Description: FUNCTIONAL STATUS PRIOR TO ADMISSION: Patient was independent and active without use of DME - occ used cane.  Pt s/p right TKR 7 years ago.    HOME SUPPORT PRIOR TO ADMISSION: The patient lived with family and did not require assistance. Pt and her sister take care of mother with dementia.  Pt/mother live on first floor and sister and  live on 2nd floor.     Physical Therapy Goals  Initiated 11/6/2024  1.  Patient will move from supine to sit and sit to supine and scoot up and down in bed with modified independence within 4 day(s).    2.  Patient will perform sit to stand with modified independence within 4 day(s).  3.  Patient will transfer from bed to chair and chair to bed with modified independence using the least restrictive device within 4 day(s).  4.  Patient will ambulate with modified independence for > 150 feet with the least restrictive device within 4 day(s).   5.  Patient will ascend/descend 4 stairs with one handrail(s) with supervision within 4 day(s).  6. Patient will perform TKR home exercise program per protocol with supervision/set-up within 4 days.  7. Patient will demonstrate AROM 0-90 degrees in operative joint within 4 days.     Outcome: Adequate for Discharge   PHYSICAL THERAPY TREATMENT    Patient: Sudha Jaimes (64 y.o. female)  Date: 11/7/2024  Diagnosis: Arthritis of left knee [M17.12]  Osteoarthritis of left knee, unspecified osteoarthritis type [M17.12] Osteoarthritis of left knee, unspecified osteoarthritis type  Procedure(s) (LRB):  LEFT TOTAL KNEE ARTHROPLASTY (VELYS) (Left) 1 Day Post-Op  Precautions: Weight Bearing, Fall Risk   Left Lower Extremity Weight Bearing: Weight Bearing As Tolerated                  ASSESSMENT:  Patient continues to benefit from skilled PT services and is  []                                        []                                        []                                           Hamstring Sets 1 10 [x]                                        []                                        []                                        []                                           Short Arc Quads   []                                        []                                        []                                        []                                           Knee Extension Stretch     []                                          []                                          []                                          []                                           Heel Slides 1 5 [x]                                        []                                        []                                        []                                           Long Arc Quads   []                                        []                                        []                                        []                                           Knee Flexion Stretch   []                                        []                                        []                                        []                                           Straight Leg Raises   []                                        []                                        []                                        []                                                                                                                                                                                                                                                                Pain Rating:  Reports soreness in L knee--tolerable for PT    Activity Tolerance:   Good    After treatment:   Patient left in no apparent distress sitting up in chair and Call bell within reach      COMMUNICATION/EDUCATION:   The patient's plan of care was

## 2024-11-07 NOTE — CARE COORDINATION
[place]:    [facility]  SOC 11/10  Sister will transport  Already owns walker    CM confirmed face sheet, HH choice; Obs Pt       11/07/24 9684   Service Assessment   Patient Orientation Alert and Oriented   Cognition Alert   History Provided By Patient   Primary Caregiver Self   Support Systems Family Members   Patient's Healthcare Decision Maker is: Named in Scanned ACP Document   PCP Verified by CM Yes   Last Visit to PCP Within last 3 months   Prior Functional Level Independent in ADLs/IADLs   Current Functional Level Independent in ADLs/IADLs   Can patient return to prior living arrangement Yes   Ability to make needs known: Good   Family able to assist with home care needs: Yes   Discharge Planning   Patient expects to be discharged to: House   Services At/After Discharge   Confirm Follow Up Transport Family   Condition of Participation: Discharge Planning   The Plan for Transition of Care is related to the following treatment goals: Home Health   The Patient and/or Patient Representative was provided with a Choice of Provider? Patient   The Patient and/Or Patient Representative agree with the Discharge Plan? Yes   Freedom of Choice list was provided with basic dialogue that supports the patient's individualized plan of care/goals, treatment preferences, and shares the quality data associated with the providers?  Yes

## 2024-12-13 ENCOUNTER — TELEPHONE (OUTPATIENT)
Age: 64
End: 2024-12-13

## 2024-12-13 DIAGNOSIS — E78.2 MIXED HYPERLIPIDEMIA: ICD-10-CM

## 2024-12-13 RX ORDER — FENOFIBRATE 145 MG/1
145 TABLET, COATED ORAL EVERY OTHER DAY
Qty: 45 TABLET | Refills: 1 | Status: SHIPPED | OUTPATIENT
Start: 2024-12-13

## 2024-12-13 NOTE — TELEPHONE ENCOUNTER
Medication(s):  fenofibrate (TRICOR) 145 MG tablet     Last OV: 10/16/2024  Next OV: 02/20/2025    Pharmacy: [facility]/PHARMACY #[id number] - [place], [place] - [address] - P [phone] - F [phone] [[id number]]

## 2024-12-24 NOTE — TELEPHONE ENCOUNTER
[facility]pharmacy #[id number] - [place], [place] - [address] - P [phone] - F [phone]      hydrOXYzine HCl (ATARAX) 25 MG tablet   Requesting 90 day supply  08/24/2023 02/22/2024   No

## 2025-02-20 ENCOUNTER — TELEMEDICINE (OUTPATIENT)
Age: 65
End: 2025-02-20

## 2025-02-20 DIAGNOSIS — R73.03 PREDIABETES: ICD-10-CM

## 2025-02-20 DIAGNOSIS — F31.10 BIPOLAR DISORDER, CURRENT EPISODE MANIC WITHOUT PSYCHOTIC FEATURES, UNSPECIFIED (HCC): ICD-10-CM

## 2025-02-20 DIAGNOSIS — G44.221 CHRONIC TENSION-TYPE HEADACHE, INTRACTABLE: ICD-10-CM

## 2025-02-20 DIAGNOSIS — N81.10 PROLAPSE OF FEMALE BLADDER, ACQUIRED: ICD-10-CM

## 2025-02-20 DIAGNOSIS — M81.0 AGE-RELATED OSTEOPOROSIS WITHOUT CURRENT PATHOLOGICAL FRACTURE: ICD-10-CM

## 2025-02-20 DIAGNOSIS — I10 ESSENTIAL (PRIMARY) HYPERTENSION: ICD-10-CM

## 2025-02-20 DIAGNOSIS — E78.2 MIXED HYPERLIPIDEMIA: ICD-10-CM

## 2025-02-20 DIAGNOSIS — N39.46 MIXED STRESS AND URGE URINARY INCONTINENCE: ICD-10-CM

## 2025-02-20 DIAGNOSIS — Z12.11 SCREEN FOR COLON CANCER: Primary | ICD-10-CM

## 2025-02-20 RX ORDER — LOSARTAN POTASSIUM 25 MG/1
25 TABLET ORAL DAILY
Qty: 90 TABLET | Refills: 1 | Status: SHIPPED | OUTPATIENT
Start: 2025-02-20

## 2025-02-20 RX ORDER — FENOFIBRATE 145 MG/1
145 TABLET, COATED ORAL EVERY OTHER DAY
Qty: 45 TABLET | Refills: 1 | Status: SHIPPED | OUTPATIENT
Start: 2025-02-20

## 2025-02-20 RX ORDER — AMITRIPTYLINE HYDROCHLORIDE 50 MG/1
50 TABLET ORAL NIGHTLY
Qty: 90 TABLET | Refills: 1 | Status: SHIPPED | OUTPATIENT
Start: 2025-02-20

## 2025-02-20 RX ORDER — ALENDRONATE SODIUM 70 MG/1
70 TABLET ORAL
Qty: 12 TABLET | Refills: 3 | Status: SHIPPED | OUTPATIENT
Start: 2025-02-20

## 2025-02-20 RX ORDER — SOLIFENACIN SUCCINATE 5 MG/1
5 TABLET, FILM COATED ORAL DAILY
Qty: 90 TABLET | Refills: 1 | Status: SHIPPED | OUTPATIENT
Start: 2025-02-20

## 2025-02-20 RX ORDER — ATENOLOL 25 MG/1
25 TABLET ORAL EVERY MORNING
Qty: 90 TABLET | Refills: 1 | Status: SHIPPED | OUTPATIENT
Start: 2025-02-20

## 2025-02-20 RX ORDER — CLINDAMYCIN HYDROCHLORIDE 150 MG/1
150 CAPSULE ORAL 3 TIMES DAILY
COMMUNITY

## 2025-02-20 ASSESSMENT — ENCOUNTER SYMPTOMS
EYES NEGATIVE: 1
GASTROINTESTINAL NEGATIVE: 1
RESPIRATORY NEGATIVE: 1

## 2025-02-20 NOTE — PROGRESS NOTES
Sudha Jaimes, was evaluated through a synchronous (real-time) audio-video encounter. The patient (or guardian if applicable) is aware that this is a billable service, which includes applicable co-pays. This Virtual Visit was conducted with patient's (and/or legal guardian's) consent. Patient identification was verified, and a caregiver was present when appropriate.   The patient was located at Home: [address] [facility] [address]  Provider was located at Facility (Appt Dept): [address]  [place] [address]  Confirm you are appropriately licensed, registered, or certified to deliver care in the state where the patient is located as indicated above. If you are not or unsure, please re-schedule the visit: Yes, I confirm.     Sudha Jaimes (:  1960) is a Established patient, presenting virtually for evaluation of the following:      Below is the assessment and plan developed based on review of pertinent history, physical exam, labs, studies, and medications.     Assessment & Plan  Screen for colon cancer       Orders:    Kike Quijano MD, Gastroenterology, [place]    Bipolar disorder, current episode manic without psychotic features, unspecified (HCC)       Orders:    amitriptyline (ELAVIL) 50 MG tablet; Take 1 tablet by mouth nightly    Comprehensive Metabolic Panel    CBC with Auto Differential    Essential (primary) hypertension       Orders:    losartan (COZAAR) 25 MG tablet; Take 1 tablet by mouth daily    atenolol (TENORMIN) 25 MG tablet; Take 1 tablet by mouth every morning    Comprehensive Metabolic Panel    CBC with Auto Differential    Mixed hyperlipidemia       Orders:    losartan (COZAAR) 25 MG tablet; Take 1 tablet by mouth daily    fenofibrate (TRICOR) 145 MG tablet; Take 1 tablet by mouth every other day    Comprehensive Metabolic Panel    Lipid Panel    Chronic tension-type headache, intractable       Orders:    atenolol

## 2025-03-05 LAB
BASOPHILS # BLD AUTO: 0.1 X10E3/UL (ref 0–0.2)
BASOPHILS NFR BLD AUTO: 1 %
EOSINOPHIL # BLD AUTO: 0.1 X10E3/UL (ref 0–0.4)
EOSINOPHIL NFR BLD AUTO: 2 %
ERYTHROCYTE [DISTWIDTH] IN BLOOD BY AUTOMATED COUNT: 13.3 % (ref 11.7–15.4)
HCT VFR BLD AUTO: 39.6 % (ref 34–46.6)
HGB BLD-MCNC: 12.4 G/DL (ref 11.1–15.9)
IMM GRANULOCYTES # BLD AUTO: 0 X10E3/UL (ref 0–0.1)
IMM GRANULOCYTES NFR BLD AUTO: 0 %
LYMPHOCYTES # BLD AUTO: 1.9 X10E3/UL (ref 0.7–3.1)
LYMPHOCYTES NFR BLD AUTO: 33 %
MCH RBC QN AUTO: 27.3 PG (ref 26.6–33)
MCHC RBC AUTO-ENTMCNC: 31.3 G/DL (ref 31.5–35.7)
MCV RBC AUTO: 87 FL (ref 79–97)
MONOCYTES # BLD AUTO: 0.3 X10E3/UL (ref 0.1–0.9)
MONOCYTES NFR BLD AUTO: 5 %
NEUTROPHILS # BLD AUTO: 3.3 X10E3/UL (ref 1.4–7)
NEUTROPHILS NFR BLD AUTO: 59 %
PLATELET # BLD AUTO: 279 X10E3/UL (ref 150–450)
RBC # BLD AUTO: 4.54 X10E6/UL (ref 3.77–5.28)
WBC # BLD AUTO: 5.7 X10E3/UL (ref 3.4–10.8)

## 2025-03-06 LAB
ALBUMIN SERPL-MCNC: 4.7 G/DL (ref 3.9–4.9)
ALP SERPL-CCNC: 58 IU/L (ref 44–121)
ALT SERPL-CCNC: 18 IU/L (ref 0–32)
AST SERPL-CCNC: 21 IU/L (ref 0–40)
BILIRUB SERPL-MCNC: 0.3 MG/DL (ref 0–1.2)
BUN SERPL-MCNC: 17 MG/DL (ref 8–27)
BUN/CREAT SERPL: 16 (ref 12–28)
CALCIUM SERPL-MCNC: 10.7 MG/DL (ref 8.7–10.3)
CHLORIDE SERPL-SCNC: 103 MMOL/L (ref 96–106)
CO2 SERPL-SCNC: 22 MMOL/L (ref 20–29)
CREAT SERPL-MCNC: 1.09 MG/DL (ref 0.57–1)
EGFRCR SERPLBLD CKD-EPI 2021: 56 ML/MIN/1.73
GLOBULIN SER CALC-MCNC: 2.3 G/DL (ref 1.5–4.5)
GLUCOSE SERPL-MCNC: 94 MG/DL (ref 70–99)
HBA1C MFR BLD: 5.8 % (ref 4.8–5.6)
POTASSIUM SERPL-SCNC: 4.1 MMOL/L (ref 3.5–5.2)
PROT SERPL-MCNC: 7 G/DL (ref 6–8.5)
REPORT: NORMAL
SODIUM SERPL-SCNC: 143 MMOL/L (ref 134–144)

## 2025-03-10 ENCOUNTER — RESULTS FOLLOW-UP (OUTPATIENT)
Age: 65
End: 2025-03-10

## 2025-06-06 ENCOUNTER — TELEPHONE (OUTPATIENT)
Age: 65
End: 2025-06-06

## 2025-06-12 ENCOUNTER — TELEMEDICINE (OUTPATIENT)
Age: 65
End: 2025-06-12
Payer: MEDICARE

## 2025-06-12 DIAGNOSIS — B37.2 CANDIDAL INTERTRIGO: Primary | ICD-10-CM

## 2025-06-12 DIAGNOSIS — N39.3 STRESS INCONTINENCE OF URINE: ICD-10-CM

## 2025-06-12 PROCEDURE — 1123F ACP DISCUSS/DSCN MKR DOCD: CPT | Performed by: INTERNAL MEDICINE

## 2025-06-12 PROCEDURE — 1159F MED LIST DOCD IN RCRD: CPT | Performed by: INTERNAL MEDICINE

## 2025-06-12 PROCEDURE — 99213 OFFICE O/P EST LOW 20 MIN: CPT | Performed by: INTERNAL MEDICINE

## 2025-06-12 RX ORDER — FLUCONAZOLE 150 MG/1
150 TABLET ORAL DAILY
Qty: 2 TABLET | Refills: 0 | Status: SHIPPED | OUTPATIENT
Start: 2025-06-12

## 2025-06-12 RX ORDER — KETOCONAZOLE 20 MG/G
CREAM TOPICAL
COMMUNITY
Start: 2025-05-17

## 2025-06-12 RX ORDER — MIRABEGRON 50 MG/1
50 TABLET, FILM COATED, EXTENDED RELEASE ORAL DAILY
COMMUNITY

## 2025-06-12 ASSESSMENT — ENCOUNTER SYMPTOMS
RESPIRATORY NEGATIVE: 1
GASTROINTESTINAL NEGATIVE: 1
EYES NEGATIVE: 1

## 2025-06-12 ASSESSMENT — PATIENT HEALTH QUESTIONNAIRE - PHQ9
10. IF YOU CHECKED OFF ANY PROBLEMS, HOW DIFFICULT HAVE THESE PROBLEMS MADE IT FOR YOU TO DO YOUR WORK, TAKE CARE OF THINGS AT HOME, OR GET ALONG WITH OTHER PEOPLE: NOT DIFFICULT AT ALL
7. TROUBLE CONCENTRATING ON THINGS, SUCH AS READING THE NEWSPAPER OR WATCHING TELEVISION: NOT AT ALL
4. FEELING TIRED OR HAVING LITTLE ENERGY: NOT AT ALL
8. MOVING OR SPEAKING SO SLOWLY THAT OTHER PEOPLE COULD HAVE NOTICED. OR THE OPPOSITE, BEING SO FIGETY OR RESTLESS THAT YOU HAVE BEEN MOVING AROUND A LOT MORE THAN USUAL: NOT AT ALL
SUM OF ALL RESPONSES TO PHQ QUESTIONS 1-9: 0
5. POOR APPETITE OR OVEREATING: NOT AT ALL
6. FEELING BAD ABOUT YOURSELF - OR THAT YOU ARE A FAILURE OR HAVE LET YOURSELF OR YOUR FAMILY DOWN: NOT AT ALL
1. LITTLE INTEREST OR PLEASURE IN DOING THINGS: NOT AT ALL
9. THOUGHTS THAT YOU WOULD BE BETTER OFF DEAD, OR OF HURTING YOURSELF: NOT AT ALL
SUM OF ALL RESPONSES TO PHQ QUESTIONS 1-9: 0
3. TROUBLE FALLING OR STAYING ASLEEP: NOT AT ALL
SUM OF ALL RESPONSES TO PHQ QUESTIONS 1-9: 0
SUM OF ALL RESPONSES TO PHQ QUESTIONS 1-9: 0
2. FEELING DOWN, DEPRESSED OR HOPELESS: NOT AT ALL

## 2025-06-12 NOTE — PROGRESS NOTES
Chief Complaint   Patient presents with    Rash     Have you been to the ER, urgent care clinic since your last visit?  Hospitalized since your last visit?   NO    Have you seen or consulted any other health care providers outside our system since your last visit?   NO

## 2025-06-12 NOTE — PROGRESS NOTES
Sudha Jaimes, was evaluated through a synchronous (real-time) audio-video encounter. The patient (or guardian if applicable) is aware that this is a billable service, which includes applicable co-pays. This Virtual Visit was conducted with patient's (and/or legal guardian's) consent. Patient identification was verified, and a caregiver was present when appropriate.   The patient was located at Home: [address] [facility] [id number]  Provider was located at Facility (Appt Dept): [address] [facility]  [address]  [place] [address]  Confirm you are appropriately licensed, registered, or certified to deliver care in the state where the patient is located as indicated above. If you are not or unsure, please re-schedule the visit: Yes, I confirm.     Sudha Jaimes (:  1960) is a Established patient, presenting virtually for evaluation of the following:      Below is the assessment and plan developed based on review of pertinent history, physical exam, labs, studies, and medications.     Assessment & Plan  Candidal intertrigo     She has rash under both breast fold.  Already using ketoconazole cream.  Will add Diflucan tablet.  She is also using hydrocortisone cream.  Advised her to stop using it.  Orders:  •  fluconazole (DIFLUCAN) 150 MG tablet; Take 1 tablet by mouth daily    Stress incontinence of urine     Will refer,  Orders:  •  External Referral To Physical Therapy       Assessment & Plan         No follow-ups on file.       Subjective   History of Present Illness  Sudha is here for follow-up.  No history of rash under the breast fold, itching.  She was seen in urgent care and was prescribed ketoconazole cream.  She is using it also she is using hydrocortisone cream but not helping her.  Rashes present spreading.  No fever.  She has urinary incontinence.  She needs pelvic exercise.  Would like to get physical therapy.  Otherwise she is doing well.  Labs reviewed, seems stable.  Has

## 2025-06-17 ENCOUNTER — TRANSCRIBE ORDERS (OUTPATIENT)
Facility: HOSPITAL | Age: 65
End: 2025-06-17

## 2025-06-17 DIAGNOSIS — Z12.31 VISIT FOR SCREENING MAMMOGRAM: Primary | ICD-10-CM

## 2025-07-19 ENCOUNTER — PATIENT MESSAGE (OUTPATIENT)
Age: 65
End: 2025-07-19

## 2025-08-07 ENCOUNTER — RESULTS FOLLOW-UP (OUTPATIENT)
Age: 65
End: 2025-08-07

## 2025-08-07 ENCOUNTER — HOSPITAL ENCOUNTER (OUTPATIENT)
Facility: HOSPITAL | Age: 65
Discharge: HOME | End: 2025-08-10
Attending: INTERNAL MEDICINE
Payer: MEDICARE

## 2025-08-07 DIAGNOSIS — Z12.31 VISIT FOR SCREENING MAMMOGRAM: ICD-10-CM

## 2025-08-07 PROCEDURE — 77063 BREAST TOMOSYNTHESIS BI: CPT

## 2025-08-17 SDOH — HEALTH STABILITY: PHYSICAL HEALTH: ON AVERAGE, HOW MANY MINUTES DO YOU ENGAGE IN EXERCISE AT THIS LEVEL?: 30 MIN

## 2025-08-17 SDOH — HEALTH STABILITY: PHYSICAL HEALTH: ON AVERAGE, HOW MANY DAYS PER WEEK DO YOU ENGAGE IN MODERATE TO STRENUOUS EXERCISE (LIKE A BRISK WALK)?: 2 DAYS

## 2025-08-17 ASSESSMENT — PATIENT HEALTH QUESTIONNAIRE - PHQ9
SUM OF ALL RESPONSES TO PHQ QUESTIONS 1-9: 0
8. MOVING OR SPEAKING SO SLOWLY THAT OTHER PEOPLE COULD HAVE NOTICED. OR THE OPPOSITE, BEING SO FIGETY OR RESTLESS THAT YOU HAVE BEEN MOVING AROUND A LOT MORE THAN USUAL: NOT AT ALL
9. THOUGHTS THAT YOU WOULD BE BETTER OFF DEAD, OR OF HURTING YOURSELF: NOT AT ALL
2. FEELING DOWN, DEPRESSED OR HOPELESS: NOT AT ALL
1. LITTLE INTEREST OR PLEASURE IN DOING THINGS: NOT AT ALL
3. TROUBLE FALLING OR STAYING ASLEEP: NOT AT ALL
4. FEELING TIRED OR HAVING LITTLE ENERGY: NOT AT ALL
7. TROUBLE CONCENTRATING ON THINGS, SUCH AS READING THE NEWSPAPER OR WATCHING TELEVISION: NOT AT ALL
SUM OF ALL RESPONSES TO PHQ QUESTIONS 1-9: 0
6. FEELING BAD ABOUT YOURSELF - OR THAT YOU ARE A FAILURE OR HAVE LET YOURSELF OR YOUR FAMILY DOWN: NOT AT ALL
10. IF YOU CHECKED OFF ANY PROBLEMS, HOW DIFFICULT HAVE THESE PROBLEMS MADE IT FOR YOU TO DO YOUR WORK, TAKE CARE OF THINGS AT HOME, OR GET ALONG WITH OTHER PEOPLE: NOT DIFFICULT AT ALL
SUM OF ALL RESPONSES TO PHQ QUESTIONS 1-9: 0
5. POOR APPETITE OR OVEREATING: NOT AT ALL
SUM OF ALL RESPONSES TO PHQ QUESTIONS 1-9: 0

## 2025-08-17 ASSESSMENT — LIFESTYLE VARIABLES
HOW MANY STANDARD DRINKS CONTAINING ALCOHOL DO YOU HAVE ON A TYPICAL DAY: 0
HOW MANY STANDARD DRINKS CONTAINING ALCOHOL DO YOU HAVE ON A TYPICAL DAY: PATIENT DOES NOT DRINK
HOW OFTEN DO YOU HAVE A DRINK CONTAINING ALCOHOL: 1
HOW OFTEN DO YOU HAVE SIX OR MORE DRINKS ON ONE OCCASION: 1
HOW OFTEN DO YOU HAVE A DRINK CONTAINING ALCOHOL: NEVER

## 2025-08-21 ENCOUNTER — OFFICE VISIT (OUTPATIENT)
Age: 65
End: 2025-08-21
Payer: MEDICARE

## 2025-08-21 VITALS
DIASTOLIC BLOOD PRESSURE: 80 MMHG | HEIGHT: 58 IN | WEIGHT: 141 LBS | RESPIRATION RATE: 18 BRPM | BODY MASS INDEX: 29.6 KG/M2 | OXYGEN SATURATION: 97 % | HEART RATE: 87 BPM | SYSTOLIC BLOOD PRESSURE: 130 MMHG

## 2025-08-21 DIAGNOSIS — I87.2 CHRONIC VENOUS INSUFFICIENCY: ICD-10-CM

## 2025-08-21 DIAGNOSIS — E78.2 MIXED HYPERLIPIDEMIA: ICD-10-CM

## 2025-08-21 DIAGNOSIS — G44.221 CHRONIC TENSION-TYPE HEADACHE, INTRACTABLE: ICD-10-CM

## 2025-08-21 DIAGNOSIS — Z71.89 ACP (ADVANCE CARE PLANNING): ICD-10-CM

## 2025-08-21 DIAGNOSIS — I10 ESSENTIAL (PRIMARY) HYPERTENSION: Primary | ICD-10-CM

## 2025-08-21 DIAGNOSIS — R73.03 PREDIABETES: ICD-10-CM

## 2025-08-21 DIAGNOSIS — G89.29 CHRONIC PAIN OF BOTH KNEES: ICD-10-CM

## 2025-08-21 DIAGNOSIS — F31.10 BIPOLAR DISORDER, CURRENT EPISODE MANIC WITHOUT PSYCHOTIC FEATURES, UNSPECIFIED (HCC): ICD-10-CM

## 2025-08-21 DIAGNOSIS — E55.9 VITAMIN D DEFICIENCY: ICD-10-CM

## 2025-08-21 DIAGNOSIS — M25.562 CHRONIC PAIN OF BOTH KNEES: ICD-10-CM

## 2025-08-21 DIAGNOSIS — Z00.00 MEDICARE ANNUAL WELLNESS VISIT, SUBSEQUENT: ICD-10-CM

## 2025-08-21 DIAGNOSIS — M25.561 CHRONIC PAIN OF BOTH KNEES: ICD-10-CM

## 2025-08-21 DIAGNOSIS — M81.0 AGE-RELATED OSTEOPOROSIS WITHOUT CURRENT PATHOLOGICAL FRACTURE: ICD-10-CM

## 2025-08-21 PROCEDURE — 99214 OFFICE O/P EST MOD 30 MIN: CPT | Performed by: INTERNAL MEDICINE

## 2025-08-21 PROCEDURE — 99497 ADVNCD CARE PLAN 30 MIN: CPT | Performed by: INTERNAL MEDICINE

## 2025-08-21 RX ORDER — FENOFIBRATE 145 MG/1
145 TABLET, FILM COATED ORAL EVERY OTHER DAY
Qty: 45 TABLET | Refills: 1 | Status: SHIPPED | OUTPATIENT
Start: 2025-08-21

## 2025-08-21 RX ORDER — AMITRIPTYLINE HYDROCHLORIDE 50 MG/1
50 TABLET ORAL NIGHTLY
Qty: 90 TABLET | Refills: 1 | Status: SHIPPED | OUTPATIENT
Start: 2025-08-21

## 2025-08-21 RX ORDER — ATENOLOL 25 MG/1
25 TABLET ORAL EVERY MORNING
Qty: 90 TABLET | Refills: 1 | Status: SHIPPED | OUTPATIENT
Start: 2025-08-21

## 2025-08-21 RX ORDER — ALENDRONATE SODIUM 70 MG/1
70 TABLET ORAL
Qty: 12 TABLET | Refills: 3 | Status: SHIPPED | OUTPATIENT
Start: 2025-08-21

## 2025-08-21 RX ORDER — LOSARTAN POTASSIUM 25 MG/1
25 TABLET ORAL DAILY
Qty: 90 TABLET | Refills: 1 | Status: SHIPPED | OUTPATIENT
Start: 2025-08-21

## 2025-08-21 ASSESSMENT — ENCOUNTER SYMPTOMS
EYES NEGATIVE: 1
RESPIRATORY NEGATIVE: 1
GASTROINTESTINAL NEGATIVE: 1

## (undated) DEVICE — STAPLER SKIN SQ 30 ABSRB STPL DISP INSORB

## (undated) DEVICE — JELLY,LUBE,STERILE,FLIP TOP,TUBE,4-OZ: Brand: MEDLINE

## (undated) DEVICE — CONTAINER,SPECIMEN,3OZ,OR STRL: Brand: MEDLINE

## (undated) DEVICE — GLOVE ORTHO 8   MSG9480

## (undated) DEVICE — STERILE POLYISOPRENE POWDER-FREE SURGICAL GLOVES: Brand: PROTEXIS

## (undated) DEVICE — GOWN,SIRUS,NONRNF,SETINSLV,2XL,18/CS: Brand: MEDLINE

## (undated) DEVICE — GDWIRE URET STR STD .035X150 -- ZIPWIRE STD

## (undated) DEVICE — FCPS ENDOSCP 10MMX39.7CM -- ENDOPATH

## (undated) DEVICE — GARMENT,MEDLINE,DVT,INT,CALF,MED, GEN2: Brand: MEDLINE

## (undated) DEVICE — SOLIDIFIER MEDC 1200ML -- CONVERT TO 356117

## (undated) DEVICE — SUTURE VCRL SZ 2-0 L36IN ABSRB UD L40MM CT 1/2 CIR J957H

## (undated) DEVICE — DRAPE,EXTREMITY,89X128,STERILE: Brand: MEDLINE

## (undated) DEVICE — SUTURE VCRL SZ 2-0 L18IN ABSRB VLT L26MM SH 1/2 CIR J775D

## (undated) DEVICE — REM POLYHESIVE ADULT PATIENT RETURN ELECTRODE: Brand: VALLEYLAB

## (undated) DEVICE — Device

## (undated) DEVICE — TUBING SUCT 12FR MAL ALUM SHFT FN CAP VENT UNIV CONN W/ OBT

## (undated) DEVICE — SYRINGE MED 20ML STD CLR PLAS LUERLOCK TIP N CTRL DISP

## (undated) DEVICE — (D)SYR 10ML 1/5ML GRAD NSAF -- PKGING CHANGE USE ITEM 338027

## (undated) DEVICE — 4-PORT MANIFOLD: Brand: NEPTUNE 2

## (undated) DEVICE — BLADELESS OPTICAL TROCAR WITH FIXATION CANNULA: Brand: VERSAONE

## (undated) DEVICE — ZIMMER® STERILE DISPOSABLE TOURNIQUET CUFF WITH PLC, DUAL PORT, SINGLE BLADDER, 34 IN. (86 CM)

## (undated) DEVICE — GLOVE SURG SZ 65 L12IN FNGR THK94MIL STD WHT LTX FREE

## (undated) DEVICE — SOLUTION IRRIG 1000ML H2O STRL BLT

## (undated) DEVICE — SUTURE VCRL SZ 2-0 L27IN ABSRB VLT L26MM UR-6 5/8 CIR J602H

## (undated) DEVICE — 1200 GUARD II KIT W/5MM TUBE W/O VAC TUBE: Brand: GUARDIAN

## (undated) DEVICE — SUTURE VCRL SZ 3-0 L27IN ABSRB UD L26MM SH 1/2 CIR J416H

## (undated) DEVICE — YANKAUER,FLEXIBLE HANDLE,REGLR CAPACITY: Brand: MEDLINE INDUSTRIES, INC.

## (undated) DEVICE — SEALANT TISS 4 CC TISSEEL KT

## (undated) DEVICE — CYSTOSCOPY PACK: Brand: CONVERTORS

## (undated) DEVICE — GUIDEWIRE ENDOSCP L150CM DIA0.035IN TIP L15CM BENT PTFE

## (undated) DEVICE — SOLUTION IV 1000ML 0.9% SOD CHL

## (undated) DEVICE — BLUNT TROCAR WITH THREADED ANCHOR: Brand: VERSAONE

## (undated) DEVICE — CYSTO MRMC: Brand: MEDLINE INDUSTRIES, INC.

## (undated) DEVICE — INFECTION CONTROL KIT SYS

## (undated) DEVICE — DERMABOND SKIN ADH 0.7ML -- DERMABOND ADVANCED 12/BX

## (undated) DEVICE — GLOVE SURG SZ 65 L12IN FNGR THK79MIL GRN LTX FREE

## (undated) DEVICE — HANDPIECE SET WITH BONE CLEANING TIP AND SUCTION TUBE: Brand: INTERPULSE

## (undated) DEVICE — DRAPE EQUIP ROBOT STRL VELYS 20/PK ORDER IN MULTIIPLES OF 20 EACH

## (undated) DEVICE — YANKAUER,POOLE TIP,STERILE,50/CS: Brand: MEDLINE

## (undated) DEVICE — SCRUB DRY SURG EZ SCRUB BRUSH PREOPERATIVE GRN

## (undated) DEVICE — ARM DRAPE

## (undated) DEVICE — PREP KIT PEEL PTCH POVIDONE IOD

## (undated) DEVICE — SOLUTION IRRIG 3000ML 0.9% SOD CHL FLX CONT 0797208] ICU MEDICAL INC]

## (undated) DEVICE — SUTURE MONOCRYL STRATAFIX SPRL + 3 0 SGL ARMED PS 1 24 IN LEN SXMP1B103

## (undated) DEVICE — BLADE SAW W051XL276IN THK005IN CUT THK005IN REPL SAG FLR

## (undated) DEVICE — ELECTRODE PT RET AD L9FT HI MOIST COND ADH HYDRGEL CORDED

## (undated) DEVICE — GLOVE SURG SZ 8 L12IN FNGR THK94MIL STD WHT LTX FREE

## (undated) DEVICE — SCRUBIN SCRUB BRUSH DRY STER: Brand: MEDLINE INDUSTRIES, INC.

## (undated) DEVICE — PREP SKN PREVAIL 40ML APPL --

## (undated) DEVICE — SYRINGE 50ML E/T

## (undated) DEVICE — CATHETER URET 4FR L70CM POLYUR OLV FLX TIP KINK RESIST W/

## (undated) DEVICE — GLOVE SURG SZ 75 L12IN FNGR THK79MIL GRN LTX FREE

## (undated) DEVICE — GAUZE SPONGES,12 PLY: Brand: CURITY

## (undated) DEVICE — DRAPE FLD WRM W44XL66IN C6L FOR INTRATEMP SYS THERMABASIN

## (undated) DEVICE — KENDALL DL ECG CABLE AND LEAD WIRE SYSTEM, 3-LEAD, SINGLE PATIENT USE: Brand: KENDALL

## (undated) DEVICE — ADPT CATH URETH 2IN LF --

## (undated) DEVICE — PIN

## (undated) DEVICE — APPLICATOR FLEXIBLE 360D 40CM --

## (undated) DEVICE — BLUNT TIP LAPAROSCOPIC SEALER/DIVIDER: Brand: LIGASURE

## (undated) DEVICE — SOL IRR SOD CL 0.9% 1000ML BTL --

## (undated) DEVICE — NEEDLE HYPO 25GA L1.5IN BVL ORIENTED ECLIPSE

## (undated) DEVICE — SPONGE LAP 18X18IN STRL -- 5/PK

## (undated) DEVICE — ACCESS PLATFORM FOR MINIMALLY INVASIVE SURGERY.: Brand: GELPORT® LAPAROSCOPIC  SYSTEM

## (undated) DEVICE — INSUFFLATION NEEDLE: Brand: SURGINEEDLE

## (undated) DEVICE — HOOK LOCK LATEX FREE ELASTIC BANDAGE D/L 6INX10YD

## (undated) DEVICE — STERILE POLYISOPRENE POWDER-FREE SURGICAL GLOVES WITH EMOLLIENT COATING: Brand: PROTEXIS

## (undated) DEVICE — SUTURE VCRL 1 L27IN ABSRB CT BRAID COAT UD J281H

## (undated) DEVICE — SUTURE VCRL SZ 2-0 L36IN ABSRB VLT L36MM CT-1 1/2 CIR J345H

## (undated) DEVICE — TRAY CATHETER 16FR F INCLUDE LUB URIN M TEMP SENS 2 STATLOK STBL

## (undated) DEVICE — 3M™ TEGADERM™ TRANSPARENT FILM DRESSING FRAME STYLE, 1624W, 2-3/8 IN X 2-3/4 IN (6 CM X 7 CM), 100/CT 4CT/CASE: Brand: 3M™ TEGADERM™

## (undated) DEVICE — CANISTER, RIGID, 3000CC: Brand: MEDLINE INDUSTRIES, INC.

## (undated) DEVICE — BLADE SAW W098XL354IN THK0047IN CUT THK0047IN SAG

## (undated) DEVICE — OPEN-END URETERAL CATHETER: Brand: COOK

## (undated) DEVICE — NEEDLE HYPO 18GA L1.5IN PNK S STL HUB POLYPR SHLD REG BVL

## (undated) DEVICE — HANDLE LT SNAP ON ULT DURABLE LENS FOR TRUMPF ALC DISPOSABLE

## (undated) DEVICE — FCPS RAD JAW 4LC 240CM W/NDL -- BX/40

## (undated) DEVICE — TOTAL JOINT - SMH: Brand: MEDLINE INDUSTRIES, INC.

## (undated) DEVICE — TUBING IRRIG L77IN DIA0.241IN L BOR FOR CYSTO W/ NVENT

## (undated) DEVICE — SEAL UNIV 5-8MM DISP BX/10 -- DA VINCI XI - SNGL USE

## (undated) DEVICE — T4 HOOD

## (undated) DEVICE — PIN DRL 4X125 MM ROBOTIC-ASSISTED SOLUTION ARRY VELYS

## (undated) DEVICE — PREP SKN CHLRAPRP APL 26ML STR --

## (undated) DEVICE — CONTAINER,SPECIMEN,4OZ,OR STRL: Brand: MEDLINE

## (undated) DEVICE — SKIN MARKER,REGULAR TIP WITH RULER AND LABELS: Brand: DEVON

## (undated) DEVICE — HYPODERMIC SAFETY NEEDLE: Brand: MAGELLAN

## (undated) DEVICE — SPONGE GZ W4XL4IN COT 12 PLY TYP VII WVN C FLD DSGN STERILE

## (undated) DEVICE — SUTURE VCRL SZ 2-0 L27IN ABSRB VLT L26MM SH 1/2 CIR J317H

## (undated) DEVICE — SOLUTION IRRIG 1000ML STRL H2O USP PLAS POUR BTL

## (undated) DEVICE — 3M™ TEGADERM™ TRANSPARENT FILM DRESSING FRAME STYLE, 1626W, 4 IN X 4-3/4 IN (10 CM X 12 CM), 50/CT 4CT/CASE: Brand: 3M™ TEGADERM™

## (undated) DEVICE — SOLUTION IRRIG 1000ML 0.9% SOD CHL USP POUR PLAS BTL

## (undated) DEVICE — TOTAL TRAY, 16FR 10ML SIL FOLEY, URN: Brand: MEDLINE

## (undated) DEVICE — KENDALL SCD EXPRESS SLEEVES, KNEE LENGTH, MEDIUM: Brand: KENDALL SCD

## (undated) DEVICE — 40580 - THE PINK PAD - ADVANCED TRENDELENBURG POSITIONING KIT: Brand: 40580 - THE PINK PAD - ADVANCED TRENDELENBURG POSITIONING KIT

## (undated) DEVICE — SUTURE PROL SZ 2-0 L36IN NONABSORBABLE BLU SH L26MM 1/2 CIR 8523H

## (undated) DEVICE — GOWN,SIRUS,NONRNF,SETINSLV,XL,20/CS: Brand: MEDLINE

## (undated) DEVICE — SUTURE STRATAFIX SPRL SZ 1 L14IN ABSRB VLT L48CM CTX 1/2 SXPD2B405

## (undated) DEVICE — SYR 50ML LR LCK 1ML GRAD NSAF --

## (undated) DEVICE — SUTURE PERMAHAND SZ 2-0 L30IN NONABSORBABLE BLK SILK W/O A305H

## (undated) DEVICE — TELFA NON-ADHERENT ABSORBENT DRESSING: Brand: TELFA

## (undated) DEVICE — CATH KT URETH INTMIT 15FR LTX -- CONVERT TO ITEM 363176

## (undated) DEVICE — CONTINU-FLO SOLUTION SET, 2 INJECTION SITES, MALE LUER LOCK ADAPTER WITH RETRACTABLE COLLAR, LARGE BORE STOPCOCK WITH ROTATING MALE LUER LOCK EXTENSION SET, 2 INJECTION SITES, MALE LUER LOCK ADAPTER WITH RETRACTABLE COLLAR: Brand: INTERLINK/CONTINU-FLO

## (undated) DEVICE — DRAPE EQUIP SATELLITE STN STRL VELYS

## (undated) DEVICE — DEVON™ KNEE AND BODY STRAP 60" X 3" (1.5 M X 7.6 CM): Brand: DEVON

## (undated) DEVICE — TOWEL,OR,DSP,ST,BLUE,STD,2/PK,40PK/CS: Brand: MEDLINE

## (undated) DEVICE — PADDING CAST W6INXL4YD ST COT RAYON MICROPLEATED HIGHLY

## (undated) DEVICE — SPONGE GZ W4XL4IN COT 12 PLY TYP VII WVN C FLD DSGN

## (undated) DEVICE — SUTURE PDS II SZ 0 L60IN ABSRB VLT L48MM CTX 1/2 CIR Z990G

## (undated) DEVICE — SUTURE SZ 0 27IN 5/8 CIR UR-6  TAPER PT VIOLET ABSRB VICRYL J603H

## (undated) DEVICE — PADDING CAST SPEC 6INX4YD COT --

## (undated) DEVICE — SYR 10ML LUER LOK 1/5ML GRAD --

## (undated) DEVICE — WRAP SURG W1.31XL1.34M CARD FOR PT 165-172CM THERMOWRP

## (undated) DEVICE — SOLUTION SURG PREP 26 CC PURPREP

## (undated) DEVICE — CURVED, LARGE JAW, OPEN SEALER/DIVIDER NANO-COATED: Brand: LIGASURE IMPACT

## (undated) DEVICE — PADDING CAST W6INXL4YD NONSTERILE COT RAYON MICROPLEATED

## (undated) DEVICE — GLOVE ORANGE PI 7   MSG9070

## (undated) DEVICE — SUTURE MCRYL SZ 3-0 L27IN ABSRB UD L19MM PS-2 3/8 CIR PRIM Y427H

## (undated) DEVICE — SOLUTION LACTATED RINGERS INJECTION USP

## (undated) DEVICE — BANDAGE COMPR M W6INXL10YD WHT BGE VELC E MTRX HK AND LOOP

## (undated) DEVICE — SUTURE PERMAHAND SZ 0 L18IN NONABSORBABLE BLK SILK BRAID A186H

## (undated) DEVICE — MUI SCIENTIFIC BALLOON

## (undated) DEVICE — CONNECTOR TBNG IV 6-15 MM 6 IN Y STRL LF

## (undated) DEVICE — UNIVERSAL FIXATION CANNULA: Brand: VERSAPORT

## (undated) DEVICE — SUTURE PERMAHAND SZ 3-0 L30IN NONABSORBABLE BLK SILK BRAID A304H

## (undated) DEVICE — MAJOR LAPAROTOMY-MRMC: Brand: MEDLINE INDUSTRIES, INC.

## (undated) DEVICE — TRANSFER SET 3": Brand: MEDLINE INDUSTRIES, INC.

## (undated) DEVICE — BASIN EMESIS 500CC ROSE 250/CS 60/PLT: Brand: MEDEGEN MEDICAL PRODUCTS, LLC

## (undated) DEVICE — STERILE LAVH DRAPE PACK: Brand: CARDINAL HEALTH

## (undated) DEVICE — SUTURE VICRYL 1 L27IN ABSRB CT BRAID COAT UD J281H

## (undated) DEVICE — BLADE SAW OSCILLATING 85X19X2 MM ROBOTIC-ASSISTED VELYS

## (undated) DEVICE — PADDING CST 6IN STERILE --

## (undated) DEVICE — TRAP SURG QUAD PARABOLA SLOT DSGN SFTY SCRN TRAPEASE

## (undated) DEVICE — BANDAGE COMPR W6INXL12FT SMOOTH FOR LIMB EXSANG ESMARCH

## (undated) DEVICE — NEEDLE HYPO 21GA L1.5IN INTRAMUSCULAR S STL LATCH BVL UP

## (undated) DEVICE — SOLUTION IRRIGATION H2O 0797305] ICU MEDICAL INC]

## (undated) DEVICE — STOPCOCK IV 4 W TRNSPAR

## (undated) DEVICE — SYRINGE 20ML LL S/C 50

## (undated) DEVICE — SUTURE ABS MF 2-0 CT1 27IN STRATAFIX PDS+ SXPP1B412

## (undated) DEVICE — DRSG AQUACEL SURG 3.5 X 10IN -- CONVERT TO ITEM 370183

## (undated) DEVICE — SNARE VASC L240CM LOOP W10MM SHTH DIA2.4MM RND STIFF CLD

## (undated) DEVICE — TUBING IRRIG L96IN DIA0.241IN L BOR T-U-R W/ NVENT PIERCING

## (undated) DEVICE — SUT CHRMC 2-0 27IN CT1 BRN --

## (undated) DEVICE — HOOK RETRCT L5MM E SHRP SELF RET SYS LONE STAR

## (undated) DEVICE — 40418 TRENDELENBURG ONE-STEP ARM PROTECTORS LARGE (1 PAIR): Brand: 40418 TRENDELENBURG ONE-STEP ARM PROTECTORS LARGE (1 PAIR)

## (undated) DEVICE — SHEET, T, LAPAROTOMY, STERILE: Brand: MEDLINE

## (undated) DEVICE — GOWN,SIRUS,FABRNF,XL,20/CS: Brand: MEDLINE

## (undated) DEVICE — SUTURE MCRYL SZ 4-0 L27IN ABSRB UD L19MM PS-2 1/2 CIR PRIM Y426H

## (undated) DEVICE — HOOD, PEEL-AWAY: Brand: FLYTE

## (undated) DEVICE — 2108 SERIES SAGITTAL BLADE, NO OFFSET  (12.4 X 1.19 X 82.1MM)

## (undated) DEVICE — BOWL BNE CEM MIX SPAT CURET SMARTMIX CTS

## (undated) DEVICE — SYR 20ML LL STRL LF --

## (undated) DEVICE — SUTURE VCRL SZ 3-0 L18IN ABSRB UD L26MM SH 1/2 CIR J864D

## (undated) DEVICE — DRAPE,REIN 53X77,STERILE: Brand: MEDLINE

## (undated) DEVICE — DRAIN SURG W10MMXL20CM SIL FULL PERF HUBLESS FLAT RADPQ

## (undated) DEVICE — 3M™ IOBAN™ 2 ANTIMICROBIAL INCISE DRAPE 6651EZ: Brand: IOBAN™ 2

## (undated) DEVICE — INTENDED FOR TISSUE SEPARATION, AND OTHER PROCEDURES THAT REQUIRE A SHARP SURGICAL BLADE TO PUNCTURE OR CUT.: Brand: BARD-PARKER ® CARBON RIB-BACK BLADES

## (undated) DEVICE — BAG DRAIN URIN 2000ML LF STRL -- CONVERT TO ITEM 363123

## (undated) DEVICE — FRAZIER SUCTION INSTRUMENT 12 FR W/CONTROL VENT & OBTURATOR: Brand: FRAZIER

## (undated) DEVICE — ENDO CARRY-ON PROCEDURE KIT INCLUDES ENZYMATIC SPONGE, GAUZE, BIOHAZARD LABEL, TRAY, LUBRICANT, DIRTY SCOPE LABEL, WATER LABEL, TRAY, DRAWSTRING PAD, AND DEFENDO 4-PIECE KIT.: Brand: ENDO CARRY-ON PROCEDURE KIT

## (undated) DEVICE — SOLUTION IRRIG 3000ML 0.9% SOD CHL USP UROMATIC PLAS CONT

## (undated) DEVICE — 3M™ IOBAN™ 2 ANTIMICROBIAL INCISE DRAPE 6650EZ: Brand: IOBAN™ 2

## (undated) DEVICE — DRAPE,ROBOTICS,STERILE: Brand: MEDLINE

## (undated) DEVICE — SOLUTION WND IRRIGATION 450 ML 0.5 PVP-I 0.9 NACL

## (undated) DEVICE — APPLICATOR MEDICATED 26 CC SOLUTION HI LT ORNG CHLORAPREP

## (undated) DEVICE — BLADELESS OBTURATOR: Brand: WECK VISTA

## (undated) DEVICE — TRAY PREP DRY W/ PREM GLV 2 APPL 6 SPNG 2 UNDPD 1 OVERWRAP

## (undated) DEVICE — SUTURE PROL SZ 2-0 L36IN NONABSORBABLE BLU RB-1 L17MM 1/2 8559H

## (undated) DEVICE — VESSEL SEALER XI EXTENDED DISP -- VESSEL

## (undated) DEVICE — SUTURE STRATAFIX SYMMETRIC PDS + SZ 1 L18IN ABSRB VLT L48MM SXPP1A400

## (undated) DEVICE — TUBING, SUCTION, 1/4" X 10', STRAIGHT: Brand: MEDLINE

## (undated) DEVICE — SOLUTION SCRB 4OZ 10% PVP I POVIDONE IOD TOP PAINT EXIDINE